# Patient Record
Sex: FEMALE | Race: WHITE | URBAN - METROPOLITAN AREA
[De-identification: names, ages, dates, MRNs, and addresses within clinical notes are randomized per-mention and may not be internally consistent; named-entity substitution may affect disease eponyms.]

---

## 2018-11-28 ENCOUNTER — INPATIENT (INPATIENT)
Facility: HOSPITAL | Age: 83
LOS: 4 days | Discharge: HOME | End: 2018-12-03
Attending: HOSPITALIST | Admitting: HOSPITALIST
Payer: MEDICARE

## 2018-11-28 VITALS — HEIGHT: 63 IN | WEIGHT: 117.07 LBS

## 2018-11-28 PROBLEM — Z00.00 ENCOUNTER FOR PREVENTIVE HEALTH EXAMINATION: Status: ACTIVE | Noted: 2018-11-28

## 2018-11-28 LAB
ABO RH CONFIRMATION: SIGNIFICANT CHANGE UP
ADD ON TEST-SPECIMEN IN LAB: SIGNIFICANT CHANGE UP
ALBUMIN SERPL ELPH-MCNC: 3.2 G/DL — LOW (ref 3.3–5)
ALP SERPL-CCNC: 80 U/L — SIGNIFICANT CHANGE UP (ref 40–120)
ALT FLD-CCNC: 25 U/L — SIGNIFICANT CHANGE UP (ref 12–78)
ANION GAP SERPL CALC-SCNC: 10 MMOL/L — SIGNIFICANT CHANGE UP (ref 5–17)
ANISOCYTOSIS BLD QL: SIGNIFICANT CHANGE UP
APPEARANCE UR: CLEAR — SIGNIFICANT CHANGE UP
APTT BLD: 25.4 SEC — LOW (ref 27.5–36.3)
AST SERPL-CCNC: 26 U/L — SIGNIFICANT CHANGE UP (ref 15–37)
BACTERIA # UR AUTO: ABNORMAL
BASO STIPL BLD QL SMEAR: PRESENT — SIGNIFICANT CHANGE UP
BASOPHILS # BLD AUTO: 0.01 K/UL — SIGNIFICANT CHANGE UP (ref 0–0.2)
BASOPHILS NFR BLD AUTO: 0.1 % — SIGNIFICANT CHANGE UP (ref 0–2)
BILIRUB SERPL-MCNC: 0.2 MG/DL — SIGNIFICANT CHANGE UP (ref 0.2–1.2)
BILIRUB UR-MCNC: NEGATIVE — SIGNIFICANT CHANGE UP
BLD GP AB SCN SERPL QL: SIGNIFICANT CHANGE UP
BUN SERPL-MCNC: 24 MG/DL — HIGH (ref 7–23)
CALCIUM SERPL-MCNC: 8.9 MG/DL — SIGNIFICANT CHANGE UP (ref 8.5–10.1)
CHLORIDE SERPL-SCNC: 104 MMOL/L — SIGNIFICANT CHANGE UP (ref 96–108)
CK SERPL-CCNC: 46 U/L — SIGNIFICANT CHANGE UP (ref 26–192)
CO2 SERPL-SCNC: 26 MMOL/L — SIGNIFICANT CHANGE UP (ref 22–31)
COLOR SPEC: YELLOW — SIGNIFICANT CHANGE UP
CREAT SERPL-MCNC: 0.99 MG/DL — SIGNIFICANT CHANGE UP (ref 0.5–1.3)
D DIMER BLD IA.RAPID-MCNC: 194 NG/ML DDU — SIGNIFICANT CHANGE UP
DIFF PNL FLD: NEGATIVE — SIGNIFICANT CHANGE UP
ELLIPTOCYTES BLD QL SMEAR: SLIGHT — SIGNIFICANT CHANGE UP
EOSINOPHIL # BLD AUTO: 0.03 K/UL — SIGNIFICANT CHANGE UP (ref 0–0.5)
EOSINOPHIL NFR BLD AUTO: 0.4 % — SIGNIFICANT CHANGE UP (ref 0–6)
EPI CELLS # UR: SIGNIFICANT CHANGE UP
GLUCOSE SERPL-MCNC: 118 MG/DL — HIGH (ref 70–99)
GLUCOSE UR QL: NEGATIVE MG/DL — SIGNIFICANT CHANGE UP
HCT VFR BLD CALC: 18.4 % — CRITICAL LOW (ref 34.5–45)
HGB BLD-MCNC: 5.7 G/DL — CRITICAL LOW (ref 11.5–15.5)
HYPOCHROMIA BLD QL: SLIGHT — SIGNIFICANT CHANGE UP
IMM GRANULOCYTES NFR BLD AUTO: 0.3 % — SIGNIFICANT CHANGE UP (ref 0–1.5)
INR BLD: 0.99 RATIO — SIGNIFICANT CHANGE UP (ref 0.88–1.16)
KETONES UR-MCNC: NEGATIVE — SIGNIFICANT CHANGE UP
LEUKOCYTE ESTERASE UR-ACNC: ABNORMAL
LYMPHOCYTES # BLD AUTO: 1.6 K/UL — SIGNIFICANT CHANGE UP (ref 1–3.3)
LYMPHOCYTES # BLD AUTO: 23.7 % — SIGNIFICANT CHANGE UP (ref 13–44)
MACROCYTES BLD QL: SLIGHT — SIGNIFICANT CHANGE UP
MAGNESIUM SERPL-MCNC: 1.9 MG/DL — SIGNIFICANT CHANGE UP (ref 1.6–2.6)
MANUAL SMEAR VERIFICATION: SIGNIFICANT CHANGE UP
MCHC RBC-ENTMCNC: 31 GM/DL — LOW (ref 32–36)
MCHC RBC-ENTMCNC: 31.3 PG — SIGNIFICANT CHANGE UP (ref 27–34)
MCV RBC AUTO: 101.1 FL — HIGH (ref 80–100)
MICROCYTES BLD QL: SLIGHT — SIGNIFICANT CHANGE UP
MONOCYTES # BLD AUTO: 0.62 K/UL — SIGNIFICANT CHANGE UP (ref 0–0.9)
MONOCYTES NFR BLD AUTO: 9.2 % — SIGNIFICANT CHANGE UP (ref 2–14)
NEUTROPHILS # BLD AUTO: 4.47 K/UL — SIGNIFICANT CHANGE UP (ref 1.8–7.4)
NEUTROPHILS NFR BLD AUTO: 66.3 % — SIGNIFICANT CHANGE UP (ref 43–77)
NITRITE UR-MCNC: NEGATIVE — SIGNIFICANT CHANGE UP
NRBC # BLD: 0 /100 WBCS — SIGNIFICANT CHANGE UP (ref 0–0)
PH UR: 6.5 — SIGNIFICANT CHANGE UP (ref 5–8)
PHOSPHATE SERPL-MCNC: 3 MG/DL — SIGNIFICANT CHANGE UP (ref 2.5–4.5)
PLAT MORPH BLD: NORMAL — SIGNIFICANT CHANGE UP
PLATELET # BLD AUTO: 201 K/UL — SIGNIFICANT CHANGE UP (ref 150–400)
POIKILOCYTOSIS BLD QL AUTO: SLIGHT — SIGNIFICANT CHANGE UP
POLYCHROMASIA BLD QL SMEAR: SLIGHT — SIGNIFICANT CHANGE UP
POTASSIUM SERPL-MCNC: 4.1 MMOL/L — SIGNIFICANT CHANGE UP (ref 3.5–5.3)
POTASSIUM SERPL-SCNC: 4.1 MMOL/L — SIGNIFICANT CHANGE UP (ref 3.5–5.3)
PROT SERPL-MCNC: 6.2 GM/DL — SIGNIFICANT CHANGE UP (ref 6–8.3)
PROT UR-MCNC: NEGATIVE MG/DL — SIGNIFICANT CHANGE UP
PROTHROM AB SERPL-ACNC: 11 SEC — SIGNIFICANT CHANGE UP (ref 10–12.9)
RBC # BLD: 1.82 M/UL — LOW (ref 3.8–5.2)
RBC # FLD: 17.2 % — HIGH (ref 10.3–14.5)
RBC BLD AUTO: ABNORMAL
RBC CASTS # UR COMP ASSIST: SIGNIFICANT CHANGE UP /HPF (ref 0–4)
ROULEAUX BLD QL SMEAR: PRESENT
SODIUM SERPL-SCNC: 140 MMOL/L — SIGNIFICANT CHANGE UP (ref 135–145)
SP GR SPEC: 1 — LOW (ref 1.01–1.02)
TROPONIN I SERPL-MCNC: 0.02 NG/ML — SIGNIFICANT CHANGE UP (ref 0.01–0.04)
TROPONIN I SERPL-MCNC: 0.03 NG/ML — SIGNIFICANT CHANGE UP (ref 0.01–0.04)
TYPE + AB SCN PNL BLD: SIGNIFICANT CHANGE UP
UROBILINOGEN FLD QL: NEGATIVE MG/DL — SIGNIFICANT CHANGE UP
WBC # BLD: 6.75 K/UL — SIGNIFICANT CHANGE UP (ref 3.8–10.5)
WBC # FLD AUTO: 6.75 K/UL — SIGNIFICANT CHANGE UP (ref 3.8–10.5)
WBC UR QL: SIGNIFICANT CHANGE UP

## 2018-11-28 PROCEDURE — 74176 CT ABD & PELVIS W/O CONTRAST: CPT | Mod: 26

## 2018-11-28 PROCEDURE — 71045 X-RAY EXAM CHEST 1 VIEW: CPT | Mod: 26

## 2018-11-28 PROCEDURE — 93010 ELECTROCARDIOGRAM REPORT: CPT

## 2018-11-28 PROCEDURE — 99291 CRITICAL CARE FIRST HOUR: CPT

## 2018-11-28 RX ORDER — PANTOPRAZOLE SODIUM 20 MG/1
40 TABLET, DELAYED RELEASE ORAL EVERY 12 HOURS
Qty: 0 | Refills: 0 | Status: DISCONTINUED | OUTPATIENT
Start: 2018-11-29 | End: 2018-11-29

## 2018-11-28 RX ORDER — FUROSEMIDE 40 MG
20 TABLET ORAL DAILY
Qty: 0 | Refills: 0 | Status: DISCONTINUED | OUTPATIENT
Start: 2018-11-28 | End: 2018-12-03

## 2018-11-28 RX ORDER — ALPRAZOLAM 0.25 MG
0.25 TABLET ORAL AT BEDTIME
Qty: 0 | Refills: 0 | Status: DISCONTINUED | OUTPATIENT
Start: 2018-11-28 | End: 2018-12-03

## 2018-11-28 RX ORDER — LOSARTAN POTASSIUM 100 MG/1
50 TABLET, FILM COATED ORAL ONCE
Qty: 0 | Refills: 0 | Status: COMPLETED | OUTPATIENT
Start: 2018-11-28 | End: 2018-11-28

## 2018-11-28 RX ORDER — PANTOPRAZOLE SODIUM 20 MG/1
TABLET, DELAYED RELEASE ORAL
Qty: 0 | Refills: 0 | Status: DISCONTINUED | OUTPATIENT
Start: 2018-11-28 | End: 2018-11-29

## 2018-11-28 RX ORDER — ACETAMINOPHEN 500 MG
650 TABLET ORAL EVERY 6 HOURS
Qty: 0 | Refills: 0 | Status: DISCONTINUED | OUTPATIENT
Start: 2018-11-28 | End: 2018-12-03

## 2018-11-28 RX ORDER — HYDRALAZINE HCL 50 MG
25 TABLET ORAL EVERY 12 HOURS
Qty: 0 | Refills: 0 | Status: DISCONTINUED | OUTPATIENT
Start: 2018-11-28 | End: 2018-12-03

## 2018-11-28 RX ORDER — ATORVASTATIN CALCIUM 80 MG/1
40 TABLET, FILM COATED ORAL AT BEDTIME
Qty: 0 | Refills: 0 | Status: DISCONTINUED | OUTPATIENT
Start: 2018-11-28 | End: 2018-12-03

## 2018-11-28 RX ORDER — PANTOPRAZOLE SODIUM 20 MG/1
40 TABLET, DELAYED RELEASE ORAL ONCE
Qty: 0 | Refills: 0 | Status: COMPLETED | OUTPATIENT
Start: 2018-11-28 | End: 2018-11-28

## 2018-11-28 RX ORDER — METOPROLOL TARTRATE 50 MG
25 TABLET ORAL DAILY
Qty: 0 | Refills: 0 | Status: DISCONTINUED | OUTPATIENT
Start: 2018-11-28 | End: 2018-12-03

## 2018-11-28 RX ORDER — SODIUM CHLORIDE 9 MG/ML
1 INJECTION INTRAMUSCULAR; INTRAVENOUS; SUBCUTANEOUS THREE TIMES A DAY
Qty: 0 | Refills: 0 | Status: DISCONTINUED | OUTPATIENT
Start: 2018-11-28 | End: 2018-12-03

## 2018-11-28 RX ORDER — SODIUM CHLORIDE 9 MG/ML
500 INJECTION INTRAMUSCULAR; INTRAVENOUS; SUBCUTANEOUS ONCE
Qty: 0 | Refills: 0 | Status: COMPLETED | OUTPATIENT
Start: 2018-11-28 | End: 2018-11-28

## 2018-11-28 RX ADMIN — SODIUM CHLORIDE 500 MILLILITER(S): 9 INJECTION INTRAMUSCULAR; INTRAVENOUS; SUBCUTANEOUS at 16:07

## 2018-11-28 RX ADMIN — PANTOPRAZOLE SODIUM 40 MILLIGRAM(S): 20 TABLET, DELAYED RELEASE ORAL at 21:38

## 2018-11-28 RX ADMIN — LOSARTAN POTASSIUM 50 MILLIGRAM(S): 100 TABLET, FILM COATED ORAL at 20:09

## 2018-11-28 RX ADMIN — Medication 25 MILLIGRAM(S): at 22:59

## 2018-11-28 RX ADMIN — SODIUM CHLORIDE 500 MILLILITER(S): 9 INJECTION INTRAMUSCULAR; INTRAVENOUS; SUBCUTANEOUS at 15:07

## 2018-11-28 RX ADMIN — Medication 25 MILLIGRAM(S): at 21:38

## 2018-11-28 RX ADMIN — ATORVASTATIN CALCIUM 40 MILLIGRAM(S): 80 TABLET, FILM COATED ORAL at 22:59

## 2018-11-28 NOTE — H&P ADULT - ASSESSMENT
89 yo female presented with chest pain, sob and weakness.      A/P:    1.  Chest pain  Shortness of breath  Weakness  Acute Anemia  ??Acute blood loss  -will give 2 units PRBC transfusion  -follow clinically and cbc  -follow CT Abd/Pelvis report  -hold aspirin and plavix  -on IV protonix   -follow GI and Cardiology consults    2.  HTN  -follow BP and adjust medications as needed    3.  Anxiety  -on Xanax    4.  HLD  -on statin    5.  h/o Hyponatremia  -on salt tabs    6.  SCD for DVT ppx

## 2018-11-28 NOTE — ED PROVIDER NOTE - MEDICAL DECISION MAKING DETAILS
pt with hx of anemia, lymphoma, salt wasting disease here with many days of chest pressure and weakness. Labs, CXR, saline.

## 2018-11-28 NOTE — ED PROVIDER NOTE - CARE PLAN
Principal Discharge DX:	Severe anemia  Secondary Diagnosis:	Weakness  Secondary Diagnosis:	Chest pain, unspecified type

## 2018-11-28 NOTE — ED ADULT NURSE REASSESSMENT NOTE - NS ED NURSE REASSESS COMMENT FT1
Called Dr Roque in reference to patient's elevated bp. Just gave patient 25mg hydralyzine.  Dr roque stated to let floor monitor and call him back if bp remains elevated.  called floor and spoke with Verito on 3e

## 2018-11-28 NOTE — ED PROVIDER NOTE - OBJECTIVE STATEMENT
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 +HA, chest discomfort. States that usually she gets episodes of weakness and chest discomfort during the morning, right after she wakes up. Describes the chest discomfort as "heaviness". As per daughter at bedside, pt is visiting from [place] and has been in [place] for about a week. Denies fever, cough, diarrhea, bladder incontinence. No hx of MI. No hx of CVA. Takes ASA. Nonsmoker.

## 2018-11-28 NOTE — ED ADULT NURSE NOTE - NSIMPLEMENTINTERV_GEN_ALL_ED
Implemented All Fall Risk Interventions:  [place] to call system. Call bell, personal items and telephone within reach. Instruct patient to call for assistance. Room bathroom lighting operational. Non-slip footwear when patient is off stretcher. Physically safe environment: no spills, clutter or unnecessary equipment. Stretcher in lowest position, wheels locked, appropriate side rails in place. Provide visual cue, wrist band, yellow gown, etc. Monitor gait and stability. Monitor for mental status changes and reorient to person, place, and time. Review medications for side effects contributing to fall risk. Reinforce activity limits and safety measures with patient and family.

## 2018-11-28 NOTE — H&P ADULT - NSHPPHYSICALEXAM_GEN_ALL_CORE
Vital Signs Last 24 Hrs  T(C): 36.5 (28 Nov 2018 22:43), Max: 37 (28 Nov 2018 12:37)  T(F): 97.7 (28 Nov 2018 22:43), Max: 98.6 (28 Nov 2018 12:37)  HR: 87 (28 Nov 2018 22:43) (85 - 96)  BP: 196/63 (28 Nov 2018 22:43) (171/64 - 205/80)  RR: 17 (28 Nov 2018 22:43) (17 - 19)  SpO2: 98% (28 Nov 2018 22:43) (98% - 100%)

## 2018-11-28 NOTE — H&P ADULT - NEUROLOGICAL DETAILS
responds to pain/cranial nerves intact/normal strength/alert and oriented x 3/responds to verbal commands

## 2018-11-28 NOTE — H&P ADULT - HISTORY OF PRESENT ILLNESS
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 +HA, chest discomfort. States that usually she gets episodes of weakness and chest discomfort during the morning, right after she wakes up. Describes the chest discomfort as "heaviness". As per daughter at bedside, pt is visiting from [place] and has been in [place] for about a week. Denies fever, cough, diarrhea, bladder incontinence. No hx of MI. No hx of CVA. Takes ASA. 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 +HA, chest discomfort. States that usually she gets episodes of weakness and chest discomfort during the morning, right after she wakes up. Describes the chest discomfort as "heaviness". As per daughter at bedside, pt is visiting from [place] and has been in [place] for about a week. Denies fever, cough, diarrhea, bladder incontinence. No hx of MI. No hx of CVA. Takes ASA. Nonsmoker. She denies any abdominal pain, any black stool, any blood in stool, any vomiting of blood or any type of active bleeding.     Family hx;  Patient is unable to provide detail family history due to her current clinical status.

## 2018-11-28 NOTE — ED STATDOCS - PROGRESS NOTE DETAILS
Craig NP for Dr. 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 As per daughter at bedside, pt is visiting from [place] and has been in [place] for about a week. Pt recently had a flu shot done, and believes it got infected. Will send pt to main ED for further evaluation.

## 2018-11-29 LAB
ANION GAP SERPL CALC-SCNC: 8 MMOL/L — SIGNIFICANT CHANGE UP (ref 5–17)
BASOPHILS # BLD AUTO: 0.01 K/UL — SIGNIFICANT CHANGE UP (ref 0–0.2)
BASOPHILS NFR BLD AUTO: 0.2 % — SIGNIFICANT CHANGE UP (ref 0–2)
BUN SERPL-MCNC: 26 MG/DL — HIGH (ref 7–23)
CALCIUM SERPL-MCNC: 8.7 MG/DL — SIGNIFICANT CHANGE UP (ref 8.5–10.1)
CHLORIDE SERPL-SCNC: 105 MMOL/L — SIGNIFICANT CHANGE UP (ref 96–108)
CO2 SERPL-SCNC: 26 MMOL/L — SIGNIFICANT CHANGE UP (ref 22–31)
CREAT SERPL-MCNC: 0.71 MG/DL — SIGNIFICANT CHANGE UP (ref 0.5–1.3)
EOSINOPHIL # BLD AUTO: 0.07 K/UL — SIGNIFICANT CHANGE UP (ref 0–0.5)
EOSINOPHIL NFR BLD AUTO: 1.2 % — SIGNIFICANT CHANGE UP (ref 0–6)
GLUCOSE SERPL-MCNC: 91 MG/DL — SIGNIFICANT CHANGE UP (ref 70–99)
HCT VFR BLD CALC: 26 % — LOW (ref 34.5–45)
HGB BLD-MCNC: 8.5 G/DL — LOW (ref 11.5–15.5)
IMM GRANULOCYTES NFR BLD AUTO: 0.3 % — SIGNIFICANT CHANGE UP (ref 0–1.5)
LYMPHOCYTES # BLD AUTO: 1.15 K/UL — SIGNIFICANT CHANGE UP (ref 1–3.3)
LYMPHOCYTES # BLD AUTO: 19.5 % — SIGNIFICANT CHANGE UP (ref 13–44)
MCHC RBC-ENTMCNC: 30.2 PG — SIGNIFICANT CHANGE UP (ref 27–34)
MCHC RBC-ENTMCNC: 32.7 GM/DL — SIGNIFICANT CHANGE UP (ref 32–36)
MCV RBC AUTO: 92.5 FL — SIGNIFICANT CHANGE UP (ref 80–100)
MONOCYTES # BLD AUTO: 0.78 K/UL — SIGNIFICANT CHANGE UP (ref 0–0.9)
MONOCYTES NFR BLD AUTO: 13.2 % — SIGNIFICANT CHANGE UP (ref 2–14)
NEUTROPHILS # BLD AUTO: 3.88 K/UL — SIGNIFICANT CHANGE UP (ref 1.8–7.4)
NEUTROPHILS NFR BLD AUTO: 65.6 % — SIGNIFICANT CHANGE UP (ref 43–77)
NRBC # BLD: 0 /100 WBCS — SIGNIFICANT CHANGE UP (ref 0–0)
PLATELET # BLD AUTO: 153 K/UL — SIGNIFICANT CHANGE UP (ref 150–400)
POTASSIUM SERPL-MCNC: 4 MMOL/L — SIGNIFICANT CHANGE UP (ref 3.5–5.3)
POTASSIUM SERPL-SCNC: 4 MMOL/L — SIGNIFICANT CHANGE UP (ref 3.5–5.3)
RBC # BLD: 2.81 M/UL — LOW (ref 3.8–5.2)
RBC # FLD: 17.8 % — HIGH (ref 10.3–14.5)
SODIUM SERPL-SCNC: 139 MMOL/L — SIGNIFICANT CHANGE UP (ref 135–145)
TROPONIN I SERPL-MCNC: 0.04 NG/ML — SIGNIFICANT CHANGE UP (ref 0.01–0.04)
WBC # BLD: 5.91 K/UL — SIGNIFICANT CHANGE UP (ref 3.8–10.5)
WBC # FLD AUTO: 5.91 K/UL — SIGNIFICANT CHANGE UP (ref 3.8–10.5)

## 2018-11-29 RX ORDER — PANTOPRAZOLE SODIUM 20 MG/1
8 TABLET, DELAYED RELEASE ORAL
Qty: 80 | Refills: 0 | Status: DISCONTINUED | OUTPATIENT
Start: 2018-11-29 | End: 2018-11-30

## 2018-11-29 RX ORDER — PANTOPRAZOLE SODIUM 20 MG/1
80 TABLET, DELAYED RELEASE ORAL ONCE
Qty: 0 | Refills: 0 | Status: DISCONTINUED | OUTPATIENT
Start: 2018-11-29 | End: 2018-11-30

## 2018-11-29 RX ADMIN — SODIUM CHLORIDE 1 GRAM(S): 9 INJECTION INTRAMUSCULAR; INTRAVENOUS; SUBCUTANEOUS at 14:15

## 2018-11-29 RX ADMIN — PANTOPRAZOLE SODIUM 10 MG/HR: 20 TABLET, DELAYED RELEASE ORAL at 21:21

## 2018-11-29 RX ADMIN — Medication 25 MILLIGRAM(S): at 05:31

## 2018-11-29 RX ADMIN — Medication 20 MILLIGRAM(S): at 05:32

## 2018-11-29 RX ADMIN — ATORVASTATIN CALCIUM 40 MILLIGRAM(S): 80 TABLET, FILM COATED ORAL at 21:18

## 2018-11-29 RX ADMIN — PANTOPRAZOLE SODIUM 40 MILLIGRAM(S): 20 TABLET, DELAYED RELEASE ORAL at 05:31

## 2018-11-29 RX ADMIN — SODIUM CHLORIDE 1 GRAM(S): 9 INJECTION INTRAMUSCULAR; INTRAVENOUS; SUBCUTANEOUS at 05:31

## 2018-11-29 RX ADMIN — SODIUM CHLORIDE 1 GRAM(S): 9 INJECTION INTRAMUSCULAR; INTRAVENOUS; SUBCUTANEOUS at 21:18

## 2018-11-29 RX ADMIN — Medication 25 MILLIGRAM(S): at 17:45

## 2018-11-29 RX ADMIN — PANTOPRAZOLE SODIUM 40 MILLIGRAM(S): 20 TABLET, DELAYED RELEASE ORAL at 17:44

## 2018-11-29 NOTE — CONSULT NOTE ADULT - SUBJECTIVE AND OBJECTIVE BOX
Patient is a 89y old  Female who presents with a chief complaint of complain of sob and chest pain .      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 +HA, chest discomfort. States that usually she gets episodes of weakness and chest discomfort during the morning, right after she wakes up. Describes the chest discomfort as "heaviness". As per daughter at the time of admission, pt is visiting from [place] and has been in [place] for about a week. Denies fever, cough, diarrhea, bladder incontinence. No hx of MI. No hx of CVA. Takes ASA. Nonsmoker. She denies any abdominal pain, any black stool, any blood in stool, any vomiting of blood or any type of active bleeding.       Pt received PRBC transfusion and she states that she felt better after that.  Denies any SOB or CP at this time.  She states that her primary compliant was generalized weakness and she had some SOB.  With SOB she states that she had chest heaviness.  Stress test last was many yrs ago per pt.  She had similar symptoms in [place] and she had loop recorder implanted.  Denies any prior cardiac hx.        PAST MEDICAL & SURGICAL HISTORY:  Lymphoma  HTN (hypertension)  Iron deficiency  No significant past surgical history      MEDICATIONS  (STANDING):  atorvastatin 40 milliGRAM(s) Oral at bedtime  furosemide    Tablet 20 milliGRAM(s) Oral daily  hydrALAZINE 25 milliGRAM(s) Oral every 12 hours  metoprolol succinate ER 25 milliGRAM(s) Oral daily  pantoprazole  Injectable      pantoprazole  Injectable 40 milliGRAM(s) IV Push every 12 hours  sodium chloride 1 Gram(s) Oral three times a day    MEDICATIONS  (PRN):  acetaminophen   Tablet .. 650 milliGRAM(s) Oral every 6 hours PRN Temp greater or equal to 38C (100.4F), Mild Pain (1 - 3)  ALPRAZolam 0.25 milliGRAM(s) Oral at bedtime PRN anxiety or insomnia      FAMILY HISTORY:  No family history of premature CAD or SCD.     SOCIAL HISTORY:  no smoker in last few yrs.     REVIEW OF SYSTEMS:  CONSTITUTIONAL:    No fatigue, malaise, lethargy.  No fever or chills. per HPI  HEENT:  Eyes:  No visual changes.     ENT:  No epistaxis.  No sinus pain.    RESPIRATORY:  No cough.  No wheeze.  No hemoptysis.  c/o shortness of breath.  CARDIOVASCULAR:  c/o chest pains.  No palpitations. c/o shortness of breath, No orthopnea or PND.  GASTROINTESTINAL:  No abdominal pain.  No nausea or vomiting.    GENITOURINARY:    No hematuria.    MUSCULOSKELETAL:  No musculoskeletal pain.  No joint swelling.  No arthritis.  NEUROLOGICAL:  No tingling or numbness or weakness.  PSYCHIATRIC:  No confusion  SKIN:  No rashes.    ENDOCRINE:  No unexplained weight loss.  No polydipsia.   HEMATOLOGIC:  No anemia.  No prolonged or excessive bleeding.   ALLERGIC AND IMMUNOLOGIC:  No pruritus.          Vital Signs Last 24 Hrs  T(C): 36.6 (2018 04:41), Max: 37 (2018 12:37)  T(F): 97.8 (2018 04:41), Max: 98.6 (2018 12:37)  HR: 86 (2018 04:41) (85 - 96)  BP: 147/49 (2018 04:41) (147/49 - 205/80)  BP(mean): --  RR: 17 (2018 22:43) (17 - 19)  SpO2: 97% (2018 04:41) (97% - 100%)    PHYSICAL EXAM-    Constitutional: elderly frail , pale looking female in no acute distress.     Head: Head is normocephalic and atraumatic.      Neck: No jugular venous distention. No audible carotid bruits.   Cardiovascular: Regular rate and rhythm without S3, S4. No murmurs or rubs are appreciated.      Respiratory: Breath sounds are normal. No rales. No wheezing.    Abdomen: Soft, nontender, nondistended with positive bowel sounds.      Extremity: No tenderness. No  pitting edema     Neurologic: The patient is alert and oriented.      Skin: No rash, no obvious lesions noted.      Psychiatric: The patient appears to be emotionally stable.      INTERPRETATION OF TELEMETRY: sinus rythm.     ECG: Sinus rythm , normal axis, no ST T changes. isolated PVCs.     I&O's Detail    2018 07:01  -  2018 07:00  --------------------------------------------------------  IN:    Packed Red Blood Cells: 605 mL  Total IN: 605 mL    OUT:  Total OUT: 0 mL    Total NET: 605 mL          LABS:                        5.7    6.75  )-----------( 201      ( 2018 14:27 )             18.4         140  |  104  |  24<H>  ----------------------------<  118<H>  4.1   |  26  |  0.99    Ca    8.9      2018 14:27  Phos  3.0       Mg     1.9         TPro  6.2  /  Alb  3.2<L>  /  TBili  0.2  /  DBili  x   /  AST  26  /  ALT  25  /  AlkPhos  80      CARDIAC MARKERS ( 2018 01:15 )  0.041 ng/mL / x     / x     / x     / x      CARDIAC MARKERS ( 2018 15:40 )  0.023 ng/mL / x     / x     / x     / x      CARDIAC MARKERS ( 2018 14:27 )  0.027 ng/mL / x     / 46 U/L / x     / x          PT/INR - ( 2018 15:40 )   PT: 11.0 sec;   INR: 0.99 ratio         PTT - ( 2018 15:40 )  PTT:25.4 sec  Urinalysis Basic - ( 2018 14:27 )    Color: Yellow / Appearance: Clear / S.005 / pH: x  Gluc: x / Ketone: Negative  / Bili: Negative / Urobili: Negative mg/dL   Blood: x / Protein: Negative mg/dL / Nitrite: Negative   Leuk Esterase: Trace / RBC: 0-2 /HPF / WBC 3-5   Sq Epi: x / Non Sq Epi: Few / Bacteria: Few      I&O's Summary    2018 07:01  -  2018 07:00  --------------------------------------------------------  IN: 605 mL / OUT: 0 mL / NET: 605 mL      BNP  RADIOLOGY & ADDITIONAL STUDIES:  < from: CT Abdomen and Pelvis No Cont (18 @ 21:37) >    ******PRELIMINARY REPORT******    ******PRELIMINARY REPORT******          EXAM:  CT ABDOMEN AND PELVIS                            PROCEDURE DATE:  2018    ******PRELIMINARY REPORT******    ******PRELIMINARY REPORT******              INTERPRETATION:  [facility] PRELIMINARY REPORT    EXAM:    CT Abdomen and Pelvis Without Intravenous Contrast     EXAM DATE/TIME:    2018 9:45 PM     CLINICAL HISTORY:    89 years old, female; Pain; Abdominal pain; Generalized; Patient HX:   Drop in   hb from 11.3 to 5.7     TECHNIQUE:    Axial computed tomography images of the abdomen and pelvis without   intravenous   contrast.    Coronal and sagittal reformatted images were created and reviewed.     COMPARISON:    No relevant prior studies available.     FINDINGS:    Lower thorax:  Nodular soft tissue density right middle lobe measuring 1   cm.   Possible linear versus nodular soft tissue density within the right   middle and   lingula.     ABDOMEN:    Liver: Normal. No mass.    Gallbladder and bile ducts: Normal. No calcified stones. No ductal   dilation.    Pancreas: Normal. No ductal dilation.    Spleen: Normal. No splenomegaly.    Adrenals: Normal. No mass.    Kidneys and ureters:  Subcentimeter cyst involving the lower pole of the   left   kidney, poorly characterize.    Stomach and bowel:  No evidence of bowel obstruction. Mild - moderate   amount   retained stool material throughout nondilated colon.    Appendix:  Appendix - visualized portions appear normal.     PELVIS:    Bladder:  Moderate fluid distention of the bladder.    Reproductive:  Uterus is not identified.     ABDOMEN and PELVIS:    Intraperitoneal space:  No evidence of focal hematoma or intraperitoneal   hemorrhage.    Bones/joints:  Chronic degenerative changes of the lumbar spine.    Soft tissues: Unremarkable.    Vasculature:  Chronic atherosclerotic calcification of the vasculature.    Lymph nodes: Normal. No enlarged lymph nodes.     IMPRESSION:   1. No evidence of focal hematoma or intraperitoneal hemorrhage.   2. No evidence of bowel obstruction.   3. Moderate fluid distention of the bladder.   4. Subcentimeter cyst involving the lower pole of the left kidney, poorly   characterize.   5. Nodular soft tissue density right middle lobe measuring 1 cm. Possible   linear versus nodular soft tissue density within the right middle and   lingula.   For low and high risk patients, CT at 3-6 months, then consider CT at   18-24   months. For high risk patients, consider CT at 3-6 months, then CT at   18-24   months.          ******PRELIMINARY REPORT******    ******PRELIMINARY REPORT******              EZIO AGUSTIN M.D.;[facility] RADIOLOG[facility]      < end of copied text >

## 2018-11-29 NOTE — PROGRESS NOTE ADULT - SUBJECTIVE AND OBJECTIVE BOX
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 +HA, chest discomfort. States that usually she gets episodes of weakness and chest discomfort during the morning, right after she wakes up. Describes the chest discomfort as "heaviness". As per daughter at bedside, pt is visiting from [place] and has been in [place] for about a week. Denies fever, cough, diarrhea, bladder incontinence. No hx of MI. No hx of CVA. Takes ASA. Nonsmoker. She denies any abdominal pain, any black stool, any blood in stool, any vomiting of blood or any type of active bleeding.     : Lying in bed, comfortable offers no complaints. Denies fever, chills, N, V, abd pain, CP, SOB.    REVIEW OF SYSTEMS: All other review of systems is negative unless indicated above.    Vital Signs Last 24 Hrs  T(C): 36.9 (2018 12:04), Max: 36.9 (2018 12:04)  T(F): 98.4 (2018 12:04), Max: 98.4 (2018 12:04)  HR: 85 (2018 12:04) (85 - 90)  BP: 120/44 (2018 12:04) (120/44 - 205/80)  BP(mean): --  RR: 15 (2018 12:04) (15 - 19)  SpO2: 96% (2018 12:04) (96% - 98%)    PHYSICAL EXAM:    Constitutional: NAD, awake and alert, frail, elderly  HEENT: PERR, EOMI, Normal Hearing, MMM  Neck: Soft and supple  Respiratory: Breath sounds are clear bilaterally, No wheezing, rales or rhonchi  Cardiovascular: S1 and S2, regular rate and rhythm, no Murmurs, gallops or rubs  Gastrointestinal: Bowel Sounds present, soft, nontender, nondistended, no guarding, no rebound  Extremities: No peripheral edema  Neurological: A/O x 3, no focal deficits in my limited exam  Skin: No rashes        MEDICATIONS  (STANDING):  atorvastatin 40 milliGRAM(s) Oral at bedtime  furosemide    Tablet 20 milliGRAM(s) Oral daily  hydrALAZINE 25 milliGRAM(s) Oral every 12 hours  metoprolol succinate ER 25 milliGRAM(s) Oral daily  pantoprazole  Injectable      pantoprazole  Injectable 40 milliGRAM(s) IV Push every 12 hours  sodium chloride 1 Gram(s) Oral three times a day    MEDICATIONS  (PRN):  acetaminophen   Tablet .. 650 milliGRAM(s) Oral every 6 hours PRN Temp greater or equal to 38C (100.4F), Mild Pain (1 - 3)  ALPRAZolam 0.25 milliGRAM(s) Oral at bedtime PRN anxiety or insomnia    CARDIAC MARKERS ( 2018 01:15 )  0.041 ng/mL / x     / x     / x     / x      CARDIAC MARKERS ( 2018 15:40 )  0.023 ng/mL / x     / x     / x     / x      CARDIAC MARKERS ( 2018 14:27 )  0.027 ng/mL / x     / 46 U/L / x     / x                                8.5    5.91  )-----------( 153      ( 2018 06:08 )             26.0         139  |  105  |  26<H>  ----------------------------<  91  4.0   |  26  |  0.71    Ca    8.7      2018 06:08  Phos  3.0       Mg     1.9         TPro  6.2  /  Alb  3.2<L>  /  TBili  0.2  /  DBili  x   /  AST  26  /  ALT  25  /  AlkPhos  80      CAPILLARY BLOOD GLUCOSE        LIVER FUNCTIONS - ( 2018 14:27 )  Alb: 3.2 g/dL / Pro: 6.2 gm/dL / ALK PHOS: 80 U/L / ALT: 25 U/L / AST: 26 U/L / GGT: x           PT/INR - ( 2018 15:40 )   PT: 11.0 sec;   INR: 0.99 ratio         PTT - ( 2018 15:40 )  PTT:25.4 sec  Urinalysis Basic - ( 2018 14:27 )    Color: Yellow / Appearance: Clear / S.005 / pH: x  Gluc: x / Ketone: Negative  / Bili: Negative / Urobili: Negative mg/dL   Blood: x / Protein: Negative mg/dL / Nitrite: Negative   Leuk Esterase: Trace / RBC: 0-2 /HPF / WBC 3-5   Sq Epi: x / Non Sq Epi: Few / Bacteria: Few      Assessment and Plan:  89 yo female presented with chest pain, sob and weakness.    CP/SOB/weakness: Probably secondary to acute on chronic anemia: r/o GI bleed  -trops neg x 3  -chest xray no acute pathology  -2 units PRBC transfusion  -CT Abd/Pelvis --> Moderate fluid distention of the bladder.  Nodular soft tissue density right middle lobe measuring 1 cm.  -hold aspirin and plavix  -on IV protonix   -plan for scope    Hx of lymphoma:  -onc on board    HTN  -loop recorder --> sinus 90s  -follow BP and adjust medications as needed    Anxiety  -on Xanax    HLD  -on statin    h/o Hyponatremia  -on salt tabs      SCD for DVT ppx

## 2018-11-29 NOTE — CHART NOTE - NSCHARTNOTEFT_GEN_A_CORE
EP asked to interrogate implantable loop monitor:    Rhythm is sinus 90's.  Episodes of sinus tachycardia/atrial tach in past few days.    No true atrial fib seen.  No pauses, heart block or bradycardia.

## 2018-11-29 NOTE — CONSULT NOTE ADULT - ASSESSMENT
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 +HA, chest discomfort. Hematology consult requested for anemia. 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 +HA, chest discomfort. Hematology consult requested for anemia.  Hb on admission 5.7 g/dl s/p transfusion of 2 units of PRBCs HB 8.5g/dl. Likely GI bleed, as per patient scheduled for EGD and colonoscopy.     Anemia work up ordered, will evaluate iron levels and determine if patient will benefit from iron infusion.    Request records from oncologist in [place]. CT of the abdomen and pelvis does not show evidence of disease.     Continue supportive care.

## 2018-11-29 NOTE — CONSULT NOTE ADULT - ASSESSMENT
SOB and chest pressure- likely from severe anemia.  So far cardiac enzymes and EKG did not reveal any ischemia.   No further workup recommended unless symptoms recur.   f/u with cardiologist as outpt.     s/p Loop recorder impantation- will request interrogation of the device.    Anemia- recheck Hgb.  Management pre primary team.     HTN- BP mildly elevated.  Resume outpt meds for HTN.    Other medical issues- Management per primary team.   Thank you for allowing me to participate in the care of this patient. Please feel free to contact me with any questions. SOB and chest pressure- likely from severe anemia.  So far cardiac enzymes and EKG did not reveal any ischemia.   No further workup recommended unless symptoms recur.   f/u with cardiologist as outpt.     s/p Loop recorder impantation- will request interrogation of the device.    Anemia- recheck Hgb.  Management pre primary team.     HTN- BP mildly elevated.  Resume outpt meds for HTN.    Other medical issues- Management per primary team.   Thank you for allowing me to participate in the care of this patient. Please feel free to contact me with any questions.     Addendum-  Pt stated to gi team that she had syncope and that is the reason why loop recorder implanted.She did not endorse this to me or to EP team.  Her loop interrogation showed tachycardia in the last few days that is probably sinus in nature based on rythm strips.  This could be from severe anemia.  Per Gi team pt has black stools and would need workup.  We do not have any of her ischemic HD workup here in our system.  Histjohnny as stated in HPI.  Patient is a moderate risk patient for a moderate risk procedure with poor functional tolerance.  Patient can proceed with anticipated procedure without any further cardiac testing.  Patient is optimized from cardiac standpoint.  Continue periop betablockers.

## 2018-11-29 NOTE — CONSULT NOTE ADULT - SUBJECTIVE AND OBJECTIVE BOX
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 +HA, chest discomfort. Hematology consult requested for anemia.     PAST MEDICAL & SURGICAL HISTORY:  Lymphoma  HTN (hypertension)  Iron deficiency  No significant past surgical history    FAMILY HISTORY:  not significant.        Review of Systems:  · Negative General Symptoms	no fever; no chills	  · Negative Skin Symptoms	no rash; no itching	  · Negative Ophthalmologic Symptoms	no diplopia; no photophobia	  · Negative ENMT Symptoms	no hearing difficulty; no ear pain	  · Negative Respiratory and Thorax Symptoms	no wheezing; no cough	  · Respiratory and Thorax Symptoms	dyspnea 	  · Negative Cardiovascular Symptoms	no palpitations	  · Cardiovascular Symptoms	chest pain	  · Negative Gastrointestinal Symptoms	no nausea; no vomiting; no diarrhea	  · Negative General Genitourinary Symptoms	no hematuria; no renal colic	  · Negative Musculoskeletal Symptoms	no arthralgia; no arthritis	  · Negative Neurological Symptoms	no transient paralysis; no weakness	  · Negative Psychiatric Symptoms	no suicidal ideation; no depression	  · Negative Hematology Symptoms	no gum bleeding; no nose bleeding	  · Negative Endocrine Symptoms	no cold intolerance; no heat intolerance	  · Negative Allergic Reactions	no anaphylaxis; no respiratory distress	      MEDICATIONS  (STANDING):  atorvastatin 40 milliGRAM(s) Oral at bedtime  furosemide    Tablet 20 milliGRAM(s) Oral daily  hydrALAZINE 25 milliGRAM(s) Oral every 12 hours  metoprolol succinate ER 25 milliGRAM(s) Oral daily  pantoprazole  Injectable      pantoprazole  Injectable 40 milliGRAM(s) IV Push every 12 hours  sodium chloride 1 Gram(s) Oral three times a day    MEDICATIONS  (PRN):  acetaminophen   Tablet .. 650 milliGRAM(s) Oral every 6 hours PRN Temp greater or equal to 38C (100.4F), Mild Pain (1 - 3)  ALPRAZolam 0.25 milliGRAM(s) Oral at bedtime PRN anxiety or insomnia        ICU Vital Signs Last 24 Hrs  T(C): 36.6 (29 Nov 2018 04:41), Max: 37 (28 Nov 2018 12:37)  T(F): 97.8 (29 Nov 2018 04:41), Max: 98.6 (28 Nov 2018 12:37)  HR: 86 (29 Nov 2018 04:41) (85 - 96)  BP: 147/49 (29 Nov 2018 04:41) (147/49 - 205/80)  BP(mean): --  ABP: --  ABP(mean): --  RR: 17 (28 Nov 2018 22:43) (17 - 19)  SpO2: 97% (29 Nov 2018 04:41) (97% - 100%)      General elderly  woman in NAD  HEENT  Lungs: Bilaterally clear, no W/R/C  CVS: S1 s2 regular, no M/R/G  Abdomen: soft NT ND positive for BS, no organomegaly.  Extremities: No C/C/E      Complete Blood Count + Automated Diff (11.29.18 @ 06:08)    WBC Count: 5.91 K/uL    RBC Count: 2.81 M/uL    Hemoglobin: 8.5 g/dL    Hematocrit: 26.0 %    Mean Cell Volume: 92.5 fl    Mean Cell Hemoglobin: 30.2 pg    Mean Cell Hemoglobin Conc: 32.7 gm/dL    Red Cell Distrib Width: 17.8 %    Platelet Count - Automated: 153 K/uL    Auto Neutrophil #: 3.88 K/uL    Auto Lymphocyte #: 1.15 K/uL    Auto Monocyte #: 0.78 K/uL    Auto Eosinophil #: 0.07 K/uL    Auto Basophil #: 0.01 K/uL    Auto Neutrophil %: 65.6: Differential percentages must be correlated with absolute numbers for  clinical significance. %    Auto Lymphocyte %: 19.5 %    Auto Monocyte %: 13.2 %    Auto Eosinophil %: 1.2 %    Auto Basophil %: 0.2 %    Auto Immature Granulocyte %: 0.3 %      Basic Metabolic Panel (11.29.18 @ 06:08)    Sodium, Serum: 139 mmol/L    Potassium, Serum: 4.0 mmol/L    Chloride, Serum: 105 mmol/L    Carbon Dioxide, Serum: 26 mmol/L    Anion Gap, Serum: 8 mmol/L    Blood Urea Nitrogen, Serum: 26 mg/dL      Creatinine, Serum: 0.71 mg/dL    Glucose, Serum: 91 mg/dL    Calcium, Total Serum: 8.7 mg/dL    eGFR if Non : 73: Interpretative comment  The units for eGFR are ml/min/1.73m2 (normalized body surface area). The  eGFR is calculated from a serum creatinine using the CKD-EPI equation.  Other variables required for calculation are race, age and sex. Among  patients with chronic kidney disease (CKD), the eGFR is useful in  determining the stage of disease according to KDOQI CKD classification.  All eGFR results are reported numerically with the following  interpretation.          GFR                    With                 Without     (ml/min/1.73 m2)    Kidney Damage       Kidney Damage        >= 90                    Stage 1                     Normal        60-89                    Stage 2                     Decreased GFR        30-59     Stage 3                     Stage 3        15-29                    Stage 4                     Stage 4        < 15                      Stage 5                     Stage 5  Each stage of CKD assumes that the associated GFR level has been in  effect for at least 3 months. Determination of stages one and two (with  eGFR > 59 ml/min/m2) requires estimation of kidney damage for at least 3  months as defined by structural or functional abnormalities.  Limitations: All estimates of GFR will be less accurate for patients at  extremes of muscle mass (including but not limited to frail elderly,  critically ill, or cancer patients), those with unusual diets, and those  with conditions associated with reduced secretion or extrarenal  elimination of creatinine. The eGFR equation is not recommended for use  in patients with unstable creatinine levels. 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 +HA, chest discomfort. Hematology consult requested for anemia.     History obtained from patient and daughter. History of presumptive NHL approx 5 years ago, treated with chemotherapy, saw oncologist in [place] 3 weeks ago. Patient also has history of hemorrhoids.     PAST MEDICAL & SURGICAL HISTORY:  Lymphoma  HTN (hypertension)  Iron deficiency  No significant past surgical history    FAMILY HISTORY:  not significant.        Review of Systems:  · Negative General Symptoms	no fever; no chills	  · Negative Skin Symptoms	no rash; no itching	  · Negative Ophthalmologic Symptoms	no diplopia; no photophobia	  · Negative ENMT Symptoms	no hearing difficulty; no ear pain	  · Negative Respiratory and Thorax Symptoms	no wheezing; no cough	  · Respiratory and Thorax Symptoms	dyspnea 	  · Negative Cardiovascular Symptoms	no palpitations	  · Cardiovascular Symptoms	chest pain	  · Negative Gastrointestinal Symptoms	no nausea; no vomiting; no diarrhea	  · Negative General Genitourinary Symptoms	no hematuria; no renal colic	  · Negative Musculoskeletal Symptoms	no arthralgia; no arthritis	  · Negative Neurological Symptoms	no transient paralysis; no weakness	  · Negative Psychiatric Symptoms	no suicidal ideation; no depression	  · Negative Hematology Symptoms	no gum bleeding; no nose bleeding	  · Negative Endocrine Symptoms	no cold intolerance; no heat intolerance	  · Negative Allergic Reactions	no anaphylaxis; no respiratory distress	      MEDICATIONS  (STANDING):  atorvastatin 40 milliGRAM(s) Oral at bedtime  furosemide    Tablet 20 milliGRAM(s) Oral daily  hydrALAZINE 25 milliGRAM(s) Oral every 12 hours  metoprolol succinate ER 25 milliGRAM(s) Oral daily  pantoprazole  Injectable      pantoprazole  Injectable 40 milliGRAM(s) IV Push every 12 hours  sodium chloride 1 Gram(s) Oral three times a day    MEDICATIONS  (PRN):  acetaminophen   Tablet .. 650 milliGRAM(s) Oral every 6 hours PRN Temp greater or equal to 38C (100.4F), Mild Pain (1 - 3)  ALPRAZolam 0.25 milliGRAM(s) Oral at bedtime PRN anxiety or insomnia        ICU Vital Signs Last 24 Hrs  T(C): 36.6 (29 Nov 2018 04:41), Max: 37 (28 Nov 2018 12:37)  T(F): 97.8 (29 Nov 2018 04:41), Max: 98.6 (28 Nov 2018 12:37)  HR: 86 (29 Nov 2018 04:41) (85 - 96)  BP: 147/49 (29 Nov 2018 04:41) (147/49 - 205/80)  BP(mean): --  ABP: --  ABP(mean): --  RR: 17 (28 Nov 2018 22:43) (17 - 19)  SpO2: 97% (29 Nov 2018 04:41) (97% - 100%)      General elderly  woman in NAD  HEENT  Lungs: Bilaterally clear, no W/R/C  CVS: S1 s2 regular, no M/R/G  Abdomen: soft NT ND positive for BS, no organomegaly.  Extremities: No C/C/E      Complete Blood Count + Automated Diff (11.29.18 @ 06:08)    WBC Count: 5.91 K/uL    RBC Count: 2.81 M/uL    Hemoglobin: 8.5 g/dL    Hematocrit: 26.0 %    Mean Cell Volume: 92.5 fl    Mean Cell Hemoglobin: 30.2 pg    Mean Cell Hemoglobin Conc: 32.7 gm/dL    Red Cell Distrib Width: 17.8 %    Platelet Count - Automated: 153 K/uL    Auto Neutrophil #: 3.88 K/uL    Auto Lymphocyte #: 1.15 K/uL    Auto Monocyte #: 0.78 K/uL    Auto Eosinophil #: 0.07 K/uL    Auto Basophil #: 0.01 K/uL    Auto Neutrophil %: 65.6: Differential percentages must be correlated with absolute numbers for  clinical significance. %    Auto Lymphocyte %: 19.5 %    Auto Monocyte %: 13.2 %    Auto Eosinophil %: 1.2 %    Auto Basophil %: 0.2 %    Auto Immature Granulocyte %: 0.3 %      Basic Metabolic Panel (11.29.18 @ 06:08)    Sodium, Serum: 139 mmol/L    Potassium, Serum: 4.0 mmol/L    Chloride, Serum: 105 mmol/L    Carbon Dioxide, Serum: 26 mmol/L    Anion Gap, Serum: 8 mmol/L    Blood Urea Nitrogen, Serum: 26 mg/dL      Creatinine, Serum: 0.71 mg/dL    Glucose, Serum: 91 mg/dL    Calcium, Total Serum: 8.7 mg/dL    eGFR if Non : 73: Interpretative comment  The units for eGFR are ml/min/1.73m2 (normalized body surface area). The  eGFR is calculated from a serum creatinine using the CKD-EPI equation.  Other variables required for calculation are race, age and sex. Among  patients with chronic kidney disease (CKD), the eGFR is useful in  determining the stage of disease according to KDOQI CKD classification.  All eGFR results are reported numerically with the following  interpretation.          GFR                    With                 Without     (ml/min/1.73 m2)    Kidney Damage       Kidney Damage        >= 90                    Stage 1                     Normal        60-89                    Stage 2                     Decreased GFR        30-59     Stage 3                     Stage 3        15-29                    Stage 4                     Stage 4        < 15                      Stage 5                     Stage 5  Each stage of CKD assumes that the associated GFR level has been in  effect for at least 3 months. Determination of stages one and two (with  eGFR > 59 ml/min/m2) requires estimation of kidney damage for at least 3  months as defined by structural or functional abnormalities.  Limitations: All estimates of GFR will be less accurate for patients at  extremes of muscle mass (including but not limited to frail elderly,  critically ill, or cancer patients), those with unusual diets, and those  with conditions associated with reduced secretion or extrarenal  elimination of creatinine. The eGFR equation is not recommended for use  in patients with unstable creatinine levels. mL/min/1.73M2    eGFR if African American: 84 mL/min/1.73M2        Troponin I, Serum Repeat 3 hours x 2 occurrences (11.28.18 @ 15:40)    Troponin I, Serum: 0.023: High Sensitivity Troponin and new reference  range effective 7/6/2016 ng/mL          FINDINGS:    Lower thorax:  Nodular soft tissue density right middle lobe measuring 1   cm.   Possible linear versus nodular soft tissue density within the right   middle and   lingula. 1 cm calcified granuloma in RIGHT middle lobe.    ABDOMEN:    Liver: Normal. No mass.    Gallbladder and bile ducts: Normal. No calcified stones. No ductal   dilation.    Pancreas: Normal. No ductal dilation.    Spleen: Normal. No splenomegaly.    Adrenals: Normal. No mass.    Kidneys and ureters:  Subcentimeter cyst involving the lower pole of the   left   kidney, poorly characterize.    Stomach and bowel:  No evidence of bowel obstruction. Mild - moderate   amount   retained stool material throughout nondilated colon.    Appendix:  Appendix - visualized portions appear normal.     PELVIS:    Bladder:  Moderate fluid distention of the bladder.    Reproductive:  Uterus is not identified.     ABDOMEN and PELVIS:    Intraperitoneal space:  No evidence of focal hematoma or intraperitoneal   hemorrhage.    Bones/joints:  Chronic degenerative changes of the lumbar spine.     Multilevel kyphosis at T11 L2 and L4. Compression deformity of the   superior endplate of L5 with loss of 25% of vertebral body height,   indeterminate in age.   Soft tissues: Unremarkable.    Vasculature:  Chronic atherosclerotic calcification of the vasculature.    Lymph nodes: Normal. No enlarged lymph nodes.     IMPRESSION:   1. No evidence of focalhematoma or intraperitoneal hemorrhage.   2. No evidence of bowel obstruction.   3. Moderate fluid distention of the bladder.   4. Subcentimeter cyst involving the lower pole of the left kidney, poorly   characterize.   5. Nodular soft tissue densityright middle lobe measuring 1 cm. Possible   linear versus nodular soft tissue density within the right middle and   lingula.   For low and high risk patients, CT at 3-6 months, then consider CT at   18-24   months. For high risk patients, consider CT at 3-6 months, then CT at   18-24   months.  6.Multilevel kyphosis at T11 L2 and L4. Compression deformity of the   superior endplate of L5 with loss of 25% of vertebral body height,   indeterminate in age.

## 2018-11-29 NOTE — CONSULT NOTE ADULT - SUBJECTIVE AND OBJECTIVE BOX
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 +HA, chest discomfort. States that usually she gets episodes of weakness and chest discomfort during the morning, right after she wakes up. Describes the chest discomfort as "heaviness". As per daughter at bedside, pt is visiting from [place] and has been in [place] for about a week. Denies fever, cough, diarrhea, bladder incontinence. No hx of MI. No hx of CVA. Takes ASA. Nonsmoker. She denies any abdominal pain, any black stool, any blood in stool, any vomiting of blood or any type of active bleeding.     Family hx;  Patient is unable to provide detail family history due to her current clinical status. (28 Nov 2018 20:43)      Patient is pleasant and very oriented. History is from patient as well as son who is a gastroenterologist in [place]. I discussed the case with son and obtained permission for evaluation as well.  Patient states that her hemoglobin recently was 11.3. Additionally, patient states that she had been hospitalized too long before episode of syncope at which time, she had a cardiac monitor placed and additionally, for reasons that she is not clear and son also is not clear, she was started on Plavix about a month ago.  She has noted some tarry stools however, that she was discussed to starting iron tablets which she took after feeling weak.  Any nausea or vomiting or weight loss. Had a colonoscopy years ago that was negative.      PAST MEDICAL & SURGICAL HISTORY:  Lymphoma  HTN (hypertension)  Iron deficiency  No significant past surgical history      MEDICATIONS  (STANDING):  atorvastatin 40 milliGRAM(s) Oral at bedtime  furosemide    Tablet 20 milliGRAM(s) Oral daily  hydrALAZINE 25 milliGRAM(s) Oral every 12 hours  metoprolol succinate ER 25 milliGRAM(s) Oral daily  pantoprazole  Injectable 80 milliGRAM(s) IV Push once  pantoprazole Infusion 8 mG/Hr (10 mL/Hr) IV Continuous <Continuous>  sodium chloride 1 Gram(s) Oral three times a day    MEDICATIONS  (PRN):  acetaminophen   Tablet .. 650 milliGRAM(s) Oral every 6 hours PRN Temp greater or equal to 38C (100.4F), Mild Pain (1 - 3)  ALPRAZolam 0.25 milliGRAM(s) Oral at bedtime PRN anxiety or insomnia      Allergies    penicillin (Hives)  sulfa drugs (Hives)  tetracycline (Hives)    Intolerances        SOCIAL HISTORY:neg drugs, lives at home and visiting    FAMILY HISTORY:M with colon ca      REVIEW OF SYSTEMS:    CONSTITUTIONAL: No weakness, fevers or chills  EYES/ENT: No visual changes;  No vertigo or throat pain   NECK: No pain or stiffness  RESPIRATORY: No cough, wheezing, hemoptysis; No shortness of breath  CARDIOVASCULAR: No chest pain or palpitations  GENITOURINARY: No dysuria, frequency or hematuria  NEUROLOGICAL: No numbness or weakness  SKIN: No itching, burning, rashes, or lesions   All other review of systems is negative unless indicated above.    Vital Signs Last 24 Hrs  T(C): 36.8 (29 Nov 2018 16:36), Max: 36.9 (29 Nov 2018 12:04)  T(F): 98.2 (29 Nov 2018 16:36), Max: 98.4 (29 Nov 2018 12:04)  HR: 90 (29 Nov 2018 16:36) (85 - 90)  BP: 134/49 (29 Nov 2018 16:36) (120/44 - 196/63)  BP(mean): --  RR: 15 (29 Nov 2018 12:04) (15 - 18)  SpO2: 98% (29 Nov 2018 16:36) (96% - 98%)    PHYSICAL EXAM:    Constitutional: NAD, well-developed  HEENT: EOMI, throat clear  Neck: No LAD, supple  Respiratory: CTA and P  Cardiovascular: S1 and S2, RRR, no M  Gastrointestinal: BS+, soft, NT/ND, neg HSM,  Extremities: No peripheral edema, neg clubing, cyanosis  Vascular: 2+ peripheral pulses  Neurological: A/O x 3, no focal deficits  Psychiatric: Normal mood, normal affect  Skin: No rashes    rectal, melena, G pos    LABS:  CBC Full  -  ( 29 Nov 2018 06:08 )  WBC Count : 5.91 K/uL  Hemoglobin : 8.5 g/dL  Hematocrit : 26.0 %  Platelet Count - Automated : 153 K/uL  Mean Cell Volume : 92.5 fl  Mean Cell Hemoglobin : 30.2 pg  Mean Cell Hemoglobin Concentration : 32.7 gm/dL  Auto Neutrophil # : 3.88 K/uL  Auto Lymphocyte # : 1.15 K/uL  Auto Monocyte # : 0.78 K/uL  Auto Eosinophil # : 0.07 K/uL  Auto Basophil # : 0.01 K/uL  Auto Neutrophil % : 65.6 %  Auto Lymphocyte % : 19.5 %  Auto Monocyte % : 13.2 %  Auto Eosinophil % : 1.2 %  Auto Basophil % : 0.2 %    11-29    139  |  105  |  26<H>  ----------------------------<  91  4.0   |  26  |  0.71    Ca    8.7      29 Nov 2018 06:08  Phos  3.0     11-28  Mg     1.9     11-28    TPro  6.2  /  Alb  3.2<L>  /  TBili  0.2  /  DBili  x   /  AST  26  /  ALT  25  /  AlkPhos  80  11-28    PT/INR - ( 28 Nov 2018 15:40 )   PT: 11.0 sec;   INR: 0.99 ratio         PTT - ( 28 Nov 2018 15:40 )  PTT:25.4 sec        RADIOLOGY & ADDITIONAL STUDIES:  < from: CT Abdomen and Pelvis No Cont (11.28.18 @ 21:37) >  EXAM:  CT ABDOMEN AND PELVIS                            PROCEDURE DATE:  11/28/2018          INTERPRETATION:      EXAM:    CT Abdomen and Pelvis Without Intravenous Contrast     EXAM DATE/TIME:    11/28/2018 9:45 PM     CLINICAL HISTORY:    89 years old, female; Pain; Abdominal pain; Generalized; Patient HX:   Drop in   hb from 11.3 to 5.7     TECHNIQUE:    Axial computed tomography images of the abdomen and pelvis without   intravenous   contrast.    Coronal and sagittal reformatted images were created and reviewed.     COMPARISON:    No relevant prior studies available.     FINDINGS:    Lower thorax:  Nodular soft tissue density right middle lobe measuring 1   cm.   Possible linear versus nodular soft tissue density within the right   middle and   lingula. 1 cm calcified granuloma in RIGHT middle lobe.    ABDOMEN:    Liver: Normal. No mass.    Gallbladder and bile ducts: Normal. No calcified stones. No ductal   dilation.    Pancreas: Normal. No ductal dilation.    Spleen: Normal. No splenomegaly.    Adrenals: Normal. No mass.    Kidneys and ureters:  Subcentimeter cyst involving the lower pole of the   left   kidney, poorly characterize.    Stomach and bowel:  No evidence of bowel obstruction. Mild - moderate   amount   retained stool material throughout nondilated colon.    Appendix:  Appendix - visualized portions appear normal.     PELVIS:    Bladder:  Moderate fluid distention of the bladder.    Reproductive:  Uterus is not identified.     ABDOMEN and PELVIS:    Intraperitoneal space:  No evidence of focal hematoma or intraperitoneal   hemorrhage.    Bones/joints:  Chronic degenerative changes of the lumbar spine.   Multilevel kyphosis at T11 L2 and L4. Compression deformity of the   superior endplate of L5 with loss of 25% of vertebral body height,   indeterminate in age.   Soft tissues: Unremarkable.    Vasculature:  Chronic atherosclerotic calcification of the vasculature.    Lymph nodes: Normal. No enlarged lymph nodes.     IMPRESSION:   1. No evidence of focalhematoma or intraperitoneal hemorrhage.   2. No evidence of bowel obstruction.   3. Moderate fluid distention of the bladder.   4. Subcentimeter cyst involving the lower pole of the left kidney, poorly   characterize.   5. Nodular soft tissue densityright middle lobe measuring 1 cm. Possible   linear versus nodular soft tissue density within the right middle and   lingula.   For low and high risk patients, CT at 3-6 months, then consider CT at   18-24   months. For high risk patients, consider CT at 3-6 months, then CT at   18-24   months.  6.Multilevel kyphosis at T11 L2 and L4. Compression deformity of the   superior endplate of L5 with loss of 25% of vertebral body height,   indeterminate in age.      < end of copied text >

## 2018-11-29 NOTE — PROVIDER CONTACT NOTE (OTHER) - SITUATION
Consult left with Dr. Mera' answering service.
Office aware of consult : spoke to pawan
Service aware of consult.

## 2018-11-29 NOTE — CONSULT NOTE ADULT - ASSESSMENT
Anemia–secondary to upper GI bleeding. BUN to creatinine ratio is elevated. Patient was recently started on Plavix.  I discussed with patient and son evaluation.  There would like endoscopy.  Would initiate patient on PPI drip.  Aspirin and Plavix should be held  Case discussed with cardiology as well as patient's son was a gastroenterologist.    History of lymphoma, however, patient states that this is in remission.    DVT prophylaxis  Ambulation as tolerated  Extensive discussion with patient and son regarding family history of colon cancer and I cannot rule out colonic pathology however, due to advanced age and likely upper GI bleed, they would like to avoid colonoscopy.  His also unclear as to why patient has been on Plavix.  Need to continue this, would need to be discussed with cardiology, I have discussed this with cardiology and we are awaiting further information    Endoscopy, alternatives benefits and risks reviewed.

## 2018-11-30 ENCOUNTER — RESULT REVIEW (OUTPATIENT)
Age: 83
End: 2018-11-30

## 2018-11-30 LAB
% ALBUMIN: 59.5 % — SIGNIFICANT CHANGE UP
% ALPHA 1: 8.4 % — SIGNIFICANT CHANGE UP
% ALPHA 2: 14.3 % — SIGNIFICANT CHANGE UP
% BETA: 11.7 % — SIGNIFICANT CHANGE UP
% GAMMA: 6.1 % — SIGNIFICANT CHANGE UP
% M SPIKE: 1.5 % — SIGNIFICANT CHANGE UP
ALBUMIN SERPL ELPH-MCNC: 3 G/DL — LOW (ref 3.6–5.5)
ALBUMIN/GLOB SERPL ELPH: 1.4 RATIO — SIGNIFICANT CHANGE UP
ALPHA1 GLOB SERPL ELPH-MCNC: 0.4 G/DL — SIGNIFICANT CHANGE UP (ref 0.1–0.4)
ALPHA2 GLOB SERPL ELPH-MCNC: 0.7 G/DL — SIGNIFICANT CHANGE UP (ref 0.5–1)
B-GLOBULIN SERPL ELPH-MCNC: 0.6 G/DL — SIGNIFICANT CHANGE UP (ref 0.5–1)
FERRITIN SERPL-MCNC: 207 NG/ML — HIGH (ref 15–150)
FOLATE SERPL-MCNC: 13 NG/ML — SIGNIFICANT CHANGE UP
GAMMA GLOBULIN: 0.3 G/DL — LOW (ref 0.6–1.6)
IGA FLD-MCNC: 76 MG/DL — LOW (ref 84–499)
IGG FLD-MCNC: 254 MG/DL — LOW (ref 610–1660)
IGM SERPL-MCNC: 58 MG/DL — SIGNIFICANT CHANGE UP (ref 35–242)
INTERPRETATION SERPL IFE-IMP: SIGNIFICANT CHANGE UP
IRON SATN MFR SERPL: 13 % — LOW (ref 14–50)
IRON SATN MFR SERPL: 38 UG/DL — SIGNIFICANT CHANGE UP (ref 30–160)
KAPPA LC SER QL IFE: 2.99 MG/DL — HIGH (ref 0.33–1.94)
KAPPA/LAMBDA FREE LIGHT CHAIN RATIO, SERUM: 1.85 RATIO — HIGH (ref 0.26–1.65)
LAMBDA LC SER QL IFE: 1.62 MG/DL — SIGNIFICANT CHANGE UP (ref 0.57–2.63)
M-SPIKE: 0.1 G/DL — HIGH (ref 0–0)
PROT PATTERN SERPL ELPH-IMP: SIGNIFICANT CHANGE UP
PROT SERPL-MCNC: 5.1 G/DL — LOW (ref 6–8.3)
PROT SERPL-MCNC: 5.1 G/DL — LOW (ref 6–8.3)
TIBC SERPL-MCNC: 289 UG/DL — SIGNIFICANT CHANGE UP (ref 220–430)
UIBC SERPL-MCNC: 251 UG/DL — SIGNIFICANT CHANGE UP (ref 110–370)
VIT B12 SERPL-MCNC: 715 PG/ML — SIGNIFICANT CHANGE UP (ref 232–1245)

## 2018-11-30 PROCEDURE — 88312 SPECIAL STAINS GROUP 1: CPT | Mod: 26

## 2018-11-30 PROCEDURE — 88342 IMHCHEM/IMCYTCHM 1ST ANTB: CPT | Mod: 26

## 2018-11-30 RX ORDER — PANTOPRAZOLE SODIUM 20 MG/1
40 TABLET, DELAYED RELEASE ORAL
Qty: 0 | Refills: 0 | Status: DISCONTINUED | OUTPATIENT
Start: 2018-11-30 | End: 2018-12-03

## 2018-11-30 RX ADMIN — Medication 25 MILLIGRAM(S): at 05:17

## 2018-11-30 RX ADMIN — PANTOPRAZOLE SODIUM 40 MILLIGRAM(S): 20 TABLET, DELAYED RELEASE ORAL at 11:46

## 2018-11-30 RX ADMIN — ATORVASTATIN CALCIUM 40 MILLIGRAM(S): 80 TABLET, FILM COATED ORAL at 21:03

## 2018-11-30 RX ADMIN — SODIUM CHLORIDE 1 GRAM(S): 9 INJECTION INTRAMUSCULAR; INTRAVENOUS; SUBCUTANEOUS at 14:31

## 2018-11-30 RX ADMIN — Medication 20 MILLIGRAM(S): at 05:17

## 2018-11-30 RX ADMIN — SODIUM CHLORIDE 1 GRAM(S): 9 INJECTION INTRAMUSCULAR; INTRAVENOUS; SUBCUTANEOUS at 05:17

## 2018-11-30 RX ADMIN — Medication 25 MILLIGRAM(S): at 17:50

## 2018-11-30 RX ADMIN — SODIUM CHLORIDE 1 GRAM(S): 9 INJECTION INTRAMUSCULAR; INTRAVENOUS; SUBCUTANEOUS at 21:03

## 2018-11-30 NOTE — PROGRESS NOTE ADULT - SUBJECTIVE AND OBJECTIVE BOX
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 +HA, chest discomfort. States that usually she gets episodes of weakness and chest discomfort during the morning, right after she wakes up. Describes the chest discomfort as "heaviness". As per daughter at bedside, pt is visiting from [place] and has been in [place] for about a week. Denies fever, cough, diarrhea, bladder incontinence. No hx of MI. No hx of CVA. Takes ASA. Nonsmoker. She denies any abdominal pain, any black stool, any blood in stool, any vomiting of blood or any type of active bleeding.     11/29: Lying in bed, comfortable offers no complaints. Denies fever, chills, N, V, abd pain, CP, SOB.  11/30: Pt feeling better, s/p 2u prbc transfusion with appropriate response.  Denies fever, chills, N, V, abd pain, CP, SOB.    REVIEW OF SYSTEMS: All other review of systems is negative unless indicated above.    Vital Signs Last 24 Hrs  T(C): 36.6 (30 Nov 2018 11:59), Max: 36.8 (29 Nov 2018 16:36)  T(F): 97.9 (30 Nov 2018 11:59), Max: 98.2 (29 Nov 2018 16:36)  HR: 92 (30 Nov 2018 11:59) (82 - 92)  BP: 133/54 (30 Nov 2018 11:59) (133/54 - 154/53)  BP(mean): --  RR: 17 (30 Nov 2018 11:59) (17 - 18)  SpO2: 100% (30 Nov 2018 11:59) (95% - 100%)    PHYSICAL EXAM:    Constitutional: NAD, awake and alert, frail, elderly  HEENT: PERR, EOMI, Normal Hearing, MMM  Neck: Soft and supple  Respiratory: Breath sounds are clear bilaterally, No wheezing, rales or rhonchi  Cardiovascular: S1 and S2, regular rate and rhythm, no Murmurs, gallops or rubs  Gastrointestinal: Bowel Sounds present, soft, nontender, nondistended, no guarding, no rebound  Extremities: No peripheral edema  Neurological: A/O x 3, no focal deficits in my limited exam  Skin: No rashes    MEDICATIONS  (STANDING):  atorvastatin 40 milliGRAM(s) Oral at bedtime  furosemide    Tablet 20 milliGRAM(s) Oral daily  hydrALAZINE 25 milliGRAM(s) Oral every 12 hours  metoprolol succinate ER 25 milliGRAM(s) Oral daily  pantoprazole    Tablet 40 milliGRAM(s) Oral before breakfast  sodium chloride 1 Gram(s) Oral three times a day    MEDICATIONS  (PRN):  acetaminophen   Tablet .. 650 milliGRAM(s) Oral every 6 hours PRN Temp greater or equal to 38C (100.4F), Mild Pain (1 - 3)  ALPRAZolam 0.25 milliGRAM(s) Oral at bedtime PRN anxiety or insomnia    CARDIAC MARKERS ( 29 Nov 2018 01:15 )  0.041 ng/mL / x     / x     / x     / x                                8.5    5.91  )-----------( 153      ( 29 Nov 2018 06:08 )             26.0     11-29    139  |  105  |  26<H>  ----------------------------<  91  4.0   |  26  |  0.71    Ca    8.7      29 Nov 2018 06:08    TPro  5.1<L>  /  Alb  3.0<L>  /  TBili  x   /  DBili  x   /  AST  x   /  ALT  x   /  AlkPhos  x   11-29    CAPILLARY BLOOD GLUCOSE        LIVER FUNCTIONS - ( 29 Nov 2018 16:30 )  Alb: 3.0 g/dL / Pro: 5.1 g/dL / ALK PHOS: x     / ALT: x     / AST: x     / GGT: x               Assessment and Plan:  89 yo female presented with chest pain, sob and weakness.    CP/SOB/weakness: Probably secondary to acute on chronic anemia: r/o GI bleed  -trops neg x 3  -chest xray no acute pathology  -2 units PRBC transfusion with appropriate response, continue to trend   -CT Abd/Pelvis --> Moderate fluid distention of the bladder.  Nodular soft tissue density right middle lobe measuring 1 cm.  -hold aspirin and plavix (unclear indication).  PCP in [place] Dr. La Paredes cell [phone], office: [phone]  -IV protonix changed to oral qd  -EGD --> mild esophageal stenosis, mild erythema antrum   -f/u GI, pt hesitant about pursuing colonoscopy    Hx of lymphoma:  -per pt in remission  -onc on board    HTN  -loop recorder --> sinus 90s  -follow BP and adjust medications as needed    Anxiety  -on Xanax    HLD  -on statin    h/o Hyponatremia  -on salt tabs      SCD for DVT ppx       Dispo:  -pending stable H+H and final GI recs. 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 +HA, chest discomfort. States that usually she gets episodes of weakness and chest discomfort during the morning, right after she wakes up. Describes the chest discomfort as "heaviness". As per daughter at bedside, pt is visiting from [place] and has been in [place] for about a week. Denies fever, cough, diarrhea, bladder incontinence. No hx of MI. No hx of CVA. Takes ASA. Nonsmoker. She denies any abdominal pain, any black stool, any blood in stool, any vomiting of blood or any type of active bleeding.     11/29: Lying in bed, comfortable offers no complaints. Denies fever, chills, N, V, abd pain, CP, SOB.  11/30: Pt feeling better, s/p 2u prbc transfusion with appropriate response.  Denies fever, chills, N, V, abd pain, CP, SOB.    REVIEW OF SYSTEMS: All other review of systems is negative unless indicated above.    Vital Signs Last 24 Hrs  T(C): 36.6 (30 Nov 2018 11:59), Max: 36.8 (29 Nov 2018 16:36)  T(F): 97.9 (30 Nov 2018 11:59), Max: 98.2 (29 Nov 2018 16:36)  HR: 92 (30 Nov 2018 11:59) (82 - 92)  BP: 133/54 (30 Nov 2018 11:59) (133/54 - 154/53)  BP(mean): --  RR: 17 (30 Nov 2018 11:59) (17 - 18)  SpO2: 100% (30 Nov 2018 11:59) (95% - 100%)    PHYSICAL EXAM:    Constitutional: NAD, awake and alert, frail, elderly  HEENT: PERR, EOMI, Normal Hearing, MMM  Neck: Soft and supple  Respiratory: Breath sounds are clear bilaterally, No wheezing, rales or rhonchi  Cardiovascular: S1 and S2, regular rate and rhythm, no Murmurs, gallops or rubs  Gastrointestinal: Bowel Sounds present, soft, nontender, nondistended, no guarding, no rebound  Extremities: No peripheral edema  Neurological: A/O x 3, no focal deficits in my limited exam  Skin: No rashes    MEDICATIONS  (STANDING):  atorvastatin 40 milliGRAM(s) Oral at bedtime  furosemide    Tablet 20 milliGRAM(s) Oral daily  hydrALAZINE 25 milliGRAM(s) Oral every 12 hours  metoprolol succinate ER 25 milliGRAM(s) Oral daily  pantoprazole    Tablet 40 milliGRAM(s) Oral before breakfast  sodium chloride 1 Gram(s) Oral three times a day    MEDICATIONS  (PRN):  acetaminophen   Tablet .. 650 milliGRAM(s) Oral every 6 hours PRN Temp greater or equal to 38C (100.4F), Mild Pain (1 - 3)  ALPRAZolam 0.25 milliGRAM(s) Oral at bedtime PRN anxiety or insomnia    CARDIAC MARKERS ( 29 Nov 2018 01:15 )  0.041 ng/mL / x     / x     / x     / x                                8.5    5.91  )-----------( 153      ( 29 Nov 2018 06:08 )             26.0     11-29    139  |  105  |  26<H>  ----------------------------<  91  4.0   |  26  |  0.71    Ca    8.7      29 Nov 2018 06:08    TPro  5.1<L>  /  Alb  3.0<L>  /  TBili  x   /  DBili  x   /  AST  x   /  ALT  x   /  AlkPhos  x   11-29    CAPILLARY BLOOD GLUCOSE        LIVER FUNCTIONS - ( 29 Nov 2018 16:30 )  Alb: 3.0 g/dL / Pro: 5.1 g/dL / ALK PHOS: x     / ALT: x     / AST: x     / GGT: x               Assessment and Plan:  89 yo female presented with chest pain, sob and weakness.    CP/SOB/weakness: Probably secondary to acute on chronic anemia: r/o GI bleed  -trops neg x 3  -chest xray no acute pathology  -2 units PRBC transfusion with appropriate response, continue to trend   -CT Abd/Pelvis --> Moderate fluid distention of the bladder.  Nodular soft tissue density right middle lobe measuring 1 cm.  -Stop aspirin and plavix (unclear indication, per pt was for stroke prevention?).  PCP in [place] Dr. La Paredes cell [phone], office: [phone]  -IV protonix changed to oral qd  -EGD --> mild esophageal stenosis, mild erythema antrum   -f/u GI, pt hesitant about pursuing colonoscopy    Hx of lymphoma:  -per pt in remission  -onc on board    HTN  -loop recorder --> sinus 90s  -follow BP and adjust medications as needed    Anxiety  -on Xanax    HLD  -on statin    h/o Hyponatremia  -on salt tabs      SCD for DVT ppx       Dispo:  -pending stable H+H and final GI recs.

## 2018-12-01 LAB
ANION GAP SERPL CALC-SCNC: 9 MMOL/L — SIGNIFICANT CHANGE UP (ref 5–17)
BUN SERPL-MCNC: 27 MG/DL — HIGH (ref 7–23)
CALCIUM SERPL-MCNC: 8.5 MG/DL — SIGNIFICANT CHANGE UP (ref 8.5–10.1)
CHLORIDE SERPL-SCNC: 105 MMOL/L — SIGNIFICANT CHANGE UP (ref 96–108)
CO2 SERPL-SCNC: 26 MMOL/L — SIGNIFICANT CHANGE UP (ref 22–31)
CREAT SERPL-MCNC: 0.96 MG/DL — SIGNIFICANT CHANGE UP (ref 0.5–1.3)
GLUCOSE SERPL-MCNC: 86 MG/DL — SIGNIFICANT CHANGE UP (ref 70–99)
HCT VFR BLD CALC: 24.9 % — LOW (ref 34.5–45)
HGB BLD-MCNC: 8 G/DL — LOW (ref 11.5–15.5)
MCHC RBC-ENTMCNC: 30.2 PG — SIGNIFICANT CHANGE UP (ref 27–34)
MCHC RBC-ENTMCNC: 32.1 GM/DL — SIGNIFICANT CHANGE UP (ref 32–36)
MCV RBC AUTO: 94 FL — SIGNIFICANT CHANGE UP (ref 80–100)
NRBC # BLD: 0 /100 WBCS — SIGNIFICANT CHANGE UP (ref 0–0)
PLATELET # BLD AUTO: 152 K/UL — SIGNIFICANT CHANGE UP (ref 150–400)
POTASSIUM SERPL-MCNC: 3.9 MMOL/L — SIGNIFICANT CHANGE UP (ref 3.5–5.3)
POTASSIUM SERPL-SCNC: 3.9 MMOL/L — SIGNIFICANT CHANGE UP (ref 3.5–5.3)
RBC # BLD: 2.65 M/UL — LOW (ref 3.8–5.2)
RBC # FLD: 18 % — HIGH (ref 10.3–14.5)
SODIUM SERPL-SCNC: 140 MMOL/L — SIGNIFICANT CHANGE UP (ref 135–145)
WBC # BLD: 5.02 K/UL — SIGNIFICANT CHANGE UP (ref 3.8–10.5)
WBC # FLD AUTO: 5.02 K/UL — SIGNIFICANT CHANGE UP (ref 3.8–10.5)

## 2018-12-01 RX ADMIN — PANTOPRAZOLE SODIUM 40 MILLIGRAM(S): 20 TABLET, DELAYED RELEASE ORAL at 05:44

## 2018-12-01 RX ADMIN — SODIUM CHLORIDE 1 GRAM(S): 9 INJECTION INTRAMUSCULAR; INTRAVENOUS; SUBCUTANEOUS at 05:44

## 2018-12-01 RX ADMIN — Medication 25 MILLIGRAM(S): at 17:21

## 2018-12-01 RX ADMIN — SODIUM CHLORIDE 1 GRAM(S): 9 INJECTION INTRAMUSCULAR; INTRAVENOUS; SUBCUTANEOUS at 14:26

## 2018-12-01 RX ADMIN — SODIUM CHLORIDE 1 GRAM(S): 9 INJECTION INTRAMUSCULAR; INTRAVENOUS; SUBCUTANEOUS at 21:27

## 2018-12-01 RX ADMIN — ATORVASTATIN CALCIUM 40 MILLIGRAM(S): 80 TABLET, FILM COATED ORAL at 21:27

## 2018-12-01 NOTE — PROGRESS NOTE ADULT - SUBJECTIVE AND OBJECTIVE BOX
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 +HA, chest discomfort. States that usually she gets episodes of weakness and chest discomfort during the morning, right after she wakes up. Describes the chest discomfort as "heaviness". As per daughter at bedside, pt is visiting from [place] and has been in [place] for about a week. Denies fever, cough, diarrhea, bladder incontinence. No hx of MI. No hx of CVA. Takes ASA. Nonsmoker. She denies any abdominal pain, any black stool, any blood in stool, any vomiting of blood or any type of active bleeding.     11/29: Lying in bed, comfortable offers no complaints. Denies fever, chills, N, V, abd pain, CP, SOB.  11/30: Pt feeling better, s/p 2u prbc transfusion with appropriate response.  Denies fever, chills, N, V, abd pain, CP, SOB.  12/01/18: Patient seen and examined. She denies any new complaints.     REVIEW OF SYSTEMS: All other review of systems is negative unless indicated above.    Vital Signs Last 24 Hrs  T(C): 36.7 (01 Dec 2018 10:43), Max: 37 (01 Dec 2018 04:44)  T(F): 98 (01 Dec 2018 10:43), Max: 98.6 (01 Dec 2018 04:44)  HR: 98 (01 Dec 2018 10:43) (85 - 98)  BP: 138/45 (01 Dec 2018 10:43) (107/42 - 138/45)  BP(mean): --  RR: 18 (01 Dec 2018 10:43) (17 - 18)  SpO2: 98% (01 Dec 2018 10:43) (94% - 98%)        PHYSICAL EXAM:    Constitutional: NAD, awake and alert, frail, elderly  HEENT: PERR, EOMI, Normal Hearing, MMM  Neck: Soft and supple  Respiratory: Breath sounds are clear bilaterally, No wheezing, rales or rhonchi  Cardiovascular: S1 and S2, regular rate and rhythm, no Murmurs, gallops or rubs  Gastrointestinal: Bowel Sounds present, soft, nontender, nondistended, no guarding, no rebound  Extremities: No peripheral edema  Neurological: A/O x 3, no focal deficits in my limited exam  Skin: No rashes        MEDICATIONS:    atorvastatin 40 milliGRAM(s) Oral at bedtime  furosemide    Tablet 20 milliGRAM(s) Oral daily  hydrALAZINE 25 milliGRAM(s) Oral every 12 hours  metoprolol succinate ER 25 milliGRAM(s) Oral daily  pantoprazole    Tablet 40 milliGRAM(s) Oral before breakfast  sodium chloride 1 Gram(s) Oral three times a day    MEDICATIONS  (PRN):  acetaminophen   Tablet .. 650 milliGRAM(s) Oral every 6 hours PRN Temp greater or equal to 38C (100.4F), Mild Pain (1 - 3)  ALPRAZolam 0.25 milliGRAM(s) Oral at bedtime PRN anxiety or insomnia        Labs:                                                 8.0    5.02  )-----------( 152      ( 01 Dec 2018 06:47 )             24.9     01 Dec 2018 06:47    140    |  105    |  27     ----------------------------<  86     3.9     |  26     |  0.96     Ca    8.5        01 Dec 2018 06:47    TPro  5.1    /  Alb  3.0    /  TBili  x      /  DBili  x      /  AST  x      /  ALT  x      /  AlkPhos  x      29 Nov 2018 16:30    LIVER FUNCTIONS - ( 29 Nov 2018 16:30 )  Alb: 3.0 g/dL / Pro: 5.1 g/dL / ALK PHOS: x     / ALT: x     / AST: x     / GGT: x             Assessment and Plan:  89 yo female presented with chest pain, sob and weakness.    CP/SOB/weakness: Probably secondary to acute on chronic anemia: r/o GI bleed  -trops neg x 3  -chest xray no acute pathology  -2 units PRBC transfusion with appropriate response, continue to trend   -CT Abd/Pelvis --> Moderate fluid distention of the bladder.  Nodular soft tissue density right middle lobe measuring 1 cm.  -Stop aspirin and plavix (unclear indication, per pt was for stroke prevention?).  PCP in [place] Dr. La Paredes cell [phone], office: [phone]  -IV protonix changed to oral qd  -EGD --> mild esophageal stenosis, mild erythema antrum   -f/u GI, possible colonoscopy on Monday    Hx of lymphoma:  -per pt in remission  -onc on board    HTN  -loop recorder --> sinus 90s  -follow BP and adjust medications as needed    Anxiety  -on Xanax    HLD  -on statin    h/o Hyponatremia  -on salt tabs      SCD for DVT ppx       Dispo:  -pending stable H+H and final GI recs.

## 2018-12-02 LAB
ANION GAP SERPL CALC-SCNC: 8 MMOL/L — SIGNIFICANT CHANGE UP (ref 5–17)
BUN SERPL-MCNC: 25 MG/DL — HIGH (ref 7–23)
CALCIUM SERPL-MCNC: 8.7 MG/DL — SIGNIFICANT CHANGE UP (ref 8.5–10.1)
CHLORIDE SERPL-SCNC: 105 MMOL/L — SIGNIFICANT CHANGE UP (ref 96–108)
CO2 SERPL-SCNC: 26 MMOL/L — SIGNIFICANT CHANGE UP (ref 22–31)
CREAT SERPL-MCNC: 0.78 MG/DL — SIGNIFICANT CHANGE UP (ref 0.5–1.3)
GLUCOSE SERPL-MCNC: 88 MG/DL — SIGNIFICANT CHANGE UP (ref 70–99)
HCT VFR BLD CALC: 24.3 % — LOW (ref 34.5–45)
HGB BLD-MCNC: 7.7 G/DL — LOW (ref 11.5–15.5)
MCHC RBC-ENTMCNC: 30.7 PG — SIGNIFICANT CHANGE UP (ref 27–34)
MCHC RBC-ENTMCNC: 31.7 GM/DL — LOW (ref 32–36)
MCV RBC AUTO: 96.8 FL — SIGNIFICANT CHANGE UP (ref 80–100)
NRBC # BLD: 0 /100 WBCS — SIGNIFICANT CHANGE UP (ref 0–0)
PLATELET # BLD AUTO: 144 K/UL — LOW (ref 150–400)
POTASSIUM SERPL-MCNC: 4.1 MMOL/L — SIGNIFICANT CHANGE UP (ref 3.5–5.3)
POTASSIUM SERPL-SCNC: 4.1 MMOL/L — SIGNIFICANT CHANGE UP (ref 3.5–5.3)
RBC # BLD: 2.51 M/UL — LOW (ref 3.8–5.2)
RBC # FLD: 17.5 % — HIGH (ref 10.3–14.5)
SODIUM SERPL-SCNC: 139 MMOL/L — SIGNIFICANT CHANGE UP (ref 135–145)
WBC # BLD: 4.71 K/UL — SIGNIFICANT CHANGE UP (ref 3.8–10.5)
WBC # FLD AUTO: 4.71 K/UL — SIGNIFICANT CHANGE UP (ref 3.8–10.5)

## 2018-12-02 RX ORDER — SOD SULF/SODIUM/NAHCO3/KCL/PEG
4000 SOLUTION, RECONSTITUTED, ORAL ORAL ONCE
Qty: 0 | Refills: 0 | Status: COMPLETED | OUTPATIENT
Start: 2018-12-02 | End: 2018-12-02

## 2018-12-02 RX ADMIN — Medication 4000 MILLILITER(S): at 17:38

## 2018-12-02 RX ADMIN — Medication 25 MILLIGRAM(S): at 17:45

## 2018-12-02 RX ADMIN — Medication 0.25 MILLIGRAM(S): at 01:36

## 2018-12-02 RX ADMIN — SODIUM CHLORIDE 1 GRAM(S): 9 INJECTION INTRAMUSCULAR; INTRAVENOUS; SUBCUTANEOUS at 05:11

## 2018-12-02 RX ADMIN — Medication 25 MILLIGRAM(S): at 05:11

## 2018-12-02 RX ADMIN — Medication 20 MILLIGRAM(S): at 05:11

## 2018-12-02 RX ADMIN — PANTOPRAZOLE SODIUM 40 MILLIGRAM(S): 20 TABLET, DELAYED RELEASE ORAL at 05:11

## 2018-12-02 RX ADMIN — SODIUM CHLORIDE 1 GRAM(S): 9 INJECTION INTRAMUSCULAR; INTRAVENOUS; SUBCUTANEOUS at 14:12

## 2018-12-02 RX ADMIN — ATORVASTATIN CALCIUM 40 MILLIGRAM(S): 80 TABLET, FILM COATED ORAL at 21:21

## 2018-12-02 RX ADMIN — SODIUM CHLORIDE 1 GRAM(S): 9 INJECTION INTRAMUSCULAR; INTRAVENOUS; SUBCUTANEOUS at 21:21

## 2018-12-02 NOTE — PROGRESS NOTE ADULT - SUBJECTIVE AND OBJECTIVE BOX
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 +HA, chest discomfort. States that usually she gets episodes of weakness and chest discomfort during the morning, right after she wakes up. Describes the chest discomfort as "heaviness". As per daughter at bedside, pt is visiting from [place] and has been in [place] for about a week. Denies fever, cough, diarrhea, bladder incontinence. No hx of MI. No hx of CVA. Takes ASA. Nonsmoker. She denies any abdominal pain, any black stool, any blood in stool, any vomiting of blood or any type of active bleeding.     11/29: Lying in bed, comfortable offers no complaints. Denies fever, chills, N, V, abd pain, CP, SOB.  11/30: Pt feeling better, s/p 2u prbc transfusion with appropriate response.  Denies fever, chills, N, V, abd pain, CP, SOB.  12/01/18: Patient seen and examined. She denies any new complaints.     REVIEW OF SYSTEMS: All other review of systems is negative unless indicated above.    Vital Signs Last 24 Hrs  T(C): 36.5 (02 Dec 2018 10:32), Max: 36.5 (01 Dec 2018 16:55)  T(F): 97.7 (02 Dec 2018 10:32), Max: 97.7 (01 Dec 2018 16:55)  HR: 99 (02 Dec 2018 10:32) (88 - 99)  BP: 137/50 (02 Dec 2018 10:32) (137/50 - 160/57)  BP(mean): --  RR: 18 (02 Dec 2018 10:32) (18 - 18)  SpO2: 99% (02 Dec 2018 10:32) (98% - 100%)        PHYSICAL EXAM:    Constitutional: NAD, awake and alert, frail, elderly  HEENT: PERR, EOMI, Normal Hearing, MMM  Neck: Soft and supple  Respiratory: Breath sounds are clear bilaterally, No wheezing, rales or rhonchi  Cardiovascular: S1 and S2, regular rate and rhythm, no Murmurs, gallops or rubs  Gastrointestinal: Bowel Sounds present, soft, nontender, nondistended, no guarding, no rebound  Extremities: No peripheral edema  Neurological: A/O x 3, no focal deficits in my limited exam  Skin: No rashes        MEDICATIONS:    atorvastatin 40 milliGRAM(s) Oral at bedtime  furosemide    Tablet 20 milliGRAM(s) Oral daily  hydrALAZINE 25 milliGRAM(s) Oral every 12 hours  metoprolol succinate ER 25 milliGRAM(s) Oral daily  pantoprazole    Tablet 40 milliGRAM(s) Oral before breakfast  sodium chloride 1 Gram(s) Oral three times a day    MEDICATIONS  (PRN):  acetaminophen   Tablet .. 650 milliGRAM(s) Oral every 6 hours PRN Temp greater or equal to 38C (100.4F), Mild Pain (1 - 3)  ALPRAZolam 0.25 milliGRAM(s) Oral at bedtime PRN anxiety or insomnia        Labs:                                       7.7    4.71  )-----------( 144      ( 02 Dec 2018 06:13 )             24.3     02 Dec 2018 06:13    139    |  105    |  25     ----------------------------<  88     4.1     |  26     |  0.78     Ca    8.7        02 Dec 2018 06:13                                       Assessment and Plan:  89 yo female presented with chest pain, sob and weakness.    CP/SOB/weakness: Probably secondary to acute on chronic anemia: r/o GI bleed  -trops neg x 3  -chest xray no acute pathology  -2 units PRBC transfusion with appropriate response, continue to trend   -CT Abd/Pelvis --> Moderate fluid distention of the bladder.  Nodular soft tissue density right middle lobe measuring 1 cm.  -Stop aspirin and plavix (unclear indication, per pt was for stroke prevention?).  PCP in [place] Dr. La Paredes cell [phone], office: [phone]  -IV protonix changed to oral qd  -EGD --> mild esophageal stenosis, mild erythema antrum   -f/u GI, possible colonoscopy tomorrow    Hx of lymphoma:  -per pt in remission  -onc on board    HTN  -loop recorder --> sinus 90s  -follow BP and adjust medications as needed    Anxiety  -on Xanax    HLD  -on statin    h/o Hyponatremia-stable  -on salt tabs      SCD for DVT ppx       Dispo:  -pending stable H+H and final GI recs.

## 2018-12-02 NOTE — PROGRESS NOTE ADULT - SUBJECTIVE AND OBJECTIVE BOX
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 +HA, chest discomfort. States that usually she gets episodes of weakness and chest discomfort during the morning, right after she wakes up. Describes the chest discomfort as "heaviness". As per daughter at bedside, pt is visiting from [place] and has been in [place] for about a week. Denies fever, cough, diarrhea, bladder incontinence. No hx of MI. No hx of CVA. Takes ASA. Nonsmoker. She denies any abdominal pain, any black stool, any blood in stool, any vomiting of blood or any type of active bleeding.     No bleeding overnight. Hct is stable.     Family hx;  Patient is unable to provide detail family history due to her current clinical status. (28 Nov 2018 20:43)      PAST MEDICAL & SURGICAL HISTORY:  Lymphoma  HTN (hypertension)  Iron deficiency  No significant past surgical history      MEDICATIONS  (STANDING):  atorvastatin 40 milliGRAM(s) Oral at bedtime  furosemide    Tablet 20 milliGRAM(s) Oral daily  hydrALAZINE 25 milliGRAM(s) Oral every 12 hours  metoprolol succinate ER 25 milliGRAM(s) Oral daily  pantoprazole    Tablet 40 milliGRAM(s) Oral before breakfast  sodium chloride 1 Gram(s) Oral three times a day    MEDICATIONS  (PRN):  acetaminophen   Tablet .. 650 milliGRAM(s) Oral every 6 hours PRN Temp greater or equal to 38C (100.4F), Mild Pain (1 - 3)  ALPRAZolam 0.25 milliGRAM(s) Oral at bedtime PRN anxiety or insomnia      Allergies    penicillin (Hives)  sulfa drugs (Hives)  tetracycline (Hives)    Intolerances          Vital Signs Last 24 Hrs  T(C): 36.4 (02 Dec 2018 04:34), Max: 36.7 (01 Dec 2018 10:43)  T(F): 97.5 (02 Dec 2018 04:34), Max: 98 (01 Dec 2018 10:43)  HR: 88 (01 Dec 2018 16:55) (88 - 98)  BP: 146/49 (02 Dec 2018 04:34) (138/45 - 160/57)  BP(mean): --  RR: 18 (01 Dec 2018 10:43) (18 - 18)  SpO2: 98% (02 Dec 2018 04:34) (98% - 100%)    PHYSICAL EXAM:    Constitutional: NAD, well-developed  Respiratory: CTAB  Cardiovascular: S1 and S2, RRR, no M/G/R  Gastrointestinal: BS+, soft, NT/ND  LABS:                        7.7    4.71  )-----------( 144      ( 02 Dec 2018 06:13 )             24.3     12-02    139  |  105  |  25<H>  ----------------------------<  88  4.1   |  26  |  0.78    Ca    8.7      02 Dec 2018 06:13            RADIOLOGY & ADDITIONAL STUDIES:

## 2018-12-03 ENCOUNTER — TRANSCRIPTION ENCOUNTER (OUTPATIENT)
Age: 83
End: 2018-12-03

## 2018-12-03 ENCOUNTER — RESULT REVIEW (OUTPATIENT)
Age: 83
End: 2018-12-03

## 2018-12-03 VITALS
TEMPERATURE: 97 F | SYSTOLIC BLOOD PRESSURE: 145 MMHG | HEART RATE: 88 BPM | OXYGEN SATURATION: 97 % | RESPIRATION RATE: 17 BRPM | DIASTOLIC BLOOD PRESSURE: 46 MMHG

## 2018-12-03 LAB
HCT VFR BLD CALC: 26.9 % — LOW (ref 34.5–45)
HGB BLD-MCNC: 8.5 G/DL — LOW (ref 11.5–15.5)
MCHC RBC-ENTMCNC: 29.9 PG — SIGNIFICANT CHANGE UP (ref 27–34)
MCHC RBC-ENTMCNC: 31.6 GM/DL — LOW (ref 32–36)
MCV RBC AUTO: 94.7 FL — SIGNIFICANT CHANGE UP (ref 80–100)
NRBC # BLD: 0 /100 WBCS — SIGNIFICANT CHANGE UP (ref 0–0)
PLATELET # BLD AUTO: 152 K/UL — SIGNIFICANT CHANGE UP (ref 150–400)
RBC # BLD: 2.84 M/UL — LOW (ref 3.8–5.2)
RBC # FLD: 17.2 % — HIGH (ref 10.3–14.5)
WBC # BLD: 5.1 K/UL — SIGNIFICANT CHANGE UP (ref 3.8–10.5)
WBC # FLD AUTO: 5.1 K/UL — SIGNIFICANT CHANGE UP (ref 3.8–10.5)

## 2018-12-03 PROCEDURE — 74177 CT ABD & PELVIS W/CONTRAST: CPT | Mod: 26

## 2018-12-03 PROCEDURE — 88305 TISSUE EXAM BY PATHOLOGIST: CPT | Mod: 26

## 2018-12-03 RX ORDER — ASPIRIN/CALCIUM CARB/MAGNESIUM 324 MG
1 TABLET ORAL
Qty: 0 | Refills: 0 | COMMUNITY

## 2018-12-03 RX ORDER — CLOPIDOGREL BISULFATE 75 MG/1
1 TABLET, FILM COATED ORAL
Qty: 0 | Refills: 0 | COMMUNITY

## 2018-12-03 RX ADMIN — PANTOPRAZOLE SODIUM 40 MILLIGRAM(S): 20 TABLET, DELAYED RELEASE ORAL at 15:04

## 2018-12-03 RX ADMIN — Medication 25 MILLIGRAM(S): at 06:41

## 2018-12-03 RX ADMIN — Medication 25 MILLIGRAM(S): at 18:29

## 2018-12-03 RX ADMIN — SODIUM CHLORIDE 1 GRAM(S): 9 INJECTION INTRAMUSCULAR; INTRAVENOUS; SUBCUTANEOUS at 15:01

## 2018-12-03 RX ADMIN — Medication 20 MILLIGRAM(S): at 15:04

## 2018-12-03 NOTE — DISCHARGE NOTE ADULT - PATIENT PORTAL LINK FT
You can access the [facility], offered by [facility], by registering with the following website: [email or web address]

## 2018-12-03 NOTE — PROGRESS NOTE ADULT - REASON FOR ADMISSION
complain of sob and chest pain

## 2018-12-03 NOTE — DISCHARGE NOTE ADULT - CARE PLAN
Principal Discharge DX:	Severe anemia  Goal:	prevent further admission  Assessment and plan of treatment:	CBC in 1 week and follow up with PMD

## 2018-12-03 NOTE — PROGRESS NOTE ADULT - ASSESSMENT
SOB and chest pressure- likely from severe anemia.  No further workup recommended unless symptoms recur.   f/u with cardiologist as outpt.   Stable from cardiac point of view

## 2018-12-03 NOTE — DISCHARGE NOTE ADULT - MEDICATION SUMMARY - MEDICATIONS TO TAKE
I will START or STAY ON the medications listed below when I get home from the hospital:    rosuvastatin 20 mg oral tablet  -- 1 tab(s) by mouth once a day (at bedtime)  -- Indication: For HLD    ALPRAZolam 0.25 mg oral tablet  -- 1 tab(s) by mouth once a day (at bedtime), As Needed  -- Indication: For anxiety    Metoprolol Succinate ER 25 mg oral tablet, extended release  -- 1 tab(s) by mouth once a day  -- Indication: For HTN (hypertension)    furosemide 20 mg oral tablet  -- 1 tab(s) by mouth once a day  -- Indication: For edema    famotidine 20 mg oral tablet  -- 1 tab(s) by mouth 2 times a day  -- Indication: For SEVERE ANEMIA, WEAKNESS, CHEST PAIN    sodium chloride 1 g oral tablet  -- 1 gram(s) by mouth 3 times a day  -- Indication: For Home med    hydrALAZINE 25 mg oral tablet  -- 1 tab(s) by mouth once a day  -- Indication: For HTN (hypertension)

## 2018-12-03 NOTE — PROGRESS NOTE ADULT - SUBJECTIVE AND OBJECTIVE BOX
Patient is a 89y old  Female who presents with a chief complaint of complain of sob and chest pain .      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 +HA, chest discomfort. States that usually she gets episodes of weakness and chest discomfort during the morning, right after she wakes up. Describes the chest discomfort as "heaviness". As per daughter at the time of admission, pt is visiting from [place] and has been in [place] for about a week. Denies fever, cough, diarrhea, bladder incontinence. No hx of MI. No hx of CVA. Takes ASA. Nonsmoker. She denies any abdominal pain, any black stool, any blood in stool, any vomiting of blood or any type of active bleeding.       Pt received PRBC transfusion and she states that she felt better after that.  Denies any SOB or CP at this time.  She states that her primary compliant was generalized weakness and she had some SOB.  With SOB she states that she had chest heaviness.  Stress test last was many yrs ago per pt.  She had similar symptoms in [place] and she had loop recorder implanted.  Denies any prior cardiac hx.    12/3 S/P EGD and colonoscopy, no active bleeding seen, for Upper GI series today, improved symptoms after  PRBC, no CP, no SOB, no palpitations        PAST MEDICAL & SURGICAL HISTORY:  Lymphoma  HTN (hypertension)  Iron deficiency  No significant past surgical history      MEDICATIONS  (STANDING):  atorvastatin 40 milliGRAM(s) Oral at bedtime  furosemide    Tablet 20 milliGRAM(s) Oral daily  hydrALAZINE 25 milliGRAM(s) Oral every 12 hours  metoprolol succinate ER 25 milliGRAM(s) Oral daily  pantoprazole  Injectable      pantoprazole  Injectable 40 milliGRAM(s) IV Push every 12 hours  sodium chloride 1 Gram(s) Oral three times a day    MEDICATIONS  (PRN):  acetaminophen   Tablet .. 650 milliGRAM(s) Oral every 6 hours PRN Temp greater or equal to 38C (100.4F), Mild Pain (1 - 3)  ALPRAZolam 0.25 milliGRAM(s) Oral at bedtime PRN anxiety or insomnia      FAMILY HISTORY:  No family history of premature CAD or SCD.     SOCIAL HISTORY:  no smoker in last few yrs.     REVIEW OF SYSTEMS:  CONSTITUTIONAL:    No fatigue, malaise, lethargy.  No fever or chills. per HPI  HEENT:  Eyes:  No visual changes.     ENT:  No epistaxis.  No sinus pain.    RESPIRATORY:  No cough.  No wheeze.  No hemoptysis.  c/o shortness of breath.  CARDIOVASCULAR:  c/o chest pains.  No palpitations. c/o shortness of breath, No orthopnea or PND.  GASTROINTESTINAL:  No abdominal pain.  No nausea or vomiting.    GENITOURINARY:    No hematuria.    MUSCULOSKELETAL:  No musculoskeletal pain.  No joint swelling.  No arthritis.  NEUROLOGICAL:  No tingling or numbness or weakness.  PSYCHIATRIC:  No confusion  SKIN:  No rashes.    ENDOCRINE:  No unexplained weight loss.  No polydipsia.   HEMATOLOGIC:  No anemia.  No prolonged or excessive bleeding.   ALLERGIC AND IMMUNOLOGIC:  No pruritus.          Vital Signs Last 24 Hrs  T(C): 36.6 (2018 04:41), Max: 37 (2018 12:37)  T(F): 97.8 (2018 04:41), Max: 98.6 (2018 12:37)  HR: 86 (2018 04:41) (85 - 96)  BP: 147/49 (2018 04:41) (147/49 - 205/80)  BP(mean): --  RR: 17 (2018 22:43) (17 - 19)  SpO2: 97% (2018 04:41) (97% - 100%)    PHYSICAL EXAM-    Constitutional: elderly frail , pale looking female in no acute distress.     Head: Head is normocephalic and atraumatic.      Neck: No jugular venous distention. No audible carotid bruits.   Cardiovascular: Regular rate and rhythm without S3, S4. No murmurs or rubs are appreciated.      Respiratory: Breath sounds are normal. No rales. No wheezing.    Abdomen: Soft, nontender, nondistended with positive bowel sounds.      Extremity: No tenderness. No  pitting edema     Neurologic: The patient is alert and oriented.      Skin: No rash, no obvious lesions noted.      Psychiatric: The patient appears to be emotionally stable.      INTERPRETATION OF TELEMETRY: sinus rythm.     ECG: Sinus rythm , normal axis, no ST T changes. isolated PVCs.     I&O's Detail    2018 07:01  -  2018 07:00  --------------------------------------------------------  IN:    Packed Red Blood Cells: 605 mL  Total IN: 605 mL    OUT:  Total OUT: 0 mL    Total NET: 605 mL          LABS:                        5.7    6.75  )-----------( 201      ( 2018 14:27 )             18.4         140  |  104  |  24<H>  ----------------------------<  118<H>  4.1   |  26  |  0.99    Ca    8.9      2018 14:27  Phos  3.0       Mg     1.9         TPro  6.2  /  Alb  3.2<L>  /  TBili  0.2  /  DBili  x   /  AST  26  /  ALT  25  /  AlkPhos  80      CARDIAC MARKERS ( 2018 01:15 )  0.041 ng/mL / x     / x     / x     / x      CARDIAC MARKERS ( 2018 15:40 )  0.023 ng/mL / x     / x     / x     / x      CARDIAC MARKERS ( 2018 14:27 )  0.027 ng/mL / x     / 46 U/L / x     / x          PT/INR - ( 2018 15:40 )   PT: 11.0 sec;   INR: 0.99 ratio         PTT - ( 2018 15:40 )  PTT:25.4 sec  Urinalysis Basic - ( 2018 14:27 )    Color: Yellow / Appearance: Clear / S.005 / pH: x  Gluc: x / Ketone: Negative  / Bili: Negative / Urobili: Negative mg/dL   Blood: x / Protein: Negative mg/dL / Nitrite: Negative   Leuk Esterase: Trace / RBC: 0-2 /HPF / WBC 3-5   Sq Epi: x / Non Sq Epi: Few / Bacteria: Few      I&O's Summary    2018 07:01  -  2018 07:00  --------------------------------------------------------  IN: 605 mL / OUT: 0 mL / NET: 605 mL      BNP  RADIOLOGY & ADDITIONAL STUDIES:  < from: CT Abdomen and Pelvis No Cont (18 @ 21:37) >    ******PRELIMINARY REPORT******    ******PRELIMINARY REPORT******          EXAM:  CT ABDOMEN AND PELVIS                            PROCEDURE DATE:  2018    ******PRELIMINARY REPORT******    ******PRELIMINARY REPORT******              INTERPRETATION:  [facility] RAD[facility] PRELIMINARY REPORT    EXAM:    CT Abdomen and Pelvis Without Intravenous Contrast     EXAM DATE/TIME:    2018 9:45 PM     CLINICAL HISTORY:    89 years old, female; Pain; Abdominal pain; Generalized; Patient HX:   Drop in   hb from 11.3 to 5.7     TECHNIQUE:    Axial computed tomography images of the abdomen and pelvis without   intravenous   contrast.    Coronal and sagittal reformatted images were created and reviewed.     COMPARISON:    No relevant prior studies available.     FINDINGS:    Lower thorax:  Nodular soft tissue density right middle lobe measuring 1   cm.   Possible linear versus nodular soft tissue density within the right   middle and   lingula.     ABDOMEN:    Liver: Normal. No mass.    Gallbladder and bile ducts: Normal. No calcified stones. No ductal   dilation.    Pancreas: Normal. No ductal dilation.    Spleen: Normal. No splenomegaly.    Adrenals: Normal. No mass.    Kidneys and ureters:  Subcentimeter cyst involving the lower pole of the   left   kidney, poorly characterize.    Stomach and bowel:  No evidence of bowel obstruction. Mild - moderate   amount   retained stool material throughout nondilated colon.    Appendix:  Appendix - visualized portions appear normal.     PELVIS:    Bladder:  Moderate fluid distention of the bladder.    Reproductive:  Uterus is not identified.     ABDOMEN and PELVIS:    Intraperitoneal space:  No evidence of focal hematoma or intraperitoneal   hemorrhage.    Bones/joints:  Chronic degenerative changes of the lumbar spine.    Soft tissues: Unremarkable.    Vasculature:  Chronic atherosclerotic calcification of the vasculature.    Lymph nodes: Normal. No enlarged lymph nodes.     IMPRESSION:   1. No evidence of focal hematoma or intraperitoneal hemorrhage.   2. No evidence of bowel obstruction.   3. Moderate fluid distention of the bladder.   4. Subcentimeter cyst involving the lower pole of the left kidney, poorly   characterize.   5. Nodular soft tissue density right middle lobe measuring 1 cm. Possible   linear versus nodular soft tissue density within the right middle and   lingula.   For low and high risk patients, CT at 3-6 months, then consider CT at   18-24   months. For high risk patients, consider CT at 3-6 months, then CT at   18-24   months.          ******PRELIMINARY REPORT******    ******PRELIMINARY REPORT******              EZIO AGUSTIN M.D.;[facility] RADIOLOGIST      < end of copied text >

## 2018-12-03 NOTE — DISCHARGE NOTE ADULT - MEDICATION SUMMARY - MEDICATIONS TO STOP TAKING
I will STOP taking the medications listed below when I get home from the hospital:    clopidogrel 75 mg oral tablet  -- 1 tab(s) by mouth once a day    Patient was told to stop after finished 30 day supply, had 5 days left    aspirin 81 mg oral tablet  -- 1 tab(s) by mouth once a day

## 2018-12-03 NOTE — DISCHARGE NOTE ADULT - HOSPITAL COURSE
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 +HA, chest discomfort. States that usually she gets episodes of weakness and chest discomfort during the morning, right after she wakes up. Describes the chest discomfort as "heaviness". As per daughter at bedside, pt is visiting from [place] and has been in [place] for about a week. Denies fever, cough, diarrhea, bladder incontinence. No hx of MI. No hx of CVA. Takes ASA. Nonsmoker. She denies any abdominal pain, any black stool, any blood in stool, any vomiting of blood or any type of active bleeding.     11/29: Lying in bed, comfortable offers no complaints. Denies fever, chills, N, V, abd pain, CP, SOB.  11/30: Pt feeling better, s/p 2u prbc transfusion with appropriate response.  Denies fever, chills, N, V, abd pain, CP, SOB.  12/01/18: Patient seen and examined. She denies any new complaints.   12/03/18: Patient seen and examined. S/P colonoscopy in am. No source of bleeding.       Vital Signs Last 24 Hrs  T(C): 36.9 (03 Dec 2018 10:30), Max: 36.9 (03 Dec 2018 10:30)  T(F): 98.4 (03 Dec 2018 10:30), Max: 98.4 (03 Dec 2018 10:30)  HR: 102 (03 Dec 2018 10:30) (82 - 102)  BP: 139/47 (03 Dec 2018 10:30) (134/39 - 153/62)  BP(mean): --  RR: 18 (03 Dec 2018 10:30) (17 - 18)  SpO2: 97% (03 Dec 2018 10:30) (97% - 100%)      PHYSICAL EXAM:    Constitutional: NAD, awake and alert, frail, elderly  HEENT: PERR, EOMI, Normal Hearing, MMM  Neck: Soft and supple  Respiratory: Breath sounds are clear bilaterally, No wheezing, rales or rhonchi  Cardiovascular: S1 and S2, regular rate and rhythm, no Murmurs, gallops or rubs  Gastrointestinal: Bowel Sounds present, soft, nontender, nondistended, no guarding, no rebound  Extremities: No peripheral edema  Neurological: A/O x 3, no focal deficits in my limited exam      Assessment and Plan:  89 yo female presented with chest pain, sob and weakness.    CP/SOB/weakness: Probably secondary to acute on chronic anemia: r/o GI bleed  -trops neg x 3  -chest xray no acute pathology  -2 units PRBC transfusion with appropriate response, continue to trend   -CT Abd/Pelvis --> Moderate fluid distention of the bladder.  Nodular soft tissue density right middle lobe measuring 1 cm.  -Stop aspirin and plavix (unclear indication, per pt was for stroke prevention?).  PCP in [place] Dr. La Paredes cell [phone], office: [phone]  -EGD --> mild esophageal stenosis, mild erythema antrum   -S/P colonoscopy: no source of bleeding    Hx of lymphoma:  -per pt in remission  -Outpatient follow up    HTN  -loop recorder --> sinus 90s  Continue outpatient meds    Anxiety  -on Xanax    HLD  -on statin    h/o Hyponatremia-stable  -on salt tabs    Home today after CT enterography  Spent more than 30 minutes to prepare the discharge.

## 2018-12-04 LAB
SURGICAL PATHOLOGY FINAL REPORT - CH: SIGNIFICANT CHANGE UP
SURGICAL PATHOLOGY FINAL REPORT - CH: SIGNIFICANT CHANGE UP

## 2018-12-06 DIAGNOSIS — E87.1 HYPO-OSMOLALITY AND HYPONATREMIA: ICD-10-CM

## 2018-12-06 DIAGNOSIS — E87.6 HYPOKALEMIA: ICD-10-CM

## 2018-12-06 DIAGNOSIS — F41.9 ANXIETY DISORDER, UNSPECIFIED: ICD-10-CM

## 2018-12-06 DIAGNOSIS — K57.30 DIVERTICULOSIS OF LARGE INTESTINE WITHOUT PERFORATION OR ABSCESS WITHOUT BLEEDING: ICD-10-CM

## 2018-12-06 DIAGNOSIS — D62 ACUTE POSTHEMORRHAGIC ANEMIA: ICD-10-CM

## 2018-12-06 DIAGNOSIS — K44.9 DIAPHRAGMATIC HERNIA WITHOUT OBSTRUCTION OR GANGRENE: ICD-10-CM

## 2018-12-06 DIAGNOSIS — Z88.2 ALLERGY STATUS TO SULFONAMIDES: ICD-10-CM

## 2018-12-06 DIAGNOSIS — K64.8 OTHER HEMORRHOIDS: ICD-10-CM

## 2018-12-06 DIAGNOSIS — R53.1 WEAKNESS: ICD-10-CM

## 2018-12-06 DIAGNOSIS — K31.89 OTHER DISEASES OF STOMACH AND DUODENUM: ICD-10-CM

## 2018-12-06 DIAGNOSIS — D12.5 BENIGN NEOPLASM OF SIGMOID COLON: ICD-10-CM

## 2018-12-06 DIAGNOSIS — C85.80 OTHER SPECIFIED TYPES OF NON-HODGKIN LYMPHOMA, UNSPECIFIED SITE: ICD-10-CM

## 2018-12-06 DIAGNOSIS — K22.2 ESOPHAGEAL OBSTRUCTION: ICD-10-CM

## 2018-12-06 DIAGNOSIS — Z88.0 ALLERGY STATUS TO PENICILLIN: ICD-10-CM

## 2018-12-06 DIAGNOSIS — K62.1 RECTAL POLYP: ICD-10-CM

## 2018-12-06 DIAGNOSIS — Z80.0 FAMILY HISTORY OF MALIGNANT NEOPLASM OF DIGESTIVE ORGANS: ICD-10-CM

## 2018-12-06 DIAGNOSIS — K92.1 MELENA: ICD-10-CM

## 2018-12-06 DIAGNOSIS — I10 ESSENTIAL (PRIMARY) HYPERTENSION: ICD-10-CM

## 2018-12-06 DIAGNOSIS — D12.2 BENIGN NEOPLASM OF ASCENDING COLON: ICD-10-CM

## 2019-12-31 ENCOUNTER — EMERGENCY (EMERGENCY)
Facility: HOSPITAL | Age: 84
LOS: 0 days | Discharge: HOME | End: 2019-12-31
Attending: EMERGENCY MEDICINE
Payer: MEDICARE

## 2019-12-31 VITALS
OXYGEN SATURATION: 100 % | TEMPERATURE: 99 F | RESPIRATION RATE: 12 BRPM | SYSTOLIC BLOOD PRESSURE: 138 MMHG | DIASTOLIC BLOOD PRESSURE: 46 MMHG | HEART RATE: 78 BPM

## 2019-12-31 VITALS — WEIGHT: 117.07 LBS | HEIGHT: 60 IN

## 2019-12-31 DIAGNOSIS — R05 COUGH: ICD-10-CM

## 2019-12-31 DIAGNOSIS — R19.7 DIARRHEA, UNSPECIFIED: ICD-10-CM

## 2019-12-31 DIAGNOSIS — Z79.899 OTHER LONG TERM (CURRENT) DRUG THERAPY: ICD-10-CM

## 2019-12-31 DIAGNOSIS — R09.81 NASAL CONGESTION: ICD-10-CM

## 2019-12-31 DIAGNOSIS — J06.9 ACUTE UPPER RESPIRATORY INFECTION, UNSPECIFIED: ICD-10-CM

## 2019-12-31 DIAGNOSIS — Z95.818 PRESENCE OF OTHER CARDIAC IMPLANTS AND GRAFTS: ICD-10-CM

## 2019-12-31 DIAGNOSIS — E61.1 IRON DEFICIENCY: ICD-10-CM

## 2019-12-31 DIAGNOSIS — B97.89 OTHER VIRAL AGENTS AS THE CAUSE OF DISEASES CLASSIFIED ELSEWHERE: ICD-10-CM

## 2019-12-31 DIAGNOSIS — Z85.72 PERSONAL HISTORY OF NON-HODGKIN LYMPHOMAS: ICD-10-CM

## 2019-12-31 DIAGNOSIS — I10 ESSENTIAL (PRIMARY) HYPERTENSION: ICD-10-CM

## 2019-12-31 PROBLEM — C85.90 NON-HODGKIN LYMPHOMA, UNSPECIFIED, UNSPECIFIED SITE: Chronic | Status: ACTIVE | Noted: 2018-11-28

## 2019-12-31 LAB
ALBUMIN SERPL ELPH-MCNC: 3.4 G/DL — SIGNIFICANT CHANGE UP (ref 3.3–5)
ALP SERPL-CCNC: 87 U/L — SIGNIFICANT CHANGE UP (ref 40–120)
ALT FLD-CCNC: 27 U/L — SIGNIFICANT CHANGE UP (ref 12–78)
ANION GAP SERPL CALC-SCNC: 5 MMOL/L — SIGNIFICANT CHANGE UP (ref 5–17)
APTT BLD: 27.7 SEC — SIGNIFICANT CHANGE UP (ref 27.5–36.3)
AST SERPL-CCNC: 32 U/L — SIGNIFICANT CHANGE UP (ref 15–37)
BASOPHILS # BLD AUTO: 0.01 K/UL — SIGNIFICANT CHANGE UP (ref 0–0.2)
BASOPHILS NFR BLD AUTO: 0.2 % — SIGNIFICANT CHANGE UP (ref 0–2)
BILIRUB SERPL-MCNC: 0.3 MG/DL — SIGNIFICANT CHANGE UP (ref 0.2–1.2)
BUN SERPL-MCNC: 19 MG/DL — SIGNIFICANT CHANGE UP (ref 7–23)
CALCIUM SERPL-MCNC: 9.4 MG/DL — SIGNIFICANT CHANGE UP (ref 8.5–10.1)
CHLORIDE SERPL-SCNC: 106 MMOL/L — SIGNIFICANT CHANGE UP (ref 96–108)
CO2 SERPL-SCNC: 28 MMOL/L — SIGNIFICANT CHANGE UP (ref 22–31)
CREAT SERPL-MCNC: 1.14 MG/DL — SIGNIFICANT CHANGE UP (ref 0.5–1.3)
EOSINOPHIL # BLD AUTO: 0.14 K/UL — SIGNIFICANT CHANGE UP (ref 0–0.5)
EOSINOPHIL NFR BLD AUTO: 2.9 % — SIGNIFICANT CHANGE UP (ref 0–6)
FLU A RESULT: SIGNIFICANT CHANGE UP
FLU A RESULT: SIGNIFICANT CHANGE UP
FLUAV AG NPH QL: SIGNIFICANT CHANGE UP
FLUBV AG NPH QL: SIGNIFICANT CHANGE UP
GLUCOSE SERPL-MCNC: 102 MG/DL — HIGH (ref 70–99)
HCT VFR BLD CALC: 33.3 % — LOW (ref 34.5–45)
HGB BLD-MCNC: 11 G/DL — LOW (ref 11.5–15.5)
IMM GRANULOCYTES NFR BLD AUTO: 0.2 % — SIGNIFICANT CHANGE UP (ref 0–1.5)
INR BLD: 0.98 RATIO — SIGNIFICANT CHANGE UP (ref 0.88–1.16)
LACTATE SERPL-SCNC: 1.2 MMOL/L — SIGNIFICANT CHANGE UP (ref 0.7–2)
LYMPHOCYTES # BLD AUTO: 1.12 K/UL — SIGNIFICANT CHANGE UP (ref 1–3.3)
LYMPHOCYTES # BLD AUTO: 22.9 % — SIGNIFICANT CHANGE UP (ref 13–44)
MCHC RBC-ENTMCNC: 32.5 PG — SIGNIFICANT CHANGE UP (ref 27–34)
MCHC RBC-ENTMCNC: 33 GM/DL — SIGNIFICANT CHANGE UP (ref 32–36)
MCV RBC AUTO: 98.5 FL — SIGNIFICANT CHANGE UP (ref 80–100)
MONOCYTES # BLD AUTO: 0.59 K/UL — SIGNIFICANT CHANGE UP (ref 0–0.9)
MONOCYTES NFR BLD AUTO: 12 % — SIGNIFICANT CHANGE UP (ref 2–14)
NEUTROPHILS # BLD AUTO: 3.03 K/UL — SIGNIFICANT CHANGE UP (ref 1.8–7.4)
NEUTROPHILS NFR BLD AUTO: 61.8 % — SIGNIFICANT CHANGE UP (ref 43–77)
PLATELET # BLD AUTO: 147 K/UL — LOW (ref 150–400)
POTASSIUM SERPL-MCNC: 3.4 MMOL/L — LOW (ref 3.5–5.3)
POTASSIUM SERPL-SCNC: 3.4 MMOL/L — LOW (ref 3.5–5.3)
PROT SERPL-MCNC: 7.1 GM/DL — SIGNIFICANT CHANGE UP (ref 6–8.3)
PROTHROM AB SERPL-ACNC: 10.9 SEC — SIGNIFICANT CHANGE UP (ref 10–12.9)
RBC # BLD: 3.38 M/UL — LOW (ref 3.8–5.2)
RBC # FLD: 14.3 % — SIGNIFICANT CHANGE UP (ref 10.3–14.5)
RSV RESULT: SIGNIFICANT CHANGE UP
RSV RNA RESP QL NAA+PROBE: SIGNIFICANT CHANGE UP
SODIUM SERPL-SCNC: 139 MMOL/L — SIGNIFICANT CHANGE UP (ref 135–145)
WBC # BLD: 4.9 K/UL — SIGNIFICANT CHANGE UP (ref 3.8–10.5)
WBC # FLD AUTO: 4.9 K/UL — SIGNIFICANT CHANGE UP (ref 3.8–10.5)

## 2019-12-31 PROCEDURE — 93010 ELECTROCARDIOGRAM REPORT: CPT

## 2019-12-31 PROCEDURE — 71045 X-RAY EXAM CHEST 1 VIEW: CPT | Mod: 26

## 2019-12-31 PROCEDURE — 99283 EMERGENCY DEPT VISIT LOW MDM: CPT

## 2019-12-31 RX ORDER — IPRATROPIUM/ALBUTEROL SULFATE 18-103MCG
3 AEROSOL WITH ADAPTER (GRAM) INHALATION
Refills: 0 | Status: COMPLETED | OUTPATIENT
Start: 2019-12-31 | End: 2019-12-31

## 2019-12-31 RX ADMIN — Medication 3 MILLILITER(S): at 15:00

## 2019-12-31 RX ADMIN — Medication 3 MILLILITER(S): at 15:06

## 2019-12-31 RX ADMIN — Medication 3 MILLILITER(S): at 15:40

## 2019-12-31 NOTE — ED ADULT TRIAGE NOTE - CHIEF COMPLAINT QUOTE
Patient comes in with chest congestion and cough x 2 days and diarrhea x 1 week. patient also states she had episode of epistaxis for 30 mins yesterday. Patient denies sob/cp. no signs of acute distress noted.

## 2019-12-31 NOTE — ED ADULT NURSE NOTE - NSIMPLEMENTINTERV_GEN_ALL_ED
Implemented All Fall with Harm Risk Interventions:  [place] to call system. Call bell, personal items and telephone within reach. Instruct patient to call for assistance. Room bathroom lighting operational. Non-slip footwear when patient is off stretcher. Physically safe environment: no spills, clutter or unnecessary equipment. Stretcher in lowest position, wheels locked, appropriate side rails in place. Provide visual cue, wrist band, yellow gown, etc. Monitor gait and stability. Monitor for mental status changes and reorient to person, place, and time. Review medications for side effects contributing to fall risk. Reinforce activity limits and safety measures with patient and family. Provide visual clues: red socks.

## 2019-12-31 NOTE — ED STATDOCS - OBJECTIVE STATEMENT
89 y/o female with PMHx of lymphoma, HTN, loop recorder presents to the ED c/o productive cough, congestion. +assoc. weakness, fatigue. Pt with few days of diarrhea after arriving from [place]. Pt with two episodes of epistaxis x2 days. Today, pt with new onset cough today. Pt also s/p cancerous skin removal to which she is applying hydrogen peroxide but daughter has noted purulent drainage. Denies fever, abd pain. Former smoker.

## 2019-12-31 NOTE — ED STATDOCS - PHYSICAL EXAMINATION
Constitutional: NAD AAOx3  Eyes: PERRLA EOMI  Head: Normocephalic atraumatic  Mouth/ENT: MMM. erythema to b/l nostrils.   Cardiac: regular rate   Resp: +coarse breath sounds b/l.   GI: Abd s/nt/nd  Neuro: CN2-12 intact  Skin: No rashes

## 2019-12-31 NOTE — ED STATDOCS - NS ED ROS FT
Constitutional: No fever or chills. +Weakness, fatigue  Eyes: No visual changes  HEENT: No throat pain. +epistaxis.   CV: No chest pain  Resp: No SOB. +cough, congestion  GI: No abd pain, nausea or vomiting. +diarrhea  : No dysuria  MSK: No musculoskeletal pain  Skin: No rash  Neuro: No headache

## 2019-12-31 NOTE — ED STATDOCS - NS_ ATTENDINGSCRIBEDETAILS _ED_A_ED_FT
I, Nigel Farmer MD,  performed the initial face to face bedside interview with this patient regarding history of present illness, review of symptoms and relevant past medical, social and family history.  I completed an independent physical examination.  I was the initial provider who evaluated this patient.  The history, relevant review of systems, past medical and surgical history, medical decision making, and physical examination was documented by the scribe in my presence and I attest to the accuracy of the documentation.

## 2019-12-31 NOTE — ED STATDOCS - PROGRESS NOTE DETAILS
Patient is a 89 y/o pleasant female with PMHx of lymphoma, HTN, loop recorder who presented to [facility] c/o cough, congestion, general malaise x 2 days. patient also has mild diarrhea x 1 week.  Pt had a cancerous skin removal from left shin area and she has been cleaning it with H2O2. As per daughter, the wound looks infected. Denies fever, abd pain, neck pain, HA, CP, SOB, [place]. ~MARRY Hernandez FLU test NEG. CXR NEG for pneumonia. Patient re-examined and re-evaluated after Albuterol NEB. Patient feels much better at this time. ED evaluation, Diagnosis and management discussed with the patient and family in detail. Rec avoid H2O2 for wound cleaning everyday, normal saline/sterile water maybe better choice. f/u derm. Workup results discussed with ED attending CHRISTOPH Diehl to dc home. Close PMD follow up encouraged.  Strict ED return instructions discussed in detail and patient given the opportunity to ask any questions about their discharge diagnosis and instructions. Patient verbalized understanding. ~ MARRY Hernandez

## 2019-12-31 NOTE — ED STATDOCS - SKIN, MLM
skin normal color for race, warm, dry and intact. LLE: small area of excised wound noted left anterior shin, appears to be healing well. wound edges are not erythematous, no signs of abscess, no drainage.  ~MARRY Hernandez

## 2019-12-31 NOTE — ED STATDOCS - NSFOLLOWUPINSTRUCTIONS_ED_ALL_ED_FT
Viral Respiratory Infection    A viral respiratory infection is an illness that affects parts of the body that are used for breathing. These include the lungs, nose, and throat. It is caused by a germ called a virus.  Some examples of this kind of infection are:  A cold.The flu (influenza).A respiratory syncytial virus (RSV) infection.A person who gets this illness may have the following symptoms:  A stuffy or runny nose.Yellow or green fluid in the nose.A cough.Sneezing.Tiredness (fatigue).Achy muscles.A sore throat.Sweating or chills.A fever.A headache.Follow these instructions at home:  Managing pain and congestion     Take over-the-counter and prescription medicines only as told by your doctor.If you have a sore throat, gargle with salt water. Do this 3–4 times per day or as needed. To make a salt-water mixture, dissolve ½–1 tsp of salt in 1 cup of warm water. Make sure that all the salt dissolves.Use nose drops made from salt water. This helps with stuffiness (congestion). It also helps soften the skin around your nose.Drink enough fluid to keep your pee (urine) pale yellow.General instructions        Rest as much as possible.Do not drink alcohol.Do not use any products that have nicotine or tobacco, such as cigarettes and e-cigarettes. If you need help quitting, ask your doctor.Keep all follow-up visits as told by your doctor. This is important.How is this prevented?        Get a flu shot every year. Ask your doctor when you should get your flu shot.Do not let other people get your germs. If you are sick:  Stay home from work or school.Wash your hands with soap and water often. Wash your hands after you cough or sneeze. If soap and water are not available, use hand .Avoid contact with people who are sick during cold and flu season. This is in fall and winter.Get help if:  Your symptoms last for 10 days or longer.Your symptoms get worse over time.You have a fever.You have very bad pain in your face or forehead.Parts of your jaw or neck become very swollen.Get help right away if:  You feel pain or pressure in your chest.You have shortness of breath.You faint or feel like you will faint.You keep throwing up (vomiting).You feel confused.Summary  A viral respiratory infection is an illness that affects parts of the body that are used for breathing.Examples of this illness include a cold, the flu, and respiratory syncytial virus (RSV) infection.The infection can cause a runny nose, cough, sneezing, sore throat, and fever.Follow what your doctor tells you about taking medicines, drinking lots of fluid, washing your hands, resting at home, and avoiding people who are sick.This information is not intended to replace advice given to you by your health care provider. Make sure you discuss any questions you have with your health care provider.

## 2019-12-31 NOTE — ED STATDOCS - CLINICAL SUMMARY MEDICAL DECISION MAKING FREE TEXT BOX
89 F presents to ED for cough, congestion worse today. exam with coarse breath sounds b/l. concern for PNA vs. viral bronchitis. will obtain labs, XR, flu test, reassess. 89 F presents to ED for cough, congestion worse today. exam with coarse breath sounds b/l. concern for PNA vs. viral bronchitis. will obtain labs, XR, flu test, reassess.    PA NOTE: 89 year old female seen and evaluated for cough, fever and malaise x 2 days. FLU test NEG. CXR. NEG. Patient feels a lot better after Albuterol NEB. Discussed with dr. Farmer. DC'd home in stable condition. ~MARRY Hernandez

## 2019-12-31 NOTE — ED STATDOCS - PATIENT PORTAL LINK FT
You can access the FollowMyHealth Patient Portal offered by [facility] by registering at the following website: [email or web address]. By joining [facility]’s FollowMyHealth portal, you will also be able to view your health information using other applications (apps) compatible with our system.

## 2020-01-01 ENCOUNTER — INPATIENT (INPATIENT)
Facility: HOSPITAL | Age: 85
LOS: 1 days | Discharge: HOME | DRG: 392 | End: 2020-01-03
Attending: FAMILY MEDICINE | Admitting: FAMILY MEDICINE
Payer: MEDICARE

## 2020-01-01 VITALS — HEIGHT: 60 IN | WEIGHT: 106.92 LBS

## 2020-01-01 DIAGNOSIS — J40 BRONCHITIS, NOT SPECIFIED AS ACUTE OR CHRONIC: ICD-10-CM

## 2020-01-01 DIAGNOSIS — Z29.9 ENCOUNTER FOR PROPHYLACTIC MEASURES, UNSPECIFIED: ICD-10-CM

## 2020-01-01 DIAGNOSIS — K52.9 NONINFECTIVE GASTROENTERITIS AND COLITIS, UNSPECIFIED: ICD-10-CM

## 2020-01-01 DIAGNOSIS — R19.7 DIARRHEA, UNSPECIFIED: ICD-10-CM

## 2020-01-01 DIAGNOSIS — I10 ESSENTIAL (PRIMARY) HYPERTENSION: ICD-10-CM

## 2020-01-01 LAB
ALBUMIN SERPL ELPH-MCNC: 3.3 G/DL — SIGNIFICANT CHANGE UP (ref 3.3–5)
ALP SERPL-CCNC: 82 U/L — SIGNIFICANT CHANGE UP (ref 40–120)
ALT FLD-CCNC: 24 U/L — SIGNIFICANT CHANGE UP (ref 12–78)
ANION GAP SERPL CALC-SCNC: 7 MMOL/L — SIGNIFICANT CHANGE UP (ref 5–17)
AST SERPL-CCNC: 31 U/L — SIGNIFICANT CHANGE UP (ref 15–37)
BASOPHILS # BLD AUTO: 0.01 K/UL — SIGNIFICANT CHANGE UP (ref 0–0.2)
BASOPHILS NFR BLD AUTO: 0.2 % — SIGNIFICANT CHANGE UP (ref 0–2)
BILIRUB SERPL-MCNC: 0.3 MG/DL — SIGNIFICANT CHANGE UP (ref 0.2–1.2)
BUN SERPL-MCNC: 19 MG/DL — SIGNIFICANT CHANGE UP (ref 7–23)
CALCIUM SERPL-MCNC: 9.1 MG/DL — SIGNIFICANT CHANGE UP (ref 8.5–10.1)
CHLORIDE SERPL-SCNC: 107 MMOL/L — SIGNIFICANT CHANGE UP (ref 96–108)
CO2 SERPL-SCNC: 27 MMOL/L — SIGNIFICANT CHANGE UP (ref 22–31)
CREAT SERPL-MCNC: 1.21 MG/DL — SIGNIFICANT CHANGE UP (ref 0.5–1.3)
EOSINOPHIL # BLD AUTO: 0.1 K/UL — SIGNIFICANT CHANGE UP (ref 0–0.5)
EOSINOPHIL NFR BLD AUTO: 1.9 % — SIGNIFICANT CHANGE UP (ref 0–6)
GLUCOSE SERPL-MCNC: 113 MG/DL — HIGH (ref 70–99)
HCT VFR BLD CALC: 31.7 % — LOW (ref 34.5–45)
HGB BLD-MCNC: 10.1 G/DL — LOW (ref 11.5–15.5)
IMM GRANULOCYTES NFR BLD AUTO: 0.2 % — SIGNIFICANT CHANGE UP (ref 0–1.5)
LACTATE SERPL-SCNC: 2.3 MMOL/L — HIGH (ref 0.7–2)
LIDOCAIN IGE QN: 196 U/L — SIGNIFICANT CHANGE UP (ref 73–393)
LYMPHOCYTES # BLD AUTO: 0.9 K/UL — LOW (ref 1–3.3)
LYMPHOCYTES # BLD AUTO: 17.4 % — SIGNIFICANT CHANGE UP (ref 13–44)
MCHC RBC-ENTMCNC: 31.3 PG — SIGNIFICANT CHANGE UP (ref 27–34)
MCHC RBC-ENTMCNC: 31.9 GM/DL — LOW (ref 32–36)
MCV RBC AUTO: 98.1 FL — SIGNIFICANT CHANGE UP (ref 80–100)
MONOCYTES # BLD AUTO: 0.66 K/UL — SIGNIFICANT CHANGE UP (ref 0–0.9)
MONOCYTES NFR BLD AUTO: 12.8 % — SIGNIFICANT CHANGE UP (ref 2–14)
NEUTROPHILS # BLD AUTO: 3.48 K/UL — SIGNIFICANT CHANGE UP (ref 1.8–7.4)
NEUTROPHILS NFR BLD AUTO: 67.5 % — SIGNIFICANT CHANGE UP (ref 43–77)
PLATELET # BLD AUTO: 146 K/UL — LOW (ref 150–400)
POTASSIUM SERPL-MCNC: 3.1 MMOL/L — LOW (ref 3.5–5.3)
POTASSIUM SERPL-SCNC: 3.1 MMOL/L — LOW (ref 3.5–5.3)
PROT SERPL-MCNC: 6.7 GM/DL — SIGNIFICANT CHANGE UP (ref 6–8.3)
RBC # BLD: 3.23 M/UL — LOW (ref 3.8–5.2)
RBC # FLD: 14.4 % — SIGNIFICANT CHANGE UP (ref 10.3–14.5)
SODIUM SERPL-SCNC: 141 MMOL/L — SIGNIFICANT CHANGE UP (ref 135–145)
WBC # BLD: 5.16 K/UL — SIGNIFICANT CHANGE UP (ref 3.8–10.5)
WBC # FLD AUTO: 5.16 K/UL — SIGNIFICANT CHANGE UP (ref 3.8–10.5)

## 2020-01-01 PROCEDURE — 85025 COMPLETE CBC W/AUTO DIFF WBC: CPT

## 2020-01-01 PROCEDURE — 87040 BLOOD CULTURE FOR BACTERIA: CPT

## 2020-01-01 PROCEDURE — 87798 DETECT AGENT NOS DNA AMP: CPT

## 2020-01-01 PROCEDURE — 36415 COLL VENOUS BLD VENIPUNCTURE: CPT

## 2020-01-01 PROCEDURE — 87581 M.PNEUMON DNA AMP PROBE: CPT

## 2020-01-01 PROCEDURE — 80048 BASIC METABOLIC PNL TOTAL CA: CPT

## 2020-01-01 PROCEDURE — 94640 AIRWAY INHALATION TREATMENT: CPT

## 2020-01-01 PROCEDURE — 83605 ASSAY OF LACTIC ACID: CPT

## 2020-01-01 PROCEDURE — 87633 RESP VIRUS 12-25 TARGETS: CPT

## 2020-01-01 PROCEDURE — 85027 COMPLETE CBC AUTOMATED: CPT

## 2020-01-01 PROCEDURE — 74177 CT ABD & PELVIS W/CONTRAST: CPT | Mod: 26

## 2020-01-01 PROCEDURE — 87486 CHLMYD PNEUM DNA AMP PROBE: CPT

## 2020-01-01 RX ORDER — ROSUVASTATIN CALCIUM 5 MG/1
1 TABLET ORAL
Qty: 0 | Refills: 0 | DISCHARGE

## 2020-01-01 RX ORDER — METRONIDAZOLE 500 MG
500 TABLET ORAL ONCE
Refills: 0 | Status: COMPLETED | OUTPATIENT
Start: 2020-01-01 | End: 2020-01-01

## 2020-01-01 RX ORDER — AMLODIPINE BESYLATE 2.5 MG/1
5 TABLET ORAL DAILY
Refills: 0 | Status: DISCONTINUED | OUTPATIENT
Start: 2020-01-01 | End: 2020-01-03

## 2020-01-01 RX ORDER — FAMOTIDINE 10 MG/ML
20 INJECTION INTRAVENOUS
Refills: 0 | Status: DISCONTINUED | OUTPATIENT
Start: 2020-01-01 | End: 2020-01-03

## 2020-01-01 RX ORDER — HYDRALAZINE HCL 50 MG
1 TABLET ORAL
Qty: 0 | Refills: 0 | DISCHARGE

## 2020-01-01 RX ORDER — SODIUM CHLORIDE 9 MG/ML
500 INJECTION INTRAMUSCULAR; INTRAVENOUS; SUBCUTANEOUS ONCE
Refills: 0 | Status: COMPLETED | OUTPATIENT
Start: 2020-01-01 | End: 2020-01-01

## 2020-01-01 RX ORDER — CIPROFLOXACIN LACTATE 400MG/40ML
500 VIAL (ML) INTRAVENOUS ONCE
Refills: 0 | Status: COMPLETED | OUTPATIENT
Start: 2020-01-01 | End: 2020-01-01

## 2020-01-01 RX ORDER — ATORVASTATIN CALCIUM 80 MG/1
10 TABLET, FILM COATED ORAL AT BEDTIME
Refills: 0 | Status: DISCONTINUED | OUTPATIENT
Start: 2020-01-01 | End: 2020-01-03

## 2020-01-01 RX ORDER — POTASSIUM CHLORIDE 20 MEQ
40 PACKET (EA) ORAL ONCE
Refills: 0 | Status: COMPLETED | OUTPATIENT
Start: 2020-01-01 | End: 2020-01-01

## 2020-01-01 RX ORDER — HYDRALAZINE HCL 50 MG
50 TABLET ORAL
Refills: 0 | Status: DISCONTINUED | OUTPATIENT
Start: 2020-01-01 | End: 2020-01-03

## 2020-01-01 RX ORDER — IPRATROPIUM/ALBUTEROL SULFATE 18-103MCG
3 AEROSOL WITH ADAPTER (GRAM) INHALATION ONCE
Refills: 0 | Status: DISCONTINUED | OUTPATIENT
Start: 2020-01-01 | End: 2020-01-03

## 2020-01-01 RX ORDER — SODIUM CHLORIDE 9 MG/ML
1000 INJECTION INTRAMUSCULAR; INTRAVENOUS; SUBCUTANEOUS ONCE
Refills: 0 | Status: COMPLETED | OUTPATIENT
Start: 2020-01-01 | End: 2020-01-01

## 2020-01-01 RX ORDER — SODIUM CHLORIDE 9 MG/ML
1 INJECTION INTRAMUSCULAR; INTRAVENOUS; SUBCUTANEOUS THREE TIMES A DAY
Refills: 0 | Status: DISCONTINUED | OUTPATIENT
Start: 2020-01-01 | End: 2020-01-03

## 2020-01-01 RX ORDER — SODIUM CHLORIDE 9 MG/ML
1000 INJECTION INTRAMUSCULAR; INTRAVENOUS; SUBCUTANEOUS
Refills: 0 | Status: DISCONTINUED | OUTPATIENT
Start: 2020-01-01 | End: 2020-01-02

## 2020-01-01 RX ORDER — POTASSIUM CHLORIDE 20 MEQ
10 PACKET (EA) ORAL ONCE
Refills: 0 | Status: COMPLETED | OUTPATIENT
Start: 2020-01-01 | End: 2020-01-01

## 2020-01-01 RX ORDER — FUROSEMIDE 40 MG
20 TABLET ORAL DAILY
Refills: 0 | Status: DISCONTINUED | OUTPATIENT
Start: 2020-01-01 | End: 2020-01-03

## 2020-01-01 RX ORDER — ONDANSETRON 8 MG/1
4 TABLET, FILM COATED ORAL EVERY 6 HOURS
Refills: 0 | Status: DISCONTINUED | OUTPATIENT
Start: 2020-01-01 | End: 2020-01-03

## 2020-01-01 RX ORDER — LANOLIN ALCOHOL/MO/W.PET/CERES
3 CREAM (GRAM) TOPICAL ONCE
Refills: 0 | Status: COMPLETED | OUTPATIENT
Start: 2020-01-01 | End: 2020-01-01

## 2020-01-01 RX ORDER — ALPRAZOLAM 0.25 MG
1 TABLET ORAL
Qty: 0 | Refills: 0 | DISCHARGE

## 2020-01-01 RX ORDER — AMIODARONE HYDROCHLORIDE 400 MG/1
200 TABLET ORAL DAILY
Refills: 0 | Status: DISCONTINUED | OUTPATIENT
Start: 2020-01-01 | End: 2020-01-03

## 2020-01-01 RX ORDER — IPRATROPIUM/ALBUTEROL SULFATE 18-103MCG
3 AEROSOL WITH ADAPTER (GRAM) INHALATION ONCE
Refills: 0 | Status: COMPLETED | OUTPATIENT
Start: 2020-01-01 | End: 2020-01-01

## 2020-01-01 RX ORDER — ENOXAPARIN SODIUM 100 MG/ML
30 INJECTION SUBCUTANEOUS DAILY
Refills: 0 | Status: DISCONTINUED | OUTPATIENT
Start: 2020-01-01 | End: 2020-01-03

## 2020-01-01 RX ORDER — METRONIDAZOLE 500 MG
500 TABLET ORAL EVERY 8 HOURS
Refills: 0 | Status: DISCONTINUED | OUTPATIENT
Start: 2020-01-01 | End: 2020-01-02

## 2020-01-01 RX ORDER — METOPROLOL TARTRATE 50 MG
25 TABLET ORAL DAILY
Refills: 0 | Status: DISCONTINUED | OUTPATIENT
Start: 2020-01-01 | End: 2020-01-03

## 2020-01-01 RX ADMIN — Medication 50 MILLIGRAM(S): at 18:58

## 2020-01-01 RX ADMIN — Medication 3 MILLILITER(S): at 12:45

## 2020-01-01 RX ADMIN — ENOXAPARIN SODIUM 30 MILLIGRAM(S): 100 INJECTION SUBCUTANEOUS at 18:57

## 2020-01-01 RX ADMIN — Medication 100 MILLIGRAM(S): at 22:25

## 2020-01-01 RX ADMIN — Medication 3 MILLIGRAM(S): at 22:25

## 2020-01-01 RX ADMIN — Medication 100 MILLIEQUIVALENT(S): at 17:19

## 2020-01-01 RX ADMIN — ATORVASTATIN CALCIUM 10 MILLIGRAM(S): 80 TABLET, FILM COATED ORAL at 18:58

## 2020-01-01 RX ADMIN — FAMOTIDINE 20 MILLIGRAM(S): 10 INJECTION INTRAVENOUS at 18:58

## 2020-01-01 RX ADMIN — SODIUM CHLORIDE 1000 MILLILITER(S): 9 INJECTION INTRAMUSCULAR; INTRAVENOUS; SUBCUTANEOUS at 14:13

## 2020-01-01 RX ADMIN — Medication 100 MILLIGRAM(S): at 15:53

## 2020-01-01 RX ADMIN — Medication 40 MILLIEQUIVALENT(S): at 15:57

## 2020-01-01 RX ADMIN — Medication 500 MILLIGRAM(S): at 15:58

## 2020-01-01 RX ADMIN — SODIUM CHLORIDE 500 MILLILITER(S): 9 INJECTION INTRAMUSCULAR; INTRAVENOUS; SUBCUTANEOUS at 14:00

## 2020-01-01 RX ADMIN — SODIUM CHLORIDE 500 MILLILITER(S): 9 INJECTION INTRAMUSCULAR; INTRAVENOUS; SUBCUTANEOUS at 12:53

## 2020-01-01 RX ADMIN — SODIUM CHLORIDE 125 MILLILITER(S): 9 INJECTION INTRAMUSCULAR; INTRAVENOUS; SUBCUTANEOUS at 18:57

## 2020-01-01 RX ADMIN — SODIUM CHLORIDE 1 GRAM(S): 9 INJECTION INTRAMUSCULAR; INTRAVENOUS; SUBCUTANEOUS at 18:58

## 2020-01-01 RX ADMIN — SODIUM CHLORIDE 1000 MILLILITER(S): 9 INJECTION INTRAMUSCULAR; INTRAVENOUS; SUBCUTANEOUS at 15:48

## 2020-01-01 NOTE — H&P ADULT - PROBLEM SELECTOR PLAN 2
questionable on CT - but will treat and monitor for resolution of symptoms  start IV cipro/flagyl  GI cs for further recs questionable on CT - but will treat and monitor for resolution of symptoms  start IV flagyl   GI cs for further recs

## 2020-01-01 NOTE — H&P ADULT - PROBLEM SELECTOR PLAN 3
stable,   resume home meds and monitor bp prn duonebs  mucinex and tessalon p for cough  consider abx if needed

## 2020-01-01 NOTE — ED STATDOCS - PROGRESS NOTE DETAILS
89 yo female with a PMH of htn, lymphoma currently in remission, presents with diarrhea x 1 week. Pt came from [place] approximately 1 week ago and had symptoms whilein [place]. Intermittent episodes of NB diarrhea, watery. Taking pepto bismal for symptoms. Was evaluated for a chest cold yesterday and did not have diarrhea yesterday but today it worsened. +weakness, decreased appetite. Will repeat labs, CT, GI PCR. Reeval. -Coleman Holley PA-C Pt with eleavted lactate and colitis on CT. Will admit and order ptassium and abx for pt. Pt and daughter aware. -Coleman Holley PA-C

## 2020-01-01 NOTE — ED STATDOCS - CLINICAL SUMMARY MEDICAL DECISION MAKING FREE TEXT BOX
Pt with intermittent diarrhea. Tolerating PO. Plan: labs, CT abd, send stool studies, anticipate d/c.

## 2020-01-01 NOTE — ED ADULT NURSE REASSESSMENT NOTE - NS ED NURSE REASSESS COMMENT FT1
Patient received from previous nurse. Pt is A&Ox4, VSS on RA. Pt is resting comfortably in their bed at this time, denies any pain or discomfort at this time family at bedside. Safety and comfort measures in place. Hourly rounding will be done on my time. Will continue to monitor.

## 2020-01-01 NOTE — H&P ADULT - HISTORY OF PRESENT ILLNESS
HPI:      PAST MEDICAL & SURGICAL HISTORY:  Lymphoma  HTN (hypertension)  Iron deficiency  salt-wasting   s/p open appendectomy  s/p hysterectomy   s/p Loop recorder         Review of Systems:   CONSTITUTIONAL: No fever.  EYES: No eye pain or discharge.  ENMT:  No sinus or throat pain  NECK: No pain or stiffness  RESPIRATORY: No cough, wheezing, chills or hemoptysis; No shortness of breath  CARDIOVASCULAR: No chest pain, palpitations, dizziness, or leg swelling  GASTROINTESTINAL: No abdominal or epigastric pain. No nausea, vomiting, or hematemesis; No diarrhea or constipation. No melena or hematochezia.  GENITOURINARY: No dysuria or incontinence  NEUROLOGICAL: No headaches, memory loss, loss of strength, numbness, or tremors  SKIN: No rashes.  LYMPH NODES: No enlarged glands  ENDOCRINE: No heat or cold intolerance; No hair loss  MUSCULOSKELETAL: No joint pain or swelling; No muscle, back, or extremity pain  PSYCHIATRIC: No depression, anxiety, mood swings, or difficulty sleeping  HEME/LYMPH: No easy bruising, or bleeding gums  ALLERY AND IMMUNOLOGIC: No hives or eczema    Allergies    penicillin (Hives)  sulfa drugs (Hives)  tetracycline (Hives)    Intolerances        Social History:   lives in own home in [place] w/ aide  denies smoking  occ etoh     FAMILY HISTORY:  unknown to this elderly lady     Home Medications:  amiodarone 200 mg oral tablet: 1 tab(s) orally once a day (01 Jan 2020 17:59)  amLODIPine 5 mg oral tablet: 1 tab(s) orally once a day (01 Jan 2020 17:59)  famotidine 20 mg oral tablet: 1 tab(s) orally 2 times a day (01 Jan 2020 17:59)  furosemide 20 mg oral tablet: 1 tab(s) orally once a day (01 Jan 2020 17:59)  hydrALAZINE 50 mg oral tablet: orally 2 times a day (01 Jan 2020 17:59)  Livalo 2 mg oral tablet: 1 tab(s) orally once a day (01 Jan 2020 17:59)  Metoprolol Succinate ER 25 mg oral tablet, extended release: 1 tab(s) orally once a day (01 Jan 2020 17:59)  sodium chloride 1 g oral tablet: 1 gram(s) orally 3 times a day (01 Jan 2020 17:59)      MEDICATIONS  (STANDING):  aMIOdarone    Tablet 200 milliGRAM(s) Oral daily  amLODIPine   Tablet 5 milliGRAM(s) Oral daily  atorvastatin 10 milliGRAM(s) Oral at bedtime  enoxaparin Injectable 30 milliGRAM(s) SubCutaneous daily  famotidine    Tablet 20 milliGRAM(s) Oral two times a day  furosemide    Tablet 20 milliGRAM(s) Oral daily  hydrALAZINE 50 milliGRAM(s) Oral two times a day  metoprolol succinate ER 25 milliGRAM(s) Oral daily  sodium chloride 1 Gram(s) Oral three times a day  sodium chloride 0.9%. 1000 milliLiter(s) (125 mL/Hr) IV Continuous <Continuous>    MEDICATIONS  (PRN):  albuterol/ipratropium for Nebulization. 3 milliLiter(s) Nebulizer once PRN Shortness of Breath and/or Wheezing  benzonatate 100 milliGRAM(s) Oral every 8 hours PRN Cough  guaiFENesin  milliGRAM(s) Oral every 12 hours PRN Cough  ondansetron Injectable 4 milliGRAM(s) IV Push every 6 hours PRN Nausea        PHYSICAL EXAM:  Vital Signs Last 24 Hrs  T(C): 36.3 (01 Jan 2020 15:04), Max: 36.5 (01 Jan 2020 12:00)  T(F): 97.3 (01 Jan 2020 15:04), Max: 97.7 (01 Jan 2020 12:00)  HR: 99 (01 Jan 2020 17:20) (82 - 102)  BP: 164/68 (01 Jan 2020 17:20) (158/55 - 173/63)  BP(mean): 94 (01 Jan 2020 15:04) (80 - 94)  RR: 16 (01 Jan 2020 17:20) (16 - 17)  SpO2: 96% (01 Jan 2020 17:20) (96% - 100%)  GENERAL: NAD, well-developed  HEAD:  Atraumatic, Normocephalic  EYES: EOMI, PERRLA, conjunctiva and sclera clear  ENT: normal hearing, no nasal discharge, throat clear, dentition normal  NECK: Supple, No JVD, no LAD, no thyromegaly   CHEST/LUNG: Clear to auscultation bilaterally; No wheeze, respirations unlabored  HEART: Regular rate and rhythm; No murmurs, rubs, or gallops  ABDOMEN: Soft, Nontender, Nondistended; Bowel sounds present, no HSM  EXTREMITIES:  2+ Peripheral Pulses, No clubbing, cyanosis, or edema  PSYCH: AAOx3, normal behavior  NEUROLOGY: non-focal, sensory and cn 2-12 intact, speech/language intact  SKIN: No visible rashes or lesions    LABS:                        10.1   5.16  )-----------( 146      ( 01 Jan 2020 12:50 )             31.7     01-01    141  |  107  |  19  ----------------------------<  113<H>  3.1<L>   |  27  |  1.21    Ca    9.1      01 Jan 2020 12:50    TPro  6.7  /  Alb  3.3  /  TBili  0.3  /  DBili  x   /  AST  31  /  ALT  24  /  AlkPhos  82  01-01    PT/INR - ( 31 Dec 2019 13:40 )   PT: 10.9 sec;   INR: 0.98 ratio         PTT - ( 31 Dec 2019 13:40 )  PTT:27.7 sec          RADIOLOGY & ADDITIONAL TESTS:    Imaging Personally Reviewed:  CT a/p - IMPRESSION: Questionable mild colitis. No evidence of bowel obstruction.    EKG Personally Reviewed:  n/a HPI:  89F w/PMH HTN, Lymphoma, presents c/o intractable diarrhea for the past week.  Pt resides in [place], was having diarrhea prior to her visit.  She flew on 12/24, and since then has been several episodes of watery diarrhea.  She's been self-medicating w/ pepto bismol which relieves her symptoms temporarily.  She endorses associated lower abd cramping that's relieved w/ BM.  She denies associated fever/chills, n/v, tenesmus, hematochezia/melena, mucus.  Dtr, at bedside, endorses that pt may have been on antibiotics for bronchitis in Nov.  Pt's son is GI MD and concerned pt may have c diff.      Incidentally, pt has also been having URI symptoms, cough and congestion, and had presented to ED yesterday, CXR neg, FLU neg, d/c'd home w/ nebs.  Pt still having cough w/ some sputum, denies cp, sob.      In ED, CT a/p - questionable mild colitis.  given cipro/flagyl, ivf, GI and cdiff pcr ordered but pt has not given sample yet.        PAST MEDICAL & SURGICAL HISTORY:  Lymphoma  HTN (hypertension)  Iron deficiency  salt-wasting   s/p open appendectomy  s/p hysterectomy   s/p Loop recorder         Review of Systems:   CONSTITUTIONAL: No fever.  EYES: No eye pain or discharge.  ENMT:  No sinus or throat pain  NECK: No pain or stiffness  RESPIRATORY:  SEE HPI   CARDIOVASCULAR: No chest pain, palpitations, dizziness, or leg swelling  GASTROINTESTINAL: No abdominal or epigastric pain. No nausea, vomiting, or hematemesis; SEE HPI   GENITOURINARY: No dysuria or incontinence  NEUROLOGICAL: No headaches, memory loss, loss of strength, numbness, or tremors  SKIN: No rashes.  MUSCULOSKELETAL: No joint pain or swelling; No muscle, back, or extremity pain  PSYCHIATRIC: No depression, anxiety, mood swings, or difficulty sleeping      Allergies    penicillin (Hives)  sulfa drugs (Hives)  tetracycline (Hives)      Social History:   lives in own home in [place] w/ aide  denies smoking  occ etoh     FAMILY HISTORY:  unknown to this elderly lady     Home Medications:  amiodarone 200 mg oral tablet: 1 tab(s) orally once a day (01 Jan 2020 17:59)  amLODIPine 5 mg oral tablet: 1 tab(s) orally once a day (01 Jan 2020 17:59)  famotidine 20 mg oral tablet: 1 tab(s) orally 2 times a day (01 Jan 2020 17:59)  furosemide 20 mg oral tablet: 1 tab(s) orally once a day (01 Jan 2020 17:59)  hydrALAZINE 50 mg oral tablet: orally 2 times a day (01 Jan 2020 17:59)  Livalo 2 mg oral tablet: 1 tab(s) orally once a day (01 Jan 2020 17:59)  Metoprolol Succinate ER 25 mg oral tablet, extended release: 1 tab(s) orally once a day (01 Jan 2020 17:59)  sodium chloride 1 g oral tablet: 1 gram(s) orally 3 times a day (01 Jan 2020 17:59)      MEDICATIONS  (STANDING):  aMIOdarone    Tablet 200 milliGRAM(s) Oral daily  amLODIPine   Tablet 5 milliGRAM(s) Oral daily  atorvastatin 10 milliGRAM(s) Oral at bedtime  enoxaparin Injectable 30 milliGRAM(s) SubCutaneous daily  famotidine    Tablet 20 milliGRAM(s) Oral two times a day  furosemide    Tablet 20 milliGRAM(s) Oral daily  hydrALAZINE 50 milliGRAM(s) Oral two times a day  metoprolol succinate ER 25 milliGRAM(s) Oral daily  sodium chloride 1 Gram(s) Oral three times a day  sodium chloride 0.9%. 1000 milliLiter(s) (125 mL/Hr) IV Continuous <Continuous>    MEDICATIONS  (PRN):  albuterol/ipratropium for Nebulization. 3 milliLiter(s) Nebulizer once PRN Shortness of Breath and/or Wheezing  benzonatate 100 milliGRAM(s) Oral every 8 hours PRN Cough  guaiFENesin  milliGRAM(s) Oral every 12 hours PRN Cough  ondansetron Injectable 4 milliGRAM(s) IV Push every 6 hours PRN Nausea        PHYSICAL EXAM:  Vital Signs Last 24 Hrs  T(C): 36.3 (01 Jan 2020 15:04), Max: 36.5 (01 Jan 2020 12:00)  T(F): 97.3 (01 Jan 2020 15:04), Max: 97.7 (01 Jan 2020 12:00)  HR: 99 (01 Jan 2020 17:20) (82 - 102)  BP: 164/68 (01 Jan 2020 17:20) (158/55 - 173/63)  BP(mean): 94 (01 Jan 2020 15:04) (80 - 94)  RR: 16 (01 Jan 2020 17:20) (16 - 17)  SpO2: 96% (01 Jan 2020 17:20) (96% - 100%)  GENERAL: NAD, FRAIL APPEARING   HEAD:  Atraumatic, Normocephalic  EYES: EOMI, PERRLA, conjunctiva and sclera clear  ENT: normal hearing, no nasal discharge, throat clear, dentition normal  NECK: Supple, No JVD, no LAD, no thyromegaly   CHEST/LUNG: Clear to auscultation bilaterally; No wheeze, respirations unlabored + MILD RHONCHI R>L  HEART: Regular rate and rhythm; No murmurs, rubs, or gallops  ABDOMEN: Soft,  Nondistended; Bowel sounds present, no HSM, ++ RLQ TTP, +SCAR  EXTREMITIES:  2+ Peripheral Pulses, No clubbing, cyanosis, or edema  PSYCH: AAOx3, normal behavior  NEUROLOGY: non-focal, sensory and cn 2-12 intact, speech/language intact  SKIN: No visible rashes or lesions    LABS:                        10.1   5.16  )-----------( 146      ( 01 Jan 2020 12:50 )             31.7     01-01    141  |  107  |  19  ----------------------------<  113<H>  3.1<L>   |  27  |  1.21    Ca    9.1      01 Jan 2020 12:50    TPro  6.7  /  Alb  3.3  /  TBili  0.3  /  DBili  x   /  AST  31  /  ALT  24  /  AlkPhos  82  01-01    PT/INR - ( 31 Dec 2019 13:40 )   PT: 10.9 sec;   INR: 0.98 ratio         PTT - ( 31 Dec 2019 13:40 )  PTT:27.7 sec          RADIOLOGY & ADDITIONAL TESTS:    Imaging Personally Reviewed:  CT a/p - IMPRESSION: Questionable mild colitis. No evidence of bowel obstruction.    EKG Personally Reviewed:  n/a

## 2020-01-01 NOTE — H&P ADULT - ASSESSMENT
89F w/PMH HTN, Lymphoma, presents c/o intractable diarrhea for the past week.    +URI symptoms, cough and congestion,   In ED, CT a/p - questionable mild colitis.  given cipro/flagyl, ivf, GI and cdiff pcr ordered but pt has not given sample yet.

## 2020-01-01 NOTE — ED ADULT NURSE NOTE - NSIMPLEMENTINTERV_GEN_ALL_ED
Implemented All Fall with Harm Risk Interventions:  [place] to call system. Call bell, personal items and telephone within reach. Instruct patient to call for assistance. Room bathroom lighting operational. Non-slip footwear when patient is off stretcher. Physically safe environment: no spills, clutter or unnecessary equipment. Stretcher in lowest position, wheels locked, appropriate side rails in place. Provide visual cue, wrist band, yellow gown, etc. Monitor gait and stability. Monitor for mental status changes and reorient to person, place, and time. Review medications for side effects contributing to fall risk. Reinforce activity limits and safety measures with patient and family. Provide visual clues: red socks.

## 2020-01-01 NOTE — ED STATDOCS - OBJECTIVE STATEMENT
89 y/o female with a PMHx of HTN, iron deficiency, lymphoma in remission, presents to the ED c/o intermittent diarrhea x1 week. Pt was evaluated in ED yesterday for chest congestion/cough. As per family at bedside, pt had diarrhea prior to being evaluated yesterday. No recent abx use. +diarrhea, +weakness, +decreased appetite. Denies fevers. No hx of diverticulitis. No other complaints at this time. 89 y/o female with a PMHx of HTN, iron deficiency, lymphoma in remission, presents to the ED c/o intermittent diarrhea x1 week. Pt was evaluated in ED yesterday for chest congestion/cough. As per family at bedside, pt had diarrhea prior to being evaluated yesterday. No recent abx use. +cough, +diarrhea, +weakness, +decreased appetite. Denies fevers, SOB. No hx of diverticulitis. No other complaints at this time.

## 2020-01-01 NOTE — ED ADULT TRIAGE NOTE - CHIEF COMPLAINT QUOTE
Pt was recently evaluated in ED for chest congestion/cough. Per pt, she has been experiencing diarrhea for over one week even prior to being seen in ED.

## 2020-01-02 LAB
ANION GAP SERPL CALC-SCNC: 3 MMOL/L — LOW (ref 5–17)
BASOPHILS # BLD AUTO: 0.01 K/UL — SIGNIFICANT CHANGE UP (ref 0–0.2)
BASOPHILS NFR BLD AUTO: 0.2 % — SIGNIFICANT CHANGE UP (ref 0–2)
BUN SERPL-MCNC: 9 MG/DL — SIGNIFICANT CHANGE UP (ref 7–23)
CALCIUM SERPL-MCNC: 8.5 MG/DL — SIGNIFICANT CHANGE UP (ref 8.5–10.1)
CHLORIDE SERPL-SCNC: 111 MMOL/L — HIGH (ref 96–108)
CO2 SERPL-SCNC: 27 MMOL/L — SIGNIFICANT CHANGE UP (ref 22–31)
CREAT SERPL-MCNC: 0.75 MG/DL — SIGNIFICANT CHANGE UP (ref 0.5–1.3)
EOSINOPHIL # BLD AUTO: 0.12 K/UL — SIGNIFICANT CHANGE UP (ref 0–0.5)
EOSINOPHIL NFR BLD AUTO: 2.8 % — SIGNIFICANT CHANGE UP (ref 0–6)
GLUCOSE SERPL-MCNC: 85 MG/DL — SIGNIFICANT CHANGE UP (ref 70–99)
HCT VFR BLD CALC: 26.9 % — LOW (ref 34.5–45)
HGB BLD-MCNC: 8.8 G/DL — LOW (ref 11.5–15.5)
IMM GRANULOCYTES NFR BLD AUTO: 0.5 % — SIGNIFICANT CHANGE UP (ref 0–1.5)
LYMPHOCYTES # BLD AUTO: 0.99 K/UL — LOW (ref 1–3.3)
LYMPHOCYTES # BLD AUTO: 22.7 % — SIGNIFICANT CHANGE UP (ref 13–44)
MCHC RBC-ENTMCNC: 32.1 PG — SIGNIFICANT CHANGE UP (ref 27–34)
MCHC RBC-ENTMCNC: 32.7 GM/DL — SIGNIFICANT CHANGE UP (ref 32–36)
MCV RBC AUTO: 98.2 FL — SIGNIFICANT CHANGE UP (ref 80–100)
MONOCYTES # BLD AUTO: 0.64 K/UL — SIGNIFICANT CHANGE UP (ref 0–0.9)
MONOCYTES NFR BLD AUTO: 14.7 % — HIGH (ref 2–14)
NEUTROPHILS # BLD AUTO: 2.58 K/UL — SIGNIFICANT CHANGE UP (ref 1.8–7.4)
NEUTROPHILS NFR BLD AUTO: 59.1 % — SIGNIFICANT CHANGE UP (ref 43–77)
PLATELET # BLD AUTO: 123 K/UL — LOW (ref 150–400)
POTASSIUM SERPL-MCNC: 3.8 MMOL/L — SIGNIFICANT CHANGE UP (ref 3.5–5.3)
POTASSIUM SERPL-SCNC: 3.8 MMOL/L — SIGNIFICANT CHANGE UP (ref 3.5–5.3)
RAPID RVP RESULT: DETECTED
RBC # BLD: 2.74 M/UL — LOW (ref 3.8–5.2)
RBC # FLD: 14.5 % — SIGNIFICANT CHANGE UP (ref 10.3–14.5)
RV+EV RNA SPEC QL NAA+PROBE: DETECTED
SODIUM SERPL-SCNC: 141 MMOL/L — SIGNIFICANT CHANGE UP (ref 135–145)
WBC # BLD: 4.36 K/UL — SIGNIFICANT CHANGE UP (ref 3.8–10.5)
WBC # FLD AUTO: 4.36 K/UL — SIGNIFICANT CHANGE UP (ref 3.8–10.5)

## 2020-01-02 RX ORDER — CEFUROXIME AXETIL 250 MG
500 TABLET ORAL EVERY 12 HOURS
Refills: 0 | Status: DISCONTINUED | OUTPATIENT
Start: 2020-01-02 | End: 2020-01-02

## 2020-01-02 RX ORDER — IPRATROPIUM/ALBUTEROL SULFATE 18-103MCG
3 AEROSOL WITH ADAPTER (GRAM) INHALATION EVERY 6 HOURS
Refills: 0 | Status: DISCONTINUED | OUTPATIENT
Start: 2020-01-02 | End: 2020-01-03

## 2020-01-02 RX ADMIN — FAMOTIDINE 20 MILLIGRAM(S): 10 INJECTION INTRAVENOUS at 05:34

## 2020-01-02 RX ADMIN — AMIODARONE HYDROCHLORIDE 200 MILLIGRAM(S): 400 TABLET ORAL at 05:34

## 2020-01-02 RX ADMIN — Medication 25 MILLIGRAM(S): at 05:34

## 2020-01-02 RX ADMIN — Medication 100 MILLIGRAM(S): at 05:34

## 2020-01-02 RX ADMIN — Medication 100 MILLIGRAM(S): at 21:55

## 2020-01-02 RX ADMIN — Medication 20 MILLIGRAM(S): at 13:01

## 2020-01-02 RX ADMIN — Medication 100 MILLIGRAM(S): at 13:00

## 2020-01-02 RX ADMIN — AMLODIPINE BESYLATE 5 MILLIGRAM(S): 2.5 TABLET ORAL at 05:34

## 2020-01-02 RX ADMIN — SODIUM CHLORIDE 1 GRAM(S): 9 INJECTION INTRAMUSCULAR; INTRAVENOUS; SUBCUTANEOUS at 05:34

## 2020-01-02 RX ADMIN — SODIUM CHLORIDE 1 GRAM(S): 9 INJECTION INTRAMUSCULAR; INTRAVENOUS; SUBCUTANEOUS at 21:55

## 2020-01-02 RX ADMIN — Medication 3 MILLILITER(S): at 13:41

## 2020-01-02 RX ADMIN — SODIUM CHLORIDE 1 GRAM(S): 9 INJECTION INTRAMUSCULAR; INTRAVENOUS; SUBCUTANEOUS at 17:48

## 2020-01-02 RX ADMIN — Medication 50 MILLIGRAM(S): at 17:48

## 2020-01-02 RX ADMIN — Medication 50 MILLIGRAM(S): at 05:34

## 2020-01-02 RX ADMIN — ATORVASTATIN CALCIUM 10 MILLIGRAM(S): 80 TABLET, FILM COATED ORAL at 21:55

## 2020-01-02 RX ADMIN — Medication 3 MILLILITER(S): at 20:57

## 2020-01-02 RX ADMIN — FAMOTIDINE 20 MILLIGRAM(S): 10 INJECTION INTRAVENOUS at 17:48

## 2020-01-02 NOTE — PROGRESS NOTE ADULT - SUBJECTIVE AND OBJECTIVE BOX
cc: diarrhea  hpi: 89y female w/ pmh htn, lymphoma p/w diarrhea x 1 week.  [place]- having diarrhea since then.  Pt recently on abx in Nov for bronchitis.    -     ros-    Vital Signs Last 24 Hrs  T(C): 36.7 (02 Jan 2020 05:35), Max: 36.9 (01 Jan 2020 19:08)    T(F): 98.1 (02 Jan 2020 05:35), Max: 98.5 (01 Jan 2020 19:08)  HR: 83 (02 Jan 2020 05:35) (82 - 102)  BP: 128/38 (02 Jan 2020 05:35) (128/38 - 173/63)  BP(mean): 85 (01 Jan 2020 19:08) (80 - 94)  RR: 18 (02 Jan 2020 05:35) (16 - 18)  SpO2: 95% (02 Jan 2020 05:35) (95% - 100%)    physical          LABS: All Labs Reviewed:                        8.8    4.36  )-----------( 123      ( 02 Jan 2020 08:17 )             26.9     01-02    141  |  111<H>  |  9   ----------------------------<  85  3.8   |  27  |  0.75    Ca    8.5      02 Jan 2020 08:17    TPro  6.7  /  Alb  3.3  /  TBili  0.3  /  DBili  x   /  AST  31  /  ALT  24  /  AlkPhos  82  01-01    PT/INR - ( 31 Dec 2019 13:40 )   PT: 10.9 sec;   INR: 0.98 ratio         PTT - ( 31 Dec 2019 13:40 )  PTT:27.7 sec    < from: CT Abdomen and Pelvis w/ IV Cont (01.01.20 @ 14:06) >    IMPRESSION: Questionable mild colitis. No evidence of bowel obstruction.    Thank you for  this referral.    < end of copied text >      MEDICATIONS  (STANDING):  aMIOdarone    Tablet 200 milliGRAM(s) Oral daily  amLODIPine   Tablet 5 milliGRAM(s) Oral daily  atorvastatin 10 milliGRAM(s) Oral at bedtime  enoxaparin Injectable 30 milliGRAM(s) SubCutaneous daily  famotidine    Tablet 20 milliGRAM(s) Oral two times a day  furosemide    Tablet 20 milliGRAM(s) Oral daily  hydrALAZINE 50 milliGRAM(s) Oral two times a day  metoprolol succinate ER 25 milliGRAM(s) Oral daily  metroNIDAZOLE  IVPB 500 milliGRAM(s) IV Intermittent every 8 hours  sodium chloride 1 Gram(s) Oral three times a day  sodium chloride 0.9%. 1000 milliLiter(s) (125 mL/Hr) IV Continuous <Continuous>    MEDICATIONS  (PRN):  albuterol/ipratropium for Nebulization. 3 milliLiter(s) Nebulizer once PRN Shortness of Breath and/or Wheezing  benzonatate 100 milliGRAM(s) Oral every 8 hours PRN Cough  guaiFENesin  milliGRAM(s) Oral every 12 hours PRN Cough  ondansetron Injectable 4 milliGRAM(s) IV Push every 6 hours PRN Nausea      Assessment and Plan:   89y female w/     1.  diarrhea, mild colitis  - stool studies pending, isolation    - elevated lactate resolved; no acidosis, no sepsis  - decrease ivf  - GI eval     2. URI  - recent abx for bronchitis in Nov  - CXR no infiltrate  - rvp not done;  flu negative    3. dvt px cc: diarrhea  hpi: 89y female w/ pmh htn, lymphoma p/w diarrhea x 1 week.  [place] and [place]- having diarrhea since then.  Pt recently on abx in Nov for bronchitis.    -Pt reports no loose bm since yesterday morning. She had 1 formed stool in ED.  No abd pain, no n/v.  Still coughing.  No cp, sob or palp.   Discussed w/ pt likely d/c tomorrow 1/3.     ros- as per hpi , other 10 point ros negative      Vital Signs Last 24 Hrs  T(C): 36.7 (02 Jan 2020 05:35), Max: 36.9 (01 Jan 2020 19:08)    T(F): 98.1 (02 Jan 2020 05:35), Max: 98.5 (01 Jan 2020 19:08)  HR: 83 (02 Jan 2020 05:35) (82 - 102)  BP: 128/38 (02 Jan 2020 05:35) (128/38 - 173/63)  BP(mean): 85 (01 Jan 2020 19:08) (80 - 94)  RR: 18 (02 Jan 2020 05:35) (16 - 18)  SpO2: 95% (02 Jan 2020 05:35) (95% - 100%)      PHYSICAL EXAM:  General: NAD, comfortable  Neuro: AAOx3  HEENT: NCAT, MMM   Neck: Soft and supple  Respiratory:  coarse b/l lung sounds  Cardiovascular: S1 and S2, RRR  Gastrointestinal: +BS, soft, non ttp   Extremities: No edema  Vascular: 2+ peripheral pulses  Musculoskeletal: full rom          LABS: All Labs Reviewed:                        8.8    4.36  )-----------( 123      ( 02 Jan 2020 08:17 )             26.9     01-02    141  |  111<H>  |  9   ----------------------------<  85  3.8   |  27  |  0.75    Ca    8.5      02 Jan 2020 08:17    TPro  6.7  /  Alb  3.3  /  TBili  0.3  /  DBili  x   /  AST  31  /  ALT  24  /  AlkPhos  82  01-01    PT/INR - ( 31 Dec 2019 13:40 )   PT: 10.9 sec;   INR: 0.98 ratio         PTT - ( 31 Dec 2019 13:40 )  PTT:27.7 sec    < from: CT Abdomen and Pelvis w/ IV Cont (01.01.20 @ 14:06) >    IMPRESSION: Questionable mild colitis. No evidence of bowel obstruction.    Thank you for  this referral.    < end of copied text >      MEDICATIONS  (STANDING):  aMIOdarone    Tablet 200 milliGRAM(s) Oral daily  amLODIPine   Tablet 5 milliGRAM(s) Oral daily  atorvastatin 10 milliGRAM(s) Oral at bedtime  enoxaparin Injectable 30 milliGRAM(s) SubCutaneous daily  famotidine    Tablet 20 milliGRAM(s) Oral two times a day  furosemide    Tablet 20 milliGRAM(s) Oral daily  hydrALAZINE 50 milliGRAM(s) Oral two times a day  metoprolol succinate ER 25 milliGRAM(s) Oral daily  metroNIDAZOLE  IVPB 500 milliGRAM(s) IV Intermittent every 8 hours  sodium chloride 1 Gram(s) Oral three times a day  sodium chloride 0.9%. 1000 milliLiter(s) (125 mL/Hr) IV Continuous <Continuous>    MEDICATIONS  (PRN):  albuterol/ipratropium for Nebulization. 3 milliLiter(s) Nebulizer once PRN Shortness of Breath and/or Wheezing  benzonatate 100 milliGRAM(s) Oral every 8 hours PRN Cough  guaiFENesin  milliGRAM(s) Oral every 12 hours PRN Cough  ondansetron Injectable 4 milliGRAM(s) IV Push every 6 hours PRN Nausea      Assessment and Plan:   89y female w/     1.  diarrhea, mild colitis  - no loose bm since arrival in ED  - stool studies pending, isolation  for now  - elevated lactate resolved; no acidosis, no sepsis  - stop ivf   - GI eval appreciated- d/c abx    2. URI  - recent abx for bronchitis in Nov  - CXR no infiltrate  - flu neg  - check rvp  - nebs, mucinex    3. dvt px

## 2020-01-02 NOTE — CONSULT NOTE ADULT - ASSESSMENT
89F with diarrhea, mild colitis on CT-now diarrhea has resolved.   Possibly self-limited infectious colitis.    Rec:  -reg diet  -stool studies as ordered, if diarrhea recurs  -suggest stopping abx unless indicated for other reasons  -would defer colonoscopy given age since diarrhea stopped  -would d/c home if no further diarrhea today  -d/w Dr. Jeong  -d/w RN

## 2020-01-02 NOTE — CONSULT NOTE ADULT - SUBJECTIVE AND OBJECTIVE BOX
GI consult    HPI:  89F w/PMH HTN, Lymphoma, presents c/o intractable diarrhea for the past week.  Pt resides in [place], was having diarrhea prior to her visit.  She flew on 12/24, and since then has been several episodes of watery diarrhea.  She's been self-medicating w/ pepto bismol which relieves her symptoms temporarily.  She endorses associated lower abd cramping that's relieved w/ BM.  She denies associated fever/chills, n/v, tenesmus, hematochezia/melena, mucus.  Dtr endorses that pt may have been on antibiotics for bronchitis in Nov.  Pt's son is GI MD and concerned pt may have c diff.    In ED, CT a/p - questionable mild colitis.  given cipro/flagyl, ivf, GI and cdiff pcr ordered but pt has not given sample yet.    This AM has had no further diarrhea, no abd pain, no n/v. Feels well. Jolie PO diet.    PAST MEDICAL & SURGICAL HISTORY:  Lymphoma  HTN (hypertension)  Iron deficiency  No significant past surgical history      Home Medications:  amiodarone 200 mg oral tablet: 1 tab(s) orally once a day (01 Jan 2020 17:59)  amLODIPine 5 mg oral tablet: 1 tab(s) orally once a day (01 Jan 2020 17:59)  famotidine 20 mg oral tablet: 1 tab(s) orally 2 times a day (01 Jan 2020 17:59)  furosemide 20 mg oral tablet: 1 tab(s) orally once a day (01 Jan 2020 17:59)  hydrALAZINE 50 mg oral tablet: orally 2 times a day (01 Jan 2020 17:59)  Livalo 2 mg oral tablet: 1 tab(s) orally once a day (01 Jan 2020 17:59)  Metoprolol Succinate ER 25 mg oral tablet, extended release: 1 tab(s) orally once a day (01 Jan 2020 17:59)  sodium chloride 1 g oral tablet: 1 gram(s) orally 3 times a day (01 Jan 2020 17:59)      MEDICATIONS  (STANDING):  albuterol/ipratropium for Nebulization 3 milliLiter(s) Nebulizer every 6 hours  aMIOdarone    Tablet 200 milliGRAM(s) Oral daily  amLODIPine   Tablet 5 milliGRAM(s) Oral daily  atorvastatin 10 milliGRAM(s) Oral at bedtime  enoxaparin Injectable 30 milliGRAM(s) SubCutaneous daily  famotidine    Tablet 20 milliGRAM(s) Oral two times a day  furosemide    Tablet 20 milliGRAM(s) Oral daily  hydrALAZINE 50 milliGRAM(s) Oral two times a day  metoprolol succinate ER 25 milliGRAM(s) Oral daily  metroNIDAZOLE  IVPB 500 milliGRAM(s) IV Intermittent every 8 hours  sodium chloride 1 Gram(s) Oral three times a day    MEDICATIONS  (PRN):  albuterol/ipratropium for Nebulization. 3 milliLiter(s) Nebulizer once PRN Shortness of Breath and/or Wheezing  benzonatate 100 milliGRAM(s) Oral every 8 hours PRN Cough 2nd line  guaiFENesin   Syrup  (Sugar-Free) 100 milliGRAM(s) Oral every 6 hours PRN Cough  ondansetron Injectable 4 milliGRAM(s) IV Push every 6 hours PRN Nausea      Allergies    penicillin (Hives)  sulfa drugs (Hives)  tetracycline (Hives)    Intolerances    Social History:   lives in own home in [place] w/ aide  denies smoking  occ etoh     FAMILY HISTORY:  denies    ROS  As above  Otherwise unremarkable, all systems reviewed    PE:  Vital Signs Last 24 Hrs  T(C): 36.7 (02 Jan 2020 10:54), Max: 36.9 (01 Jan 2020 19:08)  T(F): 98.1 (02 Jan 2020 10:54), Max: 98.5 (01 Jan 2020 19:08)  HR: 76 (02 Jan 2020 10:54) (76 - 102)  BP: 118/46 (02 Jan 2020 10:54) (118/46 - 173/63)  BP(mean): 85 (01 Jan 2020 19:08) (85 - 94)  RR: 17 (02 Jan 2020 10:54) (16 - 18)  SpO2: 95% (02 Jan 2020 10:54) (95% - 99%)    Constitutional: NAD, well-developed, A+Ox3  Anicteric   Respiratory: CTABL, breathing comfortably  Cardiovascular: S1 and S2, RRR  Gastrointestinal: +BS, soft, non tender, non distended, no mass  Extremities: warm, well perfused, no edema  Psychiatric: Normal mood, normal affect  Neuro: moves all extremities, grossly intact  Skin: No rashes or lesions    LABS:                        8.8    4.36  )-----------( 123      ( 02 Jan 2020 08:17 )             26.9     01-02    141  |  111<H>  |  9   ----------------------------<  85  3.8   |  27  |  0.75    Ca    8.5      02 Jan 2020 08:17    TPro  6.7  /  Alb  3.3  /  TBili  0.3  /  DBili  x   /  AST  31  /  ALT  24  /  AlkPhos  82  01-01    PT/INR - ( 31 Dec 2019 13:40 )   PT: 10.9 sec;   INR: 0.98 ratio         PTT - ( 31 Dec 2019 13:40 )  PTT:27.7 sec  LIVER FUNCTIONS - ( 01 Jan 2020 12:50 )  Alb: 3.3 g/dL / Pro: 6.7 gm/dL / ALK PHOS: 82 U/L / ALT: 24 U/L / AST: 31 U/L / GGT: x             RADIOLOGY & ADDITIONAL STUDIES:  Imaging Personally Reviewed:  CT a/p - IMPRESSION: Questionable mild colitis. No evidence of bowel obstruction.

## 2020-01-03 ENCOUNTER — TRANSCRIPTION ENCOUNTER (OUTPATIENT)
Age: 85
End: 2020-01-03

## 2020-01-03 VITALS — WEIGHT: 117.29 LBS

## 2020-01-03 LAB
HCT VFR BLD CALC: 27.3 % — LOW (ref 34.5–45)
HGB BLD-MCNC: 8.7 G/DL — LOW (ref 11.5–15.5)
MCHC RBC-ENTMCNC: 31.3 PG — SIGNIFICANT CHANGE UP (ref 27–34)
MCHC RBC-ENTMCNC: 31.9 GM/DL — LOW (ref 32–36)
MCV RBC AUTO: 98.2 FL — SIGNIFICANT CHANGE UP (ref 80–100)
PLATELET # BLD AUTO: 149 K/UL — LOW (ref 150–400)
RBC # BLD: 2.78 M/UL — LOW (ref 3.8–5.2)
RBC # FLD: 14.4 % — SIGNIFICANT CHANGE UP (ref 10.3–14.5)
WBC # BLD: 4.43 K/UL — SIGNIFICANT CHANGE UP (ref 3.8–10.5)
WBC # FLD AUTO: 4.43 K/UL — SIGNIFICANT CHANGE UP (ref 3.8–10.5)

## 2020-01-03 RX ADMIN — Medication 20 MILLIGRAM(S): at 12:32

## 2020-01-03 RX ADMIN — Medication 3 MILLILITER(S): at 01:35

## 2020-01-03 RX ADMIN — SODIUM CHLORIDE 1 GRAM(S): 9 INJECTION INTRAMUSCULAR; INTRAVENOUS; SUBCUTANEOUS at 12:35

## 2020-01-03 RX ADMIN — Medication 50 MILLIGRAM(S): at 05:47

## 2020-01-03 RX ADMIN — Medication 25 MILLIGRAM(S): at 05:47

## 2020-01-03 RX ADMIN — FAMOTIDINE 20 MILLIGRAM(S): 10 INJECTION INTRAVENOUS at 05:47

## 2020-01-03 RX ADMIN — SODIUM CHLORIDE 1 GRAM(S): 9 INJECTION INTRAMUSCULAR; INTRAVENOUS; SUBCUTANEOUS at 05:47

## 2020-01-03 RX ADMIN — AMLODIPINE BESYLATE 5 MILLIGRAM(S): 2.5 TABLET ORAL at 05:46

## 2020-01-03 RX ADMIN — AMIODARONE HYDROCHLORIDE 200 MILLIGRAM(S): 400 TABLET ORAL at 05:46

## 2020-01-03 RX ADMIN — Medication 3 MILLILITER(S): at 10:02

## 2020-01-03 NOTE — DISCHARGE NOTE PROVIDER - NSDCMRMEDTOKEN_GEN_ALL_CORE_FT
amiodarone 200 mg oral tablet: 1 tab(s) orally once a day  amLODIPine 5 mg oral tablet: 1 tab(s) orally once a day  famotidine 20 mg oral tablet: 1 tab(s) orally 2 times a day  furosemide 20 mg oral tablet: 1 tab(s) orally once a day  hydrALAZINE 50 mg oral tablet: orally 2 times a day  Livalo 2 mg oral tablet: 1 tab(s) orally once a day  Metoprolol Succinate ER 25 mg oral tablet, extended release: 1 tab(s) orally once a day  sodium chloride 1 g oral tablet: 1 gram(s) orally 3 times a day amiodarone 200 mg oral tablet: 1 tab(s) orally once a day  amLODIPine 5 mg oral tablet: 1 tab(s) orally once a day  famotidine 20 mg oral tablet: 1 tab(s) orally 2 times a day  furosemide 20 mg oral tablet: 1 tab(s) orally once a day  hydrALAZINE 50 mg oral tablet: orally 2 times a day  Livalo 2 mg oral tablet: 1 tab(s) orally once a day  Metoprolol Succinate ER 25 mg oral tablet, extended release: 1 tab(s) orally once a day  predniSONE 20 mg oral tablet: 1 tab(s) orally once a day   sodium chloride 1 g oral tablet: 1 gram(s) orally 3 times a day

## 2020-01-03 NOTE — DISCHARGE NOTE NURSING/CASE MANAGEMENT/SOCIAL WORK - PATIENT PORTAL LINK FT
You can access the FollowMyHealth Patient Portal offered by [facility] by registering at the following website: [email or web address]. By joining [employer]’s FollowMyHealth portal, you will also be able to view your health information using other applications (apps) compatible with our system.

## 2020-01-03 NOTE — DISCHARGE NOTE PROVIDER - HOSPITAL COURSE
Assessment and Plan:     89y female w/         1.  diarrhea, mild colitis    - no loose bm since arrival in ED    - stool studies pending, isolation  for now    - elevated lactate resolved; no acidosis, no sepsis      - stop ivf     - GI eval appreciated- d/c abx        2. URI  +enterovirus    - recent abx for bronchitis in Nov    - CXR no infiltrate    - flu neg    - nebs, mucinex        3. dvt px        1/3- d/c if no loose bms and after cbc results Assessment and Plan:     89y female w/         1.  diarrhea, mild colitis    - no loose bm since arrival in ED    - stool studies pending, isolation  for now    - elevated lactate resolved; no acidosis, no sepsis      - stop ivf     - GI eval appreciated- d/c abx        2. URI  +enterovirus    - recent abx for bronchitis in Nov    - CXR no infiltrate    - flu neg    - nebs, mucinex    discussed prednisone few days        3. normocytic anemia    - pt aware, chronic- hx of nosebleeds and that's why she states she can never take aspirin.  She had  small nosebleed yesterday.  H/h stable.  Pt will follow up w/ her ENT outpt    and her pmd for repeat labs         Stable for d/c .  She will f/u w/ her GI doc ( she states it is not her son).  Time to d/c 46min.

## 2020-01-03 NOTE — PROGRESS NOTE ADULT - SUBJECTIVE AND OBJECTIVE BOX
cc: diarrhea  hpi: 89y female w/ pmh htn, lymphoma p/w diarrhea x 1 week.  [place] and [place] here [place] 12[place]- having diarrhea since then.  Pt recently on abx in Nov for bronchitis.    -Pt reports no loose bm since yesterday morning. She had 1 formed stool in ED.  No abd pain, no n/v.  Still coughing.  No cp, sob or palp.   Discussed w/ pt likely d/c tomorrow 1/3.     1/3-     ros- as per hpi , other 10 point ros negative      Vital Signs Last 24 Hrs  T(C): 36.9 (03 Jan 2020 05:17), Max: 36.9 (03 Jan 2020 05:17)  T(F): 98.5 (03 Jan 2020 05:17), Max: 98.5 (03 Jan 2020 05:17)  HR: 90 (03 Jan 2020 05:17) (76 - 90)  BP: 150/46 (03 Jan 2020 05:17) (118/46 - 150/46)  BP(mean): --  RR: 17 (03 Jan 2020 05:17) (17 - 17)  SpO2: 95% (03 Jan 2020 05:17) (93% - 96%)  T(C): 36.7 (02 Jan 2020 05:35), Max: 36.9 (01 Jan 2020 19:08)      PHYSICAL EXAM:  General: NAD, comfortable  Neuro: AAOx3  HEENT: NCAT, MMM   Neck: Soft and supple  Respiratory:  coarse b/l lung sounds  Cardiovascular: S1 and S2, RRR  Gastrointestinal: +BS, soft, non ttp   Extremities: No edema  Vascular: 2+ peripheral pulses  Musculoskeletal: full rom          LABS: All Labs Reviewed:                         8.8    4.36  )-----------( 123      ( 02 Jan 2020 08:17 )             26.9     01-02    141  |  111<H>  |  9   ----------------------------<  85  3.8   |  27  |  0.75    Ca    8.5      02 Jan 2020 08:17    TPro  6.7  /  Alb  3.3  /  TBili  0.3  /  DBili  x   /  AST  31  /  ALT  24  /  AlkPhos  82  01-01    PT/INR - ( 31 Dec 2019 13:40 )   PT: 10.9 sec;   INR: 0.98 ratio         PTT - ( 31 Dec 2019 13:40 )  PTT:27.7 sec    < from: CT Abdomen and Pelvis w/ IV Cont (01.01.20 @ 14:06) >    IMPRESSION: Questionable mild colitis. No evidence of bowel obstruction.    Thank you for  this referral.    < end of copied text >          MEDICATIONS  (STANDING):  albuterol/ipratropium for Nebulization 3 milliLiter(s) Nebulizer every 6 hours  aMIOdarone    Tablet 200 milliGRAM(s) Oral daily  amLODIPine   Tablet 5 milliGRAM(s) Oral daily  atorvastatin 10 milliGRAM(s) Oral at bedtime  enoxaparin Injectable 30 milliGRAM(s) SubCutaneous daily  famotidine    Tablet 20 milliGRAM(s) Oral two times a day  furosemide    Tablet 20 milliGRAM(s) Oral daily  hydrALAZINE 50 milliGRAM(s) Oral two times a day  metoprolol succinate ER 25 milliGRAM(s) Oral daily  sodium chloride 1 Gram(s) Oral three times a day    MEDICATIONS  (PRN):  albuterol/ipratropium for Nebulization. 3 milliLiter(s) Nebulizer once PRN Shortness of Breath and/or Wheezing  benzonatate 100 milliGRAM(s) Oral every 8 hours PRN Cough 2nd line  guaiFENesin   Syrup  (Sugar-Free) 100 milliGRAM(s) Oral every 6 hours PRN Cough  ondansetron Injectable 4 milliGRAM(s) IV Push every 6 hours PRN Nausea      Assessment and Plan:   89y female w/     1.  diarrhea, mild colitis  - no loose bm since arrival in ED  - stool studies pending, isolation  for now  - elevated lactate resolved; no acidosis, no sepsis  - stop ivf   - GI eval appreciated- d/c abx    2. URI  +enterovirus  - recent abx for bronchitis in Nov  - CXR no infiltrate  - flu neg  - nebs, mucinex    3. dvt px    1/3- d/c if no loose bms

## 2020-01-03 NOTE — DISCHARGE NOTE PROVIDER - NSDCCPCAREPLAN_GEN_ALL_CORE_FT
PRINCIPAL DISCHARGE DIAGNOSIS  Diagnosis: Colitis  Assessment and Plan of Treatment: You were seen by our GI doc- mild colitis on imaging.  No pain, no loose stools here.  No antibiotics.  Follow up with your GI doctor outpatient.      SECONDARY DISCHARGE DIAGNOSES  Diagnosis: Enterovirus infection  Assessment and Plan of Treatment: upper respiratory infection   - supportive care- stay hydrated, rest, take over the counter meds as needed PRINCIPAL DISCHARGE DIAGNOSIS  Diagnosis: Colitis  Assessment and Plan of Treatment: You were seen by our GI doc- mild colitis on imaging.  No pain, no loose stools here.  No antibiotics.  Follow up with your GI doctor outpatient.      SECONDARY DISCHARGE DIAGNOSES  Diagnosis: Anemia  Assessment and Plan of Treatment: hemoglobin stable- follow up with your primary doc for repeat labs 1 week.   No NSAIDs.    Diagnosis: Enterovirus infection  Assessment and Plan of Treatment: upper respiratory infection   - supportive care- stay hydrated, rest, take over the counter meds as needed  - few days steroids prescribed.   You received nebulizer treatment in hospital.

## 2020-01-03 NOTE — PROGRESS NOTE ADULT - ASSESSMENT
89F with diarrhea, mild colitis on CT-now diarrhea has resolved.   Possibly self-limited infectious colitis.    Rec:  -reg diet  -would defer colonoscopy given age since diarrhea stopped  -would d/c home  -d/w RN

## 2020-01-03 NOTE — PROGRESS NOTE ADULT - SUBJECTIVE AND OBJECTIVE BOX
GI   (late note entry-pt seen at 9AM)    HPI:  feels well  no diarrhea  giovani PO  no abd pain  no n/v    ROS  As above  Otherwise unremarkable, all systems reviewed    PE:  Vital Signs Last 24 Hrs  T(C): 36.8 (03 Jan 2020 10:52), Max: 36.9 (03 Jan 2020 05:17)  T(F): 98.3 (03 Jan 2020 10:52), Max: 98.5 (03 Jan 2020 05:17)  HR: 85 (03 Jan 2020 10:52) (78 - 90)  BP: 155/43 (03 Jan 2020 10:52) (137/46 - 155/43)  BP(mean): --  RR: 18 (03 Jan 2020 10:52) (17 - 18)  SpO2: 98% (03 Jan 2020 10:52) (93% - 98%)    Constitutional: NAD, well-developed, A+Ox3  Anicteric   Respiratory: CTABL, breathing comfortably  Cardiovascular: S1 and S2, RRR  Gastrointestinal: +BS, soft, non tender, non distended, no mass  Extremities: warm, well perfused, no edema  Psychiatric: Normal mood, normal affect  Neuro: moves all extremities, grossly intact  Skin: No rashes or lesions    LABS:                                            8.7    4.43  )-----------( 149      ( 03 Jan 2020 10:46 )             27.3

## 2020-01-05 LAB
CULTURE RESULTS: SIGNIFICANT CHANGE UP
CULTURE RESULTS: SIGNIFICANT CHANGE UP
SPECIMEN SOURCE: SIGNIFICANT CHANGE UP
SPECIMEN SOURCE: SIGNIFICANT CHANGE UP

## 2020-01-07 DIAGNOSIS — Z88.0 ALLERGY STATUS TO PENICILLIN: ICD-10-CM

## 2020-01-07 DIAGNOSIS — B97.19 OTHER ENTEROVIRUS AS THE CAUSE OF DISEASES CLASSIFIED ELSEWHERE: ICD-10-CM

## 2020-01-07 DIAGNOSIS — Z79.899 OTHER LONG TERM (CURRENT) DRUG THERAPY: ICD-10-CM

## 2020-01-07 DIAGNOSIS — I10 ESSENTIAL (PRIMARY) HYPERTENSION: ICD-10-CM

## 2020-01-07 DIAGNOSIS — E61.1 IRON DEFICIENCY: ICD-10-CM

## 2020-01-07 DIAGNOSIS — D64.9 ANEMIA, UNSPECIFIED: ICD-10-CM

## 2020-01-07 DIAGNOSIS — J06.9 ACUTE UPPER RESPIRATORY INFECTION, UNSPECIFIED: ICD-10-CM

## 2020-01-07 DIAGNOSIS — Z88.2 ALLERGY STATUS TO SULFONAMIDES: ICD-10-CM

## 2020-01-07 DIAGNOSIS — R04.0 EPISTAXIS: ICD-10-CM

## 2020-01-07 DIAGNOSIS — A09 INFECTIOUS GASTROENTERITIS AND COLITIS, UNSPECIFIED: ICD-10-CM

## 2020-01-07 DIAGNOSIS — Z88.1 ALLERGY STATUS TO OTHER ANTIBIOTIC AGENTS STATUS: ICD-10-CM

## 2020-12-05 NOTE — PATIENT PROFILE ADULT - FLU SEASON?
[address] [place] [address] [facility]  Progress Note    SUBJECTIVE:  Chief Complaint   Patient presents with    Shortness of Breath    Emesis     on chemo for breast CA- states SOB, Pharyngitis, cough, emesis. sent in for blood work and covid swab    Pharyngitis     No new complaints today. Denies dyspnea. No fevers. Tolerating antibiotics. Review of systems:  As stated above in the chief complaint, otherwise negative. Medications:  Scheduled Meds:   digoxin  125 mcg Intravenous Daily    sodium chloride flush  10 mL Intravenous 2 times per day    apixaban  5 mg Oral BID    atorvastatin  40 mg Oral Nightly    calcium elemental  500 mg Oral BID    docusate sodium  100 mg Oral BID    lisinopril  10 mg Oral BID    metoprolol succinate  50 mg Oral BID    sodium chloride flush  10 mL Intravenous 2 times per day     Continuous Infusions:   sodium chloride 75 mL/hr (20 1224)     PRN Meds:sodium chloride flush, sodium chloride flush, acetaminophen **OR** acetaminophen, promethazine **OR** ondansetron    OBJECTIVE:  /60   Pulse 102   Temp 97.7 °F (36.5 °C) (Axillary)   Resp 16   Ht 5' 8\" (1.727 m)   Wt 173 lb (78.5 kg)   SpO2 93%   BMI 26.30 kg/m²   Temp  Av.9 °F (36.6 °C)  Min: 97.7 °F (36.5 °C)  Max: 98 °F (36.7 °C)  Constitutional: The patient is awake, alert, and oriented. No distress. Skin: Warm and dry. No rashes were noted. HEENT: Round and reactive pupils. Moist mucous membranes. No ulcerations or thrush. Neck: Supple to movements. Chest: No use of accessory muscles to breathe. Symmetrical expansion. No wheezing, crackles or rhonchi. Right Mediport covered and not accessed. Cardiovascular: S1 and S2 are rhythmic and regular. No murmurs appreciated. Abdomen: Positive bowel sounds to auscultation. Benign to palpation. No masses felt. No hepatosplenomegaly. Extremities: No clubbing, no cyanosis, no edema.   Lines: peripheral    Laboratory and Tests Review:  Lab Results   Component Value Date    WBC 16.4 (H) 12/05/2020    WBC 19.7 (H) 12/03/2020    WBC 7.4 11/18/2018    HGB 12.4 12/05/2020    HCT 37.8 12/05/2020    MCV 88.3 12/05/2020     12/05/2020     Lab Results   Component Value Date    NEUTROABS 18.72 (H) 12/03/2020    NEUTROABS 4.98 11/18/2018    NEUTROABS 6.48 05/21/2018     No results found for: CRP  Lab Results   Component Value Date    ALT 47 (H) 12/03/2020    AST 59 (H) 12/03/2020    ALKPHOS 93 12/03/2020    BILITOT 0.6 12/03/2020     Lab Results   Component Value Date     12/04/2020    K 3.6 12/04/2020    K 3.7 12/03/2020     12/04/2020    CO2 24 12/04/2020    BUN 58 12/04/2020    CREATININE 1.0 12/04/2020    CREATININE 1.7 12/03/2020    CREATININE 0.9 05/21/2018    GFRAA >60 12/04/2020    LABGLOM 56 12/04/2020    GLUCOSE 127 12/04/2020    PROT 6.8 12/03/2020    LABALBU 2.6 12/04/2020    CALCIUM 8.6 12/04/2020    BILITOT 0.6 12/03/2020    ALKPHOS 93 12/03/2020    AST 59 12/03/2020    ALT 47 12/03/2020     Lab Results   Component Value Date    CRP 4.7 (H) 12/04/2020     Lab Results   Component Value Date    SEDRATE 39 (H) 12/04/2020     Radiology:      Microbiology:       Recent Labs     12/03/20  1234 12/04/20  0450   PROCAL 0.63* 0.35*       ASSESSMENT:  · CLABSI  · Streptococcus bacteremia associated to the above  · SARS-CoV-2 asymptomatic infection  · Elevation of transaminases associated to SARS-CoV-2 infection  · Atrial fibrillation  · Hypotension associated to the above, improving    PLAN:  · Resume Daptomycin  · Check final cultures  · Check CPK  · Surgery following    Sita Gross  2:39 PM  12/5/2020 Yes...

## 2021-05-03 NOTE — ED PROVIDER NOTE - NS_ATTENDINGSCRIBE_ED_ALL_ED
Pending Prescriptions:                       Disp   Refills    modafinil (PROVIGIL) 200 MG tablet        60 tab*5            Sig: Take 1/2 tablet by mouth 3-4 times daily as           needed for sleepiness.    Last Written Prescription Date:  10/30/20  Last Fill Quantity: 60,   # refills: 5  Last Office Visit with [facility], [facility] or [facility] prescribing provider: 10/30/20  Future Office visit:   No Follow up scheduled at this time.    LIOR Melendez        
I personally performed the service described in the documentation recorded by the scribe in my presence, and it accurately and completely records my words and actions.

## 2021-07-10 ENCOUNTER — EMERGENCY (EMERGENCY)
Facility: HOSPITAL | Age: 86
LOS: 0 days | Discharge: HOME | End: 2021-07-10
Attending: EMERGENCY MEDICINE
Payer: MEDICARE

## 2021-07-10 VITALS
TEMPERATURE: 98 F | RESPIRATION RATE: 18 BRPM | DIASTOLIC BLOOD PRESSURE: 110 MMHG | OXYGEN SATURATION: 99 % | HEART RATE: 72 BPM | SYSTOLIC BLOOD PRESSURE: 167 MMHG

## 2021-07-10 VITALS — HEIGHT: 60 IN | WEIGHT: 115.08 LBS

## 2021-07-10 DIAGNOSIS — Z88.2 ALLERGY STATUS TO SULFONAMIDES: ICD-10-CM

## 2021-07-10 DIAGNOSIS — S51.811A LACERATION WITHOUT FOREIGN BODY OF RIGHT FOREARM, INITIAL ENCOUNTER: ICD-10-CM

## 2021-07-10 DIAGNOSIS — I10 ESSENTIAL (PRIMARY) HYPERTENSION: ICD-10-CM

## 2021-07-10 DIAGNOSIS — E61.1 IRON DEFICIENCY: ICD-10-CM

## 2021-07-10 DIAGNOSIS — Z23 ENCOUNTER FOR IMMUNIZATION: ICD-10-CM

## 2021-07-10 DIAGNOSIS — Z85.72 PERSONAL HISTORY OF NON-HODGKIN LYMPHOMAS: ICD-10-CM
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
## 2021-07-10 PROCEDURE — 73090 X-RAY EXAM OF FOREARM: CPT | Mod: 26,RT

## 2021-07-10 PROCEDURE — 99283 EMERGENCY DEPT VISIT LOW MDM: CPT | Mod: 25

## 2021-07-10 PROCEDURE — 90715 TDAP VACCINE 7 YRS/> IM: CPT

## 2021-07-10 PROCEDURE — 99283 EMERGENCY DEPT VISIT LOW MDM: CPT

## 2021-07-10 PROCEDURE — 73090 X-RAY EXAM OF FOREARM: CPT | Mod: RT

## 2021-07-10 PROCEDURE — 90471 IMMUNIZATION ADMIN: CPT

## 2021-07-10 RX ORDER — CIPROFLOXACIN LACTATE 400MG/40ML
500 VIAL (ML) INTRAVENOUS ONCE
Refills: 0 | Status: COMPLETED | OUTPATIENT
Start: 2021-07-10 | End: 2021-07-10

## 2021-07-10 RX ORDER — METRONIDAZOLE 500 MG
500 TABLET ORAL ONCE
Refills: 0 | Status: COMPLETED | OUTPATIENT
Start: 2021-07-10 | End: 2021-07-10

## 2021-07-10 RX ORDER — MOXIFLOXACIN HYDROCHLORIDE TABLETS, 400 MG 400 MG/1
1 TABLET, FILM COATED ORAL
Qty: 14 | Refills: 0
Start: 2021-07-10 | End: 2021-07-16

## 2021-07-10 RX ORDER — TETANUS TOXOID, REDUCED DIPHTHERIA TOXOID AND ACELLULAR PERTUSSIS VACCINE, ADSORBED 5; 2.5; 8; 8; 2.5 [IU]/.5ML; [IU]/.5ML; UG/.5ML; UG/.5ML; UG/.5ML
0.5 SUSPENSION INTRAMUSCULAR ONCE
Refills: 0 | Status: COMPLETED | OUTPATIENT
Start: 2021-07-10 | End: 2021-07-10

## 2021-07-10 RX ORDER — METRONIDAZOLE 500 MG
1 TABLET ORAL
Qty: 21 | Refills: 0
Start: 2021-07-10 | End: 2021-07-16

## 2021-07-10 RX ADMIN — TETANUS TOXOID, REDUCED DIPHTHERIA TOXOID AND ACELLULAR PERTUSSIS VACCINE, ADSORBED 0.5 MILLILITER(S): 5; 2.5; 8; 8; 2.5 SUSPENSION INTRAMUSCULAR at 13:25

## 2021-07-10 RX ADMIN — Medication 500 MILLIGRAM(S): at 13:25

## 2021-07-10 NOTE — ED STATDOCS - PHYSICAL EXAMINATION
*GEN: No acute distress, well appearing   *HEAD: Normocephalic, Atraumatic  *EYES/NOSE: b/l Pupils symmetric & Reactive to ligth, EOMI b/l  *THROAT: airway patent, moist mucous membranes  *NECK: Neck supple  *PULMONARY: No Respiratory distress, symmetric b/l chest rise  *CARDIAC: s1s2, regular rhythm   *ABDOMEN:  Non Tender, Non Distended, soft, no guarding, no rebound, no masses   *BACK: no CVA tenderness, No midline vertebral tenderness to palpation   *EXTREMITIES: symmetric pulses, 2+ DP & radial pulses, no cyanosis, no edema   *SKIN: no rash, no bruising, +2cm denuded skin right forearm   *NEUROLOGIC: alert,  full active & passive ROM in all 4 extremities,   *PSYCH: appropriate concern about symptoms, pleasant negative

## 2021-07-10 NOTE — ED STATDOCS - CARE PLAN
Principal Discharge DX:	Dog scratch  Secondary Diagnosis:	Skin tear of forearm without complication, right, initial encounter

## 2021-07-10 NOTE — ED ADULT NURSE NOTE - OBJECTIVE STATEMENT
Patient presents to ED complaining of dog bite to R forearm yesterday. Patient states her daughters dog bit R forearm, dog UTD shots Patient presents to ED complaining of dog bite to R forearm yesterday. Patient states her daughters dog bit R forearm, dog UTD shots. Patient complaining of pain and warmth to R forearm. Patient denies fever, chills, NVD.

## 2021-07-10 NOTE — ED ADULT TRIAGE NOTE - CHIEF COMPLAINT QUOTE
Pt presents to er with complaints of right wound erythema from dog bite that occurred yesterday, states today wound is worse and is leaking purulent drainage, denies fevers at this time.

## 2021-07-10 NOTE — ED STATDOCS - NS ED ROS FT
Review of Systems:  	•	CONSTITUTIONAL: no fever  	•	SKIN: no rash +right forearm denuded skin   	•	RESPIRATORY: no shortness of breath  	•	CARDIAC: no chest pain  	•	GI:  no abd pain, no nausea, no vomiting, no diarrhea  	•	GENITO-URINARY:  no dysuria  	•	MUSCULOSKELETAL:  no back pain, +right arm pain  	•	NEUROLOGIC: no weakness  	•	ALLERGY: no rhinorrhea  	•	PSYSCHIATRIC: appropriate concern about symptoms

## 2021-07-10 NOTE — ED STATDOCS - PROGRESS NOTE DETAILS
89 yo female presents with dog scratch to the R forearm yesterday. Dog's immunizations are UTD. Pt's last tdap is unknown. Denies fever, purulent discharge. WIll check xr, tdap, abx, and reeval. -Coleman Holley PA-C XR unremarkable. WIll d/c home on cipro and flagyl due to the multiple allergies that pt has. -Coleman Holley PA-C

## 2021-07-10 NOTE — ED STATDOCS - OBJECTIVE STATEMENT
Pertinent HPI/PMH/PSH/FHx/SHx and Review of Systems contained within  HPI: 89 y/o female presents to ED s/p scratch from dog to right forearm. Daughter states she cleaned wound with hydrogen peroxide and neosporin on it yesterday, states pt woke up today with pain to right arm. tetanus shot not UTD. Allergies; penicillin, tetracycline, and sulfa drugs   PMH/PSH relevant for: HTN, lymphoma not on chemo   ROS negative for: fever, Chest pain, SOB, Nausea, vomiting, diarrhea, abdominal pain, dysuria    FamilyHx and SocialHx not otherwise contributory

## 2021-07-10 NOTE — ED STATDOCS - NSFOLLOWUPINSTRUCTIONS_ED_ALL_ED_FT
Keep the wound clean with soap and water and apply bacitracin.   Take the antibiotics as directed.  Follow up with your primary care doctor for further evaluation.    Return to the ER for any new or other concerns.

## 2021-07-10 NOTE — ED STATDOCS - ATTENDING CONTRIBUTION TO CARE
I, Efrain Mays MD,  performed the initial face to face bedside interview with this patient regarding history of present illness, review of symptoms and relevant past medical, social and family history.  I completed an independent physical examination.  I was the initial provider who evaluated this patient. I have signed out the follow up of any pending tests (i.e. labs, radiological studies) to the ACP.  The ACP will complete the work up, interpret tests, administer medications if necessary and reassess the patient for an appropriate disposition.  If questions arise the ACP is expected to contact me for consultation. In the current work-flow I usually don't get to speak to nor reassess patients for final disposition once I sign the case out to the ACP (Unless otherwise specifically documented by me).

## 2021-07-10 NOTE — ED STATDOCS - PATIENT PORTAL LINK FT
You can access the FollowMyHealth Patient Portal offered by [facility] by registering at the following website: [email or web address]. By joining [employer]’s FollowMyHealth portal, you will also be able to view your health information using other applications (apps) compatible with our system.

## 2021-07-10 NOTE — ED STATDOCS - CLINICAL SUMMARY MEDICAL DECISION MAKING FREE TEXT BOX
dog scratch, pt does not want to take penicillin and multiple drug allergies, will start on cipro flagyl

## 2022-01-26 ENCOUNTER — EMERGENCY (EMERGENCY)
Facility: HOSPITAL | Age: 87
LOS: 0 days | Discharge: HOME | End: 2022-01-26
Attending: EMERGENCY MEDICINE
Payer: MEDICARE

## 2022-01-26 VITALS
TEMPERATURE: 98 F | RESPIRATION RATE: 16 BRPM | DIASTOLIC BLOOD PRESSURE: 46 MMHG | SYSTOLIC BLOOD PRESSURE: 134 MMHG | OXYGEN SATURATION: 98 % | HEART RATE: 63 BPM

## 2022-01-26 VITALS — WEIGHT: 110.89 LBS | HEIGHT: 60 IN

## 2022-01-26 DIAGNOSIS — I10 ESSENTIAL (PRIMARY) HYPERTENSION: ICD-10-CM

## 2022-01-26 DIAGNOSIS — R53.1 WEAKNESS: ICD-10-CM

## 2022-01-26 DIAGNOSIS — R03.0 ELEVATED BLOOD-PRESSURE READING, WITHOUT DIAGNOSIS OF HYPERTENSION: ICD-10-CM

## 2022-01-26 DIAGNOSIS — Z88.2 ALLERGY STATUS TO SULFONAMIDES: ICD-10-CM

## 2022-01-26 DIAGNOSIS — Z88.0 ALLERGY STATUS TO PENICILLIN: ICD-10-CM

## 2022-01-26 DIAGNOSIS — R51.9 HEADACHE, UNSPECIFIED: ICD-10-CM

## 2022-01-26 DIAGNOSIS — E87.1 HYPO-OSMOLALITY AND HYPONATREMIA: ICD-10-CM

## 2022-01-26 DIAGNOSIS — Z20.822 CONTACT WITH AND (SUSPECTED) EXPOSURE TO COVID-19: ICD-10-CM

## 2022-01-26 DIAGNOSIS — Z88.1 ALLERGY STATUS TO OTHER ANTIBIOTIC AGENTS STATUS: ICD-10-CM

## 2022-01-26 LAB
ALBUMIN SERPL ELPH-MCNC: 3.3 G/DL — SIGNIFICANT CHANGE UP (ref 3.3–5)
ALP SERPL-CCNC: 67 U/L — SIGNIFICANT CHANGE UP (ref 40–120)
ALT FLD-CCNC: 50 U/L — SIGNIFICANT CHANGE UP (ref 12–78)
ANION GAP SERPL CALC-SCNC: 7 MMOL/L — SIGNIFICANT CHANGE UP (ref 5–17)
APPEARANCE UR: CLEAR — SIGNIFICANT CHANGE UP
APTT BLD: 26.1 SEC — LOW (ref 27.5–35.5)
AST SERPL-CCNC: 35 U/L — SIGNIFICANT CHANGE UP (ref 15–37)
BASOPHILS # BLD AUTO: 0.01 K/UL — SIGNIFICANT CHANGE UP (ref 0–0.2)
BASOPHILS NFR BLD AUTO: 0.1 % — SIGNIFICANT CHANGE UP (ref 0–2)
BILIRUB SERPL-MCNC: 0.4 MG/DL — SIGNIFICANT CHANGE UP (ref 0.2–1.2)
BILIRUB UR-MCNC: NEGATIVE — SIGNIFICANT CHANGE UP
BUN SERPL-MCNC: 24 MG/DL — HIGH (ref 7–23)
CALCIUM SERPL-MCNC: 9.3 MG/DL — SIGNIFICANT CHANGE UP (ref 8.5–10.1)
CHLORIDE SERPL-SCNC: 95 MMOL/L — LOW (ref 96–108)
CO2 SERPL-SCNC: 27 MMOL/L — SIGNIFICANT CHANGE UP (ref 22–31)
COLOR SPEC: YELLOW — SIGNIFICANT CHANGE UP
CREAT SERPL-MCNC: 1.29 MG/DL — SIGNIFICANT CHANGE UP (ref 0.5–1.3)
DIFF PNL FLD: NEGATIVE — SIGNIFICANT CHANGE UP
EOSINOPHIL # BLD AUTO: 0 K/UL — SIGNIFICANT CHANGE UP (ref 0–0.5)
EOSINOPHIL NFR BLD AUTO: 0 % — SIGNIFICANT CHANGE UP (ref 0–6)
GLUCOSE SERPL-MCNC: 192 MG/DL — HIGH (ref 70–99)
GLUCOSE UR QL: NEGATIVE — SIGNIFICANT CHANGE UP
HCT VFR BLD CALC: 40.3 % — SIGNIFICANT CHANGE UP (ref 34.5–45)
HGB BLD-MCNC: 12.8 G/DL — SIGNIFICANT CHANGE UP (ref 11.5–15.5)
IMM GRANULOCYTES NFR BLD AUTO: 0.4 % — SIGNIFICANT CHANGE UP (ref 0–1.5)
INR BLD: 0.91 RATIO — SIGNIFICANT CHANGE UP (ref 0.88–1.16)
KETONES UR-MCNC: NEGATIVE — SIGNIFICANT CHANGE UP
LEUKOCYTE ESTERASE UR-ACNC: ABNORMAL
LYMPHOCYTES # BLD AUTO: 1.07 K/UL — SIGNIFICANT CHANGE UP (ref 1–3.3)
LYMPHOCYTES # BLD AUTO: 16 % — SIGNIFICANT CHANGE UP (ref 13–44)
MCHC RBC-ENTMCNC: 30.4 PG — SIGNIFICANT CHANGE UP (ref 27–34)
MCHC RBC-ENTMCNC: 31.8 GM/DL — LOW (ref 32–36)
MCV RBC AUTO: 95.7 FL — SIGNIFICANT CHANGE UP (ref 80–100)
MONOCYTES # BLD AUTO: 0.22 K/UL — SIGNIFICANT CHANGE UP (ref 0–0.9)
MONOCYTES NFR BLD AUTO: 3.3 % — SIGNIFICANT CHANGE UP (ref 2–14)
NEUTROPHILS # BLD AUTO: 5.37 K/UL — SIGNIFICANT CHANGE UP (ref 1.8–7.4)
NEUTROPHILS NFR BLD AUTO: 80.2 % — HIGH (ref 43–77)
NITRITE UR-MCNC: NEGATIVE — SIGNIFICANT CHANGE UP
PH UR: 5 — SIGNIFICANT CHANGE UP (ref 5–8)
PLATELET # BLD AUTO: 157 K/UL — SIGNIFICANT CHANGE UP (ref 150–400)
POTASSIUM SERPL-MCNC: 4.1 MMOL/L — SIGNIFICANT CHANGE UP (ref 3.5–5.3)
POTASSIUM SERPL-SCNC: 4.1 MMOL/L — SIGNIFICANT CHANGE UP (ref 3.5–5.3)
PROT SERPL-MCNC: 6.9 GM/DL — SIGNIFICANT CHANGE UP (ref 6–8.3)
PROT UR-MCNC: 30 MG/DL
PROTHROM AB SERPL-ACNC: 10.6 SEC — SIGNIFICANT CHANGE UP (ref 10.6–13.6)
RBC # BLD: 4.21 M/UL — SIGNIFICANT CHANGE UP (ref 3.8–5.2)
RBC # FLD: 14.6 % — HIGH (ref 10.3–14.5)
SARS-COV-2 RNA SPEC QL NAA+PROBE: SIGNIFICANT CHANGE UP
SODIUM SERPL-SCNC: 129 MMOL/L — LOW (ref 135–145)
SP GR SPEC: 1.01 — SIGNIFICANT CHANGE UP (ref 1.01–1.02)
TROPONIN I, HIGH SENSITIVITY RESULT: 12.95 NG/L — SIGNIFICANT CHANGE UP
UROBILINOGEN FLD QL: NEGATIVE — SIGNIFICANT CHANGE UP
WBC # BLD: 6.7 K/UL — SIGNIFICANT CHANGE UP (ref 3.8–10.5)
WBC # FLD AUTO: 6.7 K/UL — SIGNIFICANT CHANGE UP (ref 3.8–10.5)

## 2022-01-26 PROCEDURE — 93010 ELECTROCARDIOGRAM REPORT: CPT

## 2022-01-26 PROCEDURE — 87086 URINE CULTURE/COLONY COUNT: CPT

## 2022-01-26 PROCEDURE — 81001 URINALYSIS AUTO W/SCOPE: CPT

## 2022-01-26 PROCEDURE — 96374 THER/PROPH/DIAG INJ IV PUSH: CPT

## 2022-01-26 PROCEDURE — 80053 COMPREHEN METABOLIC PANEL: CPT

## 2022-01-26 PROCEDURE — U0005: CPT

## 2022-01-26 PROCEDURE — 84484 ASSAY OF TROPONIN QUANT: CPT

## 2022-01-26 PROCEDURE — 70450 CT HEAD/BRAIN W/O DYE: CPT | Mod: 26,MA

## 2022-01-26 PROCEDURE — 93005 ELECTROCARDIOGRAM TRACING: CPT

## 2022-01-26 PROCEDURE — 99285 EMERGENCY DEPT VISIT HI MDM: CPT

## 2022-01-26 PROCEDURE — 70450 CT HEAD/BRAIN W/O DYE: CPT | Mod: MA

## 2022-01-26 PROCEDURE — 71045 X-RAY EXAM CHEST 1 VIEW: CPT | Mod: 26

## 2022-01-26 PROCEDURE — 99285 EMERGENCY DEPT VISIT HI MDM: CPT | Mod: 25

## 2022-01-26 PROCEDURE — U0003: CPT

## 2022-01-26 PROCEDURE — 85730 THROMBOPLASTIN TIME PARTIAL: CPT

## 2022-01-26 PROCEDURE — 85025 COMPLETE CBC W/AUTO DIFF WBC: CPT

## 2022-01-26 PROCEDURE — 71045 X-RAY EXAM CHEST 1 VIEW: CPT

## 2022-01-26 PROCEDURE — 85610 PROTHROMBIN TIME: CPT

## 2022-01-26 PROCEDURE — 36415 COLL VENOUS BLD VENIPUNCTURE: CPT

## 2022-01-26 RX ORDER — AMLODIPINE BESYLATE 2.5 MG/1
1 TABLET ORAL
Qty: 0 | Refills: 0 | DISCHARGE

## 2022-01-26 RX ORDER — SODIUM CHLORIDE 9 MG/ML
1 INJECTION INTRAMUSCULAR; INTRAVENOUS; SUBCUTANEOUS ONCE
Refills: 0 | Status: COMPLETED | OUTPATIENT
Start: 2022-01-26 | End: 2022-01-26

## 2022-01-26 RX ORDER — SODIUM CHLORIDE 9 MG/ML
500 INJECTION INTRAMUSCULAR; INTRAVENOUS; SUBCUTANEOUS ONCE
Refills: 0 | Status: DISCONTINUED | OUTPATIENT
Start: 2022-01-26 | End: 2022-01-26

## 2022-01-26 RX ORDER — HYDRALAZINE HCL 50 MG
0 TABLET ORAL
Qty: 0 | Refills: 0 | DISCHARGE

## 2022-01-26 RX ORDER — HYDRALAZINE HCL 50 MG
10 TABLET ORAL ONCE
Refills: 0 | Status: COMPLETED | OUTPATIENT
Start: 2022-01-26 | End: 2022-01-26

## 2022-01-26 RX ORDER — SODIUM CHLORIDE 9 MG/ML
1 INJECTION INTRAMUSCULAR; INTRAVENOUS; SUBCUTANEOUS
Qty: 0 | Refills: 0 | DISCHARGE

## 2022-01-26 RX ORDER — HYDRALAZINE HCL 50 MG
50 TABLET ORAL ONCE
Refills: 0 | Status: COMPLETED | OUTPATIENT
Start: 2022-01-26 | End: 2022-01-26

## 2022-01-26 RX ADMIN — Medication 50 MILLIGRAM(S): at 19:19

## 2022-01-26 RX ADMIN — SODIUM CHLORIDE 1 GRAM(S): 9 INJECTION INTRAMUSCULAR; INTRAVENOUS; SUBCUTANEOUS at 20:09

## 2022-01-26 RX ADMIN — Medication 10 MILLIGRAM(S): at 17:04

## 2022-01-26 NOTE — ED ADULT NURSE NOTE - NSICDXPASTSURGICALHX_GEN_ALL_CORE_FT
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 He denies device related complaints. He has been pacer dependent in that he paces 100% of the time but there is a slow ventricular escape in the 40's with inhibition. EF has been preserved.  He spends most of his time on Lipocalyx.\par \par INRs managed by Dr. Minaya. [Swelling at Site] : no swelling at device site PAST SURGICAL HISTORY:  No significant past surgical history

## 2022-01-26 NOTE — ED PROVIDER NOTE - NSFOLLOWUPINSTRUCTIONS_ED_ALL_ED_FT
Please follow up with your doctor for your blood pressure.    Please return to ED if you have high blood pressure AND increased headache, chest pain, shortness of breath, weakness, or any other concerns.    Please take medications as prescribed.

## 2022-01-26 NOTE — ED PROVIDER NOTE - CLINICAL SUMMARY MEDICAL DECISION MAKING FREE TEXT BOX
EKG nonischemic.  CT WNL.  CXR WNL.  Labs with mild hyponatremia.  BP improved with meds.  Patient due for salt tablet and hydralazine, to be given in ED.  D/c home in good condition.  F/u with cardiology, PCP. Return precautions given.

## 2022-01-26 NOTE — PHARMACOTHERAPY INTERVENTION NOTE - COMMENTS
Medication reconciliation completed.  Reviewed Medication list and confirmed med allergies with list from Care Facility; confirmed with Dr. First Medjose.

## 2022-01-26 NOTE — ED PROVIDER NOTE - ENMT, MLM
Airway patent, Nasal mucosa clear. Mouth with normal mucosa. Throat has no vesicles, no oropharyngeal exudates and uvula is midline. +hard of hearing

## 2022-01-26 NOTE — ED ADULT TRIAGE NOTE - CHIEF COMPLAINT QUOTE
Patient presents In ED from [facility] with headache . Patient blood pressure elevated at facility. As per family blood pressure was 220/60. Patient states in triage " my headache feels much better now".

## 2022-01-26 NOTE — ED ADULT NURSE NOTE - NSIMPLEMENTINTERV_GEN_ALL_ED
Implemented All Fall with Harm Risk Interventions:  [place] to call system. Call bell, personal items and telephone within reach. Instruct patient to call for assistance. Room bathroom lighting operational. Non-slip footwear when patient is off stretcher. Physically safe environment: no spills, clutter or unnecessary equipment. Stretcher in lowest position, wheels locked, appropriate side rails in place. Provide visual cue, wrist band, yellow gown, etc. Monitor gait and stability. Monitor for mental status changes and reorient to person, place, and time. Review medications for side effects contributing to fall risk. Reinforce activity limits and safety measures with patient and family. Provide visual clues: red socks.

## 2022-01-26 NOTE — ED PROVIDER NOTE - OBJECTIVE STATEMENT
89 y/o female with PMHx of Lymphoma not on chemo, HTN, iron deficiency, presents to the ED from [facility] for hypertension. Pt's blood pressure at the facility was 220/60. Blood pressure is normally in the 140s-150s. Pt had endorsed a HA earlier but has resolved, only c/o weakness. Denies fever, n/v/d, difficulty urinating, CP, SOB.

## 2022-01-26 NOTE — ED PROVIDER NOTE - PATIENT PORTAL LINK FT
You can access the FollowMyHealth Patient Portal offered by [facility] by registering at the following website: [email or web address]. By joining [employer]’s FollowMyHealth portal, you will also be able to view your health information using other applications (apps) compatible with our system.

## 2022-01-26 NOTE — ED ADULT NURSE NOTE - CHIEF COMPLAINT QUOTE
Patient presents In ED from [facility] with headache . Patient blood pressure elevated at facility. As per family blood pressure was 220/60. Patient states in triage " my headache feels much better now".

## 2022-01-27 LAB
CULTURE RESULTS: NO GROWTH — SIGNIFICANT CHANGE UP
SPECIMEN SOURCE: SIGNIFICANT CHANGE UP

## 2022-02-01 ENCOUNTER — EMERGENCY (EMERGENCY)
Facility: HOSPITAL | Age: 87
LOS: 0 days | Discharge: HOME | End: 2022-02-01
Attending: EMERGENCY MEDICINE
Payer: MEDICARE

## 2022-02-01 VITALS — DIASTOLIC BLOOD PRESSURE: 55 MMHG | SYSTOLIC BLOOD PRESSURE: 185 MMHG

## 2022-02-01 VITALS
WEIGHT: 110.89 LBS | TEMPERATURE: 98 F | HEART RATE: 66 BPM | DIASTOLIC BLOOD PRESSURE: 71 MMHG | SYSTOLIC BLOOD PRESSURE: 245 MMHG | RESPIRATION RATE: 18 BRPM | HEIGHT: 60 IN

## 2022-02-01 DIAGNOSIS — I10 ESSENTIAL (PRIMARY) HYPERTENSION: ICD-10-CM

## 2022-02-01 DIAGNOSIS — Z88.1 ALLERGY STATUS TO OTHER ANTIBIOTIC AGENTS STATUS: ICD-10-CM

## 2022-02-01 DIAGNOSIS — Z88.0 ALLERGY STATUS TO PENICILLIN: ICD-10-CM

## 2022-02-01 DIAGNOSIS — Z88.2 ALLERGY STATUS TO SULFONAMIDES: ICD-10-CM

## 2022-02-01 PROCEDURE — 99284 EMERGENCY DEPT VISIT MOD MDM: CPT | Mod: 25

## 2022-02-01 PROCEDURE — 99284 EMERGENCY DEPT VISIT MOD MDM: CPT

## 2022-02-01 PROCEDURE — 96374 THER/PROPH/DIAG INJ IV PUSH: CPT

## 2022-02-01 RX ORDER — HYDRALAZINE HCL 50 MG
50 TABLET ORAL ONCE
Refills: 0 | Status: COMPLETED | OUTPATIENT
Start: 2022-02-01 | End: 2022-02-01

## 2022-02-01 RX ORDER — HYDRALAZINE HCL 50 MG
10 TABLET ORAL ONCE
Refills: 0 | Status: COMPLETED | OUTPATIENT
Start: 2022-02-01 | End: 2022-02-01

## 2022-02-01 RX ADMIN — Medication 10 MILLIGRAM(S): at 02:43

## 2022-02-01 RX ADMIN — Medication 50 MILLIGRAM(S): at 03:09

## 2022-02-01 NOTE — ED ADULT TRIAGE NOTE - CHIEF COMPLAINT QUOTE
pt BIBA from [place] for "hypertensive emergency". pt found to be 256/93 by EMS. pt has hx of HTN, SBP usually sits around 170 per EMS. pt is on sodium chloride pill for hx of adrenal insufficiency. pt was recently seen at  last Wednesday for similar presentation and symptoms. no medications administered PTA for BP. pt is asymptomatic hypertensive, denies CP, HA, SOB, dizziness or nausea at this time.

## 2022-02-01 NOTE — ED PROVIDER NOTE - PROGRESS NOTE DETAILS
BP improved with meds.   pt states feels fine and agree with plan for d/c back to NH and f/u with her cardiologist to adjust BP meds.   daughter notified of results and plan

## 2022-02-01 NOTE — ED ADULT NURSE NOTE - CHIEF COMPLAINT QUOTE
pt BIBA from [place] for "hypertensive emergency". pt found to be 256/93 by EMS. pt has hx of HTN, SBP usually sits around 170 per EMS. pt is on sodium chloride pill for hx of adrenal insufficiency. pt was recently seen at  last Wednesday for similar presentation and symptoms. no medications administered PTA for BP. pt is asymptomatic hypertensive, denies CP, HA, SOB, dizziness or nausea at this time.

## 2022-02-01 NOTE — ED ADULT NURSE NOTE - OBJECTIVE STATEMENT
Pt. reports to ED for hypertension. Pt. bib EMS from [place], for hypertensive emergency. Pt. BP upon arrival 256/93, reports dizziness.  Pt. denies headache, nausea, vomiting, blurred vision, chest pain. PMH hypertension.

## 2022-02-01 NOTE — ED PROVIDER NOTE - PATIENT PORTAL LINK FT
Pt bp is high
Pt. with /82
You can access the FollowMyHealth Patient Portal offered by [facility] by registering at the following website: [email or web address]. By joining [employer]’s FollowMyHealth portal, you will also be able to view your health information using other applications (apps) compatible with our system.

## 2022-02-01 NOTE — ED PROVIDER NOTE - NSFOLLOWUPINSTRUCTIONS_ED_ALL_ED_FT
please follow up with your doctor in 1-2 days for possible medication adjustment.   return to ED for any concerns

## 2022-02-01 NOTE — ED ADULT NURSE REASSESSMENT NOTE - NS ED NURSE REASSESS COMMENT FT1
Pts. daughter aware of plan of care, pt. will follow up with Cardiologist out pt. as soon as possible to adjust blood pressure medication.

## 2022-02-01 NOTE — ED PROVIDER NOTE - OBJECTIVE STATEMENT
89 y/o female in ED from NH with hypertension tonight.   pt with no complaints.   per EMS, VS taken by staff and found to be high.   pt denies any fever, HA, cp, sob, n/v/d/abd pain.

## 2022-03-05 ENCOUNTER — EMERGENCY (EMERGENCY)
Facility: HOSPITAL | Age: 87
LOS: 0 days | Discharge: HOME | End: 2022-03-05
Attending: EMERGENCY MEDICINE
Payer: MEDICARE

## 2022-03-05 VITALS
DIASTOLIC BLOOD PRESSURE: 51 MMHG | HEIGHT: 60 IN | OXYGEN SATURATION: 98 % | WEIGHT: 130.07 LBS | TEMPERATURE: 98 F | RESPIRATION RATE: 16 BRPM | HEART RATE: 62 BPM | SYSTOLIC BLOOD PRESSURE: 188 MMHG

## 2022-03-05 VITALS
OXYGEN SATURATION: 100 % | SYSTOLIC BLOOD PRESSURE: 164 MMHG | HEART RATE: 60 BPM | RESPIRATION RATE: 16 BRPM | DIASTOLIC BLOOD PRESSURE: 63 MMHG | TEMPERATURE: 98 F

## 2022-03-05 DIAGNOSIS — Z88.2 ALLERGY STATUS TO SULFONAMIDES: ICD-10-CM

## 2022-03-05 DIAGNOSIS — D50.9 IRON DEFICIENCY ANEMIA, UNSPECIFIED: ICD-10-CM
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
## 2022-03-05 DIAGNOSIS — Z88.0 ALLERGY STATUS TO PENICILLIN: ICD-10-CM

## 2022-03-05 DIAGNOSIS — R07.81 PLEURODYNIA: ICD-10-CM

## 2022-03-05 DIAGNOSIS — I10 ESSENTIAL (PRIMARY) HYPERTENSION: ICD-10-CM

## 2022-03-05 DIAGNOSIS — Z88.1 ALLERGY STATUS TO OTHER ANTIBIOTIC AGENTS STATUS: ICD-10-CM

## 2022-03-05 LAB
ANION GAP SERPL CALC-SCNC: 6 MMOL/L — SIGNIFICANT CHANGE UP (ref 5–17)
APPEARANCE UR: CLEAR — SIGNIFICANT CHANGE UP
BASOPHILS # BLD AUTO: 0.01 K/UL — SIGNIFICANT CHANGE UP (ref 0–0.2)
BASOPHILS NFR BLD AUTO: 0.1 % — SIGNIFICANT CHANGE UP (ref 0–2)
BILIRUB UR-MCNC: NEGATIVE — SIGNIFICANT CHANGE UP
BUN SERPL-MCNC: 18 MG/DL — SIGNIFICANT CHANGE UP (ref 7–23)
CALCIUM SERPL-MCNC: 9.4 MG/DL — SIGNIFICANT CHANGE UP (ref 8.5–10.1)
CHLORIDE SERPL-SCNC: 98 MMOL/L — SIGNIFICANT CHANGE UP (ref 96–108)
CO2 SERPL-SCNC: 28 MMOL/L — SIGNIFICANT CHANGE UP (ref 22–31)
COLOR SPEC: YELLOW — SIGNIFICANT CHANGE UP
CREAT SERPL-MCNC: 1.17 MG/DL — SIGNIFICANT CHANGE UP (ref 0.5–1.3)
DIFF PNL FLD: ABNORMAL
EGFR: 42 ML/MIN/1.73M2 — LOW
EOSINOPHIL # BLD AUTO: 0 K/UL — SIGNIFICANT CHANGE UP (ref 0–0.5)
EOSINOPHIL NFR BLD AUTO: 0 % — SIGNIFICANT CHANGE UP (ref 0–6)
GLUCOSE SERPL-MCNC: 134 MG/DL — HIGH (ref 70–99)
GLUCOSE UR QL: NEGATIVE — SIGNIFICANT CHANGE UP
HCT VFR BLD CALC: 37.3 % — SIGNIFICANT CHANGE UP (ref 34.5–45)
HGB BLD-MCNC: 12.2 G/DL — SIGNIFICANT CHANGE UP (ref 11.5–15.5)
IMM GRANULOCYTES NFR BLD AUTO: 0.1 % — SIGNIFICANT CHANGE UP (ref 0–1.5)
KETONES UR-MCNC: NEGATIVE — SIGNIFICANT CHANGE UP
LACTATE SERPL-SCNC: 2 MMOL/L — SIGNIFICANT CHANGE UP (ref 0.7–2)
LEUKOCYTE ESTERASE UR-ACNC: ABNORMAL
LYMPHOCYTES # BLD AUTO: 1.1 K/UL — SIGNIFICANT CHANGE UP (ref 1–3.3)
LYMPHOCYTES # BLD AUTO: 16.2 % — SIGNIFICANT CHANGE UP (ref 13–44)
MCHC RBC-ENTMCNC: 30.7 PG — SIGNIFICANT CHANGE UP (ref 27–34)
MCHC RBC-ENTMCNC: 32.7 GM/DL — SIGNIFICANT CHANGE UP (ref 32–36)
MCV RBC AUTO: 94 FL — SIGNIFICANT CHANGE UP (ref 80–100)
MONOCYTES # BLD AUTO: 0.69 K/UL — SIGNIFICANT CHANGE UP (ref 0–0.9)
MONOCYTES NFR BLD AUTO: 10.2 % — SIGNIFICANT CHANGE UP (ref 2–14)
NEUTROPHILS # BLD AUTO: 4.98 K/UL — SIGNIFICANT CHANGE UP (ref 1.8–7.4)
NEUTROPHILS NFR BLD AUTO: 73.4 % — SIGNIFICANT CHANGE UP (ref 43–77)
NITRITE UR-MCNC: NEGATIVE — SIGNIFICANT CHANGE UP
PH UR: 5 — SIGNIFICANT CHANGE UP (ref 5–8)
PLATELET # BLD AUTO: 151 K/UL — SIGNIFICANT CHANGE UP (ref 150–400)
POTASSIUM SERPL-MCNC: 3.9 MMOL/L — SIGNIFICANT CHANGE UP (ref 3.5–5.3)
POTASSIUM SERPL-SCNC: 3.9 MMOL/L — SIGNIFICANT CHANGE UP (ref 3.5–5.3)
PROT UR-MCNC: 100
RBC # BLD: 3.97 M/UL — SIGNIFICANT CHANGE UP (ref 3.8–5.2)
RBC # FLD: 15 % — HIGH (ref 10.3–14.5)
SODIUM SERPL-SCNC: 132 MMOL/L — LOW (ref 135–145)
SP GR SPEC: 1.02 — SIGNIFICANT CHANGE UP (ref 1.01–1.02)
UROBILINOGEN FLD QL: 1
WBC # BLD: 6.79 K/UL — SIGNIFICANT CHANGE UP (ref 3.8–10.5)
WBC # FLD AUTO: 6.79 K/UL — SIGNIFICANT CHANGE UP (ref 3.8–10.5)

## 2022-03-05 PROCEDURE — 99284 EMERGENCY DEPT VISIT MOD MDM: CPT

## 2022-03-05 PROCEDURE — 80048 BASIC METABOLIC PNL TOTAL CA: CPT

## 2022-03-05 PROCEDURE — 96374 THER/PROPH/DIAG INJ IV PUSH: CPT

## 2022-03-05 PROCEDURE — 99284 EMERGENCY DEPT VISIT MOD MDM: CPT | Mod: 25

## 2022-03-05 PROCEDURE — 87040 BLOOD CULTURE FOR BACTERIA: CPT

## 2022-03-05 PROCEDURE — 81001 URINALYSIS AUTO W/SCOPE: CPT

## 2022-03-05 PROCEDURE — 83605 ASSAY OF LACTIC ACID: CPT

## 2022-03-05 PROCEDURE — 74176 CT ABD & PELVIS W/O CONTRAST: CPT | Mod: 26,MA

## 2022-03-05 PROCEDURE — 71250 CT THORAX DX C-: CPT | Mod: MA

## 2022-03-05 PROCEDURE — 85025 COMPLETE CBC W/AUTO DIFF WBC: CPT

## 2022-03-05 PROCEDURE — 87086 URINE CULTURE/COLONY COUNT: CPT

## 2022-03-05 PROCEDURE — 36415 COLL VENOUS BLD VENIPUNCTURE: CPT

## 2022-03-05 PROCEDURE — 71250 CT THORAX DX C-: CPT | Mod: 26,MA

## 2022-03-05 PROCEDURE — 74176 CT ABD & PELVIS W/O CONTRAST: CPT | Mod: MA

## 2022-03-05 PROCEDURE — 96375 TX/PRO/DX INJ NEW DRUG ADDON: CPT

## 2022-03-05 RX ORDER — KETOROLAC TROMETHAMINE 30 MG/ML
15 SYRINGE (ML) INJECTION ONCE
Refills: 0 | Status: DISCONTINUED | OUTPATIENT
Start: 2022-03-05 | End: 2022-03-05

## 2022-03-05 RX ORDER — ACETAMINOPHEN 500 MG
650 TABLET ORAL ONCE
Refills: 0 | Status: COMPLETED | OUTPATIENT
Start: 2022-03-05 | End: 2022-03-05

## 2022-03-05 RX ORDER — ACETAMINOPHEN 500 MG
2 TABLET ORAL
Qty: 84 | Refills: 0
Start: 2022-03-05 | End: 2022-03-11

## 2022-03-05 RX ORDER — CEFTRIAXONE 500 MG/1
1000 INJECTION, POWDER, FOR SOLUTION INTRAMUSCULAR; INTRAVENOUS ONCE
Refills: 0 | Status: COMPLETED | OUTPATIENT
Start: 2022-03-05 | End: 2022-03-05

## 2022-03-05 RX ORDER — NITROFURANTOIN MACROCRYSTAL 50 MG
1 CAPSULE ORAL
Qty: 0 | Refills: 0 | DISCHARGE
Start: 2022-03-05 | End: 2022-03-14

## 2022-03-05 RX ADMIN — Medication 15 MILLIGRAM(S): at 13:48

## 2022-03-05 RX ADMIN — CEFTRIAXONE 100 MILLIGRAM(S): 500 INJECTION, POWDER, FOR SOLUTION INTRAMUSCULAR; INTRAVENOUS at 13:48

## 2022-03-05 RX ADMIN — Medication 650 MILLIGRAM(S): at 12:46

## 2022-03-05 NOTE — ED PROVIDER NOTE - OBJECTIVE STATEMENT
89 y/o female with PMHx of lymphoma, HTN presents to the ED BIBA from [place] s/p fall. Pt went to the bathroom and tried to sit on the toilet, lost her balance and hit her back. No head trauma. No LOC. Pt was not able to ambulate after fall. Pt usually ambulates with a walker. Pt has dysuria from bladder infection.

## 2022-03-05 NOTE — ED ADULT NURSE NOTE - NSIMPLEMENTINTERV_GEN_ALL_ED
Implemented All Fall with Harm Risk Interventions:  [place] to call system. Call bell, personal items and telephone within reach. Instruct patient to call for assistance. Room bathroom lighting operational. Non-slip footwear when patient is off stretcher. Physically safe environment: no spills, clutter or unnecessary equipment. Stretcher in lowest position, wheels locked, appropriate side rails in place. Provide visual cue, wrist band, yellow gown, etc. Monitor gait and stability. Monitor for mental status changes and reorient to person, place, and time. Review medications for side effects contributing to fall risk. Reinforce activity limits and safety measures with patient and family. Provide visual clues: red socks.

## 2022-03-05 NOTE — ED ADULT TRIAGE NOTE - CHIEF COMPLAINT QUOTE
patient brought in by EMS from [facility] c/o right middle back pain s/p fall last night.  patient was using restroom, fell and hit right side on toilet seat cover.  c/o worsening pain this morning and worsening pain upon inspiration.  - blood thinners.  - head strike.

## 2022-03-05 NOTE — ED PROVIDER NOTE - PROGRESS NOTE DETAILS
Pt's daughter is here, pt uses a walker and is on macrobid for uti, will give a dose of IV abx, pt is not tachy or hypoxic. Precautions reviewed.

## 2022-03-05 NOTE — ED PROVIDER NOTE - PATIENT PORTAL LINK FT
You can access the FollowMyHealth Patient Portal offered by [facility] by registering at the following website: [email or web address]. By joining [employer]’s FollowMyHealth portal, you will also be able to view your health information using other applications (apps) compatible with our system.

## 2022-03-05 NOTE — ED ADULT NURSE NOTE - OBJECTIVE STATEMENT
Pt presented to the ER c/o back pain. Pt stated that she went to sit down on the toilet and hit her back on the toilet lid. Pt is now c/o right lateral back pain. Pt stated that she recently started an antibiotic for a bladder infection unable to recall the name of it.

## 2022-03-05 NOTE — ED PROVIDER NOTE - CONDITION AT DISCHARGE:
[FreeTextEntry1] : 67 year old woman with uncontrolled GERD. I continue her omeprazole 40 mg twice daily and add sucralfate QID. I reinforced the diet. She will follow up in 3- 4 weeks. 
Satisfactory

## 2022-03-05 NOTE — ED PROVIDER NOTE - MUSCULOSKELETAL, MLM
Spine appears normal, range of motion is not limited, right lateral lower back pain, right lateral rib pain

## 2022-03-05 NOTE — ED ADULT NURSE NOTE - CHIEF COMPLAINT QUOTE
patient brought in by EMS from [facility] c/o right middle back pain s/p fall last night.  patient was using restroom, fell and hit right side on toilet seat cover.  c/o worsening pain this morning and worsening pain upon inspiration.  - blood thinners.  - head strike.

## 2022-03-06 LAB
CULTURE RESULTS: SIGNIFICANT CHANGE UP
SPECIMEN SOURCE: SIGNIFICANT CHANGE UP

## 2022-03-09 NOTE — PATIENT PROFILE ADULT - NSPROPOAURINARYCATHETER_GEN_A_NUR
pt c/o feeling shaky, twitching " my fingers are stuck together" pt stated to be anxious , pt has a hx of seizures, pt DENIES having seizures activities, no HA, no dizziness, no numbness,
no

## 2022-03-10 LAB
CULTURE RESULTS: SIGNIFICANT CHANGE UP
SPECIMEN SOURCE: SIGNIFICANT CHANGE UP

## 2022-03-31 ENCOUNTER — INPATIENT (INPATIENT)
Facility: HOSPITAL | Age: 87
LOS: 3 days | Discharge: HOME HEALTH | DRG: 291 | End: 2022-04-04
Attending: HOSPITALIST | Admitting: FAMILY MEDICINE
Payer: MEDICARE

## 2022-03-31 VITALS
SYSTOLIC BLOOD PRESSURE: 178 MMHG | HEART RATE: 68 BPM | RESPIRATION RATE: 19 BRPM | DIASTOLIC BLOOD PRESSURE: 83 MMHG | WEIGHT: 149.91 LBS | TEMPERATURE: 98 F | OXYGEN SATURATION: 95 % | HEIGHT: 60 IN

## 2022-03-31 DIAGNOSIS — E87.70 FLUID OVERLOAD, UNSPECIFIED: ICD-10-CM

## 2022-03-31 LAB
ALBUMIN SERPL ELPH-MCNC: 3.1 G/DL — LOW (ref 3.3–5)
ALP SERPL-CCNC: 86 U/L — SIGNIFICANT CHANGE UP (ref 40–120)
ALT FLD-CCNC: 31 U/L — SIGNIFICANT CHANGE UP (ref 12–78)
ANION GAP SERPL CALC-SCNC: 7 MMOL/L — SIGNIFICANT CHANGE UP (ref 5–17)
APPEARANCE UR: CLEAR — SIGNIFICANT CHANGE UP
APTT BLD: 27 SEC — LOW (ref 27.5–35.5)
AST SERPL-CCNC: 41 U/L — HIGH (ref 15–37)
BASOPHILS # BLD AUTO: 0.02 K/UL — SIGNIFICANT CHANGE UP (ref 0–0.2)
BASOPHILS NFR BLD AUTO: 0.4 % — SIGNIFICANT CHANGE UP (ref 0–2)
BILIRUB SERPL-MCNC: 0.5 MG/DL — SIGNIFICANT CHANGE UP (ref 0.2–1.2)
BILIRUB UR-MCNC: NEGATIVE — SIGNIFICANT CHANGE UP
BUN SERPL-MCNC: 18 MG/DL — SIGNIFICANT CHANGE UP (ref 7–23)
CALCIUM SERPL-MCNC: 9.4 MG/DL — SIGNIFICANT CHANGE UP (ref 8.5–10.1)
CHLORIDE SERPL-SCNC: 102 MMOL/L — SIGNIFICANT CHANGE UP (ref 96–108)
CO2 SERPL-SCNC: 29 MMOL/L — SIGNIFICANT CHANGE UP (ref 22–31)
COLOR SPEC: YELLOW — SIGNIFICANT CHANGE UP
CREAT SERPL-MCNC: 1.14 MG/DL — SIGNIFICANT CHANGE UP (ref 0.5–1.3)
DIFF PNL FLD: NEGATIVE — SIGNIFICANT CHANGE UP
EGFR: 44 ML/MIN/1.73M2 — LOW
EOSINOPHIL # BLD AUTO: 0.04 K/UL — SIGNIFICANT CHANGE UP (ref 0–0.5)
EOSINOPHIL NFR BLD AUTO: 0.8 % — SIGNIFICANT CHANGE UP (ref 0–6)
GLUCOSE SERPL-MCNC: 80 MG/DL — SIGNIFICANT CHANGE UP (ref 70–99)
GLUCOSE UR QL: NEGATIVE — SIGNIFICANT CHANGE UP
HCT VFR BLD CALC: 37.4 % — SIGNIFICANT CHANGE UP (ref 34.5–45)
HGB BLD-MCNC: 12.1 G/DL — SIGNIFICANT CHANGE UP (ref 11.5–15.5)
IMM GRANULOCYTES NFR BLD AUTO: 0.2 % — SIGNIFICANT CHANGE UP (ref 0–1.5)
INR BLD: 0.92 RATIO — SIGNIFICANT CHANGE UP (ref 0.88–1.16)
KETONES UR-MCNC: NEGATIVE — SIGNIFICANT CHANGE UP
LACTATE SERPL-SCNC: 0.9 MMOL/L — SIGNIFICANT CHANGE UP (ref 0.7–2)
LEUKOCYTE ESTERASE UR-ACNC: NEGATIVE — SIGNIFICANT CHANGE UP
LYMPHOCYTES # BLD AUTO: 1.51 K/UL — SIGNIFICANT CHANGE UP (ref 1–3.3)
LYMPHOCYTES # BLD AUTO: 28.5 % — SIGNIFICANT CHANGE UP (ref 13–44)
MCHC RBC-ENTMCNC: 30.6 PG — SIGNIFICANT CHANGE UP (ref 27–34)
MCHC RBC-ENTMCNC: 32.4 GM/DL — SIGNIFICANT CHANGE UP (ref 32–36)
MCV RBC AUTO: 94.7 FL — SIGNIFICANT CHANGE UP (ref 80–100)
MONOCYTES # BLD AUTO: 0.82 K/UL — SIGNIFICANT CHANGE UP (ref 0–0.9)
MONOCYTES NFR BLD AUTO: 15.5 % — HIGH (ref 2–14)
NEUTROPHILS # BLD AUTO: 2.89 K/UL — SIGNIFICANT CHANGE UP (ref 1.8–7.4)
NEUTROPHILS NFR BLD AUTO: 54.6 % — SIGNIFICANT CHANGE UP (ref 43–77)
NITRITE UR-MCNC: NEGATIVE — SIGNIFICANT CHANGE UP
NT-PROBNP SERPL-SCNC: 3487 PG/ML — HIGH (ref 0–450)
PH UR: 7 — SIGNIFICANT CHANGE UP (ref 5–8)
PLATELET # BLD AUTO: 166 K/UL — SIGNIFICANT CHANGE UP (ref 150–400)
POTASSIUM SERPL-MCNC: 3 MMOL/L — LOW (ref 3.5–5.3)
POTASSIUM SERPL-MCNC: 3.3 MMOL/L — LOW (ref 3.5–5.3)
POTASSIUM SERPL-SCNC: 3 MMOL/L — LOW (ref 3.5–5.3)
POTASSIUM SERPL-SCNC: 3.3 MMOL/L — LOW (ref 3.5–5.3)
PROT SERPL-MCNC: 6.6 GM/DL — SIGNIFICANT CHANGE UP (ref 6–8.3)
PROT UR-MCNC: 100
PROTHROM AB SERPL-ACNC: 10.7 SEC — SIGNIFICANT CHANGE UP (ref 10.5–13.4)
RBC # BLD: 3.95 M/UL — SIGNIFICANT CHANGE UP (ref 3.8–5.2)
RBC # FLD: 15.8 % — HIGH (ref 10.3–14.5)
SODIUM SERPL-SCNC: 138 MMOL/L — SIGNIFICANT CHANGE UP (ref 135–145)
SP GR SPEC: 1 — LOW (ref 1.01–1.02)
TROPONIN I, HIGH SENSITIVITY RESULT: 24.23 NG/L — SIGNIFICANT CHANGE UP
TROPONIN I, HIGH SENSITIVITY RESULT: 32.1 NG/L — SIGNIFICANT CHANGE UP
TSH SERPL-MCNC: 1.51 UU/ML — SIGNIFICANT CHANGE UP (ref 0.34–4.82)
UROBILINOGEN FLD QL: NEGATIVE — SIGNIFICANT CHANGE UP
WBC # BLD: 5.29 K/UL — SIGNIFICANT CHANGE UP (ref 3.8–10.5)
WBC # FLD AUTO: 5.29 K/UL — SIGNIFICANT CHANGE UP (ref 3.8–10.5)

## 2022-03-31 PROCEDURE — 97162 PT EVAL MOD COMPLEX 30 MIN: CPT | Mod: GP

## 2022-03-31 PROCEDURE — 83880 ASSAY OF NATRIURETIC PEPTIDE: CPT

## 2022-03-31 PROCEDURE — 97530 THERAPEUTIC ACTIVITIES: CPT | Mod: GP

## 2022-03-31 PROCEDURE — 93306 TTE W/DOPPLER COMPLETE: CPT

## 2022-03-31 PROCEDURE — 99223 1ST HOSP IP/OBS HIGH 75: CPT

## 2022-03-31 PROCEDURE — 84484 ASSAY OF TROPONIN QUANT: CPT

## 2022-03-31 PROCEDURE — 84443 ASSAY THYROID STIM HORMONE: CPT

## 2022-03-31 PROCEDURE — 93010 ELECTROCARDIOGRAM REPORT: CPT

## 2022-03-31 PROCEDURE — 93306 TTE W/DOPPLER COMPLETE: CPT | Mod: 26

## 2022-03-31 PROCEDURE — 73590 X-RAY EXAM OF LOWER LEG: CPT | Mod: 26,RT

## 2022-03-31 PROCEDURE — 80048 BASIC METABOLIC PNL TOTAL CA: CPT

## 2022-03-31 PROCEDURE — U0003: CPT

## 2022-03-31 PROCEDURE — 93971 EXTREMITY STUDY: CPT | Mod: 26,RT

## 2022-03-31 PROCEDURE — 85027 COMPLETE CBC AUTOMATED: CPT

## 2022-03-31 PROCEDURE — U0005: CPT

## 2022-03-31 PROCEDURE — 36415 COLL VENOUS BLD VENIPUNCTURE: CPT

## 2022-03-31 PROCEDURE — 84100 ASSAY OF PHOSPHORUS: CPT

## 2022-03-31 PROCEDURE — 84132 ASSAY OF SERUM POTASSIUM: CPT

## 2022-03-31 PROCEDURE — 71045 X-RAY EXAM CHEST 1 VIEW: CPT | Mod: 26

## 2022-03-31 PROCEDURE — 99285 EMERGENCY DEPT VISIT HI MDM: CPT

## 2022-03-31 PROCEDURE — 97116 GAIT TRAINING THERAPY: CPT | Mod: GP

## 2022-03-31 PROCEDURE — 83735 ASSAY OF MAGNESIUM: CPT

## 2022-03-31 RX ORDER — METOPROLOL TARTRATE 50 MG
25 TABLET ORAL DAILY
Refills: 0 | Status: DISCONTINUED | OUTPATIENT
Start: 2022-03-31 | End: 2022-04-01

## 2022-03-31 RX ORDER — ATORVASTATIN CALCIUM 80 MG/1
10 TABLET, FILM COATED ORAL AT BEDTIME
Refills: 0 | Status: DISCONTINUED | OUTPATIENT
Start: 2022-03-31 | End: 2022-04-04

## 2022-03-31 RX ORDER — HYDROCORTISONE 20 MG
5 TABLET ORAL AT BEDTIME
Refills: 0 | Status: DISCONTINUED | OUTPATIENT
Start: 2022-03-31 | End: 2022-04-04

## 2022-03-31 RX ORDER — POTASSIUM CHLORIDE 20 MEQ
40 PACKET (EA) ORAL EVERY 4 HOURS
Refills: 0 | Status: DISCONTINUED | OUTPATIENT
Start: 2022-03-31 | End: 2022-04-04

## 2022-03-31 RX ORDER — ERYTHROMYCIN BASE 5 MG/GRAM
1 OINTMENT (GRAM) OPHTHALMIC (EYE)
Refills: 0 | Status: DISCONTINUED | OUTPATIENT
Start: 2022-03-31 | End: 2022-04-04

## 2022-03-31 RX ORDER — OFLOXACIN 0.3 %
1 DROPS OPHTHALMIC (EYE)
Refills: 0 | Status: DISCONTINUED | OUTPATIENT
Start: 2022-03-31 | End: 2022-04-04

## 2022-03-31 RX ORDER — FAMOTIDINE 10 MG/ML
20 INJECTION INTRAVENOUS DAILY
Refills: 0 | Status: DISCONTINUED | OUTPATIENT
Start: 2022-03-31 | End: 2022-04-04

## 2022-03-31 RX ORDER — AMIODARONE HYDROCHLORIDE 400 MG/1
200 TABLET ORAL DAILY
Refills: 0 | Status: DISCONTINUED | OUTPATIENT
Start: 2022-03-31 | End: 2022-04-04

## 2022-03-31 RX ORDER — SODIUM CHLORIDE 9 MG/ML
1 INJECTION INTRAMUSCULAR; INTRAVENOUS; SUBCUTANEOUS DAILY
Refills: 0 | Status: DISCONTINUED | OUTPATIENT
Start: 2022-03-31 | End: 2022-04-02

## 2022-03-31 RX ORDER — FUROSEMIDE 40 MG
1 TABLET ORAL
Qty: 0 | Refills: 0 | DISCHARGE

## 2022-03-31 RX ORDER — METOPROLOL TARTRATE 50 MG
25 TABLET ORAL ONCE
Refills: 0 | Status: COMPLETED | OUTPATIENT
Start: 2022-03-31 | End: 2022-03-31

## 2022-03-31 RX ORDER — FUROSEMIDE 40 MG
40 TABLET ORAL ONCE
Refills: 0 | Status: COMPLETED | OUTPATIENT
Start: 2022-03-31 | End: 2022-03-31

## 2022-03-31 RX ORDER — LOSARTAN POTASSIUM 100 MG/1
50 TABLET, FILM COATED ORAL DAILY
Refills: 0 | Status: DISCONTINUED | OUTPATIENT
Start: 2022-03-31 | End: 2022-04-01

## 2022-03-31 RX ORDER — POTASSIUM CHLORIDE 20 MEQ
20 PACKET (EA) ORAL ONCE
Refills: 0 | Status: COMPLETED | OUTPATIENT
Start: 2022-03-31 | End: 2022-03-31

## 2022-03-31 RX ORDER — HEPARIN SODIUM 5000 [USP'U]/ML
5000 INJECTION INTRAVENOUS; SUBCUTANEOUS EVERY 12 HOURS
Refills: 0 | Status: DISCONTINUED | OUTPATIENT
Start: 2022-03-31 | End: 2022-04-04

## 2022-03-31 RX ORDER — NITROGLYCERIN 6.5 MG
0.5 CAPSULE, EXTENDED RELEASE ORAL ONCE
Refills: 0 | Status: COMPLETED | OUTPATIENT
Start: 2022-03-31 | End: 2022-03-31

## 2022-03-31 RX ORDER — ACETAMINOPHEN 500 MG
1 TABLET ORAL
Qty: 0 | Refills: 0 | DISCHARGE

## 2022-03-31 RX ORDER — HYDROCORTISONE 20 MG
10 TABLET ORAL DAILY
Refills: 0 | Status: DISCONTINUED | OUTPATIENT
Start: 2022-03-31 | End: 2022-04-04

## 2022-03-31 RX ORDER — ACETAMINOPHEN 500 MG
650 TABLET ORAL EVERY 6 HOURS
Refills: 0 | Status: DISCONTINUED | OUTPATIENT
Start: 2022-03-31 | End: 2022-04-04

## 2022-03-31 RX ORDER — FLUDROCORTISONE ACETATE 0.1 MG/1
0.05 TABLET ORAL DAILY
Refills: 0 | Status: DISCONTINUED | OUTPATIENT
Start: 2022-03-31 | End: 2022-04-04

## 2022-03-31 RX ORDER — HYDRALAZINE HCL 50 MG
50 TABLET ORAL
Refills: 0 | Status: DISCONTINUED | OUTPATIENT
Start: 2022-03-31 | End: 2022-04-02

## 2022-03-31 RX ORDER — ONDANSETRON 8 MG/1
4 TABLET, FILM COATED ORAL EVERY 6 HOURS
Refills: 0 | Status: DISCONTINUED | OUTPATIENT
Start: 2022-03-31 | End: 2022-04-04

## 2022-03-31 RX ORDER — LORATADINE 10 MG/1
1 TABLET ORAL
Qty: 0 | Refills: 0 | DISCHARGE

## 2022-03-31 RX ORDER — POLYETHYLENE GLYCOL 3350 17 G/17G
0 POWDER, FOR SOLUTION ORAL
Qty: 0 | Refills: 0 | DISCHARGE

## 2022-03-31 RX ORDER — HYDROCORTISONE 20 MG
2 TABLET ORAL
Qty: 0 | Refills: 0 | DISCHARGE

## 2022-03-31 RX ORDER — CEFTRIAXONE 500 MG/1
1000 INJECTION, POWDER, FOR SOLUTION INTRAMUSCULAR; INTRAVENOUS EVERY 24 HOURS
Refills: 0 | Status: DISCONTINUED | OUTPATIENT
Start: 2022-04-01 | End: 2022-04-01

## 2022-03-31 RX ORDER — FUROSEMIDE 40 MG
40 TABLET ORAL DAILY
Refills: 0 | Status: DISCONTINUED | OUTPATIENT
Start: 2022-04-01 | End: 2022-04-03

## 2022-03-31 RX ORDER — CEFTRIAXONE 500 MG/1
1000 INJECTION, POWDER, FOR SOLUTION INTRAMUSCULAR; INTRAVENOUS ONCE
Refills: 0 | Status: COMPLETED | OUTPATIENT
Start: 2022-03-31 | End: 2022-03-31

## 2022-03-31 RX ORDER — NEOMYCIN/POLYMYXIN B/DEXAMETHA 0.1 %
1 SUSPENSION, DROPS(FINAL DOSAGE FORM)(ML) OPHTHALMIC (EYE) AT BEDTIME
Refills: 0 | Status: DISCONTINUED | OUTPATIENT
Start: 2022-03-31 | End: 2022-04-04

## 2022-03-31 RX ORDER — SODIUM CHLORIDE 9 MG/ML
1 INJECTION INTRAMUSCULAR; INTRAVENOUS; SUBCUTANEOUS
Qty: 0 | Refills: 0 | DISCHARGE

## 2022-03-31 RX ADMIN — LOSARTAN POTASSIUM 50 MILLIGRAM(S): 100 TABLET, FILM COATED ORAL at 11:36

## 2022-03-31 RX ADMIN — Medication 50 MILLIGRAM(S): at 21:35

## 2022-03-31 RX ADMIN — FAMOTIDINE 20 MILLIGRAM(S): 10 INJECTION INTRAVENOUS at 11:36

## 2022-03-31 RX ADMIN — Medication 40 MILLIEQUIVALENT(S): at 21:34

## 2022-03-31 RX ADMIN — Medication 5 MILLIGRAM(S): at 21:35

## 2022-03-31 RX ADMIN — Medication 1 APPLICATION(S): at 21:36

## 2022-03-31 RX ADMIN — HEPARIN SODIUM 5000 UNIT(S): 5000 INJECTION INTRAVENOUS; SUBCUTANEOUS at 21:35

## 2022-03-31 RX ADMIN — Medication 25 MILLIGRAM(S): at 08:56

## 2022-03-31 RX ADMIN — Medication 0.5 INCH(S): at 06:50

## 2022-03-31 RX ADMIN — Medication 40 MILLIEQUIVALENT(S): at 17:01

## 2022-03-31 RX ADMIN — SODIUM CHLORIDE 1 GRAM(S): 9 INJECTION INTRAMUSCULAR; INTRAVENOUS; SUBCUTANEOUS at 17:05

## 2022-03-31 RX ADMIN — Medication 40 MILLIGRAM(S): at 07:04

## 2022-03-31 RX ADMIN — AMIODARONE HYDROCHLORIDE 200 MILLIGRAM(S): 400 TABLET ORAL at 11:37

## 2022-03-31 RX ADMIN — Medication 10 MILLIGRAM(S): at 17:05

## 2022-03-31 RX ADMIN — Medication 0.5 INCH(S): at 18:00

## 2022-03-31 RX ADMIN — Medication 20 MILLIEQUIVALENT(S): at 08:56

## 2022-03-31 RX ADMIN — CEFTRIAXONE 100 MILLIGRAM(S): 500 INJECTION, POWDER, FOR SOLUTION INTRAMUSCULAR; INTRAVENOUS at 07:30

## 2022-03-31 RX ADMIN — Medication 100 MILLIGRAM(S): at 21:37

## 2022-03-31 RX ADMIN — Medication 1 DROP(S): at 21:36

## 2022-03-31 RX ADMIN — ATORVASTATIN CALCIUM 10 MILLIGRAM(S): 80 TABLET, FILM COATED ORAL at 21:35

## 2022-03-31 RX ADMIN — FLUDROCORTISONE ACETATE 0.05 MILLIGRAM(S): 0.1 TABLET ORAL at 17:40

## 2022-03-31 NOTE — PHYSICAL THERAPY INITIAL EVALUATION ADULT - GENERAL OBSERVATIONS, REHAB EVAL
supine recumbent in bed dozing off but readily aousable ,initially refusing PT exam but wants to get up and use the bathroom to brush her teeth

## 2022-03-31 NOTE — PHYSICAL THERAPY INITIAL EVALUATION ADULT - DIAGNOSIS, PT EVAL
CHF/fluid overload,h/o orthostasis recently started on Florinef CHF/fluid overload, h/o orthostasis recently started on Florinef CHF/fluid overload, hypertensive emergency (WD=471/70 in ED) h/o orthostasis recently started on Florinef

## 2022-03-31 NOTE — PATIENT PROFILE ADULT - FALL HARM RISK - HARM RISK INTERVENTIONS

## 2022-03-31 NOTE — ED PROVIDER NOTE - CARE PLAN
Principal Discharge DX:	Volume overload  Secondary Diagnosis:	Pedal edema  Secondary Diagnosis:	Cellulitis   1

## 2022-03-31 NOTE — ED ADULT NURSE REASSESSMENT NOTE - NS ED NURSE REASSESS COMMENT FT1
Patient received at this time from KENNETH Gore.
Patient noted to have mild redness on her spine and her sacral area, Right anterior lower leg with redness noted mild warmth to touch.  Patient family reports that patient ambulates with walker but has had falls with the walker.

## 2022-03-31 NOTE — PHYSICAL THERAPY INITIAL EVALUATION ADULT - STRENGTHENING, PT EVAL
Performs AROM/strength General Conditioning EX's in chair/bed with SUP.& VCs 2x10 reps each (by discharge)

## 2022-03-31 NOTE — ED PROVIDER NOTE - SKIN [+], MLM
[facility] [facility] [address] [facility], [address] [facility]  Phone: [phone]  Fax [phone]    Physical Therapy Daily Treatment Note  Date:  2018    Patient Name:  Andriy Mccollum    :  1954  MRN: [id number]  Restrictions/Precautions:    Physician Information:  Referring Practitioner: Dr. Jarret Tsai  Medical/Treatment Diagnosis Information:  Diagnosis: K54.713 (ICD-10-CM) - Left shoulder pain, unspecified chronicity; S42.142A, S42.152A (ICD-10-CM) - Glenoid fracture of shoulder, left, closed, initial encounter  · Treatment Diagnosis: M25.512 (ICD-10-CM) - Left shoulder pain, unspecified chronicity      [x] Conservative / [] Surgical - DOS:  Therapy Diagnosis/Practice Pattern:  Practice Pattern G: Fracture  Insurance/Certification information:  PT Insurance Information: [employer]-$[id number] pr Ginny Para after 30V  Plan of care signed: [x] YES  [] NO  Number of Comorbidities:  []0     []1-2    [x]3+  Date of Patient follow up with Physician: RINA    G-Code (if applicable):      Date G-Code Applied: 18        Progress Note: []  Yes  [x]  No  Next due by: Visit #10        Latex Allergy:  [x]NO      []YES  Preferred Language for Healthcare:   [x]English       []other:    Visit # Insurance Allowable Reporting Period   5 30 Begin Date: 2018               End Date:      RECERT DUE BY: 8 weeks     SUBJECTIVE: Pt reports general body aches. Her elbow continues to be her largest issue. She feels the pendulum activity helps relieve her elbow symptoms some. She remains compliant with her HEP. Note: Pt has cervical hx including prior tx with therapy and radiculopathy into LUE.     OBJECTIVE: See Eval  Observation: P! w/ pressure at ulnar tunnel; Negative Spurling's  Palpation: L UT     Test used Initial score Current Score   Pain Summary VAS 5-6/10 1-2/10 shd  6-7/10 elbow   Functional questionnaire QDash 52%    ROM flexion 105 130 redness right lower leg/BRUISING

## 2022-03-31 NOTE — ED ADULT NURSE REASSESSMENT NOTE - GENERAL PATIENT STATE
Brief Postoperative Note    Patient: Johny Blackburn  YOB: 1969  MRN: [id number]    Date of Procedure: 12/2/2020     Pre-Op Diagnosis: CERVICALGIA, HNP    Post-Op Diagnosis: Same as preoperative diagnosis. Procedure(s):  C5-6 DISC ARTHROPLASTY    Surgeon(s):  Grisel Jones MD    Surgical Assistant: Physician Assistant: GERSON Crouch    Anesthesia: General     Estimated Blood Loss (mL): 50    Complications: None    Specimens: * No specimens in log *     Implants:   Implant Name Type Inv.  Item Serial No.  Lot No. LRB No. Used Action   Prestige   [id number] InfiniDB [id number] N/A 1 Implanted   GRAFT HUM TISS 3CMX 4CM AMNIO MEM VERSASHIELD - [id number]  GRAFT HUM TISS 3CMX 4CM AMNIO MEM VERSASHIELD [id number] ORTHOFIX [id number] N/A N/A 1 Implanted       Drains:   Cristi-Pink Drain 12/02/20 Anterior Neck (Active)   Site Assessment Clean, dry, & intact 12/02/20 1215   Dressing Status Clean, dry, & intact 12/02/20 1215   Status Charged 12/02/20 1215   Drainage Color Sanguinous 12/02/20 1209   Output (ml) 0 ml 12/02/20 1300       Findings: Stenosis    Electronically Signed by GERSON Payne on 12/2/2020 at 1:51 PM interactive breakfast ordered/comfortable appearance/family/SO at bedside

## 2022-03-31 NOTE — ED ADULT NURSE NOTE - NSIMPLEMENTINTERV_GEN_ALL_ED
Implemented All Fall with Harm Risk Interventions:  [place] to call system. Call bell, personal items and telephone within reach. Instruct patient to call for assistance. Room bathroom lighting operational. Non-slip footwear when patient is off stretcher. Physically safe environment: no spills, clutter or unnecessary equipment. Stretcher in lowest position, wheels locked, appropriate side rails in place. Provide visual cue, wrist band, yellow gown, etc. Monitor gait and stability. Monitor for mental status changes and reorient to person, place, and time. Review medications for side effects contributing to fall risk. Reinforce activity limits and safety measures with patient and family. Provide visual clues: red socks.

## 2022-03-31 NOTE — CONSULT NOTE ADULT - SUBJECTIVE AND OBJECTIVE BOX
CARDIOLOGY AND HEART FAILURE ATTENDING.    Patient is a 89y old  Female who presents with a chief complaint of SOB  Weakness.       HPI:  89 y/o female with history of lymphoma, HTN, Hyponatremia, adrenal insufficiency presents to the ED with complaints of weakness and sob.  Patient reports that over the past few days she has been experiencing GREENBERG and orthopnea, however denies chest pain. Also has noted that   has increased swelling in bilateral LE. Of note she was recently started on florinef for adrenal insufficiency for worsening dizziness and orthostatics.  She was seen recently by her cardiologist who stopped the lasix, patient is unsure why. Currently she denies any chest pain or sob but did have  some nausea and headache upon arrival to the ED, blood pressure at that time 230/70      Pt seen and examined by me now.  She is lying flat on bed.  She states that her pedal edema and SOB is better now.     PAST MEDICAL & SURGICAL HISTORY:  Iron deficiency    HTN (hypertension)    Lymphoma    H/O adrenal insufficiency    No significant past surgical history        MEDICATIONS  (STANDING):  aMIOdarone    Tablet 200 milliGRAM(s) Oral daily  atorvastatin 10 milliGRAM(s) Oral at bedtime  dexamethasone/neomycin/polymyxin B Ophthalmic Ointment - Peds 1 Application(s) Both EYES at bedtime  doxycycline hyclate Capsule 100 milliGRAM(s) Oral every 12 hours  erythromycin   Ointment 1 Application(s) Right EYE two times a day  famotidine    Tablet 20 milliGRAM(s) Oral daily  fludroCORTISONE 0.05 milliGRAM(s) Oral daily  heparin   Injectable 5000 Unit(s) SubCutaneous every 12 hours  hydrALAZINE 50 milliGRAM(s) Oral two times a day  hydrocortisone 5 milliGRAM(s) Oral at bedtime  hydrocortisone 10 milliGRAM(s) Oral daily  losartan 50 milliGRAM(s) Oral daily  metoprolol succinate ER 25 milliGRAM(s) Oral daily  ofloxacin 0.3% Ophthalmic Solution - Peds 1 Drop(s) Right EYE two times a day  potassium chloride    Tablet ER 40 milliEquivalent(s) Oral every 4 hours  sodium chloride 1 Gram(s) Oral daily    MEDICATIONS  (PRN):  acetaminophen     Tablet .. 650 milliGRAM(s) Oral every 6 hours PRN Mild Pain (1 - 3)  ondansetron Injectable 4 milliGRAM(s) IV Push every 6 hours PRN Nausea and/or Vomiting      FAMILY HISTORY: No family history of premature CAD or SCD.       SOCIAL HISTORY: no recent smoking     REVIEW OF SYSTEMS:  CONSTITUTIONAL:    No fatigue, malaise, lethargy.  No fever or chills.  RESPIRATORY:  No cough.  No wheeze.  No hemoptysis.  c/o shortness of breath.  CARDIOVASCULAR:  No chest pains.  No palpitations. c/o shortness of breath, No orthopnea or PND. c/o pedal edema  GASTROINTESTINAL:  No abdominal pain.  No nausea or vomiting.    GENITOURINARY:    No hematuria.    MUSCULOSKELETAL:  No musculoskeletal pain.  No joint swelling.  No arthritis.  NEUROLOGICAL:  No tingling or numbness or weakness.  PSYCHIATRIC:  No confusion  SKIN:  No rashes.            Vital Signs Last 24 Hrs  T(C): 36.4 (31 Mar 2022 12:21), Max: 36.9 (31 Mar 2022 11:22)  T(F): 97.5 (31 Mar 2022 12:21), Max: 98.4 (31 Mar 2022 11:22)  HR: 69 (31 Mar 2022 12:21) (63 - 69)  BP: 133/44 (31 Mar 2022 12:21) (133/44 - 239/64)  BP(mean): 107 (31 Mar 2022 11:22) (107 - 115)  RR: 16 (31 Mar 2022 12:21) (16 - 19)  SpO2: 93% (31 Mar 2022 12:21) (93% - 95%)    PHYSICAL EXAM-    Constitutional: elderly female in no acute distress     Head: Head is normocephalic and atraumatic.      Neck: No JVD.     Cardiovascular: Regular rate and rhythm without S3, S4. No murmurs or rubs are appreciated.      Respiratory: Breath sounds are normal. No rales. No wheezing.    Abdomen: Soft, nontender, nondistended with positive bowel sounds.      Extremity: No tenderness. trace b/l pedal  pitting edema     Neurologic: The patient is alert and oriented.      Skin: redness in R LE    Psychiatric: The patient appears to be emotionally stable.      INTERPRETATION OF TELEMETRY: SR     ECG: Sinus rythm , poor R wave progression    I&O's Detail      LABS:                        12.1   5.29  )-----------( 166      ( 31 Mar 2022 06:25 )             37.4     03-31    x   |  x   |  x   ----------------------------<  x   3.0<L>   |  x   |  x     Ca    9.4      31 Mar 2022 06:25    TPro  6.6  /  Alb  3.1<L>  /  TBili  0.5  /  DBili  x   /  AST  41<H>  /  ALT  31  /  AlkPhos  86          PT/INR - ( 31 Mar 2022 06:25 )   PT: 10.7 sec;   INR: 0.92 ratio         PTT - ( 31 Mar 2022 06:25 )  PTT:27.0 sec  Urinalysis Basic - ( 31 Mar 2022 06:25 )    Color: Yellow / Appearance: Clear / S.005 / pH: x  Gluc: x / Ketone: Negative  / Bili: Negative / Urobili: Negative   Blood: x / Protein: 100 / Nitrite: Negative   Leuk Esterase: Negative / RBC: Negative /HPF / WBC 0-2   Sq Epi: x / Non Sq Epi: Occasional / Bacteria: Occasional      I&O's Summary    BNPSerum Pro-Brain Natriuretic Peptide: 3487 pg/mL ( @ 06:25)    RADIOLOGY & ADDITIONAL STUDIES:  < from: Xray Chest 1 View- PORTABLE-Urgent (22 @ 07:25) >    ACC: [id number] EXAM:  XR CHEST PORTABLE URGENT 1V                        ACC: [id number] EXAM:  XR TIB FIB AP LAT 2 VIEWS RT                          PROCEDURE DATE:  2022          INTERPRETATION:  Right tib-fib and chest. Patient has local swelling and   possible DVT in the leg.    Right tib-fib. AP and lateral views.    There is mildly degeneration. Ankle is unremarkable. No bone destruction   or fracture.    AP chest on 2022 at 6:44 AM.    Heart possibly enlarged.    Fibrocalcific chronic findings in both apices again noted.    Left loop recorder again seen.    Slight scarring at left base laterally is seen.    There are multilevel vertebral possibly densities.    Chest is similar to CAT scan of  of this year.    IMPRESSION: No acute findings.    --- End of Report ---            JORJE BRAVO MD; Attending Radiologist  This document has been electronically signed. Mar 31 2022 10:59AM    < end of copied text >   CARDIOLOGY AND HEART FAILURE ATTENDING.    Patient is a 89y old  Female who presents with a chief complaint of SOB  Weakness.       HPI:  89 y/o female with history of lymphoma, HTN, Hyponatremia, adrenal insufficiency presents to the ED with complaints of weakness and sob.  Patient reports that over the past few days she has been experiencing GREENBERG and orthopnea, however denies chest pain. Also has noted that   has increased swelling in bilateral LE. Of note she was recently started on florinef for adrenal insufficiency for worsening dizziness and orthostatics.  She was seen recently by her cardiologist who stopped the lasix, patient is unsure why. Currently she denies any chest pain or sob but did have  some nausea and headache upon arrival to the ED, blood pressure at that time 230/70    3/31-   Pt seen and examined by me now.  She is lying flat on bed.  She states that her pedal edema and SOB is better now.     4/1- pt seen this am. Pt denies any symptoms this am and states that she feels much better.     PAST MEDICAL & SURGICAL HISTORY:  Iron deficiency    HTN (hypertension)    Lymphoma    H/O adrenal insufficiency    No significant past surgical history        MEDICATIONS  (STANDING):  aMIOdarone    Tablet 200 milliGRAM(s) Oral daily  atorvastatin 10 milliGRAM(s) Oral at bedtime  dexamethasone/neomycin/polymyxin B Ophthalmic Ointment - Peds 1 Application(s) Both EYES at bedtime  doxycycline hyclate Capsule 100 milliGRAM(s) Oral every 12 hours  erythromycin   Ointment 1 Application(s) Right EYE two times a day  famotidine    Tablet 20 milliGRAM(s) Oral daily  fludroCORTISONE 0.05 milliGRAM(s) Oral daily  heparin   Injectable 5000 Unit(s) SubCutaneous every 12 hours  hydrALAZINE 50 milliGRAM(s) Oral two times a day  hydrocortisone 5 milliGRAM(s) Oral at bedtime  hydrocortisone 10 milliGRAM(s) Oral daily  losartan 50 milliGRAM(s) Oral daily  metoprolol succinate ER 25 milliGRAM(s) Oral daily  ofloxacin 0.3% Ophthalmic Solution - Peds 1 Drop(s) Right EYE two times a day  potassium chloride    Tablet ER 40 milliEquivalent(s) Oral every 4 hours  sodium chloride 1 Gram(s) Oral daily    MEDICATIONS  (PRN):  acetaminophen     Tablet .. 650 milliGRAM(s) Oral every 6 hours PRN Mild Pain (1 - 3)  ondansetron Injectable 4 milliGRAM(s) IV Push every 6 hours PRN Nausea and/or Vomiting      FAMILY HISTORY: No family history of premature CAD or SCD.       SOCIAL HISTORY: no recent smoking     REVIEW OF SYSTEMS:  CONSTITUTIONAL:    No fatigue, malaise, lethargy.  No fever or chills.  RESPIRATORY:  No cough.  No wheeze.  No hemoptysis.  c/o shortness of breath.  CARDIOVASCULAR:  No chest pains.  No palpitations. c/o shortness of breath, No orthopnea or PND. c/o pedal edema  GASTROINTESTINAL:  No abdominal pain.  No nausea or vomiting.    GENITOURINARY:    No hematuria.    MUSCULOSKELETAL:  No musculoskeletal pain.  No joint swelling.  No arthritis.  NEUROLOGICAL:  No tingling or numbness or weakness.  PSYCHIATRIC:  No confusion  SKIN:  No rashes.            ICU Vital Signs Last 24 Hrs  T(C): 37 (01 Apr 2022 07:55), Max: 37 (01 Apr 2022 07:55)  T(F): 98.6 (01 Apr 2022 07:55), Max: 98.6 (01 Apr 2022 07:55)  HR: 63 (01 Apr 2022 07:55) (63 - 69)  BP: 141/41 (31 Mar 2022 19:17) (133/44 - 201/64)  BP(mean): 107 (31 Mar 2022 11:22) (107 - 107)  ABP: --  ABP(mean): --  RR: 18 (01 Apr 2022 07:55) (16 - 18)  SpO2: 95% (01 Apr 2022 07:55) (92% - 95%)      PHYSICAL EXAM-    Constitutional: elderly female in no acute distress     Head: Head is normocephalic and atraumatic.      Neck: No JVD.     Cardiovascular: Regular rate and rhythm without S3, S4. No murmurs or rubs are appreciated.      Respiratory: Breath sounds are normal. No rales. No wheezing.    Abdomen: Soft, nontender, nondistended with positive bowel sounds.      Extremity: No tenderness. trace b/l pedal  pitting edema     Neurologic: The patient is alert and oriented.      Skin: redness in R LE    Psychiatric: The patient appears to be emotionally stable.      INTERPRETATION OF TELEMETRY: SR     ECG: Sinus rythm , poor R wave progression    I&O's Detail      LABS:                                   10.5   5.61  )-----------( 141      ( 01 Apr 2022 06:42 )             32.4     04-01    138  |  105  |  24<H>  ----------------------------<  97  4.9   |  30  |  1.28    Ca    9.1      01 Apr 2022 06:42    TPro  6.6  /  Alb  3.1<L>  /  TBili  0.5  /  DBili  x   /  AST  41<H>  /  ALT  31  /  AlkPhos  86  03-31        LIVER FUNCTIONS - ( 31 Mar 2022 06:25 )  Alb: 3.1 g/dL / Pro: 6.6 gm/dL / ALK PHOS: 86 U/L / ALT: 31 U/L / AST: 41 U/L / GGT: x           PT/INR - ( 31 Mar 2022 06:25 )   PT: 10.7 sec;   INR: 0.92 ratio         PTT - ( 31 Mar 2022 06:25 )  PTT:27.0 sec          I&O's Summary    BNPSerum Pro-Brain Natriuretic Peptide: 3487 pg/mL (03-31 @ 06:25)    RADIOLOGY & ADDITIONAL STUDIES:  < from: Xray Chest 1 View- PORTABLE-Urgent (03.31.22 @ 07:25) >    ACC: [id number] EXAM:  XR CHEST PORTABLE URGENT 1V                        ACC: [id number] EXAM:  XR TIB FIB AP LAT 2 VIEWS RT                          < from: TTE Echo Complete w/o Contrast w/ Doppler (03.31.22 @ 19:09) >   Impression     Summary     Moderate concentric left ventricular hypertrophy is present.   Basal septal hypertrophy is seen measuring 1.7cm.   Estimated left ventricular ejection fraction is 55-60 %.   Normal appearing left atrium.   Mild aortic sclerosis is present with normal valvular opening.   Mild (1+) to moderate aortic regurgitation is present pressure half time   is 215cm/s2.   Mild mitral annular calcification is present.   The mitral valve leaflets appear thickened.   Mild (1+) mitral regurgitation is present.   EA reversal of the mitral inflow consistent with reduced compliance of   the   left ventricle.   The tricuspid valve leaflets appear mildly thickened and/or calcified,   but   open well.   Mild (1+) tricuspid valve regurgitation is present.   Normal appearing pulmonic valve structure.   Trace pulmonic valvular regurgitation is present.     Signature     ----------------------------------------------------------------   Electronically signed by Steven Cui MD(Interpreting   physician) on 03/31/2022 10:38 PM   ----------------------------------------------------------------    < end of copied text >  PROCEDURE DATE:  03/31/2022          INTERPRETATION:  Right tib-fib and chest. Patient has local swelling and   possible DVT in the leg.    Right tib-fib. AP and lateral views.    There is mildly degeneration. Ankle is unremarkable. No bone destruction   or fracture.    AP chest on March 31, 2022 at 6:44 AM.    Heart possibly enlarged.    Fibrocalcific chronic findings in both apices again noted.    Left loop recorder again seen.    Slight scarring at left base laterally is seen.    There are multilevel vertebral possibly densities.    Chest is similar to CAT scan of March 5 of this year.    IMPRESSION: No acute findings.    --- End of Report ---            JORJE BRAVO MD; Attending Radiologist  This document has been electronically signed. Mar 31 2022 10:59AM    < end of copied text >

## 2022-03-31 NOTE — PHYSICAL THERAPY INITIAL EVALUATION ADULT - DISCHARGE DISPOSITION, PT EVAL
return to assisted living when cleared by Medicine ,can resume home care PT there/home/home w/ assist/home w/ home PT

## 2022-03-31 NOTE — CONSULT NOTE ADULT - ASSESSMENT
SOB, Acute on chronic decompensated HF- HFPEF based on hx- she is mildly hypervolemic and at the most need another dose of iv lasix.  Will closely monitor.   Diuresis with close monitoring of the renal function and electrolytes.  Goal potassium of 4 and magnesium of 2.   Strict I/O and daily wt checks. Low sodium diet. Nutrition education.     R LE rash- cellulitis- on abx.    HTN emergency- BP close to normal.  can DC nitro patch in few hrs.    Loop recorder- recommend interrogation.  she thinks it might have been dead.  She is on amiodarone as outpt and she does not know why. vt vs afib. Not on AC in past.  reviwed the [facility] records which dont add to more info.   She had syncope hx   She states she is visiting from [place].  Zach try to get in touch with her [place] cardiologist tomorrow am.     Other medical issues- Management per primary team.   Thank you for allowing me to participate in the care of this patient. Please feel free to contact me with any questions.  SOB, Acute on chronic decompensated HF- HFPEF based on hx-   close to euvolemia and at the most need another dose of iv lasix.  Will closely monitor.   Diuresis with close monitoring of the renal function and electrolytes.  Goal potassium of 4 and magnesium of 2.   Strict I/O and daily wt checks. Low sodium diet. Nutrition education.   Can DC to [facility]  tomorrow from cardiac standpoint.      R LE rash- cellulitis- on abx.    HTN emergency- BP close to normal thi s am  Recheck after am meds.     Loop recorder- battery dead   Spoke with daughter this am.  Pt used to have multiple syncopal episodes in past secondary to salt depletion per daughter.  No recent ones.  Daughter not sure if she had VT or afib but thinks it might be afib.    Afib- in SR now.  Will continue amiodarone with close monitoring as outpt.  She is not on AC secondary to multiple falls, advanced age and high bleeding risk.    Pts daughter would like her cardiology close by and she is agreeable to make her mother followup with me outpt.  Pt to followup with me next week Thursday 4/7- 2:30 pm, at [address] [place], [address].  Ph :  ext [phone].    Other medical issues- Management per primary team.   Thank you for allowing me to participate in the care of this patient. Please feel free to contact me with any questions.

## 2022-03-31 NOTE — ED ADULT TRIAGE NOTE - CHIEF COMPLAINT QUOTE
Pt BIBA for hypertension. Symptoms include nausea, sob and sharp neck pain. Denies chest pain, vomiting or recent fevers. Pt also endorses bilateral lower extremity edema, pain and erythema.

## 2022-03-31 NOTE — H&P ADULT - NSICDXPASTMEDICALHX_GEN_ALL_CORE_FT
PAST MEDICAL HISTORY:  H/O adrenal insufficiency     HTN (hypertension)     Iron deficiency     Lymphoma

## 2022-03-31 NOTE — PHYSICAL THERAPY INITIAL EVALUATION ADULT - PERTINENT HX OF CURRENT PROBLEM, REHAB EVAL
weakness, shortness of breath ,increased BLE edema from Bristal assisted,her Lasix was recently discontinued and she had been started on Florinef for orthostasis

## 2022-03-31 NOTE — PHYSICAL THERAPY INITIAL EVALUATION ADULT - ADDITIONAL COMMENTS
pt resides [facility] and is ambulatory with RW ,had a fall 3 weeks ago associated with orthostasis and was placed on 24 hour care @ [facility] but recently decreased to 12 hours overnight @ [facility] ; pt owns a cane & a RW

## 2022-03-31 NOTE — H&P ADULT - NSHPPHYSICALEXAM_GEN_ALL_CORE
VITALS:  T(F): 98 (03-31-22 @ 07:12), Max: 98 (03-31-22 @ 06:13)  HR: 63 (03-31-22 @ 07:12) (63 - 68)  BP: 239/64 (03-31-22 @ 07:12) (178/83 - 239/64)  RR: 16 (03-31-22 @ 07:12) (16 - 19)  SpO2: 95% (03-31-22 @ 07:12) (95% - 95%)    PHYSICAL EXAM:  GEN: Thin, NAD  HEENT:  pupils equal and reactive, EOMI, no oropharyngeal lesions, erythema, exudates, oral thrush  NECK:   supple, no carotid bruits, no palpable lymph nodes, no thyromegaly  CV:  +S1, +S2, regular, + murmurs no rubs  RESP:   lungs clear to auscultation bilaterally, no wheezing, rales, rhonchi, good air entry bilaterally  BREAST:  not examined  GI:  abdomen soft, non-tender, non-distended, normal BS, no bruits, no abdominal masses, no palpable masses  RECTAL:  not examined  :  not examined  MSK:   normal muscle tone, no atrophy, no rigidity, no contractions  EXT:  no clubbing, no cyanosis, Trace edema bilateral, no calf pain, swelling or erythema  VASCULAR:  pulses equal and symmetric in the upper and lower extremities  NEURO:  AAOX3, no focal neurological deficits, follows all commands, able to move extremities spontaneously  SKIN: Right Lower shin hematoma with surrounding redness, slight warmth

## 2022-03-31 NOTE — H&P ADULT - ASSESSMENT
89 y/o female with history of lymphoma, HTN, Hyponatremia, adrenal insufficiency presents to the ED with complaints of weakness and sob.    # CHF exacerbation  - No echo on file  - Worsening GREENBERG and orthopnea  - Check Echo  - Lasix 40 IV daily  - Cardiology consult  - Repeat EKG  - Continue Losartan  - Check orthostatics  - Trend Troponin    # HTN urgency  - Improved and asymptomatic  - Labile blood pressure at home likely     due to hydrocortisone and florinef  - Continue Metoprolol  - Hydralazine 50 BID  - Losartan 50 daily  - Monitor BP and on tele    # Right LE Cellulitis  - Continue Rocephin 1 gram daily  - Add doxycycline 100 BID x 7 days  - Doppler LE negative  - Monitor CBC    # Adrenal Insufficiency  - Being followed by Endo as outpatient  - Continue Florinef and hydrocortisone   - Sodium Tab 1/day  - Monitor labs  - Check orthostatics    # Hypokalemia  - Replete and monitor    # HLD  - Atorvastatin    # History of Cardiac Arrhythmia   - Has loop recorder  - Will follow up with Dr. Hagen ( Cardio)     as outpatient  - Continue amiodarone 200 mg daily    # DVT prophylaxis  - Heparin

## 2022-03-31 NOTE — ED ADULT TRIAGE NOTE - PATIENT ON (OXYGEN DELIVERY METHOD)
room air
8.7    5.16  )-----------( 200      ( 16 Apr 2020 07:30 )             28.1     04-16    133<L>  |  99  |  56<H>  ----------------------------<  194<H>  4.4   |  23  |  1.79<H>    Ca    8.6      16 Apr 2020 07:30  Phos  4.3     04-16  Mg     1.8     04-16

## 2022-03-31 NOTE — PHYSICAL THERAPY INITIAL EVALUATION ADULT - PRECAUTIONS/LIMITATIONS, REHAB EVAL
+h/o fall ~3 weeks pta ?associated with orthostasis ,was started on Florinef/fall precautions +h/o imbalance/ fall ~3 weeks pta (reportedly lost balance while going to sit on toilet fell backwards hard injuring rib cage )/fall precautions +h/o imbalance/ fall onto toilet  ~3 weeks pta (reportedly lost balance while going to sit on toilet fell backwards hard injuring rib cage )/fall precautions +h/o imbalance/ fall onto toilet  ~3 weeks pta (reportedly lost balance while turning around while going to sit on toilet fell backwards hard onto toilet (DID NOT HIT FLOOR) injuring rib cage )/fall precautions

## 2022-03-31 NOTE — H&P ADULT - NSHPLABSRESULTS_GEN_ALL_CORE
LABS:                            12.1   5.29  )-----------( 166      ( 31 Mar 2022 06:25 )             37.4         138  |  102  |  18  ----------------------------<  80  3.3<L>   |  29  |  1.14    Ca    9.4      31 Mar 2022 06:25    TPro  6.6  /  Alb  3.1<L>  /  TBili  0.5  /  DBili  x   /  AST  41<H>  /  ALT  31  /  AlkPhos  86          LIVER FUNCTIONS - ( 31 Mar 2022 06:25 )  Alb: 3.1 g/dL / Pro: 6.6 gm/dL / ALK PHOS: 86 U/L / ALT: 31 U/L / AST: 41 U/L / GGT: x           PT/INR - ( 31 Mar 2022 06:25 )   PT: 10.7 sec;   INR: 0.92 ratio         PTT - ( 31 Mar 2022 06:25 )  PTT:27.0 sec      Urinalysis Basic - ( 31 Mar 2022 06:25 )  Color: Yellow / Appearance: Clear / S.005 / pH: x  Gluc: x / Ketone: Negative  / Bili: Negative / Urobili: Negative   Blood: x / Protein: 100 / Nitrite: Negative   Leuk Esterase: Negative / RBC: x / WBC x   Sq Epi: x / Non Sq Epi: x / Bacteria: x    Lactate, Blood: 0.9 mmol/L ( @ 06:25)    BNP 3487  Troponin 24    EKG: NSR, LAE  Chest xray: Prelim- Slight increased vascular congestion compared to previous chest xray  Doppler: RLE negative for DVT  Xray Tib/Fib: Negative for acute fractures Prelim

## 2022-03-31 NOTE — PHYSICAL THERAPY INITIAL EVALUATION ADULT - MARITAL STATUS
relocated from [facility] to [facility] in [place] Sept 2021 relocated from [facility] to [facility] in [place] Sept 2021/

## 2022-03-31 NOTE — ED PROVIDER NOTE - CPE EDP CARDIAC NORM
normal... no loss of consciousness, no gait abnormality, no headache, no sensory deficits, and no weakness.

## 2022-03-31 NOTE — PHARMACOTHERAPY INTERVENTION NOTE - COMMENTS
Med history complete, reviewed medications and allergies with patients med list from [facility] and confirmed medication list with doctor first med profile, all medication related questions answered

## 2022-03-31 NOTE — ED PROVIDER NOTE - CLINICAL SUMMARY MEDICAL DECISION MAKING FREE TEXT BOX
89 yo pt brought to ED with daughter for leg swelling and not able to walk because of leg swelling.  Pt was found to have high blood pressure by EMS.  Plan check labs, xray and u.s

## 2022-03-31 NOTE — H&P ADULT - HISTORY OF PRESENT ILLNESS
89 y/o female with history of lymphoma, HTN, Hyponatremia, adrenal insufficiency presents to the ED with complaints of weakness and sob.  Patient reports that over the past few days she has been experiencing GREENBERG and orthopnea, however denies chest pain. Also has noted that   has increased swelling in bilateral LE. Of note she was recently started on florinef for adrenal insufficiency for worsening dizziness and orthostatics.  She was seen recently by her cardiologist who stopped the lasix, patient is unsure why. Currently she denies any chest pain or sob but did have  some nausea and headache upon arrival to the ED, blood pressure at that time 230/70

## 2022-03-31 NOTE — ED PROVIDER NOTE - OBJECTIVE STATEMENT
89 yo pt with PMH for HTN, lymphoma presents to ED with daughter for leg swelling.  Daughter states that pt has moved here from [place].  Pt was on lasix, but was taken off the lasix by her cardiologist here.  Pt has been having increase leg swelling.  This am pt noticed redness on the right leg.  Daughter also reports that the leg swelling is so much that pt is not able to walk.  No fever, no travel, no sick contact.  + covid19 vaccine and booster. Pt notice this am that the right leg was red.  Pt denies any trauma to legs.  No fevers.

## 2022-03-31 NOTE — PHYSICAL THERAPY INITIAL EVALUATION ADULT - PATIENT PROFILE REVIEW, REHAB EVAL
yes was seen in ED 3/5 with c/o rib pain after losing balance while going to sit on toilet ,fell back and hit rib cage on ?grab bar/wall,?toilet tank ; was also seen in ED 2/1/22 with HTN/yes

## 2022-03-31 NOTE — PROGRESS NOTE ADULT - ASSESSMENT
Patient is 89yo F PMHx HTN, HLD, GERD, lymphoma, frequent UTIs, adrenal insuffieciency presents via EMS with c/o posterior headache, nausea, weakness, B/L leg weakness, inability to walk this morning, found to be hypertensive with EMS. BP currently 225/75. Of note in the ER patient found to have hematoma to right shin with redness down the right leg with warmth. Patient denies recent fall or injury, daughter reports when she saw the patient yesterday her leg looked normal. Patient c/o legs feeling swollen, minimal swelling noted. Doppler to RLE negative for DVT. Concern for cellulitis of RLE, patient initiated on ceftriaxone in ER. Patient to be admitted to telemetry for monitoring of fluid overload, HTN, RLE redness, IV antibiotics.     #HTN/Fluid Overload  - CXR concern for fluid overload  - SBP 220s  - continue IV lasix       #Hypokalemia  - repeat orally  - trend CMPs    #RLE Cellulitis  - Start IV vancomycin for MRSA coverage  - RLE doppler negative for DVT  - continue to monitor RLE redness    #DVT ppx  - SCDs    #Code Status: Full Code Patient is 89yo F PMHx HTN, HLD, GERD, lymphoma, frequent UTIs, adrenal insuffieciency presents via EMS with c/o posterior headache, nausea, weakness, B/L leg weakness, inability to walk this morning, found to be hypertensive with EMS. BP currently 225/75. Of note in the ER patient found to have hematoma to right shin with redness down the right leg with warmth. Patient denies recent fall or injury, daughter reports when she saw the patient yesterday her leg looked normal. Patient c/o legs feeling swollen, minimal swelling noted. Doppler to RLE negative for DVT. Concern for cellulitis of RLE, patient initiated on ceftriaxone in ER. Patient to be admitted to telemetry for monitoring of fluid overload, HTN, RLE redness, IV antibiotics.     #HTN/Fluid Overload  - CXR concern for fluid overload  - SBP 220s  - continue IV lasix     #Hypokalemia  - repeat orally  - trend CMPs    #Adrenal Insufficiency     #RLE Cellulitis  - Start IV vancomycin for MRSA coverage  - RLE doppler negative for DVT  - continue to monitor RLE redness    #DVT ppx  - SCDs    #Code Status: Full Code Patient is 89yo F PMHx HTN, HLD, GERD, lymphoma, frequent UTIs, adrenal insuffieciency presents via EMS with c/o posterior headache, nausea, weakness, B/L leg weakness, inability to walk this morning, found to be hypertensive with EMS. BP currently 225/75. Of note in the ER patient found to have hematoma to right shin with redness down the right leg with warmth. Patient denies recent fall or injury, daughter reports when she saw the patient yesterday her leg looked normal. Patient c/o legs feeling swollen, minimal swelling noted. Doppler to RLE negative for DVT. Concern for cellulitis of RLE, patient initiated on ceftriaxone in ER. Patient to be admitted to telemetry for monitoring of fluid overload, HTN, RLE redness, IV antibiotics.     #Hypertensive urgency  - CXR concern for fluid overload  - SBP 220s, HR 60s  - continue home medications  - add IV lasix    #CHF exacerbation  - GREENBERG since lasix was discontinued by cardiologist  - continue IV lasix  - ECHO     #Hypokalemia  - repeat orally  - trend CMPs    #Adrenal Insufficiency     #RLE Cellulitis  - Start IV vancomycin for MRSA coverage  - RLE doppler negative for DVT  - continue to monitor RLE redness    #DVT ppx  - SCDs    #Code Status: Full Code Patient is 89yo F PMHx HTN, HLD, GERD, lymphoma, frequent UTIs, adrenal insuffieciency presents via EMS with c/o posterior headache, nausea, weakness, B/L leg weakness, inability to walk this morning, found to be hypertensive with EMS. BP currently 225/75. Of note in the ER patient found to have hematoma to right shin with redness down the right leg with warmth. Patient denies recent fall or injury, daughter reports when she saw the patient yesterday her leg looked normal. Patient c/o legs feeling swollen, minimal swelling noted. Patient admits to increase dyspnea on exertion. Doppler to RLE negative for DVT. Concern for cellulitis of RLE, patient started on ceftriaxone in ER. CXR concern for fluid overload. Patient to be admitted to telemetry for monitoring of fluid overload, HTN, RLE redness, IV antibiotics. Consult cardiology.     #Hypertensive urgency  - CXR concern for fluid overload  - SBP 220s, HR 60s  - continue home medications  - add IV lasix  - cardiology consult    #CHF exacerbation  - GREENBERG since lasix was discontinued by cardiologist  - continue IV lasix  - ECHO   - cardiology consult    #Hypokalemia  - repeat orally  - trend CMPs    #Adrenal Insufficiency   - continue fludrocortisone    #RLE Cellulitis  - Continue ceftriaxone  - Start oral doxycycline for MRSA coverage  - RLE doppler negative for DVT  - continue to monitor RLE redness    #DVT ppx  - start heparin sq    #Code Status: Full Code Patient is 89yo F PMHx HTN, HLD, GERD, lymphoma, frequent UTIs, adrenal insuffieciency presents via EMS with c/o posterior headache, nausea, weakness, B/L leg weakness, inability to walk this morning, found to be hypertensive with EMS. BP currently 225/75. Of note in the ER patient found to have hematoma to right shin with redness down the right leg with warmth. Patient denies recent fall or injury, daughter reports when she saw the patient yesterday her leg looked normal. Patient c/o legs feeling swollen, minimal swelling noted. Patient admits to increase dyspnea on exertion. Doppler to RLE negative for DVT. Concern for cellulitis of RLE, patient started on ceftriaxone in ER. CXR concern for fluid overload. Patient to be admitted to telemetry for monitoring of CHF exacerbation, HTN, RLE redness, IV antibiotics. Will consult cardiology.     #Hypertensive urgency  - CXR concern for fluid overload  - SBP 220s, HR 60s  - continue home medications  - add IV lasix  - cardiology consult    #CHF exacerbation  - GREENBERG since lasix was discontinued by cardiologist  - continue IV lasix  - ECHO   - cardiology consult    #Hypokalemia  - repeat orally  - trend CMPs    #Adrenal Insufficiency   - continue fludrocortisone    #RLE Cellulitis  - Continue ceftriaxone  - Start oral doxycycline for MRSA coverage  - RLE doppler negative for DVT  - continue to monitor RLE redness    #DVT ppx  - start heparin sq    #Code Status: Full Code

## 2022-03-31 NOTE — ED PROVIDER NOTE - PROGRESS NOTE DETAILS
Attending Grant, pt and family updated on results of tests and plan for admission.  D/w Dr. Lee for admisison

## 2022-03-31 NOTE — PHYSICAL THERAPY INITIAL EVALUATION ADULT - LEVEL OF INDEPENDENCE: SCOOT/BRIDGE, REHAB EVAL
c/o reproducible rib cage pain posterolaterally when pushing off BUEs to scoot fwd in sitting/minimum assist (75% patients effort)

## 2022-03-31 NOTE — CHART NOTE - NSCHARTNOTEFT_GEN_A_CORE
Sw met with Pt and PINEDA An ([phone]) at bedside to provide additional support and assess needs.  Pt is A&Ox4, calm and pleasant. Pt reports she lives at [facility] ([facility]) since Sept 2021 (lived in [place] Pt was independent in ADLs until more recently after fall past 3 weeks where Pt had 24 hr nursing then progressed to 12 hr care/overnight care (from [facility]).  Pt reports she ambulates with rolling walker, if needed she has wheelchair. NO hx of TANG. NO home O2. Pt currently level 4 care at [facility] with total nursing and assistance with ADLs. Pt has supportive family.    Pharm- Long term rx ([facility]) and any other rx can be sent to [facility]  PCP- Dr. Madrid ([facility])    No further needs at this time, Pt to be admitted for continued care. ED team made aware.

## 2022-03-31 NOTE — ED PROVIDER NOTE - MUSCULOSKELETAL, MLM
Spine appears normal, range of motion is not limited, no muscle or joint tenderness, distal n/v/m intact extremities

## 2022-03-31 NOTE — PROGRESS NOTE ADULT - SUBJECTIVE AND OBJECTIVE BOX
ZOEY HERRERA  89y  [id number]    CHIEF COMPLAINT:    INTERVAL HISTORY:    ROS: All other systems reviewed and found to be negative with the exception of what has been described above.    VITALS: T(C): 36.7 (22 @ 07:12), Max: 36.7 (22 @ 06:13)  HR: 63 (22 @ 07:12) (63 - 68)  BP: 239/64 (22 @ 07:12) (178/83 - 239/64)  RR: 16 (22 @ 07:12) (16 - 19)  SpO2: 95% (22 @ 07:12) (95% - 95%)  PHYSICAL EXAM: VITALS:  T(F): 98 (22 @ 07:12), Max: 98 (22 @ 06:13)  HR: 63 (22 @ 07:12) (63 - 68)  BP: 239/64 (22 @ 07:12) (178/83 - 239/64)  RR: 16 (22 @ 07:12) (16 - 19)  SpO2: 95% (22 @ 07:12) (95% - 95%)  Wt(kg): --    I&O's Summary      CAPILLARY BLOOD GLUCOSE          PHYSICAL EXAM:    HEENT:  pupils equal and reactive, EOMI, no oropharyngeal lesions, erythema, exudates, oral thrush  NECK:   supple, no carotid bruits, no palpable lymph nodes, no thyromegaly  CV:  +S1, +S2, regular, no murmurs or rubs  RESP:   lungs clear to auscultation bilaterally, no wheezing, rales, rhonchi, good air entry bilaterally  BREAST:  not examined  GI:  abdomen soft, non-tender, non-distended, normal BS, no bruits, no abdominal masses, no palpable masses  RECTAL:  not examined  :  not examined  MSK:   normal muscle tone, no atrophy, no rigidity, no contractions  EXT:  no clubbing, no cyanosis, no edema, no calf pain, swelling or erythema  VASCULAR:  pulses equal and symmetric in the upper and lower extremities  NEURO:  AAOX3, no focal neurological deficits, follows all commands, able to move extremities spontaneously  SKIN:  no ulcers, lesions or rashes    LABS:                            12.1   5.29  )-----------( 166      ( 31 Mar 2022 06:25 )             37.4         138  |  102  |  18  ----------------------------<  80  3.3<L>   |  29  |  1.14    Ca    9.4      31 Mar 2022 06:25    TPro  6.6  /  Alb  3.1<L>  /  TBili  0.5  /  DBili  x   /  AST  41<H>  /  ALT  31  /  AlkPhos  86          LIVER FUNCTIONS - ( 31 Mar 2022 06:25 )  Alb: 3.1 g/dL / Pro: 6.6 gm/dL / ALK PHOS: 86 U/L / ALT: 31 U/L / AST: 41 U/L / GGT: x           PT/INR - ( 31 Mar 2022 06:25 )   PT: 10.7 sec;   INR: 0.92 ratio         PTT - ( 31 Mar 2022 06:25 )  PTT:27.0 sec  Urinalysis Basic - ( 31 Mar 2022 06:25 )    Color: Yellow / Appearance: Clear / S.005 / pH: x  Gluc: x / Ketone: Negative  / Bili: Negative / Urobili: Negative   Blood: x / Protein: 100 / Nitrite: Negative   Leuk Esterase: Negative / RBC: x / WBC x   Sq Epi: x / Non Sq Epi: x / Bacteria: x        Lactate, Blood: 0.9 mmol/L ( @ 06:25)    Blood, Urine: Negative ( @ 06:25)                            MICRO:  Blood, Urine: Negative ( @ 06:25)      IMAGING:    CARDIAC TESTING:    PROCEDURES:    MEDS: MEDICATIONS  (STANDING):    MEDICATIONS  (PRN):  acetaminophen     Tablet .. 650 milliGRAM(s) Oral every 6 hours PRN Mild Pain (1 - 3)  ondansetron Injectable 4 milliGRAM(s) IV Push every 6 hours PRN Nausea and/or Vomiting          Code status:   DVT px:  Dispo: ZOEY HERRERA  89y  [id number]    CHIEF COMPLAINT: high blood pressure, right leg redness    INTERVAL HISTORY: Patient AOx3, daughter at bedside, presented from [facility] via EMS with c/o headache, nausea, weakness, B/L leg weakness and swelling this morning while trying to ambulate with walker. Patient found to be hypertensive SBP 230s. Patient RLE noted to have hematoma to shin and redness spreading down shin, patient denies recent injury or fall. Daughter reports she saw patient yesterday and her legs did not have redness. Patient denies pain to leg, c/o swelling to legs. Patient reports to have chronic back pain. Patient reports relief of headache and nausea now. Patient denies CP, SOB, abdominal pain, N/V/D, urinary complaints.     ROS: All other systems reviewed and found to be negative with the exception of what has been described above.    VITALS: T(C): 36.7 (22 @ 07:12), Max: 36.7 (22 @ 06:13)  HR: 63 (22 @ 07:12) (63 - 68)  BP: 239/64 (22 @ 07:12) (178/83 - 239/64)  RR: 16 (22 @ 07:12) (16 - 19)  SpO2: 95% (22 @ 07:12) (95% - 95%)  PHYSICAL EXAM: VITALS:  T(F): 98 (22 @ 07:12), Max: 98 (22 @ 06:13)  HR: 63 (22 @ 07:12) (63 - 68)  BP: 239/64 (22 @ 07:12) (178/83 - 239/64)  RR: 16 (22 @ 07:12) (16 - 19)  SpO2: 95% (22 @ 07:12) (95% - 95%)  Wt(kg): --    I&O's Summary      CAPILLARY BLOOD GLUCOSE          PHYSICAL EXAM:  GENERAL: frail appearing elderly female  HEENT:  pupils equal and reactive, EOMI, no oropharyngeal lesions, erythema, exudates, oral thrush  NECK:   supple, no carotid bruits, no palpable lymph nodes, no thyromegaly  CV:  +S1, +S2, regular, no murmurs or rubs  RESP:   lungs clear to auscultation bilaterally, no wheezing, rales, rhonchi, good air entry bilaterally  BREAST:  not examined  GI:  abdomen soft, non-tender, non-distended, normal BS, no bruits, no abdominal masses, no palpable masses  RECTAL:  not examined  :  not examined  MSK:   normal muscle tone, no atrophy, no rigidity, no contractions  EXT:  minimal swelling to B/L lower extremities no clubbing, no cyanosis  VASCULAR:  pulses equal and symmetric in the upper and lower extremities  NEURO:  AAOX3, no focal neurological deficits, follows all commands, able to move extremities spontaneously  SKIN:  hematoma with redness down anterior RLE, warm to touch    LABS:                            12.1   5.29  )-----------( 166      ( 31 Mar 2022 06:25 )             37.4         138  |  102  |  18  ----------------------------<  80  3.3<L>   |  29  |  1.14    Ca    9.4      31 Mar 2022 06:25    TPro  6.6  /  Alb  3.1<L>  /  TBili  0.5  /  DBili  x   /  AST  41<H>  /  ALT    /  AlkPhos  86          LIVER FUNCTIONS - ( 31 Mar 2022 06:25 )  Alb: 3.1 g/dL / Pro: 6.6 gm/dL / ALK PHOS: 86 U/L / ALT: 31 U/L / AST: 41 U/L / GGT: x           PT/INR - ( 31 Mar 2022 06:25 )   PT: 10.7 sec;   INR: 0.92 ratio         PTT - ( 31 Mar 2022 06:25 )  PTT:27.0 sec  Urinalysis Basic - ( 31 Mar 2022 06:25 )    Color: Yellow / Appearance: Clear / S.005 / pH: x  Gluc: x / Ketone: Negative  / Bili: Negative / Urobili: Negative   Blood: x / Protein: 100 / Nitrite: Negative   Leuk Esterase: Negative / RBC: x / WBC x   Sq Epi: x / Non Sq Epi: x / Bacteria: x        Lactate, Blood: 0.9 mmol/L ( @ 06:25)    Blood, Urine: Negative ( @ 06:25)                            MICRO:  Blood, Urine: Negative ( @ 06:25)      IMAGING:    CARDIAC TESTING:    PROCEDURES:    MEDS: MEDICATIONS  (STANDING):    MEDICATIONS  (PRN):  acetaminophen     Tablet .. 650 milliGRAM(s) Oral every 6 hours PRN Mild Pain (1 - 3)  ondansetron Injectable 4 milliGRAM(s) IV Push every 6 hours PRN Nausea and/or Vomiting          Code status: Full Code   DVT px:  Dispo: ZOEY HERRERA  89y  [id number]    CHIEF COMPLAINT: high blood pressure, right leg redness    INTERVAL HISTORY: Patient AOx3, daughter at bedside, presented from [facility] via EMS with c/o headache, nausea, weakness, B/L leg weakness and swelling this morning while trying to ambulate with walker. Patient found to be hypertensive SBP 230s. Patient RLE noted to have hematoma to shin and redness spreading down shin, patient denies recent injury or fall. Daughter reports she saw patient yesterday and her legs did not have redness. Patient denies pain to leg, c/o swelling to legs. Patient reports to have chronic back pain. Patient reports relief of headache and nausea now. Patient denies fevers, chills, sick contacts, CP, SOB, abdominal pain, N/V/D, urinary complaints.     ROS: All other systems reviewed and found to be negative with the exception of what has been described above.    VITALS: T(C): 36.7 (22 @ 07:12), Max: 36.7 (22 @ 06:13)  HR: 63 (22 @ 07:12) (63 - 68)  BP: 239/64 (22 @ 07:12) (178/83 - 239/64)  RR: 16 (22 @ 07:12) (16 - 19)  SpO2: 95% (22 @ 07:12) (95% - 95%)  PHYSICAL EXAM: VITALS:  T(F): 98 (22 @ 07:12), Max: 98 (22 @ 06:13)  HR: 63 (22 @ 07:12) (63 - 68)  BP: 239/64 (22 @ 07:12) (178/83 - 239/64)  RR: 16 (22 @ 07:12) (16 - 19)  SpO2: 95% (22 @ 07:12) (95% - 95%)  Wt(kg): --    I&O's Summary      CAPILLARY BLOOD GLUCOSE          PHYSICAL EXAM:  GENERAL: frail appearing elderly female  HEENT:  pupils equal and reactive, EOMI, no oropharyngeal lesions, erythema, exudates, oral thrush  NECK:   supple, no carotid bruits, no palpable lymph nodes, no thyromegaly  CV:  +S1, +S2, regular, no murmurs or rubs  RESP:   lungs clear to auscultation bilaterally, no wheezing, rales, rhonchi, good air entry bilaterally  BREAST:  not examined  GI:  abdomen soft, non-tender, non-distended, normal BS, no bruits, no abdominal masses, no palpable masses  RECTAL:  not examined  :  not examined  MSK:   normal muscle tone, no atrophy, no rigidity, no contractions  EXT:  minimal swelling to B/L lower extremities no clubbing, no cyanosis  VASCULAR:  pulses equal and symmetric in the upper and lower extremities  NEURO:  AAOX3, no focal neurological deficits, follows all commands, able to move extremities spontaneously  SKIN:  hematoma with redness down anterior RLE, warm to touch    LABS:                            12.1   5.29  )-----------( 166      ( 31 Mar 2022 06:25 )             37.4         138  |  102  |  18  ----------------------------<  80  3.3<L>   |  29  |  1.14    Ca    9.4      31 Mar 2022 06:25    TPro  6.6  /  Alb  3.1<L>  /  TBili  0.5  /  DBili  x   /  AST  41<H>  /  ALT  31  /  AlkPhos  86          LIVER FUNCTIONS - ( 31 Mar 2022 06:25 )  Alb: 3.1 g/dL / Pro: 6.6 gm/dL / ALK PHOS: 86 U/L / ALT: 31 U/L / AST: 41 U/L / GGT: x           PT/INR - ( 31 Mar 2022 06:25 )   PT: 10.7 sec;   INR: 0.92 ratio         PTT - ( 31 Mar 2022 06:25 )  PTT:27.0 sec  Urinalysis Basic - ( 31 Mar 2022 06:25 )    Color: Yellow / Appearance: Clear / S.005 / pH: x  Gluc: x / Ketone: Negative  / Bili: Negative / Urobili: Negative   Blood: x / Protein: 100 / Nitrite: Negative   Leuk Esterase: Negative / RBC: x / WBC x   Sq Epi: x / Non Sq Epi: x / Bacteria: x        Lactate, Blood: 0.9 mmol/L ( @ 06:25)    Blood, Urine: Negative ( @ 06:25)                            MICRO:  Blood, Urine: Negative ( @ 06:25)      IMAGING:    CARDIAC TESTING:    PROCEDURES:    MEDS: MEDICATIONS  (STANDING):    MEDICATIONS  (PRN):  acetaminophen     Tablet .. 650 milliGRAM(s) Oral every 6 hours PRN Mild Pain (1 - 3)  ondansetron Injectable 4 milliGRAM(s) IV Push every 6 hours PRN Nausea and/or Vomiting          Code status: Full Code   DVT px:  Dispo: ZOEY HERRERA  89y  [id number]    CHIEF COMPLAINT: high blood pressure, right leg redness    INTERVAL HISTORY: Patient AOx3, daughter at bedside, presented from [facility] via EMS with c/o headache, nausea, weakness, B/L leg weakness and swelling this morning while trying to ambulate with walker. Patient found to be hypertensive SBP 230s. Patient RLE noted to have hematoma to shin and redness spreading down shin, patient denies recent injury or fall. Daughter reports she saw patient yesterday and her legs did not have redness. Patient denies pain to leg, c/o swelling to legs. Patient admits to increasing dyspnea on exertion. Patient reports to have chronic back pain. Patient reports relief of headache and nausea now. Patient denies fevers, chills, sick contacts, CP, abdominal pain, N/V/D, urinary complaints.     ROS: All other systems reviewed and found to be negative with the exception of what has been described above.    VITALS: T(C): 36.7 (22 @ 07:12), Max: 36.7 (22 @ 06:13)  HR: 63 (22 @ 07:12) (63 - 68)  BP: 239/64 (22 @ 07:12) (178/83 - 239/64)  RR: 16 (22 @ 07:12) (16 - 19)  SpO2: 95% (22 @ 07:12) (95% - 95%)  PHYSICAL EXAM: VITALS:  T(F): 98 (22 @ 07:12), Max: 98 (22 @ 06:13)  HR: 63 (22 @ 07:12) (63 - 68)  BP: 239/64 (22 @ 07:12) (178/83 - 239/64)  RR: 16 (22 @ 07:12) (16 - 19)  SpO2: 95% (22 @ 07:12) (95% - 95%)  Wt(kg): --    I&O's Summary      CAPILLARY BLOOD GLUCOSE          PHYSICAL EXAM:  GENERAL: frail appearing elderly female  HEENT:  pupils equal and reactive, EOMI, no oropharyngeal lesions, erythema, exudates, oral thrush  NECK:   supple, no carotid bruits, no palpable lymph nodes, no thyromegaly  CV:  +S1, +S2, regular, no murmurs or rubs  RESP:   lungs clear to auscultation bilaterally, no wheezing, rales, rhonchi, good air entry bilaterally  BREAST:  not examined  GI:  abdomen soft, non-tender, non-distended, normal BS, no bruits, no abdominal masses, no palpable masses  RECTAL:  not examined  :  not examined  MSK:   normal muscle tone, no atrophy, no rigidity, no contractions  EXT:  minimal swelling to B/L lower extremities no clubbing, no cyanosis  VASCULAR:  pulses equal and symmetric in the upper and lower extremities  NEURO:  AAOX3, no focal neurological deficits, follows all commands, able to move extremities spontaneously  SKIN:  hematoma with redness down anterior RLE, warm to touch    LABS:                            12.1   5.29  )-----------( 166      ( 31 Mar 2022 06:25 )             37.4         138  |  102  |  18  ----------------------------<  80  3.3<L>   |  29  |  1.14    Ca    9.4      31 Mar 2022 06:25    TPro  6.6  /  Alb  3.1<L>  /  TBili  0.5  /  DBili  x   /  AST  41<H>  /  ALT  31  /  AlkPhos  86          LIVER FUNCTIONS - ( 31 Mar 2022 06:25 )  Alb: 3.1 g/dL / Pro: 6.6 gm/dL / ALK PHOS: 86 U/L / ALT: 31 U/L / AST: 41 U/L / GGT: x           PT/INR - ( 31 Mar 2022 06:25 )   PT: 10.7 sec;   INR: 0.92 ratio         PTT - ( 31 Mar 2022 06:25 )  PTT:27.0 sec  Urinalysis Basic - ( 31 Mar 2022 06:25 )    Color: Yellow / Appearance: Clear / S.005 / pH: x  Gluc: x / Ketone: Negative  / Bili: Negative / Urobili: Negative   Blood: x / Protein: 100 / Nitrite: Negative   Leuk Esterase: Negative / RBC: x / WBC x   Sq Epi: x / Non Sq Epi: x / Bacteria: x        Lactate, Blood: 0.9 mmol/L ( @ 06:25)    Blood, Urine: Negative ( @ 06:25)                            MICRO:  Blood, Urine: Negative ( @ 06:25)      IMAGING:    ACC: [id number] EXAM:  US DPLX LWR EXT VEINS LTD RT                          PROCEDURE DATE:  2022          INTERPRETATION:  CLINICAL INFORMATION: Right lower extremity swelling.    COMPARISON: None available.    TECHNIQUE: Duplex sonography of the RIGHT LOWER extremity veins with   color and spectral Doppler, with and without compression.    FINDINGS:    There is normal compressibility of the right common femoral, femoral and   popliteal veins.  The contralateral common femoral vein is patent.  Doppler examination shows normal spontaneous and phasic flow.    No calf vein thrombosis is detected.    IMPRESSION:  No evidence of right lower extremity deep venous thrombosis.          --- End of Report ---    CARDIAC TESTING:    PROCEDURES:    MEDS: MEDICATIONS  (STANDING):    MEDICATIONS  (PRN):  acetaminophen     Tablet .. 650 milliGRAM(s) Oral every 6 hours PRN Mild Pain (1 - 3)  ondansetron Injectable 4 milliGRAM(s) IV Push every 6 hours PRN Nausea and/or Vomiting          Code status: Full Code   DVT px: heparin sq  Dispo: ZOEY HERRERA  89y  [id number]    CHIEF COMPLAINT: high blood pressure, right leg redness    INTERVAL HISTORY: Patient AOx3, daughter at bedside, presented from [facility] via EMS with c/o headache, nausea, weakness, B/L leg weakness and swelling this morning while trying to ambulate with walker. Patient found to be hypertensive SBP 230s. Patient RLE noted to have hematoma to shin and redness spreading down shin, patient denies recent injury or fall. Daughter reports she saw patient yesterday and her legs did not have redness. Patient denies pain to leg, c/o swelling to legs. Patient admits to increasing dyspnea on exertion. Patient reports to have chronic back pain. Patient reports relief of headache and nausea now. Patient denies fevers, chills, sick contacts, CP, abdominal pain, N/V/D, urinary complaints.     ROS: All other systems reviewed and found to be negative with the exception of what has been described above.    VITALS: T(C): 36.7 (22 @ 07:12), Max: 36.7 (22 @ 06:13)  HR: 63 (22 @ 07:12) (63 - 68)  BP: 239/64 (22 @ 07:12) (178/83 - 239/64)  RR: 16 (22 @ 07:12) (16 - 19)  SpO2: 95% (22 @ 07:12) (95% - 95%)  PHYSICAL EXAM: VITALS:  T(F): 98 (22 @ 07:12), Max: 98 (22 @ 06:13)  HR: 63 (22 @ 07:12) (63 - 68)  BP: 239/64 (22 @ 07:12) (178/83 - 239/64)  RR: 16 (22 @ 07:12) (16 - 19)  SpO2: 95% (22 @ 07:12) (95% - 95%)  Wt(kg): --    I&O's Summary      CAPILLARY BLOOD GLUCOSE          PHYSICAL EXAM:  GENERAL: frail appearing elderly female  HEENT:  pupils equal and reactive, EOMI, no oropharyngeal lesions, erythema, exudates, oral thrush  NECK:   supple, no carotid bruits, no palpable lymph nodes, no thyromegaly  CV:  +S1, +S2, regular, no murmurs or rubs  RESP:   lungs clear to auscultation bilaterally, no wheezing, rales, rhonchi, good air entry bilaterally  BREAST:  not examined  GI:  abdomen soft, non-tender, non-distended, normal BS, no bruits, no abdominal masses, no palpable masses  RECTAL:  not examined  :  not examined  MSK:   normal muscle tone, no atrophy, no rigidity, no contractions  EXT:  minimal swelling to B/L lower extremities no clubbing, no cyanosis  VASCULAR:  pulses equal and symmetric in the upper and lower extremities  NEURO:  AAOX3, no focal neurological deficits, follows all commands, able to move extremities spontaneously  SKIN:  hematoma with redness down anterior RLE, warm to touch    LABS:                            12.1   5.29  )-----------( 166      ( 31 Mar 2022 06:25 )             37.4         138  |  102  |  18  ----------------------------<  80  3.3<L>   |  29  |  1.14    Ca    9.4      31 Mar 2022 06:25    TPro  6.6  /  Alb  3.1<L>  /  TBili  0.5  /  DBili  x   /  AST  41<H>  /  ALT  31  /  AlkPhos  86          LIVER FUNCTIONS - ( 31 Mar 2022 06:25 )  Alb: 3.1 g/dL / Pro: 6.6 gm/dL / ALK PHOS: 86 U/L / ALT: 31 U/L / AST: 41 U/L / GGT: x           PT/INR - ( 31 Mar 2022 06:25 )   PT: 10.7 sec;   INR: 0.92 ratio         PTT - ( 31 Mar 2022 06:25 )  PTT:27.0 sec  Urinalysis Basic - ( 31 Mar 2022 06:25 )    Color: Yellow / Appearance: Clear / S.005 / pH: x  Gluc: x / Ketone: Negative  / Bili: Negative / Urobili: Negative   Blood: x / Protein: 100 / Nitrite: Negative   Leuk Esterase: Negative / RBC: x / WBC x   Sq Epi: x / Non Sq Epi: x / Bacteria: x        Lactate, Blood: 0.9 mmol/L ( @ 06:25)    Blood, Urine: Negative ( @ 06:25)                            MICRO:  Blood, Urine: Negative ( @ 06:25)      IMAGING:    ACC: [id number] EXAM:  US DPLX LWR EXT VEINS LTD RT                          PROCEDURE DATE:  2022          INTERPRETATION:  CLINICAL INFORMATION: Right lower extremity swelling.    COMPARISON: None available.    TECHNIQUE: Duplex sonography of the RIGHT LOWER extremity veins with   color and spectral Doppler, with and without compression.    FINDINGS:    There is normal compressibility of the right common femoral, femoral and   popliteal veins.  The contralateral common femoral vein is patent.  Doppler examination shows normal spontaneous and phasic flow.    No calf vein thrombosis is detected.    IMPRESSION:  No evidence of right lower extremity deep venous thrombosis.          --- End of Report ---    CARDIAC TESTING:    PROCEDURES:    MEDS: MEDICATIONS  (STANDING):  aMIOdarone    Tablet 200 milliGRAM(s) Oral daily  atorvastatin 10 milliGRAM(s) Oral at bedtime  dexamethasone/neomycin/polymyxin B Ophthalmic Ointment - Peds 1 Application(s) Both EYES at bedtime  erythromycin   Ointment 1 Application(s) Right EYE two times a day  famotidine    Tablet 20 milliGRAM(s) Oral two times a day  fludroCORTISONE 0.1 milliGRAM(s) Oral daily  hydrALAZINE 50 milliGRAM(s) Oral two times a day  hydrocortisone 5 milliGRAM(s) Oral daily  hydrocortisone 10 milliGRAM(s) Oral daily  losartan 50 milliGRAM(s) Oral daily  metoprolol succinate ER 25 milliGRAM(s) Oral daily  ofloxacin 0.3% Ophthalmic Solution - Peds 1 Drop(s) Right EYE two times a day  sodium chloride 1 Gram(s) Oral daily    MEDICATIONS  (PRN):  acetaminophen     Tablet .. 650 milliGRAM(s) Oral every 6 hours PRN Mild Pain (1 - 3)  ondansetron Injectable 4 milliGRAM(s) IV Push every 6 hours PRN Nausea and/or Vomiting              Code status: Full Code   DVT px: heparin sq  Dispo:

## 2022-04-01 LAB
ANION GAP SERPL CALC-SCNC: 3 MMOL/L — LOW (ref 5–17)
BUN SERPL-MCNC: 24 MG/DL — HIGH (ref 7–23)
CALCIUM SERPL-MCNC: 9.1 MG/DL — SIGNIFICANT CHANGE UP (ref 8.5–10.1)
CHLORIDE SERPL-SCNC: 105 MMOL/L — SIGNIFICANT CHANGE UP (ref 96–108)
CO2 SERPL-SCNC: 30 MMOL/L — SIGNIFICANT CHANGE UP (ref 22–31)
CREAT SERPL-MCNC: 1.28 MG/DL — SIGNIFICANT CHANGE UP (ref 0.5–1.3)
CULTURE RESULTS: SIGNIFICANT CHANGE UP
EGFR: 38 ML/MIN/1.73M2 — LOW
GLUCOSE SERPL-MCNC: 97 MG/DL — SIGNIFICANT CHANGE UP (ref 70–99)
HCT VFR BLD CALC: 32.4 % — LOW (ref 34.5–45)
HGB BLD-MCNC: 10.5 G/DL — LOW (ref 11.5–15.5)
MCHC RBC-ENTMCNC: 31.3 PG — SIGNIFICANT CHANGE UP (ref 27–34)
MCHC RBC-ENTMCNC: 32.4 GM/DL — SIGNIFICANT CHANGE UP (ref 32–36)
MCV RBC AUTO: 96.7 FL — SIGNIFICANT CHANGE UP (ref 80–100)
PLATELET # BLD AUTO: 141 K/UL — LOW (ref 150–400)
POTASSIUM SERPL-MCNC: 4.9 MMOL/L — SIGNIFICANT CHANGE UP (ref 3.5–5.3)
POTASSIUM SERPL-SCNC: 4.9 MMOL/L — SIGNIFICANT CHANGE UP (ref 3.5–5.3)
RBC # BLD: 3.35 M/UL — LOW (ref 3.8–5.2)
RBC # FLD: 15.9 % — HIGH (ref 10.3–14.5)
SODIUM SERPL-SCNC: 138 MMOL/L — SIGNIFICANT CHANGE UP (ref 135–145)
SPECIMEN SOURCE: SIGNIFICANT CHANGE UP
WBC # BLD: 5.61 K/UL — SIGNIFICANT CHANGE UP (ref 3.8–10.5)
WBC # FLD AUTO: 5.61 K/UL — SIGNIFICANT CHANGE UP (ref 3.8–10.5)

## 2022-04-01 PROCEDURE — 99233 SBSQ HOSP IP/OBS HIGH 50: CPT

## 2022-04-01 RX ORDER — LOSARTAN POTASSIUM 100 MG/1
100 TABLET, FILM COATED ORAL DAILY
Refills: 0 | Status: DISCONTINUED | OUTPATIENT
Start: 2022-04-01 | End: 2022-04-04

## 2022-04-01 RX ORDER — CARVEDILOL PHOSPHATE 80 MG/1
6.25 CAPSULE, EXTENDED RELEASE ORAL EVERY 12 HOURS
Refills: 0 | Status: DISCONTINUED | OUTPATIENT
Start: 2022-04-01 | End: 2022-04-02

## 2022-04-01 RX ORDER — HYDRALAZINE HCL 50 MG
10 TABLET ORAL EVERY 4 HOURS
Refills: 0 | Status: DISCONTINUED | OUTPATIENT
Start: 2022-04-01 | End: 2022-04-04

## 2022-04-01 RX ORDER — HYDRALAZINE HCL 50 MG
10 TABLET ORAL EVERY 4 HOURS
Refills: 0 | Status: DISCONTINUED | OUTPATIENT
Start: 2022-04-01 | End: 2022-04-01

## 2022-04-01 RX ADMIN — Medication 1 APPLICATION(S): at 09:04

## 2022-04-01 RX ADMIN — HEPARIN SODIUM 5000 UNIT(S): 5000 INJECTION INTRAVENOUS; SUBCUTANEOUS at 22:09

## 2022-04-01 RX ADMIN — HEPARIN SODIUM 5000 UNIT(S): 5000 INJECTION INTRAVENOUS; SUBCUTANEOUS at 09:05

## 2022-04-01 RX ADMIN — Medication 1 DROP(S): at 09:04

## 2022-04-01 RX ADMIN — Medication 100 MILLIGRAM(S): at 22:08

## 2022-04-01 RX ADMIN — SODIUM CHLORIDE 1 GRAM(S): 9 INJECTION INTRAMUSCULAR; INTRAVENOUS; SUBCUTANEOUS at 09:05

## 2022-04-01 RX ADMIN — FLUDROCORTISONE ACETATE 0.05 MILLIGRAM(S): 0.1 TABLET ORAL at 05:33

## 2022-04-01 RX ADMIN — Medication 50 MILLIGRAM(S): at 09:03

## 2022-04-01 RX ADMIN — LOSARTAN POTASSIUM 100 MILLIGRAM(S): 100 TABLET, FILM COATED ORAL at 10:19

## 2022-04-01 RX ADMIN — AMIODARONE HYDROCHLORIDE 200 MILLIGRAM(S): 400 TABLET ORAL at 09:03

## 2022-04-01 RX ADMIN — CEFTRIAXONE 100 MILLIGRAM(S): 500 INJECTION, POWDER, FOR SOLUTION INTRAMUSCULAR; INTRAVENOUS at 10:18

## 2022-04-01 RX ADMIN — Medication 1 APPLICATION(S): at 22:10

## 2022-04-01 RX ADMIN — Medication 50 MILLIGRAM(S): at 22:09

## 2022-04-01 RX ADMIN — Medication 1 DROP(S): at 22:10

## 2022-04-01 RX ADMIN — Medication 40 MILLIGRAM(S): at 09:04

## 2022-04-01 RX ADMIN — CARVEDILOL PHOSPHATE 6.25 MILLIGRAM(S): 80 CAPSULE, EXTENDED RELEASE ORAL at 22:23

## 2022-04-01 RX ADMIN — Medication 25 MILLIGRAM(S): at 09:03

## 2022-04-01 RX ADMIN — ATORVASTATIN CALCIUM 10 MILLIGRAM(S): 80 TABLET, FILM COATED ORAL at 22:09

## 2022-04-01 RX ADMIN — Medication 100 MILLIGRAM(S): at 09:03

## 2022-04-01 RX ADMIN — Medication 1 APPLICATION(S): at 22:08

## 2022-04-01 RX ADMIN — Medication 5 MILLIGRAM(S): at 22:08

## 2022-04-01 RX ADMIN — LOSARTAN POTASSIUM 50 MILLIGRAM(S): 100 TABLET, FILM COATED ORAL at 09:05

## 2022-04-01 RX ADMIN — Medication 10 MILLIGRAM(S): at 16:57

## 2022-04-01 RX ADMIN — FAMOTIDINE 20 MILLIGRAM(S): 10 INJECTION INTRAVENOUS at 13:48

## 2022-04-01 RX ADMIN — Medication 10 MILLIGRAM(S): at 09:04

## 2022-04-01 RX ADMIN — Medication 10 MILLIGRAM(S): at 20:19

## 2022-04-01 NOTE — DIETITIAN INITIAL EVALUATION ADULT. - OTHER INFO
89 y/o female with history of lymphoma, HTN, Hyponatremia, adrenal insufficiency presents to the ED with complaints of weakness and sob.  Patient reports that over the past few days she has been experiencing [place]EN[place] and orthopnea, however denies chest pain. Also has noted that   has increased swelling in bilateral LE. Of note she was recently started on florinef for adrenal insufficiency for worsening dizziness and orthostatics.  She was seen recently by her cardiologist who stopped the lasix, patient is unsure why. Currently she denies any chest pain or sob but did have  some nausea and headache upon arrival to the ED, blood pressure at that time 230/70    Pt seen for assessment and noted as thin, muscle wasting and weight loss. Pt overall has a fair appetite and is enjoying the food at the facility, but reports decreased intake. Given degree of muscle and fat wasting pt meets criteria for protein-calorie malnutrition. Pt agreeable to ensure enlive in the hospital. Encourage pt to continue at [place].

## 2022-04-01 NOTE — DIETITIAN INITIAL EVALUATION ADULT. - ORAL INTAKE PTA/DIET HISTORY
Pt seen for assessment 2/2 to HF admission. Pt is thin and admit weight incorrect (bedscale:55.9kg; past weight appear around 47kg-50kg). Pt shows clear signs of muscle and fat wasting. Pt states her appetite is fair, but the food at the facility ([facility]) isn't great. Pt feels she has lost weight due to the food options and quality of the food. Pt denies c/s difficulty and denies n/v/d/c. Pt denies food allergies. Per pt she has lost about 7lbs.

## 2022-04-01 NOTE — DIETITIAN INITIAL EVALUATION ADULT. - PERTINENT MEDS FT
MEDICATIONS  (STANDING):  aMIOdarone    Tablet 200 milliGRAM(s) Oral daily  atorvastatin 10 milliGRAM(s) Oral at bedtime  cefTRIAXone   IVPB 1000 milliGRAM(s) IV Intermittent every 24 hours  dexamethasone/neomycin/polymyxin B Ophthalmic Ointment - Peds 1 Application(s) Both EYES at bedtime  doxycycline hyclate Capsule 100 milliGRAM(s) Oral every 12 hours  erythromycin   Ointment 1 Application(s) Right EYE two times a day  famotidine    Tablet 20 milliGRAM(s) Oral daily  fludroCORTISONE 0.05 milliGRAM(s) Oral daily  furosemide   Injectable 40 milliGRAM(s) IV Push daily  heparin   Injectable 5000 Unit(s) SubCutaneous every 12 hours  hydrALAZINE 50 milliGRAM(s) Oral two times a day  hydrocortisone 5 milliGRAM(s) Oral at bedtime  hydrocortisone 10 milliGRAM(s) Oral daily  losartan 100 milliGRAM(s) Oral daily  metoprolol succinate ER 25 milliGRAM(s) Oral daily  ofloxacin 0.3% Ophthalmic Solution - Peds 1 Drop(s) Right EYE two times a day  potassium chloride    Tablet ER 40 milliEquivalent(s) Oral every 4 hours  sodium chloride 1 Gram(s) Oral daily    MEDICATIONS  (PRN):  acetaminophen     Tablet .. 650 milliGRAM(s) Oral every 6 hours PRN Mild Pain (1 - 3)  ondansetron Injectable 4 milliGRAM(s) IV Push every 6 hours PRN Nausea and/or Vomiting

## 2022-04-01 NOTE — DIETITIAN NUTRITION RISK NOTIFICATION - TREATMENT: THE FOLLOWING DIET HAS BEEN RECOMMENDED
Diet, Regular:   1500mL Fluid Restriction ([id number])  Low Sodium     Special Instructions for Nursing:  Low Sodium (04-01-22 @ 09:38) [Active]

## 2022-04-01 NOTE — DIETITIAN INITIAL EVALUATION ADULT. - PERTINENT LABORATORY DATA
04-01    138  |  105  |  24<H>  ----------------------------<  97  4.9   |  30  |  1.28    Ca    9.1      01 Apr 2022 06:42    TPro  6.6  /  Alb  3.1<L>  /  TBili  0.5  /  DBili  x   /  AST  41<H>  /  ALT  31  /  AlkPhos  86  03-31

## 2022-04-01 NOTE — PROGRESS NOTE ADULT - SUBJECTIVE AND OBJECTIVE BOX
History of Present Illness:   89 y/o female with history of lymphoma, HTN, Hyponatremia, adrenal insufficiency presents to the ED with complaints of weakness and sob.  Patient reports that over the past few days she has been experiencing GREENBERG and orthopnea, however denies chest pain. Also has noted that   has increased swelling in bilateral LE. Of note she was recently started on florinef for adrenal insufficiency for worsening dizziness and orthostatics.  She was seen recently by her cardiologist who stopped the lasix, patient is unsure why. Currently she denies any chest pain or sob but did have  some nausea and headache upon arrival to the ED, blood pressure at that time 230/70    4.1:  feels better after IV Lasix  less dyspnea, no cp        REVIEW OF SYSTEMS:    CONSTITUTIONAL: No weakness, No fevers or chills  ENT: No ear ache, No sorethroat  NECK: No pain, No stiffness  RESPIRATORY: No cough, No wheezing, No hemoptysis; + dyspnea  CARDIOVASCULAR: No chest pain, No palpitations  GASTROINTESTINAL: No abd pain, No nausea, No vomiting, No hematemesis, No diarrhea or constipation. No melena, No hematochezia.  GENITOURINARY: No dysuria, No  hematuria  NEUROLOGICAL: No diplopia, No paresthesia, No motor dysfunction  MUSCULOSKELETAL: No arthralgia, No myalgia  SKIN: No rashes, or lesions   PSYCH: no anxiety, no suicidal ideation    All other review of systems is negative unless indicated above    Vital Signs Last 24 Hrs  T(C): 37.1 (01 Apr 2022 08:30), Max: 37.1 (01 Apr 2022 08:30)  T(F): 98.7 (01 Apr 2022 08:30), Max: 98.7 (01 Apr 2022 08:30)  HR: 70 (01 Apr 2022 10:12) (63 - 92)  BP: 153/44 (01 Apr 2022 10:12) (133/44 - 194/51)  RR: 19 (01 Apr 2022 10:12) (16 - 19)  SpO2: 95% (01 Apr 2022 10:12) (92% - 95%)    PHYSICAL EXAM:    GENERAL: NAD  HEENT:  NC/AT, EOMI, PERRLA, No scleral icterus, Moist mucous membranes  NECK: Supple, No JVD  CNS:  Alert & Oriented X3, Motor Strength 5/5 B/L upper and lower extremities; DTRs 2+ intact   LUNG: Normal Breath sounds, Clear to auscultation bilaterally, No rales, No rhonchi, No wheezing  HEART: RRR; No murmurs, No rubs  ABDOMEN: +BS, ST/ND/NT  GENITOURINARY: Voiding, Bladder not distended  EXTREMITIES:  Rt shin erythema   MUSCULOSKELTAL: Joints normal ROM, No TTP, No effusion  VAGINAL: deferred  SKIN: no rashes  RECTAL: deferred, not indicated  BREAST: deferred                          10.5   5.61  )-----------( 141      ( 01 Apr 2022 06:42 )             32.4     04-01    138  |  105  |  24<H>  ----------------------------<  97  4.9   |  30  |  1.28    Ca    9.1      01 Apr 2022 06:42    TPro  6.6  /  Alb  3.1<L>  /  TBili  0.5  /  DBili  x   /  AST  41<H>  /  ALT  31  /  AlkPhos  86  03-31    MEDICATIONS  (STANDING):  aMIOdarone    Tablet 200 milliGRAM(s) Oral daily  atorvastatin 10 milliGRAM(s) Oral at bedtime  cefTRIAXone   IVPB 1000 milliGRAM(s) IV Intermittent every 24 hours  dexamethasone/neomycin/polymyxin B Ophthalmic Ointment - Peds 1 Application(s) Both EYES at bedtime  doxycycline hyclate Capsule 100 milliGRAM(s) Oral every 12 hours  erythromycin   Ointment 1 Application(s) Right EYE two times a day  famotidine    Tablet 20 milliGRAM(s) Oral daily  fludroCORTISONE 0.05 milliGRAM(s) Oral daily  furosemide   Injectable 40 milliGRAM(s) IV Push daily  heparin   Injectable 5000 Unit(s) SubCutaneous every 12 hours  hydrALAZINE 50 milliGRAM(s) Oral two times a day  hydrocortisone 5 milliGRAM(s) Oral at bedtime  hydrocortisone 10 milliGRAM(s) Oral daily  losartan 100 milliGRAM(s) Oral daily  metoprolol succinate ER 25 milliGRAM(s) Oral daily  ofloxacin 0.3% Ophthalmic Solution - Peds 1 Drop(s) Right EYE two times a day  potassium chloride    Tablet ER 40 milliEquivalent(s) Oral every 4 hours  sodium chloride 1 Gram(s) Oral daily    MEDICATIONS  (PRN):  acetaminophen     Tablet .. 650 milliGRAM(s) Oral every 6 hours PRN Mild Pain (1 - 3)  ondansetron Injectable 4 milliGRAM(s) IV Push every 6 hours PRN Nausea and/or Vomiting      all labs reviewed  all imaging reviewed      Assessment and Plan:   89 y/o female with history of lymphoma, HTN, Hyponatremia, adrenal insufficiency presents to the ED with complaints of weakness and sob.    # Acute on chronic diastolic CHF exacerbation  - No echo on file  - Worsening GREENBERG and orthopnea  - Check Echo  - Lasix 40 IV daily day#2, f/u K, Mg, Cr  - Cardiology consult      # HTN urgency  - Improved and asymptomatic  - Labile blood pressure at home likely     due to hydrocortisone and florinef  - Continue Metoprolol  - Hydralazine 50 BID  - increase Losartan to 100mg/day    # Right LE Cellulitis  - Continue Rocephin 1 gram daily  - Add doxycycline 100 BID x 7 days  - Doppler LE negative  - Monitor CBC    # Adrenal Insufficiency  - Being followed by Endo as outpatient  - Continue Florinef and hydrocortisone   - Sodium Tab 1/day  - Monitor labs  - Check orthostatics    # Hypokalemia  - Replete and monitor    # HLD  - Atorvastatin    # History of Cardiac Arrhythmia   - Has loop recorder  - Will follow up with Dr. Hagen ( Cardio)     as outpatient  - Continue amiodarone 200 mg daily    # DVT prophylaxis  - Heparin  History of Present Illness:   89 y/o female with history of lymphoma, HTN, Hyponatremia, adrenal insufficiency presents to the ED with complaints of weakness and sob.  Patient reports that over the past few days she has been experiencing GREENBERG and orthopnea, however denies chest pain. Also has noted that   has increased swelling in bilateral LE. Of note she was recently started on florinef for adrenal insufficiency for worsening dizziness and orthostatics.  She was seen recently by her cardiologist who stopped the lasix, patient is unsure why. Currently she denies any chest pain or sob but did have  some nausea and headache upon arrival to the ED, blood pressure at that time 230/70    4.1:  feels better after IV Lasix  less dyspnea, no cp        REVIEW OF SYSTEMS:    CONSTITUTIONAL: No weakness, No fevers or chills  ENT: No ear ache, No sorethroat  NECK: No pain, No stiffness  RESPIRATORY: No cough, No wheezing, No hemoptysis; + dyspnea  CARDIOVASCULAR: No chest pain, No palpitations  GASTROINTESTINAL: No abd pain, No nausea, No vomiting, No hematemesis, No diarrhea or constipation. No melena, No hematochezia.  GENITOURINARY: No dysuria, No  hematuria  NEUROLOGICAL: No diplopia, No paresthesia, No motor dysfunction  MUSCULOSKELETAL: No arthralgia, No myalgia  SKIN: No rashes, or lesions   PSYCH: no anxiety, no suicidal ideation    All other review of systems is negative unless indicated above    Vital Signs Last 24 Hrs  T(C): 37.1 (01 Apr 2022 08:30), Max: 37.1 (01 Apr 2022 08:30)  T(F): 98.7 (01 Apr 2022 08:30), Max: 98.7 (01 Apr 2022 08:30)  HR: 70 (01 Apr 2022 10:12) (63 - 92)  BP: 153/44 (01 Apr 2022 10:12) (133/44 - 194/51)  RR: 19 (01 Apr 2022 10:12) (16 - 19)  SpO2: 95% (01 Apr 2022 10:12) (92% - 95%)    PHYSICAL EXAM:    GENERAL: NAD  HEENT:  NC/AT, EOMI, PERRLA, No scleral icterus, Moist mucous membranes  NECK: Supple, No JVD  CNS:  Alert & Oriented X3, Motor Strength 5/5 B/L upper and lower extremities; DTRs 2+ intact   LUNG: Normal Breath sounds, Clear to auscultation bilaterally, No rales, No rhonchi, No wheezing  HEART: RRR; No murmurs, No rubs  ABDOMEN: +BS, ST/ND/NT  GENITOURINARY: Voiding, Bladder not distended  EXTREMITIES:  Rt shin erythema   MUSCULOSKELTAL: Joints normal ROM, No TTP, No effusion  VAGINAL: deferred  SKIN: no rashes  RECTAL: deferred, not indicated  BREAST: deferred                          10.5   5.61  )-----------( 141      ( 01 Apr 2022 06:42 )             32.4     04-01    138  |  105  |  24<H>  ----------------------------<  97  4.9   |  30  |  1.28    Ca    9.1      01 Apr 2022 06:42    TPro  6.6  /  Alb  3.1<L>  /  TBili  0.5  /  DBili  x   /  AST  41<H>  /  ALT  31  /  AlkPhos  86  03-31    MEDICATIONS  (STANDING):  aMIOdarone    Tablet 200 milliGRAM(s) Oral daily  atorvastatin 10 milliGRAM(s) Oral at bedtime  cefTRIAXone   IVPB 1000 milliGRAM(s) IV Intermittent every 24 hours  dexamethasone/neomycin/polymyxin B Ophthalmic Ointment - Peds 1 Application(s) Both EYES at bedtime  doxycycline hyclate Capsule 100 milliGRAM(s) Oral every 12 hours  erythromycin   Ointment 1 Application(s) Right EYE two times a day  famotidine    Tablet 20 milliGRAM(s) Oral daily  fludroCORTISONE 0.05 milliGRAM(s) Oral daily  furosemide   Injectable 40 milliGRAM(s) IV Push daily  heparin   Injectable 5000 Unit(s) SubCutaneous every 12 hours  hydrALAZINE 50 milliGRAM(s) Oral two times a day  hydrocortisone 5 milliGRAM(s) Oral at bedtime  hydrocortisone 10 milliGRAM(s) Oral daily  losartan 100 milliGRAM(s) Oral daily  metoprolol succinate ER 25 milliGRAM(s) Oral daily  ofloxacin 0.3% Ophthalmic Solution - Peds 1 Drop(s) Right EYE two times a day  potassium chloride    Tablet ER 40 milliEquivalent(s) Oral every 4 hours  sodium chloride 1 Gram(s) Oral daily    MEDICATIONS  (PRN):  acetaminophen     Tablet .. 650 milliGRAM(s) Oral every 6 hours PRN Mild Pain (1 - 3)  ondansetron Injectable 4 milliGRAM(s) IV Push every 6 hours PRN Nausea and/or Vomiting      all labs reviewed  all imaging reviewed      Assessment and Plan:   89 y/o female with history of lymphoma, HTN, Hyponatremia, adrenal insufficiency presents to the ED with complaints of weakness and sob.    # Acute on chronic diastolic CHF exacerbation  - Worsening GREENBERG and orthopnea  - Lasix 40 IV daily day#2, f/u K, Mg, Cr  - Cardiology consult noted      # HTN urgency  - Improved and asymptomatic  - Labile blood pressure at home likely     due to hydrocortisone and florinef  - d/c Toprol, start Coreg   - Hydralazine 50 BID  - increase Losartan to 100mg/day    # Right LE Cellulitis  - doxycycline 100 BID x 7 days  - Doppler LE negative    # Adrenal Insufficiency  - Being followed by Endo as outpatient  - Continue Florinef and hydrocortisone   - Sodium Tab 1/day  - Monitor labs  - Check orthostatics    # Hypokalemia  - Replete and monitor    # HLD  - Atorvastatin    # History of Cardiac Arrhythmia   - Has loop recorder  - Will follow up as outpatient  - Continue amiodarone 200 mg daily, BBlockers     # DVT prophylaxis  - Heparin

## 2022-04-02 LAB
ANION GAP SERPL CALC-SCNC: 4 MMOL/L — LOW (ref 5–17)
BUN SERPL-MCNC: 24 MG/DL — HIGH (ref 7–23)
CALCIUM SERPL-MCNC: 9.4 MG/DL — SIGNIFICANT CHANGE UP (ref 8.5–10.1)
CHLORIDE SERPL-SCNC: 101 MMOL/L — SIGNIFICANT CHANGE UP (ref 96–108)
CO2 SERPL-SCNC: 30 MMOL/L — SIGNIFICANT CHANGE UP (ref 22–31)
CREAT SERPL-MCNC: 1.16 MG/DL — SIGNIFICANT CHANGE UP (ref 0.5–1.3)
EGFR: 43 ML/MIN/1.73M2 — LOW
GLUCOSE SERPL-MCNC: 93 MG/DL — SIGNIFICANT CHANGE UP (ref 70–99)
NT-PROBNP SERPL-SCNC: 2162 PG/ML — HIGH (ref 0–450)
POTASSIUM SERPL-MCNC: 4.1 MMOL/L — SIGNIFICANT CHANGE UP (ref 3.5–5.3)
POTASSIUM SERPL-SCNC: 4.1 MMOL/L — SIGNIFICANT CHANGE UP (ref 3.5–5.3)
SODIUM SERPL-SCNC: 135 MMOL/L — SIGNIFICANT CHANGE UP (ref 135–145)

## 2022-04-02 PROCEDURE — 99232 SBSQ HOSP IP/OBS MODERATE 35: CPT

## 2022-04-02 RX ORDER — HYDRALAZINE HCL 50 MG
100 TABLET ORAL
Refills: 0 | Status: DISCONTINUED | OUTPATIENT
Start: 2022-04-02 | End: 2022-04-04

## 2022-04-02 RX ORDER — CARVEDILOL PHOSPHATE 80 MG/1
12.5 CAPSULE, EXTENDED RELEASE ORAL EVERY 12 HOURS
Refills: 0 | Status: DISCONTINUED | OUTPATIENT
Start: 2022-04-02 | End: 2022-04-04

## 2022-04-02 RX ADMIN — Medication 1 DROP(S): at 21:52

## 2022-04-02 RX ADMIN — Medication 1 APPLICATION(S): at 21:48

## 2022-04-02 RX ADMIN — Medication 5 MILLIGRAM(S): at 21:48

## 2022-04-02 RX ADMIN — LOSARTAN POTASSIUM 100 MILLIGRAM(S): 100 TABLET, FILM COATED ORAL at 10:45

## 2022-04-02 RX ADMIN — Medication 100 MILLIGRAM(S): at 10:44

## 2022-04-02 RX ADMIN — SODIUM CHLORIDE 1 GRAM(S): 9 INJECTION INTRAMUSCULAR; INTRAVENOUS; SUBCUTANEOUS at 10:45

## 2022-04-02 RX ADMIN — Medication 100 MILLIGRAM(S): at 21:49

## 2022-04-02 RX ADMIN — ONDANSETRON 4 MILLIGRAM(S): 8 TABLET, FILM COATED ORAL at 11:52

## 2022-04-02 RX ADMIN — HEPARIN SODIUM 5000 UNIT(S): 5000 INJECTION INTRAVENOUS; SUBCUTANEOUS at 10:46

## 2022-04-02 RX ADMIN — Medication 1 APPLICATION(S): at 10:48

## 2022-04-02 RX ADMIN — HEPARIN SODIUM 5000 UNIT(S): 5000 INJECTION INTRAVENOUS; SUBCUTANEOUS at 21:46

## 2022-04-02 RX ADMIN — Medication 10 MILLIGRAM(S): at 06:24

## 2022-04-02 RX ADMIN — FAMOTIDINE 20 MILLIGRAM(S): 10 INJECTION INTRAVENOUS at 12:14

## 2022-04-02 RX ADMIN — Medication 10 MILLIGRAM(S): at 10:45

## 2022-04-02 RX ADMIN — Medication 10 MILLIGRAM(S): at 01:28

## 2022-04-02 RX ADMIN — Medication 40 MILLIGRAM(S): at 10:47

## 2022-04-02 RX ADMIN — Medication 100 MILLIGRAM(S): at 21:47

## 2022-04-02 RX ADMIN — CARVEDILOL PHOSPHATE 12.5 MILLIGRAM(S): 80 CAPSULE, EXTENDED RELEASE ORAL at 21:46

## 2022-04-02 RX ADMIN — ATORVASTATIN CALCIUM 10 MILLIGRAM(S): 80 TABLET, FILM COATED ORAL at 21:46

## 2022-04-02 RX ADMIN — Medication 100 MILLIGRAM(S): at 10:47

## 2022-04-02 RX ADMIN — CARVEDILOL PHOSPHATE 12.5 MILLIGRAM(S): 80 CAPSULE, EXTENDED RELEASE ORAL at 10:47

## 2022-04-02 RX ADMIN — FLUDROCORTISONE ACETATE 0.05 MILLIGRAM(S): 0.1 TABLET ORAL at 06:25

## 2022-04-02 RX ADMIN — AMIODARONE HYDROCHLORIDE 200 MILLIGRAM(S): 400 TABLET ORAL at 10:46

## 2022-04-02 RX ADMIN — Medication 1 APPLICATION(S): at 21:51

## 2022-04-02 RX ADMIN — Medication 10 MILLIGRAM(S): at 12:11

## 2022-04-02 RX ADMIN — Medication 1 DROP(S): at 10:48

## 2022-04-02 NOTE — PROGRESS NOTE ADULT - SUBJECTIVE AND OBJECTIVE BOX
History of Present Illness:   89 y/o female with history of lymphoma, HTN, Hyponatremia, adrenal insufficiency presents to the ED with complaints of weakness and sob.  Patient reports that over the past few days she has been experiencing GREENBERG and orthopnea, however denies chest pain. Also has noted that   has increased swelling in bilateral LE. Of note she was recently started on florinef for adrenal insufficiency for worsening dizziness and orthostatics.  She was seen recently by her cardiologist who stopped the lasix, patient is unsure why. Currently she denies any chest pain or sob but did have  some nausea and headache upon arrival to the ED, blood pressure at that time 230/70  4/2: Improved SOB. Vomiting x1. Denies fever, chills, chest pain, nausea  less dyspnea, no cp        Review of Systems: 14 Point review of systems reviewed and reported as negative unless otherwise stated above    PHYSICAL EXAM:    T(C): 36.7 (04-02-22 @ 19:44), Max: 37.2 (04-02-22 @ 07:12)  HR: 66 (04-02-22 @ 19:44) (62 - 72)  BP: 142/44 (04-02-22 @ 19:44) (132/48 - 193/49)  RR: 18 (04-02-22 @ 19:44) (17 - 19)  SpO2: 93% (04-02-22 @ 19:44) (93% - 98%)    General: AAOx3; NAD  Head: AT/NC  ENT: Moist Mucous Membranes; No Injury  Eyes: EOMI; PERRL  Neck: Non-tender; No JVD  CVS: RRR, S1&S2, No murmur, No edema  Respiratory: Lungs CTA B/L; Normal Respiratory Effort  Abdomen/GI: Soft, non-tender, non-distended, no guarding, no rebound, normal bowel sounds  : No bladder distention, No Kidd  Extremities: No cyanosis, No clubbing, No edema  MSK: No CVA tenderness, Normal ROM, No injury  Neuro: AAOx3, CNII-XII grossly intact, non-focal  Psych: Appropriate, Cooperative, No depression, No anxiety  Skin: Clean, Dry and Intact                          10.5   5.61  )-----------( 141      ( 01 Apr 2022 06:42 )             32.4     04-02    135  |  101  |  24<H>  ----------------------------<  93  4.1   |  30  |  1.16    Ca    9.4      02 Apr 2022 06:50      COVID-19 PCR: NotDetec (31 Mar 2022 06:10)  COVID-19 PCR: NotDetec (26 Jan 2022 16:49)    CAPILLARY BLOOD GLUCOSE          Culture - Urine (collected 31 Mar 2022 06:25)  Source: Clean Catch Clean Catch (Midstream)  Final Report (01 Apr 2022 11:57):    <10,000 CFU/mL Normal Urogenital Britta    Culture - Blood (collected 31 Mar 2022 06:25)  Source: .Blood Blood-Peripheral  Preliminary Report (01 Apr 2022 12:01):    No growth to date.    Culture - Blood (collected 31 Mar 2022 06:25)  Source: .Blood Blood-Peripheral  Preliminary Report (01 Apr 2022 12:01):    No growth to date.                            10.5   5.61  )-----------( 141      ( 01 Apr 2022 06:42 )             32.4     04-01    138  |  105  |  24<H>  ----------------------------<  97  4.9   |  30  |  1.28    Ca    9.1      01 Apr 2022 06:42    TPro  6.6  /  Alb  3.1<L>  /  TBili  0.5  /  DBili  x   /  AST  41<H>  /  ALT  31  /  AlkPhos  86  03-31    MEDICATIONS  (STANDING):  aMIOdarone    Tablet 200 milliGRAM(s) Oral daily  atorvastatin 10 milliGRAM(s) Oral at bedtime  cefTRIAXone   IVPB 1000 milliGRAM(s) IV Intermittent every 24 hours  dexamethasone/neomycin/polymyxin B Ophthalmic Ointment - Peds 1 Application(s) Both EYES at bedtime  doxycycline hyclate Capsule 100 milliGRAM(s) Oral every 12 hours  erythromycin   Ointment 1 Application(s) Right EYE two times a day  famotidine    Tablet 20 milliGRAM(s) Oral daily  fludroCORTISONE 0.05 milliGRAM(s) Oral daily  furosemide   Injectable 40 milliGRAM(s) IV Push daily  heparin   Injectable 5000 Unit(s) SubCutaneous every 12 hours  hydrALAZINE 50 milliGRAM(s) Oral two times a day  hydrocortisone 5 milliGRAM(s) Oral at bedtime  hydrocortisone 10 milliGRAM(s) Oral daily  losartan 100 milliGRAM(s) Oral daily  metoprolol succinate ER 25 milliGRAM(s) Oral daily  ofloxacin 0.3% Ophthalmic Solution - Peds 1 Drop(s) Right EYE two times a day  potassium chloride    Tablet ER 40 milliEquivalent(s) Oral every 4 hours  sodium chloride 1 Gram(s) Oral daily    MEDICATIONS  (PRN):  acetaminophen     Tablet .. 650 milliGRAM(s) Oral every 6 hours PRN Mild Pain (1 - 3)  ondansetron Injectable 4 milliGRAM(s) IV Push every 6 hours PRN Nausea and/or Vomiting      all labs reviewed  all imaging reviewed      Assessment and Plan:   89 y/o female with history of lymphoma, HTN, Hyponatremia, adrenal insufficiency presents to the ED with complaints of weakness and sob.    # Acute on chronic diastolic CHF exacerbation  - Worsening GREENBERG and orthopnea on admission. Now improved  - Lasix 40 IV daily day#2, f/u K, Mg, Cr  - Cardiology consult noted      # HTN urgency  - Improved and asymptomatic  - Labile blood pressure at home likely     due to hydrocortisone and florinef  - d/c Toprol, start Coreg   - Hydralazine 50 BID  - increase Losartan to 100mg/day    # Right LE Cellulitis  - doxycycline 100 BID x 7 days  - Doppler LE negative    # Adrenal Insufficiency  - Being followed by Endo as outpatient  - Continue Florinef and hydrocortisone   - Sodium Tab 1/day discontinued  - Monitor labs  - Check orthostatics    # Hypokalemia  - Replete and monitor    # HLD  - Atorvastatin    # History of Cardiac Arrhythmia   - Has loop recorder  - Will follow up as outpatient  - Continue amiodarone 200 mg daily, Ritika     # DVT prophylaxis  - Heparin

## 2022-04-02 NOTE — PROGRESS NOTE ADULT - NUTRITIONAL ASSESSMENT
This patient has been assessed with a concern for Malnutrition and has been determined to have a diagnosis/diagnoses of Moderate protein-calorie malnutrition.    This patient is being managed with:   Diet Regular-  1500mL Fluid Restriction ([id number])  Low Sodium     Special Instructions for Nursing:  Low Sodium  Entered: Apr 1 2022  9:38AM

## 2022-04-03 LAB
HCT VFR BLD CALC: 37.6 % — SIGNIFICANT CHANGE UP (ref 34.5–45)
HGB BLD-MCNC: 11.9 G/DL — SIGNIFICANT CHANGE UP (ref 11.5–15.5)
MAGNESIUM SERPL-MCNC: 2.4 MG/DL — SIGNIFICANT CHANGE UP (ref 1.6–2.6)
MCHC RBC-ENTMCNC: 30.6 PG — SIGNIFICANT CHANGE UP (ref 27–34)
MCHC RBC-ENTMCNC: 31.6 GM/DL — LOW (ref 32–36)
MCV RBC AUTO: 96.7 FL — SIGNIFICANT CHANGE UP (ref 80–100)
PHOSPHATE SERPL-MCNC: 4.3 MG/DL — SIGNIFICANT CHANGE UP (ref 2.5–4.5)
PLATELET # BLD AUTO: 171 K/UL — SIGNIFICANT CHANGE UP (ref 150–400)
RBC # BLD: 3.89 M/UL — SIGNIFICANT CHANGE UP (ref 3.8–5.2)
RBC # FLD: 16.3 % — HIGH (ref 10.3–14.5)
WBC # BLD: 6.49 K/UL — SIGNIFICANT CHANGE UP (ref 3.8–10.5)
WBC # FLD AUTO: 6.49 K/UL — SIGNIFICANT CHANGE UP (ref 3.8–10.5)

## 2022-04-03 PROCEDURE — 99233 SBSQ HOSP IP/OBS HIGH 50: CPT

## 2022-04-03 RX ORDER — SODIUM CHLORIDE 9 MG/ML
500 INJECTION INTRAMUSCULAR; INTRAVENOUS; SUBCUTANEOUS
Refills: 0 | Status: DISCONTINUED | OUTPATIENT
Start: 2022-04-03 | End: 2022-04-04

## 2022-04-03 RX ADMIN — CARVEDILOL PHOSPHATE 12.5 MILLIGRAM(S): 80 CAPSULE, EXTENDED RELEASE ORAL at 21:12

## 2022-04-03 RX ADMIN — CARVEDILOL PHOSPHATE 12.5 MILLIGRAM(S): 80 CAPSULE, EXTENDED RELEASE ORAL at 10:50

## 2022-04-03 RX ADMIN — Medication 1 APPLICATION(S): at 11:14

## 2022-04-03 RX ADMIN — ATORVASTATIN CALCIUM 10 MILLIGRAM(S): 80 TABLET, FILM COATED ORAL at 21:14

## 2022-04-03 RX ADMIN — Medication 100 MILLIGRAM(S): at 10:51

## 2022-04-03 RX ADMIN — Medication 1 DROP(S): at 21:13

## 2022-04-03 RX ADMIN — Medication 1 APPLICATION(S): at 21:13

## 2022-04-03 RX ADMIN — LOSARTAN POTASSIUM 100 MILLIGRAM(S): 100 TABLET, FILM COATED ORAL at 10:50

## 2022-04-03 RX ADMIN — FLUDROCORTISONE ACETATE 0.05 MILLIGRAM(S): 0.1 TABLET ORAL at 05:55

## 2022-04-03 RX ADMIN — HEPARIN SODIUM 5000 UNIT(S): 5000 INJECTION INTRAVENOUS; SUBCUTANEOUS at 21:15

## 2022-04-03 RX ADMIN — Medication 1 DROP(S): at 10:53

## 2022-04-03 RX ADMIN — Medication 5 MILLIGRAM(S): at 21:14

## 2022-04-03 RX ADMIN — HEPARIN SODIUM 5000 UNIT(S): 5000 INJECTION INTRAVENOUS; SUBCUTANEOUS at 10:50

## 2022-04-03 RX ADMIN — Medication 40 MILLIGRAM(S): at 10:50

## 2022-04-03 RX ADMIN — Medication 100 MILLIGRAM(S): at 21:13

## 2022-04-03 RX ADMIN — Medication 1 APPLICATION(S): at 21:14

## 2022-04-03 RX ADMIN — AMIODARONE HYDROCHLORIDE 200 MILLIGRAM(S): 400 TABLET ORAL at 10:49

## 2022-04-03 RX ADMIN — FAMOTIDINE 20 MILLIGRAM(S): 10 INJECTION INTRAVENOUS at 11:18

## 2022-04-03 RX ADMIN — Medication 10 MILLIGRAM(S): at 10:49

## 2022-04-03 NOTE — PROGRESS NOTE ADULT - NUTRITIONAL ASSESSMENT
This patient has been assessed with a concern for Malnutrition and has been determined to have a diagnosis/diagnoses of Moderate protein-calorie malnutrition.    This patient is being managed with:   Diet Regular-  1500mL Fluid Restriction ([id number])  Low Sodium     Special Instructions for Nursing:  Low Sodium  Entered: Apr 1 2022  9:38AM

## 2022-04-03 NOTE — PROGRESS NOTE ADULT - SUBJECTIVE AND OBJECTIVE BOX
History of Present Illness:   89 y/o female with history of lymphoma, HTN, Hyponatremia, adrenal insufficiency presents to the ED with complaints of weakness and sob.  Patient reports that over the past few days she has been experiencing GREENBERG and orthopnea, however denies chest pain. Also has noted that   has increased swelling in bilateral LE. Of note she was recently started on florinef for adrenal insufficiency for worsening dizziness and orthostatics.  She was seen recently by her cardiologist who stopped the lasix, patient is unsure why. Currently she denies any chest pain or sob but did have  some nausea and headache upon arrival to the ED, blood pressure at that time 230/70  4/2: Improved SOB. Vomiting x1. Denies fever, chills, chest pain, nausea  4/3: Nausea resolved. BP improved. Patient deneis dizziness, chest pain, SOB          Review of Systems: 14 Point review of systems reviewed and reported as negative unless otherwise stated above    PHYSICAL EXAM:    T(C): 36.9 (04-03-22 @ 07:11), Max: 36.9 (04-03-22 @ 07:11)  HR: 64 (04-03-22 @ 07:11) (62 - 66)  BP: 165/35 (04-03-22 @ 07:11) (132/48 - 165/35)  RR: 19 (04-03-22 @ 07:11) (18 - 19)  SpO2: 95% (04-03-22 @ 07:11) (93% - 95%)  General: AAOx3; NAD  Head: AT/NC  ENT: Moist Mucous Membranes; No Injury  Eyes: EOMI; PERRL  Neck: Non-tender; No JVD  CVS: RRR, S1&S2, No murmur, No edema  Respiratory: Lungs CTA B/L; Normal Respiratory Effort  Abdomen/GI: Soft, non-tender, non-distended, no guarding, no rebound, normal bowel sounds  : No bladder distention, No Kidd  Extremities: No cyanosis, No clubbing, No edema  MSK: No CVA tenderness, Normal ROM, No injury  Neuro: AAOx3, CNII-XII grossly intact, non-focal  Psych: Appropriate, Cooperative, No depression, No anxiety  Skin: Clean, Dry and Intact                                     11.9   6.49  )-----------( 171      ( 03 Apr 2022 10:27 )             37.6     04-03    134<L>  |  98  |  32<H>  ----------------------------<  102<H>  4.3   |  30  |  1.65<H>    Ca    9.8      03 Apr 2022 10:27  Phos  4.3     04-03  Mg     2.4     04-03      COVID-19 PCR: NotDetec (31 Mar 2022 06:10)  COVID-19 PCR: NotDetec (26 Jan 2022 16:49)    CAPILLARY BLOOD GLUCOSE                     10.5   5.61  )-----------( 141      ( 01 Apr 2022 06:42 )             32.4     04-02    135  |  101  |  24<H>  ----------------------------<  93  4.1   |  30  |  1.16    Ca    9.4      02 Apr 2022 06:50      COVID-19 PCR: NotDetec (31 Mar 2022 06:10)  COVID-19 PCR: NotDetec (26 Jan 2022 16:49)    CAPILLARY BLOOD GLUCOSE          Culture - Urine (collected 31 Mar 2022 06:25)  Source: Clean Catch Clean Catch (Midstream)  Final Report (01 Apr 2022 11:57):    <10,000 CFU/mL Normal Urogenital Britta    Culture - Blood (collected 31 Mar 2022 06:25)  Source: .Blood Blood-Peripheral  Preliminary Report (01 Apr 2022 12:01):    No growth to date.    Culture - Blood (collected 31 Mar 2022 06:25)  Source: .Blood Blood-Peripheral  Preliminary Report (01 Apr 2022 12:01):    No growth to date.                            10.5   5.61  )-----------( 141      ( 01 Apr 2022 06:42 )             32.4     04-01    138  |  105  |  24<H>  ----------------------------<  97  4.9   |  30  |  1.28    Ca    9.1      01 Apr 2022 06:42    TPro  6.6  /  Alb  3.1<L>  /  TBili  0.5  /  DBili  x   /  AST  41<H>  /  ALT  31  /  AlkPhos  86  03-31    MEDICATIONS  (STANDING):  aMIOdarone    Tablet 200 milliGRAM(s) Oral daily  atorvastatin 10 milliGRAM(s) Oral at bedtime  cefTRIAXone   IVPB 1000 milliGRAM(s) IV Intermittent every 24 hours  dexamethasone/neomycin/polymyxin B Ophthalmic Ointment - Peds 1 Application(s) Both EYES at bedtime  doxycycline hyclate Capsule 100 milliGRAM(s) Oral every 12 hours  erythromycin   Ointment 1 Application(s) Right EYE two times a day  famotidine    Tablet 20 milliGRAM(s) Oral daily  fludroCORTISONE 0.05 milliGRAM(s) Oral daily  furosemide   Injectable 40 milliGRAM(s) IV Push daily  heparin   Injectable 5000 Unit(s) SubCutaneous every 12 hours  hydrALAZINE 50 milliGRAM(s) Oral two times a day  hydrocortisone 5 milliGRAM(s) Oral at bedtime  hydrocortisone 10 milliGRAM(s) Oral daily  losartan 100 milliGRAM(s) Oral daily  metoprolol succinate ER 25 milliGRAM(s) Oral daily  ofloxacin 0.3% Ophthalmic Solution - Peds 1 Drop(s) Right EYE two times a day  potassium chloride    Tablet ER 40 milliEquivalent(s) Oral every 4 hours  sodium chloride 1 Gram(s) Oral daily    MEDICATIONS  (PRN):  acetaminophen     Tablet .. 650 milliGRAM(s) Oral every 6 hours PRN Mild Pain (1 - 3)  ondansetron Injectable 4 milliGRAM(s) IV Push every 6 hours PRN Nausea and/or Vomiting      all labs reviewed  all imaging reviewed      Assessment and Plan:   89 y/o female with history of lymphoma, HTN, Hyponatremia, adrenal insufficiency presents to the ED with complaints of weakness and sob.    # Acute on chronic diastolic CHF exacerbation  # HI due to diuresis  - Worsening GREENBERG and orthopnea on admission. Now improved  - IV Lasix initially. Discontinue (rise in Cr 4/3): Add 500cc of NS back over 10 hours. Reassess Cr in am.   - Cardiology consult noted      # HTN urgency  - Improved and asymptomatic  - Labile blood pressure at home likely     due to hydrocortisone and florinef  - d/c Toprol,   - Maximize coreg/hydralazine dose  - increase Losartan to 100mg/day. Lbas resulted with Cr elevation after med pass. Re-evaluate in am to decide on discontinuation or not.     # Unlikely RLE Cellulitis  - Doxycycline->Provoked N/V. Reported allergy to tetracyclines  - RLE with bruise/ecchymoses; No warmth, minimal surrounding erythema, non-tender; no surroudning edema. Unlikely cellulitis. Observe off antibiotics.   - Doppler LE negative    # Adrenal Insufficiency  - Being followed by Endo as outpatient  - Continue Florinef and hydrocortisone   - Sodium Tab 1/day discontinued (unclear why on low salt diet, salt tab and diuresis). Maintain low salt diet, lower dose of diuresis and discontinue salt tab.  - Monitor labs  - Check orthostatics    # Hypokalemia  - Replete and monitor    # HLD  - Atorvastatin    # History of Cardiac Arrhythmia   - Has loop recorder  - Will follow up as outpatient  - Continue amiodarone 200 mg daily, Ritika     # DVT prophylaxis  - Heparin

## 2022-04-04 ENCOUNTER — TRANSCRIPTION ENCOUNTER (OUTPATIENT)
Age: 87
End: 2022-04-04

## 2022-04-04 VITALS
DIASTOLIC BLOOD PRESSURE: 40 MMHG | OXYGEN SATURATION: 94 % | TEMPERATURE: 98 F | SYSTOLIC BLOOD PRESSURE: 167 MMHG | RESPIRATION RATE: 19 BRPM | HEART RATE: 67 BPM

## 2022-04-04 LAB
ANION GAP SERPL CALC-SCNC: 6 MMOL/L — SIGNIFICANT CHANGE UP (ref 5–17)
ANION GAP SERPL CALC-SCNC: 6 MMOL/L — SIGNIFICANT CHANGE UP (ref 5–17)
BUN SERPL-MCNC: 37 MG/DL — HIGH (ref 7–23)
BUN SERPL-MCNC: 39 MG/DL — HIGH (ref 7–23)
CALCIUM SERPL-MCNC: 8.5 MG/DL — SIGNIFICANT CHANGE UP (ref 8.5–10.1)
CALCIUM SERPL-MCNC: 9.1 MG/DL — SIGNIFICANT CHANGE UP (ref 8.5–10.1)
CHLORIDE SERPL-SCNC: 100 MMOL/L — SIGNIFICANT CHANGE UP (ref 96–108)
CHLORIDE SERPL-SCNC: 100 MMOL/L — SIGNIFICANT CHANGE UP (ref 96–108)
CO2 SERPL-SCNC: 28 MMOL/L — SIGNIFICANT CHANGE UP (ref 22–31)
CO2 SERPL-SCNC: 29 MMOL/L — SIGNIFICANT CHANGE UP (ref 22–31)
CREAT SERPL-MCNC: 1.43 MG/DL — HIGH (ref 0.5–1.3)
CREAT SERPL-MCNC: 1.58 MG/DL — HIGH (ref 0.5–1.3)
EGFR: 30 ML/MIN/1.73M2 — LOW
EGFR: 33 ML/MIN/1.73M2 — LOW
GLUCOSE SERPL-MCNC: 84 MG/DL — SIGNIFICANT CHANGE UP (ref 70–99)
GLUCOSE SERPL-MCNC: 99 MG/DL — SIGNIFICANT CHANGE UP (ref 70–99)
MAGNESIUM SERPL-MCNC: 2.3 MG/DL — SIGNIFICANT CHANGE UP (ref 1.6–2.6)
POTASSIUM SERPL-MCNC: 3.9 MMOL/L — SIGNIFICANT CHANGE UP (ref 3.5–5.3)
POTASSIUM SERPL-MCNC: 3.9 MMOL/L — SIGNIFICANT CHANGE UP (ref 3.5–5.3)
POTASSIUM SERPL-SCNC: 3.9 MMOL/L — SIGNIFICANT CHANGE UP (ref 3.5–5.3)
POTASSIUM SERPL-SCNC: 3.9 MMOL/L — SIGNIFICANT CHANGE UP (ref 3.5–5.3)
SARS-COV-2 RNA SPEC QL NAA+PROBE: SIGNIFICANT CHANGE UP
SODIUM SERPL-SCNC: 134 MMOL/L — LOW (ref 135–145)
SODIUM SERPL-SCNC: 135 MMOL/L — SIGNIFICANT CHANGE UP (ref 135–145)

## 2022-04-04 PROCEDURE — 99239 HOSP IP/OBS DSCHRG MGMT >30: CPT

## 2022-04-04 RX ORDER — SODIUM CHLORIDE 9 MG/ML
1 INJECTION INTRAMUSCULAR; INTRAVENOUS; SUBCUTANEOUS
Qty: 0 | Refills: 0 | DISCHARGE

## 2022-04-04 RX ORDER — LOSARTAN POTASSIUM 100 MG/1
1 TABLET, FILM COATED ORAL
Qty: 30 | Refills: 0
Start: 2022-04-04 | End: 2022-05-03

## 2022-04-04 RX ORDER — HYDRALAZINE HCL 50 MG
1 TABLET ORAL
Qty: 60 | Refills: 0
Start: 2022-04-04 | End: 2022-05-03

## 2022-04-04 RX ORDER — HYDRALAZINE HCL 50 MG
1 TABLET ORAL
Qty: 60 | Refills: 0
Start: 2022-04-04

## 2022-04-04 RX ORDER — CARVEDILOL PHOSPHATE 80 MG/1
1 CAPSULE, EXTENDED RELEASE ORAL
Qty: 60 | Refills: 0
Start: 2022-04-04

## 2022-04-04 RX ORDER — LOSARTAN POTASSIUM 100 MG/1
1 TABLET, FILM COATED ORAL
Qty: 0 | Refills: 0 | DISCHARGE

## 2022-04-04 RX ORDER — LOSARTAN POTASSIUM 100 MG/1
1 TABLET, FILM COATED ORAL
Qty: 30 | Refills: 0
Start: 2022-04-04

## 2022-04-04 RX ORDER — HYDRALAZINE HCL 50 MG
1 TABLET ORAL
Qty: 0 | Refills: 0 | DISCHARGE

## 2022-04-04 RX ORDER — METOPROLOL TARTRATE 50 MG
1 TABLET ORAL
Qty: 0 | Refills: 0 | DISCHARGE

## 2022-04-04 RX ORDER — CARVEDILOL PHOSPHATE 80 MG/1
1 CAPSULE, EXTENDED RELEASE ORAL
Qty: 0 | Refills: 0 | DISCHARGE
Start: 2022-04-04

## 2022-04-04 RX ORDER — SODIUM CHLORIDE 9 MG/ML
500 INJECTION INTRAMUSCULAR; INTRAVENOUS; SUBCUTANEOUS
Refills: 0 | Status: COMPLETED | OUTPATIENT
Start: 2022-04-04 | End: 2022-04-04

## 2022-04-04 RX ADMIN — Medication 10 MILLIGRAM(S): at 09:42

## 2022-04-04 RX ADMIN — AMIODARONE HYDROCHLORIDE 200 MILLIGRAM(S): 400 TABLET ORAL at 09:41

## 2022-04-04 RX ADMIN — Medication 100 MILLIGRAM(S): at 09:42

## 2022-04-04 RX ADMIN — Medication 1 DROP(S): at 09:41

## 2022-04-04 RX ADMIN — SODIUM CHLORIDE 75 MILLILITER(S): 9 INJECTION INTRAMUSCULAR; INTRAVENOUS; SUBCUTANEOUS at 09:43

## 2022-04-04 RX ADMIN — FAMOTIDINE 20 MILLIGRAM(S): 10 INJECTION INTRAVENOUS at 09:45

## 2022-04-04 RX ADMIN — LOSARTAN POTASSIUM 100 MILLIGRAM(S): 100 TABLET, FILM COATED ORAL at 09:42

## 2022-04-04 RX ADMIN — CARVEDILOL PHOSPHATE 12.5 MILLIGRAM(S): 80 CAPSULE, EXTENDED RELEASE ORAL at 09:41

## 2022-04-04 RX ADMIN — HEPARIN SODIUM 5000 UNIT(S): 5000 INJECTION INTRAVENOUS; SUBCUTANEOUS at 09:42

## 2022-04-04 RX ADMIN — Medication 1 APPLICATION(S): at 09:43

## 2022-04-04 RX ADMIN — FLUDROCORTISONE ACETATE 0.05 MILLIGRAM(S): 0.1 TABLET ORAL at 06:00

## 2022-04-04 NOTE — DISCHARGE NOTE PROVIDER - NSDCHHHOMEBOUND_GEN_ALL_CORE
Ataxic gait/Requires supervison due to deteriorating mental status.../Shortness of breath with minimal ambulation/Chest pain/weakness during/after ambulation   greater than 20 feet/Fall risk

## 2022-04-04 NOTE — DISCHARGE NOTE PROVIDER - NSDCMRMEDTOKEN_GEN_ALL_CORE_FT
acetaminophen 325 mg oral tablet: 2 tab(s) orally every 4 hours, As Needed -for moderate pain   amiodarone 200 mg oral tablet: 1 tab(s) orally once a day  ascorbic acid 1000 mg oral tablet: 1 tab(s) orally once a day  carvedilol 12.5 mg oral tablet: 1 tab(s) orally every 12 hours  Claritin 10 mg oral tablet: 1 tab(s) orally once a day, As Needed  Cranberry oral tablet: 1 tab(s) 500mg orally once a day as needed  cyanocobalamin 1000 mcg oral tablet: 1 tab(s) orally once a day  Cystex Urinary Pain Relief 162 mg-162.5 mg oral tablet: 1 tab(s) orally once a day, As Needed for UTI  erythromycin 0.5% ophthalmic ointment: 1 application in the right eye 2 times a day  famotidine 20 mg oral tablet: 1 tab(s) orally 2 times a day  fludrocortisone 0.1 mg oral tablet: 0.5 tab(s) orally once a day  hydrALAZINE 100 mg oral tablet: 1 tab(s) orally every 12 hours x 30 days   hydrocortisone 5 mg oral tablet: 2 tab(s) orally once a day (in the morning)  hydrocortisone 5 mg oral tablet: 1 tab(s) orally once a day (in the evening)  Icy Hot Advanced Pain Relief 5% topical pad: apply to skin topically as needed for pain  Livalo 2 mg oral tablet: 1 tab(s) orally once a day  losartan 100 mg oral tablet: 1 tab(s) orally once a day  neomycin/polymyxin B/dexamethasone 3.5 mg-10,000 units-1 mg/g ophthalmic ointment: 1 application in each eye once a day (at bedtime)  ofloxacin 0.3% ophthalmic solution: 1 drop(s) in the right eye 2 times a day  Pepto-Bismol 262 mg/15 mL oral suspension: 15 milliliter(s) orally once a day, As Needed - for diarrhea  polyethylene glycol 3350 oral powder for reconstitution: 17 gram(s) orally once a day  Vitamin D3 50 mcg (2000 intl units) oral tablet: 1 tab(s) orally once a day

## 2022-04-04 NOTE — DISCHARGE NOTE PROVIDER - HOSPITAL COURSE
FROM H&P:    "89 y/o female with history of lymphoma, HTN, Hyponatremia, adrenal insufficiency presents to the ED with complaints of weakness and sob.  Patient reports that over the past few days she has been experiencing GREENBERG and orthopnea, however denies chest pain. Also has noted that   has increased swelling in bilateral LE. Of note she was recently started on florinef for adrenal insufficiency for worsening dizziness and orthostatics.  She was seen recently by her cardiologist who stopped the lasix, patient is unsure why. Currently she denies any chest pain or sob but did have  some nausea and headache upon arrival to the ED, blood pressure at that time 230/70"    # Acute on chronic diastolic CHF exacerbation  # HI due to diuresis not POA  - Worsening GREENBERG and orthopnea on admission. Now improved  - IV Lasix initially. Discontinue (rise in Cr 4/3): Add 500cc of NS back over 10 hours. Cr improved adequated with IVF but still to hold diurectic at discharge. Repeat BMP withtin 1 week outpatient and decision to restart Lasix with cardiology outpatient.   - Cardiology consult noted      # HTN urgency  - Improved and asymptomatic  - Labile blood pressure at home likely     due to hydrocortisone and florinef  - d/c Toprol,   - Maximize coreg/hydralazine dose  - increase Losartan to 100mg/day. L.     # Unlikely RLE Cellulitis  - Doxycycline->Provoked N/V. Reported allergy to tetracyclines however on outpatient doxy? No indication. Discussed with family and will discontinue at discharge and to be re-addressed with patient primary medical doctor.   - RLE with bruise/ecchymoses; No warmth, minimal surrounding erythema, non-tender; no surroudning edema. Unlikely cellulitis. Observe off antibiotics.   - Doppler LE negative    # Adrenal Insufficiency  - Being followed by Endo as outpatient  - Continue Florinef and hydrocortisone   - Sodium Tab 1/day discontinued (unclear why on low salt diet, salt tab and diuresis). Maintain low salt diet, lower dose of diuresis and discontinue salt tab.  - Monitor labs  - Check orthostatics    # Hypokalemia  - Replete and monitor    # HLD  - Atorvastatin    # History of Cardiac Arrhythmia   - Has loop recorder  - Will follow up as outpatient  - Continue amiodarone 200 mg daily, BBlockers     # DVT prophylaxis  - Heparin     Disposition; Orthostatics negative. Cr improved. Ambulating asymptomatically and feeling well. Discharge to [facility] with 24 hour aide and HHC in stable condition and close outpatient follow up.  T(C): 36.7 (04-04-22 @ 07:56), Max: 36.7 (04-04-22 @ 07:56)  HR: 67 (04-04-22 @ 07:56) (65 - 70)  BP: 167/40 (04-04-22 @ 07:56) (122/40 - 167/40)  RR: 19 (04-04-22 @ 07:56) (18 - 19)  SpO2: 94% (04-04-22 @ 07:56) (94% - 96%)  General: AAOx3; NAD  Head: AT/NC  ENT: Moist Mucous Membranes; No Injury  Eyes: EOMI; PERRL  Neck: Non-tender; No JVD  CVS: RRR, S1&S2, No murmur, No edema  Respiratory: Lungs CTA B/L; Normal Respiratory Effort  Abdomen/GI: Soft, non-tender, non-distended, no guarding, no rebound, normal bowel sounds  : No bladder distention, No Kidd  Extremities: No cyanosis, No clubbing, No edema  MSK: No CVA tenderness, Normal ROM, No injury  Neuro: AAOx3, CNII-XII grossly intact, non-focal  Psych: Appropriate, Cooperative, No depression, No anxiety  Skin: Clean, Dry and Intact  Discharge Management: 41 minutes  Date of Discharge/Service: 4/4/2022

## 2022-04-04 NOTE — DISCHARGE NOTE PROVIDER - CARE PROVIDER_API CALL
Kimmy Merida)  [facility] Card[facility]; Cardiovascular Disease  [address] [place] [address]  Phone: [phone]  Fax: [phone]  Established Patient  Scheduled Appointment: 04/07/2022    BE BROOKS  Internal Medicine  [address] [place] [address]  Phone: [phone]  Fax: [phone]  Established Patient  Follow Up Time: 1 week

## 2022-04-04 NOTE — PROGRESS NOTE ADULT - ASSESSMENT
A/P: 89 y/o female with history of lymphoma, HTN, Hyponatremia, adrenal insufficiency presents to the ED with complaints of weakness and sob.    1. Acute on chronic diastolic HF.   Appears compensated. Can start low dose PO lasix.   Cont BP control. Cont GDMT. Normal LV fxn on echo.  Diuresis with close monitoring of the renal function and electrolytes.  Goal potassium of 4 and magnesium of 2.   Strict I/O and daily wt checks. Low sodium diet. Nutrition education.     2. R LE rash- cellulitis- on abx.    3. HTN. BP improved- remains mildly elevated.   Cont coreg/hydralazine/losartan.  Can add amlodipine if needed.   Cont to follow for now.     4. Afib- in SR now. Will continue amiodarone with close monitoring as outpt.  She is not on AC secondary to multiple falls, advanced age and high bleeding risk.    5. D/C planning. Will sign off. Pt to f/u with her outpt cardiologist.

## 2022-04-04 NOTE — DISCHARGE NOTE NURSING/CASE MANAGEMENT/SOCIAL WORK - NSDCVIVACCINE_GEN_ALL_CORE_FT
Tdap; 10-Jul-2021 13:25; Starr Zazueta (KENNETH); [employer]; [id number] (Exp. Date: 25-Nov-2022); IntraMuscular; Deltoid Left.; 0.5 milliLiter(s); VIS (VIS Published: 09-May-2013, VIS Presented: 10-Jul-2021);

## 2022-04-04 NOTE — DISCHARGE NOTE NURSING/CASE MANAGEMENT/SOCIAL WORK - NSDCPEFALRISK_GEN_ALL_CORE
For information on Fall & Injury Prevention, visit: [email or web address] OR  [email or web address] OR  https://www.cdc.gov/steadi/patient.html

## 2022-04-04 NOTE — PROGRESS NOTE ADULT - SUBJECTIVE AND OBJECTIVE BOX
Cardiology Progress Note    HPI: 89 y/o female with history of lymphoma, HTN, Hyponatremia, adrenal insufficiency presents to the ED with complaints of weakness and sob.  Patient reports that over the past few days she has been experiencing GREENBERG and orthopnea, however denies chest pain. Also has noted that   has increased swelling in bilateral LE. Of note she was recently started on florinef for adrenal insufficiency for worsening dizziness and orthostatics.  She was seen recently by her cardiologist who stopped the lasix, patient is unsure why. Currently she denies any chest pain or sob but did have  some nausea and headache upon arrival to the ED, blood pressure at that time 230/70    4/4. Chart reviewed. No CP/SOB.   Not on tele. No events last pm.     PAST MEDICAL & SURGICAL HISTORY:  Iron deficiency    HTN (hypertension)    Lymphoma    H/O adrenal insufficiency    No significant past surgical history        MEDICATIONS  (STANDING):  aMIOdarone    Tablet 200 milliGRAM(s) Oral daily  atorvastatin 10 milliGRAM(s) Oral at bedtime  dexamethasone/neomycin/polymyxin B Ophthalmic Ointment - Peds 1 Application(s) Both EYES at bedtime  doxycycline hyclate Capsule 100 milliGRAM(s) Oral every 12 hours  erythromycin   Ointment 1 Application(s) Right EYE two times a day  famotidine    Tablet 20 milliGRAM(s) Oral daily  fludroCORTISONE 0.05 milliGRAM(s) Oral daily  heparin   Injectable 5000 Unit(s) SubCutaneous every 12 hours  hydrALAZINE 50 milliGRAM(s) Oral two times a day  hydrocortisone 5 milliGRAM(s) Oral at bedtime  hydrocortisone 10 milliGRAM(s) Oral daily  losartan 50 milliGRAM(s) Oral daily  metoprolol succinate ER 25 milliGRAM(s) Oral daily  ofloxacin 0.3% Ophthalmic Solution - Peds 1 Drop(s) Right EYE two times a day  potassium chloride    Tablet ER 40 milliEquivalent(s) Oral every 4 hours  sodium chloride 1 Gram(s) Oral daily    MEDICATIONS  (PRN):  acetaminophen     Tablet .. 650 milliGRAM(s) Oral every 6 hours PRN Mild Pain (1 - 3)  ondansetron Injectable 4 milliGRAM(s) IV Push every 6 hours PRN Nausea and/or Vomiting      FAMILY HISTORY: No family history of premature CAD or SCD.       SOCIAL HISTORY: no recent smoking     REVIEW OF SYSTEMS:  CONSTITUTIONAL:    No fatigue, malaise, lethargy.  No fever or chills.  RESPIRATORY:  No cough.  No wheeze.  No hemoptysis.  c/o shortness of breath.  CARDIOVASCULAR:  No chest pains.  No palpitations. c/o shortness of breath, No orthopnea or PND. c/o pedal edema  GASTROINTESTINAL:  No abdominal pain.  No nausea or vomiting.    GENITOURINARY:    No hematuria.    MUSCULOSKELETAL:  No musculoskeletal pain.  No joint swelling.  No arthritis.  NEUROLOGICAL:  No tingling or numbness or weakness.    ICU Vital Signs Last 24 Hrs  T(C): 37 (01 Apr 2022 07:55), Max: 37 (01 Apr 2022 07:55)  T(F): 98.6 (01 Apr 2022 07:55), Max: 98.6 (01 Apr 2022 07:55)  HR: 63 (01 Apr 2022 07:55) (63 - 69)  BP: 141/41 (31 Mar 2022 19:17) (133/44 - 201/64)  BP(mean): 107 (31 Mar 2022 11:22) (107 - 107)  ABP: --  ABP(mean): --  RR: 18 (01 Apr 2022 07:55) (16 - 18)  SpO2: 95% (01 Apr 2022 07:55) (92% - 95%)      PHYSICAL EXAM-    Constitutional: elderly female in no acute distress   Head: Head is normocephalic and atraumatic.    Neck: No JVD.   Cardiovascular: Regular rate and rhythm without S3, S4. No murmurs or rubs are appreciated.    Respiratory: Breath sounds are normal. No rales. No wheezing.  Abdomen: Soft, nontender, nondistended with positive bowel sounds.    Extremity: No tenderness. trace b/l pedal  pitting edema   Neurologic: The patient is alert and oriented.    Skin: redness in R LE    INTERPRETATION OF TELEMETRY: Not on tele.     ECG: Sinus rythm , poor R wave progression    I&O's Detail    LABS:                                 10.5   5.61  )-----------( 141      ( 01 Apr 2022 06:42 )             32.4     04-01    138  |  105  |  24<H>  ----------------------------<  97  4.9   |  30  |  1.28    Ca    9.1      01 Apr 2022 06:42    TPro  6.6  /  Alb  3.1<L>  /  TBili  0.5  /  DBili  x   /  AST  41<H>  /  ALT  31  /  AlkPhos  86  03-31    LIVER FUNCTIONS - ( 31 Mar 2022 06:25 )  Alb: 3.1 g/dL / Pro: 6.6 gm/dL / ALK PHOS: 86 U/L / ALT: 31 U/L / AST: 41 U/L / GGT: x           PT/INR - ( 31 Mar 2022 06:25 )   PT: 10.7 sec;   INR: 0.92 ratio       PTT - ( 31 Mar 2022 06:25 )  PTT:27.0 sec    I&O's Summary    BNPSerum Pro-Brain Natriuretic Peptide: 3487 pg/mL (03-31 @ 06:25)    RADIOLOGY & ADDITIONAL STUDIES:  < from: Xray Chest 1 View- PORTABLE-Urgent (03.31.22 @ 07:25) >    ACC: [id number] EXAM:  XR CHEST PORTABLE URGENT 1V                        ACC: [id number] EXAM:  XR TIB FIB AP LAT 2 VIEWS RT                          < from: TTE Echo Complete w/o Contrast w/ Doppler (03.31.22 @ 19:09) >   Impression     Summary     Moderate concentric left ventricular hypertrophy is present.   Basal septal hypertrophy is seen measuring 1.7cm.   Estimated left ventricular ejection fraction is 55-60 %.   Normal appearing left atrium.   Mild aortic sclerosis is present with normal valvular opening.   Mild (1+) to moderate aortic regurgitation is present pressure half time   is 215cm/s2.   Mild mitral annular calcification is present.   The mitral valve leaflets appear thickened.   Mild (1+) mitral regurgitation is present.   EA reversal of the mitral inflow consistent with reduced compliance of   the   left ventricle.   The tricuspid valve leaflets appear mildly thickened and/or calcified,   but   open well.   Mild (1+) tricuspid valve regurgitation is present.   Normal appearing pulmonic valve structure.   Trace pulmonic valvular regurgitation is present.     Signature     ----------------------------------------------------------------   Electronically signed by Steven Cui MD(Interpreting   physician) on 03/31/2022 10:38 PM   ----------------------------------------------------------------    < end of copied text >  PROCEDURE DATE:  03/31/2022          INTERPRETATION:  Right tib-fib and chest. Patient has local swelling and   possible DVT in the leg.    Right tib-fib. AP and lateral views.    There is mildly degeneration. Ankle is unremarkable. No bone destruction   or fracture.    AP chest on March 31, 2022 at 6:44 AM.    Heart possibly enlarged.    Fibrocalcific chronic findings in both apices again noted.    Left loop recorder again seen.    Slight scarring at left base laterally is seen.    There are multilevel vertebral possibly densities.    Chest is similar to CAT scan of March 5 of this year.    IMPRESSION: No acute findings.    --- End of Report ---    < from: US Duplex Venous Lower Ext Ltd, Right (03.31.22 @ 08:23) >  IMPRESSION:  No evidence of right lower extremity deep venous thrombosis.    < end of copied text >  < from: Xray Chest 1 View- PORTABLE-Urgent (03.31.22 @ 07:25) >  IMPRESSION: No acute findings.    < end of copied text >  < from: CT Abdomen and Pelvis No Cont (03.05.22 @ 12:26) >  IMPRESSION:  No sequela of trauma identified.    Possible vague hypodensity pancreatic tail    < end of copied text >  < from: TTE Echo Complete w/o Contrast w/ Doppler (03.31.22 @ 19:09) >  Moderate concentric left ventricular hypertrophy is present.   Basal septal hypertrophy is seen measuring 1.7cm.   Estimated left ventricular ejection fraction is 55-60 %.   Normal appearing left atrium.   Mild aortic sclerosis is present with normal valvular opening.   Mild (1+) to moderate aortic regurgitation is present pressure half time   is 215cm/s2.   Mild mitral annular calcification is present.   The mitral valve leaflets appear thickened.   Mild (1+) mitral regurgitation is present.   EA reversal of the mitral inflow consistent with reduced compliance of   the   left ventricle.   The tricuspid valve leaflets appear mildly thickened and/or calcified,   but   open well.   Mild (1+) tricuspid valve regurgitation is present.   Normal appearing pulmonic valve structure.   Trace pulmonic valvular regurgitation is present.    < end of copied text >

## 2022-04-04 NOTE — DISCHARGE NOTE PROVIDER - PROVIDER TOKENS
PROVIDER:[TOKEN:[[id number]],SCHEDULEDAPPT:[04/07/2022],ESTABLISHEDPATIENT:[T]],PROVIDER:[TOKEN:[[id number]],FOLLOWUP:[1 week],ESTABLISHEDPATIENT:[T]]

## 2022-04-04 NOTE — DISCHARGE NOTE PROVIDER - NSDCCPCAREPLAN_GEN_ALL_CORE_FT
PRINCIPAL DISCHARGE DIAGNOSIS  Diagnosis: Volume overload  Assessment and Plan of Treatment: Likely from sodium retention from Salt intake + Salt tabs   Salt tabs have been discontinued. At this time the diurectic has been held because the kidney function went down a little after the diurectic. Repeat labs withtin the next week outpatient to reassess whethter diurectics are to be resumed. To help with lower extremity swelling, can use compression stockings and/or elevated legs when not ambulating. Follow closely with your primary medical doctor and heart doctor.      SECONDARY DISCHARGE DIAGNOSES  Diagnosis: Pedal edema  Assessment and Plan of Treatment:

## 2022-04-04 NOTE — DISCHARGE NOTE NURSING/CASE MANAGEMENT/SOCIAL WORK - NSSCTYPOFSERV_GEN_ALL_CORE
JANUARY 16, 2020        DEAR SEAN,       WE HAVE RECEIVED A REPONSE FROM YOUR INSURANCE CARRIER, WHICH STATES THAT THE PROCEDURE(S) THAT DR. CASTILLO HAS PROPOSED FOR YOU HAVE BEEN APPROVED.    IF YOU HAVE ANY QUESTIONS IN REGARD TO WHAT YOUR FINANCIAL RESPONSIBILITIES MAY BE, OR ANY QUESTIONS IN REGARD TO YOUR PARTICULAR INSURANCE POLICY, PLEASE CONTACT YOUR INSURANCE CARRIER.       PLEASE FEEL FREE TO CONTACT ME TO SET UP THE PROCEDURE (S), OR WITH ANY QUESTIONS THAT YOU MAY HAVE: [phone].          THANK YOU,            BJ GEE        SURGICAL COORDINATOR  PLASTIC AND RECONSTRUCTIVE SURGERY DEPARTMENT  [facility]  DR. ROXANA CASTILLO’S OFFICE  [facility]  [phone] / PHONE  [phone] / FAX     SN/PT

## 2022-04-04 NOTE — DISCHARGE NOTE PROVIDER - DETAILS OF MALNUTRITION DIAGNOSIS/DIAGNOSES
This patient has been assessed with a concern for Malnutrition and was treated during this hospitalization for the following Nutrition diagnosis/diagnoses:     -  04/01/2022: Moderate protein-calorie malnutrition

## 2022-04-04 NOTE — DISCHARGE NOTE NURSING/CASE MANAGEMENT/SOCIAL WORK - PATIENT PORTAL LINK FT
You can access the FollowMyHealth Patient Portal offered by [facility] by registering at the following website: [email or web address]. By joining [facility]’s FollowMyHealth portal, you will also be able to view your health information using other applications (apps) compatible with our system.

## 2022-04-06 ENCOUNTER — EMERGENCY (EMERGENCY)
Facility: HOSPITAL | Age: 87
LOS: 0 days | Discharge: HOME | End: 2022-04-07
Attending: EMERGENCY MEDICINE
Payer: MEDICARE

## 2022-04-06 VITALS
OXYGEN SATURATION: 98 % | DIASTOLIC BLOOD PRESSURE: 83 MMHG | HEIGHT: 60 IN | HEART RATE: 64 BPM | WEIGHT: 113.98 LBS | RESPIRATION RATE: 17 BRPM | TEMPERATURE: 98 F | SYSTOLIC BLOOD PRESSURE: 229 MMHG

## 2022-04-06 DIAGNOSIS — Z20.822 CONTACT WITH AND (SUSPECTED) EXPOSURE TO COVID-19: ICD-10-CM

## 2022-04-06 DIAGNOSIS — R53.1 WEAKNESS: ICD-10-CM

## 2022-04-06 DIAGNOSIS — Z88.0 ALLERGY STATUS TO PENICILLIN: ICD-10-CM

## 2022-04-06 DIAGNOSIS — Z88.2 ALLERGY STATUS TO SULFONAMIDES: ICD-10-CM

## 2022-04-06 DIAGNOSIS — E61.1 IRON DEFICIENCY: ICD-10-CM

## 2022-04-06 DIAGNOSIS — I10 ESSENTIAL (PRIMARY) HYPERTENSION: ICD-10-CM

## 2022-04-06 DIAGNOSIS — R06.02 SHORTNESS OF BREATH: ICD-10-CM

## 2022-04-06 LAB
ALBUMIN SERPL ELPH-MCNC: 2.8 G/DL — LOW (ref 3.3–5)
ALP SERPL-CCNC: 75 U/L — SIGNIFICANT CHANGE UP (ref 40–120)
ALT FLD-CCNC: 44 U/L — SIGNIFICANT CHANGE UP (ref 12–78)
ANION GAP SERPL CALC-SCNC: 4 MMOL/L — LOW (ref 5–17)
AST SERPL-CCNC: 42 U/L — HIGH (ref 15–37)
BASOPHILS # BLD AUTO: 0.01 K/UL — SIGNIFICANT CHANGE UP (ref 0–0.2)
BASOPHILS NFR BLD AUTO: 0.2 % — SIGNIFICANT CHANGE UP (ref 0–2)
BILIRUB SERPL-MCNC: 0.4 MG/DL — SIGNIFICANT CHANGE UP (ref 0.2–1.2)
BUN SERPL-MCNC: 28 MG/DL — HIGH (ref 7–23)
CALCIUM SERPL-MCNC: 9.4 MG/DL — SIGNIFICANT CHANGE UP (ref 8.5–10.1)
CHLORIDE SERPL-SCNC: 99 MMOL/L — SIGNIFICANT CHANGE UP (ref 96–108)
CO2 SERPL-SCNC: 29 MMOL/L — SIGNIFICANT CHANGE UP (ref 22–31)
CREAT SERPL-MCNC: 1.2 MG/DL — SIGNIFICANT CHANGE UP (ref 0.5–1.3)
EGFR: 41 ML/MIN/1.73M2 — LOW
EOSINOPHIL # BLD AUTO: 0.01 K/UL — SIGNIFICANT CHANGE UP (ref 0–0.5)
EOSINOPHIL NFR BLD AUTO: 0.2 % — SIGNIFICANT CHANGE UP (ref 0–6)
GLUCOSE SERPL-MCNC: 95 MG/DL — SIGNIFICANT CHANGE UP (ref 70–99)
HCT VFR BLD CALC: 34.3 % — LOW (ref 34.5–45)
HGB BLD-MCNC: 10.9 G/DL — LOW (ref 11.5–15.5)
IMM GRANULOCYTES NFR BLD AUTO: 0.2 % — SIGNIFICANT CHANGE UP (ref 0–1.5)
LYMPHOCYTES # BLD AUTO: 1.8 K/UL — SIGNIFICANT CHANGE UP (ref 1–3.3)
LYMPHOCYTES # BLD AUTO: 34.4 % — SIGNIFICANT CHANGE UP (ref 13–44)
MCHC RBC-ENTMCNC: 30.6 PG — SIGNIFICANT CHANGE UP (ref 27–34)
MCHC RBC-ENTMCNC: 31.8 GM/DL — LOW (ref 32–36)
MCV RBC AUTO: 96.3 FL — SIGNIFICANT CHANGE UP (ref 80–100)
MONOCYTES # BLD AUTO: 0.76 K/UL — SIGNIFICANT CHANGE UP (ref 0–0.9)
MONOCYTES NFR BLD AUTO: 14.5 % — HIGH (ref 2–14)
NEUTROPHILS # BLD AUTO: 2.65 K/UL — SIGNIFICANT CHANGE UP (ref 1.8–7.4)
NEUTROPHILS NFR BLD AUTO: 50.5 % — SIGNIFICANT CHANGE UP (ref 43–77)
PLATELET # BLD AUTO: 153 K/UL — SIGNIFICANT CHANGE UP (ref 150–400)
POTASSIUM SERPL-MCNC: 4 MMOL/L — SIGNIFICANT CHANGE UP (ref 3.5–5.3)
POTASSIUM SERPL-SCNC: 4 MMOL/L — SIGNIFICANT CHANGE UP (ref 3.5–5.3)
PROT SERPL-MCNC: 6 GM/DL — SIGNIFICANT CHANGE UP (ref 6–8.3)
RBC # BLD: 3.56 M/UL — LOW (ref 3.8–5.2)
RBC # FLD: 15.7 % — HIGH (ref 10.3–14.5)
SODIUM SERPL-SCNC: 132 MMOL/L — LOW (ref 135–145)
TROPONIN I, HIGH SENSITIVITY RESULT: 17.2 NG/L — SIGNIFICANT CHANGE UP
WBC # BLD: 5.24 K/UL — SIGNIFICANT CHANGE UP (ref 3.8–10.5)
WBC # FLD AUTO: 5.24 K/UL — SIGNIFICANT CHANGE UP (ref 3.8–10.5)

## 2022-04-06 PROCEDURE — 36415 COLL VENOUS BLD VENIPUNCTURE: CPT

## 2022-04-06 PROCEDURE — 85025 COMPLETE CBC W/AUTO DIFF WBC: CPT

## 2022-04-06 PROCEDURE — 99284 EMERGENCY DEPT VISIT MOD MDM: CPT

## 2022-04-06 PROCEDURE — 93005 ELECTROCARDIOGRAM TRACING: CPT

## 2022-04-06 PROCEDURE — U0005: CPT

## 2022-04-06 PROCEDURE — 80053 COMPREHEN METABOLIC PANEL: CPT

## 2022-04-06 PROCEDURE — 70450 CT HEAD/BRAIN W/O DYE: CPT | Mod: MA

## 2022-04-06 PROCEDURE — 93010 ELECTROCARDIOGRAM REPORT: CPT

## 2022-04-06 PROCEDURE — U0003: CPT

## 2022-04-06 PROCEDURE — 84484 ASSAY OF TROPONIN QUANT: CPT

## 2022-04-06 PROCEDURE — 96374 THER/PROPH/DIAG INJ IV PUSH: CPT

## 2022-04-06 PROCEDURE — 70450 CT HEAD/BRAIN W/O DYE: CPT | Mod: 26,MA

## 2022-04-06 PROCEDURE — 99285 EMERGENCY DEPT VISIT HI MDM: CPT | Mod: 25

## 2022-04-06 RX ORDER — AMLODIPINE BESYLATE 2.5 MG/1
5 TABLET ORAL ONCE
Refills: 0 | Status: COMPLETED | OUTPATIENT
Start: 2022-04-06 | End: 2022-04-06

## 2022-04-06 RX ORDER — HYDRALAZINE HCL 50 MG
100 TABLET ORAL ONCE
Refills: 0 | Status: COMPLETED | OUTPATIENT
Start: 2022-04-06 | End: 2022-04-06

## 2022-04-06 RX ADMIN — Medication 100 MILLIGRAM(S): at 22:39

## 2022-04-06 RX ADMIN — AMLODIPINE BESYLATE 5 MILLIGRAM(S): 2.5 TABLET ORAL at 23:43

## 2022-04-06 NOTE — ED ADULT TRIAGE NOTE - CHIEF COMPLAINT QUOTE
Pt CHILANGO from the [facility] for Hypertension. EMS reports family wanted patient sent in for BP in 200s systolic, given daily BP meds at [facility]. Pt AAOx3 in triage. Reports mild headache to back of head and dizziness.

## 2022-04-06 NOTE — ED PROVIDER NOTE - CLINICAL SUMMARY MEDICAL DECISION MAKING FREE TEXT BOX
Pt with high blood pressure, no signs of end organ damage.  Had not been given nightly medications yet by SNF.  Given her hydralazine in ED, discharged back to [facility] in good condition.

## 2022-04-06 NOTE — ED PROVIDER NOTE - OBJECTIVE STATEMENT
89 y/o female with a PMHx of adrenal insufficiency, HTN, iron deficiency, lymphoma presents to the ED BIBA from [facility] for HTN. Pt recently admitted to  for SOB and weakness and d/c 2 days ago. Pt's BP found to be 200s systolic and family requested pt be brought to the ED for evaluation. Denies CP. No  other complaints at this time.

## 2022-04-06 NOTE — ED PROVIDER NOTE - PATIENT PORTAL LINK FT
You can access the FollowMyHealth Patient Portal offered by [facility] by registering at the following website: [email or web address]. By joining [employer]’s FollowMyHealth portal, you will also be able to view your health information using other applications (apps) compatible with our system.

## 2022-04-06 NOTE — ED PROVIDER NOTE - CONDITION AT DISCHARGE:
[Never] : never [TextBox_4] : Feeling well\par \par No cough,wheezing, SOB\par No beta agonist use.\par \par On Advair 115/21 1 BID.\par Singulair and Flonase. Improved

## 2022-04-06 NOTE — ED ADULT NURSE NOTE - CHIEF COMPLAINT QUOTE
Pt CHILANGO from the [facility] for Hypertension. EMS reports family wanted patient sent in for BP in 200s systolic, given daily BP meds at [facility]. Pt AAOx3 in triage. Reports mild headache to back of head and dizziness.

## 2022-04-06 NOTE — ED PROVIDER NOTE - PROGRESS NOTE DETAILS
Sade BEEBE: sign out received from Dr. Stewart, pending labs and BP mgmt. BP now improved. will recommend outpt f/u with her doctor as patient is also on fludrocortisone for adrenal insufficiency but that might be causing an elevation in BP.

## 2022-04-06 NOTE — ED ADULT NURSE NOTE - OBJECTIVE STATEMENT
89 y/o female BIBEMS c/o hypertension. pt. reports "high BP at bristal." pt. found to be 229/83 in triage. pt. denies chest pain, headache. pt. states she had tingling in extremities before coming to ED and have subsided now. pt. is a&ox4, speaking clearly. no distress noted at this time. EKG completed, pt. placed on cardiac monitor, labs drawn and sent. MD Tatum at bedside for evaluation.

## 2022-04-06 NOTE — ED PROVIDER NOTE - MUSCULOSKELETAL, MLM
Spine appears normal, range of motion is not limited, no muscle or joint tenderness. Trace pitting edema b/l LE.

## 2022-04-07 VITALS
OXYGEN SATURATION: 96 % | HEART RATE: 64 BPM | DIASTOLIC BLOOD PRESSURE: 50 MMHG | TEMPERATURE: 98 F | RESPIRATION RATE: 18 BRPM | SYSTOLIC BLOOD PRESSURE: 166 MMHG

## 2022-04-07 VITALS
RESPIRATION RATE: 20 BRPM | SYSTOLIC BLOOD PRESSURE: 185 MMHG | OXYGEN SATURATION: 99 % | DIASTOLIC BLOOD PRESSURE: 51 MMHG | HEART RATE: 65 BPM

## 2022-04-07 VITALS
RESPIRATION RATE: 16 BRPM | WEIGHT: 160.06 LBS | TEMPERATURE: 98 F | DIASTOLIC BLOOD PRESSURE: 52 MMHG | SYSTOLIC BLOOD PRESSURE: 143 MMHG | HEART RATE: 65 BPM | OXYGEN SATURATION: 93 % | HEIGHT: 60 IN

## 2022-04-07 DIAGNOSIS — I10 ESSENTIAL (PRIMARY) HYPERTENSION: ICD-10-CM

## 2022-04-07 DIAGNOSIS — N17.9 ACUTE KIDNEY FAILURE, UNSPECIFIED: ICD-10-CM

## 2022-04-07 DIAGNOSIS — I11.0 HYPERTENSIVE HEART DISEASE WITH HEART FAILURE: ICD-10-CM

## 2022-04-07 DIAGNOSIS — E44.0 MODERATE PROTEIN-CALORIE MALNUTRITION: ICD-10-CM

## 2022-04-07 DIAGNOSIS — T38.0X5A ADVERSE EFFECT OF GLUCOCORTICOIDS AND SYNTHETIC ANALOGUES, INITIAL ENCOUNTER: ICD-10-CM

## 2022-04-07 DIAGNOSIS — Z88.1 ALLERGY STATUS TO OTHER ANTIBIOTIC AGENTS STATUS: ICD-10-CM

## 2022-04-07 DIAGNOSIS — E78.5 HYPERLIPIDEMIA, UNSPECIFIED: ICD-10-CM

## 2022-04-07 DIAGNOSIS — Z88.0 ALLERGY STATUS TO PENICILLIN: ICD-10-CM

## 2022-04-07 DIAGNOSIS — Z20.822 CONTACT WITH AND (SUSPECTED) EXPOSURE TO COVID-19: ICD-10-CM

## 2022-04-07 DIAGNOSIS — R53.83 OTHER FATIGUE: ICD-10-CM

## 2022-04-07 DIAGNOSIS — I50.33 ACUTE ON CHRONIC DIASTOLIC (CONGESTIVE) HEART FAILURE: ICD-10-CM

## 2022-04-07 DIAGNOSIS — R53.1 WEAKNESS: ICD-10-CM

## 2022-04-07 DIAGNOSIS — I16.0 HYPERTENSIVE URGENCY: ICD-10-CM

## 2022-04-07 DIAGNOSIS — I48.91 UNSPECIFIED ATRIAL FIBRILLATION: ICD-10-CM

## 2022-04-07 DIAGNOSIS — E87.6 HYPOKALEMIA: ICD-10-CM

## 2022-04-07 DIAGNOSIS — E27.40 UNSPECIFIED ADRENOCORTICAL INSUFFICIENCY: ICD-10-CM

## 2022-04-07 DIAGNOSIS — I95.9 HYPOTENSION, UNSPECIFIED: ICD-10-CM

## 2022-04-07 DIAGNOSIS — Z88.2 ALLERGY STATUS TO SULFONAMIDES: ICD-10-CM

## 2022-04-07 PROBLEM — Z86.39 PERSONAL HISTORY OF OTHER ENDOCRINE, NUTRITIONAL AND METABOLIC DISEASE: Chronic | Status: ACTIVE | Noted: 2022-03-31

## 2022-04-07 LAB
ALBUMIN SERPL ELPH-MCNC: 2.5 G/DL — LOW (ref 3.3–5)
ALP SERPL-CCNC: 72 U/L — SIGNIFICANT CHANGE UP (ref 40–120)
ALT FLD-CCNC: 38 U/L — SIGNIFICANT CHANGE UP (ref 12–78)
ANION GAP SERPL CALC-SCNC: 5 MMOL/L — SIGNIFICANT CHANGE UP (ref 5–17)
APPEARANCE UR: CLEAR — SIGNIFICANT CHANGE UP
AST SERPL-CCNC: 38 U/L — HIGH (ref 15–37)
BASOPHILS # BLD AUTO: 0.02 K/UL — SIGNIFICANT CHANGE UP (ref 0–0.2)
BASOPHILS NFR BLD AUTO: 0.4 % — SIGNIFICANT CHANGE UP (ref 0–2)
BILIRUB SERPL-MCNC: 0.3 MG/DL — SIGNIFICANT CHANGE UP (ref 0.2–1.2)
BILIRUB UR-MCNC: NEGATIVE — SIGNIFICANT CHANGE UP
BUN SERPL-MCNC: 27 MG/DL — HIGH (ref 7–23)
CALCIUM SERPL-MCNC: 9.1 MG/DL — SIGNIFICANT CHANGE UP (ref 8.5–10.1)
CHLORIDE SERPL-SCNC: 100 MMOL/L — SIGNIFICANT CHANGE UP (ref 96–108)
CO2 SERPL-SCNC: 29 MMOL/L — SIGNIFICANT CHANGE UP (ref 22–31)
COLOR SPEC: YELLOW — SIGNIFICANT CHANGE UP
CREAT SERPL-MCNC: 1.23 MG/DL — SIGNIFICANT CHANGE UP (ref 0.5–1.3)
DIFF PNL FLD: NEGATIVE — SIGNIFICANT CHANGE UP
EGFR: 40 ML/MIN/1.73M2 — LOW
EOSINOPHIL # BLD AUTO: 0.01 K/UL — SIGNIFICANT CHANGE UP (ref 0–0.5)
EOSINOPHIL NFR BLD AUTO: 0.2 % — SIGNIFICANT CHANGE UP (ref 0–6)
GLUCOSE SERPL-MCNC: 138 MG/DL — HIGH (ref 70–99)
GLUCOSE UR QL: NEGATIVE — SIGNIFICANT CHANGE UP
HCT VFR BLD CALC: 32 % — LOW (ref 34.5–45)
HGB BLD-MCNC: 10.3 G/DL — LOW (ref 11.5–15.5)
IMM GRANULOCYTES NFR BLD AUTO: 0.2 % — SIGNIFICANT CHANGE UP (ref 0–1.5)
KETONES UR-MCNC: NEGATIVE — SIGNIFICANT CHANGE UP
LEUKOCYTE ESTERASE UR-ACNC: ABNORMAL
LYMPHOCYTES # BLD AUTO: 1.34 K/UL — SIGNIFICANT CHANGE UP (ref 1–3.3)
LYMPHOCYTES # BLD AUTO: 24.3 % — SIGNIFICANT CHANGE UP (ref 13–44)
MCHC RBC-ENTMCNC: 30.7 PG — SIGNIFICANT CHANGE UP (ref 27–34)
MCHC RBC-ENTMCNC: 32.2 GM/DL — SIGNIFICANT CHANGE UP (ref 32–36)
MCV RBC AUTO: 95.2 FL — SIGNIFICANT CHANGE UP (ref 80–100)
MONOCYTES # BLD AUTO: 0.54 K/UL — SIGNIFICANT CHANGE UP (ref 0–0.9)
MONOCYTES NFR BLD AUTO: 9.8 % — SIGNIFICANT CHANGE UP (ref 2–14)
NEUTROPHILS # BLD AUTO: 3.59 K/UL — SIGNIFICANT CHANGE UP (ref 1.8–7.4)
NEUTROPHILS NFR BLD AUTO: 65.1 % — SIGNIFICANT CHANGE UP (ref 43–77)
NITRITE UR-MCNC: NEGATIVE — SIGNIFICANT CHANGE UP
PH UR: 6.5 — SIGNIFICANT CHANGE UP (ref 5–8)
PLATELET # BLD AUTO: 143 K/UL — LOW (ref 150–400)
POTASSIUM SERPL-MCNC: 4.3 MMOL/L — SIGNIFICANT CHANGE UP (ref 3.5–5.3)
POTASSIUM SERPL-SCNC: 4.3 MMOL/L — SIGNIFICANT CHANGE UP (ref 3.5–5.3)
PROT SERPL-MCNC: 5.5 GM/DL — LOW (ref 6–8.3)
PROT UR-MCNC: 100
RBC # BLD: 3.36 M/UL — LOW (ref 3.8–5.2)
RBC # FLD: 15.6 % — HIGH (ref 10.3–14.5)
SODIUM SERPL-SCNC: 134 MMOL/L — LOW (ref 135–145)
SP GR SPEC: 1.01 — SIGNIFICANT CHANGE UP (ref 1.01–1.02)
UROBILINOGEN FLD QL: NEGATIVE — SIGNIFICANT CHANGE UP
WBC # BLD: 5.51 K/UL — SIGNIFICANT CHANGE UP (ref 3.8–10.5)
WBC # FLD AUTO: 5.51 K/UL — SIGNIFICANT CHANGE UP (ref 3.8–10.5)

## 2022-04-07 PROCEDURE — 71045 X-RAY EXAM CHEST 1 VIEW: CPT | Mod: 26

## 2022-04-07 PROCEDURE — 99284 EMERGENCY DEPT VISIT MOD MDM: CPT

## 2022-04-07 PROCEDURE — 93010 ELECTROCARDIOGRAM REPORT: CPT

## 2022-04-07 RX ORDER — HYDRALAZINE HCL 50 MG
10 TABLET ORAL ONCE
Refills: 0 | Status: COMPLETED | OUTPATIENT
Start: 2022-04-07 | End: 2022-04-07

## 2022-04-07 RX ORDER — NITROFURANTOIN MACROCRYSTAL 50 MG
1 CAPSULE ORAL
Qty: 10 | Refills: 0
Start: 2022-04-07 | End: 2022-04-11

## 2022-04-07 RX ADMIN — Medication 10 MILLIGRAM(S): at 00:52

## 2022-04-07 NOTE — ED ADULT NURSE NOTE - OBJECTIVE STATEMENT
pt presents to ED from [facility] for episode hypotension. evaluated in ED yesterday for hypertension and started on antihypertensives.

## 2022-04-07 NOTE — ED PROVIDER NOTE - PHYSICAL EXAMINATION
Constitutional: NAD, well appearing  HEENT: no rhinorrhea, PERRL, no oropharyngeal erythema or exudates, midline uvula.  TMs clear. +moist mucus membranes  CVS:  RRR, no m/r/g  Resp:  CTAB  GI: soft, ntnd  MSK:  no restriction to rom, full ROM to all extremities  Neuro:  A&Ox3, 5/5 strength to all extremities,  SILT to all extremities  Skin: no rash  psych: clear thought content  Heme/lymph:  No LAD

## 2022-04-07 NOTE — ED PROVIDER NOTE - CLINICAL SUMMARY MEDICAL DECISION MAKING FREE TEXT BOX
Elderly female with reported hypotension. Vitals signs here are normal. Pt is well appearing and in no distress. Pt 12 lead is normal. Pending screening, blood work and discussion with [facility] regarding events  that brought her here given pt feels well. Doubt pt was hypotensive from massive PE, massive MI, hypoglycemic, stroke, septic shock, or severe hypovolemic shock.

## 2022-04-07 NOTE — CHART NOTE - NSCHARTNOTEFT_GEN_A_CORE
CM met with patient and son at bedside, discussed dc option of TANG. Son stated that family has decided pt would return to the [facility] instead of going to [facility]. Pt is on service with home care and will be receiving home PT. Patient also has 24/7 HHA privately hired for the next 2 weeks per family.

## 2022-04-07 NOTE — ED PROVIDER NOTE - OBJECTIVE STATEMENT
89 y/o female with a PMH of H/O adrenal insufficiency, lymphoma, HTN, and iron deficiency from [facility] presents to the ED for hypotension. Daughter reports Pt bp this morning was 76 systolic and she was to weak to get up. Ems reports bp was 106 systolic and she was complaining about general malaise. Pt states that she feels fine as she ate cereal, bananas, and coffee for breakfast. Denies pain, loc, fever, chills. Pt was here yesterday in ER and discharged with hypertension suspected from fludrocortisone use. Pt has Cardio follow up at 2:30 pm today.

## 2022-04-07 NOTE — ED ADULT TRIAGE NOTE - CHIEF COMPLAINT QUOTE
pt presents to ED brought in by ambulance from [facility] due to low Bp pt was seen in  ED yesterday and treated for HTN and as per [facility] pt with low BP today on arrival to ED BP stable

## 2022-04-07 NOTE — ED ADULT NURSE REASSESSMENT NOTE - NS ED NURSE REASSESS COMMENT FT1
care assumed from previous RN. pt is A&O x3, resting in bed with comfortable appearance. family at bedside. provided pillows. ensure ordered for pt as per pt request. VSS. awaiting urine results.

## 2022-04-07 NOTE — ED PROVIDER NOTE - PATIENT PORTAL LINK FT
You can access the FollowMyHealth Patient Portal offered by [facility] by registering at the following website: [email or web address]. By joining [facility]’s FollowMyHealth portal, you will also be able to view your health information using other applications (apps) compatible with our system.

## 2022-04-07 NOTE — ED PROVIDER NOTE - PROGRESS NOTE DETAILS
Craig Sanders for attending : updated daughter on plan. No VS abnormalities throughout duration of stay. Patient appears well. No complaints. UTI on UA. ABx sent to pharmacy. Pt well appearing.  Symptoms ongoing for months.  Admitted here recently for same.  Would treat for UTI.  D/c home with strict return precautions and prompt outpatient f/u.

## 2022-04-07 NOTE — ED ADULT TRIAGE NOTE - BP NONINVASIVE SYSTOLIC (MM HG)
V.O. Dr. Dani Arcos DO/Higinio Nava RN. Morphine IR 15 mg PO TID and Q4H PRN as needed for pain. Metoclopramide 5 mg PO TID AC. Writer contacted patient in regards to Dr. Eliu Galindo orders for pain management. [phone]

## 2022-04-07 NOTE — ED PROVIDER NOTE - NSFOLLOWUPINSTRUCTIONS_ED_ALL_ED_FT
Macrobid 100mg twice a day for 5 days. for UTI.       Urinary Tract Infection, Adult       A urinary tract infection (UTI) is an infection of any part of the urinary tract. The urinary tract includes the kidneys, ureters, bladder, and urethra. These organs make, store, and get rid of urine in the body.    An upper UTI affects the ureters and kidneys. A lower UTI affects the bladder and urethra.      What are the causes?    Most urinary tract infections are caused by bacteria in your genital area around your urethra, where urine leaves your body. These bacteria grow and cause inflammation of your urinary tract.      What increases the risk?    You are more likely to develop this condition if:  •You have a urinary catheter that stays in place.      •You are not able to control when you urinate or have a bowel movement (incontinence).    •You are female and you:  •Use a spermicide or diaphragm for birth control.      •Have low estrogen levels.      •Are pregnant.        •You have certain genes that increase your risk.      •You are sexually active.      •You take antibiotic medicines.    •You have a condition that causes your flow of urine to slow down, such as:  •An enlarged prostate, if you are male.      •Blockage in your urethra.      •A kidney stone.      •A nerve condition that affects your bladder control (neurogenic bladder).      •Not getting enough to drink, or not urinating often.      •You have certain medical conditions, such as:  •Diabetes.      •A weak disease-fighting system (immunesystem).      •Sickle cell disease.      •Gout.      •Spinal cord injury.          What are the signs or symptoms?    Symptoms of this condition include:  •Needing to urinate right away (urgency).      •Frequent urination. This may include small amounts of urine each time you urinate.      •Pain or burning with urination.      •Blood in the urine.      •Urine that smells bad or unusual.      •Trouble urinating.      •Cloudy urine.      •Vaginal discharge, if you are female.      •Pain in the abdomen or the lower back.      You may also have:  •Vomiting or a decreased appetite.      •Confusion.      •Irritability or tiredness.      •A fever or chills.      •Diarrhea.      The first symptom in older adults may be confusion. In some cases, they may not have any symptoms until the infection has worsened.      How is this diagnosed?    This condition is diagnosed based on your medical history and a physical exam. You may also have other tests, including:  •Urine tests.      •Blood tests.      •Tests for STIs (sexually transmitted infections).      If you have had more than one UTI, a cystoscopy or imaging studies may be done to determine the cause of the infections.      How is this treated?    Treatment for this condition includes:  •Antibiotic medicine.      •Over-the-counter medicines to treat discomfort.      •Drinking enough water to stay hydrated.      If you have frequent infections or have other conditions such as a kidney stone, you may need to see a health care provider who specializes in the urinary tract (urologist).    In rare cases, urinary tract infections can cause sepsis. Sepsis is a life-threatening condition that occurs when the body responds to an infection. Sepsis is treated in the hospital with IV antibiotics, fluids, and other medicines.      Follow these instructions at home:     Medicines     •Take over-the-counter and prescription medicines only as told by your health care provider.      •If you were prescribed an antibiotic medicine, take it as told by your health care provider. Do not stop using the antibiotic even if you start to feel better.      General instructions   •Make sure you:  •Empty your bladder often and completely. Do not hold urine for long periods of time.      •Empty your bladder after sex.      •Wipe from front to back after urinating or having a bowel movement if you are female. Use each tissue only one time when you wipe.        •Drink enough fluid to keep your urine pale yellow.      •Keep all follow-up visits. This is important.        Contact a health care provider if:    •Your symptoms do not get better after 1–2 days.      •Your symptoms go away and then return.        Get help right away if:    •You have severe pain in your back or your lower abdomen.      •You have a fever or chills.      •You have nausea or vomiting.        Summary    •A urinary tract infection (UTI) is an infection of any part of the urinary tract, which includes the kidneys, ureters, bladder, and urethra.      •Most urinary tract infections are caused by bacteria in your genital area.      •Treatment for this condition often includes antibiotic medicines.      •If you were prescribed an antibiotic medicine, take it as told by your health care provider. Do not stop using the antibiotic even if you start to feel better.      •Keep all follow-up visits. This is important.      This information is not intended to replace advice given to you by your health care provider. Make sure you discuss any questions you have with your health care provider.

## 2022-04-09 LAB
CULTURE RESULTS: SIGNIFICANT CHANGE UP
SPECIMEN SOURCE: SIGNIFICANT CHANGE UP

## 2022-04-11 NOTE — ED POST DISCHARGE NOTE - RESULT SUMMARY
Ill-defined airspace disease on CXR from 4/7/22. noted to have normal CXR on 4/6/22. Patient contacted, states she feels well, no respiratory symptoms. Advised to follow up with PCP for additional CXR or return to ED if symptoms worsen. - Enrique Souza PA-C

## 2022-04-28 ENCOUNTER — FORM ENCOUNTER (OUTPATIENT)
Age: 87
End: 2022-04-28

## 2022-05-01 ENCOUNTER — INPATIENT (INPATIENT)
Facility: HOSPITAL | Age: 87
LOS: 5 days | Discharge: NURSING, REHAB OR LONG-TERM CARE | DRG: 291 | End: 2022-05-07
Attending: STUDENT IN AN ORGANIZED HEALTH CARE EDUCATION/TRAINING PROGRAM | Admitting: STUDENT IN AN ORGANIZED HEALTH CARE EDUCATION/TRAINING PROGRAM
Payer: MEDICARE

## 2022-05-01 VITALS — HEIGHT: 60 IN | WEIGHT: 119.05 LBS

## 2022-05-01 LAB
ALBUMIN SERPL ELPH-MCNC: 2.6 G/DL — LOW (ref 3.3–5)
ALP SERPL-CCNC: 75 U/L — SIGNIFICANT CHANGE UP (ref 40–120)
ALT FLD-CCNC: 41 U/L — SIGNIFICANT CHANGE UP (ref 12–78)
ANION GAP SERPL CALC-SCNC: 5 MMOL/L — SIGNIFICANT CHANGE UP (ref 5–17)
ANION GAP SERPL CALC-SCNC: 5 MMOL/L — SIGNIFICANT CHANGE UP (ref 5–17)
AST SERPL-CCNC: 36 U/L — SIGNIFICANT CHANGE UP (ref 15–37)
BASOPHILS # BLD AUTO: 0.02 K/UL — SIGNIFICANT CHANGE UP (ref 0–0.2)
BASOPHILS NFR BLD AUTO: 0.3 % — SIGNIFICANT CHANGE UP (ref 0–2)
BILIRUB SERPL-MCNC: 0.4 MG/DL — SIGNIFICANT CHANGE UP (ref 0.2–1.2)
BUN SERPL-MCNC: 21 MG/DL — SIGNIFICANT CHANGE UP (ref 7–23)
BUN SERPL-MCNC: 22 MG/DL — SIGNIFICANT CHANGE UP (ref 7–23)
CALCIUM SERPL-MCNC: 8.9 MG/DL — SIGNIFICANT CHANGE UP (ref 8.5–10.1)
CALCIUM SERPL-MCNC: 9.5 MG/DL — SIGNIFICANT CHANGE UP (ref 8.5–10.1)
CHLORIDE SERPL-SCNC: 102 MMOL/L — SIGNIFICANT CHANGE UP (ref 96–108)
CHLORIDE SERPL-SCNC: 103 MMOL/L — SIGNIFICANT CHANGE UP (ref 96–108)
CO2 SERPL-SCNC: 29 MMOL/L — SIGNIFICANT CHANGE UP (ref 22–31)
CO2 SERPL-SCNC: 32 MMOL/L — HIGH (ref 22–31)
CREAT SERPL-MCNC: 1.14 MG/DL — SIGNIFICANT CHANGE UP (ref 0.5–1.3)
CREAT SERPL-MCNC: 1.15 MG/DL — SIGNIFICANT CHANGE UP (ref 0.5–1.3)
EGFR: 43 ML/MIN/1.73M2 — LOW
EGFR: 44 ML/MIN/1.73M2 — LOW
EOSINOPHIL # BLD AUTO: 0.01 K/UL — SIGNIFICANT CHANGE UP (ref 0–0.5)
EOSINOPHIL NFR BLD AUTO: 0.1 % — SIGNIFICANT CHANGE UP (ref 0–6)
GLUCOSE SERPL-MCNC: 89 MG/DL — SIGNIFICANT CHANGE UP (ref 70–99)
GLUCOSE SERPL-MCNC: 99 MG/DL — SIGNIFICANT CHANGE UP (ref 70–99)
HCT VFR BLD CALC: 32.6 % — LOW (ref 34.5–45)
HGB BLD-MCNC: 10.4 G/DL — LOW (ref 11.5–15.5)
IMM GRANULOCYTES NFR BLD AUTO: 0.1 % — SIGNIFICANT CHANGE UP (ref 0–1.5)
LYMPHOCYTES # BLD AUTO: 1.49 K/UL — SIGNIFICANT CHANGE UP (ref 1–3.3)
LYMPHOCYTES # BLD AUTO: 21.5 % — SIGNIFICANT CHANGE UP (ref 13–44)
MCHC RBC-ENTMCNC: 31 PG — SIGNIFICANT CHANGE UP (ref 27–34)
MCHC RBC-ENTMCNC: 31.9 GM/DL — LOW (ref 32–36)
MCV RBC AUTO: 97.3 FL — SIGNIFICANT CHANGE UP (ref 80–100)
MONOCYTES # BLD AUTO: 0.79 K/UL — SIGNIFICANT CHANGE UP (ref 0–0.9)
MONOCYTES NFR BLD AUTO: 11.4 % — SIGNIFICANT CHANGE UP (ref 2–14)
NEUTROPHILS # BLD AUTO: 4.6 K/UL — SIGNIFICANT CHANGE UP (ref 1.8–7.4)
NEUTROPHILS NFR BLD AUTO: 66.6 % — SIGNIFICANT CHANGE UP (ref 43–77)
NT-PROBNP SERPL-SCNC: 3910 PG/ML — HIGH (ref 0–450)
PLATELET # BLD AUTO: 146 K/UL — LOW (ref 150–400)
POTASSIUM SERPL-MCNC: 2.9 MMOL/L — CRITICAL LOW (ref 3.5–5.3)
POTASSIUM SERPL-MCNC: 4 MMOL/L — SIGNIFICANT CHANGE UP (ref 3.5–5.3)
POTASSIUM SERPL-SCNC: 2.9 MMOL/L — CRITICAL LOW (ref 3.5–5.3)
POTASSIUM SERPL-SCNC: 4 MMOL/L — SIGNIFICANT CHANGE UP (ref 3.5–5.3)
PROT SERPL-MCNC: 5.8 GM/DL — LOW (ref 6–8.3)
RBC # BLD: 3.35 M/UL — LOW (ref 3.8–5.2)
RBC # FLD: 16.2 % — HIGH (ref 10.3–14.5)
SODIUM SERPL-SCNC: 136 MMOL/L — SIGNIFICANT CHANGE UP (ref 135–145)
SODIUM SERPL-SCNC: 140 MMOL/L — SIGNIFICANT CHANGE UP (ref 135–145)
TROPONIN I, HIGH SENSITIVITY RESULT: 32.8 NG/L — SIGNIFICANT CHANGE UP
WBC # BLD: 6.92 K/UL — SIGNIFICANT CHANGE UP (ref 3.8–10.5)
WBC # FLD AUTO: 6.92 K/UL — SIGNIFICANT CHANGE UP (ref 3.8–10.5)

## 2022-05-01 PROCEDURE — 97530 THERAPEUTIC ACTIVITIES: CPT | Mod: GP

## 2022-05-01 PROCEDURE — 80048 BASIC METABOLIC PNL TOTAL CA: CPT

## 2022-05-01 PROCEDURE — 94760 N-INVAS EAR/PLS OXIMETRY 1: CPT

## 2022-05-01 PROCEDURE — 97163 PT EVAL HIGH COMPLEX 45 MIN: CPT | Mod: GP

## 2022-05-01 PROCEDURE — 99223 1ST HOSP IP/OBS HIGH 75: CPT

## 2022-05-01 PROCEDURE — 76770 US EXAM ABDO BACK WALL COMP: CPT

## 2022-05-01 PROCEDURE — 36415 COLL VENOUS BLD VENIPUNCTURE: CPT

## 2022-05-01 PROCEDURE — 85027 COMPLETE CBC AUTOMATED: CPT

## 2022-05-01 PROCEDURE — 99285 EMERGENCY DEPT VISIT HI MDM: CPT

## 2022-05-01 PROCEDURE — 83880 ASSAY OF NATRIURETIC PEPTIDE: CPT

## 2022-05-01 PROCEDURE — 97116 GAIT TRAINING THERAPY: CPT | Mod: GP

## 2022-05-01 PROCEDURE — 71045 X-RAY EXAM CHEST 1 VIEW: CPT | Mod: 26

## 2022-05-01 PROCEDURE — U0005: CPT

## 2022-05-01 PROCEDURE — U0003: CPT

## 2022-05-01 PROCEDURE — 84100 ASSAY OF PHOSPHORUS: CPT

## 2022-05-01 PROCEDURE — 83735 ASSAY OF MAGNESIUM: CPT

## 2022-05-01 RX ORDER — LANOLIN ALCOHOL/MO/W.PET/CERES
3 CREAM (GRAM) TOPICAL AT BEDTIME
Refills: 0 | Status: DISCONTINUED | OUTPATIENT
Start: 2022-05-01 | End: 2022-05-07

## 2022-05-01 RX ORDER — HYDRALAZINE HCL 50 MG
100 TABLET ORAL EVERY 12 HOURS
Refills: 0 | Status: DISCONTINUED | OUTPATIENT
Start: 2022-05-01 | End: 2022-05-02

## 2022-05-01 RX ORDER — ACETAMINOPHEN 500 MG
650 TABLET ORAL EVERY 6 HOURS
Refills: 0 | Status: DISCONTINUED | OUTPATIENT
Start: 2022-05-01 | End: 2022-05-07

## 2022-05-01 RX ORDER — LOSARTAN POTASSIUM 100 MG/1
100 TABLET, FILM COATED ORAL DAILY
Refills: 0 | Status: DISCONTINUED | OUTPATIENT
Start: 2022-05-01 | End: 2022-05-02

## 2022-05-01 RX ORDER — FAMOTIDINE 10 MG/ML
20 INJECTION INTRAVENOUS DAILY
Refills: 0 | Status: DISCONTINUED | OUTPATIENT
Start: 2022-05-01 | End: 2022-05-07

## 2022-05-01 RX ORDER — POLYETHYLENE GLYCOL 3350 17 G/17G
17 POWDER, FOR SOLUTION ORAL
Qty: 0 | Refills: 0 | DISCHARGE

## 2022-05-01 RX ORDER — ATORVASTATIN CALCIUM 80 MG/1
10 TABLET, FILM COATED ORAL AT BEDTIME
Refills: 0 | Status: DISCONTINUED | OUTPATIENT
Start: 2022-05-01 | End: 2022-05-07

## 2022-05-01 RX ORDER — METOPROLOL TARTRATE 50 MG
25 TABLET ORAL DAILY
Refills: 0 | Status: DISCONTINUED | OUTPATIENT
Start: 2022-05-01 | End: 2022-05-01

## 2022-05-01 RX ORDER — METHENAMINE, SODIUM SALICYLATE 162; 162.5 MG/1; MG/1
1 TABLET ORAL
Qty: 0 | Refills: 0 | DISCHARGE

## 2022-05-01 RX ORDER — PREGABALIN 225 MG/1
1000 CAPSULE ORAL DAILY
Refills: 0 | Status: DISCONTINUED | OUTPATIENT
Start: 2022-05-01 | End: 2022-05-07

## 2022-05-01 RX ORDER — AMIODARONE HYDROCHLORIDE 400 MG/1
200 TABLET ORAL DAILY
Refills: 0 | Status: DISCONTINUED | OUTPATIENT
Start: 2022-05-01 | End: 2022-05-07

## 2022-05-01 RX ORDER — FLUDROCORTISONE ACETATE 0.1 MG/1
0.05 TABLET ORAL DAILY
Refills: 0 | Status: DISCONTINUED | OUTPATIENT
Start: 2022-05-01 | End: 2022-05-07

## 2022-05-01 RX ORDER — LORATADINE 10 MG/1
10 TABLET ORAL DAILY
Refills: 0 | Status: DISCONTINUED | OUTPATIENT
Start: 2022-05-01 | End: 2022-05-07

## 2022-05-01 RX ORDER — HYDROCORTISONE 20 MG
5 TABLET ORAL DAILY
Refills: 0 | Status: DISCONTINUED | OUTPATIENT
Start: 2022-05-01 | End: 2022-05-07

## 2022-05-01 RX ORDER — OFLOXACIN 0.3 %
1 DROPS OPHTHALMIC (EYE)
Qty: 0 | Refills: 0 | DISCHARGE

## 2022-05-01 RX ORDER — HEPARIN SODIUM 5000 [USP'U]/ML
5000 INJECTION INTRAVENOUS; SUBCUTANEOUS EVERY 8 HOURS
Refills: 0 | Status: DISCONTINUED | OUTPATIENT
Start: 2022-05-01 | End: 2022-05-07

## 2022-05-01 RX ORDER — HYDRALAZINE HCL 50 MG
50 TABLET ORAL
Refills: 0 | Status: DISCONTINUED | OUTPATIENT
Start: 2022-05-01 | End: 2022-05-01

## 2022-05-01 RX ORDER — CARVEDILOL PHOSPHATE 80 MG/1
12.5 CAPSULE, EXTENDED RELEASE ORAL EVERY 12 HOURS
Refills: 0 | Status: DISCONTINUED | OUTPATIENT
Start: 2022-05-01 | End: 2022-05-02

## 2022-05-01 RX ORDER — FUROSEMIDE 40 MG
80 TABLET ORAL ONCE
Refills: 0 | Status: COMPLETED | OUTPATIENT
Start: 2022-05-01 | End: 2022-05-01

## 2022-05-01 RX ORDER — CHOLECALCIFEROL (VITAMIN D3) 125 MCG
1000 CAPSULE ORAL DAILY
Refills: 0 | Status: DISCONTINUED | OUTPATIENT
Start: 2022-05-01 | End: 2022-05-07

## 2022-05-01 RX ORDER — ONDANSETRON 8 MG/1
4 TABLET, FILM COATED ORAL EVERY 8 HOURS
Refills: 0 | Status: DISCONTINUED | OUTPATIENT
Start: 2022-05-01 | End: 2022-05-07

## 2022-05-01 RX ORDER — HYDROCORTISONE 20 MG
10 TABLET ORAL DAILY
Refills: 0 | Status: DISCONTINUED | OUTPATIENT
Start: 2022-05-01 | End: 2022-05-07

## 2022-05-01 RX ORDER — POTASSIUM CHLORIDE 20 MEQ
40 PACKET (EA) ORAL EVERY 4 HOURS
Refills: 0 | Status: COMPLETED | OUTPATIENT
Start: 2022-05-01 | End: 2022-05-01

## 2022-05-01 RX ORDER — NITROGLYCERIN 6.5 MG
0.5 CAPSULE, EXTENDED RELEASE ORAL ONCE
Refills: 0 | Status: COMPLETED | OUTPATIENT
Start: 2022-05-01 | End: 2022-05-01

## 2022-05-01 RX ORDER — NEOMYCIN/POLYMYXIN B/DEXAMETHA 0.1 %
1 SUSPENSION, DROPS(FINAL DOSAGE FORM)(ML) OPHTHALMIC (EYE)
Qty: 0 | Refills: 0 | DISCHARGE

## 2022-05-01 RX ORDER — ERYTHROMYCIN BASE 5 MG/GRAM
1 OINTMENT (GRAM) OPHTHALMIC (EYE)
Qty: 0 | Refills: 0 | DISCHARGE

## 2022-05-01 RX ADMIN — Medication 80 MILLIGRAM(S): at 12:58

## 2022-05-01 RX ADMIN — Medication 0.5 INCH(S): at 15:45

## 2022-05-01 RX ADMIN — CARVEDILOL PHOSPHATE 12.5 MILLIGRAM(S): 80 CAPSULE, EXTENDED RELEASE ORAL at 22:22

## 2022-05-01 RX ADMIN — HEPARIN SODIUM 5000 UNIT(S): 5000 INJECTION INTRAVENOUS; SUBCUTANEOUS at 22:22

## 2022-05-01 RX ADMIN — Medication 5 MILLIGRAM(S): at 22:22

## 2022-05-01 RX ADMIN — Medication 40 MILLIEQUIVALENT(S): at 18:17

## 2022-05-01 RX ADMIN — LOSARTAN POTASSIUM 100 MILLIGRAM(S): 100 TABLET, FILM COATED ORAL at 16:37

## 2022-05-01 RX ADMIN — Medication 100 MILLIGRAM(S): at 22:21

## 2022-05-01 RX ADMIN — Medication 40 MILLIEQUIVALENT(S): at 12:31

## 2022-05-01 RX ADMIN — AMIODARONE HYDROCHLORIDE 200 MILLIGRAM(S): 400 TABLET ORAL at 16:37

## 2022-05-01 RX ADMIN — ATORVASTATIN CALCIUM 10 MILLIGRAM(S): 80 TABLET, FILM COATED ORAL at 22:22

## 2022-05-01 NOTE — H&P ADULT - NSHPPHYSICALEXAM_GEN_ALL_CORE
Physical Exam: Vital Signs Last 24 Hrs  T(C): 36.5 (01 May 2022 21:30), Max: 36.7 (01 May 2022 10:43)  T(F): 97.7 (01 May 2022 21:30), Max: 98 (01 May 2022 10:43)  HR: 63 (01 May 2022 21:30) (56 - 67)  BP: 165/51 (01 May 2022 21:30) (147/36 - 216/63)  BP(mean): 96 (01 May 2022 14:54) (88 - 106)  RR: 18 (01 May 2022 21:30) (14 - 20)  SpO2: 94% (01 May 2022 21:30) (92% - 98%)        HEENT: PRRL EOMI    MOUTH/TEETH/GUMS: Clear    NECK: no JVD    LUNGS: decreased BS at bases , no wheezing     HEART: S1,S2 RR    ABDOMEN: soft nontender    EXTREMITIES:  +1 ,  pedal edema    MUSCULOSKELETAL: no joint swelling     NEURO: no tremor, no focal signs.    SKIN: no rash    : CVA negative,

## 2022-05-01 NOTE — ED PROVIDER NOTE - CONSTITUTIONAL, MLM
Frail elderly female, awake, alert, oriented to person, place, time/situation and in mild distress. normal...

## 2022-05-01 NOTE — H&P ADULT - ASSESSMENT
89 yo female with Hx. of lymphoma, HTN, Hyponatremia, Atrial fib. conv to NSR, Adrenal   insufficiency  who was sent to the ER from the [facility] assisted living  because she was c/o SOB, The patient symptoms started yesterday and got worse this am. The patients new private aid got to her at 8 am when she was c/o worsening SOB, took her to the facility nurse  who recommended the patient to come to the ER. The patient was hospitalized on 3/31/22 for acute on chr. diastolic  CHF and was d/c on 4/4/22.  assessment Dx:                       1. Acute on chronic Diastolic CHF                        2. Hypokalemia                        3. Pleural effusions                       4. Atrial fib. conv to NSR     Plan:    admit to telemetry                medications: the patient received 80 mg lasix in the ED                VTEP: Heparin                Labs: cbc,bmp               Radiology: CXRay               Cardiac diagnostics: EKG , NSR                Consults: Cardiology                 Advance Directive: full code,                                   89 yo female with Hx. of lymphoma, HTN, Hyponatremia, Atrial fib. conv to NSR, Adrenal   insufficiency  who was sent to the ER from the [facility] assisted living  because she was c/o SOB, The patient symptoms started yesterday and got worse this am. The patients new private aid got to her at 8 am when she was c/o worsening SOB, took her to the facility nurse  who recommended the patient to come to the ER. The patient was hospitalized on 3/31/22 for acute on chr. diastolic  CHF and was d/c on 4/4/22.  assessment Dx:                       1. Acute on chronic Diastolic CHF                        2. Hypokalemia                        3. Pleural effusions                       4. Atrial fib. conv to NSR                        5. HTN            Plan:    admit to telemetry                medications: the patient received 80 mg lasix in the ED  and K supplement repeat K 4 , prior to Lasix 2.9                continue home medications as per med rec.                VTEP: Heparin                Labs: cbc,bmp               Radiology: CXRay               Cardiac diagnostics: EKG , NSR                Consults: Cardiology                 Advance Directive: full code,

## 2022-05-01 NOTE — H&P ADULT - HISTORY OF PRESENT ILLNESS
HPI: The patient is a 89 yo female with Hx. of lymphoma, HTN, Hyponatremia, Atrial fib. conv to NSR, Adrenal   insufficiency  who was sent to the ER from the [facility] assisted living  because she was c/o SOB, The patient symptoms started yesterday and got worse this am. The patients new private aid got to her at 8 am when she was c/o worsening SOB, took her to the facility nurse  who recommended the patient to come to the ER. The patient was hospitalized on 3/31/22 for acute on chr. diastolic  CHF and was d/c on 4/4/22.    PMHx:  lymphoma, HTN, Hyponatremia, Atrial fib. conv to NSR, Adrenal   insufficiency      PSHx:  no significant past surgical Hx.     Family Hx: Father had CVA, Mother had colon CA     Social Hx.: not smoking, no alcohol use

## 2022-05-01 NOTE — ED CLERICAL - NS ED CLERK NOTE PRE-ARRIVAL INFORMATION; ADDITIONAL PRE-ARRIVAL INFORMATION
This patient is enrolled in the [facility] readmission reduction program and has active care navigation. This patient can be followed up by the care navigation team within 24 hours. To arrange close follow-up or to obtain additional clinical information about this patient, please call the contact number above.

## 2022-05-01 NOTE — ED ADULT TRIAGE NOTE - CHIEF COMPLAINT QUOTE
patient ambulatory with walker to ED c/o difficulty breathing.  patient reports dyspnea on exertion.  patient also notes pain in right side of chest radiating to back while bending down. hx CHF.

## 2022-05-01 NOTE — ED ADULT NURSE NOTE - OBJECTIVE STATEMENT
pt came to ED for evaluation of dyspnea of exertion x 3 days. denies CP. denies any other pain or complaints.

## 2022-05-01 NOTE — ED PROVIDER NOTE - OBJECTIVE STATEMENT
89 y/o female with a PMHx of H/O adrenal insufficiency, lymphoma, HTN, and iron deficiency presents to the ED c/o SOB x 1 week. Pt states that SOB gets worse when she bends down and gets radiating CP. Denies any recent fevers. Pt has no other complaints at this time.

## 2022-05-01 NOTE — H&P ADULT - NSHPREVIEWOFSYSTEMS_GEN_ALL_CORE
ROS:   Eyes: no changes in vision  ENT/Mouth: no changes    Cardiovascular: no chest pain    Respiratory: has  SOB    Gastrointestinal: no diarrhea, no nausea, no vomiting    Genitourinary: no dysuria    Breast: no pain    Musculoskeletal: no pain    Integumentary: no itching    Neurological: No Headache, no tremor,    Endocrine: no excessive thirst,     Allergic/Immunologic: no itching

## 2022-05-01 NOTE — ED PROVIDER NOTE - MUSCULOSKELETAL, MLM
Spine appears normal, range of motion is not limited, no muscle or joint tenderness. Trace lower extremity swelling.

## 2022-05-01 NOTE — PATIENT PROFILE ADULT - FALL HARM RISK - HARM RISK INTERVENTIONS

## 2022-05-01 NOTE — H&P ADULT - NSHPLABSRESULTS_GEN_ALL_CORE
LABS: All Labs Reviewed:                          10.4   6.92  )-----------( 146      ( 01 May 2022 11:30 )             32.6       05-01    140  |  103  |  21  ----------------------------<  99  4.0   |  32<H>  |  1.14    Ca    9.5      01 May 2022 16:43    TPro  5.8<L>  /  Alb  2.6<L>  /  TBili  0.4  /  DBili  x   /  AST  36  /  ALT  41  /  AlkPhos  75  05-01  LIVER FUNCTIONS - ( 01 May 2022 11:30 )  Alb: 2.6 g/dL / Pro: 5.8 gm/dL / ALK PHOS: 75 U/L / ALT: 41 U/L / AST: 36 U/L / GGT: x           CXRay small bilateral pleural effusion,

## 2022-05-02 DIAGNOSIS — I50.9 HEART FAILURE, UNSPECIFIED: ICD-10-CM

## 2022-05-02 LAB
ANION GAP SERPL CALC-SCNC: 4 MMOL/L — LOW (ref 5–17)
BUN SERPL-MCNC: 20 MG/DL — SIGNIFICANT CHANGE UP (ref 7–23)
CALCIUM SERPL-MCNC: 8.5 MG/DL — SIGNIFICANT CHANGE UP (ref 8.5–10.1)
CHLORIDE SERPL-SCNC: 104 MMOL/L — SIGNIFICANT CHANGE UP (ref 96–108)
CO2 SERPL-SCNC: 30 MMOL/L — SIGNIFICANT CHANGE UP (ref 22–31)
CREAT SERPL-MCNC: 1.09 MG/DL — SIGNIFICANT CHANGE UP (ref 0.5–1.3)
EGFR: 46 ML/MIN/1.73M2 — LOW
GLUCOSE SERPL-MCNC: 81 MG/DL — SIGNIFICANT CHANGE UP (ref 70–99)
HCT VFR BLD CALC: 28.8 % — LOW (ref 34.5–45)
HGB BLD-MCNC: 9.2 G/DL — LOW (ref 11.5–15.5)
MCHC RBC-ENTMCNC: 31.2 PG — SIGNIFICANT CHANGE UP (ref 27–34)
MCHC RBC-ENTMCNC: 31.9 GM/DL — LOW (ref 32–36)
MCV RBC AUTO: 97.6 FL — SIGNIFICANT CHANGE UP (ref 80–100)
PLATELET # BLD AUTO: 133 K/UL — LOW (ref 150–400)
POTASSIUM SERPL-MCNC: 3.3 MMOL/L — LOW (ref 3.5–5.3)
POTASSIUM SERPL-SCNC: 3.3 MMOL/L — LOW (ref 3.5–5.3)
RBC # BLD: 2.95 M/UL — LOW (ref 3.8–5.2)
RBC # FLD: 16.4 % — HIGH (ref 10.3–14.5)
SODIUM SERPL-SCNC: 138 MMOL/L — SIGNIFICANT CHANGE UP (ref 135–145)
WBC # BLD: 4.74 K/UL — SIGNIFICANT CHANGE UP (ref 3.8–10.5)
WBC # FLD AUTO: 4.74 K/UL — SIGNIFICANT CHANGE UP (ref 3.8–10.5)

## 2022-05-02 PROCEDURE — 99232 SBSQ HOSP IP/OBS MODERATE 35: CPT

## 2022-05-02 RX ORDER — METOPROLOL TARTRATE 50 MG
1 TABLET ORAL
Qty: 0 | Refills: 0 | DISCHARGE

## 2022-05-02 RX ORDER — HYDRALAZINE HCL 50 MG
50 TABLET ORAL
Refills: 0 | Status: DISCONTINUED | OUTPATIENT
Start: 2022-05-02 | End: 2022-05-03

## 2022-05-02 RX ORDER — LISINOPRIL 2.5 MG/1
40 TABLET ORAL DAILY
Refills: 0 | Status: DISCONTINUED | OUTPATIENT
Start: 2022-05-02 | End: 2022-05-07

## 2022-05-02 RX ORDER — POTASSIUM CHLORIDE 20 MEQ
40 PACKET (EA) ORAL ONCE
Refills: 0 | Status: COMPLETED | OUTPATIENT
Start: 2022-05-02 | End: 2022-05-02

## 2022-05-02 RX ORDER — CARVEDILOL PHOSPHATE 80 MG/1
25 CAPSULE, EXTENDED RELEASE ORAL EVERY 12 HOURS
Refills: 0 | Status: DISCONTINUED | OUTPATIENT
Start: 2022-05-02 | End: 2022-05-07

## 2022-05-02 RX ORDER — SPIRONOLACTONE 25 MG/1
25 TABLET, FILM COATED ORAL DAILY
Refills: 0 | Status: DISCONTINUED | OUTPATIENT
Start: 2022-05-02 | End: 2022-05-04

## 2022-05-02 RX ADMIN — Medication 40 MILLIEQUIVALENT(S): at 18:41

## 2022-05-02 RX ADMIN — AMIODARONE HYDROCHLORIDE 200 MILLIGRAM(S): 400 TABLET ORAL at 10:07

## 2022-05-02 RX ADMIN — ATORVASTATIN CALCIUM 10 MILLIGRAM(S): 80 TABLET, FILM COATED ORAL at 22:19

## 2022-05-02 RX ADMIN — FLUDROCORTISONE ACETATE 0.1 MILLIGRAM(S): 0.1 TABLET ORAL at 10:08

## 2022-05-02 RX ADMIN — HEPARIN SODIUM 5000 UNIT(S): 5000 INJECTION INTRAVENOUS; SUBCUTANEOUS at 05:46

## 2022-05-02 RX ADMIN — Medication 50 MILLIGRAM(S): at 22:20

## 2022-05-02 RX ADMIN — PREGABALIN 1000 MICROGRAM(S): 225 CAPSULE ORAL at 10:07

## 2022-05-02 RX ADMIN — Medication 50 MILLIGRAM(S): at 10:08

## 2022-05-02 RX ADMIN — Medication 5 MILLIGRAM(S): at 22:20

## 2022-05-02 RX ADMIN — CARVEDILOL PHOSPHATE 25 MILLIGRAM(S): 80 CAPSULE, EXTENDED RELEASE ORAL at 22:19

## 2022-05-02 RX ADMIN — FAMOTIDINE 20 MILLIGRAM(S): 10 INJECTION INTRAVENOUS at 10:07

## 2022-05-02 RX ADMIN — LISINOPRIL 40 MILLIGRAM(S): 2.5 TABLET ORAL at 18:41

## 2022-05-02 RX ADMIN — HEPARIN SODIUM 5000 UNIT(S): 5000 INJECTION INTRAVENOUS; SUBCUTANEOUS at 15:07

## 2022-05-02 RX ADMIN — Medication 1000 UNIT(S): at 10:07

## 2022-05-02 RX ADMIN — Medication 10 MILLIGRAM(S): at 10:08

## 2022-05-02 RX ADMIN — CARVEDILOL PHOSPHATE 25 MILLIGRAM(S): 80 CAPSULE, EXTENDED RELEASE ORAL at 10:08

## 2022-05-02 RX ADMIN — HEPARIN SODIUM 5000 UNIT(S): 5000 INJECTION INTRAVENOUS; SUBCUTANEOUS at 22:19

## 2022-05-02 RX ADMIN — SPIRONOLACTONE 25 MILLIGRAM(S): 25 TABLET, FILM COATED ORAL at 10:07

## 2022-05-02 NOTE — CONSULT NOTE ADULT - SUBJECTIVE AND OBJECTIVE BOX
CARDIOLOGY AND HEART FAILURE ATTENDING.     Patient is a 89y old  Female who presents with a chief complaint of Hypokalemia, CHF.       HPI:  HPI: The patient is a 89 yo female with Hx. of lymphoma, HTN, Hyponatremia, Atrial fib. conv to NSR, Adrenal   insufficiency  who was sent to the ER from the [facility] living  because she was c/o SOB, The patient symptoms started yesterday and got worse this am. The patients new private aid got to her at 8 am when she was c/o worsening SOB, took her to the facility nurse  who recommended the patient to come to the ER. The patient was hospitalized on 3/31/22 for acute on chr. diastolic  CHF and was d/c on 4/4/22.    Pt seen and examined this am.  She denies any symptoms at rest.     PMHx:  lymphoma, HTN, Hyponatremia, Atrial fib. conv to NSR, Adrenal   insufficiency      PSHx:  no significant past surgical Hx.     Family Hx: Father had CVA, Mother had colon CA     Social Hx.: not smoking, no alcohol use      PAST MEDICAL & SURGICAL HISTORY:  Iron deficiency    HTN (hypertension)    Lymphoma    H/O adrenal insufficiency    No significant past surgical history        MEDICATIONS  (STANDING):  aMIOdarone    Tablet 200 milliGRAM(s) Oral daily  atorvastatin 10 milliGRAM(s) Oral at bedtime  carvedilol 25 milliGRAM(s) Oral every 12 hours  cholecalciferol 1000 Unit(s) Oral daily  cyanocobalamin 1000 MICROGram(s) Oral daily  famotidine    Tablet 20 milliGRAM(s) Oral daily  fludroCORTISONE 0.1 milliGRAM(s) Oral daily  heparin   Injectable 5000 Unit(s) SubCutaneous every 8 hours  hydrALAZINE 50 milliGRAM(s) Oral two times a day  hydrocortisone 5 milliGRAM(s) Oral daily  hydrocortisone 10 milliGRAM(s) Oral daily  lisinopril 40 milliGRAM(s) Oral daily  spironolactone 25 milliGRAM(s) Oral daily    MEDICATIONS  (PRN):  acetaminophen     Tablet .. 650 milliGRAM(s) Oral every 6 hours PRN Temp greater or equal to 38C (100.4F), Mild Pain (1 - 3)  aluminum hydroxide/magnesium hydroxide/simethicone Suspension 30 milliLiter(s) Oral every 4 hours PRN Dyspepsia  loratadine 10 milliGRAM(s) Oral daily PRN allergic rhinitis  melatonin 3 milliGRAM(s) Oral at bedtime PRN Insomnia  ondansetron Injectable 4 milliGRAM(s) IV Push every 8 hours PRN Nausea and/or Vomiting      FAMILY HISTORY: No family history of premature CAD or SCD.       SOCIAL HISTORY: no recent smoking      REVIEW OF SYSTEMS:  CONSTITUTIONAL:    No fatigue, malaise, lethargy.  No fever or chills.  RESPIRATORY:  No cough.  No wheeze.  No hemoptysis.  c/o shortness of breath.  CARDIOVASCULAR:  No chest pains.  No palpitations. c/o shortness of breath, No orthopnea or PND. c/o edema   GASTROINTESTINAL:  No abdominal pain.  No nausea or vomiting.    GENITOURINARY:    No hematuria.    MUSCULOSKELETAL:  No musculoskeletal pain.  No joint swelling.  No arthritis.  NEUROLOGICAL:  No tingling or numbness or weakness.  PSYCHIATRIC:  No confusion  SKIN:  No rashes.          Vital Signs Last 24 Hrs  T(C): 36.8 (02 May 2022 06:12), Max: 36.8 (02 May 2022 06:12)  T(F): 98.2 (02 May 2022 06:12), Max: 98.2 (02 May 2022 06:12)  HR: 65 (02 May 2022 06:12) (56 - 67)  BP: 168/50 (02 May 2022 06:12) (147/36 - 216/63)  BP(mean): 96 (01 May 2022 14:54) (88 - 106)  RR: 18 (01 May 2022 21:30) (14 - 20)  SpO2: 96% (02 May 2022 06:12) (92% - 98%)    PHYSICAL EXAM-    Constitutional: elderly frail female in no acute distress     Head: Head is normocephalic and atraumatic.      Neck: No JVD.     Cardiovascular: Regular rate and rhythm without S3, S4. No murmurs or rubs are appreciated.      Respiratory: Breath sounds are normal. No rales. No wheezing.    Abdomen: Soft, nontender, nondistended with positive bowel sounds.      Extremity: No tenderness. there is trace b/l pedal   pitting edema     Neurologic: The patient is alert and oriented.      Skin: No rash, no obvious lesions noted.      Psychiatric: The patient appears to be emotionally stable.      INTERPRETATION OF TELEMETRY: SR     ECG: Sinus rythm , poor  R wave progression, prolonged QT interval.     I&O's Detail      LABS:                        10.4   6.92  )-----------( 146      ( 01 May 2022 11:30 )             32.6     05-01    140  |  103  |  21  ----------------------------<  99  4.0   |  32<H>  |  1.14    Ca    9.5      01 May 2022 16:43    TPro  5.8<L>  /  Alb  2.6<L>  /  TBili  0.4  /  DBili  x   /  AST  36  /  ALT  41  /  AlkPhos  75  05-01            I&O's Summary    BNPSerum Pro-Brain Natriuretic Peptide: 3910 pg/mL (05-01 @ 11:30)    RADIOLOGY & ADDITIONAL STUDIES:  ch< from: Xray Chest 1 View-PORTABLE IMMEDIATE (05.01.22 @ 12:07) >    ACC: [id number] EXAM:  XR CHEST PORTABLE IMMED 1V                          PROCEDURE DATE:  05/01/2022          INTERPRETATION:  XR CHEST IMMEDIATE    Single AP view    HISTORY:  Shortness of Breath    Comparison: Chest x-ray 4/7/2022    Cardiac Looprecorder.    The cardiac silhouette is within normal limits. The lungs are clear.   Small bilateral pleural effusions and/or atelectasis. Status post   kyphoplasty.    IMPRESSION: Small bilateral pleural effusions and/or atelectasis    --- End of Report ---            BIN BA MD; Attending Radiologist  This document has been electronically signed. May  1 2022  4:01PM    < end of copied text >  < from: CT Head No Cont (04.06.22 @ 22:04) >      < from: TTE Echo Complete w/o Contrast w/ Doppler (03.31.22 @ 19:09) >     Impression     Summary     Moderate concentric left ventricular hypertrophy is present.   Basal septal hypertrophy is seen measuring 1.7cm.   Estimated left ventricular ejection fraction is 55-60 %.   Normal appearing left atrium.   Mild aortic sclerosis is present with normal valvular opening.   Mild (1+) to moderate aortic regurgitation is present pressure half time   is 215cm/s2.   Mild mitral annular calcification is present.   The mitral valve leaflets appear thickened.   Mild (1+) mitral regurgitation is present.   EA reversal of the mitral inflow consistent with reduced compliance of   the   left ventricle.   The tricuspid valve leaflets appear mildly thickened and/or calcified,   but   open well.   Mild (1+) tricuspid valve regurgitation is present.   Normal appearing pulmonic valve structure.   Trace pulmonic valvular regurgitation is present.     Signature     ----------------------------------------------------------------   Electronically signed by Steven Cui MD(Interpreting   physician) on 03/31/2022 10:38 PM   ----------------------------------------------------------------    < end of copied text >

## 2022-05-02 NOTE — PROGRESS NOTE ADULT - SUBJECTIVE AND OBJECTIVE BOX
HOSPITALIST PROGRESS NOTE:  SUBJECTIVE:  PCP:  Chief Complaint: Patient is a 89y old  Female who presents with a chief complaint of Hypokalemia, CHF (02 May 2022 07:59)      HPI:  HPI: The patient is a 89 yo female with Hx. of lymphoma, HTN, Hyponatremia, Atrial fib. conv to NSR, Adrenal   insufficiency  who was sent to the ER from the [facility] living  because she was c/o SOB, The patient symptoms started yesterday and got worse this am. The patients new private aid got to her at 8 am when she was c/o worsening SOB, took her to the facility nurse  who recommended the patient to come to the ER. The patient was hospitalized on 3/31/22 for acute on chr. diastolic  CHF and was d/c on 4/4/22.    5/2:  Patient denies any Dyspnea. states that she feels better; No CP     Allergies:  penicillin (Hives)  sulfa drugs (Hives)  tetracycline (Hives)    REVIEW OF SYSTEMS:  See HPI. All other review of systems is negative unless indicated above.     OBJECTIVE  Physical Exam:  Vital Signs:    Vital Signs Last 24 Hrs  T(C): 36.9 (02 May 2022 15:55), Max: 36.9 (02 May 2022 15:55)  T(F): 98.4 (02 May 2022 15:55), Max: 98.4 (02 May 2022 15:55)  HR: 57 (02 May 2022 15:55) (57 - 67)  BP: 148/49 (02 May 2022 15:55) (147/36 - 168/50)  BP(mean): --  RR: 18 (02 May 2022 15:55) (16 - 18)  SpO2: 95% (02 May 2022 15:55) (94% - 98%)  I&O's Summary      Constitutional: NAD, awake and alert  Neurological: AAO x 3, no focal deficits  HEENT: PERRLA, EOMI, MMM  Neck: Soft and supple, No LAD, No JVD  Respiratory: Breath sounds are clear bilaterally, No wheezing, rales or rhonchi  Cardiovascular: S1 and S2, regular rate and rhythm; no Murmurs, gallops or rubs  Gastrointestinal: Bowel Sounds present, soft, nontender, nondistended, no guarding, no rebound tenderness  Back: No CVA tenderness   Extremities: + peripheral edema B/L  Vascular: 2+ peripheral pulses  Musculoskeletal: 5/5 strength b/l upper and lower extremities  Skin: No rashes  Breast: Deferred  Rectal: Deferred    MEDICATIONS  (STANDING):  aMIOdarone    Tablet 200 milliGRAM(s) Oral daily  atorvastatin 10 milliGRAM(s) Oral at bedtime  carvedilol 25 milliGRAM(s) Oral every 12 hours  cholecalciferol 1000 Unit(s) Oral daily  cyanocobalamin 1000 MICROGram(s) Oral daily  famotidine    Tablet 20 milliGRAM(s) Oral daily  fludroCORTISONE 0.1 milliGRAM(s) Oral daily  heparin   Injectable 5000 Unit(s) SubCutaneous every 8 hours  hydrALAZINE 50 milliGRAM(s) Oral two times a day  hydrocortisone 10 milliGRAM(s) Oral daily  hydrocortisone 5 milliGRAM(s) Oral daily  lisinopril 40 milliGRAM(s) Oral daily  potassium chloride    Tablet ER 40 milliEquivalent(s) Oral once  spironolactone 25 milliGRAM(s) Oral daily      LABS: All Labs Reviewed:                        9.2    4.74  )-----------( 133      ( 02 May 2022 08:39 )             28.8     05-02    138  |  104  |  20  ----------------------------<  81  3.3<L>   |  30  |  1.09    Ca    8.5      02 May 2022 08:39    TPro  5.8<L>  /  Alb  2.6<L>  /  TBili  0.4  /  DBili  x   /  AST  36  /  ALT  41  /  AlkPhos  75  05-01        RADIOLOGY/EKG:    < from: Xray Chest 1 View-PORTABLE IMMEDIATE (05.01.22 @ 12:07) >    IMPRESSION: Small bilateral pleural effusions and/or atelectasis      < end of copied text >

## 2022-05-02 NOTE — CONSULT NOTE ADULT - ASSESSMENT
Hypertensive emergency- unclear if she had high sodium diet.  She has labile HTN with SBP any where from 70 to 200.  Will DC losartan and start lisinopril 40 mg daily- no allergies in past.  Will increase coreg to 25mg BID  Will decrease hydralazine for now to 50mg BID- with this med she has hours of drug fee period during the day with BID dosin.  Will add spirnolactone 25mg BID- this will help with diuresis, neurohormonal benefit and HTN.  Low sodium diet.        Acute on chronic decompensated HFPEF- almost close to euvolemia  NYHA class III.  got lasix at admission.  can continue lasix 40mg po daily   Diuresis with close monitoring of the renal function and electrolytes.  Goal potassium of 4 and magnesium of 2.   Strict I/O and daily wt checks. Low sodium diet. Nutrition education.   Ischemic workup not performed given advanced age and comorbidities.      Anemia- Management per primary team.     Adrenal insufficiency- Management per primary team.     Hyperlipidemia- continue statin.     Other medical issues- Management per primary team.   Thank you for allowing me to participate in the care of this patient. Please feel free to contact me with any questions.

## 2022-05-03 LAB
ANION GAP SERPL CALC-SCNC: 3 MMOL/L — LOW (ref 5–17)
BUN SERPL-MCNC: 22 MG/DL — SIGNIFICANT CHANGE UP (ref 7–23)
CALCIUM SERPL-MCNC: 9 MG/DL — SIGNIFICANT CHANGE UP (ref 8.5–10.1)
CHLORIDE SERPL-SCNC: 104 MMOL/L — SIGNIFICANT CHANGE UP (ref 96–108)
CO2 SERPL-SCNC: 31 MMOL/L — SIGNIFICANT CHANGE UP (ref 22–31)
CREAT SERPL-MCNC: 1.24 MG/DL — SIGNIFICANT CHANGE UP (ref 0.5–1.3)
EGFR: 40 ML/MIN/1.73M2 — LOW
GLUCOSE SERPL-MCNC: 86 MG/DL — SIGNIFICANT CHANGE UP (ref 70–99)
MAGNESIUM SERPL-MCNC: 2.3 MG/DL — SIGNIFICANT CHANGE UP (ref 1.6–2.6)
NT-PROBNP SERPL-SCNC: 2188 PG/ML — HIGH (ref 0–450)
PHOSPHATE SERPL-MCNC: 2.8 MG/DL — SIGNIFICANT CHANGE UP (ref 2.5–4.5)
POTASSIUM SERPL-MCNC: 4.4 MMOL/L — SIGNIFICANT CHANGE UP (ref 3.5–5.3)
POTASSIUM SERPL-SCNC: 4.4 MMOL/L — SIGNIFICANT CHANGE UP (ref 3.5–5.3)
SODIUM SERPL-SCNC: 138 MMOL/L — SIGNIFICANT CHANGE UP (ref 135–145)

## 2022-05-03 PROCEDURE — 99232 SBSQ HOSP IP/OBS MODERATE 35: CPT

## 2022-05-03 RX ORDER — HYDRALAZINE HCL 50 MG
10 TABLET ORAL ONCE
Refills: 0 | Status: COMPLETED | OUTPATIENT
Start: 2022-05-03 | End: 2022-05-03

## 2022-05-03 RX ORDER — HYDRALAZINE HCL 50 MG
100 TABLET ORAL
Refills: 0 | Status: DISCONTINUED | OUTPATIENT
Start: 2022-05-03 | End: 2022-05-07

## 2022-05-03 RX ORDER — FUROSEMIDE 40 MG
20 TABLET ORAL DAILY
Refills: 0 | Status: DISCONTINUED | OUTPATIENT
Start: 2022-05-03 | End: 2022-05-05

## 2022-05-03 RX ORDER — ASPIRIN/CALCIUM CARB/MAGNESIUM 324 MG
81 TABLET ORAL DAILY
Refills: 0 | Status: DISCONTINUED | OUTPATIENT
Start: 2022-05-03 | End: 2022-05-04

## 2022-05-03 RX ADMIN — Medication 100 MILLIGRAM(S): at 22:17

## 2022-05-03 RX ADMIN — Medication 10 MILLIGRAM(S): at 09:27

## 2022-05-03 RX ADMIN — Medication 10 MILLIGRAM(S): at 07:01

## 2022-05-03 RX ADMIN — FLUDROCORTISONE ACETATE 0.1 MILLIGRAM(S): 0.1 TABLET ORAL at 09:31

## 2022-05-03 RX ADMIN — CARVEDILOL PHOSPHATE 25 MILLIGRAM(S): 80 CAPSULE, EXTENDED RELEASE ORAL at 09:26

## 2022-05-03 RX ADMIN — Medication 100 MILLIGRAM(S): at 09:26

## 2022-05-03 RX ADMIN — ATORVASTATIN CALCIUM 10 MILLIGRAM(S): 80 TABLET, FILM COATED ORAL at 22:18

## 2022-05-03 RX ADMIN — AMIODARONE HYDROCHLORIDE 200 MILLIGRAM(S): 400 TABLET ORAL at 09:27

## 2022-05-03 RX ADMIN — Medication 5 MILLIGRAM(S): at 22:18

## 2022-05-03 RX ADMIN — PREGABALIN 1000 MICROGRAM(S): 225 CAPSULE ORAL at 09:27

## 2022-05-03 RX ADMIN — FAMOTIDINE 20 MILLIGRAM(S): 10 INJECTION INTRAVENOUS at 09:27

## 2022-05-03 RX ADMIN — HEPARIN SODIUM 5000 UNIT(S): 5000 INJECTION INTRAVENOUS; SUBCUTANEOUS at 06:00

## 2022-05-03 RX ADMIN — Medication 20 MILLIGRAM(S): at 17:11

## 2022-05-03 RX ADMIN — Medication 1000 UNIT(S): at 09:30

## 2022-05-03 RX ADMIN — LISINOPRIL 40 MILLIGRAM(S): 2.5 TABLET ORAL at 09:30

## 2022-05-03 RX ADMIN — SPIRONOLACTONE 25 MILLIGRAM(S): 25 TABLET, FILM COATED ORAL at 09:26

## 2022-05-03 RX ADMIN — HEPARIN SODIUM 5000 UNIT(S): 5000 INJECTION INTRAVENOUS; SUBCUTANEOUS at 22:17

## 2022-05-03 RX ADMIN — CARVEDILOL PHOSPHATE 25 MILLIGRAM(S): 80 CAPSULE, EXTENDED RELEASE ORAL at 22:16

## 2022-05-03 RX ADMIN — HEPARIN SODIUM 5000 UNIT(S): 5000 INJECTION INTRAVENOUS; SUBCUTANEOUS at 15:12

## 2022-05-03 NOTE — PROGRESS NOTE ADULT - SUBJECTIVE AND OBJECTIVE BOX
HOSPITALIST PROGRESS NOTE:  SUBJECTIVE:  PCP:  Chief Complaint: Patient is a 89y old  Female who presents with a chief complaint of Hypokalemia, CHF (02 May 2022 07:59)      HPI:  HPI: The patient is a 89 yo female with Hx. of lymphoma, HTN, Hyponatremia, Atrial fib. conv to NSR, Adrenal   insufficiency  who was sent to the ER from the [facility] assisted living  because she was c/o SOB, The patient symptoms started yesterday and got worse this am. The patients new private aid got to her at 8 am when she was c/o worsening SOB, took her to the facility nurse  who recommended the patient to come to the ER. The patient was hospitalized on 3/31/22 for acute on chr. diastolic  CHF and was d/c on 4/4/22.    5/2:  Patient denies any Dyspnea. states that she feels better; No CP   5/3:  Patients BP still elevated; patient denies any CP or dyspnea    Allergies:  penicillin (Hives)  sulfa drugs (Hives)  tetracycline (Hives)    REVIEW OF SYSTEMS:  See HPI. All other review of systems is negative unless indicated above.     OBJECTIVE  Physical Exam:  Vital Signs Last 24 Hrs  T(C): 36.2 (03 May 2022 08:56), Max: 36.9 (02 May 2022 15:55)  T(F): 97.1 (03 May 2022 08:56), Max: 98.4 (02 May 2022 15:55)  HR: 65 (03 May 2022 12:55) (57 - 68)  BP: 154/54 (03 May 2022 12:55) (148/49 - 209/61)  BP(mean): --  RR: 19 (03 May 2022 08:56) (18 - 19)  SpO2: 93% (03 May 2022 08:56) (93% - 96%)      Constitutional: NAD, awake and alert  Neurological: AAO x 3, no focal deficits  HEENT: PERRLA, EOMI, MMM  Neck: Soft and supple, No LAD, No JVD  Respiratory: Breath sounds are clear bilaterally, No wheezing, rales or rhonchi  Cardiovascular: S1 and S2, regular rate and rhythm; no Murmurs, gallops or rubs  Gastrointestinal: Bowel Sounds present, soft, nontender, nondistended, no guarding, no rebound tenderness  Back: No CVA tenderness   Extremities: + peripheral edema B/L  Vascular: 2+ peripheral pulses  Musculoskeletal: 5/5 strength b/l upper and lower extremities  Skin: No rashes  Breast: Deferred  Rectal: Deferred    MEDICATIONS  (STANDING):  aMIOdarone    Tablet 200 milliGRAM(s) Oral daily  atorvastatin 10 milliGRAM(s) Oral at bedtime  carvedilol 25 milliGRAM(s) Oral every 12 hours  cholecalciferol 1000 Unit(s) Oral daily  cyanocobalamin 1000 MICROGram(s) Oral daily  famotidine    Tablet 20 milliGRAM(s) Oral daily  fludroCORTISONE 0.1 milliGRAM(s) Oral daily  heparin   Injectable 5000 Unit(s) SubCutaneous every 8 hours  hydrALAZINE 50 milliGRAM(s) Oral two times a day  hydrocortisone 10 milliGRAM(s) Oral daily  hydrocortisone 5 milliGRAM(s) Oral daily  lisinopril 40 milliGRAM(s) Oral daily  potassium chloride    Tablet ER 40 milliEquivalent(s) Oral once  spironolactone 25 milliGRAM(s) Oral daily    Lab Results:  CBC  CBC Full  -  ( 02 May 2022 08:39 )  WBC Count : 4.74 K/uL  RBC Count : 2.95 M/uL  Hemoglobin : 9.2 g/dL  Hematocrit : 28.8 %  Platelet Count - Automated : 133 K/uL  Mean Cell Volume : 97.6 fl  Mean Cell Hemoglobin : 31.2 pg  Mean Cell Hemoglobin Concentration : 31.9 gm/dL  Auto Neutrophil # : x  Auto Lymphocyte # : x  Auto Monocyte # : x  Auto Eosinophil # : x  Auto Basophil # : x  Auto Neutrophil % : x  Auto Lymphocyte % : x  Auto Monocyte % : x  Auto Eosinophil % : x  Auto Basophil % : x    .		Differential:	[] Automated		[] Manual  Chemistry                        9.2    4.74  )-----------( 133      ( 02 May 2022 08:39 )             28.8     05-03    138  |  104  |  22  ----------------------------<  86  4.4   |  31  |  1.24    Ca    9.0      03 May 2022 07:56  Phos  2.8     05-03  Mg     2.3     05-03      RADIOLOGY/EKG:    < from: Xray Chest 1 View-PORTABLE IMMEDIATE (05.01.22 @ 12:07) >    IMPRESSION: Small bilateral pleural effusions and/or atelectasis      < end of copied text >

## 2022-05-03 NOTE — PROGRESS NOTE ADULT - SUBJECTIVE AND OBJECTIVE BOX
Cardiology Progress Note    HPI: The patient is a 89 yo female with Hx. of lymphoma, HTN, Hyponatremia, Atrial fib. conv to NSR, Adrenal   insufficiency  who was sent to the ER from the [facility] assisted living  because she was c/o SOB, The patient symptoms started yesterday and got worse this am. The patients new private aid got to her at 8 am when she was c/o worsening SOB, took her to the facility nurse  who recommended the patient to come to the ER. The patient was hospitalized on 3/31/22 for acute on chr. diastolic  CHF and was d/c on 4/4/22.    5/3. Chart reviewed. No CP/SOB. SR on tele.   No events last pm.     PMHx:  lymphoma, HTN, Hyponatremia, Atrial fib. conv to NSR, Adrenal   insufficiency      PSHx:  no significant past surgical Hx.     Family Hx: Father had CVA, Mother had colon CA     Social Hx.: not smoking, no alcohol use    PAST MEDICAL & SURGICAL HISTORY:  Iron deficiency  HTN (hypertension)  Lymphoma  H/O adrenal insufficiency  No significant past surgical history    MEDICATIONS  (STANDING):  aMIOdarone    Tablet 200 milliGRAM(s) Oral daily  atorvastatin 10 milliGRAM(s) Oral at bedtime  carvedilol 25 milliGRAM(s) Oral every 12 hours  cholecalciferol 1000 Unit(s) Oral daily  cyanocobalamin 1000 MICROGram(s) Oral daily  famotidine    Tablet 20 milliGRAM(s) Oral daily  fludroCORTISONE 0.1 milliGRAM(s) Oral daily  heparin   Injectable 5000 Unit(s) SubCutaneous every 8 hours  hydrALAZINE 50 milliGRAM(s) Oral two times a day  hydrocortisone 5 milliGRAM(s) Oral daily  hydrocortisone 10 milliGRAM(s) Oral daily  lisinopril 40 milliGRAM(s) Oral daily  spironolactone 25 milliGRAM(s) Oral daily    MEDICATIONS  (PRN):  acetaminophen     Tablet .. 650 milliGRAM(s) Oral every 6 hours PRN Temp greater or equal to 38C (100.4F), Mild Pain (1 - 3)  aluminum hydroxide/magnesium hydroxide/simethicone Suspension 30 milliLiter(s) Oral every 4 hours PRN Dyspepsia  loratadine 10 milliGRAM(s) Oral daily PRN allergic rhinitis  melatonin 3 milliGRAM(s) Oral at bedtime PRN Insomnia  ondansetron Injectable 4 milliGRAM(s) IV Push every 8 hours PRN Nausea and/or Vomiting    REVIEW OF SYSTEMS:  CONSTITUTIONAL:    No fatigue, malaise, lethargy.  No fever or chills.  RESPIRATORY:  No cough.  No wheeze.  No hemoptysis.  c/o shortness of breath.  CARDIOVASCULAR:  No chest pains.  No palpitations. c/o shortness of breath, No orthopnea or PND. c/o edema   GASTROINTESTINAL:  No abdominal pain.  No nausea or vomiting.    GENITOURINARY:    No hematuria.    MUSCULOSKELETAL:  No musculoskeletal pain.  No joint swelling.  No arthritis.  NEUROLOGICAL:  No tingling or numbness or weakness.    Vital Signs Last 24 Hrs  T(C): 36.8 (02 May 2022 06:12), Max: 36.8 (02 May 2022 06:12)  T(F): 98.2 (02 May 2022 06:12), Max: 98.2 (02 May 2022 06:12)  HR: 65 (02 May 2022 06:12) (56 - 67)  BP: 168/50 (02 May 2022 06:12) (147/36 - 216/63)  BP(mean): 96 (01 May 2022 14:54) (88 - 106)  RR: 18 (01 May 2022 21:30) (14 - 20)  SpO2: 96% (02 May 2022 06:12) (92% - 98%)    PHYSICAL EXAM-    Constitutional: elderly frail female in no acute distress   Head: Head is normocephalic and atraumatic.    Neck: No JVD.   Cardiovascular: Regular rate and rhythm without S3, S4. No murmurs or rubs are appreciated.    Respiratory: Breath sounds are normal. No rales. No wheezing.  Abdomen: Soft, nontender, nondistended with positive bowel sounds.    Extremity: No tenderness. there is trace b/l pedal   pitting edema     INTERPRETATION OF TELEMETRY: SR     ECG: Sinus rythm , poor  R wave progression, prolonged QT interval.     I&O's Detail      LABS:                        10.4   6.92  )-----------( 146      ( 01 May 2022 11:30 )             32.6     05-01    140  |  103  |  21  ----------------------------<  99  4.0   |  32<H>  |  1.14    Ca    9.5      01 May 2022 16:43    TPro  5.8<L>  /  Alb  2.6<L>  /  TBili  0.4  /  DBili  x   /  AST  36  /  ALT  41  /  AlkPhos  75  05-01    I&O's Summary    BNPSerum Pro-Brain Natriuretic Peptide: 3910 pg/mL (05-01 @ 11:30)    RADIOLOGY & ADDITIONAL STUDIES:  ch< from: Xray Chest 1 View-PORTABLE IMMEDIATE (05.01.22 @ 12:07) >    ACC: [id number] EXAM:  XR CHEST PORTABLE IMMED 1V                          PROCEDURE DATE:  05/01/2022          INTERPRETATION:  XR CHEST IMMEDIATE    Single AP view    HISTORY:  Shortness of Breath    Comparison: Chest x-ray 4/7/2022    Cardiac Looprecorder.    The cardiac silhouette is within normal limits. The lungs are clear.   Small bilateral pleural effusions and/or atelectasis. Status post   kyphoplasty.    IMPRESSION: Small bilateral pleural effusions and/or atelectasis    --- End of Report ---            BIN BA MD; Attending Radiologist  This document has been electronically signed. May  1 2022  4:01PM    < end of copied text >  < from: CT Head No Cont (04.06.22 @ 22:04) >      < from: TTE Echo Complete w/o Contrast w/ Doppler (03.31.22 @ 19:09) >     Impression     Summary     Moderate concentric left ventricular hypertrophy is present.   Basal septal hypertrophy is seen measuring 1.7cm.   Estimated left ventricular ejection fraction is 55-60 %.   Normal appearing left atrium.   Mild aortic sclerosis is present with normal valvular opening.   Mild (1+) to moderate aortic regurgitation is present pressure half time   is 215cm/s2.   Mild mitral annular calcification is present.   The mitral valve leaflets appear thickened.   Mild (1+) mitral regurgitation is present.   EA reversal of the mitral inflow consistent with reduced compliance of   the   left ventricle.   The tricuspid valve leaflets appear mildly thickened and/or calcified,   but   open well.   Mild (1+) tricuspid valve regurgitation is present.   Normal appearing pulmonic valve structure.   Trace pulmonic valvular regurgitation is present.     Signature     ----------------------------------------------------------------   Electronically signed by Steven Cui MD([facility]   physician) on 03/31/2022 10:38 PM   ----------------------------------------------------------------    < end of copied text >

## 2022-05-04 ENCOUNTER — APPOINTMENT (OUTPATIENT)
Dept: INTERNAL MEDICINE | Facility: CLINIC | Age: 87
End: 2022-05-04

## 2022-05-04 LAB
ANION GAP SERPL CALC-SCNC: 4 MMOL/L — LOW (ref 5–17)
BUN SERPL-MCNC: 24 MG/DL — HIGH (ref 7–23)
CALCIUM SERPL-MCNC: 9 MG/DL — SIGNIFICANT CHANGE UP (ref 8.5–10.1)
CHLORIDE SERPL-SCNC: 103 MMOL/L — SIGNIFICANT CHANGE UP (ref 96–108)
CO2 SERPL-SCNC: 30 MMOL/L — SIGNIFICANT CHANGE UP (ref 22–31)
CREAT SERPL-MCNC: 1.34 MG/DL — HIGH (ref 0.5–1.3)
EGFR: 36 ML/MIN/1.73M2 — LOW
GLUCOSE SERPL-MCNC: 86 MG/DL — SIGNIFICANT CHANGE UP (ref 70–99)
MAGNESIUM SERPL-MCNC: 2.2 MG/DL — SIGNIFICANT CHANGE UP (ref 1.6–2.6)
PHOSPHATE SERPL-MCNC: 3.4 MG/DL — SIGNIFICANT CHANGE UP (ref 2.5–4.5)
POTASSIUM SERPL-MCNC: 4.2 MMOL/L — SIGNIFICANT CHANGE UP (ref 3.5–5.3)
POTASSIUM SERPL-SCNC: 4.2 MMOL/L — SIGNIFICANT CHANGE UP (ref 3.5–5.3)
SODIUM SERPL-SCNC: 137 MMOL/L — SIGNIFICANT CHANGE UP (ref 135–145)

## 2022-05-04 PROCEDURE — 99232 SBSQ HOSP IP/OBS MODERATE 35: CPT

## 2022-05-04 RX ORDER — FLUDROCORTISONE ACETATE 0.1 MG/1
0.05 TABLET ORAL ONCE
Refills: 0 | Status: COMPLETED | OUTPATIENT
Start: 2022-05-04 | End: 2022-05-04

## 2022-05-04 RX ADMIN — Medication 100 MILLIGRAM(S): at 09:43

## 2022-05-04 RX ADMIN — HEPARIN SODIUM 5000 UNIT(S): 5000 INJECTION INTRAVENOUS; SUBCUTANEOUS at 05:59

## 2022-05-04 RX ADMIN — AMIODARONE HYDROCHLORIDE 200 MILLIGRAM(S): 400 TABLET ORAL at 09:42

## 2022-05-04 RX ADMIN — SPIRONOLACTONE 25 MILLIGRAM(S): 25 TABLET, FILM COATED ORAL at 09:44

## 2022-05-04 RX ADMIN — HEPARIN SODIUM 5000 UNIT(S): 5000 INJECTION INTRAVENOUS; SUBCUTANEOUS at 16:39

## 2022-05-04 RX ADMIN — LISINOPRIL 40 MILLIGRAM(S): 2.5 TABLET ORAL at 09:44

## 2022-05-04 RX ADMIN — Medication 100 MILLIGRAM(S): at 21:50

## 2022-05-04 RX ADMIN — FLUDROCORTISONE ACETATE 0.05 MILLIGRAM(S): 0.1 TABLET ORAL at 22:47

## 2022-05-04 RX ADMIN — CARVEDILOL PHOSPHATE 25 MILLIGRAM(S): 80 CAPSULE, EXTENDED RELEASE ORAL at 09:43

## 2022-05-04 RX ADMIN — Medication 1000 UNIT(S): at 09:43

## 2022-05-04 RX ADMIN — PREGABALIN 1000 MICROGRAM(S): 225 CAPSULE ORAL at 09:42

## 2022-05-04 RX ADMIN — Medication 5 MILLIGRAM(S): at 21:49

## 2022-05-04 RX ADMIN — ATORVASTATIN CALCIUM 10 MILLIGRAM(S): 80 TABLET, FILM COATED ORAL at 21:50

## 2022-05-04 RX ADMIN — Medication 20 MILLIGRAM(S): at 09:44

## 2022-05-04 RX ADMIN — Medication 81 MILLIGRAM(S): at 09:42

## 2022-05-04 RX ADMIN — FAMOTIDINE 20 MILLIGRAM(S): 10 INJECTION INTRAVENOUS at 09:43

## 2022-05-04 RX ADMIN — Medication 10 MILLIGRAM(S): at 09:44

## 2022-05-04 RX ADMIN — CARVEDILOL PHOSPHATE 25 MILLIGRAM(S): 80 CAPSULE, EXTENDED RELEASE ORAL at 21:50

## 2022-05-04 RX ADMIN — HEPARIN SODIUM 5000 UNIT(S): 5000 INJECTION INTRAVENOUS; SUBCUTANEOUS at 21:50

## 2022-05-04 NOTE — PHYSICAL THERAPY INITIAL EVALUATION ADULT - ADDITIONAL COMMENTS
Patient was ambulating with [employer] and via assistance at [facility] P[facility]

## 2022-05-04 NOTE — PHYSICAL THERAPY INITIAL EVALUATION ADULT - MODALITIES TREATMENT COMMENTS
Pt left OOB in chair in NAD with [facility] Intact. CBIR. Pt instructed to not get up alone. Chair alarm activated. KENNETH Ayala made aware

## 2022-05-04 NOTE — PROGRESS NOTE ADULT - SUBJECTIVE AND OBJECTIVE BOX
Cardiology Progress Note    HPI: The patient is a 89 yo female with Hx. of lymphoma, HTN, Hyponatremia, Atrial fib. conv to NSR, Adrenal   insufficiency  who was sent to the ER from the [facility] assisted living  because she was c/o SOB, The patient symptoms started yesterday and got worse this am. The patients new private aid got to her at 8 am when she was c/o worsening SOB, took her to the facility nurse  who recommended the patient to come to the ER. The patient was hospitalized on 3/31/22 for acute on chr. diastolic  CHF and was d/c on 4/4/22.    5/3. Chart reviewed. No CP/SOB. SR on tele.   No events last pm.     5/4. Had a cough last pm, not productive. No CP/SOB.  SR on tele. Ambulating to bathroom. No other events.      PMHx:  lymphoma, HTN, Hyponatremia, Atrial fib. conv to NSR, Adrenal   insufficiency      PSHx:  no significant past surgical Hx.     Family Hx: Father had CVA, Mother had colon CA     Social Hx.: not smoking, no alcohol use    PAST MEDICAL & SURGICAL HISTORY:  Iron deficiency  HTN (hypertension)  Lymphoma  H/O adrenal insufficiency  No significant past surgical history    MEDICATIONS  (STANDING):  aMIOdarone    Tablet 200 milliGRAM(s) Oral daily  atorvastatin 10 milliGRAM(s) Oral at bedtime  carvedilol 25 milliGRAM(s) Oral every 12 hours  cholecalciferol 1000 Unit(s) Oral daily  cyanocobalamin 1000 MICROGram(s) Oral daily  famotidine    Tablet 20 milliGRAM(s) Oral daily  fludroCORTISONE 0.1 milliGRAM(s) Oral daily  heparin   Injectable 5000 Unit(s) SubCutaneous every 8 hours  hydrALAZINE 50 milliGRAM(s) Oral two times a day  hydrocortisone 5 milliGRAM(s) Oral daily  hydrocortisone 10 milliGRAM(s) Oral daily  lisinopril 40 milliGRAM(s) Oral daily  spironolactone 25 milliGRAM(s) Oral daily    MEDICATIONS  (PRN):  acetaminophen     Tablet .. 650 milliGRAM(s) Oral every 6 hours PRN Temp greater or equal to 38C (100.4F), Mild Pain (1 - 3)  aluminum hydroxide/magnesium hydroxide/simethicone Suspension 30 milliLiter(s) Oral every 4 hours PRN Dyspepsia  loratadine 10 milliGRAM(s) Oral daily PRN allergic rhinitis  melatonin 3 milliGRAM(s) Oral at bedtime PRN Insomnia  ondansetron Injectable 4 milliGRAM(s) IV Push every 8 hours PRN Nausea and/or Vomiting    REVIEW OF SYSTEMS:  CONSTITUTIONAL:    No fatigue, malaise, lethargy.  No fever or chills.  RESPIRATORY:  No cough.  No wheeze.  No hemoptysis.  c/o shortness of breath.  CARDIOVASCULAR:  No chest pains.  No palpitations. c/o shortness of breath, No orthopnea or PND. c/o edema   GASTROINTESTINAL:  No abdominal pain.  No nausea or vomiting.    GENITOURINARY:    No hematuria.    MUSCULOSKELETAL:  No musculoskeletal pain.  No joint swelling.  No arthritis.  NEUROLOGICAL:  No tingling or numbness or weakness.    Vital Signs Last 24 Hrs  T(C): 36.8 (02 May 2022 06:12), Max: 36.8 (02 May 2022 06:12)  T(F): 98.2 (02 May 2022 06:12), Max: 98.2 (02 May 2022 06:12)  HR: 65 (02 May 2022 06:12) (56 - 67)  BP: 168/50 (02 May 2022 06:12) (147/36 - 216/63)  BP(mean): 96 (01 May 2022 14:54) (88 - 106)  RR: 18 (01 May 2022 21:30) (14 - 20)  SpO2: 96% (02 May 2022 06:12) (92% - 98%)    PHYSICAL EXAM-    Constitutional: elderly frail female in no acute distress   Head: Head is normocephalic and atraumatic.    Neck: No JVD.   Cardiovascular: Regular rate and rhythm without S3, S4. No murmurs or rubs are appreciated.    Respiratory: Breath sounds are normal. No rales. No wheezing.  Abdomen: Soft, nontender, nondistended with positive bowel sounds.    Extremity: No tenderness. there is trace b/l pedal   pitting edema     INTERPRETATION OF TELEMETRY: SR     ECG: Sinus rythm , poor  R wave progression, prolonged QT interval.     I&O's Detail      LABS:                        10.4   6.92  )-----------( 146      ( 01 May 2022 11:30 )             32.6     05-01    140  |  103  |  21  ----------------------------<  99  4.0   |  32<H>  |  1.14    Ca    9.5      01 May 2022 16:43    TPro  5.8<L>  /  Alb  2.6<L>  /  TBili  0.4  /  DBili  x   /  AST  36  /  ALT  41  /  AlkPhos  75  05-01    I&O's Summary    BNPSerum Pro-Brain Natriuretic Peptide: 3910 pg/mL (05-01 @ 11:30)    RADIOLOGY & ADDITIONAL STUDIES:  ch< from: Xray Chest 1 View-PORTABLE IMMEDIATE (05.01.22 @ 12:07) >    ACC: [id number] EXAM:  XR CHEST PORTABLE IMMED 1V                          PROCEDURE DATE:  05/01/2022          INTERPRETATION:  XR CHEST IMMEDIATE    Single AP view    HISTORY:  Shortness of Breath    Comparison: Chest x-ray 4/7/2022    Cardiac Looprecorder.    The cardiac silhouette is within normal limits. The lungs are clear.   Small bilateral pleural effusions and/or atelectasis. Status post   kyphoplasty.    IMPRESSION: Small bilateral pleural effusions and/or atelectasis    --- End of Report ---            BIN BA MD; Attending Radiologist  This document has been electronically signed. May  1 2022  4:01PM    < end of copied text >  < from: CT Head No Cont (04.06.22 @ 22:04) >      < from: TTE Echo Complete w/o Contrast w/ Doppler (03.31.22 @ 19:09) >     Impression     Summary     Moderate concentric left ventricular hypertrophy is present.   Basal septal hypertrophy is seen measuring 1.7cm.   Estimated left ventricular ejection fraction is 55-60 %.   Normal appearing left atrium.   Mild aortic sclerosis is present with normal valvular opening.   Mild (1+) to moderate aortic regurgitation is present pressure half time   is 215cm/s2.   Mild mitral annular calcification is present.   The mitral valve leaflets appear thickened.   Mild (1+) mitral regurgitation is present.   EA reversal of the mitral inflow consistent with reduced compliance of   the   left ventricle.   The tricuspid valve leaflets appear mildly thickened and/or calcified,   but   open well.   Mild (1+) tricuspid valve regurgitation is present.   Normal appearing pulmonic valve structure.   Trace pulmonic valvular regurgitation is present.     Signature     ----------------------------------------------------------------   Electronically signed by Steven TORRESInterpreting   physician) on 03/31/2022 10:38 PM   ----------------------------------------------------------------    < end of copied text >

## 2022-05-04 NOTE — PROGRESS NOTE ADULT - SUBJECTIVE AND OBJECTIVE BOX
HOSPITALIST PROGRESS NOTE:  SUBJECTIVE:  PCP:  Chief Complaint: Patient is a 89y old  Female who presents with a chief complaint of Hypokalemia, CHF (02 May 2022 07:59)      HPI:  HPI: The patient is a 89 yo female with Hx. of lymphoma, HTN, Hyponatremia, Atrial fib. conv to NSR, Adrenal   insufficiency  who was sent to the ER from the [facility] assisted living  because she was c/o SOB, The patient symptoms started yesterday and got worse this am. The patients new private aid got to her at 8 am when she was c/o worsening SOB, took her to the facility nurse  who recommended the patient to come to the ER. The patient was hospitalized on 3/31/22 for acute on chr. diastolic  CHF and was d/c on 4/4/22.    5/2:  Patient denies any Dyspnea. states that she feels better; No CP   5/3:  Patients BP still elevated; patient denies any CP or dyspnea  5/4:  Patients bp better this morning; jump in cr; she has no other complaints; discussed plan wiht patients daughter     Allergies:  penicillin (Hives)  sulfa drugs (Hives)  tetracycline (Hives)    REVIEW OF SYSTEMS:  See HPI. All other review of systems is negative unless indicated above.     OBJECTIVE  Physical Exam:  Vital Signs Last 24 Hrs  T(C): 36.6 (04 May 2022 09:01), Max: 36.7 (03 May 2022 16:34)  T(F): 97.8 (04 May 2022 09:01), Max: 98 (03 May 2022 16:34)  HR: 60 (04 May 2022 11:29) (60 - 67)  BP: 125/37 (04 May 2022 11:29) (125/37 - 190/52)  BP(mean): --  RR: 18 (04 May 2022 11:29) (18 - 19)  SpO2: 93% (04 May 2022 09:01) (93% - 96%)    Constitutional: NAD, awake and alert  Neurological: no focal deficits  HEENT: PERRLA, EOMI, MMM  Neck: Soft and supple, No LAD, No JVD  Respiratory: Breath sounds are clear bilaterally, No wheezing, rales or rhonchi  Cardiovascular: S1 and S2, regular rate and rhythm; no Murmurs, gallops or rubs  Gastrointestinal: Bowel Sounds present, soft, nontender, nondistended, no guarding, no rebound tenderness  Back: No CVA tenderness   Extremities: + peripheral edema B/L  Vascular: 2+ peripheral pulses  Musculoskeletal: 5/5 strength b/l upper and lower extremities  Skin: No rashes  Breast: Deferred  Rectal: Deferred    MEDICATIONS  (STANDING):  aMIOdarone    Tablet 200 milliGRAM(s) Oral daily  atorvastatin 10 milliGRAM(s) Oral at bedtime  carvedilol 25 milliGRAM(s) Oral every 12 hours  cholecalciferol 1000 Unit(s) Oral daily  cyanocobalamin 1000 MICROGram(s) Oral daily  famotidine    Tablet 20 milliGRAM(s) Oral daily  fludroCORTISONE 0.1 milliGRAM(s) Oral daily  heparin   Injectable 5000 Unit(s) SubCutaneous every 8 hours  hydrALAZINE 50 milliGRAM(s) Oral two times a day  hydrocortisone 10 milliGRAM(s) Oral daily  hydrocortisone 5 milliGRAM(s) Oral daily  lisinopril 40 milliGRAM(s) Oral daily  potassium chloride    Tablet ER 40 milliEquivalent(s) Oral once  spironolactone 25 milliGRAM(s) Oral daily    Lab Results:  CBC    .		Differential:	[] Automated		[] Manual  Chemistry    05-04    137  |  103  |  24<H>  ----------------------------<  86  4.2   |  30  |  1.34<H>    Ca    9.0      04 May 2022 07:50  Phos  3.4     05-04  Mg     2.2     05-04    RADIOLOGY/EKG:    < from: Xray Chest 1 View-PORTABLE IMMEDIATE (05.01.22 @ 12:07) >    IMPRESSION: Small bilateral pleural effusions and/or atelectasis      < end of copied text >

## 2022-05-05 ENCOUNTER — TRANSCRIPTION ENCOUNTER (OUTPATIENT)
Age: 87
End: 2022-05-05

## 2022-05-05 LAB
ANION GAP SERPL CALC-SCNC: 6 MMOL/L — SIGNIFICANT CHANGE UP (ref 5–17)
BUN SERPL-MCNC: 23 MG/DL — SIGNIFICANT CHANGE UP (ref 7–23)
CALCIUM SERPL-MCNC: 9.2 MG/DL — SIGNIFICANT CHANGE UP (ref 8.5–10.1)
CHLORIDE SERPL-SCNC: 101 MMOL/L — SIGNIFICANT CHANGE UP (ref 96–108)
CO2 SERPL-SCNC: 30 MMOL/L — SIGNIFICANT CHANGE UP (ref 22–31)
CREAT SERPL-MCNC: 1.46 MG/DL — HIGH (ref 0.5–1.3)
EGFR: 33 ML/MIN/1.73M2 — LOW
GLUCOSE SERPL-MCNC: 89 MG/DL — SIGNIFICANT CHANGE UP (ref 70–99)
MAGNESIUM SERPL-MCNC: 2.3 MG/DL — SIGNIFICANT CHANGE UP (ref 1.6–2.6)
NT-PROBNP SERPL-SCNC: 1995 PG/ML — HIGH (ref 0–450)
PHOSPHATE SERPL-MCNC: 3.3 MG/DL — SIGNIFICANT CHANGE UP (ref 2.5–4.5)
POTASSIUM SERPL-MCNC: 3.5 MMOL/L — SIGNIFICANT CHANGE UP (ref 3.5–5.3)
POTASSIUM SERPL-SCNC: 3.5 MMOL/L — SIGNIFICANT CHANGE UP (ref 3.5–5.3)
SARS-COV-2 RNA SPEC QL NAA+PROBE: DETECTED
SODIUM SERPL-SCNC: 137 MMOL/L — SIGNIFICANT CHANGE UP (ref 135–145)

## 2022-05-05 PROCEDURE — 99232 SBSQ HOSP IP/OBS MODERATE 35: CPT

## 2022-05-05 PROCEDURE — 76770 US EXAM ABDO BACK WALL COMP: CPT | Mod: 26

## 2022-05-05 RX ORDER — SPIRONOLACTONE 25 MG/1
25 TABLET, FILM COATED ORAL DAILY
Refills: 0 | Status: DISCONTINUED | OUTPATIENT
Start: 2022-05-05 | End: 2022-05-05

## 2022-05-05 RX ADMIN — FAMOTIDINE 20 MILLIGRAM(S): 10 INJECTION INTRAVENOUS at 10:41

## 2022-05-05 RX ADMIN — LISINOPRIL 40 MILLIGRAM(S): 2.5 TABLET ORAL at 10:41

## 2022-05-05 RX ADMIN — Medication 3 MILLIGRAM(S): at 22:00

## 2022-05-05 RX ADMIN — AMIODARONE HYDROCHLORIDE 200 MILLIGRAM(S): 400 TABLET ORAL at 10:40

## 2022-05-05 RX ADMIN — HEPARIN SODIUM 5000 UNIT(S): 5000 INJECTION INTRAVENOUS; SUBCUTANEOUS at 13:09

## 2022-05-05 RX ADMIN — Medication 100 MILLIGRAM(S): at 10:40

## 2022-05-05 RX ADMIN — Medication 20 MILLIGRAM(S): at 10:41

## 2022-05-05 RX ADMIN — CARVEDILOL PHOSPHATE 25 MILLIGRAM(S): 80 CAPSULE, EXTENDED RELEASE ORAL at 10:41

## 2022-05-05 RX ADMIN — Medication 100 MILLIGRAM(S): at 21:58

## 2022-05-05 RX ADMIN — HEPARIN SODIUM 5000 UNIT(S): 5000 INJECTION INTRAVENOUS; SUBCUTANEOUS at 05:23

## 2022-05-05 RX ADMIN — PREGABALIN 1000 MICROGRAM(S): 225 CAPSULE ORAL at 10:41

## 2022-05-05 RX ADMIN — HEPARIN SODIUM 5000 UNIT(S): 5000 INJECTION INTRAVENOUS; SUBCUTANEOUS at 21:57

## 2022-05-05 RX ADMIN — Medication 10 MILLIGRAM(S): at 10:41

## 2022-05-05 RX ADMIN — Medication 5 MILLIGRAM(S): at 21:59

## 2022-05-05 RX ADMIN — Medication 1000 UNIT(S): at 10:41

## 2022-05-05 RX ADMIN — CARVEDILOL PHOSPHATE 25 MILLIGRAM(S): 80 CAPSULE, EXTENDED RELEASE ORAL at 21:58

## 2022-05-05 RX ADMIN — FLUDROCORTISONE ACETATE 0.05 MILLIGRAM(S): 0.1 TABLET ORAL at 21:58

## 2022-05-05 RX ADMIN — ATORVASTATIN CALCIUM 10 MILLIGRAM(S): 80 TABLET, FILM COATED ORAL at 21:59

## 2022-05-05 NOTE — PROGRESS NOTE ADULT - SUBJECTIVE AND OBJECTIVE BOX
HOSPITALIST PROGRESS NOTE:  SUBJECTIVE:  PCP:  Chief Complaint: Patient is a 89y old  Female who presents with a chief complaint of Hypokalemia, CHF (02 May 2022 07:59)      HPI:  HPI: The patient is a 89 yo female with Hx. of lymphoma, HTN, Hyponatremia, Atrial fib. conv to NSR, Adrenal   insufficiency  who was sent to the ER from the [facility] living  because she was c/o SOB, The patient symptoms started yesterday and got worse this am. The patients new private aid got to her at 8 am when she was c/o worsening SOB, took her to the facility nurse  who recommended the patient to come to the ER. The patient was hospitalized on 3/31/22 for acute on chr. diastolic  CHF and was d/c on 4/4/22.    5/2:  Patient denies any Dyspnea. states that she feels better; No CP   5/3:  Patients BP still elevated; patient denies any CP or dyspnea  5/4:  Patients bp better this morning; jump in cr; she has no other complaints; discussed plan wiht patients daughter     5/5; chart reviewed, Pt was seen and examined, reports feeling well today, no SOB, no cough, no CP. D/c planning discussed       REVIEW OF SYSTEMS:  See HPI. All other review of systems is negative unless indicated above.     Vital Signs Last 24 Hrs  T(C): 36.4 (05 May 2022 15:52), Max: 36.8 (05 May 2022 09:00)  T(F): 97.6 (05 May 2022 15:52), Max: 98.2 (05 May 2022 09:00)  HR: 59 (05 May 2022 15:52) (57 - 63)  BP: 162/45 (05 May 2022 15:52) (154/45 - 162/45)  BP(mean): --  RR: 18 (05 May 2022 15:52) (18 - 19)  SpO2: 95% (05 May 2022 15:52) (92% - 96%)      PE:   Constitutional: NAD, awake and alert  Neurological: no focal deficits  HEENT: PERRLA, EOMI, MMM  Neck: Soft and supple, , No JVD  Respiratory: Breath sounds are clear bilaterally  Cardiovascular: S1 and S2, regular rate and rhythm; no Murmurs, gallops or rubs  Gastrointestinal: Bowel Sounds present, soft, nontender, nondistended, no guarding, no rebound tenderness  Back: No CVA tenderness   Extremities:  peripheral edema B/L better  Vascular: 2+ peripheral pulses  Musculoskeletal: 5/5 strength b/l upper and lower extremities    MEDICATIONS  (STANDING):  aMIOdarone    Tablet 200 milliGRAM(s) Oral daily  atorvastatin 10 milliGRAM(s) Oral at bedtime  carvedilol 25 milliGRAM(s) Oral every 12 hours  cholecalciferol 1000 Unit(s) Oral daily  cyanocobalamin 1000 MICROGram(s) Oral daily  famotidine    Tablet 20 milliGRAM(s) Oral daily  fludroCORTISONE 0.05 milliGRAM(s) Oral daily  heparin   Injectable 5000 Unit(s) SubCutaneous every 8 hours  hydrALAZINE 100 milliGRAM(s) Oral two times a day  hydrocortisone 10 milliGRAM(s) Oral daily  hydrocortisone 5 milliGRAM(s) Oral daily  lisinopril 40 milliGRAM(s) Oral daily    MEDICATIONS  (PRN):  acetaminophen     Tablet .. 650 milliGRAM(s) Oral every 6 hours PRN Temp greater or equal to 38C (100.4F), Mild Pain (1 - 3)  aluminum hydroxide/magnesium hydroxide/simethicone Suspension 30 milliLiter(s) Oral every 4 hours PRN Dyspepsia  loratadine 10 milliGRAM(s) Oral daily PRN allergic rhinitis  melatonin 3 milliGRAM(s) Oral at bedtime PRN Insomnia  ondansetron Injectable 4 milliGRAM(s) IV Push every 8 hours PRN Nausea and/or Vomiting      05-05    137  |  101  |  23  ----------------------------<  89  3.5   |  30  |  1.46<H>    Ca    9.2      05 May 2022 08:19  Phos  3.3     05-05  Mg     2.3     05-05      RADIOLOGY/EKG:    < from: Xray Chest 1 View-PORTABLE IMMEDIATE (05.01.22 @ 12:07) >    IMPRESSION: Small bilateral pleural effusions and/or atelectasis          < from: US Kidney and Bladder (05.05.22 @ 11:33) >    ACC: [id number] EXAM:  US KIDNEYS AND BLADDER                          PROCEDURE DATE:  05/05/2022          INTERPRETATION:  CLINICAL INFORMATION: Renal failure.    COMPARISON: CT chest/abdomen/pelvis 3/5/2022    TECHNIQUE: Sonography of the kidneys and bladder.    FINDINGS:  Right kidney: 10.1 cm. No hydronephrosis. Increased in echogenicity.    Left kidney: 10.1 cm. No hydronephrosis. Increased in echogenicity.    Urinary bladder: Unremarkable. Ureteral jet seen on the left.    IMPRESSION:  No hydronephrosis.    Kidneys increased in echogenicity consistent with medical renal disease.

## 2022-05-05 NOTE — PROGRESS NOTE ADULT - SUBJECTIVE AND OBJECTIVE BOX
CARDIOLOGY AND HEART FAILURE ATTENDING.     Patient is a 89y old  Female who presents with a chief complaint of Hypokalemia, CHF.       HPI:  HPI: The patient is a 89 yo female with Hx. of lymphoma, HTN, Hyponatremia, Atrial fib. conv to NSR, Adrenal   insufficiency  who was sent to the ER from the [facility] living  because she was c/o SOB, The patient symptoms started yesterday and got worse this am. The patients new private aid got to her at 8 am when she was c/o worsening SOB, took her to the facility nurse  who recommended the patient to come to the ER. The patient was hospitalized on 3/31/22 for acute on chr. diastolic  CHF and was d/c on 4/4/22.    5/3-   Pt seen and examined this am.  She denies any symptoms at rest.     5/5- pt seen this am.  She denies any dizziness with ambulation but c/o SOB        PAST MEDICAL & SURGICAL HISTORY:  Iron deficiency    HTN (hypertension)    Lymphoma    H/O adrenal insufficiency    No significant past surgical history        MEDICATIONS  (STANDING):  aMIOdarone    Tablet 200 milliGRAM(s) Oral daily  atorvastatin 10 milliGRAM(s) Oral at bedtime  carvedilol 25 milliGRAM(s) Oral every 12 hours  cholecalciferol 1000 Unit(s) Oral daily  cyanocobalamin 1000 MICROGram(s) Oral daily  famotidine    Tablet 20 milliGRAM(s) Oral daily  fludroCORTISONE 0.1 milliGRAM(s) Oral daily  heparin   Injectable 5000 Unit(s) SubCutaneous every 8 hours  hydrALAZINE 50 milliGRAM(s) Oral two times a day  hydrocortisone 5 milliGRAM(s) Oral daily  hydrocortisone 10 milliGRAM(s) Oral daily  lisinopril 40 milliGRAM(s) Oral daily  spironolactone 25 milliGRAM(s) Oral daily    MEDICATIONS  (PRN):  acetaminophen     Tablet .. 650 milliGRAM(s) Oral every 6 hours PRN Temp greater or equal to 38C (100.4F), Mild Pain (1 - 3)  aluminum hydroxide/magnesium hydroxide/simethicone Suspension 30 milliLiter(s) Oral every 4 hours PRN Dyspepsia  loratadine 10 milliGRAM(s) Oral daily PRN allergic rhinitis  melatonin 3 milliGRAM(s) Oral at bedtime PRN Insomnia  ondansetron Injectable 4 milliGRAM(s) IV Push every 8 hours PRN Nausea and/or Vomiting      FAMILY HISTORY: No family history of premature CAD or SCD.       SOCIAL HISTORY: no recent smoking      REVIEW OF SYSTEMS:  CONSTITUTIONAL:    No fatigue, malaise, lethargy.  No fever or chills.  RESPIRATORY:  No cough.  No wheeze.  No hemoptysis.  c/o shortness of breath.  CARDIOVASCULAR:  No chest pains.  No palpitations. c/o shortness of breath, No orthopnea or PND. c/o edema   GASTROINTESTINAL:  No abdominal pain.  No nausea or vomiting.    GENITOURINARY:    No hematuria.    MUSCULOSKELETAL:  No musculoskeletal pain.  No joint swelling.  No arthritis.  NEUROLOGICAL:  No tingling or numbness or weakness.  PSYCHIATRIC:  No confusion  SKIN:  No rashes.        ICU Vital Signs Last 24 Hrs  T(C): 36.8 (05 May 2022 09:00), Max: 36.8 (05 May 2022 09:00)  T(F): 98.2 (05 May 2022 09:00), Max: 98.2 (05 May 2022 09:00)  HR: 63 (05 May 2022 09:00) (55 - 88)  BP: 155/40 (05 May 2022 09:00) (109/37 - 155/40)  BP(mean): --  ABP: --  ABP(mean): --  RR: 19 (05 May 2022 09:00) (18 - 19)  SpO2: 94% (05 May 2022 09:00) (92% - 95%)      PHYSICAL EXAM-    Constitutional: elderly frail female in no acute distress     Head: Head is normocephalic and atraumatic.      Neck: No JVD.     Cardiovascular: Regular rate and rhythm without S3, S4. No murmurs or rubs are appreciated.      Respiratory: Breath sounds are normal. No rales. No wheezing.    Abdomen: Soft, nontender, nondistended with positive bowel sounds.      Extremity: No tenderness. there is trace b/l pedal   pitting edema     Neurologic: The patient is alert and oriented.      Skin: No rash, no obvious lesions noted.      Psychiatric: The patient appears to be emotionally stable.      INTERPRETATION OF TELEMETRY: SR     ECG: Sinus rythm , poor  R wave progression, prolonged QT interval.     I&O's Detail      LABS:               05-04    137  |  103  |  24<H>  ----------------------------<  86  4.2   |  30  |  1.34<H>    Ca    9.0      04 May 2022 07:50  Phos  3.4     05-04  Mg     2.2     05-04          I&O's Summary    BNPSerum Pro-Brain Natriuretic Peptide: 3910 pg/mL (05-01 @ 11:30)    RADIOLOGY & ADDITIONAL STUDIES:  ch< from: Xray Chest 1 View-PORTABLE IMMEDIATE (05.01.22 @ 12:07) >    ACC: [id number] EXAM:  XR CHEST PORTABLE IMMED 1V                          PROCEDURE DATE:  05/01/2022          INTERPRETATION:  XR CHEST IMMEDIATE    Single AP view    HISTORY:  Shortness of Breath    Comparison: Chest x-ray 4/7/2022    Cardiac Looprecorder.    The cardiac silhouette is within normal limits. The lungs are clear.   Small bilateral pleural effusions and/or atelectasis. Status post   kyphoplasty.    IMPRESSION: Small bilateral pleural effusions and/or atelectasis    --- End of Report ---            BIN BA MD; Attending Radiologist  This document has been electronically signed. May  1 2022  4:01PM    < end of copied text >  < from: CT Head No Cont (04.06.22 @ 22:04) >      < from: TTE Echo Complete w/o Contrast w/ Doppler (03.31.22 @ 19:09) >     Impression     Summary     Moderate concentric left ventricular hypertrophy is present.   Basal septal hypertrophy is seen measuring 1.7cm.   Estimated left ventricular ejection fraction is 55-60 %.   Normal appearing left atrium.   Mild aortic sclerosis is present with normal valvular opening.   Mild (1+) to moderate aortic regurgitation is present pressure half time   is 215cm/s2.   Mild mitral annular calcification is present.   The mitral valve leaflets appear thickened.   Mild (1+) mitral regurgitation is present.   EA reversal of the mitral inflow consistent with reduced compliance of   the   left ventricle.   The tricuspid valve leaflets appear mildly thickened and/or calcified,   but   open well.   Mild (1+) tricuspid valve regurgitation is present.   Normal appearing pulmonic valve structure.   Trace pulmonic valvular regurgitation is present.     Signature     ----------------------------------------------------------------   Electronically signed by Steven Cui MD([facility]   physician) on 03/31/2022 10:38 PM   ----------------------------------------------------------------    < end of copied text >

## 2022-05-05 NOTE — DISCHARGE NOTE NURSING/CASE MANAGEMENT/SOCIAL WORK - NSDCVIVACCINE_GEN_ALL_CORE_FT
Tdap; 10-Jul-2021 13:25; Starr Zazueta (KENNETH); [employer]; [id number] (Exp. Date: 25-Nov-2022); IntraMuscular; Deltoid Left.; 0.5 milliLiter(s); VIS (VIS Published: 09-May-2013, VIS Presented: 10-Jul-2021);

## 2022-05-05 NOTE — DISCHARGE NOTE NURSING/CASE MANAGEMENT/SOCIAL WORK - PATIENT PORTAL LINK FT
You can access the FollowMyHealth Patient Portal offered by [facility] by registering at the following website: [email or web address]. By joining [employer]’s FollowMyHealth portal, you will also be able to view your health information using other applications (apps) compatible with our system.

## 2022-05-05 NOTE — DISCHARGE NOTE NURSING/CASE MANAGEMENT/SOCIAL WORK - NSDCPEFALRISK_GEN_ALL_CORE
For information on Fall & Injury Prevention, visit: [email or web address] OR  [email or web address] OR  https://www.cdc.gov/steadi/patient.html

## 2022-05-06 ENCOUNTER — TRANSCRIPTION ENCOUNTER (OUTPATIENT)
Age: 87
End: 2022-05-06

## 2022-05-06 LAB
NT-PROBNP SERPL-SCNC: 2380 PG/ML — HIGH (ref 0–450)
SARS-COV-2 RNA SPEC QL NAA+PROBE: SIGNIFICANT CHANGE UP

## 2022-05-06 PROCEDURE — 99239 HOSP IP/OBS DSCHRG MGMT >30: CPT

## 2022-05-06 RX ORDER — ACETAMINOPHEN 500 MG
1 TABLET ORAL
Qty: 0 | Refills: 0 | DISCHARGE

## 2022-05-06 RX ORDER — LORATADINE 10 MG/1
1 TABLET ORAL
Qty: 0 | Refills: 0 | DISCHARGE

## 2022-05-06 RX ORDER — LISINOPRIL 2.5 MG/1
1 TABLET ORAL
Qty: 0 | Refills: 0 | DISCHARGE
Start: 2022-05-06

## 2022-05-06 RX ORDER — HYDRALAZINE HCL 50 MG
5 TABLET ORAL ONCE
Refills: 0 | Status: COMPLETED | OUTPATIENT
Start: 2022-05-06 | End: 2022-05-07

## 2022-05-06 RX ADMIN — Medication 100 MILLIGRAM(S): at 21:01

## 2022-05-06 RX ADMIN — PREGABALIN 1000 MICROGRAM(S): 225 CAPSULE ORAL at 09:27

## 2022-05-06 RX ADMIN — Medication 1000 UNIT(S): at 09:27

## 2022-05-06 RX ADMIN — HEPARIN SODIUM 5000 UNIT(S): 5000 INJECTION INTRAVENOUS; SUBCUTANEOUS at 14:48

## 2022-05-06 RX ADMIN — Medication 10 MILLIGRAM(S): at 09:27

## 2022-05-06 RX ADMIN — LISINOPRIL 40 MILLIGRAM(S): 2.5 TABLET ORAL at 09:28

## 2022-05-06 RX ADMIN — FLUDROCORTISONE ACETATE 0.05 MILLIGRAM(S): 0.1 TABLET ORAL at 09:28

## 2022-05-06 RX ADMIN — FAMOTIDINE 20 MILLIGRAM(S): 10 INJECTION INTRAVENOUS at 09:27

## 2022-05-06 RX ADMIN — Medication 5 MILLIGRAM(S): at 21:02

## 2022-05-06 RX ADMIN — Medication 3 MILLIGRAM(S): at 22:20

## 2022-05-06 RX ADMIN — AMIODARONE HYDROCHLORIDE 200 MILLIGRAM(S): 400 TABLET ORAL at 09:27

## 2022-05-06 RX ADMIN — HEPARIN SODIUM 5000 UNIT(S): 5000 INJECTION INTRAVENOUS; SUBCUTANEOUS at 06:43

## 2022-05-06 RX ADMIN — CARVEDILOL PHOSPHATE 25 MILLIGRAM(S): 80 CAPSULE, EXTENDED RELEASE ORAL at 09:28

## 2022-05-06 RX ADMIN — Medication 100 MILLIGRAM(S): at 09:28

## 2022-05-06 RX ADMIN — HEPARIN SODIUM 5000 UNIT(S): 5000 INJECTION INTRAVENOUS; SUBCUTANEOUS at 21:02

## 2022-05-06 RX ADMIN — CARVEDILOL PHOSPHATE 25 MILLIGRAM(S): 80 CAPSULE, EXTENDED RELEASE ORAL at 21:02

## 2022-05-06 RX ADMIN — ATORVASTATIN CALCIUM 10 MILLIGRAM(S): 80 TABLET, FILM COATED ORAL at 21:02

## 2022-05-06 NOTE — DISCHARGE NOTE PROVIDER - NSDCCPCAREPLAN_GEN_ALL_CORE_FT
PRINCIPAL DISCHARGE DIAGNOSIS  Diagnosis: Acute CHF  Assessment and Plan of Treatment: # Acute on chronic Diastolic CHF  - recommended daily weights, discharge weight 111 lbs  - Low sodium diet   - Pulse ox on ambulation 92% on room air  - continue with diuresis  - monitor kidney function with discharge Scr - 1.46      SECONDARY DISCHARGE DIAGNOSES  Diagnosis: Paroxysmal atrial fibrillation  Assessment and Plan of Treatment: # Atrial fib. converted  to NSR   - good rate control  - would avoid anticoagulation and ASA  in a patient with risk of falling; as per daughter she has unsteady gait; Fell in the bathroom 1 month ago; Understands the risk of stroke/PE/DVT    Diagnosis: Hypertensive emergency  Assessment and Plan of Treatment: # Hypertensive emergency  - discharge SBp 140s  - lisinopril 40 mg daily  - coreg to 25mg BID  - c/w increased  hydralazine for now to 100mg BID    Diagnosis: History of adrenal insufficiency  Assessment and Plan of Treatment: - continue with hydrocortisone /  florinef    Diagnosis: Chronic renal failure, unspecified stage  Assessment and Plan of Treatment: # HI/Likely  baseline CKD  - baseline Cr 1.1, discharge Scr 1.47  - Monitor closely on Lisinopril   - Kidney sono: no obstruction, medical renal Dz   - monitor kidney function on discharge     PRINCIPAL DISCHARGE DIAGNOSIS  Diagnosis: Acute CHF  Assessment and Plan of Treatment: # Acute on chronic Diastolic CHF  - recommended daily weights, discharge weight 111 lbs  - Low sodium diet   - Pulse ox on ambulation 92% on room air  - continue with diuresis  - monitor kidney function with discharge Scr - 1.46      SECONDARY DISCHARGE DIAGNOSES  Diagnosis: Paroxysmal atrial fibrillation  Assessment and Plan of Treatment: # Atrial fib. converted  to NSR   - good rate control  - would avoid anticoagulation and ASA  in a patient with risk of falling; as per daughter she has unsteady gait; Fell in the bathroom 1 month ago; Understands the risk of stroke/PE/DVT    Diagnosis: Hypertensive emergency  Assessment and Plan of Treatment: # Hypertensive emergency  - discharge SBp 140s  - lisinopril 40 mg daily  - coreg to 25mg BID  - c/w increased  hydralazine for now to 100mg BID    Diagnosis: History of adrenal insufficiency  Assessment and Plan of Treatment: - continue with hydrocortisone /  florinef    Diagnosis: Chronic renal failure, unspecified stage  Assessment and Plan of Treatment: # HI/Likely  baseline CKD  - baseline Cr 1.1, discharge Scr 1.47  - Monitor closely on Lisinopril   - Kidney sono: no obstruction, medical renal Dz   - monitor kidney function on discharge    Diagnosis: Anemia of chronic disease  Assessment and Plan of Treatment: - discharge HH 9.2  - last couple of month HH is trending down  - monitor outpatient for any bleeding / patient is not on any meds that could cause bleeding  - monitor HH trend -  if continues to trend down strongly recommended anemia workup to be intiiated     PRINCIPAL DISCHARGE DIAGNOSIS  Diagnosis: Acute CHF  Assessment and Plan of Treatment: # Acute on chronic Diastolic CHF  - recommended daily weights, discharge weight 111 lbs  - Low sodium diet   - Pulse ox on ambulation 92% on room air  - continue with diuresis  - monitor kidney function with discharge Scr - 1.46      SECONDARY DISCHARGE DIAGNOSES  Diagnosis: Paroxysmal atrial fibrillation  Assessment and Plan of Treatment: # Atrial fib. converted  to NSR   - good rate control  - would avoid anticoagulation and ASA  in a patient with risk of falling; as per daughter she has unsteady gait; Fell in the bathroom 1 month ago; Understands the risk of stroke/PE/DVT    Diagnosis: Hypertensive emergency  Assessment and Plan of Treatment: # Hypertensive emergency  - continue with current regiment of high BP meds  - after a thorough discussion with patient's family -  patient lives with very high blood pressures for years. When people try to improve the blood pressures - it drops too low. Family wants patient to be discharged, with an understanding that patient is at risk for strokes. Family understands    Diagnosis: History of adrenal insufficiency  Assessment and Plan of Treatment: - continue with hydrocortisone /  florinef    Diagnosis: Chronic renal failure, unspecified stage  Assessment and Plan of Treatment: # HI/Likely  baseline CKD  - baseline Cr 1.1, discharge Scr 1.47  - Monitor closely on Lisinopril   - Kidney sono: no obstruction, medical renal Dz   - monitor kidney function on discharge    Diagnosis: Anemia of chronic disease  Assessment and Plan of Treatment: - discharge HH 9.2  - last couple of month HH is trending down  - monitor outpatient for any bleeding / patient is not on any meds that could cause bleeding  - monitor HH trend -  if continues to trend down strongly recommended anemia workup to be intiiated

## 2022-05-06 NOTE — PROGRESS NOTE ADULT - ASSESSMENT
Hypertensive emergency- unclear if she had high sodium diet.  Continue coreg, lisinopril, lasix, hydralazine at current dose.  I added spirnolactone 25mg BID  Low sodium diet.  DC planning.     Acute on chronic decompensated HFPEF- almost close to euvolemia  NYHA class III.  continue lasix 20mg po daily   Diuresis with close monitoring of the renal function and electrolytes.  Goal potassium of 4 and magnesium of 2.   Strict I/O and daily wt checks. Low sodium diet. Nutrition education.   Ischemic workup not performed given advanced age and comorbidities.  Pt to see me Monday 230pm at [address] [place], [place]  Ph: [phone] ext [phone].       Anemia- Management per primary team.     Adrenal insufficiency- Management per primary team.     Hyperlipidemia- continue statin.     Other medical issues- Management per primary team.   Thank you for allowing me to participate in the care of this patient. Please feel free to contact me with any questions.     
#Acute on chronic Diastolic CHF  #B/L Pleural effusions    -S/P IV lasix  -Strict I/O and daily wt checks. Low sodium diet. Nutrition education.   -Pulse ox on ambulation 92%  -repeat BNP trending down  - Hold lasix and spironolactone, repeat  renal Fx in am   -PT evaluation    #Hypokalemia   -repleted, monitor                          #Atrial fib. converted  to NSR   - tele: SR-SB HR in 50s   - d/c tele    -would avoid anticoagulation and ASA  in a patient with risk of falling; as per daughter she has unsteady gait; Fell in the bathroom 1 month ago; Understands the risk of stroke/PE/DVT     #Hypertensive emergency  - BP improved  -off  losartan, c/w  lisinopril 40 mg daily-.  -c/w increased coreg to 25mg BID  -c/w increased  hydralazine for now to 100mg BID  -stop spironolactone 25mg QD    # HI/Likely  baseline CKD  baseline Cr 1.1, 1.47 this am   D/c spironolactone and lasix for now  Monitor closely on Lisinopril   KIdney sono: no obstruction, medical renal Dz     #Anemia most likely chronic   -continue to monitor      #Adrenal insufficiency  -continue to monitor     #Hyperlipidemia  - continue statin.     #VTEP: Heparin    #Advance Directive: full code    Dispo" Pt was planned for d/c but COVID test neg, Pt is asymptomatic, as per hiastory had COVID in April.  Will repeat test in am  
A/P: 89 yo female with Hx. of lymphoma, HTN, Hyponatremia, Atrial fib. conv to NSR, Adrenal insufficiency p/w SOB.     1. Hypertensive urgency. Multiple changes made yesterday- BP remains elevated with SBP between 180-210 mmhg.  Pt has no HA or vision changes. Cr stable. Can continue lisinopril, coreg, aldactone.   Will increase hydralazine back to 100mg BID.   Cont to observe BP with PRN agents as needed.   Low sodium diet.  2Decho with normal LV fxn, mod LVH- c/w chronic HTN.    2. Acute on chronic decompensated HFPEF- euvolemic  NYHA class III. Can restart PO lasix today- lower dose.   Cont GDMT, BP control in setting of diastolic HF.    Diuresis with close monitoring of the renal function and electrolytes.  Goal potassium of 4 and magnesium of 2.   Strict I/O and daily wt checks. Low sodium diet. Nutrition education.   Ischemic workup not performed given advanced age and comorbidities.    3. Anemia- Management per primary team.     4. Adrenal insufficiency- Management per primary team.     5. Hyperlipidemia- continue statin.     6. PAF? Maintaining SR on tele. Will avoid anticoagulation for now in this elderly patient.     7. DVT proph. 
Hypertensive emergency- unclear if she had high sodium diet.  Continue coreg, lisinopril, lasix, hydralazine at current dose.  Yesterday day time BP readings low normal.  Will continue with current regimen.   Low sodium diet.  DC planning.     Acute on chronic decompensated HFPEF- almost close to euvolemia  NYHA class III.  Hold lasix with worsening cr. Check renal function today.   ALthough mild pedal edema she looks intravascularly mildly depleted.   Diuresis with close monitoring of the renal function and electrolytes.  Goal potassium of 4 and magnesium of 2.   Strict I/O and daily wt checks. Low sodium diet. Nutrition education.   Ischemic workup not performed given advanced age and comorbidities.        Anemia- Management per primary team.     Adrenal insufficiency- Management per primary team.     Hyperlipidemia- continue statin.     Other medical issues- Management per primary team.   Thank you for allowing me to participate in the care of this patient. Please feel free to contact me with any questions.     
#Acute on chronic Diastolic CHF  #B/L Pleural effusions    -continue tele  -S/P IV lasix   -Continue lasix 40mg po daily   -add spirnolactone 25mg BID- this will help with diuresis, neurohormonal benefit and HTN.  -Strict I/O and daily wt checks. Low sodium diet. Nutrition education.   -Pulse ox on ambulation  -repeat BNP    #Hypokalemia   -replete and monitor                          #Atrial fib. conv to NSR   -continue to monitor     #Hypertensive emergency  -DC losartan and start lisinopril 40 mg daily- no allergies in past.  -increase coreg to 25mg BID  -decrease hydralazine for now to 50mg BID-  -add spirnolactone 25mg BID-    #Anemia most likely chronic   -continue to monitor      #Adrenal insufficiency  -continue to monitor     #Hyperlipidemia  - continue statin.     #VTEP: Heparin    #Advance Directive: full code
#Acute on chronic Diastolic CHF  #B/L Pleural effusions    -continue tele  -S/P IV lasix   -continue PO lasix 20mg QD  -stop spironolactone 25mg QD due to rise in Cr; FU repeat BMP dale   -Strict I/O and daily wt checks. Low sodium diet. Nutrition education.   -Pulse ox on ambulation 92%  -repeat BNP trending down  -PT evaluation    #Hypokalemia   -replete and monitor                          #Atrial fib. conv to NSR   -continue to monitor   -would avoid anticoagulation and ASA  in a patient with risk of falling; as per daughter she has unsteady gait; Fell in the bathroom 1 month ago; Understands the risk of stroke/PE/DVT     #Hypertensive emergency  -DC losartan and start lisinopril 40 mg daily- no allergies in past.  -increase coreg to 25mg BID  -increase hydralazine for now to 100mg BID  -stop spironolactone 25mg QD    #Anemia most likely chronic   -continue to monitor      #Adrenal insufficiency  -continue to monitor     #Hyperlipidemia  - continue statin.     #VTEP: Heparin    #Advance Directive: full code    Dispo - possible D/c dale depending on patient cr and BP; aldactone d/c
#Acute on chronic Diastolic CHF  #B/L Pleural effusions    -continue tele  -S/P IV lasix   -restart PO lasix 20mg QD  -add spironolactone 25mg QD  -Strict I/O and daily wt checks. Low sodium diet. Nutrition education.   -Pulse ox on ambulation  -repeat BNP trending down    #Hypokalemia   -replete and monitor                          #Atrial fib. conv to NSR   -continue to monitor   -void anticoagulation for now in this elderly patient.     #Hypertensive emergency  -DC losartan and start lisinopril 40 mg daily- no allergies in past.  -increase coreg to 25mg BID  -increase hydralazine for now to 100mg BID  -add spirnolactone 25mg QD    #Anemia most likely chronic   -continue to monitor      #Adrenal insufficiency  -continue to monitor     #Hyperlipidemia  - continue statin.     #VTEP: Heparin    #Advance Directive: full code
A/P: 89 yo female with Hx. of lymphoma, HTN, Hyponatremia, Atrial fib. conv to NSR, Adrenal insufficiency p/w SOB.     1. Hypertensive urgency. Multiple changes made over last 48 hrs.   SBP now in 150-160mmhg. Much improved as yesterday SBP between 180-210 mmhg.  Would hold on additional therapy today to not drop BP too quickly. Cont current regimen for now.  Pt has no HA or vision changes. Cr stable. Can continue lisinopril, coreg, aldactone, hydralazine.   Cont to observe BP with PRN agents as needed for today- and add further therapy tomorrow.   Low sodium diet.  2Decho with normal LV fxn, mod LVH- c/w chronic HTN.    2. Cough. Pt was switched from losartan to lisinopril- this may be causing her dry cough.  Consider changing back if cough continues today.     3. Acute on chronic decompensated HFPEF- euvolemic  NYHA class III. Continue PO lasix. Keep negative fluid balance.   Cont GDMT, BP control in setting of diastolic HF.    Diuresis with close monitoring of the renal function and electrolytes.  Goal potassium of 4 and magnesium of 2.   Strict I/O and daily wt checks. Low sodium diet. Nutrition education.   Ischemic workup not performed given advanced age and comorbidities.    4. Anemia- Management per primary team.     5. Adrenal insufficiency- Management per primary team.     6. Hyperlipidemia- continue statin.     7. PAF? Maintaining SR on tele. Will avoid anticoagulation for now in this elderly patient.     8. DVT proph.

## 2022-05-06 NOTE — DISCHARGE NOTE PROVIDER - NSDCMRMEDTOKEN_GEN_ALL_CORE_FT
amiodarone 200 mg oral tablet: 1 tab(s) orally once a day  ascorbic acid 1000 mg oral tablet: 1 tab(s) orally once a day  carvedilol 12.5 mg oral tablet: 1 tab(s) orally every 12 hours  Cranberry oral tablet: 1 tab(s) 500mg orally once a day as needed  cyanocobalamin 1000 mcg oral tablet: 1 tab(s) orally once a day  famotidine 20 mg oral tablet: 1 tab(s) orally 2 times a day  fludrocortisone 0.1 mg oral tablet: 0.5 tab(s) orally once a day  furosemide 20 mg oral tablet: 1 tab(s) orally once a day  hydrALAZINE 100 mg oral tablet: 1 tab(s) orally every 12 hours x 30 days   hydrocortisone 5 mg oral tablet: 2 tab(s) orally once a day (in the morning)  hydrocortisone 5 mg oral tablet: 1 tab(s) orally once a day (in the evening)  lisinopril 40 mg oral tablet: 1 tab(s) orally once a day  Livalo 2 mg oral tablet: 1 tab(s) orally once a day  Vitamin D3 50 mcg (2000 intl units) oral tablet: 1 tab(s) orally once a day   amiodarone 200 mg oral tablet: 1 tab(s) orally once a day  ascorbic acid 1000 mg oral tablet: 1 tab(s) orally once a day  carvedilol 12.5 mg oral tablet: 1 tab(s) orally every 12 hours  Cranberry oral tablet: 1 tab(s) 500mg orally once a day as needed  cyanocobalamin 1000 mcg oral tablet: 1 tab(s) orally once a day  famotidine 20 mg oral tablet: 1 tab(s) orally 2 times a day  fludrocortisone 0.1 mg oral tablet: 0.5 tab(s) orally once a day  hydrALAZINE 100 mg oral tablet: 1 tab(s) orally every 12 hours x 30 days   hydrocortisone 5 mg oral tablet: 2 tab(s) orally once a day (in the morning)  hydrocortisone 5 mg oral tablet: 1 tab(s) orally once a day (in the evening)  Lasix 20 mg oral tablet: 1 tab(s) orally once a day  lisinopril 40 mg oral tablet: 1 tab(s) orally once a day  Livalo 2 mg oral tablet: 1 tab(s) orally once a day  Vitamin D3 50 mcg (2000 intl units) oral tablet: 1 tab(s) orally once a day

## 2022-05-06 NOTE — PROGRESS NOTE ADULT - SUBJECTIVE AND OBJECTIVE BOX
HOSPITALIST PROGRESS NOTE:  SUBJECTIVE:  PCP:  Chief Complaint: Patient is a 89y old  Female who presents with a chief complaint of Hypokalemia, CHF (02 May 2022 07:59)      HPI:  HPI: The patient is a 89 yo female with Hx. of lymphoma, HTN, Hyponatremia, Atrial fib. conv to NSR, Adrenal   insufficiency  who was sent to the ER from the [facility] assisted living  because she was c/o SOB, The patient symptoms started yesterday and got worse this am. The patients new private aid got to her at 8 am when she was c/o worsening SOB, took her to the facility nurse  who recommended the patient to come to the ER. The patient was hospitalized on 3/31/22 for acute on chr. diastolic  CHF and was d/c on 4/4/22.    5/2:  Patient denies any Dyspnea. states that she feels better; No CP   5/3:  Patients BP still elevated; patient denies any CP or dyspnea  5/4:  Patients bp better this morning; jump in cr; she has no other complaints; discussed plan wiht patients daughter   5/5; chart reviewed, Pt was seen and examined, reports feeling well today, no SOB, no cough, no CP. D/c planning discussed     REVIEW OF SYSTEMS:  See HPI. All other review of systems is negative unless indicated above.     PE:   T(C): 36.6 (06 May 2022 08:06), Max: 36.9 (05 May 2022 21:20)  T(F): 97.9 (06 May 2022 08:06), Max: 98.4 (05 May 2022 21:20)  HR: 64 (06 May 2022 09:30) (59 - 64)  BP: 184/46 (06 May 2022 08:06) (162/45 - 184/46)  RR: 17 (06 May 2022 08:06) (17 - 18)  SpO2: 93% (06 May 2022 08:06) (93% - 95%)  Constitutional: NAD, awake and alert  Neurological: no focal deficits  HEENT: PERRLA, EOMI, MMM  Neck: Soft and supple, , No JVD  Respiratory: Breath sounds are clear bilaterally  Cardiovascular: S1 and S2, regular rate and rhythm; no Murmurs, gallops or rubs  Gastrointestinal: Bowel Sounds present, soft, nontender, nondistended, no guarding, no rebound tenderness  Back: No CVA tenderness   Extremities:  peripheral edema B/L better  Vascular: 2+ peripheral pulses  Musculoskeletal: 5/5 strength b/l upper and lower extremities            05-05    137  |  101  |  23  ----------------------------<  89  3.5   |  30  |  1.46<H>    Ca    9.2      05 May 2022 08:19  Phos  3.3     05-05  Mg     2.3     05-05      # Acute on chronic Diastolic CHF  # B/L Pleural effusions    - S/P IV lasix  - Strict I/O  - daily wt checks  - Low sodium diet. Nutrition education.   - Pulse ox on ambulation 92%  - repeat BNP trending down  - Hold lasix and spironolactone, repeat  renal Fx in am   - PT evaluation    # Hypokalemia   -repleted, monitor                          # Atrial fib. converted  to NSR   - tele: SR-SB HR in 50s   - d/c tele    - would avoid anticoagulation and ASA  in a patient with risk of falling; as per daughter she has unsteady gait; Fell in the bathroom 1 month ago; Understands the risk of stroke/PE/DVT     # Hypertensive emergency  - BP improved  -off  losartan, c/w  lisinopril 40 mg daily-.  -c/w increased coreg to 25mg BID  -c/w increased  hydralazine for now to 100mg BID  -stop spironolactone 25mg QD    # HI/Likely  baseline CKD  - baseline Cr 1.1, 1.47 this am   - D/c spironolactone and lasix for now  - Monitor closely on Lisinopril   - Kidney sono: no obstruction, medical renal Dz     # Anemia most likely chronic   -continue to monitor      # Adrenal insufficiency  - continue to monitor     #Hyperlipidemia  - continue statin.     #Advance Directive: full code     HOSPITALIST PROGRESS NOTE:  SUBJECTIVE:  PCP:  Chief Complaint: Patient is a 89y old  Female who presents with a chief complaint of Hypokalemia, CHF (02 May 2022 07:59)      HPI:  HPI: The patient is a 89 yo female with Hx. of lymphoma, HTN, Hyponatremia, Atrial fib. conv to NSR, Adrenal   insufficiency  who was sent to the ER from the [facility] living  because she was c/o SOB, The patient symptoms started yesterday and got worse this am. The patients new private aid got to her at 8 am when she was c/o worsening SOB, took her to the facility nurse  who recommended the patient to come to the ER. The patient was hospitalized on 3/31/22 for acute on chr. diastolic  CHF and was d/c on 4/4/22.      Medical progress: Patient denies any HA, CP, SOB. she is comfortable. Labs and vitals reviewed. awaiting covid swab.   Complaints: no new complaints    REVIEW OF SYSTEMS:  See HPI. All other review of systems is negative unless indicated above.     PE:   T(C): 36.6 (06 May 2022 08:06), Max: 36.9 (05 May 2022 21:20)  T(F): 97.9 (06 May 2022 08:06), Max: 98.4 (05 May 2022 21:20)  HR: 64 (06 May 2022 09:30) (59 - 64)  BP: 184/46 (06 May 2022 08:06) (162/45 - 184/46)  RR: 17 (06 May 2022 08:06) (17 - 18)  SpO2: 93% (06 May 2022 08:06) (93% - 95%)  Constitutional: NAD, awake and alert  Neurological: no focal deficits  HEENT: PERRLA, EOMI, MMM  Neck: Soft and supple, , No JVD  Respiratory: Breath sounds are clear bilaterally  Cardiovascular: S1 and S2, regular rate and rhythm; no Murmurs, gallops or rubs  Gastrointestinal: Bowel Sounds present, soft, nontender, nondistended, no guarding, no rebound tenderness  Back: No CVA tenderness   Extremities:  peripheral edema B/L better  Vascular: 2+ peripheral pulses  Musculoskeletal: 5/5 strength b/l upper and lower extremities      Labs:     137  |  101  |  23  ----------------------------<  89  3.5   |  30  |  1.46<H>    Ca    9.2      05 May 2022 08:19  Phos  3.3     05-05  Mg     2.3     05-05    # Acute on chronic Diastolic CHF  # B/L Pleural effusions    - Strict I/O  - daily wt checks  - Low sodium diet. Nutrition education.   - Pulse ox on ambulation 92%  - repeat BNP trending down  - Hold lasix and spironolactone, repeat  renal Fx in am   - PT evaluation    # Hypokalemia   -repleted, monitor                          # Atrial fib. converted  to NSR   - tele: SR-SB HR in 50s   - d/c tele    - would avoid anticoagulation and ASA  in a patient with risk of falling; as per daughter she has unsteady gait; Fell in the bathroom 1 month ago; Understands the risk of stroke/PE/DVT     # Hypertensive emergency  - BP improved  -off  losartan, c/w  lisinopril 40 mg daily-.  -c/w increased coreg to 25mg BID  -c/w increased  hydralazine for now to 100mg BID  -stop spironolactone 25mg QD    # HI/Likely  baseline CKD  - baseline Cr 1.1, 1.47 this am   - D/c spironolactone and lasix for now  - Monitor closely on Lisinopril   - Kidney sono: no obstruction, medical renal Dz     # Anemia most likely chronic   -continue to monitor      # Adrenal insufficiency  - continue to monitor     #Hyperlipidemia  - continue statin.     #Advance Directive: full code

## 2022-05-06 NOTE — DISCHARGE NOTE PROVIDER - CARE PROVIDER_API CALL
Kimmy Merida)  [facility]; Cardiovascular Disease  [address] [place] [address]  Phone: [phone]  Fax: [phone]  Follow Up Time:

## 2022-05-06 NOTE — DISCHARGE NOTE PROVIDER - HOSPITAL COURSE
HPI: The patient is a 89 yo female with Hx. of lymphoma, HTN, Hyponatremia, Atrial fib. conv to NSR, Adrenal   insufficiency  who was sent to the ER from the [facility] assisted living  because she was c/o SOB, The patient symptoms started yesterday and got worse this am. The patients new private aid got to her at 8 am when she was c/o worsening SOB, took her to the facility nurse  who recommended the patient to come to the ER. The patient was hospitalized on 3/31/22 for acute on chr. diastolic  CHF and was d/c on 4/4/22.      Medical progress: Patient denies any HA, CP, SOB. she is comfortable. Labs and vitals reviewed. awaiting covid swab.   Complaints: no new complaints  Family called -  no answer.     Physical Exam:   GENERAL APPEARANCE:  deconditioned, elderly, frail  T(C): 36.6 (05-06-22 @ 08:06), Max: 36.9 (05-05-22 @ 21:20)  HR: 64 (05-06-22 @ 09:30) (59 - 64)  BP: 146/48 (05-06-22 @ 11:59) (146/48 - 184/46)  RR: 17 (05-06-22 @ 08:06) (17 - 18)  SpO2: 93% (05-06-22 @ 08:06) (93% - 95%)  Wt(kg): --  HEENT:  Head is normocephalic    Skin:  Warm and dry without any rash   NECK:  Supple without lymphadenopathy.   HEART:  Regular rate and rhythm. normal S1 and S2, No M/R/G  LUNGS:  Good ins/exp effort, no W/R/R/C  ABDOMEN:  Soft, nontender, nondistended with good bowel sounds heard  EXTREMITIES:  Without cyanosis, clubbing or edema.   NEUROLOGICAL:  Gross nonfocal

## 2022-05-06 NOTE — PROGRESS NOTE ADULT - SUBJECTIVE AND OBJECTIVE BOX
CARDIOLOGY AND HEART FAILURE ATTENDING.     Patient is a 89y old  Female who presents with a chief complaint of Hypokalemia, CHF.       HPI:  HPI: The patient is a 89 yo female with Hx. of lymphoma, HTN, Hyponatremia, Atrial fib. conv to NSR, Adrenal   insufficiency  who was sent to the ER from the [facility] living  because she was c/o SOB, The patient symptoms started yesterday and got worse this am. The patients new private aid got to her at 8 am when she was c/o worsening SOB, took her to the facility nurse  who recommended the patient to come to the ER. The patient was hospitalized on 3/31/22 for acute on chr. diastolic  CHF and was d/c on 4/4/22.    5/3-   Pt seen and examined this am.  She denies any symptoms at rest.     5/5- pt seen this am.  She denies any dizziness with ambulation but c/o SOB    5/6- pt denies any symptoms.  Pt COVID positive and DC held.     PAST MEDICAL & SURGICAL HISTORY:  Iron deficiency    HTN (hypertension)    Lymphoma    H/O adrenal insufficiency    No significant past surgical history        MEDICATIONS  (STANDING):  aMIOdarone    Tablet 200 milliGRAM(s) Oral daily  atorvastatin 10 milliGRAM(s) Oral at bedtime  carvedilol 25 milliGRAM(s) Oral every 12 hours  cholecalciferol 1000 Unit(s) Oral daily  cyanocobalamin 1000 MICROGram(s) Oral daily  famotidine    Tablet 20 milliGRAM(s) Oral daily  fludroCORTISONE 0.1 milliGRAM(s) Oral daily  heparin   Injectable 5000 Unit(s) SubCutaneous every 8 hours  hydrALAZINE 50 milliGRAM(s) Oral two times a day  hydrocortisone 5 milliGRAM(s) Oral daily  hydrocortisone 10 milliGRAM(s) Oral daily  lisinopril 40 milliGRAM(s) Oral daily  spironolactone 25 milliGRAM(s) Oral daily    MEDICATIONS  (PRN):  acetaminophen     Tablet .. 650 milliGRAM(s) Oral every 6 hours PRN Temp greater or equal to 38C (100.4F), Mild Pain (1 - 3)  aluminum hydroxide/magnesium hydroxide/simethicone Suspension 30 milliLiter(s) Oral every 4 hours PRN Dyspepsia  loratadine 10 milliGRAM(s) Oral daily PRN allergic rhinitis  melatonin 3 milliGRAM(s) Oral at bedtime PRN Insomnia  ondansetron Injectable 4 milliGRAM(s) IV Push every 8 hours PRN Nausea and/or Vomiting      FAMILY HISTORY: No family history of premature CAD or SCD.       SOCIAL HISTORY: no recent smoking      REVIEW OF SYSTEMS:  CONSTITUTIONAL:    No fatigue, malaise, lethargy.  No fever or chills.  RESPIRATORY:  No cough.  No wheeze.  No hemoptysis.  c/o shortness of breath.  CARDIOVASCULAR:  No chest pains.  No palpitations. c/o shortness of breath, No orthopnea or PND. c/o edema   GASTROINTESTINAL:  No abdominal pain.  No nausea or vomiting.    GENITOURINARY:    No hematuria.    MUSCULOSKELETAL:  No musculoskeletal pain.  No joint swelling.  No arthritis.  NEUROLOGICAL:  No tingling or numbness or weakness.  PSYCHIATRIC:  No confusion  SKIN:  No rashes.        ICU Vital Signs Last 24 Hrs  T(C): 36.6 (06 May 2022 08:06), Max: 36.9 (05 May 2022 21:20)  T(F): 97.9 (06 May 2022 08:06), Max: 98.4 (05 May 2022 21:20)  HR: 62 (06 May 2022 08:06) (58 - 63)  BP: 184/46 (06 May 2022 08:06) (155/40 - 184/46)  BP(mean): --  ABP: --  ABP(mean): --  RR: 17 (06 May 2022 08:06) (17 - 19)  SpO2: 93% (06 May 2022 08:06) (93% - 96%)      PHYSICAL EXAM-    Constitutional: elderly frail female in no acute distress     Head: Head is normocephalic and atraumatic.      Neck: No JVD.     Cardiovascular: Regular rate and rhythm without S3, S4. No murmurs or rubs are appreciated.      Respiratory: Breath sounds are normal. No rales. No wheezing.    Abdomen: Soft, nontender, nondistended with positive bowel sounds.      Extremity: No tenderness. there is trace b/l pedal   pitting edema     Neurologic: The patient is alert and oriented.      Skin: No rash, no obvious lesions noted.      Psychiatric: The patient appears to be emotionally stable.      INTERPRETATION OF TELEMETRY: SR     ECG: Sinus rythm , poor  R wave progression, prolonged QT interval.     I&O's Detail      LABS:                 05-05    137  |  101  |  23  ----------------------------<  89  3.5   |  30  |  1.46<H>    Ca    9.2      05 May 2022 08:19  Phos  3.3     05-05  Mg     2.3     05-05                    I&O's Summary    BNPSerum Pro-Brain Natriuretic Peptide: 3910 pg/mL (05-01 @ 11:30)    RADIOLOGY & ADDITIONAL STUDIES:  ch< from: Xray Chest 1 View-PORTABLE IMMEDIATE (05.01.22 @ 12:07) >    ACC: [id number] EXAM:  XR CHEST PORTABLE IMMED 1V                          PROCEDURE DATE:  05/01/2022          INTERPRETATION:  XR CHEST IMMEDIATE    Single AP view    HISTORY:  Shortness of Breath    Comparison: Chest x-ray 4/7/2022    Cardiac Looprecorder.    The cardiac silhouette is within normal limits. The lungs are clear.   Small bilateral pleural effusions and/or atelectasis. Status post   kyphoplasty.    IMPRESSION: Small bilateral pleural effusions and/or atelectasis    --- End of Report ---            BIN BA MD; Attending Radiologist  This document has been electronically signed. May  1 2022  4:01PM    < end of copied text >  < from: CT Head No Cont (04.06.22 @ 22:04) >      < from: TTE Echo Complete w/o Contrast w/ Doppler (03.31.22 @ 19:09) >     Impression     Summary     Moderate concentric left ventricular hypertrophy is present.   Basal septal hypertrophy is seen measuring 1.7cm.   Estimated left ventricular ejection fraction is 55-60 %.   Normal appearing left atrium.   Mild aortic sclerosis is present with normal valvular opening.   Mild (1+) to moderate aortic regurgitation is present pressure half time   is 215cm/s2.   Mild mitral annular calcification is present.   The mitral valve leaflets appear thickened.   Mild (1+) mitral regurgitation is present.   EA reversal of the mitral inflow consistent with reduced compliance of   the   left ventricle.   The tricuspid valve leaflets appear mildly thickened and/or calcified,   but   open well.   Mild (1+) tricuspid valve regurgitation is present.   Normal appearing pulmonic valve structure.   Trace pulmonic valvular regurgitation is present.     Signature     ----------------------------------------------------------------   Electronically signed by Steven Cui MD([facility]   physician) on 03/31/2022 10:38 PM   ----------------------------------------------------------------    < end of copied text >

## 2022-05-07 VITALS — SYSTOLIC BLOOD PRESSURE: 165 MMHG | DIASTOLIC BLOOD PRESSURE: 60 MMHG | HEART RATE: 62 BPM

## 2022-05-07 LAB
ANION GAP SERPL CALC-SCNC: 6 MMOL/L — SIGNIFICANT CHANGE UP (ref 5–17)
BUN SERPL-MCNC: 24 MG/DL — HIGH (ref 7–23)
CALCIUM SERPL-MCNC: 9.1 MG/DL — SIGNIFICANT CHANGE UP (ref 8.5–10.1)
CHLORIDE SERPL-SCNC: 104 MMOL/L — SIGNIFICANT CHANGE UP (ref 96–108)
CO2 SERPL-SCNC: 30 MMOL/L — SIGNIFICANT CHANGE UP (ref 22–31)
CREAT SERPL-MCNC: 1.28 MG/DL — SIGNIFICANT CHANGE UP (ref 0.5–1.3)
EGFR: 38 ML/MIN/1.73M2 — LOW
GLUCOSE SERPL-MCNC: 92 MG/DL — SIGNIFICANT CHANGE UP (ref 70–99)
HCT VFR BLD CALC: 30.4 % — LOW (ref 34.5–45)
HGB BLD-MCNC: 9.7 G/DL — LOW (ref 11.5–15.5)
MCHC RBC-ENTMCNC: 31.3 PG — SIGNIFICANT CHANGE UP (ref 27–34)
MCHC RBC-ENTMCNC: 31.9 GM/DL — LOW (ref 32–36)
MCV RBC AUTO: 98.1 FL — SIGNIFICANT CHANGE UP (ref 80–100)
PLATELET # BLD AUTO: 144 K/UL — LOW (ref 150–400)
POTASSIUM SERPL-MCNC: 3.3 MMOL/L — LOW (ref 3.5–5.3)
POTASSIUM SERPL-SCNC: 3.3 MMOL/L — LOW (ref 3.5–5.3)
RBC # BLD: 3.1 M/UL — LOW (ref 3.8–5.2)
RBC # FLD: 16.2 % — HIGH (ref 10.3–14.5)
SODIUM SERPL-SCNC: 140 MMOL/L — SIGNIFICANT CHANGE UP (ref 135–145)
WBC # BLD: 4.61 K/UL — SIGNIFICANT CHANGE UP (ref 3.8–10.5)
WBC # FLD AUTO: 4.61 K/UL — SIGNIFICANT CHANGE UP (ref 3.8–10.5)

## 2022-05-07 RX ORDER — FUROSEMIDE 40 MG
1 TABLET ORAL
Qty: 0 | Refills: 0 | DISCHARGE

## 2022-05-07 RX ORDER — SPIRONOLACTONE 25 MG/1
25 TABLET, FILM COATED ORAL DAILY
Refills: 0 | Status: DISCONTINUED | OUTPATIENT
Start: 2022-05-07 | End: 2022-05-07

## 2022-05-07 RX ORDER — HYDRALAZINE HCL 50 MG
5 TABLET ORAL ONCE
Refills: 0 | Status: COMPLETED | OUTPATIENT
Start: 2022-05-07 | End: 2022-05-07

## 2022-05-07 RX ADMIN — AMIODARONE HYDROCHLORIDE 200 MILLIGRAM(S): 400 TABLET ORAL at 08:50

## 2022-05-07 RX ADMIN — SPIRONOLACTONE 25 MILLIGRAM(S): 25 TABLET, FILM COATED ORAL at 12:04

## 2022-05-07 RX ADMIN — HEPARIN SODIUM 5000 UNIT(S): 5000 INJECTION INTRAVENOUS; SUBCUTANEOUS at 05:29

## 2022-05-07 RX ADMIN — PREGABALIN 1000 MICROGRAM(S): 225 CAPSULE ORAL at 08:49

## 2022-05-07 RX ADMIN — CARVEDILOL PHOSPHATE 25 MILLIGRAM(S): 80 CAPSULE, EXTENDED RELEASE ORAL at 11:03

## 2022-05-07 RX ADMIN — LISINOPRIL 40 MILLIGRAM(S): 2.5 TABLET ORAL at 08:50

## 2022-05-07 RX ADMIN — Medication 5 MILLIGRAM(S): at 03:01

## 2022-05-07 RX ADMIN — Medication 5 MILLIGRAM(S): at 00:30

## 2022-05-07 RX ADMIN — Medication 10 MILLIGRAM(S): at 08:49

## 2022-05-07 RX ADMIN — Medication 100 MILLIGRAM(S): at 08:50

## 2022-05-07 RX ADMIN — Medication 1000 UNIT(S): at 08:50

## 2022-05-07 RX ADMIN — FLUDROCORTISONE ACETATE 0.05 MILLIGRAM(S): 0.1 TABLET ORAL at 08:51

## 2022-05-07 RX ADMIN — FAMOTIDINE 20 MILLIGRAM(S): 10 INJECTION INTRAVENOUS at 08:50

## 2022-05-07 NOTE — PROGRESS NOTE ADULT - PROVIDER SPECIALTY LIST ADULT
Hospitalist
Cardiology
Hospitalist
Hospitalist
Cardiology
Hospitalist

## 2022-05-07 NOTE — PROGRESS NOTE ADULT - REASON FOR ADMISSION
Hypokalemia, CHF

## 2022-05-13 DIAGNOSIS — E87.6 HYPOKALEMIA: ICD-10-CM

## 2022-05-13 DIAGNOSIS — I13.0 HYPERTENSIVE HEART AND CHRONIC KIDNEY DISEASE WITH HEART FAILURE AND STAGE 1 THROUGH STAGE 4 CHRONIC KIDNEY DISEASE, OR UNSPECIFIED CHRONIC KIDNEY DISEASE: ICD-10-CM

## 2022-05-13 DIAGNOSIS — N17.9 ACUTE KIDNEY FAILURE, UNSPECIFIED: ICD-10-CM

## 2022-05-13 DIAGNOSIS — E78.5 HYPERLIPIDEMIA, UNSPECIFIED: ICD-10-CM

## 2022-05-13 DIAGNOSIS — I16.0 HYPERTENSIVE URGENCY: ICD-10-CM

## 2022-05-13 DIAGNOSIS — Z88.0 ALLERGY STATUS TO PENICILLIN: ICD-10-CM

## 2022-05-13 DIAGNOSIS — Z86.16 PERSONAL HISTORY OF COVID-19: ICD-10-CM

## 2022-05-13 DIAGNOSIS — I48.0 PAROXYSMAL ATRIAL FIBRILLATION: ICD-10-CM

## 2022-05-13 DIAGNOSIS — Z88.2 ALLERGY STATUS TO SULFONAMIDES: ICD-10-CM

## 2022-05-13 DIAGNOSIS — I50.33 ACUTE ON CHRONIC DIASTOLIC (CONGESTIVE) HEART FAILURE: ICD-10-CM

## 2022-05-13 DIAGNOSIS — N18.9 CHRONIC KIDNEY DISEASE, UNSPECIFIED: ICD-10-CM

## 2022-05-13 DIAGNOSIS — D63.1 ANEMIA IN CHRONIC KIDNEY DISEASE: ICD-10-CM

## 2022-05-13 DIAGNOSIS — E27.40 UNSPECIFIED ADRENOCORTICAL INSUFFICIENCY: ICD-10-CM

## 2022-05-13 DIAGNOSIS — Z85.72 PERSONAL HISTORY OF NON-HODGKIN LYMPHOMAS: ICD-10-CM

## 2022-05-30 ENCOUNTER — INPATIENT (INPATIENT)
Facility: HOSPITAL | Age: 87
LOS: 5 days | Discharge: HOME HEALTH | DRG: 64 | End: 2022-06-05
Attending: INTERNAL MEDICINE | Admitting: NEUROLOGICAL SURGERY
Payer: MEDICARE

## 2022-05-30 VITALS
HEART RATE: 50 BPM | OXYGEN SATURATION: 100 % | SYSTOLIC BLOOD PRESSURE: 229 MMHG | DIASTOLIC BLOOD PRESSURE: 63 MMHG | TEMPERATURE: 97 F | RESPIRATION RATE: 18 BRPM | WEIGHT: 115.08 LBS | HEIGHT: 60 IN

## 2022-05-30 LAB
BASOPHILS # BLD AUTO: 0.01 K/UL — SIGNIFICANT CHANGE UP (ref 0–0.2)
BASOPHILS NFR BLD AUTO: 0.2 % — SIGNIFICANT CHANGE UP (ref 0–2)
EOSINOPHIL # BLD AUTO: 0.01 K/UL — SIGNIFICANT CHANGE UP (ref 0–0.5)
EOSINOPHIL NFR BLD AUTO: 0.2 % — SIGNIFICANT CHANGE UP (ref 0–6)
HCT VFR BLD CALC: 33.4 % — LOW (ref 34.5–45)
HGB BLD-MCNC: 10.9 G/DL — LOW (ref 11.5–15.5)
IMM GRANULOCYTES NFR BLD AUTO: 0.3 % — SIGNIFICANT CHANGE UP (ref 0–1.5)
LYMPHOCYTES # BLD AUTO: 1.26 K/UL — SIGNIFICANT CHANGE UP (ref 1–3.3)
LYMPHOCYTES # BLD AUTO: 21.2 % — SIGNIFICANT CHANGE UP (ref 13–44)
MCHC RBC-ENTMCNC: 31.4 PG — SIGNIFICANT CHANGE UP (ref 27–34)
MCHC RBC-ENTMCNC: 32.6 GM/DL — SIGNIFICANT CHANGE UP (ref 32–36)
MCV RBC AUTO: 96.3 FL — SIGNIFICANT CHANGE UP (ref 80–100)
MONOCYTES # BLD AUTO: 0.56 K/UL — SIGNIFICANT CHANGE UP (ref 0–0.9)
MONOCYTES NFR BLD AUTO: 9.4 % — SIGNIFICANT CHANGE UP (ref 2–14)
NEUTROPHILS # BLD AUTO: 4.08 K/UL — SIGNIFICANT CHANGE UP (ref 1.8–7.4)
NEUTROPHILS NFR BLD AUTO: 68.7 % — SIGNIFICANT CHANGE UP (ref 43–77)
PLATELET # BLD AUTO: 133 K/UL — LOW (ref 150–400)
RBC # BLD: 3.47 M/UL — LOW (ref 3.8–5.2)
RBC # FLD: 14.7 % — HIGH (ref 10.3–14.5)
WBC # BLD: 5.94 K/UL — SIGNIFICANT CHANGE UP (ref 3.8–10.5)
WBC # FLD AUTO: 5.94 K/UL — SIGNIFICANT CHANGE UP (ref 3.8–10.5)

## 2022-05-30 PROCEDURE — 93010 ELECTROCARDIOGRAM REPORT: CPT

## 2022-05-30 PROCEDURE — 71045 X-RAY EXAM CHEST 1 VIEW: CPT | Mod: 26

## 2022-05-30 PROCEDURE — 70450 CT HEAD/BRAIN W/O DYE: CPT | Mod: 26,MA

## 2022-05-30 PROCEDURE — 99285 EMERGENCY DEPT VISIT HI MDM: CPT

## 2022-05-30 RX ORDER — ACETAMINOPHEN 500 MG
1000 TABLET ORAL ONCE
Refills: 0 | Status: COMPLETED | OUTPATIENT
Start: 2022-05-30 | End: 2022-05-30

## 2022-05-30 RX ORDER — SODIUM CHLORIDE 9 MG/ML
500 INJECTION INTRAMUSCULAR; INTRAVENOUS; SUBCUTANEOUS ONCE
Refills: 0 | Status: COMPLETED | OUTPATIENT
Start: 2022-05-30 | End: 2022-05-30

## 2022-05-30 RX ORDER — ONDANSETRON 8 MG/1
4 TABLET, FILM COATED ORAL ONCE
Refills: 0 | Status: COMPLETED | OUTPATIENT
Start: 2022-05-30 | End: 2022-05-30

## 2022-05-30 RX ORDER — HYDRALAZINE HCL 50 MG
10 TABLET ORAL ONCE
Refills: 0 | Status: COMPLETED | OUTPATIENT
Start: 2022-05-30 | End: 2022-05-30

## 2022-05-30 RX ADMIN — ONDANSETRON 4 MILLIGRAM(S): 8 TABLET, FILM COATED ORAL at 23:44

## 2022-05-30 RX ADMIN — SODIUM CHLORIDE 500 MILLILITER(S): 9 INJECTION INTRAMUSCULAR; INTRAVENOUS; SUBCUTANEOUS at 23:44

## 2022-05-30 RX ADMIN — Medication 400 MILLIGRAM(S): at 23:44

## 2022-05-30 RX ADMIN — Medication 10 MILLIGRAM(S): at 23:44

## 2022-05-30 NOTE — ED PROVIDER NOTE - CLINICAL SUMMARY MEDICAL DECISION MAKING FREE TEXT BOX
pt with ha and not feeling well today with nausea, no trauma, willget ct head, r/o ich, labs, iv ine ekg

## 2022-05-30 NOTE — ED CLERICAL - NS ED CLERK NOTE PRE-ARRIVAL INFORMATION; ADDITIONAL PRE-ARRIVAL INFORMATION
This patient is enrolled in the readmission program and has active care navigation. This patient can be followed up by the care navigation team within 24 hours. To arrange close follow-up or to obtain additional clinical information about this patient, please call the contact number above.   Please call the hospitalist as needed to collaborate on further medical management ([phone])

## 2022-05-30 NOTE — ED ADULT TRIAGE NOTE - CHIEF COMPLAINT QUOTE
pt BIBEMS for weakness for the last few days. pt states she has felt weak and dizzy and " just not good". pt complaining of upper weakness and abdominal pain.  pt alert and oriented in triage. PMH HTN. no signs of distress noted. BP elevated.

## 2022-05-30 NOTE — ED PROVIDER NOTE - OBJECTIVE STATEMENT
89 yo female brought in by sondenis for not feeling well today. Pt states today she has had a "pain in her head" and felt nausea, no vomiting. she feels generally weak today and states she slept all day. She denies chest pain

## 2022-05-30 NOTE — ED PROVIDER NOTE - PROGRESS NOTE DETAILS
spoke with neurosurg for head ct tba to icu, pt bp 160 to 180 systolically, no headache, spoke to icu pa Elmo, family updated. NORMA Gifford DO

## 2022-05-30 NOTE — ED PROVIDER NOTE - PHYSICAL EXAMINATION
Gen:  Well appearing in NAD  Head:  NC/AT  HEENT: pupils perrl,no pharyngeal erythema, uvula midline  Cardiac: S1S2, RRR  Abd: Soft, non tender  Resp: No distress, CTA   musculoskeletal:: no deformities, no swelling, strength +5/+5  Skin: warm and dry as visualized, no rashes  Neuro: HECTOR, aao x 4  Psych:alert, cooperative, appropriate mood and affect for situation

## 2022-05-30 NOTE — ED PROVIDER NOTE - CARE PLAN
Principal Discharge DX:	Hypertensive urgency  Secondary Diagnosis:	ICH (intracerebral hemorrhage)   1

## 2022-05-30 NOTE — ED PROVIDER NOTE - NSICDXPASTMEDICALHX_GEN_ALL_CORE_FT
PAST MEDICAL HISTORY:  Chronic kidney disease, unspecified CKD stage     Diastolic heart failure     H/O adrenal insufficiency     HTN (hypertension)     Hypokalemia     Hyponatremia     Iron deficiency     Lymphoma     Paroxysmal atrial fibrillation

## 2022-05-31 ENCOUNTER — TRANSCRIPTION ENCOUNTER (OUTPATIENT)
Age: 87
End: 2022-05-31

## 2022-05-31 DIAGNOSIS — I16.0 HYPERTENSIVE URGENCY: ICD-10-CM

## 2022-05-31 LAB
ALBUMIN SERPL ELPH-MCNC: 2.3 G/DL — LOW (ref 3.3–5)
ALBUMIN SERPL ELPH-MCNC: 2.8 G/DL — LOW (ref 3.3–5)
ALP SERPL-CCNC: 63 U/L — SIGNIFICANT CHANGE UP (ref 40–120)
ALP SERPL-CCNC: 79 U/L — SIGNIFICANT CHANGE UP (ref 40–120)
ALT FLD-CCNC: 36 U/L — SIGNIFICANT CHANGE UP (ref 12–78)
ALT FLD-CCNC: 48 U/L — SIGNIFICANT CHANGE UP (ref 12–78)
ANION GAP SERPL CALC-SCNC: 3 MMOL/L — LOW (ref 5–17)
ANION GAP SERPL CALC-SCNC: 5 MMOL/L — SIGNIFICANT CHANGE UP (ref 5–17)
ANION GAP SERPL CALC-SCNC: 8 MMOL/L — SIGNIFICANT CHANGE UP (ref 5–17)
APPEARANCE UR: CLEAR — SIGNIFICANT CHANGE UP
AST SERPL-CCNC: 41 U/L — HIGH (ref 15–37)
AST SERPL-CCNC: 51 U/L — HIGH (ref 15–37)
BILIRUB SERPL-MCNC: 0.3 MG/DL — SIGNIFICANT CHANGE UP (ref 0.2–1.2)
BILIRUB SERPL-MCNC: 0.5 MG/DL — SIGNIFICANT CHANGE UP (ref 0.2–1.2)
BILIRUB UR-MCNC: NEGATIVE — SIGNIFICANT CHANGE UP
BUN SERPL-MCNC: 19 MG/DL — SIGNIFICANT CHANGE UP (ref 7–23)
BUN SERPL-MCNC: 19 MG/DL — SIGNIFICANT CHANGE UP (ref 7–23)
BUN SERPL-MCNC: 22 MG/DL — SIGNIFICANT CHANGE UP (ref 7–23)
CALCIUM SERPL-MCNC: 8.3 MG/DL — LOW (ref 8.5–10.1)
CALCIUM SERPL-MCNC: 8.4 MG/DL — LOW (ref 8.5–10.1)
CALCIUM SERPL-MCNC: 9.3 MG/DL — SIGNIFICANT CHANGE UP (ref 8.5–10.1)
CHLORIDE SERPL-SCNC: 102 MMOL/L — SIGNIFICANT CHANGE UP (ref 96–108)
CHLORIDE SERPL-SCNC: 106 MMOL/L — SIGNIFICANT CHANGE UP (ref 96–108)
CHLORIDE SERPL-SCNC: 98 MMOL/L — SIGNIFICANT CHANGE UP (ref 96–108)
CO2 SERPL-SCNC: 30 MMOL/L — SIGNIFICANT CHANGE UP (ref 22–31)
CO2 SERPL-SCNC: 30 MMOL/L — SIGNIFICANT CHANGE UP (ref 22–31)
CO2 SERPL-SCNC: 31 MMOL/L — SIGNIFICANT CHANGE UP (ref 22–31)
COLOR SPEC: YELLOW — SIGNIFICANT CHANGE UP
CREAT SERPL-MCNC: 1.05 MG/DL — SIGNIFICANT CHANGE UP (ref 0.5–1.3)
CREAT SERPL-MCNC: 1.13 MG/DL — SIGNIFICANT CHANGE UP (ref 0.5–1.3)
CREAT SERPL-MCNC: 1.19 MG/DL — SIGNIFICANT CHANGE UP (ref 0.5–1.3)
DIFF PNL FLD: NEGATIVE — SIGNIFICANT CHANGE UP
EGFR: 42 ML/MIN/1.73M2 — LOW
EGFR: 44 ML/MIN/1.73M2 — LOW
EGFR: 48 ML/MIN/1.73M2 — LOW
GLUCOSE SERPL-MCNC: 86 MG/DL — SIGNIFICANT CHANGE UP (ref 70–99)
GLUCOSE SERPL-MCNC: 93 MG/DL — SIGNIFICANT CHANGE UP (ref 70–99)
GLUCOSE SERPL-MCNC: 95 MG/DL — SIGNIFICANT CHANGE UP (ref 70–99)
GLUCOSE UR QL: NEGATIVE — SIGNIFICANT CHANGE UP
HCT VFR BLD CALC: 28.1 % — LOW (ref 34.5–45)
HGB BLD-MCNC: 9 G/DL — LOW (ref 11.5–15.5)
KETONES UR-MCNC: NEGATIVE — SIGNIFICANT CHANGE UP
LEUKOCYTE ESTERASE UR-ACNC: NEGATIVE — SIGNIFICANT CHANGE UP
LIDOCAIN IGE QN: 299 U/L — SIGNIFICANT CHANGE UP (ref 73–393)
MAGNESIUM SERPL-MCNC: 1.8 MG/DL — SIGNIFICANT CHANGE UP (ref 1.6–2.6)
MCHC RBC-ENTMCNC: 31.4 PG — SIGNIFICANT CHANGE UP (ref 27–34)
MCHC RBC-ENTMCNC: 32 GM/DL — SIGNIFICANT CHANGE UP (ref 32–36)
MCV RBC AUTO: 97.9 FL — SIGNIFICANT CHANGE UP (ref 80–100)
NITRITE UR-MCNC: NEGATIVE — SIGNIFICANT CHANGE UP
PH UR: 6.5 — SIGNIFICANT CHANGE UP (ref 5–8)
PHOSPHATE SERPL-MCNC: 2.7 MG/DL — SIGNIFICANT CHANGE UP (ref 2.5–4.5)
PLATELET # BLD AUTO: 111 K/UL — LOW (ref 150–400)
POTASSIUM SERPL-MCNC: 2.3 MMOL/L — CRITICAL LOW (ref 3.5–5.3)
POTASSIUM SERPL-MCNC: 2.8 MMOL/L — CRITICAL LOW (ref 3.5–5.3)
POTASSIUM SERPL-MCNC: 3.8 MMOL/L — SIGNIFICANT CHANGE UP (ref 3.5–5.3)
POTASSIUM SERPL-SCNC: 2.3 MMOL/L — CRITICAL LOW (ref 3.5–5.3)
POTASSIUM SERPL-SCNC: 2.8 MMOL/L — CRITICAL LOW (ref 3.5–5.3)
POTASSIUM SERPL-SCNC: 3.8 MMOL/L — SIGNIFICANT CHANGE UP (ref 3.5–5.3)
PROT SERPL-MCNC: 4.9 GM/DL — LOW (ref 6–8.3)
PROT SERPL-MCNC: 6.1 GM/DL — SIGNIFICANT CHANGE UP (ref 6–8.3)
PROT UR-MCNC: 100
RBC # BLD: 2.87 M/UL — LOW (ref 3.8–5.2)
RBC # FLD: 14.7 % — HIGH (ref 10.3–14.5)
SARS-COV-2 RNA SPEC QL NAA+PROBE: SIGNIFICANT CHANGE UP
SODIUM SERPL-SCNC: 137 MMOL/L — SIGNIFICANT CHANGE UP (ref 135–145)
SODIUM SERPL-SCNC: 137 MMOL/L — SIGNIFICANT CHANGE UP (ref 135–145)
SODIUM SERPL-SCNC: 139 MMOL/L — SIGNIFICANT CHANGE UP (ref 135–145)
SP GR SPEC: 1 — LOW (ref 1.01–1.02)
TROPONIN I, HIGH SENSITIVITY RESULT: 38.94 NG/L — SIGNIFICANT CHANGE UP
UROBILINOGEN FLD QL: NEGATIVE — SIGNIFICANT CHANGE UP
WBC # BLD: 6.14 K/UL — SIGNIFICANT CHANGE UP (ref 3.8–10.5)
WBC # FLD AUTO: 6.14 K/UL — SIGNIFICANT CHANGE UP (ref 3.8–10.5)

## 2022-05-31 PROCEDURE — 93010 ELECTROCARDIOGRAM REPORT: CPT

## 2022-05-31 PROCEDURE — U0003: CPT

## 2022-05-31 PROCEDURE — 70450 CT HEAD/BRAIN W/O DYE: CPT | Mod: ME

## 2022-05-31 PROCEDURE — 80053 COMPREHEN METABOLIC PANEL: CPT

## 2022-05-31 PROCEDURE — 93308 TTE F-UP OR LMTD: CPT

## 2022-05-31 PROCEDURE — 84100 ASSAY OF PHOSPHORUS: CPT

## 2022-05-31 PROCEDURE — G1004: CPT

## 2022-05-31 PROCEDURE — 70450 CT HEAD/BRAIN W/O DYE: CPT | Mod: 26

## 2022-05-31 PROCEDURE — 99223 1ST HOSP IP/OBS HIGH 75: CPT

## 2022-05-31 PROCEDURE — U0005: CPT

## 2022-05-31 PROCEDURE — 97530 THERAPEUTIC ACTIVITIES: CPT | Mod: GP

## 2022-05-31 PROCEDURE — 97161 PT EVAL LOW COMPLEX 20 MIN: CPT | Mod: GP

## 2022-05-31 PROCEDURE — 93308 TTE F-UP OR LMTD: CPT | Mod: 26

## 2022-05-31 PROCEDURE — 36415 COLL VENOUS BLD VENIPUNCTURE: CPT

## 2022-05-31 PROCEDURE — 80048 BASIC METABOLIC PNL TOTAL CA: CPT

## 2022-05-31 PROCEDURE — 70553 MRI BRAIN STEM W/O & W/DYE: CPT

## 2022-05-31 PROCEDURE — 93005 ELECTROCARDIOGRAM TRACING: CPT

## 2022-05-31 PROCEDURE — A9579: CPT

## 2022-05-31 PROCEDURE — 99233 SBSQ HOSP IP/OBS HIGH 50: CPT

## 2022-05-31 PROCEDURE — 97116 GAIT TRAINING THERAPY: CPT | Mod: GP

## 2022-05-31 PROCEDURE — 85027 COMPLETE CBC AUTOMATED: CPT

## 2022-05-31 PROCEDURE — 83735 ASSAY OF MAGNESIUM: CPT

## 2022-05-31 PROCEDURE — 80061 LIPID PANEL: CPT

## 2022-05-31 RX ORDER — LISINOPRIL 2.5 MG/1
40 TABLET ORAL DAILY
Refills: 0 | Status: DISCONTINUED | OUTPATIENT
Start: 2022-05-31 | End: 2022-06-01

## 2022-05-31 RX ORDER — MAGNESIUM SULFATE 500 MG/ML
1 VIAL (ML) INJECTION ONCE
Refills: 0 | Status: COMPLETED | OUTPATIENT
Start: 2022-05-31 | End: 2022-05-31

## 2022-05-31 RX ORDER — HYDRALAZINE HCL 50 MG
10 TABLET ORAL ONCE
Refills: 0 | Status: COMPLETED | OUTPATIENT
Start: 2022-05-31 | End: 2022-05-31

## 2022-05-31 RX ORDER — HYDROCORTISONE 20 MG
2 TABLET ORAL
Qty: 0 | Refills: 0 | DISCHARGE

## 2022-05-31 RX ORDER — FLUDROCORTISONE ACETATE 0.1 MG/1
0.05 TABLET ORAL DAILY
Refills: 0 | Status: DISCONTINUED | OUTPATIENT
Start: 2022-05-31 | End: 2022-05-31

## 2022-05-31 RX ORDER — FAMOTIDINE 10 MG/ML
20 INJECTION INTRAVENOUS
Refills: 0 | Status: DISCONTINUED | OUTPATIENT
Start: 2022-05-31 | End: 2022-05-31

## 2022-05-31 RX ORDER — POTASSIUM CHLORIDE 20 MEQ
40 PACKET (EA) ORAL ONCE
Refills: 0 | Status: COMPLETED | OUTPATIENT
Start: 2022-05-31 | End: 2022-05-31

## 2022-05-31 RX ORDER — ASCORBIC ACID 60 MG
1 TABLET,CHEWABLE ORAL
Qty: 0 | Refills: 0 | DISCHARGE

## 2022-05-31 RX ORDER — CARVEDILOL PHOSPHATE 80 MG/1
12.5 CAPSULE, EXTENDED RELEASE ORAL EVERY 12 HOURS
Refills: 0 | Status: DISCONTINUED | OUTPATIENT
Start: 2022-05-31 | End: 2022-05-31

## 2022-05-31 RX ORDER — CARVEDILOL PHOSPHATE 80 MG/1
12.5 CAPSULE, EXTENDED RELEASE ORAL ONCE
Refills: 0 | Status: COMPLETED | OUTPATIENT
Start: 2022-05-31 | End: 2022-05-31

## 2022-05-31 RX ORDER — AMIODARONE HYDROCHLORIDE 400 MG/1
200 TABLET ORAL DAILY
Refills: 0 | Status: DISCONTINUED | OUTPATIENT
Start: 2022-05-31 | End: 2022-06-05

## 2022-05-31 RX ORDER — HYDROCORTISONE 20 MG
5 TABLET ORAL AT BEDTIME
Refills: 0 | Status: DISCONTINUED | OUTPATIENT
Start: 2022-05-31 | End: 2022-06-05

## 2022-05-31 RX ORDER — SENNA PLUS 8.6 MG/1
2 TABLET ORAL AT BEDTIME
Refills: 0 | Status: DISCONTINUED | OUTPATIENT
Start: 2022-05-31 | End: 2022-06-05

## 2022-05-31 RX ORDER — CHOLECALCIFEROL (VITAMIN D3) 125 MCG
2000 CAPSULE ORAL DAILY
Refills: 0 | Status: DISCONTINUED | OUTPATIENT
Start: 2022-05-31 | End: 2022-06-05

## 2022-05-31 RX ORDER — POTASSIUM CHLORIDE 20 MEQ
20 PACKET (EA) ORAL ONCE
Refills: 0 | Status: COMPLETED | OUTPATIENT
Start: 2022-05-31 | End: 2022-05-31

## 2022-05-31 RX ORDER — HYDROCORTISONE 20 MG
10 TABLET ORAL DAILY
Refills: 0 | Status: DISCONTINUED | OUTPATIENT
Start: 2022-05-31 | End: 2022-06-05

## 2022-05-31 RX ORDER — FUROSEMIDE 40 MG
20 TABLET ORAL DAILY
Refills: 0 | Status: DISCONTINUED | OUTPATIENT
Start: 2022-05-31 | End: 2022-05-31

## 2022-05-31 RX ORDER — ASCORBIC ACID 60 MG
500 TABLET,CHEWABLE ORAL DAILY
Refills: 0 | Status: DISCONTINUED | OUTPATIENT
Start: 2022-05-31 | End: 2022-06-05

## 2022-05-31 RX ORDER — ONDANSETRON 8 MG/1
4 TABLET, FILM COATED ORAL ONCE
Refills: 0 | Status: COMPLETED | OUTPATIENT
Start: 2022-05-31 | End: 2022-05-31

## 2022-05-31 RX ORDER — POTASSIUM CHLORIDE 20 MEQ
10 PACKET (EA) ORAL
Refills: 0 | Status: COMPLETED | OUTPATIENT
Start: 2022-05-31 | End: 2022-05-31

## 2022-05-31 RX ORDER — HYDRALAZINE HCL 50 MG
100 TABLET ORAL ONCE
Refills: 0 | Status: COMPLETED | OUTPATIENT
Start: 2022-05-31 | End: 2022-05-31

## 2022-05-31 RX ORDER — ATORVASTATIN CALCIUM 80 MG/1
10 TABLET, FILM COATED ORAL AT BEDTIME
Refills: 0 | Status: DISCONTINUED | OUTPATIENT
Start: 2022-05-31 | End: 2022-06-05

## 2022-05-31 RX ORDER — HYDRALAZINE HCL 50 MG
10 TABLET ORAL EVERY 4 HOURS
Refills: 0 | Status: DISCONTINUED | OUTPATIENT
Start: 2022-05-31 | End: 2022-06-02

## 2022-05-31 RX ORDER — PREGABALIN 225 MG/1
1000 CAPSULE ORAL DAILY
Refills: 0 | Status: DISCONTINUED | OUTPATIENT
Start: 2022-05-31 | End: 2022-06-05

## 2022-05-31 RX ADMIN — Medication 100 MILLIEQUIVALENT(S): at 02:16

## 2022-05-31 RX ADMIN — Medication 40 MILLIEQUIVALENT(S): at 06:06

## 2022-05-31 RX ADMIN — AMIODARONE HYDROCHLORIDE 200 MILLIGRAM(S): 400 TABLET ORAL at 16:21

## 2022-05-31 RX ADMIN — Medication 10 MILLIEQUIVALENT(S): at 02:12

## 2022-05-31 RX ADMIN — Medication 500 MILLIGRAM(S): at 13:19

## 2022-05-31 RX ADMIN — Medication 500 MILLIGRAM(S): at 03:37

## 2022-05-31 RX ADMIN — LISINOPRIL 40 MILLIGRAM(S): 2.5 TABLET ORAL at 13:18

## 2022-05-31 RX ADMIN — Medication 10 MILLIGRAM(S): at 05:05

## 2022-05-31 RX ADMIN — Medication 10 MILLIGRAM(S): at 13:18

## 2022-05-31 RX ADMIN — Medication 100 MILLIEQUIVALENT(S): at 07:32

## 2022-05-31 RX ADMIN — Medication 20 MILLIGRAM(S): at 03:37

## 2022-05-31 RX ADMIN — Medication 100 MILLIGRAM(S): at 03:23

## 2022-05-31 RX ADMIN — Medication 100 MILLIEQUIVALENT(S): at 06:06

## 2022-05-31 RX ADMIN — Medication 20 MILLIEQUIVALENT(S): at 01:11

## 2022-05-31 RX ADMIN — LISINOPRIL 40 MILLIGRAM(S): 2.5 TABLET ORAL at 04:49

## 2022-05-31 RX ADMIN — AMIODARONE HYDROCHLORIDE 200 MILLIGRAM(S): 400 TABLET ORAL at 03:23

## 2022-05-31 RX ADMIN — Medication 1000 MILLIGRAM(S): at 02:06

## 2022-05-31 RX ADMIN — Medication 100 MILLIEQUIVALENT(S): at 01:12

## 2022-05-31 RX ADMIN — Medication 100 MILLIEQUIVALENT(S): at 03:23

## 2022-05-31 RX ADMIN — SODIUM CHLORIDE 500 MILLILITER(S): 9 INJECTION INTRAMUSCULAR; INTRAVENOUS; SUBCUTANEOUS at 02:06

## 2022-05-31 RX ADMIN — CARVEDILOL PHOSPHATE 12.5 MILLIGRAM(S): 80 CAPSULE, EXTENDED RELEASE ORAL at 03:22

## 2022-05-31 RX ADMIN — ONDANSETRON 4 MILLIGRAM(S): 8 TABLET, FILM COATED ORAL at 09:55

## 2022-05-31 RX ADMIN — Medication 2000 UNIT(S): at 13:19

## 2022-05-31 RX ADMIN — Medication 10 MILLIGRAM(S): at 23:22

## 2022-05-31 RX ADMIN — Medication 100 GRAM(S): at 06:27

## 2022-05-31 RX ADMIN — Medication 0.1 MILLIGRAM(S): at 01:11

## 2022-05-31 RX ADMIN — ATORVASTATIN CALCIUM 10 MILLIGRAM(S): 80 TABLET, FILM COATED ORAL at 22:20

## 2022-05-31 RX ADMIN — Medication 10 MILLIGRAM(S): at 18:35

## 2022-05-31 RX ADMIN — Medication 5 MILLIGRAM(S): at 22:20

## 2022-05-31 NOTE — ED ADULT NURSE NOTE - OBJECTIVE STATEMENT
brought in by sondenis for not feeling well today. Pt states today she has had a "pain in her head" and felt nausea, no vomiting. she feels generally weak today and states she slept all day.

## 2022-05-31 NOTE — CONSULT NOTE ADULT - ASSESSMENT
89y old Female with hx of Lymphoma (remission), HTN, hyponatremia, hypokalemia, paroxysmal A-fib not on AC due to falls (last 1 month ago), adrenal insufficiency, CKD, HLD, 3rd admission with recent admission 5/1-6/7 for HF. Now admitted 5/30 bib her son due to generalized weakness. Found here to have headache with hypertensive urgency improved with meds, CT showed an area of high attenuation in the left parietal region, possible ICH vs metastatic lesion.  Palliative Care consulted to assist with establishing GOC.     1) ICH vs. intracranial mass  - neurosurgical notes appreciated  - repeat CTH unchanged  - awaiting MRI  - also awaiting echo given hx of dCHf  - fall and aspiration precautions  - headache resolved    2) Hypertensive urgency  - being managed with meds  - on monitor    3) Nausea  - added zofran prn  - could be from mass    4) Debility  - walks with RW and has hx of falls recently  - suggest PT consult  - suggest nutrition consult for signs of malnutrition on exam    5) Prognosis  - appears poor overall  - in setting of advanced age, debility with recent falls, multiple hospitalizations this year, albumin 2.3 with evidence of malnutrition, now also with possible ICH vs met, pt at inc risk for further complications    6) GOC/Advanced Directives  - pt seems to have capacity for decision making, deferring to family  - HCP on file naming daughter Kate Partida [phone]  - full code, will discuss  - GOC meeting scheduled for tomorrow at 11am via phone    Thank you for including us in Ms. An's care. Will continue to follow with you.    Shoshana Lynn MD  Palliative Care Attending

## 2022-05-31 NOTE — H&P ADULT - HISTORY OF PRESENT ILLNESS
Pt is a very pleasant 89 year old woman with a PMH of Lymphoma, HTN, hyponatremia, hypokalemia, paroxysmal A-fib not on AC due to falls, adrenal insufficiency, CKD and HLD who was brought to the ED by her son due to generalized weakness. Pt was seen and examined in the ED, c/o weakness all over but no specific pain. Pt denies headache, dizziness, visual changes, nausea or vomiting. She states her lymphoma was "cured" and no longer follows with an oncologist. Pt is awake and alert, oriented X3, moving all extremities antigravity with good strength, no drift, no dysmetria. CT shows an area of high attenuation in the left parietal region, possible ICH vs metastatic lesion.

## 2022-05-31 NOTE — H&P ADULT - NSHPPHYSICALEXAM_GEN_ALL_CORE
Vital Signs Last 24 Hrs  T(C): 36.4 (31 May 2022 02:15), Max: 36.4 (31 May 2022 02:15)  T(F): 97.5 (31 May 2022 02:15), Max: 97.5 (31 May 2022 02:15)  HR: 59 (31 May 2022 03:30) (50 - 60)  BP: 191/58 (31 May 2022 03:30) (186/50 - 232/60)  BP(mean): 91 (31 May 2022 03:15) (90 - 109)  RR: 16 (31 May 2022 03:30) (13 - 18)  SpO2: 93% (31 May 2022 03:30) (93% - 100%)    Physical Exam:  Constitutional: Awake / alert, NAD, resting comfortably in bed  Eyes: anicteric sclera, moist conjunctivae, PEARRLA  HENT: atraumatic, oropharynx clear with moist mucous membranes, no mucosal ulcerations  Neck: trachea midline, FROM, supple, no thyromegaly or lymphadenopathy  Respiratory: Breath sounds are clear bilaterally, normal respiratory effort and no intercostal retractions   Cardiovascular: S1 and S2, regular rhythm  Gastrointestinal: Soft, NT/ND, +BS  Extremities: +pitting edema b/l lower ext up to the knee   Skin: No rashes  Psych: appropriate affect, alert, and oriented to person, place and time    Neurological Exam:  HF: AxOx 3, appropriately interactive, normal affect, speech fluent, no aphasia or paraphasic errors. Naming/repetition intact   CN: PERRL, EOMI, VFF, facial sensation normal, no NLFD, tongue midline  Motor: No pronator drift, Strength 5/5 in all 4 ext, normal bulk and tone, no tremor, rigidity or bradykinesia  Sens: Intact to light touch  Reflexes: Symmetric and normal, downgoing toes b/l  Coord:  No FNFA, dysmetria, VELMA intact   Gait/Balance: Not tested

## 2022-05-31 NOTE — CONSULT NOTE ADULT - CONVERSATION DETAILS
Called pt's daughter Kate who confirmed that there is quite a bit going on today and accepted recommendation that we talk tomorrow at 11am via phone, knowing that her brother and TOSHA are more than welcome. Shared names of clinical team and Kate asked if there is any paperwork we all may need in the interim. I shared Samira's agreement to bring in HCP for records and also gave headsup about the likelihood of being asked about code status. Otherwise, they will be available to discuss goals further at 11am tomorrow.

## 2022-05-31 NOTE — PHYSICAL THERAPY INITIAL EVALUATION ADULT - ADDITIONAL COMMENTS
pt recently DC from  to hospitals to [facility], back at [facility] x10d w/ HHA (24 hr->12hr). pt amb w/ rollator, just PTA pt amb in [facility] w/ [facility] ([place] to [place])

## 2022-05-31 NOTE — PROGRESS NOTE ADULT - ASSESSMENT
A/P: 89 year old female who presents with an ICH vs Mass, hypokalemia, and bradycardia  Adrenal insuffiencey     Plan:  Can transfer to tele  Po Diet  Replace K +  BMP at noon  Stop Florinef as it can cause hypokalemia  D/C Lasix as well  Hold Coreg because of bradycardia  Continue Amio for Now as she is rate controlled  For an MRI  Continue Hydrocortisone for adrenal insuffiencey

## 2022-05-31 NOTE — PROGRESS NOTE ADULT - ASSESSMENT
Pt is a very pleasant 89 year old woman with a PMH of Lymphoma, HTN, hyponatremia, hypokalemia, paroxysmal A-fib not on AC due to falls, adrenal insufficiency, CKD and HLD who was brought to the ED by her son due to generalized weakness. Pt was seen and examined in the ED, c/o weakness all over but no specific pain. Pt denies headache, dizziness, visual changes, nausea or vomiting. She states her lymphoma was "cured" and no longer follows with an oncologist. Pt is awake and alert, oriented X3, moving all extremities antigravity with good strength, no drift, no dysmetria. CT shows an area of high attenuation in the left parietal region, possible ICH vs metastatic lesion.     #ICH vs metastatic lesion     Admitted to ICU for monitoring- RHCT stable  MRI +/- contrast pending  Maintain SBP <150  BP stable ok for transfer to telemetry  Hospitalist consult  Neuro checks [id number]  Advance diet  PT/OOB with assist  potassium repleted f/u rpt labs  Will assess need for full metastatic work up  Venodynes for DVT PPX   Continue statin, check lipids    Last TTE 3/31/22, EF 55-60%   h/o a-fib, presently NSR, no AC due to history of falls     Discussed with Dr. Grant

## 2022-05-31 NOTE — CONSULT NOTE ADULT - SUBJECTIVE AND OBJECTIVE BOX
Patient is a 89y old  Female who presents with a chief complaint of     BRIEF HOSPITAL COURSE: Patient is a 89 yo female with a pmh of lymphoma, HTN, hyponatremia, a-fib (now in SR), and adrenal insuffiencey who presented to HTN ED from [facility] on 22 with severe headache. Patient states she has been feeling dizzy and today had a severe headache. She states nothing amplifies it and she did not take any medication to try and resolve it. On arrival to ED, patient was noted to have SBP in 220's. Head CT was obtained showing a new high attenuation nodule in the left parietal white matter which could represent a hemorrhagic or proteinaceous mass versus a focus of hemorrhage. Neurosurgery was consulted and requested ICU admission given inability to r/o intracranial hemorrhage. Of note, patient was also found to be severely hypokalemic with a K of 2.3 in ED.  ICU to accept patient to services.  Upon assessment, patient is resting comfortably in bed. Patient axox4, though [place]. Patient moving all extremities with equal strength.     Events last 24 hours: Arrived to ED for severe headache, found to have HTN urgency. Head CT with ? met lesion vs hemorrhage.     PAST MEDICAL & SURGICAL HISTORY:  Iron deficiency  HTN (hypertension)  Lymphoma  H/O adrenal insufficiency  No significant past surgical history    Review of Systems:  CONSTITUTIONAL: No fever, chills, or fatigue  EYES: No eye pain, visual disturbances, or discharge  ENMT:  No difficulty hearing, tinnitus, vertigo; No sinus or throat pain  NECK: No pain or stiffness  RESPIRATORY: No cough, wheezing, chills or hemoptysis; No shortness of breath  CARDIOVASCULAR: No chest pain, palpitations, dizziness, or leg swelling  GASTROINTESTINAL: No abdominal or epigastric pain. No nausea, vomiting, or hematemesis; No diarrhea or constipation. No melena or hematochezia.  GENITOURINARY: No dysuria, frequency, hematuria, or incontinence  NEUROLOGICAL: No headaches, memory loss, loss of strength, numbness, or tremors  SKIN: No itching, burning, rashes, or lesions   MUSCULOSKELETAL: No joint pain or swelling; No muscle, back, or extremity pain  PSYCHIATRIC: No depression, anxiety, mood swings, or difficulty sleeping      Medications:    aMIOdarone    Tablet 200 milliGRAM(s) Oral daily  carvedilol 12.5 milliGRAM(s) Oral every 12 hours  carvedilol 12.5 milliGRAM(s) Oral once  furosemide    Tablet 20 milliGRAM(s) Oral daily  hydrALAZINE 100 milliGRAM(s) Oral Once  lisinopril 40 milliGRAM(s) Oral daily            famotidine    Tablet 20 milliGRAM(s) Oral two times a day      atorvastatin 10 milliGRAM(s) Oral at bedtime  fludroCORTISONE 0.05 milliGRAM(s) Oral daily  hydrocortisone 5 milliGRAM(s) Oral daily  hydrocortisone 10 milliGRAM(s) Oral daily    ascorbic acid 500 milliGRAM(s) Oral daily  cholecalciferol 2000 Unit(s) Oral daily  cyanocobalamin 1000 MICROGram(s) Oral daily  potassium chloride  10 mEq/100 mL IVPB 10 milliEquivalent(s) IV Intermittent every 1 hour                ICU Vital Signs Last 24 Hrs  T(C): 36 (30 May 2022 22:59), Max: 36 (30 May 2022 22:59)  T(F): 96.8 (30 May 2022 22:59), Max: 96.8 (30 May 2022 22:59)  HR: 59 (31 May 2022 00:30) (50 - 60)  BP: 201/55 (31 May 2022 00:30) (196/54 - 232/60)  BP(mean): 97 (31 May 2022 00:30) (97 - 109)  ABP: --  ABP(mean): --  RR: 16 (31 May 2022 00:30) (15 - 18)  SpO2: 94% (31 May 2022 00:30) (94% - 100%)          I&O's Detail        LABS:                        10.9   5.94  )-----------( 133      ( 30 May 2022 23:35 )             33.4     05-30    137  |  98  |  22  ----------------------------<  95  2.3<LL>   |  31  |  1.19    Ca    9.3      30 May 2022 23:35    TPro  6.1  /  Alb  2.8<L>  /  TBili  0.5  /  DBili  x   /  AST  51<H>  /  ALT  48  /  AlkPhos  79  05-30          CAPILLARY BLOOD GLUCOSE          Urinalysis Basic - ( 31 May 2022 02:19 )    Color: Yellow / Appearance: Clear / S.005 / pH: x  Gluc: x / Ketone: Negative  / Bili: Negative / Urobili: Negative   Blood: x / Protein: 100 / Nitrite: Negative   Leuk Esterase: Negative / RBC: 0-2 /HPF / WBC 0-2   Sq Epi: x / Non Sq Epi: Few / Bacteria: Occasional      CULTURES:      Physical Examination:    General: No acute distress.  Alert, oriented, interactive, nonfocal    HEENT: Pupils equal, reactive to light.  Symmetric.    PULM: Clear to auscultation bilaterally, no significant sputum production    CVS: Regular rate and rhythm, no murmurs, rubs, or gallops    ABD: Soft, nondistended, nontender, normoactive bowel sounds, no masses    EXT: No edema, nontender    SKIN: Warm and well perfused, no rashes noted.    NEURO: A&Ox3, strength 5/5 all extremities, cranial nerves grossly intact, no focal deficits      RADIOLOGY: ***      CRITICAL CARE TIME SPENT: ***  Evaluating/treating patient, reviewing data/labs/imaging, discussing case with multidisciplinary team, discussing plan/goals of care with patient/family. Non-inclusive of procedure time.   Patient is a 89y old  Female who presents with a chief complaint of     BRIEF HOSPITAL COURSE: Patient is a 89 yo female with a pmh of lymphoma, HTN, hyponatremia, a-fib (now in SR), Diastolic HF and adrenal insuffiencey who presented to [facility] ED from [facility] on 22 with severe headache. Patient states she has been feeling dizzy and today had a severe headache. She states nothing amplifies it and she did not take any medication to try and resolve it. On arrival to ED, patient was noted to have SBP in 220's. Head CT was obtained showing a new high attenuation nodule in the left parietal white matter which could represent a hemorrhagic or proteinaceous mass versus a focus of hemorrhage. Neurosurgery was consulted and requested ICU admission given inability to r/o intracranial hemorrhage. Of note, patient was also found to be severely hypokalemic with a K of 2.3 in ED.  ICU to accept patient to services.  Upon assessment, patient is resting comfortably in bed. Patient axox4, though [place]. Patient moving all extremities with equal strength.     Events last 24 hours: Arrived to ED for severe headache, found to have HTN urgency. Head CT with ? met lesion vs hemorrhage.     PAST MEDICAL & SURGICAL HISTORY:  Iron deficiency  HTN (hypertension)  Lymphoma  H/O adrenal insufficiency  No significant past surgical history    Review of Systems:  CONSTITUTIONAL: No fever, chills, or fatigue. + Dizzy.  EYES: No changes in vision   ENMT:  Known [place].  NECK: No pain or stiffness  RESPIRATORY: No cough, wheezing; No shortness of breath  CARDIOVASCULAR: No chest pain, palpitations. + Leg swelling - states chronic.   GASTROINTESTINAL: No abdominal. No nausea, vomiting; No diarrhea or constipation. + minor epigastric pain resting.  GENITOURINARY: No dysuria, frequency or hematuria.   NEUROLOGICAL: No memory loss, loss of strength, or tremors. + Headache, + "slight" b/l hand numbness  SKIN: No itching, burning, rashes, or lesions   MUSCULOSKELETAL: No joint pain or swelling; No muscle, back, or extremity pain  PSYCHIATRIC: No depression, anxiety, mood swings, or difficulty sleeping      Medications:  aMIOdarone    Tablet 200 milliGRAM(s) Oral daily  carvedilol 12.5 milliGRAM(s) Oral every 12 hours  carvedilol 12.5 milliGRAM(s) Oral once  furosemide    Tablet 20 milliGRAM(s) Oral daily  hydrALAZINE 100 milliGRAM(s) Oral Once  lisinopril 40 milliGRAM(s) Oral daily  famotidine    Tablet 20 milliGRAM(s) Oral two times a day  atorvastatin 10 milliGRAM(s) Oral at bedtime  fludroCORTISONE 0.05 milliGRAM(s) Oral daily  hydrocortisone 5 milliGRAM(s) Oral daily  hydrocortisone 10 milliGRAM(s) Oral daily  ascorbic acid 500 milliGRAM(s) Oral daily  cholecalciferol 2000 Unit(s) Oral daily  cyanocobalamin 1000 MICROGram(s) Oral daily  potassium chloride  10 mEq/100 mL IVPB 10 milliEquivalent(s) IV Intermittent every 1 hour      ICU Vital Signs Last 24 Hrs  T(C): 36 (30 May 2022 22:59), Max: 36 (30 May 2022 22:59)  T(F): 96.8 (30 May 2022 22:59), Max: 96.8 (30 May 2022 22:59)  HR: 59 (31 May 2022 00:30) (50 - 60)  BP: 201/55 (31 May 2022 00:30) (196/54 - 232/60)  BP(mean): 97 (31 May 2022 00:30) (97 - 109)  ABP: --  ABP(mean): --  RR: 16 (31 May 2022 00:30) (15 - 18)  SpO2: 94% (31 May 2022 00:30) (94% - 100%)    I&O's Detail      LABS:                        10.9   5.94  )-----------( 133      ( 30 May 2022 23:35 )             33.4     05-30    137  |  98  |  22  ----------------------------<  95  2.3<LL>   |  31  |  1.19    Ca    9.3      30 May 2022 23:35    TPro  6.1  /  Alb  2.8<L>  /  TBili  0.5  /  DBili  x   /  AST  51<H>  /  ALT  48  /  AlkPhos  79  05-30    CAPILLARY BLOOD GLUCOSE    Urinalysis Basic - ( 31 May 2022 02:19 )    Color: Yellow / Appearance: Clear / S.005 / pH: x  Gluc: x / Ketone: Negative  / Bili: Negative / Urobili: Negative   Blood: x / Protein: 100 / Nitrite: Negative   Leuk Esterase: Negative / RBC: 0-2 /HPF / WBC 0-2   Sq Epi: x / Non Sq Epi: Few / Bacteria: Occasional    CULTURES:    Physical Examination:    General: No acute distress.  Alert, oriented, interactive, nonfocal. Resting comfortably.     HEENT: Pupils 2mm equal, reactive to light.  Symmetric.    PULM: Clear to auscultation bilaterally, no significant sputum production    CVS: Regular/Charli rate and rhythm, no murmurs, rubs, or gallops    ABD: Soft, nondistended, nontender, normoactive bowel sounds    EXT: +2 pitting lower ext edema, nontender    SKIN: Warm and well perfused, no rashes noted.    NEURO: A&Ox3, strength 5/5 all extremities, moving all ext. following commands.       RADIOLOGY: < from: CT Head No Cont (22 @ 23:55) >  INTERPRETATION:  CLINICAL INFORMATION: Headache and hypertension.    TECHNIQUE: Multiple axial sections were acquired from the base of the   skull to the vertex without contrast enhancement. Coronal and sagittal   reformats were additionally performed. Beam hardening artifact slightly   obscures evaluation of the posterior fossa and brainstem.    COMPARISON: CT of the head from 2022.    FINDINGS:  Parenchymal volume loss is noted with prominent ventricles and sulci.   Chronic microvascular ischemic changes are identified. No acute   territorial infarct is demonstrated.    A new 7 x 6 mm high attenuation nodule is seen in the left parietal white   matter. There is no significant adjacent mass effect.    Evaluation of the osseous structures with the appropriate window appears   unremarkable. Vascular calcifications are noted. Sequela of bilateral   lens surgery is seen. Mild mucosal thickening is noted in the left   sphenoid sinus. Minimal mucosal thickening is seen in the right maxillary   sinus. The remaining visualized paranasal sinuses and mastoid air cells   are clear.    IMPRESSION:  New high attenuation nodule in the left parietal white matter which could   represent a hemorrhagic or proteinaceous mass versus a focus of   hemorrhage. A follow-up contrast-enhanced MRI of the brain is recommended   for further evaluation.    < end of copied text >    TIME SPENT: 44 minutes  Evaluating/treating patient, reviewing data/labs/imaging, discussing case with multidisciplinary team, discussing plan/goals of care with patient/family. Non-inclusive of procedure time.   Patient is a 89y old  Female who presents with a chief complaint of headache.    BRIEF HOSPITAL COURSE: Patient is a 89 yo female with a pmh of lymphoma, HTN, hyponatremia, a-fib (now in SR), Diastolic HF and adrenal insuffiencey who presented to [facility] ED from [facility] on 22 with severe headache. Patient states she has been feeling dizzy and today had a severe headache. She states nothing amplifies it and she did not take any medication to try and resolve it. On arrival to ED, patient was noted to have SBP in 220's. Head CT was obtained showing a new high attenuation nodule in the left parietal white matter which could represent a hemorrhagic or proteinaceous mass versus a focus of hemorrhage. Neurosurgery was consulted and requested ICU admission given inability to r/o intracranial hemorrhage. Of note, patient was also found to be severely hypokalemic with a K of 2.3 in ED.  ICU to accept patient to services.  Upon assessment, patient is resting comfortably in bed. Patient axox4, though [place]. Patient moving all extremities with equal strength.     Events last 24 hours: Arrived to ED for severe headache, found to have HTN urgency. Head CT with ? met lesion vs hemorrhage.     PAST MEDICAL & SURGICAL HISTORY:  Iron deficiency  HTN (hypertension)  Lymphoma  H/O adrenal insufficiency  No significant past surgical history    Review of Systems:  CONSTITUTIONAL: No fever, chills, or fatigue. + Dizzy.  EYES: No changes in vision   ENMT:  Known [place].  NECK: No pain or stiffness  RESPIRATORY: No cough, wheezing; No shortness of breath  CARDIOVASCULAR: No chest pain, palpitations. + Leg swelling - states chronic.   GASTROINTESTINAL: No abdominal. No nausea, vomiting; No diarrhea or constipation. + minor epigastric pain resting.  GENITOURINARY: No dysuria, frequency or hematuria.   NEUROLOGICAL: No memory loss, loss of strength, or tremors. + Headache, + "slight" b/l hand numbness  SKIN: No itching, burning, rashes, or lesions   MUSCULOSKELETAL: No joint pain or swelling; No muscle, back, or extremity pain  PSYCHIATRIC: No depression, anxiety, mood swings, or difficulty sleeping      Medications:  aMIOdarone    Tablet 200 milliGRAM(s) Oral daily  carvedilol 12.5 milliGRAM(s) Oral every 12 hours  carvedilol 12.5 milliGRAM(s) Oral once  furosemide    Tablet 20 milliGRAM(s) Oral daily  hydrALAZINE 100 milliGRAM(s) Oral Once  lisinopril 40 milliGRAM(s) Oral daily  famotidine    Tablet 20 milliGRAM(s) Oral two times a day  atorvastatin 10 milliGRAM(s) Oral at bedtime  fludroCORTISONE 0.05 milliGRAM(s) Oral daily  hydrocortisone 5 milliGRAM(s) Oral daily  hydrocortisone 10 milliGRAM(s) Oral daily  ascorbic acid 500 milliGRAM(s) Oral daily  cholecalciferol 2000 Unit(s) Oral daily  cyanocobalamin 1000 MICROGram(s) Oral daily  potassium chloride  10 mEq/100 mL IVPB 10 milliEquivalent(s) IV Intermittent every 1 hour      ICU Vital Signs Last 24 Hrs  T(C): 36 (30 May 2022 22:59), Max: 36 (30 May 2022 22:59)  T(F): 96.8 (30 May 2022 22:59), Max: 96.8 (30 May 2022 22:59)  HR: 59 (31 May 2022 00:30) (50 - 60)  BP: 201/55 (31 May 2022 00:30) (196/54 - 232/60)  BP(mean): 97 (31 May 2022 00:30) (97 - 109)  ABP: --  ABP(mean): --  RR: 16 (31 May 2022 00:30) (15 - 18)  SpO2: 94% (31 May 2022 00:30) (94% - 100%)    I&O's Detail      LABS:                        10.9   5.94  )-----------( 133      ( 30 May 2022 23:35 )             33.4     05-30    137  |  98  |  22  ----------------------------<  95  2.3<LL>   |  31  |  1.19    Ca    9.3      30 May 2022 23:35    TPro  6.1  /  Alb  2.8<L>  /  TBili  0.5  /  DBili  x   /  AST  51<H>  /  ALT  48  /  AlkPhos  79  05-30    CAPILLARY BLOOD GLUCOSE    Urinalysis Basic - ( 31 May 2022 02:19 )    Color: Yellow / Appearance: Clear / S.005 / pH: x  Gluc: x / Ketone: Negative  / Bili: Negative / Urobili: Negative   Blood: x / Protein: 100 / Nitrite: Negative   Leuk Esterase: Negative / RBC: 0-2 /HPF / WBC 0-2   Sq Epi: x / Non Sq Epi: Few / Bacteria: Occasional    CULTURES:    Physical Examination:    General: No acute distress.  Alert, oriented, interactive, nonfocal. Resting comfortably.     HEENT: Pupils 2mm equal, reactive to light.  Symmetric.    PULM: Clear to auscultation bilaterally, no significant sputum production    CVS: Regular/Charli rate and rhythm, no murmurs, rubs, or gallops    ABD: Soft, nondistended, nontender, normoactive bowel sounds    EXT: +2 pitting lower ext edema, nontender    SKIN: Warm and well perfused, no rashes noted.    NEURO: A&Ox3, strength 5/5 all extremities, moving all ext. following commands.       RADIOLOGY: < from: CT Head No Cont (22 @ 23:55) >  INTERPRETATION:  CLINICAL INFORMATION: Headache and hypertension.    TECHNIQUE: Multiple axial sections were acquired from the base of the   skull to the vertex without contrast enhancement. Coronal and sagittal   reformats were additionally performed. Beam hardening artifact slightly   obscures evaluation of the posterior fossa and brainstem.    COMPARISON: CT of the head from 2022.    FINDINGS:  Parenchymal volume loss is noted with prominent ventricles and sulci.   Chronic microvascular ischemic changes are identified. No acute   territorial infarct is demonstrated.    A new 7 x 6 mm high attenuation nodule is seen in the left parietal white   matter. There is no significant adjacent mass effect.    Evaluation of the osseous structures with the appropriate window appears   unremarkable. Vascular calcifications are noted. Sequela of bilateral   lens surgery is seen. Mild mucosal thickening is noted in the left   sphenoid sinus. Minimal mucosal thickening is seen in the right maxillary   sinus. The remaining visualized paranasal sinuses and mastoid air cells   are clear.    IMPRESSION:  New high attenuation nodule in the left parietal white matter which could   represent a hemorrhagic or proteinaceous mass versus a focus of   hemorrhage. A follow-up contrast-enhanced MRI of the brain is recommended   for further evaluation.    < end of copied text >    TIME SPENT: 44 minutes  Evaluating/treating patient, reviewing data/labs/imaging, discussing case with multidisciplinary team, discussing plan/goals of care with patient/family. Non-inclusive of procedure time.   Patient is a 89y old  Female who presents with a chief complaint of headache.    BRIEF HOSPITAL COURSE: Patient is a 89 yo female with a pmh of lymphoma (states cured and not active), HTN, hyponatremia, a-fib - on no AC d/t falls (now in SR), Diastolic HF, CKD and adrenal insuffiencey who presented to [facility] ED from [facility] on 22 with severe headache. Patient states she has been feeling dizzy and today had a severe headache. She states nothing amplifies it and she did not take any medication to try and resolve it. On arrival to ED, patient was noted to have SBP in 220's. Head CT was obtained showing a new high attenuation nodule in the left parietal white matter which could represent a hemorrhagic or proteinaceous mass versus a focus of hemorrhage. Neurosurgery was consulted and requested ICU admission given inability to r/o intracranial hemorrhage. Of note, patient was also found to be severely hypokalemic with a K of 2.3 in ED.  ICU to accept patient to services.  Upon assessment, patient is resting comfortably in bed. Patient axox4, though [place]. Patient moving all extremities with equal strength.     Events last 24 hours: Arrived to ED for severe headache, found to have HTN urgency. Head CT with ? met lesion vs hemorrhage.     PAST MEDICAL & SURGICAL HISTORY:  Iron deficiency  HTN (hypertension)  Lymphoma  H/O adrenal insufficiency  No significant past surgical history    Review of Systems:  CONSTITUTIONAL: No fever, chills, or fatigue. + Dizzy.  EYES: No changes in vision   ENMT:  Known [place].  NECK: No pain or stiffness  RESPIRATORY: No cough, wheezing; No shortness of breath  CARDIOVASCULAR: No chest pain, palpitations. + Leg swelling - states chronic.   GASTROINTESTINAL: No abdominal. No nausea, vomiting; No diarrhea or constipation. + minor epigastric pain resting.  GENITOURINARY: No dysuria, frequency or hematuria.   NEUROLOGICAL: No memory loss, loss of strength, or tremors. + Headache, + "slight" b/l hand numbness  SKIN: No itching, burning, rashes, or lesions   MUSCULOSKELETAL: No joint pain or swelling; No muscle, back, or extremity pain  PSYCHIATRIC: No depression, anxiety, mood swings, or difficulty sleeping      Medications:  aMIOdarone    Tablet 200 milliGRAM(s) Oral daily  carvedilol 12.5 milliGRAM(s) Oral every 12 hours  carvedilol 12.5 milliGRAM(s) Oral once  furosemide    Tablet 20 milliGRAM(s) Oral daily  hydrALAZINE 100 milliGRAM(s) Oral Once  lisinopril 40 milliGRAM(s) Oral daily  famotidine    Tablet 20 milliGRAM(s) Oral two times a day  atorvastatin 10 milliGRAM(s) Oral at bedtime  fludroCORTISONE 0.05 milliGRAM(s) Oral daily  hydrocortisone 5 milliGRAM(s) Oral daily  hydrocortisone 10 milliGRAM(s) Oral daily  ascorbic acid 500 milliGRAM(s) Oral daily  cholecalciferol 2000 Unit(s) Oral daily  cyanocobalamin 1000 MICROGram(s) Oral daily  potassium chloride  10 mEq/100 mL IVPB 10 milliEquivalent(s) IV Intermittent every 1 hour      ICU Vital Signs Last 24 Hrs  T(C): 36 (30 May 2022 22:59), Max: 36 (30 May 2022 22:59)  T(F): 96.8 (30 May 2022 22:59), Max: 96.8 (30 May 2022 22:59)  HR: 59 (31 May 2022 00:30) (50 - 60)  BP: 201/55 (31 May 2022 00:30) (196/54 - 232/60)  BP(mean): 97 (31 May 2022 00:30) (97 - 109)  ABP: --  ABP(mean): --  RR: 16 (31 May 2022 00:30) (15 - 18)  SpO2: 94% (31 May 2022 00:30) (94% - 100%)    I&O's Detail      LABS:                        10.9   5.94  )-----------( 133      ( 30 May 2022 23:35 )             33.4     05-30    137  |  98  |  22  ----------------------------<  95  2.3<LL>   |  31  |  1.19    Ca    9.3      30 May 2022 23:35    TPro  6.1  /  Alb  2.8<L>  /  TBili  0.5  /  DBili  x   /  AST  51<H>  /  ALT  48  /  AlkPhos  79  05-30    CAPILLARY BLOOD GLUCOSE    Urinalysis Basic - ( 31 May 2022 02:19 )    Color: Yellow / Appearance: Clear / S.005 / pH: x  Gluc: x / Ketone: Negative  / Bili: Negative / Urobili: Negative   Blood: x / Protein: 100 / Nitrite: Negative   Leuk Esterase: Negative / RBC: 0-2 /HPF / WBC 0-2   Sq Epi: x / Non Sq Epi: Few / Bacteria: Occasional    CULTURES:    Physical Examination:    General: No acute distress.  Alert, oriented, interactive, nonfocal. Resting comfortably.     HEENT: Pupils 2mm equal, reactive to light.  Symmetric.    PULM: Clear to auscultation bilaterally, no significant sputum production    CVS: Regular/Charli rate and rhythm, no murmurs, rubs, or gallops    ABD: Soft, nondistended, nontender, normoactive bowel sounds    EXT: +2 pitting lower ext edema, nontender    SKIN: Warm and well perfused, no rashes noted.    NEURO: A&Ox3, strength 5/5 all extremities, moving all ext. following commands.       RADIOLOGY: < from: CT Head No Cont (22 @ 23:55) >  INTERPRETATION:  CLINICAL INFORMATION: Headache and hypertension.    TECHNIQUE: Multiple axial sections were acquired from the base of the   skull to the vertex without contrast enhancement. Coronal and sagittal   reformats were additionally performed. Beam hardening artifact slightly   obscures evaluation of the posterior fossa and brainstem.    COMPARISON: CT of the head from 2022.    FINDINGS:  Parenchymal volume loss is noted with prominent ventricles and sulci.   Chronic microvascular ischemic changes are identified. No acute   territorial infarct is demonstrated.    A new 7 x 6 mm high attenuation nodule is seen in the left parietal white   matter. There is no significant adjacent mass effect.    Evaluation of the osseous structures with the appropriate window appears   unremarkable. Vascular calcifications are noted. Sequela of bilateral   lens surgery is seen. Mild mucosal thickening is noted in the left   sphenoid sinus. Minimal mucosal thickening is seen in the right maxillary   sinus. The remaining visualized paranasal sinuses and mastoid air cells   are clear.    IMPRESSION:  New high attenuation nodule in the left parietal white matter which could   represent a hemorrhagic or proteinaceous mass versus a focus of   hemorrhage. A follow-up contrast-enhanced MRI of the brain is recommended   for further evaluation.    < end of copied text >    TIME SPENT: 44 minutes  Evaluating/treating patient, reviewing data/labs/imaging, discussing case with multidisciplinary team, discussing plan/goals of care with patient/family. Non-inclusive of procedure time.   Patient is a 89y old  Female who presents with a chief complaint of headache.    BRIEF HOSPITAL COURSE: Patient is a 89 yo female with a pmh of lymphoma (states cured and not active), HTN, hyponatremia, a-fib - on no AC d/t falls (now in SR), Diastolic HF, CKD and adrenal insuffiencey who presented to [facility] ED from [facility] Hospital on 22 with severe headache. Patient states she has been feeling dizzy and today had a severe headache. She states nothing amplifies it and she did not take any medication to try and resolve it. On arrival to ED, patient was noted to have SBP in 220's. Head CT was obtained showing a new high attenuation nodule in the left parietal white matter which could represent a hemorrhagic or proteinaceous mass versus a focus of hemorrhage. Neurosurgery was consulted and requested ICU admission given inability to r/o intracranial hemorrhage. Of note, patient was also found to be severely hypokalemic with a K of 2.3 in ED.  ICU to accept patient to services.  Upon assessment, patient is resting comfortably in bed. Patient axox4, though [place]. Patient moving all extremities with equal strength.     Events last 24 hours: Arrived to ED for severe headache, found to have HTN urgency. Head CT with ? met lesion vs hemorrhage.     PAST MEDICAL & SURGICAL HISTORY:  Iron deficiency  HTN (hypertension)  Lymphoma  H/O adrenal insufficiency  No significant past surgical history    Review of Systems:  CONSTITUTIONAL: No fever, chills, or fatigue. + Dizzy.  EYES: No changes in vision   ENMT:  Known [place].  NECK: No pain or stiffness  RESPIRATORY: No cough, wheezing; No shortness of breath  CARDIOVASCULAR: No chest pain, palpitations. + Leg swelling - states chronic.   GASTROINTESTINAL: No abdominal. No nausea, vomiting; No diarrhea or constipation. + minor epigastric pain resting.  GENITOURINARY: No dysuria, frequency or hematuria.   NEUROLOGICAL: No memory loss, loss of strength, or tremors. + Headache, + "slight" b/l hand numbness  SKIN: No itching, burning, rashes, or lesions   MUSCULOSKELETAL: No joint pain or swelling; No muscle, back, or extremity pain  PSYCHIATRIC: No depression, anxiety, mood swings, or difficulty sleeping      Medications:  aMIOdarone    Tablet 200 milliGRAM(s) Oral daily  carvedilol 12.5 milliGRAM(s) Oral every 12 hours  carvedilol 12.5 milliGRAM(s) Oral once  furosemide    Tablet 20 milliGRAM(s) Oral daily  hydrALAZINE 100 milliGRAM(s) Oral Once  lisinopril 40 milliGRAM(s) Oral daily  famotidine    Tablet 20 milliGRAM(s) Oral two times a day  atorvastatin 10 milliGRAM(s) Oral at bedtime  fludroCORTISONE 0.05 milliGRAM(s) Oral daily  hydrocortisone 5 milliGRAM(s) Oral daily  hydrocortisone 10 milliGRAM(s) Oral daily  ascorbic acid 500 milliGRAM(s) Oral daily  cholecalciferol 2000 Unit(s) Oral daily  cyanocobalamin 1000 MICROGram(s) Oral daily  potassium chloride  10 mEq/100 mL IVPB 10 milliEquivalent(s) IV Intermittent every 1 hour      ICU Vital Signs Last 24 Hrs  T(C): 36 (30 May 2022 22:59), Max: 36 (30 May 2022 22:59)  T(F): 96.8 (30 May 2022 22:59), Max: 96.8 (30 May 2022 22:59)  HR: 59 (31 May 2022 00:30) (50 - 60)  BP: 201/55 (31 May 2022 00:30) (196/54 - 232/60)  BP(mean): 97 (31 May 2022 00:30) (97 - 109)  ABP: --  ABP(mean): --  RR: 16 (31 May 2022 00:30) (15 - 18)  SpO2: 94% (31 May 2022 00:30) (94% - 100%)    I&O's Detail      LABS:                        10.9   5.94  )-----------( 133      ( 30 May 2022 23:35 )             33.4     05-30    137  |  98  |  22  ----------------------------<  95  2.3<LL>   |  31  |  1.19    Ca    9.3      30 May 2022 23:35    TPro  6.1  /  Alb  2.8<L>  /  TBili  0.5  /  DBili  x   /  AST  51<H>  /  ALT  48  /  AlkPhos  79  05-30    CAPILLARY BLOOD GLUCOSE    Urinalysis Basic - ( 31 May 2022 02:19 )    Color: Yellow / Appearance: Clear / S.005 / pH: x  Gluc: x / Ketone: Negative  / Bili: Negative / Urobili: Negative   Blood: x / Protein: 100 / Nitrite: Negative   Leuk Esterase: Negative / RBC: 0-2 /HPF / WBC 0-2   Sq Epi: x / Non Sq Epi: Few / Bacteria: Occasional    CULTURES:    Physical Examination:    General: No acute distress.  Alert, oriented, interactive, nonfocal. Resting comfortably.     HEENT: Pupils 2mm equal, reactive to light.  Symmetric.    PULM: Clear to auscultation bilaterally, no significant sputum production    CVS: Regular/Charli rate and rhythm, no murmurs, rubs, or gallops    ABD: Soft, nondistended, nontender, normoactive bowel sounds    EXT: +2 pitting lower ext edema, nontender    SKIN: Warm and well perfused, no rashes noted.    NEURO: A&Ox3, strength 5/5 all extremities, moving all ext. following commands.       RADIOLOGY: < from: CT Head No Cont (22 @ 23:55) >  INTERPRETATION:  CLINICAL INFORMATION: Headache and hypertension.    TECHNIQUE: Multiple axial sections were acquired from the base of the   skull to the vertex without contrast enhancement. Coronal and sagittal   reformats were additionally performed. Beam hardening artifact slightly   obscures evaluation of the posterior fossa and brainstem.    COMPARISON: CT of the head from 2022.    FINDINGS:  Parenchymal volume loss is noted with prominent ventricles and sulci.   Chronic microvascular ischemic changes are identified. No acute   territorial infarct is demonstrated.    A new 7 x 6 mm high attenuation nodule is seen in the left parietal white   matter. There is no significant adjacent mass effect.    Evaluation of the osseous structures with the appropriate window appears   unremarkable. Vascular calcifications are noted. Sequela of bilateral   lens surgery is seen. Mild mucosal thickening is noted in the left   sphenoid sinus. Minimal mucosal thickening is seen in the right maxillary   sinus. The remaining visualized paranasal sinuses and mastoid air cells   are clear.    IMPRESSION:  New high attenuation nodule in the left parietal white matter which could   represent a hemorrhagic or proteinaceous mass versus a focus of   hemorrhage. A follow-up contrast-enhanced MRI of the brain is recommended   for further evaluation.        Critical Care Time: 44 minutes assessing presenting problems of acute illness, which pose high probability of life threatening deterioration or end organ damage/dysfunction, as well as medical decision making including initiating plan of care, reviewing data, reviewing radiologic exams, discussing with multidisciplinary team,  discussing goals of care with patient/family, and writing this note.  Non-inclusive of procedures performed,

## 2022-05-31 NOTE — H&P ADULT - ASSESSMENT
Pt is a very pleasant 89 year old woman with a PMH of Lymphoma, HTN, hyponatremia, hypokalemia, paroxysmal A-fib not on AC due to falls, adrenal insufficiency, CKD and HLD who was brought to the ED by her son due to generalized weakness. Pt was seen and examined in the ED, c/o weakness all over but no specific pain. Pt denies headache, dizziness, visual changes, nausea or vomiting. She states her lymphoma was "cured" and no longer follows with an oncologist. Pt is awake and alert, oriented X3, moving all extremities antigravity with good strength, no drift, no dysmetria. CT shows an area of high attenuation in the left parietal region, possible ICH vs metastatic lesion.     #ICH vs metastatic lesion   Admit to ICU for Q1 hour neuro checks   Repeat CT head at 4am  MRI +/- contrast   Maintain SBP <150, IV Cardene as needed   potassium 2.3, repeated in the ED, will follow up   Will assess need for full metastatic work up  Venodynes for DVT PPX   Continue statin, check lipids    Last TTE 3/31/22, EF 55-60%   h/o a-fib, presently NSR, no AC due to history of falls     Discussed with Dr. Grant

## 2022-05-31 NOTE — PHYSICAL THERAPY INITIAL EVALUATION ADULT - PRECAUTIONS/LIMITATIONS, REHAB EVAL
Maintain SBP <150/aspiration precautions/fall precautions/oxygen therapy device and L/min/seizure precautions

## 2022-05-31 NOTE — CONSULT NOTE ADULT - ASSESSMENT
Patient is a 89 yo female with a pmh of lymphoma, HTN, hyponatremia, a-fib (now in SR), Diastolic HF and adrenal insuffiencey who presented to [facility] ED with severe headache.    Problem:  - Acute ICH vs Met lesion on head CT  - HTN urgency   - Hypokalemia     Patient is a 89 yo female with a pmh of lymphoma, HTN, hyponatremia, a-fib (now in SR), Diastolic HF and adrenal insuffiencey who presented to [facility] ED with severe headache.    Problem:  - Acute ICH vs Met lesion on head CT  - HTN urgency   - Hypokalemia     Plan:  - Admit to ICU  - Neuro Surg following, appreciate recs   - Repeat head CT at 4 am to assess change in etiology  - Neuro checks q1hr  - Goal SBP <150, Given home medications this am  - Will trial labetalol if needed, though low threshold to start nicardipine gtt  - Given x3 10 meq potassium replacements. Will repeat K post infusions.   - Strict I & O  - Trend electrolytes replace as needed. Goal K >4, mag >2  - Bedrest at this time  - ISS to prevent atelectasis   - DVT ppx with SCDS, hold AC at this time given concern of bleed  - Trend CBC, transfuse if hgb <7  - NPO at this time except meds  - TTE in am     Discussed case with EICU attending Dr. Harris  Patient is a 89 yo female with a pmh of lymphoma, HTN, hyponatremia, a-fib (now in SR), Diastolic HF and adrenal insuffiencey who presented to [facility] ED with severe headache.    Problem:  - Acute ICH vs Met lesion on head CT  - HTN urgency   - Hypokalemia     Plan:  - Admit to ICU  - Neuro Surg following, appreciate recs   - Repeat head CT at 4 am to assess change in etiology  - Neuro checks q1hr  - Goal SBP <150, Given home medications this am  - Will trial labetalol if needed, though low threshold to start nicardipine gtt  - Will need MRI  - Given x3 10 meq potassium replacements. Will repeat K post infusions.   - Strict I & O  - Trend electrolytes replace as needed. Goal K >4, mag >2  - Bedrest at this time  - ISS to prevent atelectasis   - DVT ppx with SCDS, hold AC at this time given concern of bleed  - Trend CBC, transfuse if hgb <7  - NPO at this time except meds  - TTE in am     Discussed case with EICU attending Dr. Harris  Patient is a 89 yo female with a pmh of lymphoma, HTN, hyponatremia, a-fib (now in SR), Diastolic HF and adrenal insuffiencey who presented to [facility] ED with severe headache.    Problem:  - Acute ICH vs Met lesion on head CT  - HTN urgency   - Hypokalemia   - Diastolic HF    Plan:  - Admit to ICU  - Neuro Surg following, appreciate recs   - Repeat head CT at 4 am to assess change in etiology  - Neuro checks q1hr  - Goal SBP <150, Given home medications this am  - Will trial labetalol if HR >60 if needed, otherwise utilize hydralazine IVP.   - Low threshold to start nicardipine gtt  - Will need MRI  - Given x3 10 meq potassium replacements. Will repeat K post infusions.   - Strict I & O  - Trend electrolytes replace as needed. Goal K >4, mag >2  - Bedrest at this time  - ISS to prevent atelectasis   - DVT ppx with SCDS, hold AC at this time given concern of bleed  - Trend CBC, transfuse if hgb <7  - NPO at this time except meds  - Resume home cardiac meds including lasix, coreg (hold if HR <60), lisinopril, hydralazine, amio and Lipitor  - TTE in am     Discussed case with EICU attending Dr. Harris

## 2022-05-31 NOTE — PATIENT PROFILE ADULT - FALL HARM RISK - HARM RISK INTERVENTIONS

## 2022-05-31 NOTE — DIETITIAN INITIAL EVALUATION ADULT - PERTINENT MEDS FT
MEDICATIONS  (STANDING):  aMIOdarone    Tablet 200 milliGRAM(s) Oral daily  ascorbic acid 500 milliGRAM(s) Oral daily  atorvastatin 10 milliGRAM(s) Oral at bedtime  cholecalciferol 2000 Unit(s) Oral daily  cyanocobalamin 1000 MICROGram(s) Oral daily  hydrocortisone 5 milliGRAM(s) Oral at bedtime  hydrocortisone 10 milliGRAM(s) Oral daily  lisinopril 40 milliGRAM(s) Oral daily    MEDICATIONS  (PRN):  senna 2 Tablet(s) Oral at bedtime PRN Constipation

## 2022-05-31 NOTE — PHYSICAL THERAPY INITIAL EVALUATION ADULT - GENERAL OBSERVATIONS, REHAB EVAL
pt rec'd sitting in BS chair in ICU, monitors on place, DIL present. pt c/o being hungry and is finishing (late) breakfast->info obtained from DIL

## 2022-05-31 NOTE — DISCHARGE NOTE NURSING/CASE MANAGEMENT/SOCIAL WORK - NSDCVIVACCINE_GEN_ALL_CORE_FT
Tdap; 10-Jul-2021 13:25; Starr Zazueta (KENNETH); [employer]; [id number] (Exp. Date: 25-Nov-2022); IntraMuscular; Deltoid Left.; 0.5 milliLiter(s); VIS (VIS Published: 09-May-2013, VIS Presented: 10-Jul-2021);

## 2022-05-31 NOTE — DISCHARGE NOTE NURSING/CASE MANAGEMENT/SOCIAL WORK - PATIENT PORTAL LINK FT
You can access the FollowMyHealth Patient Portal offered by [facility] by registering at the following website: [email or web address]. By joining [employer]’s FollowMyHealth portal, you will also be able to view your health information using other applications (apps) compatible with our system.

## 2022-05-31 NOTE — DIETITIAN INITIAL EVALUATION ADULT - OTHER INFO
89yo female with PMH significant for lymphoma, HTN, hyponatremia, hypokalemia, Afib, adrenal insufficiency, CKD, and HLD p/w generalized weakness. admitted with ICH vs metastatic lesion.  seen my palliative care;  pt remains full code, pending [facility] meeting tomorrow (6/1).    *during visit, pt reporting a lot of nausea, pending meds.  bedscale wt taken during visit of 112# (5/31).

## 2022-05-31 NOTE — CONSULT NOTE ADULT - SUBJECTIVE AND OBJECTIVE BOX
HPI: Ms. An is a 89y old Female with hx of Lymphoma (remission), HTN, hyponatremia, hypokalemia, paroxysmal A-fib not on AC due to falls (last 1 month ago), adrenal insufficiency, CKD, HLD, 3rd admission with recent admission 5/1-6/7 for HF. Now admitted 5/30 bib her son due to generalized weakness. Found here to have headache with hypertensive urgency improved with meds, CT showed an area of high attenuation in the left parietal region, possible ICH vs metastatic lesion.  Palliative Care consulted to assist with establishing GOC.     Met Ms. An this am. Pt tired, but open to talking. She denied pain or dyspnea, noting occasional cough at baseline (unchanged). Denies headache, was nauseas earlier, denies this now. She denied any other sx beside fatigue.     Pt notes she lives in [place] though could not recall how long she has been living there for; lived with daughter Kate prior to this. BARRIE Samira called during encounter explaining that her  (pt's son) Wolf and his sister Kate were pt's HCPs. However, Wolf is having teeth removed today and Kate's  is also having a procedure. Kate called Samira while Samira was on with us and agreed with being called to discuss goals. Pt was fine with this, trusting them to make decisions for her.     PAIN: ( )Yes   (x )No    DYSPNEA: ( ) Yes  ( x) No    PAST MEDICAL & SURGICAL HISTORY:  Iron deficiency  HTN (hypertension)  Lymphoma  H/O adrenal insufficiency  Hyponatremia  Hypokalemia  Chronic kidney disease, unspecified CKD stage  Diastolic heart failure  Paroxysmal atrial fibrillation    No significant past surgical history      SOCIAL HX: Lives at [place]    Hx opiate tolerance ( )YES  (x )NO    Baseline ADLs  (Prior to Admission)- walks with RW, falls, pt notes recent fall that led to hospitalization  ( ) Independent   ( x)Dependent    FAMILY HISTORY:  unable to recall    Review of Systems:    Anxiety- denies  Depression- denies  Constipation- denies  Diarrhea- denies   Nausea- occasionally  Vomiting- denies  Anorexia- yes  Weight Loss- yes   Cough- yes baseline, on occasion does not bother her  Secretions- denies  Fatigue- yes  Weakness- yes  Delirium- no    All other systems reviewed and negative      PHYSICAL EXAM:    Vital Signs Last 24 Hrs  T(C): 36.3 (31 May 2022 07:30), Max: 36.6 (31 May 2022 04:02)  T(F): 97.4 (31 May 2022 07:30), Max: 97.8 (31 May 2022 04:02)  HR: 51 (31 May 2022 08:00) (48 - 60)  BP: 151/45 (31 May 2022 08:00) (114/37 - 232/60)  BP(mean): 76 (31 May 2022 08:00) (60 - 109)  RR: 11 (31 May 2022 08:00) (11 - 18)  SpO2: 93% (31 May 2022 08:00) (92% - 100%)  Daily Height in cm: 152.4 (30 May 2022 22:59)    Daily     PPSV2: 30  %    General: Elderly female sitting up in bed, pleasant, appears tired  Mental Status: AOx3  HEENT: dmm, + temporal wasting  Lungs: dec at bases bl  Cardiac: + s1 s2 joycelyn  GI: soft nt nd +bs  : voids  MSK/skin: moves all extremities, muscle and fat wasting throughtout  Neuro: no focal deficits      LABS:                        9.0    6.14  )-----------( 111      ( 31 May 2022 05:10 )             28.1     05-31    137  |  102  |  19  ----------------------------<  93  2.8<LL>   |  30  |  1.05    Ca    8.3<L>      31 May 2022 05:10  Phos  2.7     05-31  Mg     1.8     05-31    TPro  4.9<L>  /  Alb  2.3<L>  /  TBili  0.3  /  DBili  x   /  AST  41<H>  /  ALT  36  /  AlkPhos  63  05-31      Albumin: Albumin, Serum: 2.3 g/dL (05-31 @ 05:10)      Allergies    penicillin (Hives)  sulfa drugs (Hives)  tetracycline (Hives)    Intolerances      MEDICATIONS  (STANDING):  aMIOdarone    Tablet 200 milliGRAM(s) Oral daily  ascorbic acid 500 milliGRAM(s) Oral daily  atorvastatin 10 milliGRAM(s) Oral at bedtime  cholecalciferol 2000 Unit(s) Oral daily  cyanocobalamin 1000 MICROGram(s) Oral daily  hydrocortisone 5 milliGRAM(s) Oral at bedtime  hydrocortisone 10 milliGRAM(s) Oral daily  lisinopril 40 milliGRAM(s) Oral daily    MEDICATIONS  (PRN):  senna 2 Tablet(s) Oral at bedtime PRN Constipation      RADIOLOGY/ADDITIONAL STUDIES:    ACC: [id number] EXAM:  CT BRAIN                        PROCEDURE DATE:  05/31/2022      IMPRESSION:  Stable head CT scan.    No interval change in appearance of a 7 mm mildly hyperattenuating   nodular focus in the left parietal lobe, located at the gray-white   matter-white white matter junction.  There is mild surrounding vasogenic   edema.  Follow-up MRI with contrast is recommended to evaluate for   metastatic lesion versus spontaneous hemorrhage.    Thick secretions layering dependently in the left sphenoid sinus may   represent trapped secretions versus sinusitis.    ACC: [id number] EXAM:  CT BRAIN                        PROCEDURE DATE:  05/30/2022      IMPRESSION:  New high attenuation nodule in the left parietal white matter which could   represent a hemorrhagic or proteinaceous mass versus a focus of   hemorrhage. A follow-up contrast-enhanced MRI of the brain is recommended   for further evaluation.    Call Back:  I discussed this case with Dr. Gifford at 12:54 AM on   5/31/2022.  Hospital policies for call back including read back policy   were followed. The verbal communication call back supplements this   written report.

## 2022-05-31 NOTE — DIETITIAN INITIAL EVALUATION ADULT - ADD RECOMMEND
1) when feasible, ADAT to regular with ensure enlive TID to optimize PO intake 2) consider checking vitamin D 25OH level and supplement prn 3) monitor BM: if > 3 days without BM, add bowel regimen 4) in v/o malnutrition, add 100mg thiamine 5) daily wt checks to track/trend changes 6) add MVI with minerals daily to ensure 100% RDI met

## 2022-05-31 NOTE — H&P ADULT - NSHPLABSRESULTS_GEN_ALL_CORE
< from: CT Head No Cont (04.06.22 @ 22:04) >    No acute intracranial hemorrhage, mass effect or midline shift.  No CT evidence of acute large vascular territory infarct.  The ventricles and cortical sulci are prominent reflecting parenchymal volume loss.  Patchy and more confluent hypodensities in the periventricular and   subcortical white matter are nonspecific, but likely sequela of small vessel ischemic disease.    The visualized paranasal sinuses and mastoid air cells are well aerated.   The native ocular lenses are surgically absent.  No displaced calvarial fracture.    IMPRESSION:  No acute intracranial hemorrhage or mass effect.    05-30    137  |  98  |  22  ----------------------------<  95  2.3<LL>   |  31  |  1.19                 10.9   5.94  )-----------( 133      ( 30 May 2022 23:35 )             33.4

## 2022-05-31 NOTE — DIETITIAN INITIAL EVALUATION ADULT - PERTINENT LABORATORY DATA
05-31    137  |  102  |  19  ----------------------------<  93  2.8<LL>   |  30  |  1.05    Ca    8.3<L>      31 May 2022 05:10  Phos  2.7     05-31  Mg     1.8     05-31    TPro  4.9<L>  /  Alb  2.3<L>  /  TBili  0.3  /  DBili  x   /  AST  41<H>  /  ALT  36  /  AlkPhos  63  05-31

## 2022-05-31 NOTE — DIETITIAN INITIAL EVALUATION ADULT - ORAL INTAKE PTA/DIET HISTORY
lives at assisted living facility; consumes half or less of meals there due to poor appetite.  no food allergies.

## 2022-05-31 NOTE — PHYSICAL THERAPY INITIAL EVALUATION ADULT - PERTINENT HX OF CURRENT PROBLEM, REHAB EVAL
pt was brought to the ED by her son due to generalized weakness. CT shows an area of high attenuation in the left parietal region, possible ICH vs metastatic lesion. Repeat CTH was stable.  Patient noted to be bradycardic, hypokalemic-replaced. MRI head pending .

## 2022-05-31 NOTE — PHARMACOTHERAPY INTERVENTION NOTE - COMMENTS
Medication history complete, reviewed with patient list from the [facility] and confirmed with rain

## 2022-06-01 LAB
ANION GAP SERPL CALC-SCNC: 6 MMOL/L — SIGNIFICANT CHANGE UP (ref 5–17)
ANION GAP SERPL CALC-SCNC: 6 MMOL/L — SIGNIFICANT CHANGE UP (ref 5–17)
BUN SERPL-MCNC: 21 MG/DL — SIGNIFICANT CHANGE UP (ref 7–23)
BUN SERPL-MCNC: 23 MG/DL — SIGNIFICANT CHANGE UP (ref 7–23)
CALCIUM SERPL-MCNC: 8.6 MG/DL — SIGNIFICANT CHANGE UP (ref 8.5–10.1)
CALCIUM SERPL-MCNC: 8.8 MG/DL — SIGNIFICANT CHANGE UP (ref 8.5–10.1)
CHLORIDE SERPL-SCNC: 103 MMOL/L — SIGNIFICANT CHANGE UP (ref 96–108)
CHLORIDE SERPL-SCNC: 104 MMOL/L — SIGNIFICANT CHANGE UP (ref 96–108)
CO2 SERPL-SCNC: 27 MMOL/L — SIGNIFICANT CHANGE UP (ref 22–31)
CO2 SERPL-SCNC: 28 MMOL/L — SIGNIFICANT CHANGE UP (ref 22–31)
CREAT SERPL-MCNC: 1.3 MG/DL — SIGNIFICANT CHANGE UP (ref 0.5–1.3)
CREAT SERPL-MCNC: 1.46 MG/DL — HIGH (ref 0.5–1.3)
EGFR: 33 ML/MIN/1.73M2 — LOW
EGFR: 37 ML/MIN/1.73M2 — LOW
GLUCOSE SERPL-MCNC: 134 MG/DL — HIGH (ref 70–99)
GLUCOSE SERPL-MCNC: 95 MG/DL — SIGNIFICANT CHANGE UP (ref 70–99)
HCT VFR BLD CALC: 29.2 % — LOW (ref 34.5–45)
HGB BLD-MCNC: 9.1 G/DL — LOW (ref 11.5–15.5)
MAGNESIUM SERPL-MCNC: 2.3 MG/DL — SIGNIFICANT CHANGE UP (ref 1.6–2.6)
MCHC RBC-ENTMCNC: 31.1 PG — SIGNIFICANT CHANGE UP (ref 27–34)
MCHC RBC-ENTMCNC: 31.2 GM/DL — LOW (ref 32–36)
MCV RBC AUTO: 99.7 FL — SIGNIFICANT CHANGE UP (ref 80–100)
PHOSPHATE SERPL-MCNC: 3.2 MG/DL — SIGNIFICANT CHANGE UP (ref 2.5–4.5)
PLATELET # BLD AUTO: 118 K/UL — LOW (ref 150–400)
POTASSIUM SERPL-MCNC: 3.6 MMOL/L — SIGNIFICANT CHANGE UP (ref 3.5–5.3)
POTASSIUM SERPL-MCNC: 4 MMOL/L — SIGNIFICANT CHANGE UP (ref 3.5–5.3)
POTASSIUM SERPL-SCNC: 3.6 MMOL/L — SIGNIFICANT CHANGE UP (ref 3.5–5.3)
POTASSIUM SERPL-SCNC: 4 MMOL/L — SIGNIFICANT CHANGE UP (ref 3.5–5.3)
RBC # BLD: 2.93 M/UL — LOW (ref 3.8–5.2)
RBC # FLD: 15.2 % — HIGH (ref 10.3–14.5)
SODIUM SERPL-SCNC: 136 MMOL/L — SIGNIFICANT CHANGE UP (ref 135–145)
SODIUM SERPL-SCNC: 138 MMOL/L — SIGNIFICANT CHANGE UP (ref 135–145)
WBC # BLD: 4.58 K/UL — SIGNIFICANT CHANGE UP (ref 3.8–10.5)
WBC # FLD AUTO: 4.58 K/UL — SIGNIFICANT CHANGE UP (ref 3.8–10.5)

## 2022-06-01 PROCEDURE — 99232 SBSQ HOSP IP/OBS MODERATE 35: CPT

## 2022-06-01 PROCEDURE — 99498 ADVNCD CARE PLAN ADDL 30 MIN: CPT

## 2022-06-01 PROCEDURE — 99233 SBSQ HOSP IP/OBS HIGH 50: CPT | Mod: 25

## 2022-06-01 PROCEDURE — 70553 MRI BRAIN STEM W/O & W/DYE: CPT | Mod: 26

## 2022-06-01 PROCEDURE — 99497 ADVNCD CARE PLAN 30 MIN: CPT

## 2022-06-01 PROCEDURE — 99233 SBSQ HOSP IP/OBS HIGH 50: CPT

## 2022-06-01 RX ORDER — LABETALOL HCL 100 MG
10 TABLET ORAL ONCE
Refills: 0 | Status: COMPLETED | OUTPATIENT
Start: 2022-06-01 | End: 2022-06-01

## 2022-06-01 RX ORDER — HYDRALAZINE HCL 50 MG
10 TABLET ORAL ONCE
Refills: 0 | Status: COMPLETED | OUTPATIENT
Start: 2022-06-01 | End: 2022-06-01

## 2022-06-01 RX ORDER — LABETALOL HCL 100 MG
10 TABLET ORAL ONCE
Refills: 0 | Status: DISCONTINUED | OUTPATIENT
Start: 2022-06-01 | End: 2022-06-01

## 2022-06-01 RX ORDER — AMLODIPINE BESYLATE 2.5 MG/1
5 TABLET ORAL DAILY
Refills: 0 | Status: DISCONTINUED | OUTPATIENT
Start: 2022-06-01 | End: 2022-06-02

## 2022-06-01 RX ORDER — CARVEDILOL PHOSPHATE 80 MG/1
12.5 CAPSULE, EXTENDED RELEASE ORAL EVERY 12 HOURS
Refills: 0 | Status: DISCONTINUED | OUTPATIENT
Start: 2022-06-01 | End: 2022-06-05

## 2022-06-01 RX ORDER — SODIUM CHLORIDE 9 MG/ML
500 INJECTION INTRAMUSCULAR; INTRAVENOUS; SUBCUTANEOUS ONCE
Refills: 0 | Status: COMPLETED | OUTPATIENT
Start: 2022-06-01 | End: 2022-06-01

## 2022-06-01 RX ORDER — HYDRALAZINE HCL 50 MG
50 TABLET ORAL ONCE
Refills: 0 | Status: COMPLETED | OUTPATIENT
Start: 2022-06-01 | End: 2022-06-01

## 2022-06-01 RX ORDER — HYDRALAZINE HCL 50 MG
100 TABLET ORAL EVERY 8 HOURS
Refills: 0 | Status: DISCONTINUED | OUTPATIENT
Start: 2022-06-01 | End: 2022-06-05

## 2022-06-01 RX ADMIN — AMLODIPINE BESYLATE 5 MILLIGRAM(S): 2.5 TABLET ORAL at 11:21

## 2022-06-01 RX ADMIN — Medication 10 MILLIGRAM(S): at 03:21

## 2022-06-01 RX ADMIN — Medication 10 MILLIGRAM(S): at 19:17

## 2022-06-01 RX ADMIN — AMIODARONE HYDROCHLORIDE 200 MILLIGRAM(S): 400 TABLET ORAL at 10:15

## 2022-06-01 RX ADMIN — ATORVASTATIN CALCIUM 10 MILLIGRAM(S): 80 TABLET, FILM COATED ORAL at 20:44

## 2022-06-01 RX ADMIN — Medication 10 MILLIGRAM(S): at 14:31

## 2022-06-01 RX ADMIN — PREGABALIN 1000 MICROGRAM(S): 225 CAPSULE ORAL at 10:16

## 2022-06-01 RX ADMIN — CARVEDILOL PHOSPHATE 6.25 MILLIGRAM(S): 80 CAPSULE, EXTENDED RELEASE ORAL at 20:43

## 2022-06-01 RX ADMIN — Medication 5 MILLIGRAM(S): at 20:44

## 2022-06-01 RX ADMIN — Medication 100 MILLIGRAM(S): at 13:04

## 2022-06-01 RX ADMIN — CARVEDILOL PHOSPHATE 6.25 MILLIGRAM(S): 80 CAPSULE, EXTENDED RELEASE ORAL at 15:05

## 2022-06-01 RX ADMIN — Medication 10 MILLIGRAM(S): at 01:13

## 2022-06-01 RX ADMIN — Medication 10 MILLIGRAM(S): at 11:44

## 2022-06-01 RX ADMIN — Medication 10 MILLIGRAM(S): at 21:07

## 2022-06-01 RX ADMIN — Medication 50 MILLIGRAM(S): at 05:03

## 2022-06-01 RX ADMIN — Medication 2000 UNIT(S): at 10:15

## 2022-06-01 RX ADMIN — Medication 10 MILLIGRAM(S): at 07:40

## 2022-06-01 RX ADMIN — Medication 100 MILLIGRAM(S): at 20:44

## 2022-06-01 RX ADMIN — Medication 10 MILLIGRAM(S): at 10:16

## 2022-06-01 RX ADMIN — Medication 50 MILLIGRAM(S): at 10:20

## 2022-06-01 RX ADMIN — Medication 500 MILLIGRAM(S): at 10:16

## 2022-06-01 NOTE — PROGRESS NOTE ADULT - CONVERSATION DETAILS
Met with Ms. An's family to discuss her medical issues and wishes for her care. They explained that the pt was living in [place] up until last summer when she moved up here with Kate. After a few months she moved in [facility] with 24hr aids support due to functional decline. Pt was doing ok there but not as interactive with other residents- more lively with family. They know that she is aging and are prepared to consider best plans for her.     We reviewed current medical issues, namely ICH, noting MRI was just completed and primary team will share result when available. They knew about pt's HTN, Elena, who is head GI in [place] explained that the pt has had issues with HTN emergency with SBPs over 200 for years, this is not new to family and has led to several hospital visits for BP control. However, pt has not had a stroke or TIAs that they can recall. History of lymphoma also mentioned with no further workup. They also have been struggling with chronic hyponatremia and trying to balance treatments for that with recurrent CHF exacerbations and BP issues. They know she is complicated, but she has been doing ok along the way despite this, with a sharp mind. They do note recognizing that pt seems more afraid before each hospitalization now and is emotional whenever they say goodbye. They gather that the possibility of mortality likely seems increasingly impactful as medical issues continue to recur.     Given pt's medical issues and advanced age we went on to discuss advanced directives. They confirmed HCP status, and having conversations with the pt in the past about code status, thinking she already had a DNR and DNI in place. We reviewed statistics for CPR and survival and they agreed that those aggressive interventions would not benefit the pt. We reviewed MOLST to put this on and they returned to room after meeting to review with pt and pt agreed, able to sign for herseld. Prior to this we also discussed possible dc options considering above including home palliative plan (essentially same plan they have had, with intent to return to the hospital) and home hospice (focusing on comfort at home without returning). Considering pt's sharp mind and her surfacing anxieties about mortality they felt they would opt for home palliative with PT for now, glad to know that hospice remains their right. They inquired about how to sign up for hospice in the future and we discussed this. For now, they are also taking pt home so she can be happier amongst her family. Shared agreement with this plan and intent to share this with floor SW who will help them set it up.     Overall, family understand pt is aging and declining with recurrent underlying and acute issues. However, for now they would like to continue on with stabilizing interventions and have pt return home with pall plan + PT when ready for dc. They remain open to further support.

## 2022-06-01 NOTE — PROGRESS NOTE ADULT - ASSESSMENT
89y old Female with hx of Lymphoma (remission), HTN, hyponatremia, hypokalemia, paroxysmal A-fib not on AC due to falls (last 1 month ago), adrenal insufficiency, CKD, HLD, 3rd admission with recent admission 5/1-6/7 for HF. Now admitted 5/30 bib her son due to generalized weakness. Found here to have headache with hypertensive urgency improved with meds, CT showed an area of high attenuation in the left parietal region, possible ICH vs metastatic lesion.  Palliative Care consulted to assist with establishing GOC.     1) ICH vs. intracranial mass  - neurosurgical notes appreciated  - repeat CTH unchanged  - awaiting MRI read  - limited echo appreciated  - fall and aspiration precautions  - headache resolved    2) Hypertensive urgency  - being managed with meds  - on monitor    3) Nausea  - c/w zofran prn  - could be from intracranial issues    4) Debility  - walks with RW and has hx of falls recently  - PT consult appreciated- walked 100 ft; home with PT suggested  - suggest nutrition consult for signs of malnutrition on exam    5) Prognosis  - appears poor overall  - in setting of advanced age, debility with recent falls, multiple hospitalizations this year, albumin 2.3 with evidence of malnutrition, now also with possible ICH vs met, pt at inc risk for further complications    6) GOC/Advanced Directives  - pt seems to have capacity for decision making, deferring to family  - HCP on file naming daughter Kate Partida [phone]  - MOLST completed today with pt- DNR and DNI  - GOC meeting held as noted above- not yet ready for hospice, would like to take pt home with home pall    Thank you for including us in Ms. An's care. Will continue to follow with you.    Shoshana Lynn MD  Palliative Care Attending

## 2022-06-01 NOTE — PROGRESS NOTE ADULT - TREATMENT GUIDELINE COMMENT
c/w current interventions, dc home with PT when medically stable    ** Spent 45 minutes reviewing advanced care planning, including advanced directives, MOLST, and all dispo options

## 2022-06-01 NOTE — PROGRESS NOTE ADULT - ASSESSMENT
Pt is a very pleasant 89 year old woman with a PMH of Lymphoma, HTN, hyponatremia, hypokalemia, paroxysmal A-fib not on AC due to falls, adrenal insufficiency, CKD and HLD who was brought to the ED by her son due to generalized weakness. Pt was seen and examined in the ED, c/o weakness all over but no specific pain. Pt denies headache, dizziness, visual changes, nausea or vomiting. She states her lymphoma was "cured" and no longer follows with an oncologist. Pt is awake and alert, oriented X3, moving all extremities antigravity with good strength, no drift, no dysmetria. CT shows an area of high attenuation in the left parietal region, possible ICH vs metastatic lesion.     Plan:  No acute neurosurgical intervention indicated  RHCT stable  MRI +/- contrast done awaiting final read appears c/w small heme vs cavernoma  Palliative care consult goc meeting this AM  Maintain SBP <150  Transfer to telemetry  Hospitalist consult  Neuro checks [id number]  Advance diet  PT/OOB with assist  Venodynes for DVT PPX   Continue statin  Last TTE 3/31/22, EF 55-60%   h/o a-fib, presently NSR, no AC due to history of falls     Discussed with Dr. Grant

## 2022-06-01 NOTE — PROGRESS NOTE ADULT - ASSESSMENT
A/P: 89 year old female who presents with an ICH vs Mass, hypokalemia, and bradycardia  Adrenal insuffiencey     Plan:  Can transfer to tele  Po Diet  Stopped Florinef as it can cause hypokalemia  D/C Lasix as well  Hold Coreg because of bradycardia  Resume hydralazine at 100 TID  Hold ACS ad her Cr went to 1.4  Continue Amio for Now as she is rate controlled  For an MRI today  Continue Hydrocortisone for adrenal insuffiencey

## 2022-06-02 LAB
ANION GAP SERPL CALC-SCNC: 1 MMOL/L — LOW (ref 5–17)
BUN SERPL-MCNC: 23 MG/DL — SIGNIFICANT CHANGE UP (ref 7–23)
CALCIUM SERPL-MCNC: 8.9 MG/DL — SIGNIFICANT CHANGE UP (ref 8.5–10.1)
CHLORIDE SERPL-SCNC: 104 MMOL/L — SIGNIFICANT CHANGE UP (ref 96–108)
CHOLEST SERPL-MCNC: 186 MG/DL — SIGNIFICANT CHANGE UP
CO2 SERPL-SCNC: 32 MMOL/L — HIGH (ref 22–31)
CREAT SERPL-MCNC: 1.44 MG/DL — HIGH (ref 0.5–1.3)
EGFR: 33 ML/MIN/1.73M2 — LOW
GLUCOSE SERPL-MCNC: 88 MG/DL — SIGNIFICANT CHANGE UP (ref 70–99)
HCT VFR BLD CALC: 28.1 % — LOW (ref 34.5–45)
HDLC SERPL-MCNC: 92 MG/DL — SIGNIFICANT CHANGE UP
HGB BLD-MCNC: 9 G/DL — LOW (ref 11.5–15.5)
LIPID PNL WITH DIRECT LDL SERPL: 81 MG/DL — SIGNIFICANT CHANGE UP
MAGNESIUM SERPL-MCNC: 2.1 MG/DL — SIGNIFICANT CHANGE UP (ref 1.6–2.6)
MCHC RBC-ENTMCNC: 31.9 PG — SIGNIFICANT CHANGE UP (ref 27–34)
MCHC RBC-ENTMCNC: 32 GM/DL — SIGNIFICANT CHANGE UP (ref 32–36)
MCV RBC AUTO: 99.6 FL — SIGNIFICANT CHANGE UP (ref 80–100)
NON HDL CHOLESTEROL: 94 MG/DL — SIGNIFICANT CHANGE UP
PHOSPHATE SERPL-MCNC: 3.3 MG/DL — SIGNIFICANT CHANGE UP (ref 2.5–4.5)
PLATELET # BLD AUTO: 120 K/UL — LOW (ref 150–400)
POTASSIUM SERPL-MCNC: 4.2 MMOL/L — SIGNIFICANT CHANGE UP (ref 3.5–5.3)
POTASSIUM SERPL-SCNC: 4.2 MMOL/L — SIGNIFICANT CHANGE UP (ref 3.5–5.3)
RBC # BLD: 2.82 M/UL — LOW (ref 3.8–5.2)
RBC # FLD: 15 % — HIGH (ref 10.3–14.5)
SODIUM SERPL-SCNC: 137 MMOL/L — SIGNIFICANT CHANGE UP (ref 135–145)
TRIGL SERPL-MCNC: 66 MG/DL — SIGNIFICANT CHANGE UP
WBC # BLD: 4.86 K/UL — SIGNIFICANT CHANGE UP (ref 3.8–10.5)
WBC # FLD AUTO: 4.86 K/UL — SIGNIFICANT CHANGE UP (ref 3.8–10.5)

## 2022-06-02 PROCEDURE — 99233 SBSQ HOSP IP/OBS HIGH 50: CPT

## 2022-06-02 PROCEDURE — 99232 SBSQ HOSP IP/OBS MODERATE 35: CPT

## 2022-06-02 PROCEDURE — 93010 ELECTROCARDIOGRAM REPORT: CPT

## 2022-06-02 RX ORDER — ERYTHROMYCIN BASE 5 MG/GRAM
1 OINTMENT (GRAM) OPHTHALMIC (EYE) AT BEDTIME
Refills: 0 | Status: DISCONTINUED | OUTPATIENT
Start: 2022-06-02 | End: 2022-06-05

## 2022-06-02 RX ORDER — HYDRALAZINE HCL 50 MG
10 TABLET ORAL
Refills: 0 | Status: DISCONTINUED | OUTPATIENT
Start: 2022-06-02 | End: 2022-06-05

## 2022-06-02 RX ORDER — AMLODIPINE BESYLATE 2.5 MG/1
10 TABLET ORAL DAILY
Refills: 0 | Status: DISCONTINUED | OUTPATIENT
Start: 2022-06-02 | End: 2022-06-05

## 2022-06-02 RX ADMIN — Medication 10 MILLIGRAM(S): at 22:41

## 2022-06-02 RX ADMIN — CARVEDILOL PHOSPHATE 12.5 MILLIGRAM(S): 80 CAPSULE, EXTENDED RELEASE ORAL at 21:22

## 2022-06-02 RX ADMIN — SENNA PLUS 2 TABLET(S): 8.6 TABLET ORAL at 21:25

## 2022-06-02 RX ADMIN — ATORVASTATIN CALCIUM 10 MILLIGRAM(S): 80 TABLET, FILM COATED ORAL at 21:22

## 2022-06-02 RX ADMIN — Medication 10 MILLIGRAM(S): at 03:13

## 2022-06-02 RX ADMIN — Medication 10 MILLIGRAM(S): at 06:43

## 2022-06-02 RX ADMIN — Medication 10 MILLIGRAM(S): at 09:24

## 2022-06-02 RX ADMIN — Medication 5 MILLIGRAM(S): at 21:22

## 2022-06-02 RX ADMIN — Medication 100 MILLIGRAM(S): at 21:22

## 2022-06-02 RX ADMIN — Medication 1 APPLICATION(S): at 21:23

## 2022-06-02 RX ADMIN — Medication 500 MILLIGRAM(S): at 09:25

## 2022-06-02 RX ADMIN — CARVEDILOL PHOSPHATE 12.5 MILLIGRAM(S): 80 CAPSULE, EXTENDED RELEASE ORAL at 09:25

## 2022-06-02 RX ADMIN — PREGABALIN 1000 MICROGRAM(S): 225 CAPSULE ORAL at 09:23

## 2022-06-02 RX ADMIN — Medication 100 MILLIGRAM(S): at 05:09

## 2022-06-02 RX ADMIN — Medication 2000 UNIT(S): at 09:24

## 2022-06-02 RX ADMIN — AMLODIPINE BESYLATE 10 MILLIGRAM(S): 2.5 TABLET ORAL at 06:50

## 2022-06-02 RX ADMIN — Medication 10 MILLIGRAM(S): at 18:28

## 2022-06-02 NOTE — PROGRESS NOTE ADULT - ASSESSMENT
A/P: 89 year old female who presents with an ICH vs Mass, hypokalemia, and bradycardia  Adrenal insuffiencey     Plan:  Can transfer to tele once BP improved  Po Diet  Stopped Florinef as it can cause hypokalemia  D/C Lasix as well  Resumed hydralazine at 100 TID and Coreg 12.5 bid, added Norvasc 10  Hold ACE ad her Cr went to 1.4.  if it improves will resume ACE  Continue Amio for Now as she is rate controlled  MRI noted  Continue Hydrocortisone for adrenal insuffiencey  DVT Prophylaxis with V

## 2022-06-02 NOTE — PROGRESS NOTE ADULT - ASSESSMENT
89y old Female with hx of Lymphoma (remission), HTN, hyponatremia, hypokalemia, paroxysmal A-fib not on AC due to falls (last 1 month ago), adrenal insufficiency, CKD, HLD, 3rd admission with recent admission 5/1-6/7 for HF. Now admitted 5/30 bib her son due to generalized weakness. Found here to have headache with hypertensive urgency improved with meds, CT showed an area of high attenuation in the left parietal region, possible ICH vs metastatic lesion.  Palliative Care consulted to assist with establishing GOC.     1) ICH vs. intracranial mass  - neurosurgical notes appreciated  - repeat CTH unchanged  - MRI appreciated  - echo appreciated  - fall and aspiration precautions  - headache resolved    2) Hypertensive urgency/chest pain  - being managed with meds  - on monitor  - unclear if chest discomfort is from reflux this am  - s/p maalox with persistent sx  - primary team aware and plan to evaluate. If negative cardio workup would consider giving dose of anti-imetic    3) Nausea  - c/w zofran prn--> can give dose   - could be from mass    4) Debility  - walks with RW and has hx of falls recently  - PT consulted and pt walked 100ft  - suggest nutrition consult for signs of malnutrition on exam     5) Prognosis  - appears poor overall  - in setting of advanced age, debility with recent falls, multiple hospitalizations this year, albumin 2.3 with evidence of malnutrition, now also with possible ICH vs met, pt at inc risk for further complications    6) GOC/Advanced Directives  - pt seems to have capacity for decision making, deferring to family  - HCP on file naming daughter Kate Partida [phone]  - MOLST completed 6/1- DNR and DNI  - GOC conversation held yesterday- plan for home with pall (ok if agency they want for PT doesn't have pall) they are not ready for hospice. See GOC note for further issues    Thank you for including us in Ms. An's care. Will continue to follow with you.    Shoshana Lynn MD  Palliative Care Attending

## 2022-06-02 NOTE — PROGRESS NOTE ADULT - ASSESSMENT
Pt is a very pleasant 89 year old woman with a PMH of Lymphoma, HTN, hyponatremia, hypokalemia, paroxysmal A-fib not on AC due to falls, adrenal insufficiency, CKD and HLD who was brought to the ED by her son due to generalized weakness. Pt was seen and examined in the ED, c/o weakness all over but no specific pain. Pt denies headache, dizziness, visual changes, nausea or vomiting. She states her lymphoma was "cured" and no longer follows with an oncologist. Pt is awake and alert, oriented X3, moving all extremities antigravity with good strength, no drift, no dysmetria. CT shows an area of high attenuation in the left parietal region, possible ICH vs metastatic lesion.     Plan:  MRI +/- contrast shows small focus hemorrhage, small hypertensive stroke vs hemorrhage into a cavernoma, no surgical intervention, pt should have a repeat CT in 2 weeks   Palliative care consult appreciated, pt now DNR/DNI, plan is for d/c home   Maintain SBP <180  Transfer to telemetry, transfer to medicine service  Nutrition consult for malnutrition   Neuro checks [id number]  Advance diet  PT/OOB with assist  Venodynes for DVT PPX   Continue statin  Last TTE 3/31/22, EF 55-60%   h/o a-fib, presently NSR, no AC due to history of falls     Discussed with Dr. Grant

## 2022-06-03 LAB
ANION GAP SERPL CALC-SCNC: 5 MMOL/L — SIGNIFICANT CHANGE UP (ref 5–17)
BUN SERPL-MCNC: 21 MG/DL — SIGNIFICANT CHANGE UP (ref 7–23)
CALCIUM SERPL-MCNC: 8.9 MG/DL — SIGNIFICANT CHANGE UP (ref 8.5–10.1)
CHLORIDE SERPL-SCNC: 100 MMOL/L — SIGNIFICANT CHANGE UP (ref 96–108)
CO2 SERPL-SCNC: 31 MMOL/L — SIGNIFICANT CHANGE UP (ref 22–31)
CREAT SERPL-MCNC: 1.17 MG/DL — SIGNIFICANT CHANGE UP (ref 0.5–1.3)
EGFR: 42 ML/MIN/1.73M2 — LOW
GLUCOSE SERPL-MCNC: 88 MG/DL — SIGNIFICANT CHANGE UP (ref 70–99)
HCT VFR BLD CALC: 29.2 % — LOW (ref 34.5–45)
HGB BLD-MCNC: 9 G/DL — LOW (ref 11.5–15.5)
MCHC RBC-ENTMCNC: 30.6 PG — SIGNIFICANT CHANGE UP (ref 27–34)
MCHC RBC-ENTMCNC: 30.8 GM/DL — LOW (ref 32–36)
MCV RBC AUTO: 99.3 FL — SIGNIFICANT CHANGE UP (ref 80–100)
PHOSPHATE SERPL-MCNC: 3 MG/DL — SIGNIFICANT CHANGE UP (ref 2.5–4.5)
PLATELET # BLD AUTO: 129 K/UL — LOW (ref 150–400)
POTASSIUM SERPL-MCNC: 4.1 MMOL/L — SIGNIFICANT CHANGE UP (ref 3.5–5.3)
POTASSIUM SERPL-SCNC: 4.1 MMOL/L — SIGNIFICANT CHANGE UP (ref 3.5–5.3)
RBC # BLD: 2.94 M/UL — LOW (ref 3.8–5.2)
RBC # FLD: 15 % — HIGH (ref 10.3–14.5)
SODIUM SERPL-SCNC: 136 MMOL/L — SIGNIFICANT CHANGE UP (ref 135–145)
WBC # BLD: 5.36 K/UL — SIGNIFICANT CHANGE UP (ref 3.8–10.5)
WBC # FLD AUTO: 5.36 K/UL — SIGNIFICANT CHANGE UP (ref 3.8–10.5)

## 2022-06-03 PROCEDURE — 99232 SBSQ HOSP IP/OBS MODERATE 35: CPT

## 2022-06-03 PROCEDURE — 99233 SBSQ HOSP IP/OBS HIGH 50: CPT

## 2022-06-03 RX ORDER — LABETALOL HCL 100 MG
10 TABLET ORAL ONCE
Refills: 0 | Status: COMPLETED | OUTPATIENT
Start: 2022-06-03 | End: 2022-06-03

## 2022-06-03 RX ORDER — LISINOPRIL 2.5 MG/1
20 TABLET ORAL DAILY
Refills: 0 | Status: DISCONTINUED | OUTPATIENT
Start: 2022-06-03 | End: 2022-06-05

## 2022-06-03 RX ORDER — HEPARIN SODIUM 5000 [USP'U]/ML
5000 INJECTION INTRAVENOUS; SUBCUTANEOUS EVERY 12 HOURS
Refills: 0 | Status: DISCONTINUED | OUTPATIENT
Start: 2022-06-03 | End: 2022-06-05

## 2022-06-03 RX ADMIN — Medication 100 MILLIGRAM(S): at 14:37

## 2022-06-03 RX ADMIN — Medication 100 MILLIGRAM(S): at 21:20

## 2022-06-03 RX ADMIN — Medication 100 MILLIGRAM(S): at 05:43

## 2022-06-03 RX ADMIN — Medication 10 MILLIGRAM(S): at 10:43

## 2022-06-03 RX ADMIN — PREGABALIN 1000 MICROGRAM(S): 225 CAPSULE ORAL at 10:42

## 2022-06-03 RX ADMIN — Medication 10 MILLIGRAM(S): at 22:13

## 2022-06-03 RX ADMIN — Medication 2000 UNIT(S): at 10:42

## 2022-06-03 RX ADMIN — ATORVASTATIN CALCIUM 10 MILLIGRAM(S): 80 TABLET, FILM COATED ORAL at 21:20

## 2022-06-03 RX ADMIN — Medication 5 MILLIGRAM(S): at 21:20

## 2022-06-03 RX ADMIN — HEPARIN SODIUM 5000 UNIT(S): 5000 INJECTION INTRAVENOUS; SUBCUTANEOUS at 21:20

## 2022-06-03 RX ADMIN — LISINOPRIL 20 MILLIGRAM(S): 2.5 TABLET ORAL at 12:18

## 2022-06-03 RX ADMIN — Medication 500 MILLIGRAM(S): at 10:43

## 2022-06-03 RX ADMIN — AMIODARONE HYDROCHLORIDE 200 MILLIGRAM(S): 400 TABLET ORAL at 10:43

## 2022-06-03 RX ADMIN — CARVEDILOL PHOSPHATE 12.5 MILLIGRAM(S): 80 CAPSULE, EXTENDED RELEASE ORAL at 21:20

## 2022-06-03 RX ADMIN — AMLODIPINE BESYLATE 10 MILLIGRAM(S): 2.5 TABLET ORAL at 05:43

## 2022-06-03 RX ADMIN — Medication 10 MILLIGRAM(S): at 18:35

## 2022-06-03 RX ADMIN — Medication 1 APPLICATION(S): at 21:20

## 2022-06-03 RX ADMIN — Medication 10 MILLIGRAM(S): at 16:36

## 2022-06-03 RX ADMIN — CARVEDILOL PHOSPHATE 12.5 MILLIGRAM(S): 80 CAPSULE, EXTENDED RELEASE ORAL at 10:42

## 2022-06-03 NOTE — PROGRESS NOTE ADULT - ASSESSMENT
A/P: 89 year old female who presents with an ICH vs Mass, hypokalemia, and bradycardia  Adrenal insuffiencey     Plan:  Can transfer to med surg once BP improved  Po Diet  Stopped Florinef as it can cause hypokalemia  D/C Lasix as well  Resumed hydralazine at 100 TID and Coreg 12.5 bid, added Norvasc 10  Resumed ACE on 6/3 as renal fx improved--- Lisinopril 20 QD  Continue Amio for Now as she is rate controlled  MRI noted  Continue Hydrocortisone for adrenal insuffiencey  DVT Prophylaxis with V.  D/W NS about SQH

## 2022-06-03 NOTE — PROGRESS NOTE ADULT - ASSESSMENT
Pt is a very pleasant 89 year old woman with a PMH of Lymphoma, HTN, hyponatremia, hypokalemia, paroxysmal A-fib not on AC due to falls, adrenal insufficiency, CKD and HLD who was brought to the ED by her son due to generalized weakness. Pt was seen and examined in the ED, c/o weakness all over but no specific pain. Pt denies headache, dizziness, visual changes, nausea or vomiting. She states her lymphoma was "cured" and no longer follows with an oncologist. Pt is awake and alert, oriented X3, moving all extremities antigravity with good strength, no drift, no dysmetria. CT shows an area of high attenuation in the left parietal region, possible ICH vs metastatic lesion.     Plan:  MRI +/- contrast shows small focus hemorrhage, small hypertensive stroke vs hemorrhage into a cavernoma, no surgical intervention, pt should have a repeat CT in 2 weeks   Palliative care consult appreciated, pt now DNR/DNI, plan is for d/c home   Maintain SBP <180  Transfer to telemetry, transfer to medicine service  Nutrition consult for malnutrition   Neuro checks [id number]  Advance diet  PT/OOB with assist  Venodynes for DVT PPX ok for SQH  Continue statin  Last TTE 3/31/22, EF 55-60%   h/o a-fib, presently NSR, no AC due to history of falls     Discussed with Dr. Grant  Pt is a very pleasant 89 year old woman with a PMH of Lymphoma, HTN, hyponatremia, hypokalemia, paroxysmal A-fib not on AC due to falls, adrenal insufficiency, CKD and HLD who was brought to the ED by her son due to generalized weakness. Pt was seen and examined in the ED, c/o weakness all over but no specific pain. Pt denies headache, dizziness, visual changes, nausea or vomiting. She states her lymphoma was "cured" and no longer follows with an oncologist. Pt is awake and alert, oriented X3, moving all extremities antigravity with good strength, no drift, no dysmetria. CT shows an area of high attenuation in the left parietal region, possible ICH vs metastatic lesion.     Plan:  MRI +/- contrast shows small focus hemorrhage, small hypertensive stroke vs hemorrhage into a cavernoma, no surgical intervention, pt should have a repeat CT in 2 weeks   Palliative care consult appreciated, pt now DNR/DNI, plan is for d/c home   Maintain SBP <180  Transfer to telemetry, transfer to medicine service  Signed out case to Hospitalist Dr. Haynes   Nutrition consult for malnutrition   Neuro checks [id number]  Advance diet  PT/OOB with assist  Venodynes for DVT PPX ok for SQH  Continue statin  Last TTE 3/31/22, EF 55-60%   h/o a-fib, presently NSR, no AC due to history of falls     Discussed with Dr. Grant

## 2022-06-04 LAB
ANION GAP SERPL CALC-SCNC: 3 MMOL/L — LOW (ref 5–17)
BUN SERPL-MCNC: 29 MG/DL — HIGH (ref 7–23)
CALCIUM SERPL-MCNC: 9 MG/DL — SIGNIFICANT CHANGE UP (ref 8.5–10.1)
CHLORIDE SERPL-SCNC: 100 MMOL/L — SIGNIFICANT CHANGE UP (ref 96–108)
CO2 SERPL-SCNC: 31 MMOL/L — SIGNIFICANT CHANGE UP (ref 22–31)
CREAT SERPL-MCNC: 1.23 MG/DL — SIGNIFICANT CHANGE UP (ref 0.5–1.3)
EGFR: 40 ML/MIN/1.73M2 — LOW
GLUCOSE SERPL-MCNC: 89 MG/DL — SIGNIFICANT CHANGE UP (ref 70–99)
HCT VFR BLD CALC: 27.3 % — LOW (ref 34.5–45)
HGB BLD-MCNC: 8.5 G/DL — LOW (ref 11.5–15.5)
MAGNESIUM SERPL-MCNC: 2.3 MG/DL — SIGNIFICANT CHANGE UP (ref 1.6–2.6)
MCHC RBC-ENTMCNC: 30.7 PG — SIGNIFICANT CHANGE UP (ref 27–34)
MCHC RBC-ENTMCNC: 31.1 GM/DL — LOW (ref 32–36)
MCV RBC AUTO: 98.6 FL — SIGNIFICANT CHANGE UP (ref 80–100)
PHOSPHATE SERPL-MCNC: 3.5 MG/DL — SIGNIFICANT CHANGE UP (ref 2.5–4.5)
PLATELET # BLD AUTO: 128 K/UL — LOW (ref 150–400)
POTASSIUM SERPL-MCNC: 4.4 MMOL/L — SIGNIFICANT CHANGE UP (ref 3.5–5.3)
POTASSIUM SERPL-SCNC: 4.4 MMOL/L — SIGNIFICANT CHANGE UP (ref 3.5–5.3)
RBC # BLD: 2.77 M/UL — LOW (ref 3.8–5.2)
RBC # FLD: 14.8 % — HIGH (ref 10.3–14.5)
SARS-COV-2 RNA SPEC QL NAA+PROBE: SIGNIFICANT CHANGE UP
SODIUM SERPL-SCNC: 134 MMOL/L — LOW (ref 135–145)
WBC # BLD: 4.54 K/UL — SIGNIFICANT CHANGE UP (ref 3.8–10.5)
WBC # FLD AUTO: 4.54 K/UL — SIGNIFICANT CHANGE UP (ref 3.8–10.5)

## 2022-06-04 PROCEDURE — 99233 SBSQ HOSP IP/OBS HIGH 50: CPT

## 2022-06-04 RX ORDER — LANOLIN ALCOHOL/MO/W.PET/CERES
3 CREAM (GRAM) TOPICAL AT BEDTIME
Refills: 0 | Status: DISCONTINUED | OUTPATIENT
Start: 2022-06-04 | End: 2022-06-05

## 2022-06-04 RX ADMIN — HEPARIN SODIUM 5000 UNIT(S): 5000 INJECTION INTRAVENOUS; SUBCUTANEOUS at 11:02

## 2022-06-04 RX ADMIN — Medication 500 MILLIGRAM(S): at 11:00

## 2022-06-04 RX ADMIN — LISINOPRIL 20 MILLIGRAM(S): 2.5 TABLET ORAL at 11:01

## 2022-06-04 RX ADMIN — Medication 5 MILLIGRAM(S): at 20:41

## 2022-06-04 RX ADMIN — Medication 3 MILLIGRAM(S): at 00:28

## 2022-06-04 RX ADMIN — CARVEDILOL PHOSPHATE 12.5 MILLIGRAM(S): 80 CAPSULE, EXTENDED RELEASE ORAL at 20:41

## 2022-06-04 RX ADMIN — ATORVASTATIN CALCIUM 10 MILLIGRAM(S): 80 TABLET, FILM COATED ORAL at 20:41

## 2022-06-04 RX ADMIN — HEPARIN SODIUM 5000 UNIT(S): 5000 INJECTION INTRAVENOUS; SUBCUTANEOUS at 20:42

## 2022-06-04 RX ADMIN — Medication 100 MILLIGRAM(S): at 20:41

## 2022-06-04 RX ADMIN — Medication 1 APPLICATION(S): at 21:00

## 2022-06-04 RX ADMIN — PREGABALIN 1000 MICROGRAM(S): 225 CAPSULE ORAL at 11:01

## 2022-06-04 RX ADMIN — Medication 2000 UNIT(S): at 11:01

## 2022-06-04 RX ADMIN — AMLODIPINE BESYLATE 10 MILLIGRAM(S): 2.5 TABLET ORAL at 05:58

## 2022-06-04 RX ADMIN — CARVEDILOL PHOSPHATE 12.5 MILLIGRAM(S): 80 CAPSULE, EXTENDED RELEASE ORAL at 11:00

## 2022-06-04 RX ADMIN — Medication 30 MILLILITER(S): at 16:46

## 2022-06-04 RX ADMIN — Medication 10 MILLIGRAM(S): at 11:01

## 2022-06-04 RX ADMIN — Medication 100 MILLIGRAM(S): at 16:00

## 2022-06-04 RX ADMIN — Medication 100 MILLIGRAM(S): at 05:58

## 2022-06-04 RX ADMIN — AMIODARONE HYDROCHLORIDE 200 MILLIGRAM(S): 400 TABLET ORAL at 11:00

## 2022-06-04 NOTE — PROGRESS NOTE ADULT - NEGATIVE RESPIRATORY AND THORAX SYMPTOMS
no wheezing/no dyspnea

## 2022-06-04 NOTE — PROGRESS NOTE ADULT - ASSESSMENT
A/P: 89 year old female who presents with an ICH vs Mass, hypokalemia, and bradycardia  Adrenal insuffiencey     Plan:    Po Diet  Stopped Florinef as it can cause hypokalemia  D/C Lasix as well  Resumed hydralazine at 100 TID and Coreg 12.5 bid, added Norvasc 10  Resumed ACE on 6/3 as renal fx improved--- Lisinopril 20 QD  Continue Amio for Now as she is rate controlled  MRI noted  Continue Hydrocortisone for adrenal insuffiencey  DVT Prophylaxis SQH    Transfer to MED SURG    GOC: DNR/DNI

## 2022-06-04 NOTE — PROGRESS NOTE ADULT - SUBJECTIVE AND OBJECTIVE BOX
HPI:  Pt is a very pleasant 89 year old woman with a PMH of Lymphoma, HTN, hyponatremia, hypokalemia, paroxysmal A-fib not on AC due to falls, adrenal insufficiency, CKD and HLD who was brought to the ED by her son due to generalized weakness. She states her lymphoma was "cured" and no longer follows with an oncologist.  CT shows an area of high attenuation in the left parietal region, possible ICH vs metastatic lesion. Repeat CTH was stable.  Patient noted to be bradycardiac.                 PAST MEDICAL & SURGICAL HISTORY:  Iron deficiency      HTN (hypertension)      Lymphoma      H/O adrenal insufficiency      Hyponatremia      Hypokalemia      Chronic kidney disease, unspecified CKD stage      Diastolic heart failure      Paroxysmal atrial fibrillation      No significant past surgical history          FAMILY HISTORY:      Social Hx:    Allergies    penicillin (Hives)  sulfa drugs (Hives)  tetracycline (Hives)    Intolerances        Height (cm): 152.4 ( @ 22:59)  Weight (kg): 52.2 ( @ 22:59)  BMI (kg/m2): 22.5 ( @ 22:59)    ICU Vital Signs Last 24 Hrs  T(C): 36.3 (31 May 2022 07:30), Max: 36.6 (31 May 2022 04:02)  T(F): 97.4 (31 May 2022 07:30), Max: 97.8 (31 May 2022 04:02)  HR: 49 (31 May 2022 11:00) (48 - 60)  BP: 122/41 (31 May 2022 11:00) (114/37 - 232/60)  BP(mean): 66 (31 May 2022 11:00) (42 - 109)  ABP: --  ABP(mean): --  RR: 12 (31 May 2022 11:00) (11 - 21)  SpO2: 100% (31 May 2022 11:00) (92% - 100%)          I&O's Summary    30 May 2022 07:01  -  31 May 2022 07:00  --------------------------------------------------------  IN: 200 mL / OUT: 0 mL / NET: 200 mL                              9.0    6.14  )-----------( 111      ( 31 May 2022 05:10 )             28.1           137  |  102  |  19  ----------------------------<  93  2.8<LL>   |  30  |  1.05    Ca    8.3<L>      31 May 2022 05:10  Phos  2.7       Mg     1.8         TPro  4.9<L>  /  Alb  2.3<L>  /  TBili  0.3  /  DBili  x   /  AST  41<H>  /  ALT  36  /  AlkPhos  63                  Urinalysis Basic - ( 31 May 2022 02:19 )    Color: Yellow / Appearance: Clear / S.005 / pH: x  Gluc: x / Ketone: Negative  / Bili: Negative / Urobili: Negative   Blood: x / Protein: 100 / Nitrite: Negative   Leuk Esterase: Negative / RBC: 0-2 /HPF / WBC 0-2   Sq Epi: x / Non Sq Epi: Few / Bacteria: Occasional        MEDICATIONS  (STANDING):  aMIOdarone    Tablet 200 milliGRAM(s) Oral daily  ascorbic acid 500 milliGRAM(s) Oral daily  atorvastatin 10 milliGRAM(s) Oral at bedtime  cholecalciferol 2000 Unit(s) Oral daily  cyanocobalamin 1000 MICROGram(s) Oral daily  hydrocortisone 5 milliGRAM(s) Oral at bedtime  hydrocortisone 10 milliGRAM(s) Oral daily  lisinopril 40 milliGRAM(s) Oral daily    MEDICATIONS  (PRN):  senna 2 Tablet(s) Oral at bedtime PRN Constipation      DVT Prophylaxis:    Advanced Directives:  Discussed with:    Visit Information:    ** Time is exclusive of billed procedures and/or teaching and/or routine family updates.        
CODE STATUS: dnr  Hospital day # 4    chief complaint: ICH    HPI: 89 year old woman with a PMH of Lymphoma, HTN, hyponatremia, hypokalemia, paroxysmal A-fib not on AC due to falls, adrenal insufficiency, CKD and HLD who was brought to the ED by her son due to generalized weakness; CT done in ED showed an area of high attenuation in the left parietal region, possible ICH vs metastatic lesion.  Pt transferred to Hospitalist service 6/3. No c/o.    Vital Signs Last 24 Hrs  T(C): 36.7 (04 Jun 2022 11:00), Max: 36.7 (03 Jun 2022 14:00)  T(F): 98.1 (04 Jun 2022 11:00), Max: 98.1 (04 Jun 2022 00:00)  HR: 60 (04 Jun 2022 12:00) (58 - 70)  BP: 145/70 (04 Jun 2022 12:00) (128/44 - 189/58)  BP(mean): 93 (04 Jun 2022 12:00) (69 - 99)  RR: 13 (04 Jun 2022 12:00) (11 - 21)  SpO2: 94% (04 Jun 2022 12:00) (87% - 100%)      Gen not in acute distress  HEENT nc at perrla  Neck supple no JVD  Chest CTA  CVS s1s2 reg, no m/g/r  Abd soft nt/nd + BS   voiding  Neuro non focal mot str 5/5 all extr  Skin no rash  Musculoskeletal gen weakness    Home Medications:  amiodarone 200 mg oral tablet: 1 tab(s) orally once a day (31 May 2022 07:54)  Claritin 10 mg oral tablet: 1 tab(s) orally once a day, As Needed (31 May 2022 07:54)  Cranberry oral tablet: 1 tab(s) 500mg orally once a day as needed (31 May 2022 07:54)  cyanocobalamin 1000 mcg oral tablet: 1 tab(s) orally once a day (31 May 2022 07:54)  Cystex Urinary Pain Relief 162 mg-162.5 mg oral tablet: 1 tab(s) orally once a day, As Needed (31 May 2022 07:54)  famotidine 20 mg oral tablet: 1 tab(s) orally 2 times a day (31 May 2022 07:54)  fludrocortisone 0.1 mg oral tablet: 0.5 tab(s) orally once a day (31 May 2022 07:54)  hydrocortisone 10 mg oral tablet: 1 tab(s) orally once a day (in the morning) (31 May 2022 07:54)  hydrocortisone 5 mg oral tablet: 1 tab(s) orally once a day (in the evening) (31 May 2022 07:54)  Icy Hot topical cream: Apply topically to affected area , As Needed (31 May 2022 07:54)  Lasix 20 mg oral tablet: 1 tab(s) orally once a day (31 May 2022 07:54)  lisinopril 40 mg oral tablet: 1 tab(s) orally once a day (31 May 2022 07:54)  Livalo 2 mg oral tablet: 1 tab(s) orally once a day (31 May 2022 07:54)  Melatonin 3 mg oral tablet: 1 tab(s) orally once a day (at bedtime), As Needed (31 May 2022 07:54)  MiraLax oral powder for reconstitution: 17 gram(s) orally once a day, As Needed (31 May 2022 07:54)  Pepto-Bismol 262 mg/15 mL oral suspension: 15 milliliter(s) orally 4 times a day, As Needed (31 May 2022 07:54)  Senna 8.6 mg oral tablet: 2 tab(s) orally once a day (at bedtime) (31 May 2022 07:54)  Tylenol 500 mg oral tablet: 1 tab(s) orally every 6 hours (31 May 2022 07:54)  Vitamin D3 50 mcg (2000 intl units) oral tablet: 1 tab(s) orally once a day (31 May 2022 07:54)      Labs:                           8.5    4.54  )-----------( 128      ( 04 Jun 2022 05:39 )             27.3   06-04    134<L>  |  100  |  29<H>  ----------------------------<  89  4.4   |  31  |  1.23    Ca    9.0      04 Jun 2022 05:39  Phos  3.5     06-04  Mg     2.3     06-04    12 Lead ECG (06.02.22 @ 09:58) >  Normal sinus rhythm  Possible Inferior infarct , age undetermined  Anteroseptal infarct (cited on or before 28-NOV-2018)  Abnormal ECG  When compared with ECG of 31-MAY-2022 06:57,  No significant change was found    RADIOLOGY:     CT Head No Cont (05.31.22 @ 04:21) >  Stable head CT scan.    No interval change in appearance of a 7 mm mildly hyperattenuating   nodular focus in the left parietal lobe, located at the gray-white   matter-white white matter junction.  There is mild surrounding vasogenic   edema.  Follow-up MRI with contrast is recommended to evaluate for   metastatic lesion versus spontaneous hemorrhage.    Thick secretions layering dependently in the left sphenoid sinus may   represent trapped secretions versus sinusitis.      MEDICATIONS  (STANDING):  aMIOdarone    Tablet 200 milliGRAM(s) Oral daily  amLODIPine   Tablet 10 milliGRAM(s) Oral daily  ascorbic acid 500 milliGRAM(s) Oral daily  atorvastatin 10 milliGRAM(s) Oral at bedtime  carvedilol 12.5 milliGRAM(s) Oral every 12 hours  cholecalciferol 2000 Unit(s) Oral daily  cyanocobalamin 1000 MICROGram(s) Oral daily  erythromycin   Ointment 1 Application(s) Both EYES at bedtime  heparin   Injectable 5000 Unit(s) SubCutaneous every 12 hours  hydrALAZINE 100 milliGRAM(s) Oral every 8 hours  hydrocortisone 5 milliGRAM(s) Oral at bedtime  hydrocortisone 10 milliGRAM(s) Oral daily  lisinopril 20 milliGRAM(s) Oral daily  melatonin 3 milliGRAM(s) Oral at bedtime        A/P:      * ICH  MRI +/- contrast shows small focus hemorrhage, small hypertensive stroke vs hemorrhage into a cavernoma, no surgical intervention, pt should have a repeat CT in 2 weeks   Palliative care consult appreciated, pt now DNR/DNI, plan is for d/c home   Maintain SBP <180  case d/w fam; will take her home for few days then return to [place]    * h/o adr rashadstef Mora was dc in ICU suspected hypokalemia induced by it    Dispo: home in am  
HPI:  Pt is a very pleasant 89 year old woman with a PMH of Lymphoma, HTN, hyponatremia, hypokalemia, paroxysmal A-fib not on AC due to falls, adrenal insufficiency, CKD and HLD who was brought to the ED by her son due to generalized weakness. Pt was seen and examined in the ED, c/o weakness all over but no specific pain. Pt denies headache, dizziness, visual changes, nausea or vomiting. She states her lymphoma was "cured" and no longer follows with an oncologist. Pt is awake and alert, oriented X3, moving all extremities antigravity with good strength, no drift, no dysmetria. CT shows an area of high attenuation in the left parietal region, possible ICH vs metastatic lesion.      5/31- Patient seen and examined in ICU. Repeat Head CT stable. Patient awake/alert, complains of generalized weakness. Denies HA, n/v, numb/tingling, visual changes.    6/1- Patient seen and examined, still hypertensive this AM SBP 190s. BP medications adjusted. No new complaints.    6/2- Pt seen and examined in the ICU, no events overnight, out of bed in chair, no complaints but states she still feels very weak, BP labile, goal is to maintain SB < 180    6/3- Patient seen and examined, no acute events overnight. Appears comfortable, sitting up in chair states she feels better today less generalized weakness. Norvasc increased today.     Vital Signs Last 24 Hrs  T(C): 36.9 (03 Jun 2022 05:00), Max: 36.9 (02 Jun 2022 18:00)  T(F): 98.5 (03 Jun 2022 05:00), Max: 98.5 (03 Jun 2022 05:00)  HR: 62 (03 Jun 2022 10:00) (55 - 65)  BP: 167/45 (03 Jun 2022 09:00) (144/44 - 199/58)  BP(mean): 79 (03 Jun 2022 09:00) (72 - 110)  RR: 15 (03 Jun 2022 10:00) (12 - 19)  SpO2: 99% (03 Jun 2022 10:00) (88% - 99%)    MEDICATIONS  (STANDING):  aMIOdarone    Tablet 200 milliGRAM(s) Oral daily  amLODIPine   Tablet 10 milliGRAM(s) Oral daily  ascorbic acid 500 milliGRAM(s) Oral daily  atorvastatin 10 milliGRAM(s) Oral at bedtime  carvedilol 12.5 milliGRAM(s) Oral every 12 hours  cholecalciferol 2000 Unit(s) Oral daily  cyanocobalamin 1000 MICROGram(s) Oral daily  erythromycin   Ointment 1 Application(s) Both EYES at bedtime  hydrALAZINE 100 milliGRAM(s) Oral every 8 hours  hydrocortisone 5 milliGRAM(s) Oral at bedtime  hydrocortisone 10 milliGRAM(s) Oral daily  lisinopril 20 milliGRAM(s) Oral daily    MEDICATIONS  (PRN):  aluminum hydroxide/magnesium hydroxide/simethicone Suspension 30 milliLiter(s) Oral every 4 hours PRN Dyspepsia  hydrALAZINE Injectable 10 milliGRAM(s) IV Push every 2 hours PRN SBP >180  senna 2 Tablet(s) Oral at bedtime PRN Constipation    PHYSICAL EXAM:  Constitutional: Awake / alert, NAD, resting comfortably in bed  Eyes: anicteric sclera, moist conjunctivae, PEARRLA  HENT: atraumatic, oropharynx clear with moist mucous membranes, no mucosal ulcerations  Neck: trachea midline, FROM, supple, no thyromegaly or lymphadenopathy  Respiratory: Breath sounds are clear bilaterally, normal respiratory effort and no intercostal retractions   Cardiovascular: S1 and S2, regular rhythm  Gastrointestinal: Soft, NT/ND, +BS  Extremities: +pitting edema b/l lower ext up to the knee   Skin: No rashes  Psych: appropriate affect, alert, and oriented to person, place and time    Neurological Exam:  HF: AxOx 3, appropriately interactive, normal affect, speech fluent, no aphasia or paraphasic errors. Naming/repetition intact   CN: PERRL, EOMI, VFF, facial sensation normal, no NLFD, tongue midline  Motor: No pronator drift, Strength 5/5 in all 4 ext, normal bulk and tone, no tremor, rigidity or bradykinesia  Sens: Intact to light touch  Reflexes: Symmetric and normal, downgoing toes b/l  Coord:  No FNFA, dysmetria, VELMA intact   Gait/Balance: Not tested            LABS:                         9.0    5.36  )-----------( 129      ( 03 Jun 2022 04:26 )             29.2     06-03    136  |  100  |  21  ----------------------------<  88  4.1   |  31  |  1.17    Ca    8.9      03 Jun 2022 04:26  Phos  3.0     06-03  Mg     2.2     06-03        06-02 Chol 186 LDL -- HDL 92 Trig 66  
HPI:  Pt is a very pleasant 89 year old woman with a PMH of Lymphoma, HTN, hyponatremia, hypokalemia, paroxysmal A-fib not on AC due to falls, adrenal insufficiency, CKD and HLD who was brought to the ED by her son due to generalized weakness. Pt was seen and examined in the ED, c/o weakness all over but no specific pain. Pt denies headache, dizziness, visual changes, nausea or vomiting. She states her lymphoma was "cured" and no longer follows with an oncologist. Pt is awake and alert, oriented X3, moving all extremities antigravity with good strength, no drift, no dysmetria. CT shows an area of high attenuation in the left parietal region, possible ICH vs metastatic lesion.      5/31- Patient seen and examined in ICU. Repeat Head CT stable. Patient awake/alert, complains of generalized weakness. Denies HA, n/v, numb/tingling, visual changes.    6/1- Patient seen and examined, still hypertensive this AM SBP 190s. BP medications adjusted. No new complaints.    6/2- Pt seen and examined in the ICU, no events overnight, out of bed in chair, no complaints but states she still feels very weak, BP labile, goal is to maintain SB < 180    Vital Signs Last 24 Hrs  T(C): 36.8 (02 Jun 2022 05:00), Max: 36.9 (01 Jun 2022 21:00)  T(F): 98.2 (02 Jun 2022 05:00), Max: 98.5 (02 Jun 2022 00:00)  HR: 68 (02 Jun 2022 09:00) (58 - 70)  BP: 179/47 (02 Jun 2022 09:00) (142/46 - 211/67)  BP(mean): 84 (02 Jun 2022 09:00) (73 - 104)  RR: 19 (02 Jun 2022 09:00) (12 - 23)  SpO2: 91% (02 Jun 2022 09:00) (91% - 100%)    MEDICATIONS  (STANDING):  aMIOdarone    Tablet 200 milliGRAM(s) Oral daily  amLODIPine   Tablet 10 milliGRAM(s) Oral daily  ascorbic acid 500 milliGRAM(s) Oral daily  atorvastatin 10 milliGRAM(s) Oral at bedtime  carvedilol 12.5 milliGRAM(s) Oral every 12 hours  cholecalciferol 2000 Unit(s) Oral daily  cyanocobalamin 1000 MICROGram(s) Oral daily  erythromycin   Ointment 1 Application(s) Both EYES at bedtime  hydrALAZINE 100 milliGRAM(s) Oral every 8 hours  hydrocortisone 10 milliGRAM(s) Oral daily  hydrocortisone 5 milliGRAM(s) Oral at bedtime    MEDICATIONS  (PRN):  aluminum hydroxide/magnesium hydroxide/simethicone Suspension 30 milliLiter(s) Oral every 4 hours PRN Dyspepsia  hydrALAZINE Injectable 10 milliGRAM(s) IV Push every 2 hours PRN SBP >180  senna 2 Tablet(s) Oral at bedtime PRN Constipation    ROS: pertinent positives in HPI, all other ROS were reviewed and are negative     PHYSICAL EXAM:  Constitutional: Awake / alert, NAD, resting comfortably in bed  Eyes: anicteric sclera, moist conjunctivae, PEARRLA  HENT: atraumatic, oropharynx clear with moist mucous membranes, no mucosal ulcerations  Neck: trachea midline, FROM, supple, no thyromegaly or lymphadenopathy  Respiratory: Breath sounds are clear bilaterally, normal respiratory effort and no intercostal retractions   Cardiovascular: S1 and S2, regular rhythm  Gastrointestinal: Soft, NT/ND, +BS  Extremities: +pitting edema b/l lower ext up to the knee   Skin: No rashes  Psych: appropriate affect, alert, and oriented to person, place and time    Neurological Exam:  HF: AxOx 3, appropriately interactive, normal affect, speech fluent, no aphasia or paraphasic errors. Naming/repetition intact   CN: PERRL, EOMI, VFF, facial sensation normal, no NLFD, tongue midline  Motor: No pronator drift, Strength 5/5 in all 4 ext, normal bulk and tone, no tremor, rigidity or bradykinesia  Sens: Intact to light touch  Reflexes: Symmetric and normal, downgoing toes b/l  Coord:  No FNFA, dysmetria, VELMA intact   Gait/Balance: Not tested    LABS:                         9.0    4.86  )-----------( 120      ( 02 Jun 2022 06:06 )             28.1     06-02    137  |  104  |  23  ----------------------------<  88  4.2   |  32<H>  |  1.44<H>    Ca    8.9      02 Jun 2022 06:06  Phos  3.3     06-02  Mg     2.1     06-02    06-02 Chol 186 LDL -- HDL 92 Trig 66    RADIOLOGY:  < from: MR Head w/wo IV Cont (06.01.22 @ 10:17) >  Small focus of hemorrhage within the left medial occipital lobe measuring   5 mm demonstrating internal T2 hyperintensity with a T2 hypointense rim.   This finding may reflect a small hypertensive hemorrhage versus   hemorrhage into a cavernoma, and hemorrhage into a metastatic lesion   cannot be entirely excluded. Short-term follow-up MRI brain with IV   contrast in 4-6 weeks may be helpful to ensure resolution.    
HPI: Pt seen and examined this am in follow up for sx. Pt noting chest discomfort in left chest overnight and this am, thinking this might be reflux. However, pt given maalox and she endorsed belching but no real relief. Pt was drinking orange juice and ginger ale at the time, counseled against acidic foods/bevarages, but also reassured her that I would share her sx with primary team so they can work it up further. Pt was onboard. Spoke with RN and Dr. Seay who will follow this up to ensure stability.       PAIN: as above    DYSPNEA: denies      ROS:  Nausea- denies    All other systems reviewed and negative      PHYSICAL EXAM:    Vital Signs Last 24 Hrs  T(C): 36.8 (02 Jun 2022 05:00), Max: 36.9 (01 Jun 2022 21:00)  T(F): 98.2 (02 Jun 2022 05:00), Max: 98.5 (02 Jun 2022 00:00)  HR: 68 (02 Jun 2022 09:00) (58 - 70)  BP: 179/47 (02 Jun 2022 09:00) (142/46 - 211/67)  BP(mean): 84 (02 Jun 2022 09:00) (73 - 104)  RR: 19 (02 Jun 2022 09:00) (12 - 23)  SpO2: 91% (02 Jun 2022 09:00) (91% - 100%)  Daily     Daily     PPSV2: 30  %    General: Elderly female sitting up in bed, pleasant, uncomfortable  Mental Status: AOx3  HEENT: dmm, + temporal wasting  Lungs: dec at bases bl  Cardiac: + s1 s2 rrr  GI: soft nt nd +bs  : voids  MSK/skin: moves all extremities, muscle and fat wasting throughout  Neuro: no focal deficits    LABS:                        9.0    4.86  )-----------( 120      ( 02 Jun 2022 06:06 )             28.1     06-02    137  |  104  |  23  ----------------------------<  88  4.2   |  32<H>  |  1.44<H>    Ca    8.9      02 Jun 2022 06:06  Phos  3.3     06-02  Mg     2.1     06-02        Albumin: Albumin, Serum: 2.3 g/dL (05-31 @ 05:10)      Allergies    penicillin (Hives)  sulfa drugs (Hives)  tetracycline (Hives)    Intolerances      MEDICATIONS  (STANDING):  aMIOdarone    Tablet 200 milliGRAM(s) Oral daily  amLODIPine   Tablet 10 milliGRAM(s) Oral daily  ascorbic acid 500 milliGRAM(s) Oral daily  atorvastatin 10 milliGRAM(s) Oral at bedtime  carvedilol 12.5 milliGRAM(s) Oral every 12 hours  cholecalciferol 2000 Unit(s) Oral daily  cyanocobalamin 1000 MICROGram(s) Oral daily  erythromycin   Ointment 1 Application(s) Both EYES at bedtime  hydrALAZINE 100 milliGRAM(s) Oral every 8 hours  hydrocortisone 10 milliGRAM(s) Oral daily  hydrocortisone 5 milliGRAM(s) Oral at bedtime    MEDICATIONS  (PRN):  aluminum hydroxide/magnesium hydroxide/simethicone Suspension 30 milliLiter(s) Oral every 4 hours PRN Dyspepsia  hydrALAZINE Injectable 10 milliGRAM(s) IV Push every 2 hours PRN SBP >180  senna 2 Tablet(s) Oral at bedtime PRN Constipation      RADIOLOGY:    ACC: [id number] EXAM:  ECHO TTE WO CON FOLLOW UP LTD                        PROCEDURE DATE:  05/31/2022         Summary     Limited study to assess left ventricular function.   The left ventricle is normal in size, wall motion, and contractility as   seen in limited views.   Trace pericardial effusion is present.   Pleural effusion - is present.    ACC: [id number] EXAM:  MR BRAIN WAW IC                        PROCEDURE DATE:  06/01/2022      IMPRESSION:    Small focus of hemorrhage within the left medial occipital lobe measuring   5 mm demonstrating internal T2 hyperintensity with a T2 hypointense rim.   This finding may reflect a small hypertensive hemorrhage versus   hemorrhage into a cavernoma, and hemorrhage into a metastatic lesion   cannot be entirely excluded. Short-term follow-up MRI brain with IV   contrast in 4-6 weeks may be helpful to ensure resolution.  
HPI: Pt seen and examined this am after returning from MRI, no complaints. Endorsed walking with PT and feeling a little better. Pt was ok with deferring GOC conversation to her children knowing they would return to share insights or any decisions.       PAIN: denies    DYSPNEA: denies      ROS:  All other systems reviewed and negative      PHYSICAL EXAM:    Vital Signs Last 24 Hrs  T(C): 36.8 (02 Jun 2022 05:00), Max: 36.9 (01 Jun 2022 21:00)  T(F): 98.2 (02 Jun 2022 05:00), Max: 98.5 (02 Jun 2022 00:00)  HR: 68 (02 Jun 2022 09:00) (58 - 70)  BP: 179/47 (02 Jun 2022 09:00) (142/46 - 211/67)  BP(mean): 84 (02 Jun 2022 09:00) (73 - 104)  RR: 19 (02 Jun 2022 09:00) (12 - 23)  SpO2: 91% (02 Jun 2022 09:00) (91% - 100%)  Daily     Daily     PPSV2: 30  %    General: Elderly female sitting up in chair, NAD  Mental Status: AOx4  HEENT: dmm, + temporal wasting  Lungs: dec at bases bl  Cardiac: + s1 s2 joycelyn  GI: soft nt nd +bs  : voids  MSK/skin: moves all extremities, muscle and fat wasting throughout  Neuro: no focal deficits    LABS:                              9.1    4.58  )-----------( 118      ( 01 Jun 2022 05:29 )             29.2       06-01    138  |  104  |  21  ----------------------------<  95  3.6   |  28  |  1.46<H>    Ca    8.6      01 Jun 2022 05:29  Phos  3.2     06-01  Mg     2.3     06-01    TPro  4.9<L>  /  Alb  2.3<L>  /  TBili  0.3  /  DBili  x   /  AST  41<H>  /  ALT  36  /  AlkPhos  63  05-31        Albumin: Albumin, Serum: 2.3 g/dL (05-31 @ 05:10)      Allergies    penicillin (Hives)  sulfa drugs (Hives)  tetracycline (Hives)    Intolerances      MEDICATIONS  (STANDING):  aMIOdarone    Tablet 200 milliGRAM(s) Oral daily  amLODIPine   Tablet 10 milliGRAM(s) Oral daily  ascorbic acid 500 milliGRAM(s) Oral daily  atorvastatin 10 milliGRAM(s) Oral at bedtime  carvedilol 12.5 milliGRAM(s) Oral every 12 hours  cholecalciferol 2000 Unit(s) Oral daily  cyanocobalamin 1000 MICROGram(s) Oral daily  erythromycin   Ointment 1 Application(s) Both EYES at bedtime  hydrALAZINE 100 milliGRAM(s) Oral every 8 hours  hydrocortisone 10 milliGRAM(s) Oral daily  hydrocortisone 5 milliGRAM(s) Oral at bedtime    MEDICATIONS  (PRN):  aluminum hydroxide/magnesium hydroxide/simethicone Suspension 30 milliLiter(s) Oral every 4 hours PRN Dyspepsia  hydrALAZINE Injectable 10 milliGRAM(s) IV Push every 2 hours PRN SBP >180  senna 2 Tablet(s) Oral at bedtime PRN Constipation    
HPI:  Pt is a very pleasant 89 year old woman with a PMH of Lymphoma, HTN, hyponatremia, hypokalemia, paroxysmal A-fib not on AC due to falls, adrenal insufficiency, CKD and HLD who was brought to the ED by her son due to generalized weakness. Pt was seen and examined in the ED, c/o weakness all over but no specific pain. Pt denies headache, dizziness, visual changes, nausea or vomiting. She states her lymphoma was "cured" and no longer follows with an oncologist. Pt is awake and alert, oriented X3, moving all extremities antigravity with good strength, no drift, no dysmetria. CT shows an area of high attenuation in the left parietal region, possible ICH vs metastatic lesion.      5/31- Patient seen and examined in ICU. Repeat Head CT stable. Patient awake/alert, complains of generalized weakness. Denies HA, n/v, numb/tingling, visual changes.    Vital Signs Last 24 Hrs  T(C): 36.3 (31 May 2022 07:30), Max: 36.6 (31 May 2022 04:02)  T(F): 97.4 (31 May 2022 07:30), Max: 97.8 (31 May 2022 04:02)  HR: 51 (31 May 2022 08:00) (48 - 60)  BP: 151/45 (31 May 2022 08:00) (114/37 - 232/60)  BP(mean): 76 (31 May 2022 08:00) (60 - 109)  RR: 11 (31 May 2022 08:00) (11 - 18)  SpO2: 93% (31 May 2022 08:00) (92% - 100%)    MEDICATIONS  (STANDING):  aMIOdarone    Tablet 200 milliGRAM(s) Oral daily  ascorbic acid 500 milliGRAM(s) Oral daily  atorvastatin 10 milliGRAM(s) Oral at bedtime  cholecalciferol 2000 Unit(s) Oral daily  cyanocobalamin 1000 MICROGram(s) Oral daily  hydrocortisone 5 milliGRAM(s) Oral at bedtime  hydrocortisone 10 milliGRAM(s) Oral daily  lisinopril 40 milliGRAM(s) Oral daily  ondansetron Injectable 4 milliGRAM(s) IV Push once    MEDICATIONS  (PRN):  senna 2 Tablet(s) Oral at bedtime PRN Constipation      PHYSICAL EXAM:  Constitutional: Awake / alert, NAD, resting comfortably in bed  Eyes: anicteric sclera, moist conjunctivae, PEARRLA  HENT: [place] b/l, atraumatic, oropharynx clear with moist mucous membranes, no mucosal ulcerations  Neck: trachea midline, FROM, supple, no thyromegaly or lymphadenopathy  Respiratory: Breath sounds are clear bilaterally, normal respiratory effort and no intercostal retractions   Cardiovascular: S1 and S2, regular rhythm  Gastrointestinal: Soft, NT/ND, +BS  Extremities: +pitting edema b/l lower ext up to the knee   Skin: No rashes  Psych: appropriate affect, alert, and oriented to person, place and time    Neurological Exam:  HF: AxO x 3, appropriately interactive, normal affect, speech fluent, no aphasia or paraphasic errors. Naming/repetition intact   CN: PERRL, EOMI, VFF, facial sensation normal, no NLFD, tongue midline  Motor: No pronator drift, Strength 5/5 in all 4 ext, normal bulk and tone, no tremor, rigidity or bradykinesia  Sens: Intact to light touch  Reflexes: Symmetric and normal, downgoing toes b/l  Coord:  No FNFA, dysmetria, VELMA intact   Gait/Balance: Not tested          LABS:                         9.0    6.14  )-----------( 111      ( 31 May 2022 05:10 )             28.1     05-31    137  |  102  |  19  ----------------------------<  93  2.8<LL>   |  30  |  1.05    Ca    8.3<L>      31 May 2022 05:10  Phos  2.7     05-31  Mg     1.8     05-31    TPro  4.9<L>  /  Alb  2.3<L>  /  TBili  0.3  /  DBili  x   /  AST  41<H>  /  ALT  36  /  AlkPhos  63  05-31    LIVER FUNCTIONS - ( 31 May 2022 05:10 )  Alb: 2.3 g/dL / Pro: 4.9 gm/dL / ALK PHOS: 63 U/L / ALT: 36 U/L / AST: 41 U/L / GGT: x                 RADIOLOGY:  CT Head No Cont (05.31.22 @ 04:21)  IMPRESSION:  Stable head CT scan.  No interval change in appearance of a 7 mm mildly hyperattenuating   nodular focus in the left parietal lobe, located at the gray-white   matter-white white matter junction.  There is mild surrounding vasogenic   edema.  Follow-up MRI with contrast is recommended to evaluate for   metastatic lesion versus spontaneous hemorrhage.    Thick secretions layering dependently in the left sphenoid sinus may   represent trapped secretions versus sinusitis.    
HPI:  Pt is a very pleasant 89 year old woman with a PMH of Lymphoma, HTN, hyponatremia, hypokalemia, paroxysmal A-fib not on AC due to falls, adrenal insufficiency, CKD and HLD who was brought to the ED by her son due to generalized weakness. Pt was seen and examined in the ED, c/o weakness all over but no specific pain. Pt denies headache, dizziness, visual changes, nausea or vomiting. She states her lymphoma was "cured" and no longer follows with an oncologist. Pt is awake and alert, oriented X3, moving all extremities antigravity with good strength, no drift, no dysmetria. CT shows an area of high attenuation in the left parietal region, possible ICH vs metastatic lesion.      5/31- Patient seen and examined in ICU. Repeat Head CT stable. Patient awake/alert, complains of generalized weakness. Denies HA, n/v, numb/tingling, visual changes.    6/1- Patient seen and examined, still hypertensive this AM SBP 190s. BP medications adjusted. No new complaints.    Vital Signs Last 24 Hrs  T(C): 36.9 (01 Jun 2022 09:00), Max: 37 (01 Jun 2022 05:00)  T(F): 98.4 (01 Jun 2022 09:00), Max: 98.6 (01 Jun 2022 05:00)  HR: 68 (01 Jun 2022 10:50) (48 - 75)  BP: 179/52 (01 Jun 2022 10:50) (131/45 - 190/61)  BP(mean): 87 (01 Jun 2022 10:50) (67 - 112)  RR: 21 (01 Jun 2022 10:50) (12 - 22)  SpO2: 99% (01 Jun 2022 08:00) (89% - 100%)    MEDICATIONS  (STANDING):  aMIOdarone    Tablet 200 milliGRAM(s) Oral daily  amLODIPine   Tablet 5 milliGRAM(s) Oral daily  ascorbic acid 500 milliGRAM(s) Oral daily  atorvastatin 10 milliGRAM(s) Oral at bedtime  cholecalciferol 2000 Unit(s) Oral daily  cyanocobalamin 1000 MICROGram(s) Oral daily  hydrALAZINE 100 milliGRAM(s) Oral every 8 hours  hydrocortisone 5 milliGRAM(s) Oral at bedtime  hydrocortisone 10 milliGRAM(s) Oral daily    MEDICATIONS  (PRN):  hydrALAZINE Injectable 10 milliGRAM(s) IV Push every 4 hours PRN SBP >160  senna 2 Tablet(s) Oral at bedtime PRN Constipation      PHYSICAL EXAM:  Constitutional: Awake / alert, NAD, resting comfortably in bed  Eyes: anicteric sclera, moist conjunctivae, PEARRLA  HENT: [place] b/l, atraumatic, oropharynx clear with moist mucous membranes, no mucosal ulcerations  Neck: trachea midline, FROM, supple, no thyromegaly or lymphadenopathy  Respiratory: Breath sounds are clear bilaterally, normal respiratory effort and no intercostal retractions   Cardiovascular: S1 and S2, regular rhythm  Gastrointestinal: Soft, NT/ND, +BS  Extremities: +pitting edema b/l lower ext up to the knee   Skin: No rashes  Psych: appropriate affect, alert, and oriented to person, place and time    Neurological Exam:  HF: AxO x 3, appropriately interactive, normal affect, speech fluent, no aphasia or paraphasic errors. Naming/repetition intact   CN: PERRL, EOMI, VFF, facial sensation normal, no NLFD, tongue midline  Motor: No pronator drift, Strength 5/5 in all 4 ext, normal bulk and tone, no tremor, rigidity or bradykinesia  Sens: Intact to light touch  Reflexes: Symmetric and normal, downgoing toes b/l  Coord:  No FNFA, dysmetria, VELMA intact   Gait/Balance: Not tested          LABS:                         9.1    4.58  )-----------( 118      ( 01 Jun 2022 05:29 )             29.2     06-01    138  |  104  |  21  ----------------------------<  95  3.6   |  28  |  1.46<H>    Ca    8.6      01 Jun 2022 05:29  Phos  3.2     06-01  Mg     2.3     06-01    TPro  4.9<L>  /  Alb  2.3<L>  /  TBili  0.3  /  DBili  x   /  AST  41<H>  /  ALT  36  /  AlkPhos  63  05-31    LIVER FUNCTIONS - ( 31 May 2022 05:10 )  Alb: 2.3 g/dL / Pro: 4.9 gm/dL / ALK PHOS: 63 U/L / ALT: 36 U/L / AST: 41 U/L / GGT: x               
HPI:  Pt is a very pleasant 89 year old woman with a PMH of Lymphoma, HTN, hyponatremia, hypokalemia, paroxysmal A-fib not on AC due to falls, adrenal insufficiency, CKD and HLD who was brought to the ED by her son due to generalized weakness. She states her lymphoma was "cured" and no longer follows with an oncologist.  CT shows an area of high attenuation in the left parietal region, possible ICH vs metastatic lesion. Repeat CTH was stable.  Patient noted to be bradycardiac.      6/1: BP elevated over night, no HA, for MRI today, bradycardia improved of Coreg  6/2: BP remains elevated despite increased dose of Hydralazine and addition of Norvasc  6/3: BP continues to be elevated.  no HA or CP  6/4: BP improved with resuming the ACE        PAST MEDICAL & SURGICAL HISTORY:  Iron deficiency      HTN (hypertension)      Lymphoma      H/O adrenal insufficiency      Hyponatremia      Hypokalemia      Chronic kidney disease, unspecified CKD stage      Diastolic heart failure      Paroxysmal atrial fibrillation      No significant past surgical history          FAMILY HISTORY:      Social Hx:    Allergies    penicillin (Hives)  sulfa drugs (Hives)  tetracycline (Hives)    Intolerances            ICU Vital Signs Last 24 Hrs  T(C): 36.7 (04 Jun 2022 06:00), Max: 36.7 (03 Jun 2022 14:00)  T(F): 98 (04 Jun 2022 06:00), Max: 98.1 (04 Jun 2022 00:00)  HR: 60 (04 Jun 2022 08:00) (58 - 70)  BP: 155/46 (04 Jun 2022 08:00) (128/44 - 189/58)  BP(mean): 77 (04 Jun 2022 08:00) (69 - 99)  ABP: --  ABP(mean): --  RR: 14 (04 Jun 2022 08:00) (11 - 21)  SpO2: 94% (04 Jun 2022 08:00) (87% - 100%)          I&O's Summary    03 Jun 2022 07:01  -  04 Jun 2022 07:00  --------------------------------------------------------  IN: 960 mL / OUT: 1750 mL / NET: -790 mL                              8.5    4.54  )-----------( 128      ( 04 Jun 2022 05:39 )             27.3       06-04    134<L>  |  100  |  29<H>  ----------------------------<  89  4.4   |  31  |  1.23    Ca    9.0      04 Jun 2022 05:39  Phos  3.5     06-04  Mg     2.3     06-04                      MEDICATIONS  (STANDING):  aMIOdarone    Tablet 200 milliGRAM(s) Oral daily  amLODIPine   Tablet 10 milliGRAM(s) Oral daily  ascorbic acid 500 milliGRAM(s) Oral daily  atorvastatin 10 milliGRAM(s) Oral at bedtime  carvedilol 12.5 milliGRAM(s) Oral every 12 hours  cholecalciferol 2000 Unit(s) Oral daily  cyanocobalamin 1000 MICROGram(s) Oral daily  erythromycin   Ointment 1 Application(s) Both EYES at bedtime  heparin   Injectable 5000 Unit(s) SubCutaneous every 12 hours  hydrALAZINE 100 milliGRAM(s) Oral every 8 hours  hydrocortisone 5 milliGRAM(s) Oral at bedtime  hydrocortisone 10 milliGRAM(s) Oral daily  lisinopril 20 milliGRAM(s) Oral daily  melatonin 3 milliGRAM(s) Oral at bedtime    MEDICATIONS  (PRN):  aluminum hydroxide/magnesium hydroxide/simethicone Suspension 30 milliLiter(s) Oral every 4 hours PRN Dyspepsia  hydrALAZINE Injectable 10 milliGRAM(s) IV Push every 2 hours PRN SBP >180  senna 2 Tablet(s) Oral at bedtime PRN Constipation      DVT Prophylaxis: SQH    Advanced Directives:  Discussed with:    Visit Information:    ** Time is exclusive of billed procedures and/or teaching and/or routine family updates.        
HPI:  Pt is a very pleasant 89 year old woman with a PMH of Lymphoma, HTN, hyponatremia, hypokalemia, paroxysmal A-fib not on AC due to falls, adrenal insufficiency, CKD and HLD who was brought to the ED by her son due to generalized weakness. She states her lymphoma was "cured" and no longer follows with an oncologist.  CT shows an area of high attenuation in the left parietal region, possible ICH vs metastatic lesion. Repeat CTH was stable.  Patient noted to be bradycardiac.      6/1: BP elevated over night, no HA, for MRI today, bradycardia improved of Coreg  6/2: BP remains elevated despite increased dose of Hydralazine and addition of Norvasc          PAST MEDICAL & SURGICAL HISTORY:  Iron deficiency      HTN (hypertension)      Lymphoma      H/O adrenal insufficiency      Hyponatremia      Hypokalemia      Chronic kidney disease, unspecified CKD stage      Diastolic heart failure      Paroxysmal atrial fibrillation      No significant past surgical history          FAMILY HISTORY:      Social Hx:    Allergies    penicillin (Hives)  sulfa drugs (Hives)  tetracycline (Hives)    Intolerances            ICU Vital Signs Last 24 Hrs  T(C): 36.8 (02 Jun 2022 05:00), Max: 36.9 (01 Jun 2022 21:00)  T(F): 98.2 (02 Jun 2022 05:00), Max: 98.5 (02 Jun 2022 00:00)  HR: 65 (02 Jun 2022 10:00) (58 - 69)  BP: 176/50 (02 Jun 2022 10:00) (142/46 - 211/67)  BP(mean): 87 (02 Jun 2022 10:00) (73 - 104)  ABP: --  ABP(mean): --  RR: 17 (02 Jun 2022 10:00) (12 - 23)  SpO2: 93% (02 Jun 2022 10:00) (91% - 100%)          I&O's Summary    01 Jun 2022 07:01  -  02 Jun 2022 07:00  --------------------------------------------------------  IN: 0 mL / OUT: 1000 mL / NET: -1000 mL                              9.0    4.86  )-----------( 120      ( 02 Jun 2022 06:06 )             28.1       06-02    137  |  104  |  23  ----------------------------<  88  4.2   |  32<H>  |  1.44<H>    Ca    8.9      02 Jun 2022 06:06  Phos  3.3     06-02  Mg     2.1     06-02                      MEDICATIONS  (STANDING):  aMIOdarone    Tablet 200 milliGRAM(s) Oral daily  amLODIPine   Tablet 10 milliGRAM(s) Oral daily  ascorbic acid 500 milliGRAM(s) Oral daily  atorvastatin 10 milliGRAM(s) Oral at bedtime  carvedilol 12.5 milliGRAM(s) Oral every 12 hours  cholecalciferol 2000 Unit(s) Oral daily  cyanocobalamin 1000 MICROGram(s) Oral daily  erythromycin   Ointment 1 Application(s) Both EYES at bedtime  hydrALAZINE 100 milliGRAM(s) Oral every 8 hours  hydrocortisone 10 milliGRAM(s) Oral daily  hydrocortisone 5 milliGRAM(s) Oral at bedtime    MEDICATIONS  (PRN):  aluminum hydroxide/magnesium hydroxide/simethicone Suspension 30 milliLiter(s) Oral every 4 hours PRN Dyspepsia  hydrALAZINE Injectable 10 milliGRAM(s) IV Push every 2 hours PRN SBP >180  senna 2 Tablet(s) Oral at bedtime PRN Constipation      DVT Prophylaxis: V    Advanced Directives:  Discussed with:    Visit Information:    ** Time is exclusive of billed procedures and/or teaching and/or routine family updates.        
HPI:  Pt is a very pleasant 89 year old woman with a PMH of Lymphoma, HTN, hyponatremia, hypokalemia, paroxysmal A-fib not on AC due to falls, adrenal insufficiency, CKD and HLD who was brought to the ED by her son due to generalized weakness. She states her lymphoma was "cured" and no longer follows with an oncologist.  CT shows an area of high attenuation in the left parietal region, possible ICH vs metastatic lesion. Repeat CTH was stable.  Patient noted to be bradycardiac.      6/1: BP elevated over night, no HA, for MRI today, bradycardia improved of Coreg  6/2: BP remains elevated despite increased dose of Hydralazine and addition of Norvasc  6/3: BP continues to be elevated.  no HA or CP      PAST MEDICAL & SURGICAL HISTORY:  Iron deficiency      HTN (hypertension)      Lymphoma      H/O adrenal insufficiency      Hyponatremia      Hypokalemia      Chronic kidney disease, unspecified CKD stage      Diastolic heart failure      Paroxysmal atrial fibrillation      No significant past surgical history          FAMILY HISTORY:      Social Hx:    Allergies    penicillin (Hives)  sulfa drugs (Hives)  tetracycline (Hives)    Intolerances            ICU Vital Signs Last 24 Hrs  T(C): 36.9 (03 Jun 2022 05:00), Max: 36.9 (02 Jun 2022 18:00)  T(F): 98.5 (03 Jun 2022 05:00), Max: 98.5 (03 Jun 2022 05:00)  HR: 62 (03 Jun 2022 10:00) (55 - 65)  BP: 167/45 (03 Jun 2022 09:00) (144/44 - 199/58)  BP(mean): 79 (03 Jun 2022 09:00) (72 - 110)  ABP: --  ABP(mean): --  RR: 15 (03 Jun 2022 10:00) (12 - 19)  SpO2: 99% (03 Jun 2022 10:00) (88% - 99%)          I&O's Summary    02 Jun 2022 07:01  -  03 Jun 2022 07:00  --------------------------------------------------------  IN: 0 mL / OUT: 750 mL / NET: -750 mL    03 Jun 2022 07:01  -  03 Jun 2022 10:43  --------------------------------------------------------  IN: 360 mL / OUT: 400 mL / NET: -40 mL                              9.0    5.36  )-----------( 129      ( 03 Jun 2022 04:26 )             29.2       06-03    136  |  100  |  21  ----------------------------<  88  4.1   |  31  |  1.17    Ca    8.9      03 Jun 2022 04:26  Phos  3.0     06-03  Mg     2.2     06-03                      MEDICATIONS  (STANDING):  aMIOdarone    Tablet 200 milliGRAM(s) Oral daily  amLODIPine   Tablet 10 milliGRAM(s) Oral daily  ascorbic acid 500 milliGRAM(s) Oral daily  atorvastatin 10 milliGRAM(s) Oral at bedtime  carvedilol 12.5 milliGRAM(s) Oral every 12 hours  cholecalciferol 2000 Unit(s) Oral daily  cyanocobalamin 1000 MICROGram(s) Oral daily  erythromycin   Ointment 1 Application(s) Both EYES at bedtime  hydrALAZINE 100 milliGRAM(s) Oral every 8 hours  hydrocortisone 5 milliGRAM(s) Oral at bedtime  hydrocortisone 10 milliGRAM(s) Oral daily  lisinopril 20 milliGRAM(s) Oral daily    MEDICATIONS  (PRN):  aluminum hydroxide/magnesium hydroxide/simethicone Suspension 30 milliLiter(s) Oral every 4 hours PRN Dyspepsia  hydrALAZINE Injectable 10 milliGRAM(s) IV Push every 2 hours PRN SBP >180  senna 2 Tablet(s) Oral at bedtime PRN Constipation      DVT Prophylaxis:    Advanced Directives:  Discussed with:    Visit Information:    ** Time is exclusive of billed procedures and/or teaching and/or routine family updates.                  PAST MEDICAL & SURGICAL HISTORY:  Iron deficiency      HTN (hypertension)      Lymphoma      H/O adrenal insufficiency      Hyponatremia      Hypokalemia      Chronic kidney disease, unspecified CKD stage      Diastolic heart failure      Paroxysmal atrial fibrillation      No significant past surgical history          FAMILY HISTORY:      Social Hx:    Allergies    penicillin (Hives)  sulfa drugs (Hives)  tetracycline (Hives)    Intolerances            ICU Vital Signs Last 24 Hrs  T(C): 36.9 (03 Jun 2022 05:00), Max: 36.9 (02 Jun 2022 18:00)  T(F): 98.5 (03 Jun 2022 05:00), Max: 98.5 (03 Jun 2022 05:00)  HR: 62 (03 Jun 2022 10:00) (55 - 65)  BP: 167/45 (03 Jun 2022 09:00) (144/44 - 199/58)  BP(mean): 79 (03 Jun 2022 09:00) (72 - 110)  ABP: --  ABP(mean): --  RR: 15 (03 Jun 2022 10:00) (12 - 19)  SpO2: 99% (03 Jun 2022 10:00) (88% - 99%)          I&O's Summary    02 Jun 2022 07:01  -  03 Jun 2022 07:00  --------------------------------------------------------  IN: 0 mL / OUT: 750 mL / NET: -750 mL    03 Jun 2022 07:01  -  03 Jun 2022 10:42  --------------------------------------------------------  IN: 360 mL / OUT: 400 mL / NET: -40 mL                              9.0    5.36  )-----------( 129      ( 03 Jun 2022 04:26 )             29.2       06-03    136  |  100  |  21  ----------------------------<  88  4.1   |  31  |  1.17    Ca    8.9      03 Jun 2022 04:26  Phos  3.0     06-03  Mg     2.2     06-03                      MEDICATIONS  (STANDING):  aMIOdarone    Tablet 200 milliGRAM(s) Oral daily  amLODIPine   Tablet 10 milliGRAM(s) Oral daily  ascorbic acid 500 milliGRAM(s) Oral daily  atorvastatin 10 milliGRAM(s) Oral at bedtime  carvedilol 12.5 milliGRAM(s) Oral every 12 hours  cholecalciferol 2000 Unit(s) Oral daily  cyanocobalamin 1000 MICROGram(s) Oral daily  erythromycin   Ointment 1 Application(s) Both EYES at bedtime  hydrALAZINE 100 milliGRAM(s) Oral every 8 hours  hydrocortisone 5 milliGRAM(s) Oral at bedtime  hydrocortisone 10 milliGRAM(s) Oral daily  lisinopril 20 milliGRAM(s) Oral daily    MEDICATIONS  (PRN):  aluminum hydroxide/magnesium hydroxide/simethicone Suspension 30 milliLiter(s) Oral every 4 hours PRN Dyspepsia  hydrALAZINE Injectable 10 milliGRAM(s) IV Push every 2 hours PRN SBP >180  senna 2 Tablet(s) Oral at bedtime PRN Constipation      DVT Prophylaxis: SQH    Advanced Directives:  Discussed with:    Visit Information:    ** Time is exclusive of billed procedures and/or teaching and/or routine family updates.        
HPI:  Pt is a very pleasant 89 year old woman with a PMH of Lymphoma, HTN, hyponatremia, hypokalemia, paroxysmal A-fib not on AC due to falls, adrenal insufficiency, CKD and HLD who was brought to the ED by her son due to generalized weakness. She states her lymphoma was "cured" and no longer follows with an oncologist.  CT shows an area of high attenuation in the left parietal region, possible ICH vs metastatic lesion. Repeat CTH was stable.  Patient noted to be bradycardiac.      : BP elevated over night, no HA, for MRI today, bradycardia improved of Coreg            PAST MEDICAL & SURGICAL HISTORY:  Iron deficiency      HTN (hypertension)      Lymphoma      H/O adrenal insufficiency      Hyponatremia      Hypokalemia      Chronic kidney disease, unspecified CKD stage      Diastolic heart failure      Paroxysmal atrial fibrillation      No significant past surgical history          FAMILY HISTORY:      Social Hx:    Allergies    penicillin (Hives)  sulfa drugs (Hives)  tetracycline (Hives)    Intolerances            ICU Vital Signs Last 24 Hrs  T(C): 37 (2022 05:00), Max: 37 (2022 05:00)  T(F): 98.6 (2022 05:00), Max: 98.6 (2022 05:00)  HR: 65 (2022 08:00) (48 - 67)  BP: 172/71 (2022 08:00) (122/41 - 186/57)  BP(mean): 99 (2022 08:00) (66 - 112)  ABP: --  ABP(mean): --  RR: 16 (2022 08:00) (12 - 22)  SpO2: 99% (2022 08:00) (89% - 100%)          I&O's Summary    31 May 2022 07:01  -  2022 07:00  --------------------------------------------------------  IN: 0 mL / OUT: 950 mL / NET: -950 mL                              9.1    4.58  )-----------( 118      ( 2022 05:29 )             29.2       06-    138  |  104  |  21  ----------------------------<  95  3.6   |  28  |  1.46<H>    Ca    8.6      2022 05:29  Phos  3.2     06-  Mg     2.3     06-    TPro  4.9<L>  /  Alb  2.3<L>  /  TBili  0.3  /  DBili  x   /  AST  41<H>  /  ALT  36  /  AlkPhos  63                  Urinalysis Basic - ( 31 May 2022 02:19 )    Color: Yellow / Appearance: Clear / S.005 / pH: x  Gluc: x / Ketone: Negative  / Bili: Negative / Urobili: Negative   Blood: x / Protein: 100 / Nitrite: Negative   Leuk Esterase: Negative / RBC: 0-2 /HPF / WBC 0-2   Sq Epi: x / Non Sq Epi: Few / Bacteria: Occasional        MEDICATIONS  (STANDING):  aMIOdarone    Tablet 200 milliGRAM(s) Oral daily  ascorbic acid 500 milliGRAM(s) Oral daily  atorvastatin 10 milliGRAM(s) Oral at bedtime  cholecalciferol 2000 Unit(s) Oral daily  cyanocobalamin 1000 MICROGram(s) Oral daily  hydrALAZINE 100 milliGRAM(s) Oral every 8 hours  hydrALAZINE 50 milliGRAM(s) Oral once  hydrocortisone 5 milliGRAM(s) Oral at bedtime  hydrocortisone 10 milliGRAM(s) Oral daily    MEDICATIONS  (PRN):  hydrALAZINE Injectable 10 milliGRAM(s) IV Push every 4 hours PRN SBP >160  senna 2 Tablet(s) Oral at bedtime PRN Constipation      DVT Prophylaxis:    Advanced Directives:  Discussed with:    Visit Information:    ** Time is exclusive of billed procedures and/or teaching and/or routine family updates.

## 2022-06-04 NOTE — PROGRESS NOTE ADULT - PROVIDER SPECIALTY LIST ADULT
Hospitalist
Neurosurgery
Palliative Care
Neurosurgery
Critical Care
Critical Care
Palliative Care
Critical Care

## 2022-06-04 NOTE — PROGRESS NOTE ADULT - MS EXT PE MLT D E PC
no clubbing/no cyanosis
no clubbing/no cyanosis/no pedal edema
no clubbing/no cyanosis

## 2022-06-04 NOTE — PROGRESS NOTE ADULT - RS GEN PE MLT RESP DETAILS PC
breath sounds equal/no rales/no rhonchi/no wheezes

## 2022-06-04 NOTE — PROGRESS NOTE ADULT - NUTRITIONAL ASSESSMENT
This patient has been assessed with a concern for Malnutrition and has been determined to have a diagnosis/diagnoses of Severe protein-calorie malnutrition.    This patient is being managed with:   Diet Regular-  Entered: May 31 2022  7:25AM    

## 2022-06-04 NOTE — PROGRESS NOTE ADULT - GASTROINTESTINAL DETAILS
soft/no distention
soft/nontender/no distention
soft/no distention

## 2022-06-04 NOTE — PROGRESS NOTE ADULT - NEGATIVE OPHTHALMOLOGIC SYMPTOMS
no diplopia/no photophobia

## 2022-06-04 NOTE — PROGRESS NOTE ADULT - NEGATIVE ENMT SYMPTOMS
no hearing difficulty

## 2022-06-05 ENCOUNTER — TRANSCRIPTION ENCOUNTER (OUTPATIENT)
Age: 87
End: 2022-06-05

## 2022-06-05 VITALS
TEMPERATURE: 98 F | RESPIRATION RATE: 16 BRPM | DIASTOLIC BLOOD PRESSURE: 49 MMHG | OXYGEN SATURATION: 94 % | HEART RATE: 58 BPM | SYSTOLIC BLOOD PRESSURE: 140 MMHG

## 2022-06-05 PROCEDURE — 99239 HOSP IP/OBS DSCHRG MGMT >30: CPT

## 2022-06-05 RX ORDER — ASCORBIC ACID 60 MG
1 TABLET,CHEWABLE ORAL
Qty: 0 | Refills: 0 | DISCHARGE
Start: 2022-06-05

## 2022-06-05 RX ORDER — ACETAMINOPHEN 500 MG
1 TABLET ORAL
Qty: 0 | Refills: 0 | DISCHARGE

## 2022-06-05 RX ORDER — HYDRALAZINE HCL 50 MG
1 TABLET ORAL
Qty: 0 | Refills: 0 | DISCHARGE
Start: 2022-06-05 | End: 2022-07-04

## 2022-06-05 RX ORDER — MENTHOL AND METHYL SALICYLATE 10; 30 G/100G; G/100G
1 STICK TOPICAL
Qty: 0 | Refills: 0 | DISCHARGE

## 2022-06-05 RX ORDER — LOSARTAN POTASSIUM 100 MG/1
50 TABLET, FILM COATED ORAL DAILY
Refills: 0 | Status: DISCONTINUED | OUTPATIENT
Start: 2022-06-05 | End: 2022-06-05

## 2022-06-05 RX ORDER — FLUDROCORTISONE ACETATE 0.1 MG/1
0.1 TABLET ORAL DAILY
Refills: 0 | Status: DISCONTINUED | OUTPATIENT
Start: 2022-06-05 | End: 2022-06-05

## 2022-06-05 RX ORDER — FUROSEMIDE 40 MG
1 TABLET ORAL
Qty: 0 | Refills: 0 | DISCHARGE

## 2022-06-05 RX ORDER — LOSARTAN POTASSIUM 100 MG/1
2 TABLET, FILM COATED ORAL
Qty: 0 | Refills: 0 | DISCHARGE
Start: 2022-06-05 | End: 2022-07-04

## 2022-06-05 RX ORDER — AMLODIPINE BESYLATE 2.5 MG/1
0.5 TABLET ORAL
Qty: 0 | Refills: 0 | DISCHARGE
Start: 2022-06-05 | End: 2022-07-04

## 2022-06-05 RX ORDER — LOSARTAN POTASSIUM 100 MG/1
1 TABLET, FILM COATED ORAL
Qty: 30 | Refills: 0
Start: 2022-06-05 | End: 2022-07-04

## 2022-06-05 RX ORDER — HYDROCORTISONE 20 MG
10 TABLET ORAL DAILY
Refills: 0 | Status: DISCONTINUED | OUTPATIENT
Start: 2022-06-05 | End: 2022-06-05

## 2022-06-05 RX ORDER — AMLODIPINE BESYLATE 2.5 MG/1
1 TABLET ORAL
Qty: 30 | Refills: 0
Start: 2022-06-05 | End: 2022-07-04

## 2022-06-05 RX ORDER — HYDRALAZINE HCL 50 MG
1 TABLET ORAL
Qty: 90 | Refills: 0
Start: 2022-06-05 | End: 2022-07-04

## 2022-06-05 RX ADMIN — Medication 10 MILLIGRAM(S): at 09:05

## 2022-06-05 RX ADMIN — CARVEDILOL PHOSPHATE 12.5 MILLIGRAM(S): 80 CAPSULE, EXTENDED RELEASE ORAL at 09:05

## 2022-06-05 RX ADMIN — Medication 100 MILLIGRAM(S): at 06:42

## 2022-06-05 RX ADMIN — Medication 10 MILLIGRAM(S): at 00:13

## 2022-06-05 RX ADMIN — PREGABALIN 1000 MICROGRAM(S): 225 CAPSULE ORAL at 09:05

## 2022-06-05 RX ADMIN — HEPARIN SODIUM 5000 UNIT(S): 5000 INJECTION INTRAVENOUS; SUBCUTANEOUS at 09:07

## 2022-06-05 RX ADMIN — LOSARTAN POTASSIUM 50 MILLIGRAM(S): 100 TABLET, FILM COATED ORAL at 09:05

## 2022-06-05 RX ADMIN — Medication 500 MILLIGRAM(S): at 09:06

## 2022-06-05 RX ADMIN — AMIODARONE HYDROCHLORIDE 200 MILLIGRAM(S): 400 TABLET ORAL at 09:04

## 2022-06-05 RX ADMIN — AMLODIPINE BESYLATE 10 MILLIGRAM(S): 2.5 TABLET ORAL at 06:43

## 2022-06-05 RX ADMIN — Medication 2000 UNIT(S): at 09:05

## 2022-06-05 RX ADMIN — LISINOPRIL 20 MILLIGRAM(S): 2.5 TABLET ORAL at 09:05

## 2022-06-05 NOTE — DISCHARGE NOTE PROVIDER - NSDCMRMEDTOKEN_GEN_ALL_CORE_FT
amiodarone 200 mg oral tablet: 1 tab(s) orally once a day  amLODIPine 10 mg oral tablet: 1 tab(s) orally once a day  ascorbic acid 500 mg oral tablet: 1 tab(s) orally once a day  carvedilol 12.5 mg oral tablet: 1 tab(s) orally every 12 hours  Claritin 10 mg oral tablet: 1 tab(s) orally once a day, As Needed  Cranberry oral tablet: 1 tab(s) 500mg orally once a day as needed  cyanocobalamin 1000 mcg oral tablet: 1 tab(s) orally once a day  Cystex Urinary Pain Relief 162 mg-162.5 mg oral tablet: 1 tab(s) orally once a day, As Needed  famotidine 20 mg oral tablet: 1 tab(s) orally 2 times a day  fludrocortisone 0.1 mg oral tablet: 0.5 tab(s) orally once a day  hydrALAZINE 100 mg oral tablet: 1 tab(s) orally 3 times a day x 30 days   hydrocortisone 10 mg oral tablet: 1 tab(s) orally once a day (in the morning)  hydrocortisone 5 mg oral tablet: 1 tab(s) orally once a day (in the evening)  Livalo 2 mg oral tablet: 1 tab(s) orally once a day  losartan 50 mg oral tablet: 1 tab(s) orally once a day  Melatonin 3 mg oral tablet: 1 tab(s) orally once a day (at bedtime), As Needed  MiraLax oral powder for reconstitution: 17 gram(s) orally once a day, As Needed  Pepto-Bismol 262 mg/15 mL oral suspension: 15 milliliter(s) orally 4 times a day, As Needed  Senna 8.6 mg oral tablet: 2 tab(s) orally once a day (at bedtime)  Vitamin D3 50 mcg (2000 intl units) oral tablet: 1 tab(s) orally once a day

## 2022-06-05 NOTE — DISCHARGE NOTE PROVIDER - CARE PROVIDER_API CALL
Jabier Kam; PhD)  Neurosurgery  [address] [facility] [address], [address] [place] [address]  Phone: [phone]  Fax: [phone]  Follow Up Time: 2 weeks    BE BROOKS  Internal Medicine  [address] [place] [address]  Phone: [phone]  Fax: [phone]  Follow Up Time: 2 weeks

## 2022-06-05 NOTE — DISCHARGE NOTE PROVIDER - HOSPITAL COURSE
89 year old woman with a PMH of Lymphoma, HTN, hyponatremia, hypokalemia, paroxysmal A-fib not on AC due to falls, adrenal insufficiency, CKD and HLD who was brought to the ED by her son due to generalized weakness; CT done in ED showed an area of high attenuation in the left parietal region, possible ICH vs metastatic lesion.  Pt transferred to Hospitalist service 6/3. No c/o.        * ICH  MRI +/- contrast shows small focus hemorrhage, small hypertensive stroke vs hemorrhage into a cavernoma, no surgical intervention, pt should have a repeat CT in 2 weeks   DNR/DNI,   case d/w fam; to decide if they want further studies as outpt    * h/o adr seth Mora was dc in ICU suspected hypokalemia induced by it  I will restart and recommend   Potassium check this week     Med change  Med rec was not done on admission I had a hard time reconciling meds  Increase Hydralazine  dc Lisinopril, she is now on Losartan  new: Norvasc and Coreg

## 2022-06-05 NOTE — DISCHARGE NOTE PROVIDER - NSDCCPCAREPLAN_GEN_ALL_CORE_FT
PRINCIPAL DISCHARGE DIAGNOSIS  Diagnosis: Hypertensive urgency  Assessment and Plan of Treatment: stable, meds changed. You need a potassium level this week to make sure it is normal. Abnormal MRI f/up with neurosurgery as OP      SECONDARY DISCHARGE DIAGNOSES  Diagnosis: ICH (intracerebral hemorrhage)  Assessment and Plan of Treatment:

## 2022-06-05 NOTE — DISCHARGE NOTE PROVIDER - DETAILS OF MALNUTRITION DIAGNOSIS/DIAGNOSES
This patient has been assessed with a concern for Malnutrition and was treated during this hospitalization for the following Nutrition diagnosis/diagnoses:     -  05/31/2022: Severe protein-calorie malnutrition

## 2022-06-05 NOTE — DISCHARGE NOTE PROVIDER - PROVIDER TOKENS
PROVIDER:[TOKEN:[[id number]],FOLLOWUP:[2 weeks]],PROVIDER:[TOKEN:[[id number]],FOLLOWUP:[2 weeks]]

## 2022-06-10 NOTE — CDI QUERY NOTE - NSCDIOTHERTXTBX_GEN_ALL_CORE_HH
Please include more specific documentation in your progress note and discharge summary.  The documentation in this medical record requires additional clarification to accurately capture the patient’s diagnosis/diagnoses, treatment and or severity of illness. Please document all corresponding diagnoses, either known or suspected, of the clinical indicators described below.    Link Signs and Symptoms to underlying cause if known (e.g. chest pain (likely) secondary to ___________).    Present on Admission:  Was the severity of the condition present on admission? If so, please document in the chart that “(the condition) was present on admission.”  A) Vasogenic edema  B) Cerebral edema  C) Unable to determine  D) Other ( Please specify)     CLINICAL INDICATORS:    < from: CT Head No Cont (05.31.22 @ 04:21) >  There is stable appearance of a 7 mm mildly hyperattenuating nodular   focus in the left parietal lobe, located at the gray matter-white matter   junction (2:18-19).  Mild surrounding vasogenic edema is stable.  There   is no significant mass effect.    Discharge summary on 6/5/2022:  * ICH  MRI +/- contrast shows small focus hemorrhage, small hypertensive stroke vs hemorrhage into a cavernoma, no surgical intervention, pt should have a repeat CT in 2 weeks

## 2022-06-11 NOTE — CHART NOTE - NSCHARTNOTEFT_GEN_A_CORE
HPI:  Pt is a very pleasant 89 year old woman with a PMH of Lymphoma, HTN, hyponatremia, hypokalemia, paroxysmal A-fib not on AC due to falls, adrenal insufficiency, CKD and HLD who was brought to the ED by her son due to generalized weakness. Pt was seen and examined in the ED, c/o weakness all over but no specific pain.  (31 May 2022 03:22)      PERTINENT PMH REVIEWED:  [ X ] YES [ ] NO           Primary Contact:  HCP on file naming daughter Kate Partida [phone]                HCP [ X ] Surrogate [   ] Guardian [   ]    Mental Status: pt. deferring to family  Concerns of Depression [  ]- not identified   Anxiety [   ]- not identified   Baseline ADLs (prior to admission):  Independent [ ] moderately [ ] fully   Dependent   [ X ] moderately [ ] fully    Family Meeting: [facility] conversation held yesterday with Dr. Lynn. Plan [facility] which they had before, they are not ready for hospice.    Anticipated Grief: Patient [  ] Family [ X ]    Caregiver [place] Assessed: Yes [ X ] No [  ]    Presybeterian: Yarsani     Spiritual Concerns: Not identified     Goals of Care: Comfort [  ] Rehabilitation [  ] Curative [  ] Life Prolonging [  ]    Previous Services: [facility]     ADVANCE DIRECTIVES: MOLST completed 6/1- DNR and DNI    Anticipated D/C Plan: Home to her sons temporarily with [facility] home care and eventually back to [facility]                      Summary:    This SW spoke with pts daughter via phone to follow up and offer support. Role of Palliative  explained. Pts daughter shared how pt. has been in and out of the hospital multiple times and it has been very difficult. Feelings explored and support provided. Pt. had been a resident at [facility]. Pts daughter reports after a recent hospitalization she went to [facility] rehab and then back to [place] with 24/7 aides however, it was not long before she had to come back to the hospital. [facility] meeting took place with Dr. Lynn yesterday. Pts daughter shared that the plan is for pt. to go back to her sons home for a week or so with [facility] home care (who they had used briefly and were happy with) and then eventually return to [place]. They are not ready for a comfort focus or hospice at this time. Emotional support provided. Our team will continue to follow.
Vasogenic edema POA
EP NP  called to interrogate ILR.  Pt had medtronic ILR implanted >4 years ago was told battery end of service.  Lt breast ILR site intact.   Pt & family (daughter) decided to leave it as it is.

## 2022-06-15 DIAGNOSIS — E43 UNSPECIFIED SEVERE PROTEIN-CALORIE MALNUTRITION: ICD-10-CM

## 2022-06-15 DIAGNOSIS — E87.1 HYPO-OSMOLALITY AND HYPONATREMIA: ICD-10-CM

## 2022-06-15 DIAGNOSIS — Z88.1 ALLERGY STATUS TO OTHER ANTIBIOTIC AGENTS STATUS: ICD-10-CM

## 2022-06-15 DIAGNOSIS — R29.700 NIHSS SCORE 0: ICD-10-CM

## 2022-06-15 DIAGNOSIS — I50.32 CHRONIC DIASTOLIC (CONGESTIVE) HEART FAILURE: ICD-10-CM

## 2022-06-15 DIAGNOSIS — Z66 DO NOT RESUSCITATE: ICD-10-CM

## 2022-06-15 DIAGNOSIS — R11.0 NAUSEA: ICD-10-CM

## 2022-06-15 DIAGNOSIS — E27.40 UNSPECIFIED ADRENOCORTICAL INSUFFICIENCY: ICD-10-CM

## 2022-06-15 DIAGNOSIS — I61.8 OTHER NONTRAUMATIC INTRACEREBRAL HEMORRHAGE: ICD-10-CM

## 2022-06-15 DIAGNOSIS — Z88.2 ALLERGY STATUS TO SULFONAMIDES: ICD-10-CM

## 2022-06-15 DIAGNOSIS — E78.5 HYPERLIPIDEMIA, UNSPECIFIED: ICD-10-CM

## 2022-06-15 DIAGNOSIS — N18.9 CHRONIC KIDNEY DISEASE, UNSPECIFIED: ICD-10-CM

## 2022-06-15 DIAGNOSIS — I48.0 PAROXYSMAL ATRIAL FIBRILLATION: ICD-10-CM

## 2022-06-15 DIAGNOSIS — E87.6 HYPOKALEMIA: ICD-10-CM

## 2022-06-15 DIAGNOSIS — R07.9 CHEST PAIN, UNSPECIFIED: ICD-10-CM

## 2022-06-15 DIAGNOSIS — T38.0X5A ADVERSE EFFECT OF GLUCOCORTICOIDS AND SYNTHETIC ANALOGUES, INITIAL ENCOUNTER: ICD-10-CM

## 2022-06-15 DIAGNOSIS — Z91.81 HISTORY OF FALLING: ICD-10-CM
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
## 2022-06-15 DIAGNOSIS — I13.0 HYPERTENSIVE HEART AND CHRONIC KIDNEY DISEASE WITH HEART FAILURE AND STAGE 1 THROUGH STAGE 4 CHRONIC KIDNEY DISEASE, OR UNSPECIFIED CHRONIC KIDNEY DISEASE: ICD-10-CM

## 2022-06-15 DIAGNOSIS — R00.1 BRADYCARDIA, UNSPECIFIED: ICD-10-CM

## 2022-06-15 DIAGNOSIS — Z88.0 ALLERGY STATUS TO PENICILLIN: ICD-10-CM

## 2022-06-15 DIAGNOSIS — G93.6 CEREBRAL EDEMA: ICD-10-CM

## 2022-06-15 DIAGNOSIS — R51.9 HEADACHE, UNSPECIFIED: ICD-10-CM

## 2022-06-15 DIAGNOSIS — I16.0 HYPERTENSIVE URGENCY: ICD-10-CM

## 2022-06-15 DIAGNOSIS — Z85.72 PERSONAL HISTORY OF NON-HODGKIN LYMPHOMAS: ICD-10-CM

## 2022-06-16 PROBLEM — N18.9 CHRONIC KIDNEY DISEASE, UNSPECIFIED: Chronic | Status: ACTIVE | Noted: 2022-05-31

## 2022-06-16 PROBLEM — E87.1 HYPO-OSMOLALITY AND HYPONATREMIA: Chronic | Status: ACTIVE | Noted: 2022-05-31

## 2022-06-16 PROBLEM — I50.30 UNSPECIFIED DIASTOLIC (CONGESTIVE) HEART FAILURE: Chronic | Status: ACTIVE | Noted: 2022-05-31

## 2022-06-16 PROBLEM — E87.6 HYPOKALEMIA: Chronic | Status: ACTIVE | Noted: 2022-05-31

## 2022-06-16 PROBLEM — I48.0 PAROXYSMAL ATRIAL FIBRILLATION: Chronic | Status: ACTIVE | Noted: 2022-05-31

## 2022-06-21 ENCOUNTER — INPATIENT (INPATIENT)
Facility: HOSPITAL | Age: 87
LOS: 4 days | Discharge: HOME HEALTH | DRG: 291 | End: 2022-06-26
Attending: HOSPITALIST | Admitting: INTERNAL MEDICINE
Payer: MEDICARE

## 2022-06-21 VITALS
SYSTOLIC BLOOD PRESSURE: 156 MMHG | HEIGHT: 60 IN | TEMPERATURE: 98 F | HEART RATE: 64 BPM | OXYGEN SATURATION: 93 % | RESPIRATION RATE: 17 BRPM | DIASTOLIC BLOOD PRESSURE: 50 MMHG

## 2022-06-21 DIAGNOSIS — Z90.49 ACQUIRED ABSENCE OF OTHER SPECIFIED PARTS OF DIGESTIVE TRACT: Chronic | ICD-10-CM

## 2022-06-21 DIAGNOSIS — Z86.79 PERSONAL HISTORY OF OTHER DISEASES OF THE CIRCULATORY SYSTEM: Chronic | ICD-10-CM

## 2022-06-21 DIAGNOSIS — Z90.710 ACQUIRED ABSENCE OF BOTH CERVIX AND UTERUS: Chronic | ICD-10-CM

## 2022-06-21 LAB
ADD ON TEST-SPECIMEN IN LAB: SIGNIFICANT CHANGE UP
ADD ON TEST-SPECIMEN IN LAB: SIGNIFICANT CHANGE UP
ALBUMIN SERPL ELPH-MCNC: 2.6 G/DL — LOW (ref 3.3–5)
ALP SERPL-CCNC: 94 U/L — SIGNIFICANT CHANGE UP (ref 40–120)
ALT FLD-CCNC: 71 U/L — SIGNIFICANT CHANGE UP (ref 12–78)
ANION GAP SERPL CALC-SCNC: 6 MMOL/L — SIGNIFICANT CHANGE UP (ref 5–17)
APPEARANCE UR: CLEAR — SIGNIFICANT CHANGE UP
APTT BLD: 26.4 SEC — LOW (ref 27.5–35.5)
AST SERPL-CCNC: 73 U/L — HIGH (ref 15–37)
BASOPHILS # BLD AUTO: 0 K/UL — SIGNIFICANT CHANGE UP (ref 0–0.2)
BASOPHILS NFR BLD AUTO: 0 % — SIGNIFICANT CHANGE UP (ref 0–2)
BILIRUB SERPL-MCNC: 0.4 MG/DL — SIGNIFICANT CHANGE UP (ref 0.2–1.2)
BILIRUB UR-MCNC: NEGATIVE — SIGNIFICANT CHANGE UP
BUN SERPL-MCNC: 21 MG/DL — SIGNIFICANT CHANGE UP (ref 7–23)
CALCIUM SERPL-MCNC: 8.8 MG/DL — SIGNIFICANT CHANGE UP (ref 8.5–10.1)
CHLORIDE SERPL-SCNC: 100 MMOL/L — SIGNIFICANT CHANGE UP (ref 96–108)
CO2 SERPL-SCNC: 32 MMOL/L — HIGH (ref 22–31)
COLOR SPEC: YELLOW — SIGNIFICANT CHANGE UP
CREAT SERPL-MCNC: 1.27 MG/DL — SIGNIFICANT CHANGE UP (ref 0.5–1.3)
DIFF PNL FLD: NEGATIVE — SIGNIFICANT CHANGE UP
EGFR: 38 ML/MIN/1.73M2 — LOW
EOSINOPHIL # BLD AUTO: 0 K/UL — SIGNIFICANT CHANGE UP (ref 0–0.5)
EOSINOPHIL NFR BLD AUTO: 0 % — SIGNIFICANT CHANGE UP (ref 0–6)
GLUCOSE SERPL-MCNC: 133 MG/DL — HIGH (ref 70–99)
GLUCOSE UR QL: NEGATIVE — SIGNIFICANT CHANGE UP
HCT VFR BLD CALC: 30 % — LOW (ref 34.5–45)
HGB BLD-MCNC: 9.7 G/DL — LOW (ref 11.5–15.5)
IMM GRANULOCYTES NFR BLD AUTO: 0.2 % — SIGNIFICANT CHANGE UP (ref 0–1.5)
INR BLD: 0.92 RATIO — SIGNIFICANT CHANGE UP (ref 0.88–1.16)
KETONES UR-MCNC: NEGATIVE — SIGNIFICANT CHANGE UP
LEUKOCYTE ESTERASE UR-ACNC: NEGATIVE — SIGNIFICANT CHANGE UP
LYMPHOCYTES # BLD AUTO: 1.33 K/UL — SIGNIFICANT CHANGE UP (ref 1–3.3)
LYMPHOCYTES # BLD AUTO: 26 % — SIGNIFICANT CHANGE UP (ref 13–44)
MAGNESIUM SERPL-MCNC: 2.2 MG/DL — SIGNIFICANT CHANGE UP (ref 1.6–2.6)
MCHC RBC-ENTMCNC: 30.7 PG — SIGNIFICANT CHANGE UP (ref 27–34)
MCHC RBC-ENTMCNC: 32.3 GM/DL — SIGNIFICANT CHANGE UP (ref 32–36)
MCV RBC AUTO: 94.9 FL — SIGNIFICANT CHANGE UP (ref 80–100)
MONOCYTES # BLD AUTO: 0.45 K/UL — SIGNIFICANT CHANGE UP (ref 0–0.9)
MONOCYTES NFR BLD AUTO: 8.8 % — SIGNIFICANT CHANGE UP (ref 2–14)
NEUTROPHILS # BLD AUTO: 3.32 K/UL — SIGNIFICANT CHANGE UP (ref 1.8–7.4)
NEUTROPHILS NFR BLD AUTO: 65 % — SIGNIFICANT CHANGE UP (ref 43–77)
NITRITE UR-MCNC: NEGATIVE — SIGNIFICANT CHANGE UP
NT-PROBNP SERPL-SCNC: 2226 PG/ML — HIGH (ref 0–450)
PH UR: 6.5 — SIGNIFICANT CHANGE UP (ref 5–8)
PLATELET # BLD AUTO: 157 K/UL — SIGNIFICANT CHANGE UP (ref 150–400)
POTASSIUM SERPL-MCNC: 2.7 MMOL/L — CRITICAL LOW (ref 3.5–5.3)
POTASSIUM SERPL-MCNC: 3.2 MMOL/L — LOW (ref 3.5–5.3)
POTASSIUM SERPL-SCNC: 2.7 MMOL/L — CRITICAL LOW (ref 3.5–5.3)
POTASSIUM SERPL-SCNC: 3.2 MMOL/L — LOW (ref 3.5–5.3)
PROT SERPL-MCNC: 6.1 GM/DL — SIGNIFICANT CHANGE UP (ref 6–8.3)
PROT UR-MCNC: 100
PROTHROM AB SERPL-ACNC: 10.7 SEC — SIGNIFICANT CHANGE UP (ref 10.5–13.4)
RAPID RVP RESULT: SIGNIFICANT CHANGE UP
RBC # BLD: 3.16 M/UL — LOW (ref 3.8–5.2)
RBC # FLD: 14 % — SIGNIFICANT CHANGE UP (ref 10.3–14.5)
SARS-COV-2 RNA SPEC QL NAA+PROBE: SIGNIFICANT CHANGE UP
SODIUM SERPL-SCNC: 138 MMOL/L — SIGNIFICANT CHANGE UP (ref 135–145)
SP GR SPEC: 1.01 — SIGNIFICANT CHANGE UP (ref 1.01–1.02)
TROPONIN I, HIGH SENSITIVITY RESULT: 21.49 NG/L — SIGNIFICANT CHANGE UP
UROBILINOGEN FLD QL: NEGATIVE — SIGNIFICANT CHANGE UP
WBC # BLD: 5.11 K/UL — SIGNIFICANT CHANGE UP (ref 3.8–10.5)
WBC # FLD AUTO: 5.11 K/UL — SIGNIFICANT CHANGE UP (ref 3.8–10.5)

## 2022-06-21 PROCEDURE — G1004: CPT

## 2022-06-21 PROCEDURE — 71250 CT THORAX DX C-: CPT | Mod: 26,MG

## 2022-06-21 PROCEDURE — 99285 EMERGENCY DEPT VISIT HI MDM: CPT | Mod: CS

## 2022-06-21 PROCEDURE — 71045 X-RAY EXAM CHEST 1 VIEW: CPT | Mod: 26

## 2022-06-21 RX ORDER — CARVEDILOL PHOSPHATE 80 MG/1
12.5 CAPSULE, EXTENDED RELEASE ORAL EVERY 12 HOURS
Refills: 0 | Status: DISCONTINUED | OUTPATIENT
Start: 2022-06-21 | End: 2022-06-24

## 2022-06-21 RX ORDER — LANOLIN ALCOHOL/MO/W.PET/CERES
3 CREAM (GRAM) TOPICAL AT BEDTIME
Refills: 0 | Status: DISCONTINUED | OUTPATIENT
Start: 2022-06-21 | End: 2022-06-26

## 2022-06-21 RX ORDER — LOSARTAN POTASSIUM 100 MG/1
50 TABLET, FILM COATED ORAL DAILY
Refills: 0 | Status: DISCONTINUED | OUTPATIENT
Start: 2022-06-21 | End: 2022-06-24

## 2022-06-21 RX ORDER — HYDROCORTISONE 20 MG
10 TABLET ORAL DAILY
Refills: 0 | Status: DISCONTINUED | OUTPATIENT
Start: 2022-06-21 | End: 2022-06-26

## 2022-06-21 RX ORDER — HYDRALAZINE HCL 50 MG
10 TABLET ORAL EVERY 6 HOURS
Refills: 0 | Status: DISCONTINUED | OUTPATIENT
Start: 2022-06-21 | End: 2022-06-26

## 2022-06-21 RX ORDER — SENNA PLUS 8.6 MG/1
2 TABLET ORAL DAILY
Refills: 0 | Status: DISCONTINUED | OUTPATIENT
Start: 2022-06-21 | End: 2022-06-26

## 2022-06-21 RX ORDER — ATORVASTATIN CALCIUM 80 MG/1
10 TABLET, FILM COATED ORAL AT BEDTIME
Refills: 0 | Status: DISCONTINUED | OUTPATIENT
Start: 2022-06-21 | End: 2022-06-26

## 2022-06-21 RX ORDER — CHOLECALCIFEROL (VITAMIN D3) 125 MCG
2000 CAPSULE ORAL DAILY
Refills: 0 | Status: DISCONTINUED | OUTPATIENT
Start: 2022-06-21 | End: 2022-06-26

## 2022-06-21 RX ORDER — POTASSIUM CHLORIDE 20 MEQ
10 PACKET (EA) ORAL ONCE
Refills: 0 | Status: COMPLETED | OUTPATIENT
Start: 2022-06-21 | End: 2022-06-21

## 2022-06-21 RX ORDER — LORATADINE 10 MG/1
10 TABLET ORAL DAILY
Refills: 0 | Status: DISCONTINUED | OUTPATIENT
Start: 2022-06-21 | End: 2022-06-26

## 2022-06-21 RX ORDER — AMLODIPINE BESYLATE 2.5 MG/1
10 TABLET ORAL DAILY
Refills: 0 | Status: DISCONTINUED | OUTPATIENT
Start: 2022-06-21 | End: 2022-06-24

## 2022-06-21 RX ORDER — FUROSEMIDE 40 MG
40 TABLET ORAL ONCE
Refills: 0 | Status: COMPLETED | OUTPATIENT
Start: 2022-06-21 | End: 2022-06-21

## 2022-06-21 RX ORDER — ENOXAPARIN SODIUM 100 MG/ML
40 INJECTION SUBCUTANEOUS EVERY 24 HOURS
Refills: 0 | Status: DISCONTINUED | OUTPATIENT
Start: 2022-06-21 | End: 2022-06-26

## 2022-06-21 RX ORDER — AMIODARONE HYDROCHLORIDE 400 MG/1
200 TABLET ORAL DAILY
Refills: 0 | Status: DISCONTINUED | OUTPATIENT
Start: 2022-06-21 | End: 2022-06-26

## 2022-06-21 RX ORDER — FAMOTIDINE 10 MG/ML
20 INJECTION INTRAVENOUS DAILY
Refills: 0 | Status: DISCONTINUED | OUTPATIENT
Start: 2022-06-21 | End: 2022-06-26

## 2022-06-21 RX ORDER — ACETAMINOPHEN 500 MG
650 TABLET ORAL EVERY 6 HOURS
Refills: 0 | Status: DISCONTINUED | OUTPATIENT
Start: 2022-06-21 | End: 2022-06-26

## 2022-06-21 RX ORDER — SENNA PLUS 8.6 MG/1
2 TABLET ORAL
Qty: 0 | Refills: 0 | DISCHARGE

## 2022-06-21 RX ORDER — POLYETHYLENE GLYCOL 3350 17 G/17G
17 POWDER, FOR SOLUTION ORAL DAILY
Refills: 0 | Status: DISCONTINUED | OUTPATIENT
Start: 2022-06-21 | End: 2022-06-26

## 2022-06-21 RX ORDER — POTASSIUM CHLORIDE 20 MEQ
40 PACKET (EA) ORAL ONCE
Refills: 0 | Status: COMPLETED | OUTPATIENT
Start: 2022-06-21 | End: 2022-06-21

## 2022-06-21 RX ORDER — ONDANSETRON 8 MG/1
4 TABLET, FILM COATED ORAL EVERY 8 HOURS
Refills: 0 | Status: DISCONTINUED | OUTPATIENT
Start: 2022-06-21 | End: 2022-06-26

## 2022-06-21 RX ORDER — HYDROCORTISONE 20 MG
5 TABLET ORAL AT BEDTIME
Refills: 0 | Status: DISCONTINUED | OUTPATIENT
Start: 2022-06-21 | End: 2022-06-26

## 2022-06-21 RX ORDER — FLUDROCORTISONE ACETATE 0.1 MG/1
0.05 TABLET ORAL DAILY
Refills: 0 | Status: DISCONTINUED | OUTPATIENT
Start: 2022-06-21 | End: 2022-06-26

## 2022-06-21 RX ORDER — PREGABALIN 225 MG/1
1000 CAPSULE ORAL DAILY
Refills: 0 | Status: DISCONTINUED | OUTPATIENT
Start: 2022-06-21 | End: 2022-06-26

## 2022-06-21 RX ORDER — FUROSEMIDE 40 MG
40 TABLET ORAL DAILY
Refills: 0 | Status: DISCONTINUED | OUTPATIENT
Start: 2022-06-21 | End: 2022-06-23

## 2022-06-21 RX ADMIN — CARVEDILOL PHOSPHATE 12.5 MILLIGRAM(S): 80 CAPSULE, EXTENDED RELEASE ORAL at 21:47

## 2022-06-21 RX ADMIN — Medication 100 MILLIEQUIVALENT(S): at 23:54

## 2022-06-21 RX ADMIN — ATORVASTATIN CALCIUM 10 MILLIGRAM(S): 80 TABLET, FILM COATED ORAL at 21:47

## 2022-06-21 RX ADMIN — Medication 100 MILLIEQUIVALENT(S): at 18:43

## 2022-06-21 RX ADMIN — ENOXAPARIN SODIUM 40 MILLIGRAM(S): 100 INJECTION SUBCUTANEOUS at 21:47

## 2022-06-21 RX ADMIN — Medication 100 MILLIEQUIVALENT(S): at 22:30

## 2022-06-21 RX ADMIN — Medication 5 MILLIGRAM(S): at 22:30

## 2022-06-21 RX ADMIN — Medication 40 MILLIGRAM(S): at 21:20

## 2022-06-21 RX ADMIN — Medication 40 MILLIEQUIVALENT(S): at 18:49

## 2022-06-21 NOTE — ED PROVIDER NOTE - OBJECTIVE STATEMENT
89 yo female w/PMHx of HTN, TIA, Afib, CKD, lymphoma in remission, CHF, UTI presents to the ED from [place] c/o SOB for a couple days. According to the daughter, pt was recently had her dose increased for Losartan. Pt has difficulty walking, dizziness, lightheadedness, and some CP today. Pt states on exertion that she has worse SOB. Pt states that she feels like she needs oxygen. Denies any cardiac hx or recent trauma. Cardiologist: Dr. Willard 89 yo female w/PMHx of HTN, TIA, Afib, CKD, lymphoma in remission, CHF, UTI presents to the ED from [place] c/o SOB for a couple days. According to the daughter, pt was recently had her dose increased for Losartan. Pt has difficulty walking, dizziness, lightheadedness, and some CP today. Pt states on exertion that she has worse SOB. Pt states that she feels like she needs oxygen. Denies any recent trauma. Cardiologist: Dr. Willard 89 yo female w/PMHx of HTN, TIA, Afib, CKD, lymphoma in remission, CHF, UTI presents to the ED from [place] c/o SOB for a couple days. According to the daughter, pt was recently had her dose increased for Losartan. Pt has difficulty walking, dizziness, lightheadedness, and some CP today. Pt states on exertion that she has worse SOB. Pt states that she feels like she needs oxygen. Denies any recent trauma. Pt usually ambulates with a walker. Cardiologist: Dr. Willard 89 yo female w/PMHx of HTN, TIA, Afib, CKD, lymphoma in remission, CHF, UTI presents to the ED from [place] c/o SOB for a couple days. According to the daughter, pt was recently had her dose increased for Losartan. Pt has difficulty walking, dizziness, lightheadedness, and some CP today. Pt states on exertion that she has worse SOB. Pt states that she feels like she needs oxygen. Denies any recent trauma. Cardiologist: Dr. Willard

## 2022-06-21 NOTE — H&P ADULT - NSHPPHYSICALEXAM_GEN_ALL_CORE
ICU Vital Signs Last 24 Hrs  T(C): 36.1 (21 Jun 2022 16:00), Max: 36.4 (21 Jun 2022 15:47)  T(F): 96.9 (21 Jun 2022 16:00), Max: 97.5 (21 Jun 2022 15:47)  HR: 66 (21 Jun 2022 18:55) (64 - 66)  BP: 166/64 (21 Jun 2022 18:55) (156/50 - 166/64)  BP(mean): 82 (21 Jun 2022 15:47) (82 - 82)  RR: 18 (21 Jun 2022 18:55) (17 - 18)  SpO2: 99% (21 Jun 2022 18:55) (93% - 99%)    General: Awake and alert, cooperative with exam. No acute distress.   Skin: Warm, dry, and pink.   Eyes: Pupils equal and reactive to light. Extraocular eye movements intact. No conjunctival injection, discharge, or scleral icterus.   HEENT: Atraumatic, normocephalic. Moist mucus membranes.   Neck: Supple, non-tender. No lymphadenopathy. JVD present with hepatojugular reflex.   Cardiology: Normal S1, S2. No murmurs, rubs, or gallops. Regular rate and rhythm.   Respiratory: Crackles at the bases and expiratory wheezing bilaterally. Normal chest expansion.   Gastrointestinal: Positive bowel sounds. Soft, non-tender, non-distended. No guarding, rigidity, or rebound tenderness. No hepatosplenomegaly.   Musculoskeletal: 5/5 motor strength in all extremities. Normal range of motion.   Extremities: No peripheral edema bilaterally. Dorsalis pedis pulses 2+ bilaterally.   Neurological: A+Ox3 (person, place, and time). Cranial nerves 2-12 intact. Normal speech. No facial droop. No focal neurological deficits.   Psychiatric: Normal affect. Normal mood.

## 2022-06-21 NOTE — PATIENT PROFILE ADULT - CAREGIVER ADDRESS
70M with HTN, DM2, hypothyroid, admitted to [facility] on 4/7/20 with fevers, cough, SOB, dx with COVID19 pneumonia, hypoxic respiratory failure requiring vent/trach/PEG, s/p Hydroxychloroquine, Solumedrol, Anakinra, convalescent plasma. Course further complicated by pseudomonas pneumonia, DVT, sepsis. Patient now transferred to Acute Ventil[facility] Recovery Unit at [facility] for further care. s/p hydroxychloroquine (4/7-4/12); solumedrol (4/7-4/13); anakinra (4/11-4/15); CP (4/29). Tx to ICU 6/8 for hypotension and tachycardia - found to have SVT. Additionally found to have large hematoma R leg and buttock-AC now off. ?CVA on CT head. He had episodes of bradycardia and junctional beats on CPAP, which is now resolved. On 6/24 he developed hypotension, LEONIDES likely 2nd over-diuresis which improved with volume resuscitation. [address], [facility] [address]

## 2022-06-21 NOTE — ED ADULT NURSE NOTE - NSIMPLEMENTINTERV_GEN_ALL_ED
Implemented All Fall Risk Interventions:  [place] to call system. Call bell, personal items and telephone within reach. Instruct patient to call for assistance. Room bathroom lighting operational. Non-slip footwear when patient is off stretcher. Physically safe environment: no spills, clutter or unnecessary equipment. Stretcher in lowest position, wheels locked, appropriate side rails in place. Provide visual cue, wrist band, yellow gown, etc. Monitor gait and stability. Monitor for mental status changes and reorient to person, place, and time. Review medications for side effects contributing to fall risk. Reinforce activity limits and safety measures with patient and family.

## 2022-06-21 NOTE — H&P ADULT - HISTORY OF PRESENT ILLNESS
89 y/o F with PMH HTN, TIA, A fib not on AC d/t falls, CKD, lymphoma in remission, CHF, hyponatremia, adrenal insufficiency, UTI presented with SOB. The pt reports that she was out with her daughter for lunch today and she got up to walk and had SOB with exertion. She states that these symptoms seem to have started after the last hospitalization 1 month ago. She does report coughing throughout the night productive of white phlegm. The cough only occurs at night. Reports sore throat, acid reflux, chills, congestion, wheezing, dizziness, and lower extremity swelling. The pt saw Dr. Blount in the office last week and stated that everything was normal at that time. No other current complaints. Denies fevers, chills, chest pain, SOB, abdominal pain, N/V, diarrhea/constipation.     Prior admission:   - 5/31/2022-6/5/2022: generalized weakness -> ICH -> no surgical intervention     ER course: //64, SpO2 93% on room air -> 99% on 2L nasal cannula. Labs: Hb 9.7, K+ 2.7, glucose 133, albumin 2.6, AST 73, BNP 2226. UA: occasional bacteria. COVID negative. EKG: NSR with HR 63 bpm, normal intervals, no ST segment changes, no T wave inversions (personally reviewed).     Imaging:   - CXR: loop recorder, fibrosis and scarring, no consolidation, small right pleural effusion, relatively unchanged from prior EKG (personally reviewed) -> official read: 1. Areas of probable scarring and fibrosis can be seen particularly in the left upper lobe and to a lesser extent in the right middle and lower lobe, unchanged from the previous study. 2. Mild pleural reaction on the right is better seen at this time. 3. Borderline heart size with loop recorder but no CHF. 4. Multilevel thoracolumbar vertebroplasty, unchanged.    Pt was given 40 mg PO KCl, 3 K-riders of 10 meq. The pt is being placed on telemetry observation for further management.

## 2022-06-21 NOTE — H&P ADULT - NSICDXFAMILYHX_GEN_ALL_CORE_FT
FAMILY HISTORY:  Father  Still living? Unknown  No known problems, Age at diagnosis: Age Unknown    Mother  Still living? Unknown  No known problems, Age at diagnosis: Age Unknown

## 2022-06-21 NOTE — H&P ADULT - NSHPSOCIALHISTORY_GEN_ALL_CORE
Lives at the [facility]. Independent with ADLs and IADLs. Dneies drug use. Social alcohol use. No current smoking.

## 2022-06-21 NOTE — PATIENT PROFILE ADULT - FALL HARM RISK - HARM RISK INTERVENTIONS

## 2022-06-21 NOTE — H&P ADULT - ASSESSMENT
89 y/o F presented with SOB with exertion     1. Acute hypoxic respiratory distress secondary to CHF exacerbation and right pleural effusion   - Admit to telemetry   - SpO2 93% on room air -> 99% on 2L nasal cannula  - c/w supplemental O2 to maintain SpO2 > 92%   - ECHO (5/31/22): limited study, trace pericardial effusion, pleural effusion   - BNP 2226, pt has 3+ pitting edema on exam with JVD on hepatojugular reflex   - Ordered repeat ECHO as pericardial and pleural effusion on prior with limited evaluation  - Start 40 mg IVP lasix daily (renal function may improve with diuresis)   - c/w home medications   - Ordered procalcitonin to r/o superimposed bacterial PNA and CT chest   - Strict I+Os, daily weights   - 2L H20 restriction, 2g sodium restriction      - Keep K > 4 and Mg > 2    - Lower extremity elevation and compression   - Cardiology consult - Dr. Blount     2. Hypertension   - //64, monitor closely   - c/w home anti-HTN agents   - Hydralazine for SBP > 160     3. Normocytic anemia   - Hb 9.7, monitor closely     4. Hypokalemia   - K+ 2.7, will repleted 40 mg PO KCl, 3 K-riders of 10 meq  - f/u K+ level 2 hours after repletion and AM K+ and Mg levels     5. Hyperglycemia   - Glucose 133, monitor closely   - Goal for hospitalization 140-180   - Ordered HbA1c     6. Elevated AST   - AST 73, trend     7. Asymptomatic bacteriuria  - UA: occasional bacteria -> likely contaminant   - f/u UCx, if positive will treat with antibiotics     8. Hypoalbuminemia   - Albumin 2.6   - Nutrition consult     9. History of HTN, TIA, A fib not on AC d/t falls, CKD, lymphoma in remission, CHF, hyponatremia, adrenal insufficiency, UTI  - c/w home medications; verified with facility list from [facility]     DVT ppx: Lovenox 40 mg subcutaneous daily   Code status: DNR/DNI (Completed MOLST form with the pt at the bedside. The patient is A+Ox3 at the time of my evaluation and has full capacity. Able to make an informed decision and understands risks associated with decisions made. MOLST completed and placed into chart).     *I was unable to reach the pt's daughter to provide her with an update. Please call pt's daughter/HCP Kate Partida with updates regarding the plan of care.

## 2022-06-21 NOTE — ED PROVIDER NOTE - CLINICAL SUMMARY MEDICAL DECISION MAKING FREE TEXT BOX
Pt with constant substernal CP worse on exertion, SOB, dizziness, lightheadedness. Family concerned, admit, r/o ACS. CT head, labs, XR, reassess.

## 2022-06-21 NOTE — ED ADULT NURSE NOTE - OBJECTIVE STATEMENT
pt. presents to ED co worsening SOB and intermittent cp. pt. not o2 dependent, 2L NC from intake room. spo2 98%. RA o2 95-96% but pt. feels more comfortable with oxygen on.   no other complaints upon assessment. pmh: HTN, hyponatremia. afebrile. no sick contact.

## 2022-06-21 NOTE — H&P ADULT - NSHPREVIEWOFSYSTEMS_GEN_ALL_CORE
Constitutional: negative for fatigue, negative for fever, negative for chills, negative for decreased appetite.  Skin: negative for rashes, negative for open wounds, negative for jaundice.   Eyes: negative for blurry vision, negative for double vision.   Ears, nose, throat: negative for ear pain, negative for nasal congestion, negative for sore throat, negative for lymph node swelling.   Cardiovascular: negative for chest pain, negative for palpitations, positive for lower extremity swelling.   Respiratory: positive for shortness of breath, positive for wheezing, positive for cough.   Gastrointestinal: negative for abdominal pain, negative for nausea, negative for vomiting, negative for diarrhea, negative for constipation, negative for blood in the stool, negative for black tarry stools.   Genitourinary: negative for burning on urination, negative for urinary urgency or frequency, negative for blood in the urine.   Endocrine: negative for cold intolerance, negative for heat intolerance, negative for increased thirst.   Hematologic: negative for easy bruising or bleeding.   Musculoskeletal: negative for muscle/joint pain, negative for decreased range of motion.   Neurological: positive for dizziness, negative for headaches, negative for loss of consciousness, negative for motor weakness, negative for sensory deficits.   Psychiatric: negative for depression, negative for anxiety.

## 2022-06-22 LAB
A1C WITH ESTIMATED AVERAGE GLUCOSE RESULT: 5.3 % — SIGNIFICANT CHANGE UP (ref 4–5.6)
ALBUMIN SERPL ELPH-MCNC: 2.2 G/DL — LOW (ref 3.3–5)
ALP SERPL-CCNC: 83 U/L — SIGNIFICANT CHANGE UP (ref 40–120)
ALT FLD-CCNC: 61 U/L — SIGNIFICANT CHANGE UP (ref 12–78)
ANION GAP SERPL CALC-SCNC: 4 MMOL/L — LOW (ref 5–17)
AST SERPL-CCNC: 59 U/L — HIGH (ref 15–37)
BASOPHILS # BLD AUTO: 0.01 K/UL — SIGNIFICANT CHANGE UP (ref 0–0.2)
BASOPHILS NFR BLD AUTO: 0.2 % — SIGNIFICANT CHANGE UP (ref 0–2)
BILIRUB SERPL-MCNC: 0.3 MG/DL — SIGNIFICANT CHANGE UP (ref 0.2–1.2)
BUN SERPL-MCNC: 19 MG/DL — SIGNIFICANT CHANGE UP (ref 7–23)
CALCIUM SERPL-MCNC: 8.8 MG/DL — SIGNIFICANT CHANGE UP (ref 8.5–10.1)
CERULOPLASMIN SERPL-MCNC: 27 MG/DL — SIGNIFICANT CHANGE UP (ref 16–45)
CHLORIDE SERPL-SCNC: 102 MMOL/L — SIGNIFICANT CHANGE UP (ref 96–108)
CO2 SERPL-SCNC: 34 MMOL/L — HIGH (ref 22–31)
CREAT SERPL-MCNC: 1.23 MG/DL — SIGNIFICANT CHANGE UP (ref 0.5–1.3)
CULTURE RESULTS: SIGNIFICANT CHANGE UP
EGFR: 40 ML/MIN/1.73M2 — LOW
EOSINOPHIL # BLD AUTO: 0 K/UL — SIGNIFICANT CHANGE UP (ref 0–0.5)
EOSINOPHIL NFR BLD AUTO: 0 % — SIGNIFICANT CHANGE UP (ref 0–6)
ESTIMATED AVERAGE GLUCOSE: 105 MG/DL — SIGNIFICANT CHANGE UP (ref 68–114)
FERRITIN SERPL-MCNC: 86 NG/ML — SIGNIFICANT CHANGE UP (ref 15–150)
GLUCOSE SERPL-MCNC: 87 MG/DL — SIGNIFICANT CHANGE UP (ref 70–99)
HCT VFR BLD CALC: 28.7 % — LOW (ref 34.5–45)
HGB BLD-MCNC: 8.9 G/DL — LOW (ref 11.5–15.5)
IMM GRANULOCYTES NFR BLD AUTO: 0.5 % — SIGNIFICANT CHANGE UP (ref 0–1.5)
IRON SATN MFR SERPL: 11 % — LOW (ref 14–50)
IRON SATN MFR SERPL: 29 UG/DL — LOW (ref 30–160)
LYMPHOCYTES # BLD AUTO: 1.26 K/UL — SIGNIFICANT CHANGE UP (ref 1–3.3)
LYMPHOCYTES # BLD AUTO: 30.6 % — SIGNIFICANT CHANGE UP (ref 13–44)
MAGNESIUM SERPL-MCNC: 2.2 MG/DL — SIGNIFICANT CHANGE UP (ref 1.6–2.6)
MCHC RBC-ENTMCNC: 30.1 PG — SIGNIFICANT CHANGE UP (ref 27–34)
MCHC RBC-ENTMCNC: 31 GM/DL — LOW (ref 32–36)
MCV RBC AUTO: 97 FL — SIGNIFICANT CHANGE UP (ref 80–100)
MONOCYTES # BLD AUTO: 0.37 K/UL — SIGNIFICANT CHANGE UP (ref 0–0.9)
MONOCYTES NFR BLD AUTO: 9 % — SIGNIFICANT CHANGE UP (ref 2–14)
NEUTROPHILS # BLD AUTO: 2.46 K/UL — SIGNIFICANT CHANGE UP (ref 1.8–7.4)
NEUTROPHILS NFR BLD AUTO: 59.7 % — SIGNIFICANT CHANGE UP (ref 43–77)
PLATELET # BLD AUTO: 145 K/UL — LOW (ref 150–400)
POTASSIUM SERPL-MCNC: 3.5 MMOL/L — SIGNIFICANT CHANGE UP (ref 3.5–5.3)
POTASSIUM SERPL-SCNC: 3.5 MMOL/L — SIGNIFICANT CHANGE UP (ref 3.5–5.3)
PROT SERPL-MCNC: 5.4 GM/DL — LOW (ref 6–8.3)
RBC # BLD: 2.96 M/UL — LOW (ref 3.8–5.2)
RBC # FLD: 14.2 % — SIGNIFICANT CHANGE UP (ref 10.3–14.5)
SODIUM SERPL-SCNC: 140 MMOL/L — SIGNIFICANT CHANGE UP (ref 135–145)
SPECIMEN SOURCE: SIGNIFICANT CHANGE UP
TIBC SERPL-MCNC: 263 UG/DL — SIGNIFICANT CHANGE UP (ref 220–430)
TRANSFERRIN SERPL-MCNC: 205 MG/DL — SIGNIFICANT CHANGE UP (ref 200–360)
UIBC SERPL-MCNC: 235 UG/DL — SIGNIFICANT CHANGE UP (ref 110–370)
WBC # BLD: 4.12 K/UL — SIGNIFICANT CHANGE UP (ref 3.8–10.5)
WBC # FLD AUTO: 4.12 K/UL — SIGNIFICANT CHANGE UP (ref 3.8–10.5)

## 2022-06-22 PROCEDURE — 93306 TTE W/DOPPLER COMPLETE: CPT | Mod: 26

## 2022-06-22 RX ORDER — HYDRALAZINE HCL 50 MG
50 TABLET ORAL THREE TIMES A DAY
Refills: 0 | Status: DISCONTINUED | OUTPATIENT
Start: 2022-06-22 | End: 2022-06-23

## 2022-06-22 RX ORDER — AZITHROMYCIN 500 MG/1
500 TABLET, FILM COATED ORAL EVERY 24 HOURS
Refills: 0 | Status: DISCONTINUED | OUTPATIENT
Start: 2022-06-22 | End: 2022-06-22

## 2022-06-22 RX ORDER — CEFTRIAXONE 500 MG/1
1000 INJECTION, POWDER, FOR SOLUTION INTRAMUSCULAR; INTRAVENOUS EVERY 24 HOURS
Refills: 0 | Status: DISCONTINUED | OUTPATIENT
Start: 2022-06-22 | End: 2022-06-22

## 2022-06-22 RX ORDER — CHLORHEXIDINE GLUCONATE 213 G/1000ML
1 SOLUTION TOPICAL
Refills: 0 | Status: DISCONTINUED | OUTPATIENT
Start: 2022-06-22 | End: 2022-06-26

## 2022-06-22 RX ORDER — FERROUS SULFATE 325(65) MG
325 TABLET ORAL DAILY
Refills: 0 | Status: DISCONTINUED | OUTPATIENT
Start: 2022-06-22 | End: 2022-06-26

## 2022-06-22 RX ADMIN — Medication 2000 UNIT(S): at 11:17

## 2022-06-22 RX ADMIN — ENOXAPARIN SODIUM 40 MILLIGRAM(S): 100 INJECTION SUBCUTANEOUS at 21:31

## 2022-06-22 RX ADMIN — CHLORHEXIDINE GLUCONATE 1 APPLICATION(S): 213 SOLUTION TOPICAL at 11:19

## 2022-06-22 RX ADMIN — Medication 10 MILLIGRAM(S): at 11:16

## 2022-06-22 RX ADMIN — Medication 50 MILLIGRAM(S): at 21:32

## 2022-06-22 RX ADMIN — FAMOTIDINE 20 MILLIGRAM(S): 10 INJECTION INTRAVENOUS at 11:17

## 2022-06-22 RX ADMIN — CARVEDILOL PHOSPHATE 12.5 MILLIGRAM(S): 80 CAPSULE, EXTENDED RELEASE ORAL at 11:17

## 2022-06-22 RX ADMIN — Medication 3 MILLIGRAM(S): at 23:32

## 2022-06-22 RX ADMIN — PREGABALIN 1000 MICROGRAM(S): 225 CAPSULE ORAL at 11:19

## 2022-06-22 RX ADMIN — Medication 40 MILLIGRAM(S): at 11:16

## 2022-06-22 RX ADMIN — CEFTRIAXONE 100 MILLIGRAM(S): 500 INJECTION, POWDER, FOR SOLUTION INTRAMUSCULAR; INTRAVENOUS at 11:19

## 2022-06-22 RX ADMIN — LOSARTAN POTASSIUM 50 MILLIGRAM(S): 100 TABLET, FILM COATED ORAL at 11:16

## 2022-06-22 RX ADMIN — AMIODARONE HYDROCHLORIDE 200 MILLIGRAM(S): 400 TABLET ORAL at 11:20

## 2022-06-22 RX ADMIN — AMLODIPINE BESYLATE 10 MILLIGRAM(S): 2.5 TABLET ORAL at 11:17

## 2022-06-22 RX ADMIN — Medication 5 MILLIGRAM(S): at 21:32

## 2022-06-22 RX ADMIN — FLUDROCORTISONE ACETATE 0.05 MILLIGRAM(S): 0.1 TABLET ORAL at 11:16

## 2022-06-22 RX ADMIN — AZITHROMYCIN 255 MILLIGRAM(S): 500 TABLET, FILM COATED ORAL at 11:19

## 2022-06-22 RX ADMIN — ATORVASTATIN CALCIUM 10 MILLIGRAM(S): 80 TABLET, FILM COATED ORAL at 21:32

## 2022-06-22 NOTE — DIETITIAN INITIAL EVALUATION ADULT - PERTINENT LABORATORY DATA
06-22    140  |  102  |  19  ----------------------------<  87  3.5   |  34<H>  |  1.23    Ca    8.8      22 Jun 2022 07:10  Mg     2.2     06-22    TPro  5.4<L>  /  Alb  2.2<L>  /  TBili  0.3  /  DBili  x   /  AST  59<H>  /  ALT  61  /  AlkPhos  83  06-22

## 2022-06-22 NOTE — DIETITIAN INITIAL EVALUATION ADULT - ADD RECOMMEND
1) liberalize diet to regular 2) ensure enlive TID 3) Monitor lytes and hydration replete prn 4) Maintain aspiration precautions, back of bed >35 degrees. 5) Monitor BM if none x > 3 days increase bowel meds 6) monitor weights track trends 7) MVI w/minerals daily to meet RDI's 8) Confirm goals of care regarding nutrition support

## 2022-06-22 NOTE — DIETITIAN INITIAL EVALUATION ADULT - WEIGHT (KG)
Department of Anesthesiology  Preprocedure Note       Name:  Deneen Romero   Age:  47 y.o.  :  1967                                          MRN:  [id number]         Date:  3/26/2021      Surgeon: Maria Isabel Paige):  Hardik Molina MD    Procedure: Procedure(s):  COLORECTAL CANCER SCREENING, NOT HIGH RISK    Medications prior to admission:   Prior to Admission medications    Medication Sig Start Date End Date Taking?  Authorizing Provider   levothyroxine (SYNTHROID) 88 MCG tablet TAKE ONE TABLET BY MOUTH EVERY DAY 21  Yes Sergio Tidwell MD   eszopiclone (LUNESTA) 3 MG TABS TAKE ONE TABLET BY MOUTH AT BEDTIME FOR 30 DAYS 20 Yes Sergio Tidwell MD   vitamin D (ERGOCALCIFEROL) 1.25 MG (59843 UT) CAPS capsule TAKE ONE CAPSULE BY MOUTH ONCE A WEEK 20  Yes Sergio Tidwell MD   estradiol (ESTRACE) 0.5 MG tablet TAKE ONE TABLET BY MOUTH EVERY DAY 19  Yes Historical Provider, MD   medroxyPROGESTERone (PROVERA) 2.5 MG tablet Take 2.5 mg by mouth daily   Yes Historical Provider, MD   Loratadine (CLARITIN) 10 MG CAPS Take  by mouth. prn    Yes Historical Provider, MD   Acetaminophen (TYLENOL PO) Take by mouth as needed    Historical Provider, MD   tiZANidine (ZANAFLEX) 4 MG tablet Take 1 tablet by mouth nightly as needed (pain)  Patient not taking: Reported on 3/19/2021 2/11/21   Sergio Tidwell MD   naproxen (NAPROSYN) 500 MG tablet Take 1 tablet by mouth 2 times daily for 7 days 19  Satnam Restrepo DO   levothyroxine (SYNTHROID) 100 MCG tablet TAKE ONE TABLET BY MOUTH EVERY DAY  Patient not taking: Reported on 2021   Sergio Tidwell MD       Current medications:    Current Facility-Administered Medications   Medication Dose Route Frequency Provider Last Rate Last Admin    0.9 % sodium chloride infusion   Intravenous Continuous Hardik Molina MD           Allergies:  No Known Allergies    Problem List:    Patient Active 56.2 Problem List   Diagnosis Code    Hypothyroidism, unspecified type E03.9    Lateral epicondylitis of right elbow M77.11    Depression with anxiety F41.8    Grief reaction F43.21       Past Medical History:        Diagnosis Date    Allergic rhinitis     Chronic back pain     Encounter for screening colonoscopy 3/26/2021    Endometriosis     Hypothyroidism     Insomnia        Past Surgical History:        Procedure Laterality Date    COLONOSCOPY  13    rectal bleeding--patient believes food poisoning    PELVIC LAPAROSCOPY         Social History:    Social History     Tobacco Use    Smoking status: Former Smoker     Quit date: 10/14/2012     Years since quittin.4    Smokeless tobacco: Never Used   Substance Use Topics    Alcohol use: Yes     Comment: socially                                Counseling given: Not Answered      Vital Signs (Current):   Vitals:    21 1029 21 0849   BP:  121/66   Pulse:  62   Resp:  16   Temp:  96.8 °F (36 °C)   TempSrc:  Temporal   SpO2:  96%   Weight: 125 lb (56.7 kg) 125 lb (56.7 kg)   Height: 5' 5\" (1.651 m) 5' 5\" (1.651 m)                                              BP Readings from Last 3 Encounters:   21 121/66   21 114/70   21 98/61       NPO Status: Time of last liquid consumption:                         Time of last solid consumption:                         Date of last liquid consumption: 21                        Date of last solid food consumption: 21    BMI:   Wt Readings from Last 3 Encounters:   21 125 lb (56.7 kg)   21 132 lb (59.9 kg)   21 132 lb 1.6 oz (59.9 kg)     Body mass index is 20.8 kg/m².     CBC:   Lab Results   Component Value Date    WBC 5.3 2021    RBC 4.44 2021    HGB 13.8 2021    HCT 41.8 2021    MCV 94.1 2021    RDW 13.1 2021     2021       CMP:   Lab Results   Component Value Date     2021    K 4.3 02/11/2021     02/11/2021    CO2 27 02/11/2021    BUN 10 02/11/2021    CREATININE 0.8 02/11/2021    GFRAA >60 02/11/2021    LABGLOM >60 02/11/2021    GLUCOSE 94 02/11/2021    GLUCOSE 90 02/08/2012    PROT 7.0 02/11/2021    CALCIUM 9.4 02/11/2021    BILITOT 0.2 02/11/2021    ALKPHOS 56 02/11/2021    AST 18 02/11/2021    ALT 11 02/11/2021       POC Tests: No results for input(s): POCGLU, POCNA, POCK, POCCL, POCBUN, POCHEMO, POCHCT in the last 72 hours. Coags:   Lab Results   Component Value Date    PROTIME 10.7 01/05/2011    INR 0.9 01/05/2011    APTT 26.8 01/05/2011       HCG (If Applicable):   Lab Results   Component Value Date    PREGTESTUR negative 12/20/2014        ABGs: No results found for: PHART, PO2ART, IWD2SHT, EAB3BWX, BEART, Z0HLVUFL     Type & Screen (If Applicable):  No results found for: LABABO, LABRH    Drug/Infectious Status (If Applicable):  No results found for: HIV, HEPCAB    COVID-19 Screening (If Applicable):   Lab Results   Component Value Date    COVID19 Not Detected 03/23/2021           Anesthesia Evaluation  Patient summary reviewed and Nursing notes reviewed no history of anesthetic complications:   Airway: Mallampati: I  TM distance: >3 FB   Neck ROM: full  Mouth opening: > = 3 FB Dental: normal exam         Pulmonary:Negative Pulmonary ROS breath sounds clear to auscultation                             Cardiovascular:Negative CV ROS  Exercise tolerance: good (>4 METS),           Rhythm: regular  Rate: normal                    Neuro/Psych:   (+) psychiatric history:depression/anxiety             GI/Hepatic/Renal:   (+) bowel prep,           Endo/Other:    (+) hypothyroidism::., .                 Abdominal:           Vascular:                                      Anesthesia Plan      MAC     ASA 2       Induction: intravenous. Anesthetic plan and risks discussed with patient.       Plan discussed with CRNA and surgical team.                  Pablito Duron MD   3/26/2021

## 2022-06-22 NOTE — DIETITIAN INITIAL EVALUATION ADULT - OTHER INFO
89 y/o F with PMH HTN, TIA, A fib not on AC d/t falls, CKD, lymphoma in remission, CHF, hyponatremia, adrenal insufficiency, UTI presented with SOB. The pt reports that she was out with her daughter for lunch today and she got up to walk and had SOB with exertion. Current therapeutic diet DASH/TLC is inappropriate 2/2 advancing age and poor nutritional status. NFPE- severe muscle/fat wasting. Will provide ensure shakes TID (in between meals) to optimize nutrition. Unable to obtain bed scale weight patient sitting in chair during assessment. Will continue to monitor and follow up prn. See below for recommendations. Criteria met for severe malnutrition.

## 2022-06-22 NOTE — DIETITIAN INITIAL EVALUATION ADULT - NSFNSGIASSESSMENTFT_GEN_A_CORE
no bm documented since admission x 1 day (polyethylene glycol 3350 17 Gram(s) Oral daily PRN Constipation, senna 2 Tablet(s) Oral daily PRN Constipation)

## 2022-06-22 NOTE — DIETITIAN INITIAL EVALUATION ADULT - PERTINENT MEDS FT
MEDICATIONS  (STANDING):  aMIOdarone    Tablet 200 milliGRAM(s) Oral daily  amLODIPine   Tablet 10 milliGRAM(s) Oral daily  atorvastatin 10 milliGRAM(s) Oral at bedtime  azithromycin  IVPB 500 milliGRAM(s) IV Intermittent every 24 hours  carvedilol 12.5 milliGRAM(s) Oral every 12 hours  cefTRIAXone   IVPB 1000 milliGRAM(s) IV Intermittent every 24 hours  chlorhexidine 4% Liquid 1 Application(s) Topical <User Schedule>  cholecalciferol 2000 Unit(s) Oral daily  cyanocobalamin 1000 MICROGram(s) Oral daily  enoxaparin Injectable 40 milliGRAM(s) SubCutaneous every 24 hours  famotidine    Tablet 20 milliGRAM(s) Oral daily  fludroCORTISONE 0.05 milliGRAM(s) Oral daily  furosemide   Injectable 40 milliGRAM(s) IV Push daily  hydrocortisone 10 milliGRAM(s) Oral daily  hydrocortisone 5 milliGRAM(s) Oral at bedtime  losartan 50 milliGRAM(s) Oral daily    MEDICATIONS  (PRN):  acetaminophen     Tablet .. 650 milliGRAM(s) Oral every 6 hours PRN Temp greater or equal to 38C (100.4F), Mild Pain (1 - 3)  aluminum hydroxide/magnesium hydroxide/simethicone Suspension 30 milliLiter(s) Oral every 4 hours PRN Dyspepsia  bismuth subsalicylate Liquid 15 milliLiter(s) Oral four times a day PRN Diarrhea  hydrALAZINE Injectable 10 milliGRAM(s) IV Push every 6 hours PRN SBP  loratadine 10 milliGRAM(s) Oral daily PRN allergies  melatonin 3 milliGRAM(s) Oral at bedtime PRN Insomnia  ondansetron Injectable 4 milliGRAM(s) IV Push every 8 hours PRN Nausea and/or Vomiting  polyethylene glycol 3350 17 Gram(s) Oral daily PRN Constipation  senna 2 Tablet(s) Oral daily PRN Constipation

## 2022-06-22 NOTE — PROGRESS NOTE ADULT - SUBJECTIVE AND OBJECTIVE BOX
Cheif complaints and Diagnosis: CHF / HTN/ anemia    Subjective: no complaints      REVIEW OF SYSTEMS:    CONSTITUTIONAL: No weakness, fevers or chills  EYES/ENT: No visual changes;  No vertigo or throat pain   NECK: No pain or stiffness  RESPIRATORY: No cough, wheezing, hemoptysis; No shortness of breath  CARDIOVASCULAR: No chest pain or palpitations  GASTROINTESTINAL: No abdominal or epigastric pain. No nausea, vomiting, or hematemesis; No diarrhea or constipation. No melena or hematochezia.  GENITOURINARY: No dysuria, frequency or hematuria  NEUROLOGICAL: No numbness or weakness  SKIN: No itching, burning, rashes, or lesions   All other review of systems is negative unless indicated above      Vital Signs Last 24 Hrs  T(C): 36.6 (2022 07:44), Max: 36.6 (2022 07:44)  T(F): 97.8 (2022 07:44), Max: 97.8 (2022 07:44)  HR: 69 (2022 07:44) (66 - 69)  BP: 167/51 (2022 07:44) (162/67 - 173/44)  BP(mean): --  RR: 18 (2022 07:44) (18 - 18)  SpO2: 100% (2022 07:44) (99% - 100%)    HEENT:   pupils equal and reactive, EOMI, no oropharyngeal lesions, erythema, exudates, oral thrush    NECK:   supple, no carotid bruits, no palpable lymph nodes, no thyromegaly    CV:  +S1, +S2, regular, no murmurs or rubs    RESP:   lungs clear to auscultation bilaterally, no wheezing, rales, rhonchi, good air entry bilaterally    BREAST:  not examined    GI:  abdomen soft, non-tender, non-distended, normal BS, no bruits, no abdominal masses, no palpable masses    RECTAL:  not examined    :  not examined    MSK:   normal muscle tone, no atrophy, no rigidity, no contractions    EXT:   no clubbing, no cyanosis, no edema, no calf pain, swelling or erythema    VASCULAR:  pulses equal and symmetric in the upper and lower extremities    NEURO:  AAOX3, no focal neurological deficits, follows all commands, able to move extremities spontaneously    SKIN:  no ulcers, lesions or rashes    MEDICATIONS  (STANDING):  aMIOdarone    Tablet 200 milliGRAM(s) Oral daily  amLODIPine   Tablet 10 milliGRAM(s) Oral daily  atorvastatin 10 milliGRAM(s) Oral at bedtime  carvedilol 12.5 milliGRAM(s) Oral every 12 hours  chlorhexidine 4% Liquid 1 Application(s) Topical <User Schedule>  cholecalciferol 2000 Unit(s) Oral daily  cyanocobalamin 1000 MICROGram(s) Oral daily  enoxaparin Injectable 40 milliGRAM(s) SubCutaneous every 24 hours  famotidine    Tablet 20 milliGRAM(s) Oral daily  fludroCORTISONE 0.05 milliGRAM(s) Oral daily  furosemide   Injectable 40 milliGRAM(s) IV Push daily  hydrALAZINE 50 milliGRAM(s) Oral three times a day  hydrocortisone 10 milliGRAM(s) Oral daily  hydrocortisone 5 milliGRAM(s) Oral at bedtime  losartan 50 milliGRAM(s) Oral daily    MEDICATIONS  (PRN):  acetaminophen     Tablet .. 650 milliGRAM(s) Oral every 6 hours PRN Temp greater or equal to 38C (100.4F), Mild Pain (1 - 3)  aluminum hydroxide/magnesium hydroxide/simethicone Suspension 30 milliLiter(s) Oral every 4 hours PRN Dyspepsia  bismuth subsalicylate Liquid 15 milliLiter(s) Oral four times a day PRN Diarrhea  hydrALAZINE Injectable 10 milliGRAM(s) IV Push every 6 hours PRN SBP  loratadine 10 milliGRAM(s) Oral daily PRN allergies  melatonin 3 milliGRAM(s) Oral at bedtime PRN Insomnia  ondansetron Injectable 4 milliGRAM(s) IV Push every 8 hours PRN Nausea and/or Vomiting  polyethylene glycol 3350 17 Gram(s) Oral daily PRN Constipation  senna 2 Tablet(s) Oral daily PRN Constipation      Urinalysis Basic - ( 2022 17:58 )    Color: Yellow / Appearance: Clear / S.010 / pH: x  Gluc: x / Ketone: Negative  / Bili: Negative / Urobili: Negative   Blood: x / Protein: 100 / Nitrite: Negative   Leuk Esterase: Negative / RBC: 0-2 /HPF / WBC 0-2   Sq Epi: x / Non Sq Epi: Occasional / Bacteria: Occasional    2022 07:10    140    |  102    |  19     ----------------------------<  87     3.5     |  34     |  1.23     Ca    8.8        2022 07:10  Mg     2.2       2022 07:10    TPro  5.4    /  Alb  2.2    /  TBili  0.3    /  DBili  x      /  AST  59     /  ALT  61     /  AlkPhos  83     2022 07:10  LIVER FUNCTIONS - ( 2022 07:10 )  Alb: 2.2 g/dL / Pro: 5.4 gm/dL / ALK PHOS: 83 U/L / ALT: 61 U/L / AST: 59 U/L / GGT: x         PT/INR - ( 2022 16:57 )   PT: 10.7 sec;   INR: 0.92 ratio         PTT - ( 2022 16:57 )  PTT:26.4 secCBC Full  -  ( 2022 07:10 )  WBC Count : 4.12 K/uL  Hemoglobin : 8.9 g/dL  Hematocrit : 28.7 %  Platelet Count - Automated : 145 K/uL  Mean Cell Volume : 97.0 fl  Mean Cell Hemoglobin : 30.1 pg  Mean Cell Hemoglobin Concentration : 31.0 gm/dL  Auto Neutrophil # : 2.46 K/uL  Auto Lymphocyte # : 1.26 K/uL  Auto Monocyte # : 0.37 K/uL  Auto Eosinophil # : 0.00 K/uL  Auto Basophil # : 0.01 K/uL  Auto Neutrophil % : 59.7 %  Auto Lymphocyte % : 30.6 %  Auto Monocyte % : 9.0 %  Auto Eosinophil % : 0.0 %  Auto Basophil % : 0.2 %        Blood, Urine: Negative ( @ 17:58)      PT/INR - ( 2022 16:57 )   PT: 10.7 sec;   INR: 0.92 ratio         PTT - ( 2022 16:57 )  PTT:26.4 sec          Assessment and Plan:        	  89 y/o F with PMH HTN, TIA, A fib not on AC d/t falls, CKD, lymphoma in remission, CHF, hyponatremia, adrenal insufficiency, UTI presented with SOB.         1. Acute hypoxic respiratory distress secondary to CHF exacerbation and right pleural effusion   -cardio appreciated Dr. Blount  -c/w IV lasix 40 daily  -c/w carvedilol    2. Hypertension   - //64, monitor closely   - c/w home anti-HTN agents   - Hydralazine for SBP > 160   -patient admits to eating high salt diet ; she is educated     3. Normocytic anemia  vs possible iron def anemia  -blood work suspicous for iron def anemia: ferritin low end, tibc on high end, low serum iron.   -start PO ferrous sulfate  - Hb 9.7, monitor closely     4. Hypokalemia   - K+ 2.7, will repleted 40 mg PO KCl, 3 K-riders of 10 meq  - f/u K+ level 2 hours after repletion and AM K+ and Mg levels     5. Hyperglycemia   - Glucose 133, monitor closely   - Goal for hospitalization 140-180   - Ordered HbA1c     6. Elevated AST   - AST 73, trend     7. Asymptomatic bacteriuria  - UA: occasional bacteria -> likely contaminant   - f/u UCx, if positive will treat with antibiotics     8. Hypoalbuminemia   - Albumin 2.6   - Nutrition consult     9. History of HTN, TIA, A fib not on AC d/t falls, CKD, lymphoma in remission, CHF, hyponatremia, adrenal insufficiency, UTI  - c/w home medications; verified with facility list from [facility]     DVT ppx: Lovenox 40 mg subcutaneous daily   Code status: DNR/DNI

## 2022-06-22 NOTE — CONSULT NOTE ADULT - SUBJECTIVE AND OBJECTIVE BOX
Cardiology Consultation    HPI: 89 y/o F with PMH HTN, TIA, A fib not on AC d/t falls, CKD, lymphoma in remission, CHF, hyponatremia, adrenal insufficiency, UTI presented with SOB. The pt reports that she was out with her daughter for lunch today and she got up to walk and had SOB with exertion. She states that these symptoms seem to have started after the last hospitalization 1 month ago. She does report coughing throughout the night productive of white phlegm. The cough only occurs at night. Reports sore throat, acid reflux, chills, congestion, wheezing, dizziness, and lower extremity swelling. The pt saw Dr. Blount in the office last week and stated that everything was normal at that time. No other current complaints. Denies fevers, chills, chest pain, SOB, abdominal pain, N/V, diarrhea/constipation.   Prior admission:   - 2022-2022: generalized weakness -> ICH -> no surgical intervention   ER course: //64, SpO2 93% on room air -> 99% on 2L nasal cannula. Labs: Hb 9.7, K+ 2.7, glucose 133, albumin 2.6, AST 73, BNP 2226. UA: occasional bacteria. COVID negative. EKG: NSR with HR 63 bpm, normal intervals, no ST segment changes, no T wave inversions (personally reviewed).   Imaging:   - CXR: loop recorder, fibrosis and scarring, no consolidation, small right pleural effusion, relatively unchanged from prior EKG (personally reviewed) -> official read: 1. Areas of probable scarring and fibrosis can be seen particularly in the left upper lobe and to a lesser extent in the right middle and lower lobe, unchanged from the previous study. 2. Mild pleural reaction on the right is better seen at this time. 3. Borderline heart size with loop recorder but no CHF. 4. Multilevel thoracolumbar vertebroplasty, unchanged.  Pt was given 40 mg PO KCl, 3 K-riders of 10 meq. The pt is being placed on telemetry observation for further management.  (2022 20:54)        PAST MEDICAL & SURGICAL HISTORY:  Iron deficiency  HTN (hypertension)  Lymphoma  H/O adrenal insufficiency  Hyponatremia  Hypokalemia  Chronic kidney disease, unspecified CKD stage  Diastolic heart failure  Paroxysmal atrial fibrillation  S/P appendectomy  S/P hysterectomy  H/O intracranial hemorrhage    Allergies  penicillin (Hives)  sulfa drugs (Hives)  tetracycline (Hives)    SOCIAL HISTORY: Denies tobacco, etoh abuse or illicit drug use    FAMILY HISTORY:  No known problems (Father, Mother)    MEDICATIONS  (STANDING):  aMIOdarone    Tablet 200 milliGRAM(s) Oral daily  amLODIPine   Tablet 10 milliGRAM(s) Oral daily  atorvastatin 10 milliGRAM(s) Oral at bedtime  azithromycin  IVPB 500 milliGRAM(s) IV Intermittent every 24 hours  carvedilol 12.5 milliGRAM(s) Oral every 12 hours  cefTRIAXone   IVPB 1000 milliGRAM(s) IV Intermittent every 24 hours  cholecalciferol 2000 Unit(s) Oral daily  cyanocobalamin 1000 MICROGram(s) Oral daily  enoxaparin Injectable 40 milliGRAM(s) SubCutaneous every 24 hours  famotidine    Tablet 20 milliGRAM(s) Oral daily  fludroCORTISONE 0.05 milliGRAM(s) Oral daily  furosemide   Injectable 40 milliGRAM(s) IV Push daily  hydrocortisone 10 milliGRAM(s) Oral daily  hydrocortisone 5 milliGRAM(s) Oral at bedtime  losartan 50 milliGRAM(s) Oral daily    MEDICATIONS  (PRN):  acetaminophen     Tablet .. 650 milliGRAM(s) Oral every 6 hours PRN Temp greater or equal to 38C (100.4F), Mild Pain (1 - 3)  aluminum hydroxide/magnesium hydroxide/simethicone Suspension 30 milliLiter(s) Oral every 4 hours PRN Dyspepsia  bismuth subsalicylate Liquid 15 milliLiter(s) Oral four times a day PRN Diarrhea  hydrALAZINE Injectable 10 milliGRAM(s) IV Push every 6 hours PRN SBP  loratadine 10 milliGRAM(s) Oral daily PRN allergies  melatonin 3 milliGRAM(s) Oral at bedtime PRN Insomnia  ondansetron Injectable 4 milliGRAM(s) IV Push every 8 hours PRN Nausea and/or Vomiting  polyethylene glycol 3350 17 Gram(s) Oral daily PRN Constipation  senna 2 Tablet(s) Oral daily PRN Constipation    Vital Signs Last 24 Hrs  T(C): 36.6 (2022 07:44), Max: 36.6 (2022 07:44)  T(F): 97.8 (2022 07:44), Max: 97.8 (2022 07:44)  HR: 69 (2022 07:44) (64 - 69)  BP: 167/51 (2022 07:44) (156/50 - 173/44)  BP(mean): 82 (2022 15:47) (82 - 82)  RR: 18 (2022 07:44) (17 - 18)  SpO2: 100% (2022 07:44) (93% - 100%)    REVIEW OF SYSTEMS:    CONSTITUTIONAL:  As per HPI.  HEENT:  Eyes:  No diplopia or blurred vision. ENT:  No earache, sore throat or runny nose.  CARDIOVASCULAR:  No pressure, squeezing, strangling, tightness, heaviness or aching about the chest, neck, axilla or epigastrium.  RESPIRATORY:  +SOB.  GASTROINTESTINAL:  No nausea, vomiting or diarrhea.  GENITOURINARY:  No dysuria, frequency or urgency.  MUSCULOSKELETAL:  As per HPI.  SKIN:  No change in skin, hair or nails. +edema b/l.  NEUROLOGIC:  No paresthesias, fasciculations, seizures or weakness.       PHYSICAL EXAMINATION:    GENERAL APPEARANCE:  Pt. is not currently dyspneic, in no distress. Pt. is alert, oriented, and pleasant.  HEENT:  Pupils are normal and react normally. No icterus. Mucous membranes well colored.  NECK:  Supple. No lymphadenopathy. Jugular venous pressure not elevated. Carotids equal.   HEART:   The cardiac impulse has a normal quality. There are no murmurs, rubs or gallops noted  CHEST:  decreased BS b/l. crackles scattered.   ABDOMEN:  Soft and nontender.   EXTREMITIES:  2-3+ LE pitting edema.    LABS:                        9.7    5.11  )-----------( 157      ( 2022 16:57 )             30.0     06-21    x   |  x   |  x   ----------------------------<  x   3.2<L>   |  x   |  x     Ca    8.8      2022 16:57  Mg     2.2     06-21    TPro  6.1  /  Alb  2.6<L>  /  TBili  0.4  /  DBili  x   /  AST  73<H>  /  ALT  71  /  AlkPhos  94  06-21    LIVER FUNCTIONS - ( 2022 16:57 )  Alb: 2.6 g/dL / Pro: 6.1 gm/dL / ALK PHOS: 94 U/L / ALT: 71 U/L / AST: 73 U/L / GGT: x           PT/INR - ( 2022 16:57 )   PT: 10.7 sec;   INR: 0.92 ratio       PTT - ( 2022 16:57 )  PTT:26.4 sec    Urinalysis Basic - ( 2022 17:58 )    Color: Yellow / Appearance: Clear / S.010 / pH: x  Gluc: x / Ketone: Negative  / Bili: Negative / Urobili: Negative   Blood: x / Protein: 100 / Nitrite: Negative   Leuk Esterase: Negative / RBC: 0-2 /HPF / WBC 0-2   Sq Epi: x / Non Sq Epi: Occasional / Bacteria: Occasional    EKG: < from: 12 Lead ECG (22 @ 09:58) >   Normal sinus rhythm  Possible Inferior infarct , age undetermined  Anteroseptal infarct (cited on or before 2018)  Abnormal ECG    TELEMETRY: SR     CARDIAC TESTS: `< from: Transthoracic Echocardiogram Follow Up (22 @ 10:46) >  Limited study to assess left ventricular function.   The left ventricle is normal in size, wall motion, and contractility as   seen in limited views.   Trace pericardial effusion is present.   Pleural effusion - is present.    < end of copied text >  < from: TTE Echo Complete w/o Contrast w/ Doppler (22 @ 19:09) >   Moderate concentric left ventricular hypertrophy is present.   Basal septal hypertrophy is seen measuring 1.7cm.   Estimated left ventricular ejection fraction is 55-60 %.   Normal appearing left atrium.   Mild aortic sclerosis is present with normal valvular opening.   Mild (1+) to moderate aortic regurgitation is present pressure half time   is 215cm/s2.   Mild mitral annular calcification is present.   The mitral valve leaflets appear thickened.   Mild (1+) mitral regurgitation is present.   EA reversal of the mitral inflow consistent with reduced compliance of   the   left ventricle.   The tricuspid valve leaflets appear mildly thickened and/or calcified,   but   open well.   Mild (1+) tricuspid valve regurgitation is present.   Normal appearing pulmonic valve structure.   Trace pulmonic valvular regurgitation is present.    RADIOLOGY & ADDITIONAL STUDIES: `< from: CT Chest No Cont (22 @ 20:36) >  IMPRESSION:  Patchy biapical opacities with small pleural effusions are new from the   prior study are new from the prior study and are indeterminate.   Differential diagnosis includes infectious/inflammatory etiology and   fluid overload.    < end of copied text >  < from: MR Head w/wo IV Cont (22 @ 10:17) >  IMPRESSION:    Small focus of hemorrhage within the left medial occipital lobe measuring   5 mm demonstrating internal T2 hyperintensity with a T2 hypointense rim.   This finding may reflect a small hypertensive hemorrhage versus   hemorrhage into a cavernoma, and hemorrhage into a metastatic lesion   cannot be entirely excluded. Short-term follow-up MRI brain with IV   contrast in 4-6 weeks may be helpful to ensure resolution.    ASSESSMENT & PLAN:

## 2022-06-22 NOTE — CONSULT NOTE ADULT - ASSESSMENT
A/P:  89 y/o F with PMH HTN, TIA, A fib not on AC d/t falls, CKD, lymphoma in remission, CHF, hyponatremia, adrenal insufficiency,   UTI presented with SOB.    1. SOB. Acute on chronic diastolic HF exacerbation. Recent echo 5/22 with normal LV fxn.   Cont IV diuresis- follow CR closely- 1.27 now. Strict I/Os, daily wts.   Cont outpt medical therapy- will need tight BP control.   Repeat CXR in next 24-48 hrs.     2. Hypertension. Now on coreg 12.5 BID- will hold on increasing with HR in 60s.   Will hold on increasing losartan with Cr 1.27 and diuresis.   Will start hydralazine 25mg BID and titrate as needed.     3. PAF. Now in SR. Cont rhythm control with amiodarone. No a/c at present likely due to age/fall risk.    4. TIA. Cont conservative medical mgmt.    5. DVT proph.

## 2022-06-22 NOTE — DIETITIAN NUTRITION RISK NOTIFICATION - ADDITIONAL COMMENTS/DIETITIAN RECOMMENDATIONS
Additional Recommendations  1) liberalize diet to regular 2) ensure enlive TID 3) Monitor lytes and hydration replete prn 4) Maintain aspiration precautions, back of bed >35 degrees. 5) Monitor BM if none x > 3 days increase bowel meds 6) monitor weights track trends 7) MVI w/minerals daily to meet RDI's 8) Confirm goals of care regarding nutrition support

## 2022-06-23 DIAGNOSIS — R07.9 CHEST PAIN, UNSPECIFIED: ICD-10-CM

## 2022-06-23 LAB
ADD ON TEST-SPECIMEN IN LAB: SIGNIFICANT CHANGE UP
ANION GAP SERPL CALC-SCNC: 4 MMOL/L — LOW (ref 5–17)
BUN SERPL-MCNC: 18 MG/DL — SIGNIFICANT CHANGE UP (ref 7–23)
CALCIUM SERPL-MCNC: 8.9 MG/DL — SIGNIFICANT CHANGE UP (ref 8.5–10.1)
CHLORIDE SERPL-SCNC: 100 MMOL/L — SIGNIFICANT CHANGE UP (ref 96–108)
CO2 SERPL-SCNC: 34 MMOL/L — HIGH (ref 22–31)
CREAT SERPL-MCNC: 1.06 MG/DL — SIGNIFICANT CHANGE UP (ref 0.5–1.3)
EGFR: 48 ML/MIN/1.73M2 — LOW
GLUCOSE SERPL-MCNC: 89 MG/DL — SIGNIFICANT CHANGE UP (ref 70–99)
POTASSIUM SERPL-MCNC: 2.9 MMOL/L — CRITICAL LOW (ref 3.5–5.3)
POTASSIUM SERPL-SCNC: 2.9 MMOL/L — CRITICAL LOW (ref 3.5–5.3)
SODIUM SERPL-SCNC: 138 MMOL/L — SIGNIFICANT CHANGE UP (ref 135–145)

## 2022-06-23 PROCEDURE — 87635 SARS-COV-2 COVID-19 AMP PRB: CPT

## 2022-06-23 PROCEDURE — 99233 SBSQ HOSP IP/OBS HIGH 50: CPT

## 2022-06-23 PROCEDURE — 36415 COLL VENOUS BLD VENIPUNCTURE: CPT

## 2022-06-23 PROCEDURE — 97530 THERAPEUTIC ACTIVITIES: CPT | Mod: GP

## 2022-06-23 PROCEDURE — 97116 GAIT TRAINING THERAPY: CPT | Mod: GP

## 2022-06-23 PROCEDURE — 80048 BASIC METABOLIC PNL TOTAL CA: CPT

## 2022-06-23 PROCEDURE — 93971 EXTREMITY STUDY: CPT | Mod: 26,RT

## 2022-06-23 PROCEDURE — 83735 ASSAY OF MAGNESIUM: CPT

## 2022-06-23 PROCEDURE — 93971 EXTREMITY STUDY: CPT | Mod: RT

## 2022-06-23 RX ORDER — FUROSEMIDE 40 MG
40 TABLET ORAL
Refills: 0 | Status: COMPLETED | OUTPATIENT
Start: 2022-06-23 | End: 2022-06-24

## 2022-06-23 RX ORDER — PREGABALIN 225 MG/1
1 CAPSULE ORAL
Qty: 0 | Refills: 0 | DISCHARGE

## 2022-06-23 RX ORDER — POTASSIUM CHLORIDE 20 MEQ
40 PACKET (EA) ORAL EVERY 4 HOURS
Refills: 0 | Status: COMPLETED | OUTPATIENT
Start: 2022-06-23 | End: 2022-06-23

## 2022-06-23 RX ORDER — HYDRALAZINE HCL 50 MG
100 TABLET ORAL THREE TIMES A DAY
Refills: 0 | Status: DISCONTINUED | OUTPATIENT
Start: 2022-06-23 | End: 2022-06-26

## 2022-06-23 RX ADMIN — Medication 3 MILLIGRAM(S): at 22:56

## 2022-06-23 RX ADMIN — FLUDROCORTISONE ACETATE 0.05 MILLIGRAM(S): 0.1 TABLET ORAL at 10:36

## 2022-06-23 RX ADMIN — ENOXAPARIN SODIUM 40 MILLIGRAM(S): 100 INJECTION SUBCUTANEOUS at 22:56

## 2022-06-23 RX ADMIN — Medication 650 MILLIGRAM(S): at 19:57

## 2022-06-23 RX ADMIN — CHLORHEXIDINE GLUCONATE 1 APPLICATION(S): 213 SOLUTION TOPICAL at 10:37

## 2022-06-23 RX ADMIN — Medication 40 MILLIEQUIVALENT(S): at 11:04

## 2022-06-23 RX ADMIN — Medication 2000 UNIT(S): at 10:35

## 2022-06-23 RX ADMIN — CARVEDILOL PHOSPHATE 12.5 MILLIGRAM(S): 80 CAPSULE, EXTENDED RELEASE ORAL at 10:36

## 2022-06-23 RX ADMIN — Medication 40 MILLIGRAM(S): at 10:35

## 2022-06-23 RX ADMIN — Medication 650 MILLIGRAM(S): at 20:30

## 2022-06-23 RX ADMIN — ATORVASTATIN CALCIUM 10 MILLIGRAM(S): 80 TABLET, FILM COATED ORAL at 22:56

## 2022-06-23 RX ADMIN — Medication 10 MILLIGRAM(S): at 10:36

## 2022-06-23 RX ADMIN — LOSARTAN POTASSIUM 50 MILLIGRAM(S): 100 TABLET, FILM COATED ORAL at 10:36

## 2022-06-23 RX ADMIN — Medication 5 MILLIGRAM(S): at 23:36

## 2022-06-23 RX ADMIN — PREGABALIN 1000 MICROGRAM(S): 225 CAPSULE ORAL at 11:37

## 2022-06-23 RX ADMIN — AMLODIPINE BESYLATE 10 MILLIGRAM(S): 2.5 TABLET ORAL at 10:35

## 2022-06-23 RX ADMIN — Medication 325 MILLIGRAM(S): at 10:36

## 2022-06-23 RX ADMIN — Medication 40 MILLIEQUIVALENT(S): at 15:09

## 2022-06-23 RX ADMIN — AMIODARONE HYDROCHLORIDE 200 MILLIGRAM(S): 400 TABLET ORAL at 10:36

## 2022-06-23 RX ADMIN — FAMOTIDINE 20 MILLIGRAM(S): 10 INJECTION INTRAVENOUS at 10:36

## 2022-06-23 RX ADMIN — Medication 100 MILLIGRAM(S): at 15:09

## 2022-06-23 RX ADMIN — Medication 40 MILLIGRAM(S): at 17:08

## 2022-06-23 RX ADMIN — Medication 50 MILLIGRAM(S): at 06:31

## 2022-06-23 NOTE — PROGRESS NOTE ADULT - SUBJECTIVE AND OBJECTIVE BOX
Cheif complaints and Diagnosis: resp failure/ CHF/ right effusion    Subjective:       REVIEW OF SYSTEMS:    CONSTITUTIONAL: No weakness, fevers or chills  EYES/ENT: No visual changes;  No vertigo or throat pain   NECK: No pain or stiffness  RESPIRATORY: No cough, wheezing, hemoptysis; No shortness of breath  CARDIOVASCULAR: No chest pain or palpitations  GASTROINTESTINAL: No abdominal or epigastric pain. No nausea, vomiting, or hematemesis; No diarrhea or constipation. No melena or hematochezia.  GENITOURINARY: No dysuria, frequency or hematuria  NEUROLOGICAL: No numbness or weakness  SKIN: No itching, burning, rashes, or lesions   All other review of systems is negative unless indicated above      Vital Signs Last 24 Hrs  T(C): 36.6 (2022 07:52), Max: 36.9 (2022 19:05)  T(F): 97.8 (2022 07:52), Max: 98.4 (2022 19:05)  HR: 76 (2022 07:52) (69 - 76)  BP: 179/47 (2022 07:52) (166/45 - 179/47)  BP(mean): 82 (2022 07:52) (82 - 82)  RR: 18 (2022 07:52) (18 - 18)  SpO2: 90% (2022 07:52) (90% - 98%)    HEENT:   pupils equal and reactive, EOMI, no oropharyngeal lesions, erythema, exudates, oral thrush    NECK:   supple, no carotid bruits, no palpable lymph nodes, no thyromegaly    CV:  +S1, +S2, regular, no murmurs or rubs    RESP:   lungs clear to auscultation bilaterally, no wheezing, rales, rhonchi, good air entry bilaterally    BREAST:  not examined    GI:  abdomen soft, non-tender, non-distended, normal BS, no bruits, no abdominal masses, no palpable masses    RECTAL:  not examined    :  not examined    MSK:   normal muscle tone, no atrophy, no rigidity, no contractions    EXT:   no clubbing, no cyanosis, no edema, no calf pain, swelling or erythema    VASCULAR:  pulses equal and symmetric in the upper and lower extremities    NEURO:  AAOX3, no focal neurological deficits, follows all commands, able to move extremities spontaneously    SKIN:  no ulcers, lesions or rashes    MEDICATIONS  (STANDING):  aMIOdarone    Tablet 200 milliGRAM(s) Oral daily  amLODIPine   Tablet 10 milliGRAM(s) Oral daily  atorvastatin 10 milliGRAM(s) Oral at bedtime  carvedilol 12.5 milliGRAM(s) Oral every 12 hours  chlorhexidine 4% Liquid 1 Application(s) Topical <User Schedule>  cholecalciferol 2000 Unit(s) Oral daily  cyanocobalamin 1000 MICROGram(s) Oral daily  enoxaparin Injectable 40 milliGRAM(s) SubCutaneous every 24 hours  famotidine    Tablet 20 milliGRAM(s) Oral daily  ferrous    sulfate 325 milliGRAM(s) Oral daily  fludroCORTISONE 0.05 milliGRAM(s) Oral daily  furosemide   Injectable 40 milliGRAM(s) IV Push daily  hydrALAZINE 50 milliGRAM(s) Oral three times a day  hydrocortisone 10 milliGRAM(s) Oral daily  hydrocortisone 5 milliGRAM(s) Oral at bedtime  losartan 50 milliGRAM(s) Oral daily  potassium chloride    Tablet ER 40 milliEquivalent(s) Oral every 4 hours    MEDICATIONS  (PRN):  acetaminophen     Tablet .. 650 milliGRAM(s) Oral every 6 hours PRN Temp greater or equal to 38C (100.4F), Mild Pain (1 - 3)  aluminum hydroxide/magnesium hydroxide/simethicone Suspension 30 milliLiter(s) Oral every 4 hours PRN Dyspepsia  bismuth subsalicylate Liquid 15 milliLiter(s) Oral four times a day PRN Diarrhea  hydrALAZINE Injectable 10 milliGRAM(s) IV Push every 6 hours PRN SBP  loratadine 10 milliGRAM(s) Oral daily PRN allergies  melatonin 3 milliGRAM(s) Oral at bedtime PRN Insomnia  ondansetron Injectable 4 milliGRAM(s) IV Push every 8 hours PRN Nausea and/or Vomiting  polyethylene glycol 3350 17 Gram(s) Oral daily PRN Constipation  senna 2 Tablet(s) Oral daily PRN Constipation      Urinalysis Basic - ( 2022 17:58 )    Color: Yellow / Appearance: Clear / S.010 / pH: x  Gluc: x / Ketone: Negative  / Bili: Negative / Urobili: Negative   Blood: x / Protein: 100 / Nitrite: Negative   Leuk Esterase: Negative / RBC: 0-2 /HPF / WBC 0-2   Sq Epi: x / Non Sq Epi: Occasional / Bacteria: Occasional    2022 07:24    138    |  100    |  18     ----------------------------<  89     2.9     |  34     |  1.06     Ca    8.9        2022 07:24  Mg     2.1       2022 07:24    TPro  5.4    /  Alb  2.2    /  TBili  0.3    /  DBili  x      /  AST  59     /  ALT  61     /  AlkPhos  83     2022 07:10  LIVER FUNCTIONS - ( 2022 07:10 )  Alb: 2.2 g/dL / Pro: 5.4 gm/dL / ALK PHOS: 83 U/L / ALT: 61 U/L / AST: 59 U/L / GGT: x         PT/INR - ( 2022 16:57 )   PT: 10.7 sec;   INR: 0.92 ratio         PTT - ( 2022 16:57 )  PTT:26.4 secCBC Full  -  ( 2022 07:10 )  WBC Count : 4.12 K/uL  Hemoglobin : 8.9 g/dL  Hematocrit : 28.7 %  Platelet Count - Automated : 145 K/uL  Mean Cell Volume : 97.0 fl  Mean Cell Hemoglobin : 30.1 pg  Mean Cell Hemoglobin Concentration : 31.0 gm/dL  Auto Neutrophil # : 2.46 K/uL  Auto Lymphocyte # : 1.26 K/uL  Auto Monocyte # : 0.37 K/uL  Auto Eosinophil # : 0.00 K/uL  Auto Basophil # : 0.01 K/uL  Auto Neutrophil % : 59.7 %  Auto Lymphocyte % : 30.6 %  Auto Monocyte % : 9.0 %  Auto Eosinophil % : 0.0 %  Auto Basophil % : 0.2 %            PT/INR - ( 2022 16:57 )   PT: 10.7 sec;   INR: 0.92 ratio         PTT - ( 2022 16:57 )  PTT:26.4 sec    RADIOLOGY RESULTS:          Assessment and Plan:          Assessment and Plan:        89 y/o F with PMH HTN, TIA, A fib not on AC d/t falls, CKD, lymphoma in remission, CHF, hyponatremia, adrenal insufficiency, UTI presented with SOB.         1. Acute hypoxic respiratory distress secondary to CHF exacerbation and right pleural effusion   -cardio appreciated Dr. Blount  -c/w IV lasix 40 daily  -c/w carvedilol    2. Hypertension   - //64, monitor closely   - c/w home anti-HTN agents   - Hydralazine for SBP > 160   -patient admits to eating high salt diet ; she is educated     3. Normocytic anemia  vs possible iron def anemia  -blood work suspicous for iron def anemia: ferritin low end, tibc on high end, low serum iron.   -start PO ferrous sulfate  - Hb 9.7, monitor closely     4. Hypokalemia   - K+ 2.7, will repleted 40 mg PO KCl, 3 K-riders of 10 meq  - f/u K+ level 2 hours after repletion and AM K+ and Mg levels     5. Hyperglycemia   - Glucose 133, monitor closely   - Goal for hospitalization 140-180   - Ordered HbA1c     6. Elevated AST   - AST 73, trend     7. Asymptomatic bacteriuria  - UA: occasional bacteria -> likely contaminant   - f/u UCx, if positive will treat with antibiotics     8. Hypoalbuminemia   - Albumin 2.6   - Nutrition consult     9. History of HTN, TIA, A fib not on AC d/t falls, CKD, lymphoma in remission, CHF, hyponatremia, adrenal insufficiency, UTI  - c/w home medications; verified with facility list from [facility]     DVT ppx: Lovenox 40 mg subcutaneous daily   Code status: DNR/DNI        Cheif complaints and Diagnosis: resp failure/ CHF/ right effusion    Subjective: breathing issues improved; patient walked wth PT with o2 sat in 90's on room air.  Leg edema b/l still persists with R>L      REVIEW OF SYSTEMS:    CONSTITUTIONAL: No weakness, fevers or chills  EYES/ENT: No visual changes;  No vertigo or throat pain   NECK: No pain or stiffness  RESPIRATORY: No cough, wheezing, hemoptysis; No shortness of breath  CARDIOVASCULAR: No chest pain or palpitations  GASTROINTESTINAL: No abdominal or epigastric pain. No nausea, vomiting, or hematemesis; No diarrhea or constipation. No melena or hematochezia.  GENITOURINARY: No dysuria, frequency or hematuria  NEUROLOGICAL: No numbness or weakness  SKIN: No itching, burning, rashes, or lesions   All other review of systems is negative unless indicated above      Vital Signs Last 24 Hrs  T(C): 36.6 (2022 07:52), Max: 36.9 (2022 19:05)  T(F): 97.8 (2022 07:52), Max: 98.4 (2022 19:05)  HR: 76 (2022 07:52) (69 - 76)  BP: 179/47 (2022 07:52) (166/45 - 179/47)  BP(mean): 82 (2022 07:52) (82 - 82)  RR: 18 (2022 07:52) (18 - 18)  SpO2: 90% (2022 07:52) (90% - 98%)    HEENT:   pupils equal and reactive, EOMI, no oropharyngeal lesions, erythema, exudates, oral thrush    NECK:   supple, no carotid bruits, no palpable lymph nodes, no thyromegaly    CV:  +S1, +S2, regular, no murmurs or rubs    RESP:   lungs clear to auscultation bilaterally, no wheezing, rales, rhonchi, good air entry bilaterally    BREAST:  not examined    GI:  abdomen soft, non-tender, non-distended, normal BS, no bruits, no abdominal masses, no palpable masses    RECTAL:  not examined    :  not examined    MSK:   normal muscle tone, no atrophy, no rigidity, no contractions    EXT:   no clubbing, no cyanosis, no edema, no calf pain, swelling or erythema    VASCULAR:  pulses equal and symmetric in the upper and lower extremities    NEURO:  AAOX3, no focal neurological deficits, follows all commands, able to move extremities spontaneously    SKIN:  no ulcers, lesions or rashes    MEDICATIONS  (STANDING):  aMIOdarone    Tablet 200 milliGRAM(s) Oral daily  amLODIPine   Tablet 10 milliGRAM(s) Oral daily  atorvastatin 10 milliGRAM(s) Oral at bedtime  carvedilol 12.5 milliGRAM(s) Oral every 12 hours  chlorhexidine 4% Liquid 1 Application(s) Topical <User Schedule>  cholecalciferol 2000 Unit(s) Oral daily  cyanocobalamin 1000 MICROGram(s) Oral daily  enoxaparin Injectable 40 milliGRAM(s) SubCutaneous every 24 hours  famotidine    Tablet 20 milliGRAM(s) Oral daily  ferrous    sulfate 325 milliGRAM(s) Oral daily  fludroCORTISONE 0.05 milliGRAM(s) Oral daily  furosemide   Injectable 40 milliGRAM(s) IV Push daily  hydrALAZINE 50 milliGRAM(s) Oral three times a day  hydrocortisone 10 milliGRAM(s) Oral daily  hydrocortisone 5 milliGRAM(s) Oral at bedtime  losartan 50 milliGRAM(s) Oral daily  potassium chloride    Tablet ER 40 milliEquivalent(s) Oral every 4 hours    MEDICATIONS  (PRN):  acetaminophen     Tablet .. 650 milliGRAM(s) Oral every 6 hours PRN Temp greater or equal to 38C (100.4F), Mild Pain (1 - 3)  aluminum hydroxide/magnesium hydroxide/simethicone Suspension 30 milliLiter(s) Oral every 4 hours PRN Dyspepsia  bismuth subsalicylate Liquid 15 milliLiter(s) Oral four times a day PRN Diarrhea  hydrALAZINE Injectable 10 milliGRAM(s) IV Push every 6 hours PRN SBP  loratadine 10 milliGRAM(s) Oral daily PRN allergies  melatonin 3 milliGRAM(s) Oral at bedtime PRN Insomnia  ondansetron Injectable 4 milliGRAM(s) IV Push every 8 hours PRN Nausea and/or Vomiting  polyethylene glycol 3350 17 Gram(s) Oral daily PRN Constipation  senna 2 Tablet(s) Oral daily PRN Constipation      Urinalysis Basic - ( 2022 17:58 )    Color: Yellow / Appearance: Clear / S.010 / pH: x  Gluc: x / Ketone: Negative  / Bili: Negative / Urobili: Negative   Blood: x / Protein: 100 / Nitrite: Negative   Leuk Esterase: Negative / RBC: 0-2 /HPF / WBC 0-2   Sq Epi: x / Non Sq Epi: Occasional / Bacteria: Occasional    2022 07:24    138    |  100    |  18     ----------------------------<  89     2.9     |  34     |  1.06     Ca    8.9        2022 07:24  Mg     2.1       2022 07:24    TPro  5.4    /  Alb  2.2    /  TBili  0.3    /  DBili  x      /  AST  59     /  ALT  61     /  AlkPhos  83     2022 07:10  LIVER FUNCTIONS - ( 2022 07:10 )  Alb: 2.2 g/dL / Pro: 5.4 gm/dL / ALK PHOS: 83 U/L / ALT: 61 U/L / AST: 59 U/L / GGT: x         PT/INR - ( 2022 16:57 )   PT: 10.7 sec;   INR: 0.92 ratio         PTT - ( 2022 16:57 )  PTT:26.4 secCBC Full  -  ( 2022 07:10 )  WBC Count : 4.12 K/uL  Hemoglobin : 8.9 g/dL  Hematocrit : 28.7 %  Platelet Count - Automated : 145 K/uL  Mean Cell Volume : 97.0 fl  Mean Cell Hemoglobin : 30.1 pg  Mean Cell Hemoglobin Concentration : 31.0 gm/dL  Auto Neutrophil # : 2.46 K/uL  Auto Lymphocyte # : 1.26 K/uL  Auto Monocyte # : 0.37 K/uL  Auto Eosinophil # : 0.00 K/uL  Auto Basophil # : 0.01 K/uL  Auto Neutrophil % : 59.7 %  Auto Lymphocyte % : 30.6 %  Auto Monocyte % : 9.0 %  Auto Eosinophil % : 0.0 %  Auto Basophil % : 0.2 %            PT/INR - ( 2022 16:57 )   PT: 10.7 sec;   INR: 0.92 ratio         PTT - ( 2022 16:57 )  PTT:26.4 sec          Assessment and Plan:        89 y/o F with PMH HTN, TIA, A fib not on AC d/t falls, CKD, lymphoma in remission, CHF, hyponatremia, adrenal insufficiency, UTI presented with SOB.         1. Acute hypoxic respiratory distress secondary to CHF exacerbation and right pleural effusion   -cardio appreciated Dr. Blount  -c/w IV lasix 40 daily >>still sig edema persists, change to BID x 3 doses.   -c/w carvedilol    2. Hypertension   - //64, monitor closely   - c/w home anti-HTN agents   - Hydralazine for SBP > 160   -patient admits to eating high salt diet ; she is educated   -restarted patients home hydralazine 100mg tid.     3. Normocytic anemia  vs possible iron def anemia  -blood work suspicous for iron def anemia: ferritin low end, tibc on high end, low serum iron.   -start PO ferrous sulfate  - Hb 9.7, monitor closely     4. Hypokalemia   - K+ 2.7, will repleted 40 mg PO KCl, 3 K-riders of 10 meq  - f/u K+ level 2 hours after repletion and AM K+ and Mg levels     5. Hyperglycemia   - Glucose 133, monitor closely   - Goal for hospitalization 140-180   - Ordered HbA1c     6. Elevated AST   - AST 73, trend     7. Asymptomatic bacteriuria  - UA: occasional bacteria -> likely contaminant   - f/u UCx, if positive will treat with antibiotics     8. Hypoalbuminemia   - Albumin 2.6   - Nutrition consult     9. History of HTN, TIA, A fib not on AC d/t falls, CKD, lymphoma in remission, CHF, hyponatremia, adrenal insufficiency, UTI  - c/w home medications; verified with facility list from [facility]       10-right lower ext edema  -rule out DVT   -u/s rle       DVT ppx: Lovenox 40 mg subcutaneous daily   Code status: DNR/DNI

## 2022-06-23 NOTE — PHYSICAL THERAPY INITIAL EVALUATION ADULT - PRECAUTIONS/LIMITATIONS, REHAB EVAL
cardiac precautions/fall precautions 2L NC at rest/cardiac precautions/fall precautions/oxygen therapy device and L/min

## 2022-06-23 NOTE — PHYSICAL THERAPY INITIAL EVALUATION ADULT - IMPAIRMENTS FOUND, PT EVAL
aerobic capacity/endurance/gait, locomotion, and balance aerobic capacity/endurance/gait, locomotion, and balance/gross motor/muscle strength/ventilation and respiration/gas exchange

## 2022-06-23 NOTE — PHYSICAL THERAPY INITIAL EVALUATION ADULT - SHORT TERM MEMORY, REHAB EVAL
[facility] Surgery Consultation    CC:  Perineal pain     HPI:  This 36 year old year old male is seen at the request of Magdalena Harry for evaluation of higinio-anal pain. He reports that it has been going on for 8 days. Was seen in UC three days ago for pain and change in stool caliber. No abnormality noted at that time. Was referred to general surgery, pain significantly increased today with night sweats over night. He has never had anything drained before but has often felt twinges of pain. He has never had abdominal surgery before. He lives in [place].     History reviewed. No pertinent past medical history.    Past Surgical History:   Procedure Laterality Date     HAND SURGERY Right 2006     ORTHOPEDIC SURGERY      tendon repair right hand       No Known Allergies    No current facility-administered medications for this encounter.      Current Outpatient Medications   Medication     triamcinolone (KENALOG) 0.1 % external cream       HABITS:    Social History     Tobacco Use     Smoking status: Former Smoker     Smokeless tobacco: Current User     Types: Chew   Substance Use Topics     Alcohol use: Yes     Comment: occasionaly      Drug use: No       Family History   Problem Relation Age of Onset     Diabetes Paternal Grandmother        REVIEW OF SYSTEMS:  Ten point review of systems negative except those mentioned in the HPI.     OBJECTIVE:    /76   Pulse 74   Temp 98.5  F (36.9  C) (Tympanic)   Resp 16   SpO2 99%     GENERAL: Generally appears well, in no distress with appropriate affect.  HEENT:   Sclerae anicteric - normocephalic atraumatic   Respiratory:  No acute distress, no splinting   Cardiovascular:  Regular Rate and Rhythm  Abdomen: tender at posterior midline.   :  deferred  Extremities:  Extremities normal. No deformities, edema, or skin discoloration.  Skin:  no suspicious lesions or rashes  Neurological: grossly intact  Psych:  Alert, oriented, affect appropriate with normal decision  making ability.    IMPRESSION:    8 days of perianal pain, leukocytosis and CT scan concerning for higinio-anal abscess discussed the possibility of a seton. Will plan exam under anesthesia.     PLAN:    EUA possible seton, likely discharge home tonight.     Marty Mercer MD,     7/27/2020  6:21 PM     intact

## 2022-06-23 NOTE — PHYSICAL THERAPY INITIAL EVALUATION ADULT - GENERAL OBSERVATIONS, REHAB EVAL
pt rec'd pt rec'd sitting in chair. Rn stated after breakfast this morning pt feel SOB put on 2L o2 via NC. Pt initially hesitant but willing to participate in PT.o2 at rest  RA 96% before PT session. pt rec'd sitting in chair. Rn stated after breakfast this morning pt feel SOB put on 2L o2 via NC. Pt initially hesitant but willing to participate in PT.o2 sats at 100% on 2 lits o2 ,at rest on  RA o2 sats 96% before PT session.

## 2022-06-23 NOTE — PHYSICAL THERAPY INITIAL EVALUATION ADULT - PERTINENT HX OF CURRENT PROBLEM, REHAB EVAL
pt presented to Ed with SOB. Pt was out with dtr got up to walk and had SOB with exertion.. Pt stated these sx began after last hpspitalization 1 month ago. Pt report coughing throughout night with productive white phlegm.Reports sore throat, acid reflux, chills, congestion, wheezing, dizziness, and lower extremity swelling.  Saw Dr Blount last week in office, everything fine at that time. Last hospital admit reason generalized weakness. pt presented to Ed with SOB. Pt was out with dtr got up to walk and had SOB with exertion.. Pt stated these sx began after last hpspitalization 1 month ago. Pt report coughing throughout night with productive white phlegm.Reports sore throat, acid reflux, chills, congestion, wheezing, dizziness, and lower extremity swelling.  Saw Dr Blount last week in office, everything fine at that time. Last hospital admit reason generalized weakness. Hx of HTn , TIA(~3 yrs ago) Afib, CKD, lymphoma, pt presented to ED with SOB. Pt was out with dtr got up to walk and had SOB with exertion.. Pt stated these sx began after last hpspitalization 1 month ago. Pt report coughing throughout night with productive white phlegm.Reports sore throat, acid reflux, chills, congestion, wheezing, dizziness, and lower extremity swelling.  Saw Dr Blount last week in office, everything fine at that time. Last hospital admit reason generalized weakness. Hx of HTn , TIA(~3 yrs ago) Afib, CKD, lymphoma,

## 2022-06-23 NOTE — PROGRESS NOTE ADULT - SUBJECTIVE AND OBJECTIVE BOX
Patient is a 89y old  Female who presents with a chief complaint of SOB with exertion (2022 17:13)      HPI:  89 y/o F with PMH HTN, TIA, A fib not on AC d/t falls, CKD, lymphoma in remission, CHF, hyponatremia, adrenal insufficiency, UTI presented with SOB. The pt reports that she was out with her daughter for lunch today and she got up to walk and had SOB with exertion. She states that these symptoms seem to have started after the last hospitalization 1 month ago. She does report coughing throughout the night productive of white phlegm. The cough only occurs at night. Reports sore throat, acid reflux, chills, congestion, wheezing, dizziness, and lower extremity swelling. The pt saw Dr. Blount in the office last week and stated that everything was normal at that time. No other current complaints. Denies fevers, chills, chest pain, SOB, abdominal pain, N/V, diarrhea/constipation.     Prior admission:   - 2022-2022: generalized weakness -> ICH -> no surgical intervention     ER course: //64, SpO2 93% on room air -> 99% on 2L nasal cannula. Labs: Hb 9.7, K+ 2.7, glucose 133, albumin 2.6, AST 73, BNP 2226. UA: occasional bacteria. COVID negative. EKG: NSR with HR 63 bpm, normal intervals, no ST segment changes, no T wave inversions (personally reviewed).     Imaging:   - CXR: loop recorder, fibrosis and scarring, no consolidation, small right pleural effusion, relatively unchanged from prior EKG (personally reviewed) -> official read: 1. Areas of probable scarring and fibrosis can be seen particularly in the left upper lobe and to a lesser extent in the right middle and lower lobe, unchanged from the previous study. 2. Mild pleural reaction on the right is better seen at this time. 3. Borderline heart size with loop recorder but no CHF. 4. Multilevel thoracolumbar vertebroplasty, unchanged.    Pt was given 40 mg PO KCl, 3 K-riders of 10 meq. The pt is being placed on telemetry observation for further management.  (2022 20:54)      PAST MEDICAL & SURGICAL HISTORY:  Iron deficiency      HTN (hypertension)      Lymphoma      H/O adrenal insufficiency      Hyponatremia      Hypokalemia      Chronic kidney disease, unspecified CKD stage      Diastolic heart failure      Paroxysmal atrial fibrillation      S/P appendectomy      S/P hysterectomy      H/O intracranial hemorrhage          MEDICATIONS  (STANDING):  aMIOdarone    Tablet 200 milliGRAM(s) Oral daily  amLODIPine   Tablet 10 milliGRAM(s) Oral daily  atorvastatin 10 milliGRAM(s) Oral at bedtime  carvedilol 12.5 milliGRAM(s) Oral every 12 hours  chlorhexidine 4% Liquid 1 Application(s) Topical <User Schedule>  cholecalciferol 2000 Unit(s) Oral daily  cyanocobalamin 1000 MICROGram(s) Oral daily  enoxaparin Injectable 40 milliGRAM(s) SubCutaneous every 24 hours  famotidine    Tablet 20 milliGRAM(s) Oral daily  ferrous    sulfate 325 milliGRAM(s) Oral daily  fludroCORTISONE 0.05 milliGRAM(s) Oral daily  furosemide   Injectable 40 milliGRAM(s) IV Push daily  hydrALAZINE 50 milliGRAM(s) Oral three times a day  hydrocortisone 10 milliGRAM(s) Oral daily  hydrocortisone 5 milliGRAM(s) Oral at bedtime  losartan 50 milliGRAM(s) Oral daily    MEDICATIONS  (PRN):  acetaminophen     Tablet .. 650 milliGRAM(s) Oral every 6 hours PRN Temp greater or equal to 38C (100.4F), Mild Pain (1 - 3)  aluminum hydroxide/magnesium hydroxide/simethicone Suspension 30 milliLiter(s) Oral every 4 hours PRN Dyspepsia  bismuth subsalicylate Liquid 15 milliLiter(s) Oral four times a day PRN Diarrhea  hydrALAZINE Injectable 10 milliGRAM(s) IV Push every 6 hours PRN SBP  loratadine 10 milliGRAM(s) Oral daily PRN allergies  melatonin 3 milliGRAM(s) Oral at bedtime PRN Insomnia  ondansetron Injectable 4 milliGRAM(s) IV Push every 8 hours PRN Nausea and/or Vomiting  polyethylene glycol 3350 17 Gram(s) Oral daily PRN Constipation  senna 2 Tablet(s) Oral daily PRN Constipation      FAMILY HISTORY:  No known problems (Father, Mother)        SOCIAL HISTORY:    REVIEW OF SYSTEMS:  CONSTITUTIONAL:    No fatigue, malaise, lethargy.  No fever or chills.  HEENT:  Eyes:  No visual changes.     ENT:  No epistaxis.  No sinus pain.    RESPIRATORY:  No cough.  No wheeze.  No hemoptysis.  No shortness of breath.  CARDIOVASCULAR:  No chest pains.  No palpitations. No shortness of breath, No orthopnea or PND.  GASTROINTESTINAL:  No abdominal pain.  No nausea or vomiting.    GENITOURINARY:    No hematuria.    MUSCULOSKELETAL:  No musculoskeletal pain.  No joint swelling.  No arthritis.  NEUROLOGICAL:  No tingling or numbness or weakness.  PSYCHIATRIC:  No confusion  SKIN:  No rashes.    ENDOCRINE:  No unexplained weight loss.  No polydipsia.   HEMATOLOGIC:  No anemia.  No prolonged or excessive bleeding.   ALLERGIC AND IMMUNOLOGIC:  No pruritus.          Vital Signs Last 24 Hrs  T(C): 36.9 (2022 19:05), Max: 36.9 (2022 19:05)  T(F): 98.4 (2022 19:05), Max: 98.4 (2022 19:05)  HR: 70 (2022 06:21) (69 - 70)  BP: 168/50 (2022 06:21) (166/45 - 170/42)  BP(mean): --  RR: 18 (2022 21:27) (18 - 18)  SpO2: 95% (2022 21:27) (94% - 98%)    PHYSICAL EXAM-    Constitutional: The patient appears to be normal, well developed, well nourished and alert and oriented to time, place and person. The patient does not appear acutely ill.     Head: Head is normocephalic and atraumatic.      Neck: No jugular venous distention. No audible carotid bruits. There are strong carotid pulses bilaterally. No JVD.     Cardiovascular: Regular rate and rhythm without S3, S4. No murmurs or rubs are appreciated.      Respiratory: Breathsounds are normal. No rales. No wheezing.    Abdomen: Soft, nontender, nondistended with positive bowel sounds.      Extremity: No tenderness. No  pitting edema     Neurologic: The patient is alert and oriented.      Skin: No rash, no obvious lesions noted.      Psychiatric: The patient appears to be emotionally stable.      INTERPRETATION OF TELEMETRY:    ECG: Sinus rythm , normal axis, no ST T changes.     I&O's Detail      LABS:                        8.9    4.12  )-----------( 145      ( 2022 07:10 )             28.7     06-    140  |  102  |  19  ----------------------------<  87  3.5   |  34<H>  |  1.23    Ca    8.8      2022 07:10  Mg     2.2         TPro  5.4<L>  /  Alb  2.2<L>  /  TBili  0.3  /  DBili  x   /  AST  59<H>  /  ALT  61  /  AlkPhos  83          PT/INR - ( 2022 16:57 )   PT: 10.7 sec;   INR: 0.92 ratio         PTT - ( 2022 16:57 )  PTT:26.4 sec  Urinalysis Basic - ( 2022 17:58 )    Color: Yellow / Appearance: Clear / S.010 / pH: x  Gluc: x / Ketone: Negative  / Bili: Negative / Urobili: Negative   Blood: x / Protein: 100 / Nitrite: Negative   Leuk Esterase: Negative / RBC: 0-2 /HPF / WBC 0-2   Sq Epi: x / Non Sq Epi: Occasional / Bacteria: Occasional      I&O's Summary    BNP  RADIOLOGY & ADDITIONAL STUDIES: Patient is a 89y old  Female who presents with a chief complaint of SOB with exertion.     HPI:  89 y/o F with PMH HTN, TIA, A fib not on AC d/t falls, CKD, lymphoma in remission, CHF, hyponatremia, adrenal insufficiency, UTI presented with SOB.   Pt seen and examined by me this am.  Still c/o SOB sitting in bed and cough.  No CP .  Last night mild confusion per staff.      PAST MEDICAL & SURGICAL HISTORY:  Iron deficiency      HTN (hypertension)      Lymphoma      H/O adrenal insufficiency      Hyponatremia      Hypokalemia      Chronic kidney disease, unspecified CKD stage      Diastolic heart failure      Paroxysmal atrial fibrillation      S/P appendectomy      S/P hysterectomy      H/O intracranial hemorrhage          MEDICATIONS  (STANDING):  aMIOdarone    Tablet 200 milliGRAM(s) Oral daily  amLODIPine   Tablet 10 milliGRAM(s) Oral daily  atorvastatin 10 milliGRAM(s) Oral at bedtime  carvedilol 12.5 milliGRAM(s) Oral every 12 hours  chlorhexidine 4% Liquid 1 Application(s) Topical <User Schedule>  cholecalciferol 2000 Unit(s) Oral daily  cyanocobalamin 1000 MICROGram(s) Oral daily  enoxaparin Injectable 40 milliGRAM(s) SubCutaneous every 24 hours  famotidine    Tablet 20 milliGRAM(s) Oral daily  ferrous    sulfate 325 milliGRAM(s) Oral daily  fludroCORTISONE 0.05 milliGRAM(s) Oral daily  furosemide   Injectable 40 milliGRAM(s) IV Push daily  hydrALAZINE 50 milliGRAM(s) Oral three times a day  hydrocortisone 10 milliGRAM(s) Oral daily  hydrocortisone 5 milliGRAM(s) Oral at bedtime  losartan 50 milliGRAM(s) Oral daily    MEDICATIONS  (PRN):  acetaminophen     Tablet .. 650 milliGRAM(s) Oral every 6 hours PRN Temp greater or equal to 38C (100.4F), Mild Pain (1 - 3)  aluminum hydroxide/magnesium hydroxide/simethicone Suspension 30 milliLiter(s) Oral every 4 hours PRN Dyspepsia  bismuth subsalicylate Liquid 15 milliLiter(s) Oral four times a day PRN Diarrhea  hydrALAZINE Injectable 10 milliGRAM(s) IV Push every 6 hours PRN SBP  loratadine 10 milliGRAM(s) Oral daily PRN allergies  melatonin 3 milliGRAM(s) Oral at bedtime PRN Insomnia  ondansetron Injectable 4 milliGRAM(s) IV Push every 8 hours PRN Nausea and/or Vomiting  polyethylene glycol 3350 17 Gram(s) Oral daily PRN Constipation  senna 2 Tablet(s) Oral daily PRN Constipation      FAMILY HISTORY:  No known problems (Father, Mother)        SOCIAL HISTORY:    REVIEW OF SYSTEMS:  CONSTITUTIONAL:    No fatigue, malaise, lethargy.  No fever or chills.    RESPIRATORY:  No cough.  No wheeze.  No hemoptysis.  c/o shortness of breath.  CARDIOVASCULAR:  No chest pains.  No palpitations. c/o shortness of breath, No orthopnea or PND.  GASTROINTESTINAL:  No abdominal pain.  No nausea or vomiting.    GENITOURINARY:    No hematuria.    MUSCULOSKELETAL:  c/o musculoskeletal pain.  No joint swelling.  No arthritis.  NEUROLOGICAL:  No tingling or numbness or weakness.  PSYCHIATRIC:  No confusion  SKIN:  No rashes.          Vital Signs Last 24 Hrs  T(C): 36.9 (2022 19:05), Max: 36.9 (2022 19:05)  T(F): 98.4 (2022 19:05), Max: 98.4 (2022 19:05)  HR: 70 (2022 06:21) (69 - 70)  BP: 168/50 (2022 06:21) (166/45 - 170/42)  BP(mean): --  RR: 18 (2022 21:27) (18 - 18)  SpO2: 95% (2022 21:27) (94% - 98%)    PHYSICAL EXAM-    Constitutional: elderly frail female in no acute distress.     Head: Head is normocephalic and atraumatic.      Neck:  No JVD.     Cardiovascular: Regular rate and rhythm without S3, S4. No murmurs or rubs are appreciated.      Respiratory: B/l rales. No wheezing.    Abdomen: Soft, nontender, nondistended with positive bowel sounds.      Extremity: No tenderness.trace b/l pedal  pitting edema     Neurologic: The patient is alert and oriented.      Skin: No rash, no obvious lesions noted.      Psychiatric: The patient appears to be emotionally stable.      INTERPRETATION OF TELEMETRY: SR 60-70/min     ECG: Sinus rythm , poor R wave progression, T wave flattening in lateral leads.     I&O's Detail      LABS:                        8.9    4.12  )-----------( 145      ( 2022 07:10 )             28.7         140  |  102  |  19  ----------------------------<  87  3.5   |  34<H>  |  1.23    Ca    8.8      2022 07:10  Mg     2.2         TPro  5.4<L>  /  Alb  2.2<L>  /  TBili  0.3  /  DBili  x   /  AST  59<H>  /  ALT  61  /  AlkPhos  83          PT/INR - ( 2022 16:57 )   PT: 10.7 sec;   INR: 0.92 ratio         PTT - ( 2022 16:57 )  PTT:26.4 sec  Urinalysis Basic - ( 2022 17:58 )    Color: Yellow / Appearance: Clear / S.010 / pH: x  Gluc: x / Ketone: Negative  / Bili: Negative / Urobili: Negative   Blood: x / Protein: 100 / Nitrite: Negative   Leuk Esterase: Negative / RBC: 0-2 /HPF / WBC 0-2   Sq Epi: x / Non Sq Epi: Occasional / Bacteria: Occasional      I&O's Summary    BNP  RADIOLOGY & ADDITIONAL STUDIES:  < from: TTE Echo Complete w/o Contrast w/ Doppler (22 @ 19:09) >   Impression     Summary     Moderate concentric left ventricular hypertrophy is present.   Basal septal hypertrophy is seen measuring 1.7cm.   Estimated left ventricular ejection fraction is 55-60 %.   Normal appearing left atrium.   Mild aortic sclerosis is present with normal valvular opening.   Mild (1+) to moderate aortic regurgitation is present pressure half time   is 215cm/s2.   Mild mitral annular calcification is present.   The mitral valve leaflets appear thickened.   Mild (1+) mitral regurgitation is present.   EA reversal of the mitral inflow consistent with reduced compliance of   the   left ventricle.   The tricuspid valve leaflets appear mildly thickened and/or calcified,   but   open well.   Mild (1+) tricuspid valve regurgitation is present.   Normal appearing pulmonic valve structure.   Trace pulmonic valvular regurgitation is present.     Signature     ----------------------------------------------------------------   Electronically signed by Steven Cui MD([facility]   physician) on 2022 10:38 PM   ----------------------------------------------------------------    < end of copied text >

## 2022-06-24 ENCOUNTER — TRANSCRIPTION ENCOUNTER (OUTPATIENT)
Age: 87
End: 2022-06-24

## 2022-06-24 PROCEDURE — 99232 SBSQ HOSP IP/OBS MODERATE 35: CPT

## 2022-06-24 RX ORDER — LOSARTAN POTASSIUM 100 MG/1
50 TABLET, FILM COATED ORAL DAILY
Refills: 0 | Status: DISCONTINUED | OUTPATIENT
Start: 2022-06-24 | End: 2022-06-26

## 2022-06-24 RX ORDER — AMLODIPINE BESYLATE 2.5 MG/1
10 TABLET ORAL ONCE
Refills: 0 | Status: COMPLETED | OUTPATIENT
Start: 2022-06-24 | End: 2022-06-24

## 2022-06-24 RX ORDER — AMLODIPINE BESYLATE 2.5 MG/1
10 TABLET ORAL DAILY
Refills: 0 | Status: DISCONTINUED | OUTPATIENT
Start: 2022-06-24 | End: 2022-06-26

## 2022-06-24 RX ORDER — FUROSEMIDE 40 MG
1 TABLET ORAL
Qty: 30 | Refills: 0
Start: 2022-06-24 | End: 2022-07-23

## 2022-06-24 RX ORDER — LOSARTAN POTASSIUM 100 MG/1
50 TABLET, FILM COATED ORAL ONCE
Refills: 0 | Status: COMPLETED | OUTPATIENT
Start: 2022-06-24 | End: 2022-06-24

## 2022-06-24 RX ORDER — CARVEDILOL PHOSPHATE 80 MG/1
25 CAPSULE, EXTENDED RELEASE ORAL EVERY 12 HOURS
Refills: 0 | Status: DISCONTINUED | OUTPATIENT
Start: 2022-06-24 | End: 2022-06-26

## 2022-06-24 RX ORDER — FUROSEMIDE 40 MG
40 TABLET ORAL DAILY
Refills: 0 | Status: DISCONTINUED | OUTPATIENT
Start: 2022-06-25 | End: 2022-06-26

## 2022-06-24 RX ADMIN — AMIODARONE HYDROCHLORIDE 200 MILLIGRAM(S): 400 TABLET ORAL at 11:02

## 2022-06-24 RX ADMIN — CHLORHEXIDINE GLUCONATE 1 APPLICATION(S): 213 SOLUTION TOPICAL at 10:54

## 2022-06-24 RX ADMIN — AMLODIPINE BESYLATE 10 MILLIGRAM(S): 2.5 TABLET ORAL at 16:11

## 2022-06-24 RX ADMIN — FLUDROCORTISONE ACETATE 0.05 MILLIGRAM(S): 0.1 TABLET ORAL at 11:02

## 2022-06-24 RX ADMIN — LOSARTAN POTASSIUM 50 MILLIGRAM(S): 100 TABLET, FILM COATED ORAL at 16:11

## 2022-06-24 RX ADMIN — Medication 40 MILLIGRAM(S): at 15:06

## 2022-06-24 RX ADMIN — Medication 100 MILLIGRAM(S): at 06:45

## 2022-06-24 RX ADMIN — FAMOTIDINE 20 MILLIGRAM(S): 10 INJECTION INTRAVENOUS at 11:02

## 2022-06-24 RX ADMIN — Medication 2000 UNIT(S): at 11:04

## 2022-06-24 RX ADMIN — ATORVASTATIN CALCIUM 10 MILLIGRAM(S): 80 TABLET, FILM COATED ORAL at 21:16

## 2022-06-24 RX ADMIN — Medication 10 MILLIGRAM(S): at 10:54

## 2022-06-24 RX ADMIN — Medication 325 MILLIGRAM(S): at 11:00

## 2022-06-24 RX ADMIN — CARVEDILOL PHOSPHATE 25 MILLIGRAM(S): 80 CAPSULE, EXTENDED RELEASE ORAL at 11:35

## 2022-06-24 RX ADMIN — Medication 5 MILLIGRAM(S): at 21:16

## 2022-06-24 RX ADMIN — ENOXAPARIN SODIUM 40 MILLIGRAM(S): 100 INJECTION SUBCUTANEOUS at 21:15

## 2022-06-24 RX ADMIN — Medication 40 MILLIGRAM(S): at 06:46

## 2022-06-24 RX ADMIN — CARVEDILOL PHOSPHATE 25 MILLIGRAM(S): 80 CAPSULE, EXTENDED RELEASE ORAL at 21:16

## 2022-06-24 RX ADMIN — PREGABALIN 1000 MICROGRAM(S): 225 CAPSULE ORAL at 10:54

## 2022-06-24 RX ADMIN — Medication 100 MILLIGRAM(S): at 21:16

## 2022-06-24 RX ADMIN — Medication 100 MILLIGRAM(S): at 15:06

## 2022-06-24 NOTE — DISCHARGE NOTE PROVIDER - HOSPITAL COURSE
Vital Signs Last 24 Hrs  T(C): 36.8 (24 Jun 2022 08:00), Max: 36.8 (23 Jun 2022 19:45)  T(F): 98.3 (24 Jun 2022 08:00), Max: 98.3 (24 Jun 2022 08:00)  HR: 69 (24 Jun 2022 08:00) (67 - 69)  BP: 163/48 (24 Jun 2022 08:00) (143/39 - 163/48)  BP(mean): --  RR: 18 (24 Jun 2022 08:00) (18 - 18)  SpO2: 96% (24 Jun 2022 08:00) (96% - 99%)    HEENT:   pupils equal and reactive, EOMI, no oropharyngeal lesions, erythema, exudates, oral thrush    NECK:   supple, no carotid bruits, no palpable lymph nodes, no thyromegaly    CV:  +S1, +S2, regular, no murmurs or rubs    RESP:   lungs clear to auscultation bilaterally, no wheezing, rales, rhonchi, good air entry bilaterally    BREAST:  not examined    GI:  abdomen soft, non-tender, non-distended, normal BS, no bruits, no abdominal masses, no palpable masses    RECTAL:  not examined    :  not examined    MSK:   normal muscle tone, no atrophy, no rigidity, no contractions    EXT:   no clubbing, no cyanosis, no edema, no calf pain, swelling or erythema    VASCULAR:  pulses equal and symmetric in the upper and lower extremities    NEURO:  AAOX3, no focal neurological deficits, follows all commands, able to move extremities spontaneously    SKIN:  no ulcers, lesions or rashes    23 Jun 2022 07:24    138    |  100    |  18     ----------------------------<  89     2.9     |  34     |  1.06     Ca    8.9        23 Jun 2022 07:24  Mg     2.1       23 Jun 2022 07:24            Hospital Course:       89 y/o F with PMH HTN, TIA, A fib not on AC d/t falls, CKD, lymphoma in remission, CHF, hyponatremia, adrenal insufficiency, UTI presented with SOB.       1. Acute hypoxic respiratory distress secondary to CHF exacerbation and right pleural effusion   -cardio appreciated Dr. Pinto  -s/p IV lasix >> significant improvement in leg edema.   -c/w carvedilol, increased due to uncontrolled BP    2. Hypertension   -patient admits to eating high salt diet ; she is educated   -restarted patients home hydralazine 100mg tid. Carvedilol increased.     3. Normocytic anemia  vs possible iron def anemia  -blood work suspicous for iron def anemia: ferritin low end, tibc on high end, low serum iron.   -start PO ferrous sulfate      4. Asymptomatic bacteriuria  -urine culture negative        5-right lower ext edema  -rule out DVT   -u/s rle         Code status: DNR/DNI        89 y/o F with HTN, HFpEF, Afib not on AC due to falls, hx of TIA, CKD, lymphoma in remission, hyponatremia, adrenal insufficiency, past UTI, presented to [facility] with dyspnea, B/L LE edema in setting of eating high salt diet. Patient found to have CT chest findings of patchy B/L R pleural effusion and decompensated CHF, admitted to Medicine Telemetry.    Acute hypoxic respiratory distress  Patient admitted to the hospital with dyspnea and hypoxia. Found to have a CHF exacerbation and R pleural effusion, see below. Patient has since clinically improved and is stable for discharge.    Acute on chronic diastolic CHF  Appreciate input from Cardiology. Patient s/p course of IV Lasix with subsequent improvement. Carvedilol dose increased to optimize BP, GDMT for CHF. Continued on home hydralazine 100mg TID. F/u with Cardiology as outpatient.     R pleural effusion   Medically managed with diuresis. Continue to monitor.    Uncontrolled hypertension   BP now improved with increased dose of carvedilol, continuation of patient's home hydralazine. F/u with PCP as outpatient for BP check.    Normocytic anemia  No overt bleeding. No signs of hemolysis. Likely due to iron deficiency vs chronic disease. Hgb ~9 and stable. MCV WNL. RDW WNL. Serum iron low, TIBC normal, ferritin normal. Iron sat low. Patient started on PO iron supplementation, will need PCP follow up outpatient.     Hypokalemia  In setting of IV Lasix. Patient given potassium supplementation.     Lower extremity edema  Likely due to CHF exacerbation. LE ultrasound duplex negative for DVT.     Hepatic hypodensity  As noted incidentally on CT chest. Previously seen. Continue to monitor.       Code status: DNR/DNI       Discharge Exam:  Afebrile  BP 140s/40s HR 60s  RR 18  O2 96%  HEENT:   pupils equal and reactive, EOMI, no oropharyngeal lesions, erythema, exudates, oral thrush  NECK:   supple, no carotid bruits, no palpable lymph nodes, no thyromegaly  CV:  +S1, +S2, regular, no murmurs or rubs  RESP:   lungs clear to auscultation bilaterally, no wheezing, rales, rhonchi, good air entry bilaterally  BREAST:  not examined  GI:  abdomen soft, non-tender, non-distended, normal BS, no bruits, no abdominal masses, no palpable masses  MSK:   normal muscle tone, no atrophy, no rigidity, no contractions  EXT:   no clubbing, no cyanosis, no edema, no calf pain, swelling or erythema  VASCULAR:  pulses equal and symmetric in the upper and lower extremities  NEURO:  AAOX3, no focal neurological deficits, follows all commands, able to move extremities spontaneously  SKIN:  no ulcers, lesions or rashes    Labs:    4.12 > 8.9/28.7 < 145    MVC WNL RDW WNL Iron 29 TIBC 235 Iron sat 11% Ferritin 86    138  |  98  |  28  --------------------<  80  3.4   |  33  |  1.21    Ca 8.9   Mg 2.3    a1c 5.3   troponin (-)  proBNP 2226    Tot prot 5.4 Alb 2.2 AST 59 ALT 61 AlkPhos 83 Tbili 0.3    UA clear, N-, LE-, 0-2 WBC, 0-2 RBC, occ bacteria, occ epithelial cells    COVID19 PCR negative           89 y/o F with HTN, HFpEF, Afib not on AC due to falls, hx of TIA, CKD, lymphoma in remission, hyponatremia, adrenal insufficiency, past UTI, presented to [facility] with dyspnea, B/L LE edema in setting of eating high salt diet. Patient found to have CT chest findings of patchy B/L R pleural effusion and decompensated CHF, admitted to Medicine Telemetry.    Acute hypoxic respiratory distress  Patient admitted to the hospital with dyspnea and hypoxia. Found to have a CHF exacerbation and R pleural effusion, see below. Patient has since clinically improved and is stable for discharge.    Acute on chronic diastolic CHF  No angina. Trop negative. BNP 2226. EKG NSR, normal rate, old anteroseptal q. TTE done, mild asymmetrical septal left ventricular hypertrophy, normal LVEF, mildly dilated LA. Mild AR, mod TR, mild pHTN. Appreciate input from Cardiology. Patient s/p course of IV furosemide with subsequent improvement. Home dose of furosemide increased to 40mg daily. Carvedilol dose increased to 25mg BID to optimize BP, GDMT for CHF. Continued on home hydralazine 100mg TID, losartan 50mg daily and amlodipine 10mg daily. F/u with Cardiology as outpatient with Dr. Blount.     Paroxysmal afib  In normal sinus rhythm this admission. On carvedilol and amiodarone. Not on AC as outpatient secondary to risk of falls and risk of bleed per family.    Small B/L pleural effusions  As noted on chest imaging. No need to pursue thoracentesis. Medically managed with diuresis. Continue to monitor.    Mild bronchiectasis  As noted in CT chest in RML and lingula associated with atelectasis and scarring. Bilateral calcified pleural plaques. No enlarged mediastinal, hilar or axillary lymph nodes. No fever. No cough. No discolored sputum. Patient given ceftriaxone and azithromycin in the ED but not continued thereafter. Improved without antibiotics. Will need continued follow up as outpatient.     Uncontrolled hypertension   BP now improved with increased dose of carvedilol, continuation of patient's home hydralazine, losartan and amlodipine. Home furosemide dose was increased. F/u with PCP as outpatient for BP check.    Normocytic anemia  No overt bleeding. No signs of hemolysis. Likely due to iron deficiency vs chronic disease. Hgb ~9 and stable. MCV WNL. RDW WNL. Serum iron low, TIBC normal, ferritin normal. Iron sat low. Patient started on PO iron supplementation, will need PCP follow up outpatient.     Hypokalemia  In setting of IV Lasix. Patient given potassium supplementation.     Lower extremity edema  Likely due to CHF exacerbation. LE ultrasound duplex negative for DVT.     Hepatic hypodensity  As noted incidentally on CT chest. Previously seen. Continue to monitor.     Adrenal insufficiency  Stable. Continued on home fludrocortisone and hydrocortisone.     Code status: DNR/DNI     Discharge Exam:  Afebrile  BP 140s/40s HR 60s  RR 18  O2 96%  HEENT:  NCAT, pupils equal and reactive, EOMI, no oropharyngeal lesions  NECK:  Supple, no carotid bruits, no palpable lymph nodes, no thyromegaly  CV: +S1, +S2, regular, rate normal  RESP: Normal resp effort, lungs clear to auscultation bilaterally  GI: Abdomen soft, non-tender, non-distended, normal BS,  MSK: Normal muscle tone, no atrophy, no rigidity, no contractions  EXT: No clubbing, no cyanosis, no edema, no calf pain, swelling or erythema  VASCULAR: Pulses equal and symmetric in the upper and lower extremities  NEURO:  AAOX3, no focal neurological deficits, follows all commands, able to move extremities spontaneously  SKIN:  no ulcers, lesions or rashes    Labs:    4.12 > 8.9/28.7 < 145    MVC WNL RDW WNL Iron 29 TIBC 235 Iron sat 11% Ferritin 86    138  |  98  |  28  --------------------<  80  3.4   |  33  |  1.21    Ca 8.9   Mg 2.3    a1c 5.3   troponin (-)  proBNP 2226    Tot prot 5.4 Alb 2.2 AST 59 ALT 61 AlkPhos 83 Tbili 0.3    UA clear, N-, LE-, 0-2 WBC, 0-2 RBC, occ bacteria, occ epithelial cells    COVID19 PCR negative    Imaging:  US venous duplex B/L LE 6/23: No evidence of right lower extremity deep venous thrombosis.    CT chest WO 6/21: Central tracheobronchial tree is patent.  Bilateral calcified granuloma. Mild bronchiectasis in the right middle lobe and lingula with associated atelectasis/scarring. Patchy biapical opacities are new from the prior study. Bilateral calcified pleural plaques. No enlarged mediastinal, hilar or axillary lymph nodes. The visualized portion of the thyroid gland is unremarkable. There are small bilateral pleural effusions.  There is no pneumothorax. Cardiomegaly.  There are atherosclerotic calcifications of the aorta and coronary arteries.  There is trace pericardial fluid.  Aortic valve calcification. Images of the upper abdomen demonstrate redemonstrated hepatic hypodensity. Multilevel vertebroplasty  The soft tissues are unremarkable.    CXR 6/21: Areas of probable scarring and fibrosis can be seen particularly in the left upper lobe and to a lesser extent in the right middle and lower lobe, unchanged from the previous study. Mild pleural reaction on the right is better seen at this time. Borderline heart size with loop recorder but no CHF. Multilevel thoracolumbar vertebroplasty, unchanged.    Cardiac Testing:  TTE 6/22:  Mild asymmetrical septal left ventricular hypertrophy is present. Estimated left ventricular ejection fraction is 60-65 %. The left atrium is mildly dilated. Mild (1+) aortic regurgitation. Moderate (2+) tricuspid valve regurgitation. Mild pulmonary hypertension. Trace pericardial effusion is present.    EKG 6/21: Normal rate. Sinus rhythm. Old anteroseptal q.                    89 y/o F with HTN, HFpEF, Afib not on AC due to falls, hx of TIA, CKD, lymphoma in remission, hyponatremia, adrenal insufficiency, past UTI, presented to [facility] with dyspnea, B/L LE edema in setting of eating high salt diet. Patient found to have CT chest findings of patchy B/L R pleural effusion and decompensated CHF, admitted to Medicine Telemetry.     Acute hypoxic respiratory distress  Patient admitted to the hospital with dyspnea and hypoxia. Found to have a CHF exacerbation and R pleural effusion, see below. Patient has since clinically improved and is stable for discharge.    Acute on chronic diastolic CHF  No angina. Trop negative. BNP 2226. EKG NSR, normal rate, old anteroseptal q. TTE done, mild asymmetrical septal left ventricular hypertrophy, normal LVEF, mildly dilated LA. Mild AR, mod TR, mild pHTN. Appreciate input from Cardiology. Patient s/p course of IV furosemide with subsequent improvement. Home dose of furosemide increased to 40mg daily. Carvedilol dose increased to 25mg BID to optimize BP, GDMT for CHF. Continued on home hydralazine 100mg TID, losartan 50mg daily and amlodipine 10mg daily. F/u with Cardiology as outpatient with Dr. Blount.     Paroxysmal afib  In normal sinus rhythm this admission. On carvedilol and amiodarone. Not on AC as outpatient secondary to risk of falls and risk of bleed per family.    Small B/L pleural effusions  As noted on chest imaging. No need to pursue thoracentesis. Medically managed with diuresis. Continue to monitor.    Mild bronchiectasis  As noted in CT chest in RML and lingula associated with atelectasis and scarring. Bilateral calcified pleural plaques. No enlarged mediastinal, hilar or axillary lymph nodes. No fever. No cough. No discolored sputum. Patient given ceftriaxone and azithromycin in the ED but not continued thereafter. Improved without antibiotics. Will need continued follow up as outpatient.     Uncontrolled hypertension   BP now improved with increased dose of carvedilol, continuation of patient's home hydralazine, losartan and amlodipine. Home furosemide dose was increased. F/u with PCP as outpatient for BP check.    Normocytic anemia  No overt bleeding. No signs of hemolysis. Likely due to iron deficiency vs chronic disease. Hgb ~9 and stable. MCV WNL. RDW WNL. Serum iron low, TIBC normal, ferritin normal. Iron sat low. Patient started on PO iron supplementation, will need PCP follow up outpatient.     Hypokalemia  In setting of IV Lasix. Patient given potassium supplementation.     Lower extremity edema  Likely due to CHF exacerbation. LE ultrasound duplex negative for DVT.     Hepatic hypodensity  As noted incidentally on CT chest. Previously seen. Continue to monitor.     Adrenal insufficiency  Stable. Continued on home fludrocortisone and hydrocortisone.     Code status: DNR/DNI     Discharge Exam:  Afebrile  BP 140s/40s HR 60s  RR 18  O2 96%  HEENT:  NCAT, pupils equal and reactive, EOMI, no oropharyngeal lesions  NECK:  Supple, no carotid bruits, no palpable lymph nodes, no thyromegaly  CV: +S1, +S2, regular, rate normal  RESP: Normal resp effort, lungs clear to auscultation bilaterally  GI: Abdomen soft, non-tender, non-distended, normal BS,  MSK: Normal muscle tone, no atrophy, no rigidity, no contractions  EXT: No clubbing, no cyanosis, no edema, no calf pain, swelling or erythema  VASCULAR: Pulses equal and symmetric in the upper and lower extremities  NEURO:  AAOX3, no focal neurological deficits, follows all commands, able to move extremities spontaneously  SKIN:  no ulcers, lesions or rashes    Labs:    4.12 > 8.9/28.7 < 145    MVC WNL RDW WNL Iron 29 TIBC 235 Iron sat 11% Ferritin 86    138  |  98  |  28  --------------------<  80  3.4   |  33  |  1.21    Ca 8.9   Mg 2.3    a1c 5.3   troponin (-)  proBNP 2226    Tot prot 5.4 Alb 2.2 AST 59 ALT 61 AlkPhos 83 Tbili 0.3    UA clear, N-, LE-, 0-2 WBC, 0-2 RBC, occ bacteria, occ epithelial cells    COVID19 PCR negative    Imaging:  US venous duplex B/L LE 6/23: No evidence of right lower extremity deep venous thrombosis.    CT chest WO 6/21: Central tracheobronchial tree is patent.  Bilateral calcified granuloma. Mild bronchiectasis in the right middle lobe and lingula with associated atelectasis/scarring. Patchy biapical opacities are new from the prior study. Bilateral calcified pleural plaques. No enlarged mediastinal, hilar or axillary lymph nodes. The visualized portion of the thyroid gland is unremarkable. There are small bilateral pleural effusions.  There is no pneumothorax. Cardiomegaly.  There are atherosclerotic calcifications of the aorta and coronary arteries.  There is trace pericardial fluid.  Aortic valve calcification. Images of the upper abdomen demonstrate redemonstrated hepatic hypodensity. Multilevel vertebroplasty  The soft tissues are unremarkable.    CXR 6/21: Areas of probable scarring and fibrosis can be seen particularly in the left upper lobe and to a lesser extent in the right middle and lower lobe, unchanged from the previous study. Mild pleural reaction on the right is better seen at this time. Borderline heart size with loop recorder but no CHF. Multilevel thoracolumbar vertebroplasty, unchanged.    Cardiac Testing:  TTE 6/22:  Mild asymmetrical septal left ventricular hypertrophy is present. Estimated left ventricular ejection fraction is 60-65 %. The left atrium is mildly dilated. Mild (1+) aortic regurgitation. Moderate (2+) tricuspid valve regurgitation. Mild pulmonary hypertension. Trace pericardial effusion is present.    EKG 6/21: Normal rate. Sinus rhythm. Old anteroseptal q.                    89 y/o F with HTN, HFpEF, Afib not on AC due to falls, hx of TIA, CKD, lymphoma in remission, hyponatremia, adrenal insufficiency, past UTI, presented to [facility] with dyspnea, B/L LE edema in setting of eating high salt diet. Patient found to have CT chest findings of patchy B/L R pleural effusion and decompensated CHF, admitted to Medicine Telemetry.     Acute hypoxic respiratory distress  Patient admitted to the hospital with dyspnea and hypoxia. Found to have a CHF exacerbation and R pleural effusion, see below. Patient has since clinically improved and is stable for discharge.    Acute on chronic diastolic CHF  No angina. Trop negative. BNP 2226. EKG NSR, normal rate, old anteroseptal q. TTE done, mild asymmetrical septal left ventricular hypertrophy, normal LVEF, mildly dilated LA. Mild AR, mod TR, mild pHTN. Appreciate input from Cardiology. Patient s/p course of IV furosemide with subsequent improvement. Home dose of furosemide increased to 40mg daily. Carvedilol dose increased to 25mg BID to optimize BP, GDMT for CHF. Continued on home hydralazine 100mg TID, losartan 50mg daily and amlodipine 10mg daily. F/u with Cardiology as outpatient with Dr. Blount.     Paroxysmal afib   In normal sinus rhythm this admission. On carvedilol and amiodarone. Not on AC as outpatient secondary to risk of falls and risk of bleed per family.    Small B/L pleural effusions  As noted on chest imaging. No need to pursue thoracentesis. Medically managed with diuresis. Continue to monitor.    Mild bronchiectasis  As noted in CT chest in RML and lingula associated with atelectasis and scarring. Bilateral calcified pleural plaques. No enlarged mediastinal, hilar or axillary lymph nodes. No fever. No cough. No discolored sputum. Patient given ceftriaxone and azithromycin in the ED but not continued thereafter. Improved without antibiotics. Will need continued follow up as outpatient.     Uncontrolled hypertension   BP now improved with increased dose of carvedilol, continuation of patient's home hydralazine, losartan and amlodipine. Home furosemide dose was increased. F/u with PCP as outpatient for BP check.    Normocytic anemia  No overt bleeding. No signs of hemolysis. Likely due to iron deficiency vs chronic disease. Hgb ~9 and stable. MCV WNL. RDW WNL. Serum iron low, TIBC normal, ferritin normal. Iron sat low. Patient started on PO iron supplementation, will need PCP follow up outpatient.     Hypokalemia  In setting of IV Lasix. Patient given potassium supplementation.     Lower extremity edema  Likely due to CHF exacerbation. LE ultrasound duplex negative for DVT.     Hepatic hypodensity  As noted incidentally on CT chest. Previously seen. Continue to monitor.     Adrenal insufficiency  Stable. Continued on home fludrocortisone and hydrocortisone.     Code status: DNR/DNI     Discharge Exam:  Afebrile  BP 140s/40s HR 60s  RR 18  O2 96%  HEENT:  NCAT, pupils equal and reactive, EOMI, no oropharyngeal lesions  NECK:  Supple, no carotid bruits, no palpable lymph nodes, no thyromegaly  CV: +S1, +S2, regular, rate normal  RESP: Normal resp effort, lungs clear to auscultation bilaterally  GI: Abdomen soft, non-tender, non-distended, normal BS,  MSK: Normal muscle tone, no atrophy, no rigidity, no contractions  EXT: No clubbing, no cyanosis, no edema, no calf pain, swelling or erythema  VASCULAR: Pulses equal and symmetric in the upper and lower extremities  NEURO:  AAOX3, no focal neurological deficits, follows all commands, able to move extremities spontaneously  SKIN:  no ulcers, lesions or rashes    Labs:    4.12 > 8.9/28.7 < 145    MVC WNL RDW WNL Iron 29 TIBC 235 Iron sat 11% Ferritin 86    138  |  98  |  28  --------------------<  80  3.4   |  33  |  1.21    Ca 8.9   Mg 2.3    a1c 5.3   troponin (-)  proBNP 2226    Tot prot 5.4 Alb 2.2 AST 59 ALT 61 AlkPhos 83 Tbili 0.3    UA clear, N-, LE-, 0-2 WBC, 0-2 RBC, occ bacteria, occ epithelial cells    COVID19 PCR negative    Imaging:  US venous duplex B/L LE 6/23: No evidence of right lower extremity deep venous thrombosis.    CT chest WO 6/21: Central tracheobronchial tree is patent.  Bilateral calcified granuloma. Mild bronchiectasis in the right middle lobe and lingula with associated atelectasis/scarring. Patchy biapical opacities are new from the prior study. Bilateral calcified pleural plaques. No enlarged mediastinal, hilar or axillary lymph nodes. The visualized portion of the thyroid gland is unremarkable. There are small bilateral pleural effusions.  There is no pneumothorax. Cardiomegaly.  There are atherosclerotic calcifications of the aorta and coronary arteries.  There is trace pericardial fluid.  Aortic valve calcification. Images of the upper abdomen demonstrate redemonstrated hepatic hypodensity. Multilevel vertebroplasty  The soft tissues are unremarkable.    CXR 6/21: Areas of probable scarring and fibrosis can be seen particularly in the left upper lobe and to a lesser extent in the right middle and lower lobe, unchanged from the previous study. Mild pleural reaction on the right is better seen at this time. Borderline heart size with loop recorder but no CHF. Multilevel thoracolumbar vertebroplasty, unchanged.    Cardiac Testing:  TTE 6/22:  Mild asymmetrical septal left ventricular hypertrophy is present. Estimated left ventricular ejection fraction is 60-65 %. The left atrium is mildly dilated. Mild (1+) aortic regurgitation. Moderate (2+) tricuspid valve regurgitation. Mild pulmonary hypertension. Trace pericardial effusion is present.    EKG 6/21: Normal rate. Sinus rhythm. Old anteroseptal q.

## 2022-06-24 NOTE — DISCHARGE NOTE PROVIDER - PROVIDER TOKENS
PROVIDER:[TOKEN:[[id number]]] PROVIDER:[TOKEN:[[id number]],FOLLOWUP:[2 weeks]],PROVIDER:[TOKEN:[[id number]],SCHEDULEDAPPT:[07/05/2022]]

## 2022-06-24 NOTE — DISCHARGE NOTE PROVIDER - NSDCFUSCHEDAPPT_GEN_ALL_CORE_FT
Kristie Gong  [facility]  [facility] [id number]  Scheduled Appointment: 07/05/2022

## 2022-06-24 NOTE — DISCHARGE NOTE PROVIDER - NSDCCAREPROVSEEN_GEN_ALL_CORE_FT
Rachlin, Peter ([facility])  Tory Hamilton ([facility])  Imtiaz Mera (Cardiology)  Tahira Cook ([facility])  Kimmy Merida (Cardiology)

## 2022-06-24 NOTE — DISCHARGE NOTE PROVIDER - DETAILS OF MALNUTRITION DIAGNOSIS/DIAGNOSES
This patient has been assessed with a concern for Malnutrition and was treated during this hospitalization for the following Nutrition diagnosis/diagnoses:     -  06/22/2022: Severe protein-calorie malnutrition

## 2022-06-24 NOTE — DISCHARGE NOTE PROVIDER - NSDCMRMEDTOKEN_GEN_ALL_CORE_FT
acetaminophen 500 mg oral tablet: 1 tab(s) orally every 6 hours, As Needed  amiodarone 200 mg oral tablet: 1 tab(s) orally once a day  amLODIPine 10 mg oral tablet: 1 tab(s) orally once a day  Claritin 10 mg oral tablet: 1 tab(s) orally once a day, As Needed  Cranberry oral tablet: 1 tab(s) 500mg orally once a day as needed  cyanocobalamin 1000 mcg oral tablet: 1 tab(s) orally once a day  Cystex Urinary Pain Relief 162 mg-162.5 mg oral tablet: 1 tab(s) orally once a day, As Needed  famotidine 20 mg oral tablet: 1 tab(s) orally 2 times a day  fludrocortisone 0.1 mg oral tablet: 0.5 tab(s) orally once a day  furosemide 20 mg oral tablet: 1 tab(s) orally once a day  hydrALAZINE 100 mg oral tablet: 1 tab(s) orally 3 times a day   hydrocortisone 10 mg oral tablet: 1 tab(s) orally once a day (in the morning)  hydrocortisone 5 mg oral tablet: 1 tab(s) orally once a day (in the evening)  Icy Hot Advanced Pain Relief 5% topical pad: Apply topically to affected area 3 times a day, As Needed  Lasix 40 mg oral tablet: 1 tab(s) orally once a day   Livalo 2 mg oral tablet: 1 tab(s) orally once a day  losartan 50 mg oral tablet: 1 tab(s) orally once a day  Melatonin 3 mg oral tablet: 1 tab(s) orally once a day (at bedtime), As Needed  MiraLax oral powder for reconstitution: 17 gram(s) orally once a day, As Needed  Pepto-Bismol 262 mg/15 mL oral suspension: 15 milliliter(s) orally 4 times a day, As Needed  Senna 8.6 mg oral tablet: 2 tab(s) orally once a day  Vitamin D3 50 mcg (2000 intl units) oral tablet: 1 tab(s) orally once a day   acetaminophen 500 mg oral tablet: 1 tab(s) orally every 6 hours, As Needed  amiodarone 200 mg oral tablet: 1 tab(s) orally once a day  amLODIPine 10 mg oral tablet: 1 tab(s) orally once a day  Claritin 10 mg oral tablet: 1 tab(s) orally once a day, As Needed  Cranberry oral tablet: 1 tab(s) 500mg orally once a day as needed  cyanocobalamin 1000 mcg oral tablet: 1 tab(s) orally once a day  Cystex Urinary Pain Relief 162 mg-162.5 mg oral tablet: 1 tab(s) orally once a day, As Needed  famotidine 20 mg oral tablet: 1 tab(s) orally 2 times a day  ferrous sulfate 325 mg (65 mg elemental iron) oral tablet: 1 tab(s) orally once a day  fludrocortisone 0.1 mg oral tablet: 0.5 tab(s) orally once a day  furosemide 20 mg oral tablet: 1 tab(s) orally once a day  hydrALAZINE 100 mg oral tablet: 1 tab(s) orally 3 times a day   hydrocortisone 10 mg oral tablet: 1 tab(s) orally once a day (in the morning)  hydrocortisone 5 mg oral tablet: 1 tab(s) orally once a day (in the evening)  Icy Hot Advanced Pain Relief 5% topical pad: Apply topically to affected area 3 times a day, As Needed  Lasix 40 mg oral tablet: 1 tab(s) orally once a day   Livalo 2 mg oral tablet: 1 tab(s) orally once a day  losartan 50 mg oral tablet: 1 tab(s) orally once a day  Melatonin 3 mg oral tablet: 1 tab(s) orally once a day (at bedtime), As Needed  MiraLax oral powder for reconstitution: 17 gram(s) orally once a day, As Needed  Pepto-Bismol 262 mg/15 mL oral suspension: 15 milliliter(s) orally 4 times a day, As Needed  Senna 8.6 mg oral tablet: 2 tab(s) orally once a day  Vitamin D3 50 mcg (2000 intl units) oral tablet: 1 tab(s) orally once a day   acetaminophen 500 mg oral tablet: 1 tab(s) orally every 6 hours, As Needed  amiodarone 200 mg oral tablet: 1 tab(s) orally once a day  amLODIPine 10 mg oral tablet: 1 tab(s) orally once a day  carvedilol 25 mg oral tablet: 1 tab(s) orally every 12 hours  Claritin 10 mg oral tablet: 1 tab(s) orally once a day, As Needed  Cranberry oral tablet: 1 tab(s) 500mg orally once a day as needed  cyanocobalamin 1000 mcg oral tablet: 1 tab(s) orally once a day  Cystex Urinary Pain Relief 162 mg-162.5 mg oral tablet: 1 tab(s) orally once a day, As Needed  famotidine 20 mg oral tablet: 1 tab(s) orally 2 times a day  ferrous sulfate 325 mg (65 mg elemental iron) oral tablet: 1 tab(s) orally once a day  fludrocortisone 0.1 mg oral tablet: 0.5 tab(s) orally once a day  hydrALAZINE 100 mg oral tablet: 1 tab(s) orally 3 times a day   hydrocortisone 10 mg oral tablet: 1 tab(s) orally once a day (in the morning)  hydrocortisone 5 mg oral tablet: 1 tab(s) orally once a day (in the evening)  Icy Hot Advanced Pain Relief 5% topical pad: Apply topically to affected area 3 times a day, As Needed  Lasix 40 mg oral tablet: 1 tab(s) orally once a day   Livalo 2 mg oral tablet: 1 tab(s) orally once a day  losartan 50 mg oral tablet: 1 tab(s) orally once a day  Melatonin 3 mg oral tablet: 1 tab(s) orally once a day (at bedtime), As Needed  MiraLax oral powder for reconstitution: 17 gram(s) orally once a day, As Needed  Pepto-Bismol 262 mg/15 mL oral suspension: 15 milliliter(s) orally 4 times a day, As Needed  Senna 8.6 mg oral tablet: 2 tab(s) orally once a day  Vitamin D3 50 mcg (2000 intl units) oral tablet: 1 tab(s) orally once a day

## 2022-06-24 NOTE — PROGRESS NOTE ADULT - SUBJECTIVE AND OBJECTIVE BOX
Patient is a 89y old  Female who presents with a chief complaint of SOB with exertion.     HPI:  89 y/o F with PMH HTN, TIA, A fib not on AC d/t falls, CKD, lymphoma in remission, CHF, hyponatremia, adrenal insufficiency, UTI presented with SOB.   -   Pt seen and examined by me this am.  Still c/o SOB sitting in bed and cough.  No CP .  Last night mild confusion per staff.    - pt seen this am.  Pt states that she is having trouble breathing sitting up but better when lying down.     PAST MEDICAL & SURGICAL HISTORY:  Iron deficiency      HTN (hypertension)      Lymphoma      H/O adrenal insufficiency      Hyponatremia      Hypokalemia      Chronic kidney disease, unspecified CKD stage      Diastolic heart failure      Paroxysmal atrial fibrillation      S/P appendectomy      S/P hysterectomy      H/O intracranial hemorrhage          MEDICATIONS  (STANDING):  aMIOdarone    Tablet 200 milliGRAM(s) Oral daily  amLODIPine   Tablet 10 milliGRAM(s) Oral daily  atorvastatin 10 milliGRAM(s) Oral at bedtime  carvedilol 12.5 milliGRAM(s) Oral every 12 hours  chlorhexidine 4% Liquid 1 Application(s) Topical <User Schedule>  cholecalciferol 2000 Unit(s) Oral daily  cyanocobalamin 1000 MICROGram(s) Oral daily  enoxaparin Injectable 40 milliGRAM(s) SubCutaneous every 24 hours  famotidine    Tablet 20 milliGRAM(s) Oral daily  ferrous    sulfate 325 milliGRAM(s) Oral daily  fludroCORTISONE 0.05 milliGRAM(s) Oral daily  furosemide   Injectable 40 milliGRAM(s) IV Push daily  hydrALAZINE 50 milliGRAM(s) Oral three times a day  hydrocortisone 10 milliGRAM(s) Oral daily  hydrocortisone 5 milliGRAM(s) Oral at bedtime  losartan 50 milliGRAM(s) Oral daily    MEDICATIONS  (PRN):  acetaminophen     Tablet .. 650 milliGRAM(s) Oral every 6 hours PRN Temp greater or equal to 38C (100.4F), Mild Pain (1 - 3)  aluminum hydroxide/magnesium hydroxide/simethicone Suspension 30 milliLiter(s) Oral every 4 hours PRN Dyspepsia  bismuth subsalicylate Liquid 15 milliLiter(s) Oral four times a day PRN Diarrhea  hydrALAZINE Injectable 10 milliGRAM(s) IV Push every 6 hours PRN SBP  loratadine 10 milliGRAM(s) Oral daily PRN allergies  melatonin 3 milliGRAM(s) Oral at bedtime PRN Insomnia  ondansetron Injectable 4 milliGRAM(s) IV Push every 8 hours PRN Nausea and/or Vomiting  polyethylene glycol 3350 17 Gram(s) Oral daily PRN Constipation  senna 2 Tablet(s) Oral daily PRN Constipation      FAMILY HISTORY:  No known problems (Father, Mother)        SOCIAL HISTORY: no recent smoking     REVIEW OF SYSTEMS:  CONSTITUTIONAL:    No fatigue, malaise, lethargy.  No fever or chills.    RESPIRATORY:  No cough.  No wheeze.  No hemoptysis.  c/o shortness of breath.  CARDIOVASCULAR:  No chest pains.  No palpitations. c/o shortness of breath, No orthopnea or PND.  GASTROINTESTINAL:  No abdominal pain.  No nausea or vomiting.    GENITOURINARY:    No hematuria.    MUSCULOSKELETAL:  c/o musculoskeletal pain.  No joint swelling.  No arthritis.  NEUROLOGICAL:  No tingling or numbness or weakness.  PSYCHIATRIC:  No confusion  SKIN:  No rashes.          ICU Vital Signs Last 24 Hrs  T(C): 36.8 (2022 08:00), Max: 36.8 (2022 19:45)  T(F): 98.3 (2022 08:00), Max: 98.3 (2022 08:00)  HR: 69 (2022 08:00) (67 - 69)  BP: 163/48 (2022 08:00) (143/39 - 163/48)  BP(mean): --  ABP: --  ABP(mean): --  RR: 18 (2022 08:00) (18 - 18)  SpO2: 96% (2022 08:00) (94% - 99%)      PHYSICAL EXAM-    Constitutional: elderly frail female in no acute distress.     Head: Head is normocephalic and atraumatic.      Neck:  No JVD.     Cardiovascular: Regular rate and rhythm without S3, S4. No murmurs or rubs are appreciated.      Respiratory: B/l rales. No wheezing.    Abdomen: Soft, nontender, nondistended with positive bowel sounds.      Extremity: No tenderness. trace b/l pedal  pitting edema     Neurologic: The patient is alert and oriented.      Skin: No rash, no obvious lesions noted.      Psychiatric: The patient appears to be emotionally stable.      INTERPRETATION OF TELEMETRY: SR 60-70/min     ECG: Sinus rythm , poor R wave progression, T wave flattening in lateral leads.     I&O's Detail      LABS:                   138  |  100  |  18  ----------------------------<  89  2.9<LL>   |  34<H>  |  1.06    Ca    8.9      2022 07:24  Mg     2.1                   06 Chol 186 LDL -- HDL 92 Trig 66       PTT - ( 2022 16:57 )  PTT:26.4 sec  Urinalysis Basic - ( 2022 17:58 )    Color: Yellow / Appearance: Clear / S.010 / pH: x  Gluc: x / Ketone: Negative  / Bili: Negative / Urobili: Negative   Blood: x / Protein: 100 / Nitrite: Negative   Leuk Esterase: Negative / RBC: 0-2 /HPF / WBC 0-2   Sq Epi: x / Non Sq Epi: Occasional / Bacteria: Occasional      I&O's Summary    BNP  RADIOLOGY & ADDITIONAL STUDIES:  < from: TTE Echo Complete w/o Contrast w/ Doppler (22 @ 19:09) >   Impression     Summary     Moderate concentric left ventricular hypertrophy is present.   Basal septal hypertrophy is seen measuring 1.7cm.   Estimated left ventricular ejection fraction is 55-60 %.   Normal appearing left atrium.   Mild aortic sclerosis is present with normal valvular opening.   Mild (1+) to moderate aortic regurgitation is present pressure half time   is 215cm/s2.   Mild mitral annular calcification is present.   The mitral valve leaflets appear thickened.   Mild (1+) mitral regurgitation is present.   EA reversal of the mitral inflow consistent with reduced compliance of   the   left ventricle.   The tricuspid valve leaflets appear mildly thickened and/or calcified,   but   open well.   Mild (1+) tricuspid valve regurgitation is present.   Normal appearing pulmonic valve structure.   Trace pulmonic valvular regurgitation is present.     Signature     ----------------------------------------------------------------   Electronically signed by Steven Cui MD(Interpreting   physician) on 2022 10:38 PM   ----------------------------------------------------------------    < end of copied text >

## 2022-06-24 NOTE — DISCHARGE NOTE PROVIDER - REASON FOR ADMISSION
SOB with exertion Acute hypoxic respiratory failure  Acute on chronic diastolic CHF  R pleural effusion    Acute hypoxic respiratory distress  Acute on chronic diastolic CHF

## 2022-06-24 NOTE — DISCHARGE NOTE PROVIDER - NSDCCPCAREPLAN_GEN_ALL_CORE_FT
PRINCIPAL DISCHARGE DIAGNOSIS  Diagnosis: Acute CHF  Assessment and Plan of Treatment:   *Exacerbation of congestive heart failure.   *Adhere to a low salt diet. Refrain from eating canned foods with high salt.   *Increase your lasix to 40mg daily (previously 20mg daily )  *Follow up with Dr. Blount in one week in her office.      SECONDARY DISCHARGE DIAGNOSES  Diagnosis: Benign essential HTN  Assessment and Plan of Treatment:   *increase your Carvedilol to 25mg twice a day  *Follow up outpatient with your cardiologist Dr. Blount in one week.   *Adhere to a low salt diet.   *continue with your hydralazing 100mg three times a day     PRINCIPAL DISCHARGE DIAGNOSIS  Diagnosis: Acute CHF  Assessment and Plan of Treatment:   *Exacerbation of congestive heart failure.   *Adhere to a low salt diet. Refrain from eating canned foods with high salt.   *Increase your lasix to 40mg daily (previously 20mg daily )  *Follow up with Dr. Blount in one week in her office.      SECONDARY DISCHARGE DIAGNOSES  Diagnosis: Benign essential HTN  Assessment and Plan of Treatment:   *increase your Carvedilol to 25mg twice a day  *Follow up outpatient with your cardiologist Dr. Blount in one week.   *Adhere to a low salt diet.   *continue with your hydralazing 100mg three times a day    Diagnosis: Iron deficiency anemia  Assessment and Plan of Treatment:   *Start oral Ferrous Sulfate 325mg once daily.   *Iron may cause constipation. if this happens you can start over the counter stool softners.     PRINCIPAL DISCHARGE DIAGNOSIS  Diagnosis: Acute on chronic diastolic congestive heart failure  Assessment and Plan of Treatment: You were admitted to the hospital for shortness of breath, leg swelling. You were found to have an acute exacerbation of congestive heart failure. You were treated with an intravenous diuretic. Your home medications were continued albeit with adjustments in dose to maximize medical therapy. Continue on furosemide but now 40mg daily. Continue on carvedilol but now 25mg twice a day. Continue hydralazine 100mg three times a day. Adhere to a low salt diet. Avoid fluid intake greater than 1.5 Liters per day. Monitor weight, preferably at least twice a week. Notify your doctor or return to the hospital of you develop chest pain or tightness, worsening shortness of breath, trouble laying flat, leg swelling or increase in weight greater than 3 lbs in 2 days or 5 lbs in 1 week. Follow up with Dr Hwang as outpatient within 2 weeks.      SECONDARY DISCHARGE DIAGNOSES  Diagnosis: Benign essential HTN  Assessment and Plan of Treatment: Note that your home furosemide was increaesed to 40mg daily, carvedilol was increased to 25mg twice a day and hydralazine 100mg three times a day was continued. Please follow up with your Primary Care provider and/or Dr. Blount, Cardiology within 1-2 weeks for blood pressure check.    Diagnosis: Normocytic anemia  Assessment and Plan of Treatment: You were noted to have a mild anemia. Cause may be iron deficiency given your lab results. You were started on iron supplementation. Iron may cause constipation. If this happens you can start over the counter stool softners. Please follow up with your Primary Care Provider for further workup if needed.    Diagnosis: Bronchiectasis  Assessment and Plan of Treatment: You were noted to have on cat scan of the chest areas of bronchiectasis (thickening of the brochioles, which are mid-sized airways). There was also some lung scarring. Reassuringy, you had no signs or symptoms of infection and you improved without a course of antibiotics. Recommend outpatient follow up with your Primary Care Provider.

## 2022-06-24 NOTE — DISCHARGE NOTE PROVIDER - CARE PROVIDER_API CALL
Kimmy Merida)  [facility]; Cardiovascular Disease  [address] [place] [address]  Phone: [phone]  Fax: [phone]  Follow Up Time:    Kimmy Merida)  [facility]; Cardiovascular Disease  [address] [place] [address]  Phone: [phone]  Fax: [phone]  Follow Up Time: 2 weeks    Kristie Gong)  Internal Medicine  [address] [place] [address]  Phone: [phone]  Fax: [phone]  Scheduled Appointment: 07/05/2022

## 2022-06-25 LAB
ANION GAP SERPL CALC-SCNC: 7 MMOL/L — SIGNIFICANT CHANGE UP (ref 5–17)
BUN SERPL-MCNC: 28 MG/DL — HIGH (ref 7–23)
CALCIUM SERPL-MCNC: 8.9 MG/DL — SIGNIFICANT CHANGE UP (ref 8.5–10.1)
CHLORIDE SERPL-SCNC: 98 MMOL/L — SIGNIFICANT CHANGE UP (ref 96–108)
CO2 SERPL-SCNC: 33 MMOL/L — HIGH (ref 22–31)
CREAT SERPL-MCNC: 1.21 MG/DL — SIGNIFICANT CHANGE UP (ref 0.5–1.3)
EGFR: 41 ML/MIN/1.73M2 — LOW
GLUCOSE SERPL-MCNC: 80 MG/DL — SIGNIFICANT CHANGE UP (ref 70–99)
MAGNESIUM SERPL-MCNC: 2.3 MG/DL — SIGNIFICANT CHANGE UP (ref 1.6–2.6)
POTASSIUM SERPL-MCNC: 3.4 MMOL/L — LOW (ref 3.5–5.3)
POTASSIUM SERPL-SCNC: 3.4 MMOL/L — LOW (ref 3.5–5.3)
SARS-COV-2 RNA SPEC QL NAA+PROBE: SIGNIFICANT CHANGE UP
SODIUM SERPL-SCNC: 138 MMOL/L — SIGNIFICANT CHANGE UP (ref 135–145)

## 2022-06-25 PROCEDURE — 99232 SBSQ HOSP IP/OBS MODERATE 35: CPT

## 2022-06-25 RX ORDER — FERROUS SULFATE 325(65) MG
1 TABLET ORAL
Qty: 0 | Refills: 0 | DISCHARGE

## 2022-06-25 RX ORDER — CARVEDILOL PHOSPHATE 80 MG/1
1 CAPSULE, EXTENDED RELEASE ORAL
Qty: 60 | Refills: 3
Start: 2022-06-25

## 2022-06-25 RX ORDER — POTASSIUM CHLORIDE 20 MEQ
40 PACKET (EA) ORAL ONCE
Refills: 0 | Status: COMPLETED | OUTPATIENT
Start: 2022-06-25 | End: 2022-06-25

## 2022-06-25 RX ORDER — FERROUS SULFATE 325(65) MG
1 TABLET ORAL
Qty: 30 | Refills: 3
Start: 2022-06-25

## 2022-06-25 RX ORDER — CARVEDILOL PHOSPHATE 80 MG/1
1 CAPSULE, EXTENDED RELEASE ORAL
Qty: 0 | Refills: 3 | DISCHARGE
Start: 2022-06-25

## 2022-06-25 RX ORDER — FUROSEMIDE 40 MG
1 TABLET ORAL
Qty: 30 | Refills: 3
Start: 2022-06-25 | End: 2022-10-22

## 2022-06-25 RX ORDER — FUROSEMIDE 40 MG
1 TABLET ORAL
Qty: 0 | Refills: 0 | DISCHARGE

## 2022-06-25 RX ADMIN — PREGABALIN 1000 MICROGRAM(S): 225 CAPSULE ORAL at 09:57

## 2022-06-25 RX ADMIN — LOSARTAN POTASSIUM 50 MILLIGRAM(S): 100 TABLET, FILM COATED ORAL at 10:04

## 2022-06-25 RX ADMIN — Medication 10 MILLIGRAM(S): at 09:57

## 2022-06-25 RX ADMIN — FAMOTIDINE 20 MILLIGRAM(S): 10 INJECTION INTRAVENOUS at 10:02

## 2022-06-25 RX ADMIN — CARVEDILOL PHOSPHATE 25 MILLIGRAM(S): 80 CAPSULE, EXTENDED RELEASE ORAL at 22:06

## 2022-06-25 RX ADMIN — AMIODARONE HYDROCHLORIDE 200 MILLIGRAM(S): 400 TABLET ORAL at 09:57

## 2022-06-25 RX ADMIN — Medication 40 MILLIEQUIVALENT(S): at 09:57

## 2022-06-25 RX ADMIN — FLUDROCORTISONE ACETATE 0.05 MILLIGRAM(S): 0.1 TABLET ORAL at 09:57

## 2022-06-25 RX ADMIN — ATORVASTATIN CALCIUM 10 MILLIGRAM(S): 80 TABLET, FILM COATED ORAL at 22:05

## 2022-06-25 RX ADMIN — Medication 2000 UNIT(S): at 10:03

## 2022-06-25 RX ADMIN — Medication 5 MILLIGRAM(S): at 22:06

## 2022-06-25 RX ADMIN — CARVEDILOL PHOSPHATE 25 MILLIGRAM(S): 80 CAPSULE, EXTENDED RELEASE ORAL at 09:57

## 2022-06-25 RX ADMIN — CHLORHEXIDINE GLUCONATE 1 APPLICATION(S): 213 SOLUTION TOPICAL at 10:03

## 2022-06-25 RX ADMIN — Medication 100 MILLIGRAM(S): at 05:39

## 2022-06-25 RX ADMIN — Medication 100 MILLIGRAM(S): at 22:06

## 2022-06-25 RX ADMIN — ENOXAPARIN SODIUM 40 MILLIGRAM(S): 100 INJECTION SUBCUTANEOUS at 22:06

## 2022-06-25 RX ADMIN — Medication 100 MILLIGRAM(S): at 13:40

## 2022-06-25 RX ADMIN — AMLODIPINE BESYLATE 10 MILLIGRAM(S): 2.5 TABLET ORAL at 10:03

## 2022-06-25 RX ADMIN — Medication 325 MILLIGRAM(S): at 09:57

## 2022-06-25 RX ADMIN — Medication 40 MILLIGRAM(S): at 10:02

## 2022-06-25 NOTE — PROGRESS NOTE ADULT - ASSESSMENT
89 y/o F with HTN, HFpEF, Afib not on AC due to falls, hx of TIA, CKD, lymphoma in remission, hyponatremia, adrenal insufficiency, past UTI, presented to [facility] with dyspnea, B/L LE edema in setting of eating high salt diet. Patient found to have CT chest findings of patchy B/L R pleural effusion and decompensated CHF, admitted to Medicine Telemetry.     Acute hypoxic respiratory distress  Patient admitted to the hospital with dyspnea and hypoxia. Found to have a CHF exacerbation and R pleural effusion, see below. Patient has since clinically improved and is stable for discharge.    Acute on chronic diastolic CHF  No angina. Trop negative. BNP 2226. EKG NSR, normal rate, old anteroseptal q. TTE done, mild asymmetrical septal left ventricular hypertrophy, normal LVEF, mildly dilated LA. Mild AR, mod TR, mild pHTN. Appreciate input from Cardiology. Patient s/p course of IV furosemide with subsequent improvement. Home dose of furosemide increased to 40mg daily. Carvedilol dose increased to 25mg BID to optimize BP, GDMT for CHF. Continued on home hydralazine 100mg TID, losartan 50mg daily and amlodipine 10mg daily. F/u with Cardiology as outpatient with Dr. Blount.     Paroxysmal afib  In normal sinus rhythm this admission. On carvedilol and amiodarone. Not on AC as outpatient secondary to risk of falls and risk of bleed per family.    Small B/L pleural effusions  As noted on chest imaging. No need to pursue thoracentesis. Medically managed with diuresis. Continue to monitor.    Mild bronchiectasis  As noted in CT chest in RML and lingula associated with atelectasis and scarring. Bilateral calcified pleural plaques. No enlarged mediastinal, hilar or axillary lymph nodes. No fever. No cough. No discolored sputum. Patient given ceftriaxone and azithromycin in the ED but not continued thereafter. Improved without antibiotics. Will need continued follow up as outpatient.     Uncontrolled hypertension   BP now improved with increased dose of carvedilol, continuation of patient's home hydralazine, losartan and amlodipine. Home furosemide dose was increased. F/u with PCP as outpatient for BP check.    Normocytic anemia  No overt bleeding. No signs of hemolysis. Likely due to iron deficiency vs chronic disease. Hgb ~9 and stable. MCV WNL. RDW WNL. Serum iron low, TIBC normal, ferritin normal. Iron sat low. Patient started on PO iron supplementation, will need PCP follow up outpatient.     Hypokalemia  In setting of IV Lasix. Patient given potassium supplementation.     Lower extremity edema  Likely due to CHF exacerbation. LE ultrasound duplex negative for DVT.     Hepatic hypodensity  As noted incidentally on CT chest. Previously seen. Continue to monitor.     Adrenal insufficiency  Stable. Continued on home fludrocortisone and hydrocortisone.     Code status: DNR/DNI   
SOB, Acute on chronic decompensated HFPEF- hypervolemic, NYHA class III- complicated by chronic CKD and poorly controlled HTN and labile HTN.   Recommend diuresis with lasix iv daily.  Diuresis with close monitoring of the renal function and electrolytes.  Goal potassium of 4 and magnesium of 2.   Strict I/O and daily wt checks. Low sodium diet. Nutrition education.   cardiac enzymes negative.      Anemia- chronic normocytic anemia.   Management per primary team.     CKD- renal function stable.  monitor with diuresis.    Pooly controlled HTN- in the past pt states that her BP became when low when meds uptitrated in hosptial.  BP still high.  Will increase coreg while monitoring on tele.  Will monitor for bradycardia.  IF still high then will consider losartan uptitration which should be relatively easy compared to coreg uptitration.   low sodium diet.  Outpt she might be getting more sodium in diet.    Paroxysmal afib- now in SR  Not on AC as outpt secondary to risk of falls and risk of bleed per family.    Other medical issues- Management per primary team.   Thank you for allowing me to participate in the care of this patient. Please feel free to contact me with any questions. 
SOB, Acute on chronic decompensated HFPEF- hypervolemic, NYHA class III- complicated by chronic CKD and poorly controlled HTN and labile HTN.   I saw her in office few days ago and I informed the daughter that BP high and mild volume overload.  They are leaning toward comfort measures and want interventions to be as minimal as possible and they do not want pt to know her BP high or volume high.  Recommend diuresis with lasix iv daily.  Diuresis with close monitoring of the renal function and electrolytes.  Goal potassium of 4 and magnesium of 2.   Strict I/O and daily wt checks. Low sodium diet. Nutrition education.   cardiac enzymes negative.      Anemia- chronic normocytic anemia.   Management per primary team.     CKD- renal function stable.  monitor with diuresis.    Pooly controlled HTN- in the past pt states that her BP became when low when meds uptitrated in hosptial.  Today recheck BP after am meds in 2 hrs.  If BP still high increase losartan to 100mg daily.  low sodium diet.  Outpt she might be getting more sodium in diet.    Paroxysmal afib- now in SR  Not on AC as outpt secondary to risk of falls and risk of bleed per family.    Other medical issues- Management per primary team.   Thank you for allowing me to participate in the care of this patient. Please feel free to contact me with any questions.

## 2022-06-25 NOTE — PROGRESS NOTE ADULT - NUTRITIONAL ASSESSMENT
This patient has been assessed with a concern for Malnutrition and has been determined to have a diagnosis/diagnoses of Severe protein-calorie malnutrition.    This patient is being managed with:   Diet DASH/TLC-  Sodium & Cholesterol Restricted  Entered: Jun 21 2022  8:45PM    

## 2022-06-25 NOTE — PROGRESS NOTE ADULT - SUBJECTIVE AND OBJECTIVE BOX
Chief Complaint: Shortness of breath    Interval Hx: Patient seen and examined. Feeling much improved. No dyspnea at rest. Minimal dyspnea with exertion. No chest pain or tightness. No cough. No leg edema. O2 sat 96% on RA at rest.     ROS: Multi system review comprehensively negative x 10 systems except for some chronic physical deconditioning and debility    Exam:  Afebrile  BP 140s/40s HR 60s  RR 18  O2 96%  HEENT:  NCAT, pupils equal and reactive, EOMI, no oropharyngeal lesions  NECK:  Supple, no carotid bruits, no palpable lymph nodes, no thyromegaly  CV: +S1, +S2, regular, rate normal  RESP: Normal resp effort, lungs clear to auscultation bilaterally  GI: Abdomen soft, non-tender, non-distended, normal BS,  MSK: Normal muscle tone, no atrophy, no rigidity, no contractions  EXT: No clubbing, no cyanosis, no edema, no calf pain, swelling or erythema  VASCULAR: Pulses equal and symmetric in the upper and lower extremities  NEURO:  AAOX3, no focal neurological deficits, follows all commands, able to move extremities spontaneously  SKIN:  no ulcers, lesions or rashes    Labs:    4.12 > 8.9/28.7 < 145    MVC WNL RDW WNL Iron 29 TIBC 235 Iron sat 11% Ferritin 86    138  |  98  |  28  --------------------<  80  3.4   |  33  |  1.21    Ca 8.9   Mg 2.3    a1c 5.3   troponin (-)  proBNP 2226    Tot prot 5.4 Alb 2.2 AST 59 ALT 61 AlkPhos 83 Tbili 0.3    UA clear, N-, LE-, 0-2 WBC, 0-2 RBC, occ bacteria, occ epithelial cells    COVID19 PCR negative    Imaging:  US venous duplex B/L LE 6/23: No evidence of right lower extremity deep venous thrombosis.    CT chest WO 6/21: Central tracheobronchial tree is patent.  Bilateral calcified granuloma. Mild bronchiectasis in the right middle lobe and lingula with associated atelectasis/scarring. Patchy biapical opacities are new from the prior study. Bilateral calcified pleural plaques. No enlarged mediastinal, hilar or axillary lymph nodes. The visualized portion of the thyroid gland is unremarkable. There are small bilateral pleural effusions.  There is no pneumothorax. Cardiomegaly.  There are atherosclerotic calcifications of the aorta and coronary arteries.  There is trace pericardial fluid.  Aortic valve calcification. Images of the upper abdomen demonstrate redemonstrated hepatic hypodensity. Multilevel vertebroplasty  The soft tissues are unremarkable.    CXR 6/21: Areas of probable scarring and fibrosis can be seen particularly in the left upper lobe and to a lesser extent in the right middle and lower lobe, unchanged from the previous study. Mild pleural reaction on the right is better seen at this time. Borderline heart size with loop recorder but no CHF. Multilevel thoracolumbar vertebroplasty, unchanged.    Cardiac Testing:  TTE 6/22:  Mild asymmetrical septal left ventricular hypertrophy is present. Estimated left ventricular ejection fraction is 60-65 %. The left atrium is mildly dilated. Mild (1+) aortic regurgitation. Moderate (2+) tricuspid valve regurgitation. Mild pulmonary hypertension. Trace pericardial effusion is present.    EKG 6/21: Normal rate. Sinus rhythm. Old anteroseptal q.

## 2022-06-26 ENCOUNTER — TRANSCRIPTION ENCOUNTER (OUTPATIENT)
Age: 87
End: 2022-06-26

## 2022-06-26 VITALS
OXYGEN SATURATION: 97 % | DIASTOLIC BLOOD PRESSURE: 35 MMHG | RESPIRATION RATE: 18 BRPM | HEART RATE: 63 BPM | SYSTOLIC BLOOD PRESSURE: 135 MMHG | TEMPERATURE: 98 F

## 2022-06-26 PROCEDURE — 99239 HOSP IP/OBS DSCHRG MGMT >30: CPT

## 2022-06-26 RX ORDER — SENNA PLUS 8.6 MG/1
2 TABLET ORAL
Qty: 0 | Refills: 0 | DISCHARGE

## 2022-06-26 RX ORDER — SENNA PLUS 8.6 MG/1
2 TABLET ORAL
Qty: 60 | Refills: 3
Start: 2022-06-26

## 2022-06-26 RX ORDER — SENNA PLUS 8.6 MG/1
2 TABLET ORAL
Qty: 0 | Refills: 3 | DISCHARGE
Start: 2022-06-26

## 2022-06-26 RX ORDER — POLYETHYLENE GLYCOL 3350 17 G/17G
17 POWDER, FOR SOLUTION ORAL
Qty: 510 | Refills: 3
Start: 2022-06-26 | End: 2022-10-23

## 2022-06-26 RX ORDER — POLYETHYLENE GLYCOL 3350 17 G/17G
17 POWDER, FOR SOLUTION ORAL
Qty: 0 | Refills: 0 | DISCHARGE

## 2022-06-26 RX ADMIN — CARVEDILOL PHOSPHATE 25 MILLIGRAM(S): 80 CAPSULE, EXTENDED RELEASE ORAL at 09:08

## 2022-06-26 RX ADMIN — LOSARTAN POTASSIUM 50 MILLIGRAM(S): 100 TABLET, FILM COATED ORAL at 05:49

## 2022-06-26 RX ADMIN — Medication 10 MILLIGRAM(S): at 09:07

## 2022-06-26 RX ADMIN — PREGABALIN 1000 MICROGRAM(S): 225 CAPSULE ORAL at 09:07

## 2022-06-26 RX ADMIN — FAMOTIDINE 20 MILLIGRAM(S): 10 INJECTION INTRAVENOUS at 09:08

## 2022-06-26 RX ADMIN — Medication 325 MILLIGRAM(S): at 09:06

## 2022-06-26 RX ADMIN — AMLODIPINE BESYLATE 10 MILLIGRAM(S): 2.5 TABLET ORAL at 05:48

## 2022-06-26 RX ADMIN — Medication 100 MILLIGRAM(S): at 05:49

## 2022-06-26 RX ADMIN — FLUDROCORTISONE ACETATE 0.05 MILLIGRAM(S): 0.1 TABLET ORAL at 09:08

## 2022-06-26 RX ADMIN — Medication 2000 UNIT(S): at 09:07

## 2022-06-26 RX ADMIN — Medication 40 MILLIGRAM(S): at 05:49

## 2022-06-26 RX ADMIN — CHLORHEXIDINE GLUCONATE 1 APPLICATION(S): 213 SOLUTION TOPICAL at 05:48

## 2022-06-26 RX ADMIN — AMIODARONE HYDROCHLORIDE 200 MILLIGRAM(S): 400 TABLET ORAL at 09:07

## 2022-06-26 NOTE — DISCHARGE NOTE NURSING/CASE MANAGEMENT/SOCIAL WORK - NSDCPEFALRISK_GEN_ALL_CORE
For information on Fall & Injury Prevention, visit: [email or web address] OR  [email or web address] OR  https://www.cdc.gov/steadi/patient.html

## 2022-06-26 NOTE — DISCHARGE NOTE NURSING/CASE MANAGEMENT/SOCIAL WORK - PATIENT PORTAL LINK FT
You can access the FollowMyHealth Patient Portal offered by [facility] by registering at the following website: [email or web address]. By joining [employer]’s FollowMyHealth portal, you will also be able to view your health information using other applications (apps) compatible with our system.

## 2022-06-26 NOTE — DISCHARGE NOTE NURSING/CASE MANAGEMENT/SOCIAL WORK - NSDCVIVACCINE_GEN_ALL_CORE_FT
Tdap; 10-Jul-2021 13:25; Starr Zazueta (KENNETH); [employer]; [id number] (Exp. Date: 25-Nov-2022); IntraMuscular; Deltoid Left.; 0.5 milliLiter(s); VIS (VIS Published: 09-May-2013, VIS Presented: 10-Jul-2021);

## 2022-06-30 DIAGNOSIS — Z66 DO NOT RESUSCITATE: ICD-10-CM

## 2022-06-30 DIAGNOSIS — I50.33 ACUTE ON CHRONIC DIASTOLIC (CONGESTIVE) HEART FAILURE: ICD-10-CM

## 2022-06-30 DIAGNOSIS — Z88.0 ALLERGY STATUS TO PENICILLIN: ICD-10-CM

## 2022-06-30 DIAGNOSIS — E88.09 OTHER DISORDERS OF PLASMA-PROTEIN METABOLISM, NOT ELSEWHERE CLASSIFIED: ICD-10-CM

## 2022-06-30 DIAGNOSIS — Z87.440 PERSONAL HISTORY OF URINARY (TRACT) INFECTIONS: ICD-10-CM

## 2022-06-30 DIAGNOSIS — I48.0 PAROXYSMAL ATRIAL FIBRILLATION: ICD-10-CM

## 2022-06-30 DIAGNOSIS — J96.01 ACUTE RESPIRATORY FAILURE WITH HYPOXIA: ICD-10-CM

## 2022-06-30 DIAGNOSIS — C85.90 NON-HODGKIN LYMPHOMA, UNSPECIFIED, UNSPECIFIED SITE: ICD-10-CM

## 2022-06-30 DIAGNOSIS — Z88.2 ALLERGY STATUS TO SULFONAMIDES: ICD-10-CM

## 2022-06-30 DIAGNOSIS — R73.9 HYPERGLYCEMIA, UNSPECIFIED: ICD-10-CM

## 2022-06-30 DIAGNOSIS — Z88.1 ALLERGY STATUS TO OTHER ANTIBIOTIC AGENTS STATUS: ICD-10-CM

## 2022-06-30 DIAGNOSIS — J47.9 BRONCHIECTASIS, UNCOMPLICATED: ICD-10-CM

## 2022-06-30 DIAGNOSIS — E43 UNSPECIFIED SEVERE PROTEIN-CALORIE MALNUTRITION: ICD-10-CM

## 2022-06-30 DIAGNOSIS — Z86.73 PERSONAL HISTORY OF TRANSIENT ISCHEMIC ATTACK (TIA), AND CEREBRAL INFARCTION WITHOUT RESIDUAL DEFICITS: ICD-10-CM

## 2022-06-30 DIAGNOSIS — I13.0 HYPERTENSIVE HEART AND CHRONIC KIDNEY DISEASE WITH HEART FAILURE AND STAGE 1 THROUGH STAGE 4 CHRONIC KIDNEY DISEASE, OR UNSPECIFIED CHRONIC KIDNEY DISEASE: ICD-10-CM

## 2022-06-30 DIAGNOSIS — N18.9 CHRONIC KIDNEY DISEASE, UNSPECIFIED: ICD-10-CM

## 2022-07-05 ENCOUNTER — APPOINTMENT (OUTPATIENT)
Dept: INTERNAL MEDICINE | Facility: CLINIC | Age: 87
End: 2022-07-05

## 2022-07-05 VITALS
TEMPERATURE: 98 F | HEIGHT: 60 IN | BODY MASS INDEX: 23.56 KG/M2 | DIASTOLIC BLOOD PRESSURE: 80 MMHG | OXYGEN SATURATION: 98 % | HEART RATE: 79 BPM | SYSTOLIC BLOOD PRESSURE: 150 MMHG | RESPIRATION RATE: 15 BRPM | WEIGHT: 120 LBS

## 2022-07-05 DIAGNOSIS — R09.82 POSTNASAL DRIP: ICD-10-CM

## 2022-07-05 DIAGNOSIS — Z87.891 PERSONAL HISTORY OF NICOTINE DEPENDENCE: ICD-10-CM

## 2022-07-05 DIAGNOSIS — Z76.89 PERSONS ENCOUNTERING HEALTH SERVICES IN OTHER SPECIFIED CIRCUMSTANCES: ICD-10-CM

## 2022-07-05 DIAGNOSIS — I10 ESSENTIAL (PRIMARY) HYPERTENSION: ICD-10-CM

## 2022-07-05 DIAGNOSIS — Z86.79 PERSONAL HISTORY OF OTHER DISEASES OF THE CIRCULATORY SYSTEM: ICD-10-CM

## 2022-07-05 DIAGNOSIS — Z80.0 FAMILY HISTORY OF MALIGNANT NEOPLASM OF DIGESTIVE ORGANS: ICD-10-CM

## 2022-07-05 DIAGNOSIS — M81.0 AGE-RELATED OSTEOPOROSIS W/OUT CURRENT PATHOLOGICAL FRACTURE: ICD-10-CM

## 2022-07-05 DIAGNOSIS — H91.90 UNSPECIFIED HEARING LOSS, UNSPECIFIED EAR: ICD-10-CM

## 2022-07-05 DIAGNOSIS — Z86.39 PERSONAL HISTORY OF OTHER ENDOCRINE, NUTRITIONAL AND METABOLIC DISEASE: ICD-10-CM

## 2022-07-05 PROCEDURE — 99204 OFFICE O/P NEW MOD 45 MIN: CPT | Mod: 25

## 2022-07-05 PROCEDURE — 36415 COLL VENOUS BLD VENIPUNCTURE: CPT

## 2022-07-05 PROCEDURE — G0444 DEPRESSION SCREEN ANNUAL: CPT | Mod: 59

## 2022-07-05 RX ORDER — FUROSEMIDE 20 MG/1
20 TABLET ORAL
Refills: 0 | Status: DISCONTINUED | COMMUNITY
Start: 2022-05-18 | End: 2022-07-05

## 2022-07-05 RX ORDER — CRANBERRY FRUIT EXTRACT 200 MG
CAPSULE ORAL
Refills: 0 | Status: ACTIVE | COMMUNITY

## 2022-07-05 NOTE — HISTORY OF PRESENT ILLNESS
[Family Member] : family member [FreeTextEntry1] : pt. is here today to establish care. [de-identified] : Ms. ZOEY HERRERA  is    89 year female . \par She have h/o uncontrolled HTN , have multiple visits to the hospital , CHF , seeing cardiology.\par adrenal dysfunction causing hyponatremia.\par lymphoma in remission , not seeing oncology at this point.\par osteoporosis not on treatment.\par macular degeneration both eyes , not able to see well.\par she is feeling stable , except , feels dizzy intermittent and have unsteady gait uses a walker.\par c/o cough , worse at night , post nasal drip.\par No change is bowel and bladder habit.\par No trouble sleeping at night.\par \par

## 2022-07-05 NOTE — PHYSICAL EXAM
[Normal Rate] : normal rate  [Regular Rhythm] : with a regular rhythm [Normal] : affect was normal and insight and judgment were intact [de-identified] : Bilateral nasal mucosa red and swollen [de-identified] : + Systolic murmur [de-identified] : Generalized weakness, uses walker

## 2022-07-05 NOTE — HEALTH RISK ASSESSMENT
[Fair] :  ~his/her~ mood as fair [Never] : Never [No] : In the past 12 months have you used drugs other than those required for medical reasons? No [No falls in past year] : Patient reported no falls in the past year [3] : 1) Little interest or pleasure doing things for nearly every day (3) [1] : 2) Feeling down, depressed, or hopeless for several days (1) [PHQ-2 Positive] : PHQ-2 Positive [Nearly Every Day (3)] : 4.) Feeling tired or having little energy? Nearly every day [Several Days (1)] : 5.) Poor appetite or overeating? Several days [Not at All (0)] : 9.) Thoughts that you would be off dead or of hurting yourself in some way? Not at all [Mild] : severity of depression is mild [PHQ-9 Positive] : PHQ-9 Positive [Learning/Retaining New Information] : difficulty learning/retaining new information [Handling Complex Tasks] : difficulty handling complex tasks [Reasoning] : difficulty with reasoning [de-identified] : limited due to weakness [[id number]] : 8 [Change in mental status noted] : No change in mental status noted [Language] : denies difficulty with language [de-identified] : lives in a senior living facility

## 2022-07-05 NOTE — ASSESSMENT
[FreeTextEntry1] : Ms. HERRERA is a 89-year-old female here with her daughter to establish care.\par She had multiple hospital visits in the last few months for elevated blood pressure.\par I reviewed hospital records, labs, cardiac work-up\par \par Uncontrolled hypertension:\par Blood pressure is fairly controlled today.\par She is on amlodipine 10 mg, carvedilol 25 mg twice daily, furosemide 40 mg once a day, hydralazine 100 mg tid, losartan 50 mg once a day.\par Also on amiodarone 200 mg once a day.\par \par GERD:\par Symptoms are controlled on famotidine 20 mg once a day.\par \par Lymphoma:\par Currently on remission.\par \par Macular degeneration both eyes:\par Sees ophthalmology.  She is on erythromycin ophthalmic ointment as needed\par \par Postnasal drip:\par Advised Flonase nasal spray 2 spray both nostril at bedtime.\par Hypokalemia:\par Ordered CMP.\par \par HM:\par PHQ-9: 8.  Stated she will not need medication, she feels fine.\par Up-to-date on flu, Tdap, COVID-vaccine\par \par Return for follow-up in 3 months.\par \par \par

## 2022-07-05 NOTE — REVIEW OF SYSTEMS
[Hearing Loss] : hearing loss [Nosebleed] : nosebleed [Nasal Discharge] : nasal discharge [Postnasal Drip] : postnasal drip [Wheezing] : wheezing [Cough] : cough [Negative] : Gastrointestinal [Earache] : no earache [Hoarseness] : no hoarseness [Sore Throat] : no sore throat [Shortness Of Breath] : no shortness of breath [Dyspnea on Exertion] : no dyspnea on exertion

## 2022-07-06 LAB
ALBUMIN SERPL ELPH-MCNC: 3.4 G/DL
ALP BLD-CCNC: 95 U/L
ALT SERPL-CCNC: 38 U/L
ANION GAP SERPL CALC-SCNC: 11 MMOL/L
AST SERPL-CCNC: 46 U/L
BILIRUB SERPL-MCNC: 0.2 MG/DL
BUN SERPL-MCNC: 24 MG/DL
CALCIUM SERPL-MCNC: 8.8 MG/DL
CHLORIDE SERPL-SCNC: 101 MMOL/L
CO2 SERPL-SCNC: 29 MMOL/L
CREAT SERPL-MCNC: 1.66 MG/DL
EGFR: 28 ML/MIN/1.73M2
GLUCOSE SERPL-MCNC: 101 MG/DL
POTASSIUM SERPL-SCNC: 3.7 MMOL/L
PROT SERPL-MCNC: 5.5 G/DL
SODIUM SERPL-SCNC: 142 MMOL/L

## 2022-07-07 ENCOUNTER — NON-APPOINTMENT (OUTPATIENT)
Age: 87
End: 2022-07-07

## 2022-07-17 ENCOUNTER — INPATIENT (INPATIENT)
Facility: HOSPITAL | Age: 87
LOS: 9 days | Discharge: HOME | DRG: 291 | End: 2022-07-27
Attending: INTERNAL MEDICINE | Admitting: HOSPITALIST
Payer: MEDICARE

## 2022-07-17 VITALS — HEIGHT: 60 IN | WEIGHT: 119.93 LBS

## 2022-07-17 DIAGNOSIS — Z90.49 ACQUIRED ABSENCE OF OTHER SPECIFIED PARTS OF DIGESTIVE TRACT: Chronic | ICD-10-CM

## 2022-07-17 DIAGNOSIS — Z90.710 ACQUIRED ABSENCE OF BOTH CERVIX AND UTERUS: Chronic | ICD-10-CM

## 2022-07-17 DIAGNOSIS — Z86.79 PERSONAL HISTORY OF OTHER DISEASES OF THE CIRCULATORY SYSTEM: Chronic | ICD-10-CM

## 2022-07-17 LAB
APTT BLD: 28 SEC — SIGNIFICANT CHANGE UP (ref 27.5–35.5)
BASOPHILS # BLD AUTO: 0.01 K/UL — SIGNIFICANT CHANGE UP (ref 0–0.2)
BASOPHILS NFR BLD AUTO: 0.2 % — SIGNIFICANT CHANGE UP (ref 0–2)
EOSINOPHIL # BLD AUTO: 0.02 K/UL — SIGNIFICANT CHANGE UP (ref 0–0.5)
EOSINOPHIL NFR BLD AUTO: 0.4 % — SIGNIFICANT CHANGE UP (ref 0–6)
HCT VFR BLD CALC: 32.8 % — LOW (ref 34.5–45)
HGB BLD-MCNC: 10.6 G/DL — LOW (ref 11.5–15.5)
IMM GRANULOCYTES NFR BLD AUTO: 0.2 % — SIGNIFICANT CHANGE UP (ref 0–1.5)
INR BLD: 0.97 RATIO — SIGNIFICANT CHANGE UP (ref 0.88–1.16)
LYMPHOCYTES # BLD AUTO: 1.38 K/UL — SIGNIFICANT CHANGE UP (ref 1–3.3)
LYMPHOCYTES # BLD AUTO: 26.8 % — SIGNIFICANT CHANGE UP (ref 13–44)
MCHC RBC-ENTMCNC: 30.8 PG — SIGNIFICANT CHANGE UP (ref 27–34)
MCHC RBC-ENTMCNC: 32.3 GM/DL — SIGNIFICANT CHANGE UP (ref 32–36)
MCV RBC AUTO: 95.3 FL — SIGNIFICANT CHANGE UP (ref 80–100)
MONOCYTES # BLD AUTO: 0.72 K/UL — SIGNIFICANT CHANGE UP (ref 0–0.9)
MONOCYTES NFR BLD AUTO: 14 % — SIGNIFICANT CHANGE UP (ref 2–14)
NEUTROPHILS # BLD AUTO: 3 K/UL — SIGNIFICANT CHANGE UP (ref 1.8–7.4)
NEUTROPHILS NFR BLD AUTO: 58.4 % — SIGNIFICANT CHANGE UP (ref 43–77)
PLATELET # BLD AUTO: 115 K/UL — LOW (ref 150–400)
PROTHROM AB SERPL-ACNC: 11.3 SEC — SIGNIFICANT CHANGE UP (ref 10.5–13.4)
RBC # BLD: 3.44 M/UL — LOW (ref 3.8–5.2)
RBC # FLD: 15.6 % — HIGH (ref 10.3–14.5)
SARS-COV-2 RNA SPEC QL NAA+PROBE: SIGNIFICANT CHANGE UP
WBC # BLD: 5.14 K/UL — SIGNIFICANT CHANGE UP (ref 3.8–10.5)
WBC # FLD AUTO: 5.14 K/UL — SIGNIFICANT CHANGE UP (ref 3.8–10.5)

## 2022-07-17 PROCEDURE — 99285 EMERGENCY DEPT VISIT HI MDM: CPT | Mod: FS,CS

## 2022-07-17 PROCEDURE — 71045 X-RAY EXAM CHEST 1 VIEW: CPT | Mod: 26

## 2022-07-17 RX ORDER — ONDANSETRON 8 MG/1
4 TABLET, FILM COATED ORAL EVERY 8 HOURS
Refills: 0 | Status: DISCONTINUED | OUTPATIENT
Start: 2022-07-17 | End: 2022-07-27

## 2022-07-17 RX ORDER — HYDRALAZINE HCL 50 MG
100 TABLET ORAL EVERY 8 HOURS
Refills: 0 | Status: DISCONTINUED | OUTPATIENT
Start: 2022-07-17 | End: 2022-07-27

## 2022-07-17 RX ORDER — ACETAMINOPHEN 500 MG
650 TABLET ORAL EVERY 6 HOURS
Refills: 0 | Status: DISCONTINUED | OUTPATIENT
Start: 2022-07-17 | End: 2022-07-21

## 2022-07-17 RX ORDER — PREGABALIN 225 MG/1
1000 CAPSULE ORAL DAILY
Refills: 0 | Status: DISCONTINUED | OUTPATIENT
Start: 2022-07-17 | End: 2022-07-27

## 2022-07-17 RX ORDER — CHOLECALCIFEROL (VITAMIN D3) 125 MCG
2000 CAPSULE ORAL DAILY
Refills: 0 | Status: DISCONTINUED | OUTPATIENT
Start: 2022-07-17 | End: 2022-07-27

## 2022-07-17 RX ORDER — POTASSIUM CHLORIDE 20 MEQ
40 PACKET (EA) ORAL ONCE
Refills: 0 | Status: COMPLETED | OUTPATIENT
Start: 2022-07-17 | End: 2022-07-17

## 2022-07-17 RX ORDER — FLUDROCORTISONE ACETATE 0.1 MG/1
0.1 TABLET ORAL DAILY
Refills: 0 | Status: DISCONTINUED | OUTPATIENT
Start: 2022-07-17 | End: 2022-07-18

## 2022-07-17 RX ORDER — AMLODIPINE BESYLATE 2.5 MG/1
10 TABLET ORAL DAILY
Refills: 0 | Status: DISCONTINUED | OUTPATIENT
Start: 2022-07-17 | End: 2022-07-27

## 2022-07-17 RX ORDER — POLYETHYLENE GLYCOL 3350 17 G/17G
17 POWDER, FOR SOLUTION ORAL DAILY
Refills: 0 | Status: DISCONTINUED | OUTPATIENT
Start: 2022-07-17 | End: 2022-07-27

## 2022-07-17 RX ORDER — LORATADINE 10 MG/1
10 TABLET ORAL DAILY
Refills: 0 | Status: DISCONTINUED | OUTPATIENT
Start: 2022-07-17 | End: 2022-07-27

## 2022-07-17 RX ORDER — FUROSEMIDE 40 MG
40 TABLET ORAL ONCE
Refills: 0 | Status: COMPLETED | OUTPATIENT
Start: 2022-07-17 | End: 2022-07-17

## 2022-07-17 RX ORDER — LANOLIN ALCOHOL/MO/W.PET/CERES
3 CREAM (GRAM) TOPICAL AT BEDTIME
Refills: 0 | Status: DISCONTINUED | OUTPATIENT
Start: 2022-07-17 | End: 2022-07-27

## 2022-07-17 RX ORDER — HYDROCORTISONE 20 MG
10 TABLET ORAL
Refills: 0 | Status: DISCONTINUED | OUTPATIENT
Start: 2022-07-17 | End: 2022-07-27

## 2022-07-17 RX ORDER — AMIODARONE HYDROCHLORIDE 400 MG/1
200 TABLET ORAL DAILY
Refills: 0 | Status: DISCONTINUED | OUTPATIENT
Start: 2022-07-17 | End: 2022-07-27

## 2022-07-17 RX ORDER — POTASSIUM CHLORIDE 20 MEQ
20 PACKET (EA) ORAL EVERY 4 HOURS
Refills: 0 | Status: COMPLETED | OUTPATIENT
Start: 2022-07-17 | End: 2022-07-17

## 2022-07-17 RX ORDER — FAMOTIDINE 10 MG/ML
20 INJECTION INTRAVENOUS DAILY
Refills: 0 | Status: DISCONTINUED | OUTPATIENT
Start: 2022-07-17 | End: 2022-07-27

## 2022-07-17 RX ORDER — SENNA PLUS 8.6 MG/1
2 TABLET ORAL AT BEDTIME
Refills: 0 | Status: DISCONTINUED | OUTPATIENT
Start: 2022-07-17 | End: 2022-07-27

## 2022-07-17 RX ORDER — ATORVASTATIN CALCIUM 80 MG/1
10 TABLET, FILM COATED ORAL AT BEDTIME
Refills: 0 | Status: DISCONTINUED | OUTPATIENT
Start: 2022-07-17 | End: 2022-07-27

## 2022-07-17 RX ORDER — CARVEDILOL PHOSPHATE 80 MG/1
25 CAPSULE, EXTENDED RELEASE ORAL EVERY 12 HOURS
Refills: 0 | Status: DISCONTINUED | OUTPATIENT
Start: 2022-07-17 | End: 2022-07-27

## 2022-07-17 RX ORDER — POTASSIUM CHLORIDE 20 MEQ
10 PACKET (EA) ORAL ONCE
Refills: 0 | Status: DISCONTINUED | OUTPATIENT
Start: 2022-07-17 | End: 2022-07-17

## 2022-07-17 RX ORDER — POTASSIUM CHLORIDE 20 MEQ
10 PACKET (EA) ORAL
Refills: 0 | Status: COMPLETED | OUTPATIENT
Start: 2022-07-17 | End: 2022-07-17

## 2022-07-17 RX ORDER — HYDROCORTISONE 20 MG
5 TABLET ORAL
Refills: 0 | Status: DISCONTINUED | OUTPATIENT
Start: 2022-07-17 | End: 2022-07-27

## 2022-07-17 RX ADMIN — Medication 100 MILLIGRAM(S): at 21:01

## 2022-07-17 RX ADMIN — Medication 40 MILLIGRAM(S): at 13:50

## 2022-07-17 RX ADMIN — Medication 100 MILLIEQUIVALENT(S): at 15:03

## 2022-07-17 RX ADMIN — Medication 3 MILLIGRAM(S): at 21:01

## 2022-07-17 RX ADMIN — FAMOTIDINE 20 MILLIGRAM(S): 10 INJECTION INTRAVENOUS at 18:54

## 2022-07-17 RX ADMIN — Medication 20 MILLIEQUIVALENT(S): at 20:13

## 2022-07-17 RX ADMIN — Medication 40 MILLIEQUIVALENT(S): at 15:03

## 2022-07-17 RX ADMIN — Medication 100 MILLIEQUIVALENT(S): at 17:03

## 2022-07-17 RX ADMIN — SENNA PLUS 2 TABLET(S): 8.6 TABLET ORAL at 21:01

## 2022-07-17 RX ADMIN — Medication 5 MILLIGRAM(S): at 21:01

## 2022-07-17 RX ADMIN — ATORVASTATIN CALCIUM 10 MILLIGRAM(S): 80 TABLET, FILM COATED ORAL at 21:01

## 2022-07-17 RX ADMIN — Medication 20 MILLIEQUIVALENT(S): at 21:02

## 2022-07-17 NOTE — ED PROVIDER NOTE - NS ED ATTENDING STATEMENT MOD
This was a shared visit with the ZIGGY. I reviewed and verified the documentation and independently performed the documented:

## 2022-07-17 NOTE — ED PROVIDER NOTE - PHYSICAL EXAMINATION
Gen: Well appearing. A&O x3.   HEENT: NC/AT. Dentition in good repair. No oropharyngeal erythema, tonsilar enlargement or exudates. Uvula midline. No submandibular or anterior cervical lymphadenopathy. TM well visualized bilaterally. Nares patent.  Cardiac: s1s2, RRR.  Lungs: Decreased breath sounds right chest, no chest wall tenderness.  Abdomen: NBS x4, soft, nontender. No CVAT.  MSK: No obvious deformity to extremities. No midline spinal tenderness or tenderness over bony landmarks on extremities. 5/5 upper and lower extremity strength. Full ROM in all extremities  Neuro: CN II-XII intact. Sensation intact to light touch in all extremities. 5/5 strength in all extremities.   PV: 2+ pitting LE and UE edema without calf tenderness. Distal pulses 2+ in all extremities.

## 2022-07-17 NOTE — ED ADULT TRIAGE NOTE - CHIEF COMPLAINT QUOTE
PT presents to er with complaints of chest heaviness since last night, states she has a history of CHF and was recently admitted. States she took all morning meds today.

## 2022-07-17 NOTE — PATIENT PROFILE ADULT - BRAND OF FIRST COVID-19 BOOSTER
[employer] impaired postural control/impaired balance/abnormal muscle tone/decreased ROM/decreased strength/impaired motor control

## 2022-07-17 NOTE — ED PROVIDER NOTE - OBJECTIVE STATEMENT
89 y/o F with PMH of CHF, HTN, lymphoma, CKD presents with SOB, chest pain, LE and UE swelling x 2-3 days. Daughter at bedside proving majority of history as patient is [place] as well. Patient recently admitted for similar from 6/21-6/26 and discharged home. Had home dose of lasix increased to 40mg PO daily upon discharge. Denies fever, chills, nausea, vomiting, dizziness.

## 2022-07-17 NOTE — ED PROVIDER NOTE - PROGRESS NOTE DETAILS
Pt initially recorded O2 saturation of 96% at triage. During history/physical patient observed to have O2 saturation 89-91% throughout exam, placed on 4L O2 NC and now at 95%. IV lasix ordered. - RENU WinklerC Symptoms improved with IV lasix, found to be hypokalemic, will replete in ED. Oxygen now at 2L NC, holding % O2 saturation. Plan for observation placement. - Enrique Souza PA-C Craig Nunez for attending Dr. Rojas  89 y/o female with a PMHX of htn, chf, cad, lymphoma hospitalized last week of June for CHF exacerbation, BIBA to ED c/o of recurrence of SOB some chest distress positive swelling of all 4 extremities past 2-3 days. No fever, chills, or cough. Had chest pain and labored breathing.   Exam: positive edema, distal extremities x 4, sob exacerbated, positive chest distress since bradycardic, regular, decreased breathing sound at bases with slightly rales, 1-2 plus pitting edema distal extremities  x4 no tactile warmth Craig Nunez for attending Dr. Rojas  89 y/o female with a PMHX of htn, chf, cad, lymphoma hospitalized last week of June for CHF exacerbation, BIBA to ED c/o of recurrence of SOB some chest distress, positive swelling of all 4 extremities past 2-3 days. No fever, chills, or cough. Had chest pain and labored breathing.   Exam:   CV: bradycardic, regular, Lungs: decreased breathing sound at bases with slight rales, 1-2 plus pitting edema distal extremities x4. Skin: no tactile warmth,. no rash.  Neuro: Alert, CNs intact, no focal motor weakness.

## 2022-07-17 NOTE — PATIENT PROFILE ADULT - FALL HARM RISK - HARM RISK INTERVENTIONS

## 2022-07-17 NOTE — ED ADULT NURSE NOTE - NS ED NOTE ABUSE SUSPICION NEGLECT YN
Quality 226: Preventive Care And Screening: Tobacco Use: Screening And Cessation Intervention: Patient screened for tobacco use and is an ex/non-smoker
Quality 130: Documentation Of Current Medications In The Medical Record: Current Medications Documented
Detail Level: Detailed
Quality 431: Preventive Care And Screening: Unhealthy Alcohol Use - Screening: Patient not identified as an unhealthy alcohol user when screened for unhealthy alcohol use using a systematic screening method
No

## 2022-07-17 NOTE — H&P ADULT - NSHPLABSRESULTS_GEN_ALL_CORE
10.6   5.14  )-----------( 115      ( 17 Jul 2022 13:11 )             32.8     07-17    135  |  96  |  22  ----------------------------<  109<H>  2.8<LL>   |  33<H>  |  1.52<H>    Ca    8.5      17 Jul 2022 13:59  Mg     2.1     07-17    TPro  6.1  /  Alb  2.6<L>  /  TBili  0.3  /  DBili  x   /  AST  52<H>  /  ALT  49  /  AlkPhos  99  07-17    COVID-19 PCR: NotDetec (17 Jul 2022 13:11)  COVID-19 PCR: NotDetec (25 Jun 2022 10:15)  SARS-CoV-2: NotDetec (21 Jun 2022 16:58)  COVID-19 PCR: NotDetec (04 Jun 2022 15:20)  COVID-19 PCR: NotDetec (31 May 2022 00:08)  COVID-19 PCR: NotDetec (06 May 2022 09:20)  COVID-19 PCR: Detected (05 May 2022 10:45)  COVID-19 PCR: NotDetec (01 May 2022 11:30)  COVID-19 PCR: NotDetec (07 Apr 2022 11:54)  COVID-19 PCR: NotDetec (06 Apr 2022 21:15)  COVID-19 PCR: NotDetec (04 Apr 2022 10:10)  COVID-19 PCR: NotDetec (31 Mar 2022 06:10)  COVID-19 PCR: NotDetec (26 Jan 2022 16:49)    CAPILLARY BLOOD GLUCOSE      Troponin I, High Sensitivity Result: 15.39Serum Pro-Brain Natriuretic Peptide: 1170 pg/mL (07.17.22 @ 13:59)   Serum Pro-Brain Natriuretic Peptide: 2226 pg/mL (06.21.22 @ 16:57)   Serum Pro-Brain Natriuretic Peptide: 2380 pg/mL (05.06.22 @ 07:23)   Serum Pro-Brain Natriuretic Peptide: 1995 pg/mL (05.05.22 @ 08:19)   Serum Pro-Brain Natriuretic Peptide: 2188 pg/mL (05.03.22 @ 07:56)     < from: 12 Lead ECG (07.17.22 @ 11:32) >    Diagnosis Line Normal sinus rhythm  Anteroseptal infarct (cited on or before 28-NOV-2018)  Abnormal ECG  When compared with ECG of 21-JUN-2022 15:54,      < end of copied text >      CXRAY (7/17): Mild vascualr congestion; bilateral pleural effusions (official radiology read of imaging unavailable at the time of admission)  I personally reviewed labs, imaging, ek

## 2022-07-17 NOTE — ED ADULT NURSE REASSESSMENT NOTE - NS ED NURSE REASSESS COMMENT FT1
Assumed care of Pt from RN Anette. Pt received resting comfortably in stretcher. Vital signs reassessed and stable at this time. Pending cortef from pharmacy, as per RN Anette called to request medication earlier, still not received. No additional requests or complaints. Patient safety maintained. Will continue to monitor.

## 2022-07-17 NOTE — ED PROVIDER NOTE - NS ED ROS FT
GEN: Denies fever, chills, sick contacts, recent travel  HEENT: Denies dizziness, blurry vision, HA  Cardiac: +SOB, CP Denies palpitations  Lungs: Denies cough, wheezing  PV: + LE swelling  GI: Denies nausea, vomiting, diarrhea, constipation, flank pain  : Denies hematuria, dysuria, frequency, urgency  Skin: Denies rash, redness, open wound  MSK: Denies pain, decreased ROM  Neuro: Denies dizziness, difficulty speaking, difficulty swallowing, numbness/tingling in extremities, loss of bowel/bladder control, weakness in extremities

## 2022-07-17 NOTE — H&P ADULT - NSHPPHYSICALEXAM_GEN_ALL_CORE
PHYSICAL EXAM:    T(C): 36.4 (07-17-22 @ 15:08), Max: 36.4 (07-17-22 @ 11:40)  HR: 56 (07-17-22 @ 15:08) (56 - 60)  BP: 139/86 (07-17-22 @ 15:08) (139/86 - 147/72)  RR: 20 (07-17-22 @ 15:08) (17 - 20)  SpO2: 96% (07-17-22 @ 15:08) (96% - 96%)    General: AAOx3; NAD  Head: AT/NC  ENT: Moist Mucous Membranes; No Injury  Eyes: EOMI; PERRL  Neck: Non-tender; No JVD  CVS: RRR, S1&S2, No murmur, Anasarca  Respiratory: Decreased basilar breath sounds. ; Normal Respiratory Effort  Abdomen/GI: Soft, non-tender, non-distended, no guarding, no rebound, normal bowel sounds  : No bladder distention, No Kidd  Extremities: No cyanosis, No clubbing, LE Edema  MSK: No CVA tenderness, Normal ROM, No injury  Neuro: AAOx3, CNII-XII grossly intact (Except Kaw), non-focal  Psych: Appropriate, Cooperative,   Skin: Clean, Dry and Intact

## 2022-07-17 NOTE — ED PROVIDER NOTE - CARE PLAN
1 Principal Discharge DX:	CHF exacerbation  Secondary Diagnosis:	Acute respiratory failure with hypoxia  Secondary Diagnosis:	Hypokalemia  Secondary Diagnosis:	Pleural effusion due to congestive heart failure

## 2022-07-17 NOTE — ED PROVIDER NOTE - ATTENDING APP SHARED VISIT CONTRIBUTION OF CARE
HORACE Rojas MD, ED Attending physician:  This was a shared visit with ZIGGY.  I reviewed and verified the documentation and independently performed the documented history/exam.

## 2022-07-17 NOTE — PROVIDER CONTACT NOTE (OTHER) - SITUATION
removed IV in pt R FA when arrived from ER for warmth, swelling and pain. Let arm rest elevated and reevaluated site, less swollen, but still reddened and warm to the touch

## 2022-07-17 NOTE — H&P ADULT - HISTORY OF PRESENT ILLNESS
89 y/o F with PMH HTN, TIA, A fib not on AC d/t falls, CKD, lymphoma in remission, CHF, hyponatremia, adrenal insufficiency presents from [facility] with increased SOB/Edema. Admitted for acute CHF exacerbation towards end of June. At that time patient had baseline GREENBERG and fatigue with exertion. However in the last week leading up to admission worsening fatigue and GREENBERG and edema. Unknown if there is weight gain/loss.  Denies fever, chills, syncope, chest pain, nausea, vomiting, Abdominal pain/discomfort.    Recent admission end of June with CHF exacerbation. At discharge increased lasix dose and coreg dosing.     In the ER, Tmax 97.6F, HR 56-60, /72, RR 17-20, SpO2 96% on RA (reported SpO2 89-91% on RA in ER documentation). Hgb 10.6 (baseline), Plt 115, K 2.8, Cr 1.52 (baseline 1.1-1.3), BUN 22, BNP 1170.

## 2022-07-17 NOTE — ED PROVIDER NOTE - CLINICAL SUMMARY MEDICAL DECISION MAKING FREE TEXT BOX
89 y/o F with PMH of CHF presents with SOB, diffuse extremity swelling. Recent admission for CHF, concern for exacerbation. WIll check labs, EKG, CXR, provide IV lasix, likely admission.

## 2022-07-18 DIAGNOSIS — I50.9 HEART FAILURE, UNSPECIFIED: ICD-10-CM

## 2022-07-18 LAB
ANION GAP SERPL CALC-SCNC: 5 MMOL/L — SIGNIFICANT CHANGE UP (ref 5–17)
BUN SERPL-MCNC: 20 MG/DL — SIGNIFICANT CHANGE UP (ref 7–23)
CALCIUM SERPL-MCNC: 8.9 MG/DL — SIGNIFICANT CHANGE UP (ref 8.5–10.1)
CHLORIDE SERPL-SCNC: 98 MMOL/L — SIGNIFICANT CHANGE UP (ref 96–108)
CO2 SERPL-SCNC: 31 MMOL/L — SIGNIFICANT CHANGE UP (ref 22–31)
CREAT SERPL-MCNC: 1.34 MG/DL — HIGH (ref 0.5–1.3)
EGFR: 36 ML/MIN/1.73M2 — LOW
GLUCOSE SERPL-MCNC: 86 MG/DL — SIGNIFICANT CHANGE UP (ref 70–99)
HCT VFR BLD CALC: 29.7 % — LOW (ref 34.5–45)
HGB BLD-MCNC: 9.3 G/DL — LOW (ref 11.5–15.5)
MAGNESIUM SERPL-MCNC: 2.1 MG/DL — SIGNIFICANT CHANGE UP (ref 1.6–2.6)
MCHC RBC-ENTMCNC: 30.2 PG — SIGNIFICANT CHANGE UP (ref 27–34)
MCHC RBC-ENTMCNC: 31.3 GM/DL — LOW (ref 32–36)
MCV RBC AUTO: 96.4 FL — SIGNIFICANT CHANGE UP (ref 80–100)
PHOSPHATE SERPL-MCNC: 2.6 MG/DL — SIGNIFICANT CHANGE UP (ref 2.5–4.5)
PLATELET # BLD AUTO: 104 K/UL — LOW (ref 150–400)
POTASSIUM SERPL-MCNC: 3.5 MMOL/L — SIGNIFICANT CHANGE UP (ref 3.5–5.3)
POTASSIUM SERPL-SCNC: 3.5 MMOL/L — SIGNIFICANT CHANGE UP (ref 3.5–5.3)
RBC # BLD: 3.08 M/UL — LOW (ref 3.8–5.2)
RBC # FLD: 15.4 % — HIGH (ref 10.3–14.5)
SODIUM SERPL-SCNC: 134 MMOL/L — LOW (ref 135–145)
WBC # BLD: 4.31 K/UL — SIGNIFICANT CHANGE UP (ref 3.8–10.5)
WBC # FLD AUTO: 4.31 K/UL — SIGNIFICANT CHANGE UP (ref 3.8–10.5)

## 2022-07-18 PROCEDURE — 84300 ASSAY OF URINE SODIUM: CPT

## 2022-07-18 PROCEDURE — 76376 3D RENDER W/INTRP POSTPROCES: CPT

## 2022-07-18 PROCEDURE — 82570 ASSAY OF URINE CREATININE: CPT

## 2022-07-18 PROCEDURE — 97110 THERAPEUTIC EXERCISES: CPT | Mod: GP

## 2022-07-18 PROCEDURE — 99233 SBSQ HOSP IP/OBS HIGH 50: CPT

## 2022-07-18 PROCEDURE — 93971 EXTREMITY STUDY: CPT | Mod: LT

## 2022-07-18 PROCEDURE — 87635 SARS-COV-2 COVID-19 AMP PRB: CPT

## 2022-07-18 PROCEDURE — 84156 ASSAY OF PROTEIN URINE: CPT

## 2022-07-18 PROCEDURE — 36415 COLL VENOUS BLD VENIPUNCTURE: CPT

## 2022-07-18 PROCEDURE — 94760 N-INVAS EAR/PLS OXIMETRY 1: CPT

## 2022-07-18 PROCEDURE — 80048 BASIC METABOLIC PNL TOTAL CA: CPT

## 2022-07-18 PROCEDURE — 97163 PT EVAL HIGH COMPLEX 45 MIN: CPT | Mod: GP

## 2022-07-18 PROCEDURE — 86334 IMMUNOFIX E-PHORESIS SERUM: CPT

## 2022-07-18 PROCEDURE — 85027 COMPLETE CBC AUTOMATED: CPT

## 2022-07-18 PROCEDURE — 73521 X-RAY EXAM HIPS BI 2 VIEWS: CPT

## 2022-07-18 PROCEDURE — 84166 PROTEIN E-PHORESIS/URINE/CSF: CPT

## 2022-07-18 PROCEDURE — 83735 ASSAY OF MAGNESIUM: CPT

## 2022-07-18 PROCEDURE — 83880 ASSAY OF NATRIURETIC PEPTIDE: CPT

## 2022-07-18 PROCEDURE — 81001 URINALYSIS AUTO W/SCOPE: CPT

## 2022-07-18 PROCEDURE — 84165 PROTEIN E-PHORESIS SERUM: CPT

## 2022-07-18 PROCEDURE — 84100 ASSAY OF PHOSPHORUS: CPT

## 2022-07-18 PROCEDURE — 97116 GAIT TRAINING THERAPY: CPT | Mod: GP

## 2022-07-18 PROCEDURE — 84155 ASSAY OF PROTEIN SERUM: CPT

## 2022-07-18 PROCEDURE — 82962 GLUCOSE BLOOD TEST: CPT

## 2022-07-18 PROCEDURE — 72192 CT PELVIS W/O DYE: CPT

## 2022-07-18 RX ORDER — ACETAMINOPHEN 500 MG
1 TABLET ORAL
Qty: 0 | Refills: 0 | DISCHARGE

## 2022-07-18 RX ORDER — FLUDROCORTISONE ACETATE 0.1 MG/1
0.05 TABLET ORAL DAILY
Refills: 0 | Status: DISCONTINUED | OUTPATIENT
Start: 2022-07-18 | End: 2022-07-27

## 2022-07-18 RX ORDER — FUROSEMIDE 40 MG
40 TABLET ORAL DAILY
Refills: 0 | Status: DISCONTINUED | OUTPATIENT
Start: 2022-07-18 | End: 2022-07-23

## 2022-07-18 RX ORDER — CHLORHEXIDINE GLUCONATE 213 G/1000ML
1 SOLUTION TOPICAL
Refills: 0 | Status: DISCONTINUED | OUTPATIENT
Start: 2022-07-18 | End: 2022-07-27

## 2022-07-18 RX ORDER — LOSARTAN POTASSIUM 100 MG/1
50 TABLET, FILM COATED ORAL DAILY
Refills: 0 | Status: DISCONTINUED | OUTPATIENT
Start: 2022-07-18 | End: 2022-07-27

## 2022-07-18 RX ORDER — FLUDROCORTISONE ACETATE 0.1 MG/1
0.1 TABLET ORAL DAILY
Refills: 0 | Status: DISCONTINUED | OUTPATIENT
Start: 2022-07-18 | End: 2022-07-18

## 2022-07-18 RX ADMIN — Medication 100 MILLIGRAM(S): at 14:04

## 2022-07-18 RX ADMIN — Medication 100 MILLIGRAM(S): at 05:33

## 2022-07-18 RX ADMIN — SENNA PLUS 2 TABLET(S): 8.6 TABLET ORAL at 22:43

## 2022-07-18 RX ADMIN — AMIODARONE HYDROCHLORIDE 200 MILLIGRAM(S): 400 TABLET ORAL at 10:04

## 2022-07-18 RX ADMIN — Medication 2000 UNIT(S): at 10:05

## 2022-07-18 RX ADMIN — FLUDROCORTISONE ACETATE 0.05 MILLIGRAM(S): 0.1 TABLET ORAL at 18:03

## 2022-07-18 RX ADMIN — Medication 5 MILLIGRAM(S): at 18:03

## 2022-07-18 RX ADMIN — CHLORHEXIDINE GLUCONATE 1 APPLICATION(S): 213 SOLUTION TOPICAL at 14:05

## 2022-07-18 RX ADMIN — AMLODIPINE BESYLATE 10 MILLIGRAM(S): 2.5 TABLET ORAL at 10:05

## 2022-07-18 RX ADMIN — PREGABALIN 1000 MICROGRAM(S): 225 CAPSULE ORAL at 10:04

## 2022-07-18 RX ADMIN — ATORVASTATIN CALCIUM 10 MILLIGRAM(S): 80 TABLET, FILM COATED ORAL at 22:44

## 2022-07-18 RX ADMIN — CARVEDILOL PHOSPHATE 25 MILLIGRAM(S): 80 CAPSULE, EXTENDED RELEASE ORAL at 22:44

## 2022-07-18 RX ADMIN — Medication 40 MILLIGRAM(S): at 10:07

## 2022-07-18 RX ADMIN — Medication 100 MILLIGRAM(S): at 22:44

## 2022-07-18 RX ADMIN — FAMOTIDINE 20 MILLIGRAM(S): 10 INJECTION INTRAVENOUS at 10:05

## 2022-07-18 RX ADMIN — LOSARTAN POTASSIUM 50 MILLIGRAM(S): 100 TABLET, FILM COATED ORAL at 10:07

## 2022-07-18 RX ADMIN — Medication 10 MILLIGRAM(S): at 10:04

## 2022-07-18 RX ADMIN — CARVEDILOL PHOSPHATE 25 MILLIGRAM(S): 80 CAPSULE, EXTENDED RELEASE ORAL at 10:04

## 2022-07-18 NOTE — DIETITIAN NUTRITION RISK NOTIFICATION - ADDITIONAL COMMENTS/DIETITIAN RECOMMENDATIONS
Record PO intake in EMR after each meal (nursing.)   MVI w/ minerals daily to ensure 100% RDA met.    ensure pudding bid.    Pt on 1500 cc FR.    Daily weights.   Monitor PO intake, tolerance, labs and weight.

## 2022-07-18 NOTE — DIETITIAN INITIAL EVALUATION ADULT - PERTINENT MEDS FT
MEDICATIONS  (STANDING):  aMIOdarone    Tablet 200 milliGRAM(s) Oral daily  amLODIPine   Tablet 10 milliGRAM(s) Oral daily  atorvastatin 10 milliGRAM(s) Oral at bedtime  carvedilol 25 milliGRAM(s) Oral every 12 hours  cholecalciferol 2000 Unit(s) Oral daily  cyanocobalamin 1000 MICROGram(s) Oral daily  famotidine    Tablet 20 milliGRAM(s) Oral daily  fludroCORTISONE 0.1 milliGRAM(s) Oral daily  hydrALAZINE 100 milliGRAM(s) Oral every 8 hours  hydrocortisone 10 milliGRAM(s) Oral <User Schedule>  hydrocortisone 5 milliGRAM(s) Oral <User Schedule>  senna 2 Tablet(s) Oral at bedtime    MEDICATIONS  (PRN):  acetaminophen     Tablet .. 650 milliGRAM(s) Oral every 6 hours PRN Temp greater or equal to 38C (100.4F), Mild Pain (1 - 3)  aluminum hydroxide/magnesium hydroxide/simethicone Suspension 30 milliLiter(s) Oral every 4 hours PRN Dyspepsia  bismuth subsalicylate Liquid 15 milliLiter(s) Oral four times a day PRN Dyspsea/GI DIscomfort  loratadine 10 milliGRAM(s) Oral daily PRN Allergies  melatonin 3 milliGRAM(s) Oral at bedtime PRN Insomnia  ondansetron Injectable 4 milliGRAM(s) IV Push every 8 hours PRN Nausea and/or Vomiting  polyethylene glycol 3350 17 Gram(s) Oral daily PRN Constipation

## 2022-07-18 NOTE — DIETITIAN INITIAL EVALUATION ADULT - ADD RECOMMEND
Record PO intake in EMR after each meal (nursing.) MVI w/ minerals daily to ensure 100% RDA met.  ensure pudding.  Pt on 1500 cc FR.  Daily weights. Monitor PO intake, tolerance, labs and weight.

## 2022-07-18 NOTE — DIETITIAN INITIAL EVALUATION ADULT - PERTINENT LABORATORY DATA
07-18    134<L>  |  98  |  20  ----------------------------<  86  3.5   |  31  |  1.34<H>    Ca    8.9      18 Jul 2022 06:30  Phos  2.6     07-18  Mg     2.1     07-18    TPro  6.1  /  Alb  2.6<L>  /  TBili  0.3  /  DBili  x   /  AST  52<H>  /  ALT  49  /  AlkPhos  99  07-17  A1C with Estimated Average Glucose Result: 5.3 % (06-22-22 @ 07:10)

## 2022-07-18 NOTE — DIETITIAN INITIAL EVALUATION ADULT - OTHER INFO
89 y/o F with PMH HTN, TIA, A fib not on AC d/t falls, CKD, lymphoma in remission, CHF, hyponatremia, adrenal insufficiency presents from [facility] with increased SOB/Edema. Admitted for acute CHF exacerbation towards end of June. At that time patient had baseline GREENBERG and fatigue with exertion. However in the last week leading up to admission worsening fatigue and GREENBERG and edema. Unknown if there is weight gain/loss.     Recent admission end of June with CHF exacerbation. At discharge increased lasix dose and coreg dosing.  89 y/o F with PMH HTN, TIA, A fib not on AC d/t falls, CKD, lymphoma in remission, CHF, hyponatremia, adrenal insufficiency presents from [facility] with increased SOB/Edema. Admitted for acute CHF exacerbation towards end of June. At that time patient had baseline GREENBERG and fatigue with exertion. However in the last week leading up to admission worsening fatigue and GREENBERG and edema. Unknown if there is weight gain/loss.     RDN- Visited with pt at bedside.  Pt was seen on 6/22 at , pt weight  by RD  124#  Now 132 #  Pt reports fair PO intake  Last BM  7/16  NFPE reveals severe and moderate muscle and fat wasting  Pt at risk for severe malnutrition  No GI issues, no issues with chew/swallow  Recommend regular diet, and encouraging PO intake      Recent admission end of June with CHF exacerbation. At discharge increased lasix dose and coreg dosing.

## 2022-07-18 NOTE — CONSULT NOTE ADULT - ASSESSMENT
SOB, Acute on chronic decompensated HFPEF- hypervolemic , Hypervolemic, NYHA class III-   Pt is on fludrocortisone and hydrocortisone ? reason  This might have caused fluid retention on top of other factors.  DC fludrocortisone.  Will speak with hosptialist attending today.  WIll continue lasix iv 40 mg daily.  she has cardiorenal syndrome too.  Last admission she was getting lot of sodium in diet and family buying her high sodium diet which was addressed even in office.  Diuresis with close monitoring of the renal function and electrolytes.  Goal potassium of 4 and magnesium of 2.   Strict I/O and daily wt checks. Low sodium diet. Nutrition education.     HTN- BP mildly elevated this am.  Will recommend cont current meds and recheck later in am.    Hyponatremia- monitor with diuresis.    CKD- last time her cr normalized with diuresis.  monitor for now.  Improving with diuresis now.    PAF- not on AC secondary to falls and risk of bleed outweighs benefit of AC  on amiodarone.     Other medical issues- Management per primary team.   Thank you for allowing me to participate in the care of this patient. Please feel free to contact me with any questions.

## 2022-07-18 NOTE — PHYSICAL THERAPY INITIAL EVALUATION ADULT - ADDITIONAL COMMENTS
Patient was ambulating Independently with RW to bathroom and dining santiago @ [facility]. Patient was also receiving in house Physical Therapy Services PTA.

## 2022-07-18 NOTE — PROGRESS NOTE ADULT - ASSESSMENT
89 y/o F with PMH HTN, TIA, A fib not on AC d/t falls, CKD, lymphoma in remission, CHF, hyponatremia, adrenal insufficiency presents from [facility] with increased SOB/Edema, admitted for acute on chronic HFpEF, admitted for same in 6/22.    #Acute hypoxic respiratory failure 2/2 acute on chronic HFpEF  -tele  -now on RA  -BNP 1170  -trops neg  -ILR interrogation  -echo done in 6/22  -lasix 40iv qd for now (on lasix 40po qd at home)  -f/u cards recs    #Paroxysmal Atrial Fibrillation (no AC because of fall history)  -Continue BB/Amiodarone    #Adrenal Insufficiency  - hydrocortisone, florinef    #HTN  -Home meds    #thrombocytopenia  -appears chronic, trend    #DVT ppx  -SCDs    #from [facility], PT ordered 89 y/o F with PMH HTN, TIA, A fib not on AC d/t falls, CKD, lymphoma in remission, CHF, hyponatremia, adrenal insufficiency presents from [facility] with increased SOB/Edema, admitted for acute on chronic HFpEF, admitted for same in 6/22.    #Acute hypoxic respiratory failure 2/2 acute on chronic HFpEF  -tele  -now on RA  -BNP 1170  -trops neg  -ILR interrogation  -echo done in 6/22  -lasix 40iv qd for now (on lasix 40po qd at home)  -f/u cards recs    #Paroxysmal Atrial Fibrillation (no AC because of fall history)  -Continue BB/Amiodarone    #Adrenal Insufficiency  - hydrocortisone  -will 1/2 dose florinef to 0.05mg qd to see if helps w recurrent CHF exac    #HTN  -Home meds    #thrombocytopenia  -appears chronic, trend    #DVT ppx  -SCDs    #from [facility], PT ordered

## 2022-07-18 NOTE — DIETITIAN NUTRITION RISK NOTIFICATION - TREATMENT: THE FOLLOWING DIET HAS BEEN RECOMMENDED
Diet, DASH/TLC:   Sodium & Cholesterol Restricted  1500mL Fluid Restriction ([id number])     Special Instructions for Nursin milliLiter(s) to 2000 milliLiter(s) fluid restriction (22 @ 16:00) [Active]

## 2022-07-18 NOTE — PROGRESS NOTE ADULT - SUBJECTIVE AND OBJECTIVE BOX
HOSPITALIST ATTENDING PROGRESS NOTE    Chart and meds reviewed.  Patient seen and examined.    CC: SOB    Subjective: Feeling well, sitting in chair, 94% on RA.    All other systems reviewed and found to be negative with the exception of what has been described above.    MEDICATIONS  (STANDING):  aMIOdarone    Tablet 200 milliGRAM(s) Oral daily  amLODIPine   Tablet 10 milliGRAM(s) Oral daily  atorvastatin 10 milliGRAM(s) Oral at bedtime  carvedilol 25 milliGRAM(s) Oral every 12 hours  chlorhexidine 4% Liquid 1 Application(s) Topical <User Schedule>  cholecalciferol 2000 Unit(s) Oral daily  cyanocobalamin 1000 MICROGram(s) Oral daily  famotidine    Tablet 20 milliGRAM(s) Oral daily  fludroCORTISONE 0.1 milliGRAM(s) Oral daily  furosemide   Injectable 40 milliGRAM(s) IV Push daily  hydrALAZINE 100 milliGRAM(s) Oral every 8 hours  hydrocortisone 10 milliGRAM(s) Oral <User Schedule>  hydrocortisone 5 milliGRAM(s) Oral <User Schedule>  losartan 50 milliGRAM(s) Oral daily  senna 2 Tablet(s) Oral at bedtime    MEDICATIONS  (PRN):  acetaminophen     Tablet .. 650 milliGRAM(s) Oral every 6 hours PRN Temp greater or equal to 38C (100.4F), Mild Pain (1 - 3)  aluminum hydroxide/magnesium hydroxide/simethicone Suspension 30 milliLiter(s) Oral every 4 hours PRN Dyspepsia  bismuth subsalicylate Liquid 15 milliLiter(s) Oral four times a day PRN Dyspsea/GI DIscomfort  loratadine 10 milliGRAM(s) Oral daily PRN Allergies  melatonin 3 milliGRAM(s) Oral at bedtime PRN Insomnia  ondansetron Injectable 4 milliGRAM(s) IV Push every 8 hours PRN Nausea and/or Vomiting  polyethylene glycol 3350 17 Gram(s) Oral daily PRN Constipation      VITALS:  T(F): 98 (07-18-22 @ 08:02), Max: 98.8 (07-17-22 @ 19:00)  HR: 60 (07-18-22 @ 14:03) (49 - 63)  BP: 144/45 (07-18-22 @ 14:03) (144/41 - 158/53)  RR: 18 (07-18-22 @ 08:02) (18 - 20)  SpO2: 94% (07-18-22 @ 11:50) (94% - 98%)  Wt(kg): --    I&O's Summary    17 Jul 2022 07:01  -  18 Jul 2022 07:00  --------------------------------------------------------  IN: 0 mL / OUT: 700 mL / NET: -700 mL        CAPILLARY BLOOD GLUCOSE      POCT Blood Glucose.: 116 mg/dL (18 Jul 2022 12:53)      PHYSICAL EXAM:  Gen: NAD  HEENT:  pupils equal and reactive, EOMI, no oropharyngeal lesions, erythema, exudates, oral thrush  NECK:   supple, no carotid bruits, no palpable lymph nodes, no thyromegaly  CV:  +S1, +S2, regular, no murmurs or rubs  RESP:   lungs clear to auscultation bilaterally, no wheezing, rales, rhonchi, good air entry bilaterally  BREAST:  not examined  GI:  abdomen soft, non-tender, non-distended, normal BS, no bruits, no abdominal masses, no palpable masses  RECTAL:  not examined  :  not examined  MSK:   normal muscle tone, no atrophy, no rigidity, no contractions  EXT:  no clubbing, no cyanosis, no edema, no calf pain, swelling or erythema  VASCULAR:  pulses equal and symmetric in the upper and lower extremities  NEURO:  AAOX3, no focal neurological deficits, follows all commands, able to move extremities spontaneously  SKIN:  no ulcers, lesions or rashes    LABS:                            9.3    4.31  )-----------( 104      ( 18 Jul 2022 06:30 )             29.7     07-18    134<L>  |  98  |  20  ----------------------------<  86  3.5   |  31  |  1.34<H>    Ca    8.9      18 Jul 2022 06:30  Phos  2.6     07-18  Mg     2.1     07-18    TPro  6.1  /  Alb  2.6<L>  /  TBili  0.3  /  DBili  x   /  AST  52<H>  /  ALT  49  /  AlkPhos  99  07-17        LIVER FUNCTIONS - ( 17 Jul 2022 13:59 )  Alb: 2.6 g/dL / Pro: 6.1 gm/dL / ALK PHOS: 99 U/L / ALT: 49 U/L / AST: 52 U/L / GGT: x           PT/INR - ( 17 Jul 2022 13:11 )   PT: 11.3 sec;   INR: 0.97 ratio         PTT - ( 17 Jul 2022 13:11 )  PTT:28.0 sec    CULTURES:  no new    Additional results/Imaging, I have personally reviewed:  1. Central bronchiectasis on both sides along with prominent interstitial markings in both upper lobes are unchanged. 2. Calcified bilateral pleural plaque near the apices along with bibasilar pleural effusions, grossly unchanged. 3. Cardiac loop recorder however there is no pulmonary edema.    Echo 6/22/22: Mild asymmetrical septal left ventricular hypertrophy is present. Estimated left ventricular ejection fraction is 60-65 %. The left atrium is mildly dilated. Mild (1+) aortic regurgitation Moderate (2+) tricuspid valve regurgitation. Mild pulmonary hypertension. Trace pericardial effusion is present.    Telemetry, personally reviewed:  7/18/22: sinus 50-60

## 2022-07-18 NOTE — PHYSICAL THERAPY INITIAL EVALUATION ADULT - MODALITIES TREATMENT COMMENTS
Pt left OOB in chair in NAD with HM Intact. CBIR. Pt instructed to not get up alone. Chair alarm donned. RN aware. NC left off due to 94% on Room Air

## 2022-07-18 NOTE — DIETITIAN INITIAL EVALUATION ADULT - NSFNSPHYEXAMSKINFT_GEN_A_CORE
Pressure Injury 1: sacrum, Stage I  Pressure Injury 2: none, none  Pressure Injury 3: none, none  Pressure Injury 4: none, none  Pressure Injury 5: none, none  Pressure Injury 6: none, none  Pressure Injury 7: none, none  Pressure Injury 8: none, none  Pressure Injury 9: none, none  Pressure Injury 10: none, none  Pressure Injury 11: none, none Pressure Injury 1: sacrum, Stage I  Wagner De La Cruz

## 2022-07-18 NOTE — PHYSICAL THERAPY INITIAL EVALUATION ADULT - DISCHARGE DISPOSITION, PT EVAL
Safe to Return to [facility] with Resumption of Home Physical Therapy Services

## 2022-07-18 NOTE — PHYSICAL THERAPY INITIAL EVALUATION ADULT - GAIT DISTANCE, PT EVAL
30 feet - Ambulation Distance limited due to SOB - SpO2 88% on Room Air which recovered back up to 94% with rest

## 2022-07-18 NOTE — GOALS OF CARE CONVERSATION - ADVANCED CARE PLANNING - CONVERSATION DETAILS
Spoke with patient at bedside  MOLST obtained off one content.  Patient confirmed DNR/DNI - MOLST, no change. Signed copy of MOLST to confirm.

## 2022-07-18 NOTE — DIETITIAN INITIAL EVALUATION ADULT - NS FNS DIET ORDER
Diet, DASH/TLC:   Sodium & Cholesterol Restricted  1500mL Fluid Restriction ([id number])     Special Instructions for Nursin milliLiter(s) to 2000 milliLiter(s) fluid restriction (22 @ 16:00)

## 2022-07-18 NOTE — DIETITIAN INITIAL EVALUATION ADULT - NAME AND PHONE
Khadijah Serrano RDN, CDN, [facility]      [phone]   [email or web address]

## 2022-07-18 NOTE — CONSULT NOTE ADULT - SUBJECTIVE AND OBJECTIVE BOX
Patient is a 89y old  Female who presents with a chief complaint of SOB/Edema (17 Jul 2022 17:35)      HPI:  89 y/o F with PMH HTN, TIA, A fib not on AC d/t falls, CKD, lymphoma in remission, CHF, hyponatremia, adrenal insufficiency presents from [facility] with increased SOB/Edema. Admitted for acute CHF exacerbation towards end of June. At that time patient had baseline GREENBERG and fatigue with exertion. However in the last week leading up to admission worsening fatigue and GREENBERG and edema. Unknown if there is weight gain/loss.  Denies fever, chills, syncope, chest pain, nausea, vomiting, Abdominal pain/discomfort.    Recent admission end of June with CHF exacerbation. At discharge increased lasix dose and coreg dosing.     In the ER, Tmax 97.6F, HR 56-60, /72, RR 17-20, SpO2 96% on RA (reported SpO2 89-91% on RA in ER documentation). Hgb 10.6 (baseline), Plt 115, K 2.8, Cr 1.52 (baseline 1.1-1.3), BUN 22, BNP 1170. (17 Jul 2022 17:35)      PAST MEDICAL & SURGICAL HISTORY:  Iron deficiency      HTN (hypertension)      Lymphoma      H/O adrenal insufficiency      Hyponatremia      Hypokalemia      Chronic kidney disease, unspecified CKD stage      Diastolic heart failure      Paroxysmal atrial fibrillation      S/P appendectomy      S/P hysterectomy      H/O intracranial hemorrhage          MEDICATIONS  (STANDING):  aMIOdarone    Tablet 200 milliGRAM(s) Oral daily  amLODIPine   Tablet 10 milliGRAM(s) Oral daily  atorvastatin 10 milliGRAM(s) Oral at bedtime  carvedilol 25 milliGRAM(s) Oral every 12 hours  cholecalciferol 2000 Unit(s) Oral daily  cyanocobalamin 1000 MICROGram(s) Oral daily  famotidine    Tablet 20 milliGRAM(s) Oral daily  fludroCORTISONE 0.1 milliGRAM(s) Oral daily  hydrALAZINE 100 milliGRAM(s) Oral every 8 hours  hydrocortisone 10 milliGRAM(s) Oral <User Schedule>  hydrocortisone 5 milliGRAM(s) Oral <User Schedule>  senna 2 Tablet(s) Oral at bedtime    MEDICATIONS  (PRN):  acetaminophen     Tablet .. 650 milliGRAM(s) Oral every 6 hours PRN Temp greater or equal to 38C (100.4F), Mild Pain (1 - 3)  aluminum hydroxide/magnesium hydroxide/simethicone Suspension 30 milliLiter(s) Oral every 4 hours PRN Dyspepsia  bismuth subsalicylate Liquid 15 milliLiter(s) Oral four times a day PRN Dyspsea/GI DIscomfort  loratadine 10 milliGRAM(s) Oral daily PRN Allergies  melatonin 3 milliGRAM(s) Oral at bedtime PRN Insomnia  ondansetron Injectable 4 milliGRAM(s) IV Push every 8 hours PRN Nausea and/or Vomiting  polyethylene glycol 3350 17 Gram(s) Oral daily PRN Constipation      FAMILY HISTORY:  No known problems (Father, Mother)        SOCIAL HISTORY:    REVIEW OF SYSTEMS:  CONSTITUTIONAL:    No fatigue, malaise, lethargy.  No fever or chills.  HEENT:  Eyes:  No visual changes.     ENT:  No epistaxis.  No sinus pain.    RESPIRATORY:  No cough.  No wheeze.  No hemoptysis.  No shortness of breath.  CARDIOVASCULAR:  No chest pains.  No palpitations. No shortness of breath, No orthopnea or PND.  GASTROINTESTINAL:  No abdominal pain.  No nausea or vomiting.    GENITOURINARY:    No hematuria.    MUSCULOSKELETAL:  No musculoskeletal pain.  No joint swelling.  No arthritis.  NEUROLOGICAL:  No tingling or numbness or weakness.  PSYCHIATRIC:  No confusion  SKIN:  No rashes.    ENDOCRINE:  No unexplained weight loss.  No polydipsia.   HEMATOLOGIC:  No anemia.  No prolonged or excessive bleeding.   ALLERGIC AND IMMUNOLOGIC:  No pruritus.          Vital Signs Last 24 Hrs  T(C): 36.3 (17 Jul 2022 20:39), Max: 37.1 (17 Jul 2022 19:00)  T(F): 97.3 (17 Jul 2022 20:39), Max: 98.8 (17 Jul 2022 19:00)  HR: 61 (18 Jul 2022 05:31) (49 - 61)  BP: 144/41 (18 Jul 2022 05:31) (139/86 - 158/53)  BP(mean): 83 (17 Jul 2022 20:02) (73 - 94)  RR: 18 (17 Jul 2022 20:39) (17 - 20)  SpO2: 98% (17 Jul 2022 20:39) (96% - 98%)    Parameters below as of 17 Jul 2022 20:39  Patient On (Oxygen Delivery Method): nasal cannula  O2 Flow (L/min): 2      PHYSICAL EXAM-    Constitutional: The patient appears to be normal, well developed, well nourished and alert and oriented to time, place and person. The patient does not appear acutely ill.     Head: Head is normocephalic and atraumatic.      Neck: No jugular venous distention. No audible carotid bruits. There are strong carotid pulses bilaterally. No JVD.     Cardiovascular: Regular rate and rhythm without S3, S4. No murmurs or rubs are appreciated.      Respiratory: Breathsounds are normal. No rales. No wheezing.    Abdomen: Soft, nontender, nondistended with positive bowel sounds.      Extremity: No tenderness. No  pitting edema     Neurologic: The patient is alert and oriented.      Skin: No rash, no obvious lesions noted.      Psychiatric: The patient appears to be emotionally stable.      INTERPRETATION OF TELEMETRY:    ECG: Sinus rythm , normal axis, no ST T changes.     I&O's Detail    17 Jul 2022 07:01  -  18 Jul 2022 07:00  --------------------------------------------------------  IN:  Total IN: 0 mL    OUT:    Voided (mL): 700 mL  Total OUT: 700 mL    Total NET: -700 mL          LABS:                        9.3    4.31  )-----------( 104      ( 18 Jul 2022 06:30 )             29.7     07-18    134<L>  |  98  |  20  ----------------------------<  86  3.5   |  31  |  1.34<H>    Ca    8.9      18 Jul 2022 06:30  Phos  2.6     07-18  Mg     2.1     07-18    TPro  6.1  /  Alb  2.6<L>  /  TBili  0.3  /  DBili  x   /  AST  52<H>  /  ALT  49  /  AlkPhos  99  07-17        PT/INR - ( 17 Jul 2022 13:11 )   PT: 11.3 sec;   INR: 0.97 ratio         PTT - ( 17 Jul 2022 13:11 )  PTT:28.0 sec    I&O's Summary    17 Jul 2022 07:01  -  18 Jul 2022 07:00  --------------------------------------------------------  IN: 0 mL / OUT: 700 mL / NET: -700 mL      BNPSerum Pro-Brain Natriuretic Peptide: 1170 pg/mL (07-17 @ 13:59)    RADIOLOGY & ADDITIONAL STUDIES: Patient is a 89y old  Female who presents with a chief complaint of SOB/Edema.     HPI:  89 y/o F with PMH HTN, TIA, A fib not on AC d/t falls, CKD, lymphoma in remission, CHF, hyponatremia, adrenal insufficiency presents from [facility] with increased SOB/Edema.       In the ER, Tmax 97.6F, HR 56-60, /72, RR 17-20, SpO2 96% on RA (reported SpO2 89-91% on RA in ER documentation). Hgb 10.6 (baseline), Plt 115, K 2.8, Cr 1.52 (baseline 1.1-1.3), BUN 22, BNP 1170.     Pt seen and examined this am.  Pt still c/o SOB, cough and phlegm.  No overnight events.      PAST MEDICAL & SURGICAL HISTORY:  Iron deficiency      HTN (hypertension)      Lymphoma      H/O adrenal insufficiency      Hyponatremia      Hypokalemia      Chronic kidney disease, unspecified CKD stage      Diastolic heart failure      Paroxysmal atrial fibrillation      S/P appendectomy      S/P hysterectomy      H/O intracranial hemorrhage          MEDICATIONS  (STANDING):  aMIOdarone    Tablet 200 milliGRAM(s) Oral daily  amLODIPine   Tablet 10 milliGRAM(s) Oral daily  atorvastatin 10 milliGRAM(s) Oral at bedtime  carvedilol 25 milliGRAM(s) Oral every 12 hours  cholecalciferol 2000 Unit(s) Oral daily  cyanocobalamin 1000 MICROGram(s) Oral daily  famotidine    Tablet 20 milliGRAM(s) Oral daily  fludroCORTISONE 0.1 milliGRAM(s) Oral daily  hydrALAZINE 100 milliGRAM(s) Oral every 8 hours  hydrocortisone 10 milliGRAM(s) Oral <User Schedule>  hydrocortisone 5 milliGRAM(s) Oral <User Schedule>  senna 2 Tablet(s) Oral at bedtime    MEDICATIONS  (PRN):  acetaminophen     Tablet .. 650 milliGRAM(s) Oral every 6 hours PRN Temp greater or equal to 38C (100.4F), Mild Pain (1 - 3)  aluminum hydroxide/magnesium hydroxide/simethicone Suspension 30 milliLiter(s) Oral every 4 hours PRN Dyspepsia  bismuth subsalicylate Liquid 15 milliLiter(s) Oral four times a day PRN Dyspsea/GI DIscomfort  loratadine 10 milliGRAM(s) Oral daily PRN Allergies  melatonin 3 milliGRAM(s) Oral at bedtime PRN Insomnia  ondansetron Injectable 4 milliGRAM(s) IV Push every 8 hours PRN Nausea and/or Vomiting  polyethylene glycol 3350 17 Gram(s) Oral daily PRN Constipation      FAMILY HISTORY:  No known problems (Father, Mother)        SOCIAL HISTORY: no recent smoking     REVIEW OF SYSTEMS:  CONSTITUTIONAL:    No fatigue, malaise, lethargy.  No fever or chills.  RESPIRATORY:  No cough.  No wheeze.  No hemoptysis.  c/o shortness of breath.  CARDIOVASCULAR:  No chest pains.  No palpitations. c/o shortness of breath, No orthopnea or PND.  GASTROINTESTINAL:  No abdominal pain.  No nausea or vomiting.    GENITOURINARY:    No hematuria.    MUSCULOSKELETAL:  c/o musculoskeletal pain.  No joint swelling.  No arthritis.  NEUROLOGICAL:  No tingling or numbness or weakness.  PSYCHIATRIC:  No confusion  SKIN:  No rashes.            Vital Signs Last 24 Hrs  T(C): 36.3 (17 Jul 2022 20:39), Max: 37.1 (17 Jul 2022 19:00)  T(F): 97.3 (17 Jul 2022 20:39), Max: 98.8 (17 Jul 2022 19:00)  HR: 61 (18 Jul 2022 05:31) (49 - 61)  BP: 144/41 (18 Jul 2022 05:31) (139/86 - 158/53)  BP(mean): 83 (17 Jul 2022 20:02) (73 - 94)  RR: 18 (17 Jul 2022 20:39) (17 - 20)  SpO2: 98% (17 Jul 2022 20:39) (96% - 98%)    Parameters below as of 17 Jul 2022 20:39  Patient On (Oxygen Delivery Method): nasal cannula  O2 Flow (L/min): 2      PHYSICAL EXAM-    Constitutional: elderly ill looking female in no acute distress    Head: Head is normocephalic and atraumatic.      Neck: + JVD.     Cardiovascular: Regular rate and rhythm without S3, S4. No murmurs or rubs are appreciated.      Respiratory: B/lrales. No wheezing.    Abdomen: Soft, nontender, nondistended with positive bowel sounds.      Extremity: No tenderness. No  pitting edema     Neurologic: The patient is alert and oriented.      Skin: No rash, no obvious lesions noted.      Psychiatric: The patient appears to be emotionally stable.      INTERPRETATION OF TELEMETRY: SR     ECG: Sinus rythm ,  no ST T changes. poor R wave progression.    I&O's Detail    17 Jul 2022 07:01  -  18 Jul 2022 07:00  --------------------------------------------------------  IN:  Total IN: 0 mL    OUT:    Voided (mL): 700 mL  Total OUT: 700 mL    Total NET: -700 mL          LABS:                        9.3    4.31  )-----------( 104      ( 18 Jul 2022 06:30 )             29.7     07-18    134<L>  |  98  |  20  ----------------------------<  86  3.5   |  31  |  1.34<H>    Ca    8.9      18 Jul 2022 06:30  Phos  2.6     07-18  Mg     2.1     07-18    TPro  6.1  /  Alb  2.6<L>  /  TBili  0.3  /  DBili  x   /  AST  52<H>  /  ALT  49  /  AlkPhos  99  07-17        PT/INR - ( 17 Jul 2022 13:11 )   PT: 11.3 sec;   INR: 0.97 ratio         PTT - ( 17 Jul 2022 13:11 )  PTT:28.0 sec    I&O's Summary    17 Jul 2022 07:01  -  18 Jul 2022 07:00  --------------------------------------------------------  IN: 0 mL / OUT: 700 mL / NET: -700 mL      BNPSerum Pro-Brain Natriuretic Peptide: 1170 pg/mL (07-17 @ 13:59)    RADIOLOGY & ADDITIONAL STUDIES:

## 2022-07-19 LAB
ANION GAP SERPL CALC-SCNC: 5 MMOL/L — SIGNIFICANT CHANGE UP (ref 5–17)
BUN SERPL-MCNC: 23 MG/DL — SIGNIFICANT CHANGE UP (ref 7–23)
CALCIUM SERPL-MCNC: 8.8 MG/DL — SIGNIFICANT CHANGE UP (ref 8.5–10.1)
CHLORIDE SERPL-SCNC: 98 MMOL/L — SIGNIFICANT CHANGE UP (ref 96–108)
CO2 SERPL-SCNC: 32 MMOL/L — HIGH (ref 22–31)
CREAT SERPL-MCNC: 1.57 MG/DL — HIGH (ref 0.5–1.3)
EGFR: 30 ML/MIN/1.73M2 — LOW
GLUCOSE SERPL-MCNC: 80 MG/DL — SIGNIFICANT CHANGE UP (ref 70–99)
HCT VFR BLD CALC: 30.4 % — LOW (ref 34.5–45)
HGB BLD-MCNC: 9.4 G/DL — LOW (ref 11.5–15.5)
MCHC RBC-ENTMCNC: 30.1 PG — SIGNIFICANT CHANGE UP (ref 27–34)
MCHC RBC-ENTMCNC: 30.9 GM/DL — LOW (ref 32–36)
MCV RBC AUTO: 97.4 FL — SIGNIFICANT CHANGE UP (ref 80–100)
PLATELET # BLD AUTO: 102 K/UL — LOW (ref 150–400)
POTASSIUM SERPL-MCNC: 3.3 MMOL/L — LOW (ref 3.5–5.3)
POTASSIUM SERPL-SCNC: 3.3 MMOL/L — LOW (ref 3.5–5.3)
RBC # BLD: 3.12 M/UL — LOW (ref 3.8–5.2)
RBC # FLD: 15.8 % — HIGH (ref 10.3–14.5)
SODIUM SERPL-SCNC: 135 MMOL/L — SIGNIFICANT CHANGE UP (ref 135–145)
WBC # BLD: 5.7 K/UL — SIGNIFICANT CHANGE UP (ref 3.8–10.5)
WBC # FLD AUTO: 5.7 K/UL — SIGNIFICANT CHANGE UP (ref 3.8–10.5)

## 2022-07-19 PROCEDURE — 99233 SBSQ HOSP IP/OBS HIGH 50: CPT

## 2022-07-19 RX ORDER — POTASSIUM CHLORIDE 20 MEQ
40 PACKET (EA) ORAL ONCE
Refills: 0 | Status: COMPLETED | OUTPATIENT
Start: 2022-07-19 | End: 2022-07-19

## 2022-07-19 RX ADMIN — Medication 40 MILLIEQUIVALENT(S): at 09:50

## 2022-07-19 RX ADMIN — FAMOTIDINE 20 MILLIGRAM(S): 10 INJECTION INTRAVENOUS at 09:47

## 2022-07-19 RX ADMIN — CHLORHEXIDINE GLUCONATE 1 APPLICATION(S): 213 SOLUTION TOPICAL at 09:48

## 2022-07-19 RX ADMIN — SENNA PLUS 2 TABLET(S): 8.6 TABLET ORAL at 21:59

## 2022-07-19 RX ADMIN — Medication 10 MILLIGRAM(S): at 09:47

## 2022-07-19 RX ADMIN — AMLODIPINE BESYLATE 10 MILLIGRAM(S): 2.5 TABLET ORAL at 09:49

## 2022-07-19 RX ADMIN — PREGABALIN 1000 MICROGRAM(S): 225 CAPSULE ORAL at 09:46

## 2022-07-19 RX ADMIN — ATORVASTATIN CALCIUM 10 MILLIGRAM(S): 80 TABLET, FILM COATED ORAL at 21:59

## 2022-07-19 RX ADMIN — Medication 5 MILLIGRAM(S): at 18:28

## 2022-07-19 RX ADMIN — Medication 100 MILLIGRAM(S): at 21:59

## 2022-07-19 RX ADMIN — Medication 40 MILLIGRAM(S): at 09:46

## 2022-07-19 RX ADMIN — Medication 2000 UNIT(S): at 09:46

## 2022-07-19 RX ADMIN — Medication 100 MILLIGRAM(S): at 15:20

## 2022-07-19 RX ADMIN — LOSARTAN POTASSIUM 50 MILLIGRAM(S): 100 TABLET, FILM COATED ORAL at 09:48

## 2022-07-19 RX ADMIN — AMIODARONE HYDROCHLORIDE 200 MILLIGRAM(S): 400 TABLET ORAL at 09:48

## 2022-07-19 RX ADMIN — FLUDROCORTISONE ACETATE 0.05 MILLIGRAM(S): 0.1 TABLET ORAL at 09:46

## 2022-07-19 RX ADMIN — CARVEDILOL PHOSPHATE 25 MILLIGRAM(S): 80 CAPSULE, EXTENDED RELEASE ORAL at 09:47

## 2022-07-19 RX ADMIN — Medication 100 MILLIGRAM(S): at 06:15

## 2022-07-19 NOTE — PROGRESS NOTE ADULT - ASSESSMENT
A/P: 89 y/o F with PMH HTN, TIA, A fib not on AC d/t falls, CKD, lymphoma in remission, CHF, hyponatremia, adrenal insufficiency presents from [facility] with increased SOB/Edema.     1. Acute on chronic diastolic HF. Pt was on fludrocortisone and hydrocortisone as outpt exacerbating fluid retention.   Will speak with hosptialist attending today.  Florinef already reduced to half dose. 10mg of Amlodipine also likely contributing.   Cr did increase to 1.57- will continue IV lasix today, but if Cr increases further, will need to d/c.    Cont tight BP control.   Diuresis with close monitoring of the renal function and electrolytes.  Goal potassium of 4 and magnesium of 2.   Strict I/O and daily wt checks. Low sodium diet. Nutrition education.     2. HTN. BP relatively stable at present in 140s systolic.     3. Hyponatremia- monitor with diuresis. Normalized.     4. CKD. Cr increased today. Will follow daily.     5. PAF- not on AC secondary to falls and risk of bleed outweighs benefit of AC  on amiodarone.     6. Palliative care eval.

## 2022-07-19 NOTE — PROGRESS NOTE ADULT - SUBJECTIVE AND OBJECTIVE BOX
HOSPITALIST ATTENDING PROGRESS NOTE    Chart and meds reviewed.  Patient seen and examined.    CC: SOB    Subjective: Pt reports SOB but is stable on RA at rest, will put in for amb puls ox. Per daugther, prior to past 2 months, pt did not have any JOSELYN. Also with LUE edema.    All other systems reviewed and found to be negative with the exception of what has been described above.    MEDICATIONS  (STANDING):  aMIOdarone    Tablet 200 milliGRAM(s) Oral daily  amLODIPine   Tablet 10 milliGRAM(s) Oral daily  atorvastatin 10 milliGRAM(s) Oral at bedtime  carvedilol 25 milliGRAM(s) Oral every 12 hours  chlorhexidine 4% Liquid 1 Application(s) Topical <User Schedule>  cholecalciferol 2000 Unit(s) Oral daily  cyanocobalamin 1000 MICROGram(s) Oral daily  famotidine    Tablet 20 milliGRAM(s) Oral daily  fludroCORTISONE 0.05 milliGRAM(s) Oral daily  furosemide   Injectable 40 milliGRAM(s) IV Push daily  hydrALAZINE 100 milliGRAM(s) Oral every 8 hours  hydrocortisone 10 milliGRAM(s) Oral <User Schedule>  hydrocortisone 5 milliGRAM(s) Oral <User Schedule>  losartan 50 milliGRAM(s) Oral daily  senna 2 Tablet(s) Oral at bedtime    MEDICATIONS  (PRN):  acetaminophen     Tablet .. 650 milliGRAM(s) Oral every 6 hours PRN Temp greater or equal to 38C (100.4F), Mild Pain (1 - 3)  aluminum hydroxide/magnesium hydroxide/simethicone Suspension 30 milliLiter(s) Oral every 4 hours PRN Dyspepsia  bismuth subsalicylate Liquid 15 milliLiter(s) Oral four times a day PRN Dyspsea/GI DIscomfort  loratadine 10 milliGRAM(s) Oral daily PRN Allergies  melatonin 3 milliGRAM(s) Oral at bedtime PRN Insomnia  ondansetron Injectable 4 milliGRAM(s) IV Push every 8 hours PRN Nausea and/or Vomiting  polyethylene glycol 3350 17 Gram(s) Oral daily PRN Constipation      VITALS:  T(F): 98 (07-19-22 @ 07:57), Max: 98 (07-19-22 @ 07:57)  HR: 63 (07-19-22 @ 07:57) (60 - 65)  BP: 139/33 (07-19-22 @ 07:57) (139/33 - 148/41)  RR: 18 (07-19-22 @ 07:57) (18 - 18)  SpO2: 93% (07-19-22 @ 07:57) (93% - 95%)  Wt(kg): --    I&O's Summary      CAPILLARY BLOOD GLUCOSE      POCT Blood Glucose.: 85 mg/dL (19 Jul 2022 07:49)  POCT Blood Glucose.: 120 mg/dL (18 Jul 2022 16:48)      PHYSICAL EXAM:  Gen: NAD  HEENT:  pupils equal and reactive, EOMI, no oropharyngeal lesions, erythema, exudates, oral thrush  NECK:   supple, no carotid bruits, no palpable lymph nodes, no thyromegaly  CV:  +S1, +S2, regular, no murmurs or rubs  RESP:   lungs clear to auscultation bilaterally, no wheezing, rales, rhonchi, good air entry bilaterally  BREAST:  not examined  GI:  abdomen soft, non-tender, non-distended, normal BS, no bruits, no abdominal masses, no palpable masses  RECTAL:  not examined  :  not examined  MSK:   normal muscle tone, no atrophy, no rigidity, no contractions  EXT:  2+ b/l JOSELYN, and LUE edema  VASCULAR:  pulses equal and symmetric in the upper and lower extremities  NEURO:  AAOX3, no focal neurological deficits, follows all commands, able to move extremities spontaneously  SKIN:  no ulcers, lesions or rashes    LABS:                            9.4    5.70  )-----------( 102      ( 19 Jul 2022 06:02 )             30.4     07-19    135  |  98  |  23  ----------------------------<  80  3.3<L>   |  32<H>  |  1.57<H>    Ca    8.8      19 Jul 2022 06:02  Phos  2.6     07-18  Mg     2.1     07-18    CULTURES:  no new    Additional results/Imaging, I have personally reviewed:  1. Central bronchiectasis on both sides along with prominent interstitial markings in both upper lobes are unchanged. 2. Calcified bilateral pleural plaque near the apices along with bibasilar pleural effusions, grossly unchanged. 3. Cardiac loop recorder however there is no pulmonary edema.    Echo 6/22/22: Mild asymmetrical septal left ventricular hypertrophy is present. Estimated left ventricular ejection fraction is 60-65 %. The left atrium is mildly dilated. Mild (1+) aortic regurgitation Moderate (2+) tricuspid valve regurgitation. Mild pulmonary hypertension. Trace pericardial effusion is present.    Telemetry, personally reviewed:  7/18/22: sinus 50-60  7/19/22: sinus 60-70, no ectopy

## 2022-07-19 NOTE — PROGRESS NOTE ADULT - SUBJECTIVE AND OBJECTIVE BOX
Cardiology Progress Note    HPI: 89 y/o F with PMH HTN, TIA, A fib not on AC d/t falls, CKD, lymphoma in remission, CHF, hyponatremia, adrenal insufficiency presents from [facility] with increased SOB/Edema.   In the ER, Tmax 97.6F, HR 56-60, /72, RR 17-20, SpO2 96% on RA (reported SpO2 89-91% on RA in ER documentation). Hgb 10.6 (baseline), Plt 115, K 2.8, Cr 1.52 (baseline 1.1-1.3), BUN 22, BNP 1170.     7/19. SOB improved, edema is persistent. No events last pm.   Cr increased to 1.57. No fevers. Feels weak/tired.    PAST MEDICAL & SURGICAL HISTORY:  Iron deficiency  HTN (hypertension)  Lymphoma  H/O adrenal insufficiency  Hyponatremia  Hypokalemia  Chronic kidney disease, unspecified CKD stage  Diastolic heart failure  Paroxysmal atrial fibrillation  S/P appendectomy  S/P hysterectomy  H/O intracranial hemorrhage    MEDICATIONS  (STANDING):  aMIOdarone    Tablet 200 milliGRAM(s) Oral daily  amLODIPine   Tablet 10 milliGRAM(s) Oral daily  atorvastatin 10 milliGRAM(s) Oral at bedtime  carvedilol 25 milliGRAM(s) Oral every 12 hours  cholecalciferol 2000 Unit(s) Oral daily  cyanocobalamin 1000 MICROGram(s) Oral daily  famotidine    Tablet 20 milliGRAM(s) Oral daily  fludroCORTISONE 0.1 milliGRAM(s) Oral daily  hydrALAZINE 100 milliGRAM(s) Oral every 8 hours  hydrocortisone 10 milliGRAM(s) Oral <User Schedule>  hydrocortisone 5 milliGRAM(s) Oral <User Schedule>  senna 2 Tablet(s) Oral at bedtime    MEDICATIONS  (PRN):  acetaminophen     Tablet .. 650 milliGRAM(s) Oral every 6 hours PRN Temp greater or equal to 38C (100.4F), Mild Pain (1 - 3)  aluminum hydroxide/magnesium hydroxide/simethicone Suspension 30 milliLiter(s) Oral every 4 hours PRN Dyspepsia  bismuth subsalicylate Liquid 15 milliLiter(s) Oral four times a day PRN Dyspsea/GI DIscomfort  loratadine 10 milliGRAM(s) Oral daily PRN Allergies  melatonin 3 milliGRAM(s) Oral at bedtime PRN Insomnia  ondansetron Injectable 4 milliGRAM(s) IV Push every 8 hours PRN Nausea and/or Vomiting  polyethylene glycol 3350 17 Gram(s) Oral daily PRN Constipation    FAMILY HISTORY:  No known problems (Father, Mother)    SOCIAL HISTORY: no recent smoking     REVIEW OF SYSTEMS:  CONSTITUTIONAL:    No fatigue, malaise, lethargy.  No fever or chills.  RESPIRATORY:  No cough.  No wheeze.  No hemoptysis.  c/o shortness of breath.  CARDIOVASCULAR:  No chest pains.  No palpitations. c/o shortness of breath, No orthopnea or PND.  GASTROINTESTINAL:  No abdominal pain.  No nausea or vomiting.    GENITOURINARY:    No hematuria.    MUSCULOSKELETAL:  c/o musculoskeletal pain.  No joint swelling.  No arthritis.  NEUROLOGICAL:  No tingling or numbness or weakness.    Vital Signs Last 24 Hrs  T(C): 36.3 (17 Jul 2022 20:39), Max: 37.1 (17 Jul 2022 19:00)  T(F): 97.3 (17 Jul 2022 20:39), Max: 98.8 (17 Jul 2022 19:00)  HR: 61 (18 Jul 2022 05:31) (49 - 61)  BP: 144/41 (18 Jul 2022 05:31) (139/86 - 158/53)  BP(mean): 83 (17 Jul 2022 20:02) (73 - 94)  RR: 18 (17 Jul 2022 20:39) (17 - 20)  SpO2: 98% (17 Jul 2022 20:39) (96% - 98%)    Parameters below as of 17 Jul 2022 20:39  Patient On (Oxygen Delivery Method): nasal cannula  O2 Flow (L/min): 2      PHYSICAL EXAM-  Constitutional: elderly ill looking female in no acute distress  Head: Head is normocephalic and atraumatic.    Neck: + JVD.   Cardiovascular: Regular rate and rhythm without S3, S4. No murmurs or rubs are appreciated.    Respiratory: B/lrales. No wheezing.  Abdomen: Soft, nontender, nondistended with positive bowel sounds.    Extremity: No tenderness. No  pitting edema   Neurologic: The patient is alert and oriented.      INTERPRETATION OF TELEMETRY: SR     ECG: Sinus rythm ,  no ST T changes. poor R wave progression.    I&O's Detail    17 Jul 2022 07:01  -  18 Jul 2022 07:00  --------------------------------------------------------  IN:  Total IN: 0 mL    OUT:    Voided (mL): 700 mL  Total OUT: 700 mL    Total NET: -700 mL    LABS:                        9.3    4.31  )-----------( 104      ( 18 Jul 2022 06:30 )             29.7     07-18    134<L>  |  98  |  20  ----------------------------<  86  3.5   |  31  |  1.34<H>    Ca    8.9      18 Jul 2022 06:30  Phos  2.6     07-18  Mg     2.1     07-18    TPro  6.1  /  Alb  2.6<L>  /  TBili  0.3  /  DBili  x   /  AST  52<H>  /  ALT  49  /  AlkPhos  99  07-17        PT/INR - ( 17 Jul 2022 13:11 )   PT: 11.3 sec;   INR: 0.97 ratio         PTT - ( 17 Jul 2022 13:11 )  PTT:28.0 sec    I&O's Summary    17 Jul 2022 07:01  -  18 Jul 2022 07:00  --------------------------------------------------------  IN: 0 mL / OUT: 700 mL / NET: -700 mL      BNPSerum Pro-Brain Natriuretic Peptide: 1170 pg/mL (07-17 @ 13:59)    RADIOLOGY & ADDITIONAL STUDIES:

## 2022-07-19 NOTE — PROGRESS NOTE ADULT - ASSESSMENT
89 y/o F with PMH HTN, TIA, A fib not on AC d/t falls, CKD, lymphoma in remission, CHF, hyponatremia, adrenal insufficiency presents from [facility] with increased SOB/Edema, admitted for acute on chronic HFpEF, admitted for same in 6/22.    #Acute hypoxic respiratory failure 2/2 acute on chronic HFpEF  -tele  -now on RA at rest, however will order ambulatory puls ox  -BNP 1170  -trops neg  -ILR interrogation  -echo done in 6/22  -also LUE edema, doppler ordered  -lasix 40iv qd for now (on lasix 40po qd at home)  -f/u cards recs    #Paroxysmal Atrial Fibrillation (no AC because of fall history)  -Continue BB/Amiodarone    #Adrenal Insufficiency  - hydrocortisone  -will 1/2 dose florinef to 0.05mg qd to see if helps w recurrent CHF exac    #HTN  -Home meds    #thrombocytopenia  -appears chronic, trend    #DVT ppx  -SCDs    #from [place] [place] [facility], PT ordered  #updated daughter at bedside today

## 2022-07-20 LAB
ANION GAP SERPL CALC-SCNC: 3 MMOL/L — LOW (ref 5–17)
BUN SERPL-MCNC: 23 MG/DL — SIGNIFICANT CHANGE UP (ref 7–23)
CALCIUM SERPL-MCNC: 9.1 MG/DL — SIGNIFICANT CHANGE UP (ref 8.5–10.1)
CHLORIDE SERPL-SCNC: 98 MMOL/L — SIGNIFICANT CHANGE UP (ref 96–108)
CO2 SERPL-SCNC: 32 MMOL/L — HIGH (ref 22–31)
CREAT SERPL-MCNC: 1.52 MG/DL — HIGH (ref 0.5–1.3)
EGFR: 31 ML/MIN/1.73M2 — LOW
GLUCOSE SERPL-MCNC: 88 MG/DL — SIGNIFICANT CHANGE UP (ref 70–99)
HCT VFR BLD CALC: 30.9 % — LOW (ref 34.5–45)
HGB BLD-MCNC: 9.9 G/DL — LOW (ref 11.5–15.5)
MCHC RBC-ENTMCNC: 30.4 PG — SIGNIFICANT CHANGE UP (ref 27–34)
MCHC RBC-ENTMCNC: 32 GM/DL — SIGNIFICANT CHANGE UP (ref 32–36)
MCV RBC AUTO: 94.8 FL — SIGNIFICANT CHANGE UP (ref 80–100)
PLATELET # BLD AUTO: 108 K/UL — LOW (ref 150–400)
POTASSIUM SERPL-MCNC: 3.4 MMOL/L — LOW (ref 3.5–5.3)
POTASSIUM SERPL-SCNC: 3.4 MMOL/L — LOW (ref 3.5–5.3)
RBC # BLD: 3.26 M/UL — LOW (ref 3.8–5.2)
RBC # FLD: 15.6 % — HIGH (ref 10.3–14.5)
SODIUM SERPL-SCNC: 133 MMOL/L — LOW (ref 135–145)
WBC # BLD: 5.95 K/UL — SIGNIFICANT CHANGE UP (ref 3.8–10.5)
WBC # FLD AUTO: 5.95 K/UL — SIGNIFICANT CHANGE UP (ref 3.8–10.5)

## 2022-07-20 PROCEDURE — 93971 EXTREMITY STUDY: CPT | Mod: 26,LT

## 2022-07-20 PROCEDURE — 99233 SBSQ HOSP IP/OBS HIGH 50: CPT

## 2022-07-20 RX ORDER — POTASSIUM CHLORIDE 20 MEQ
40 PACKET (EA) ORAL EVERY 4 HOURS
Refills: 0 | Status: COMPLETED | OUTPATIENT
Start: 2022-07-20 | End: 2022-07-20

## 2022-07-20 RX ADMIN — CARVEDILOL PHOSPHATE 25 MILLIGRAM(S): 80 CAPSULE, EXTENDED RELEASE ORAL at 10:14

## 2022-07-20 RX ADMIN — Medication 100 MILLIGRAM(S): at 21:34

## 2022-07-20 RX ADMIN — CHLORHEXIDINE GLUCONATE 1 APPLICATION(S): 213 SOLUTION TOPICAL at 10:14

## 2022-07-20 RX ADMIN — Medication 100 MILLIGRAM(S): at 05:36

## 2022-07-20 RX ADMIN — Medication 40 MILLIEQUIVALENT(S): at 10:17

## 2022-07-20 RX ADMIN — SENNA PLUS 2 TABLET(S): 8.6 TABLET ORAL at 21:34

## 2022-07-20 RX ADMIN — Medication 5 MILLIGRAM(S): at 17:20

## 2022-07-20 RX ADMIN — PREGABALIN 1000 MICROGRAM(S): 225 CAPSULE ORAL at 10:12

## 2022-07-20 RX ADMIN — AMIODARONE HYDROCHLORIDE 200 MILLIGRAM(S): 400 TABLET ORAL at 10:13

## 2022-07-20 RX ADMIN — Medication 40 MILLIGRAM(S): at 10:12

## 2022-07-20 RX ADMIN — ATORVASTATIN CALCIUM 10 MILLIGRAM(S): 80 TABLET, FILM COATED ORAL at 21:34

## 2022-07-20 RX ADMIN — LOSARTAN POTASSIUM 50 MILLIGRAM(S): 100 TABLET, FILM COATED ORAL at 10:13

## 2022-07-20 RX ADMIN — AMLODIPINE BESYLATE 10 MILLIGRAM(S): 2.5 TABLET ORAL at 10:11

## 2022-07-20 RX ADMIN — Medication 40 MILLIEQUIVALENT(S): at 15:36

## 2022-07-20 RX ADMIN — Medication 10 MILLIGRAM(S): at 10:13

## 2022-07-20 RX ADMIN — Medication 100 MILLIGRAM(S): at 15:36

## 2022-07-20 RX ADMIN — Medication 2000 UNIT(S): at 10:12

## 2022-07-20 RX ADMIN — FAMOTIDINE 20 MILLIGRAM(S): 10 INJECTION INTRAVENOUS at 10:14

## 2022-07-20 RX ADMIN — FLUDROCORTISONE ACETATE 0.05 MILLIGRAM(S): 0.1 TABLET ORAL at 10:13

## 2022-07-20 NOTE — PROGRESS NOTE ADULT - SUBJECTIVE AND OBJECTIVE BOX
Chief Complaint: Dyspnea, swelling    Interval History: Patient seen and examined. Patient reports feeling somewhat improved since admission. Overall slightly less edema. Still with some dependent edema in her L elbow. Note LUE venous duplex US today negative for DVT. Also still with some B/L LE edema. No dyspnea at rest. Patient able to ambulate to bathroom, less dyspneic that prior to admission. No chest pain or tightness. No other complaints.     ROS: Multi system review is comprehensively negative x 10 systems except as above    Vitals:  T(F): 98.2 (20 Jul 2022 19:20), Max: 98.2 (20 Jul 2022 19:20)  HR: 61 (20 Jul 2022 19:20) (60 - 70)  BP: 147/41 (20 Jul 2022 19:20) (125/40 - 156/39)  RR: 18 (20 Jul 2022 19:20) (18 - 18)  SpO2: 99% (20 Jul 2022 19:20) (98% - 99%) on room air    Exam:  Gen: Comfortable appearing  HEENT: NCAT PERRL EOMI MMM clear oropharynx  Neck: Supple, no JVD  Chest: Normal resp effort at rest, lungs CTA B/L  CVS: s1 s2 normal, rrr  Abd: +BS, soft NT ND  Ext: Mild pitting edema in L elbow and B/L LEs. No redness. No induration. No tenderness.   Skin: Warm, dry, intact  Neuro: A+OX3, no deficits  Mood: Calm, pleasant    Labs:                9.9    5.95  )-----------( 108                   30.9       133  |  98  |  23  ----------------------<  88  3.4   |  32  |  1.52    Ca  9.1        BNP 1170   Troponin (-)    Micro:  COVID19 PCR 7/17: Negative    Imaging:  LUE venous duplex US 7/20: No evidence of left upper extremity deep venous thrombosis.    CXR 7/17: Central bronchiectasis on both sides along with prominent interstitial markings in both upper lobes are unchanged. Calcified bilateral pleural plaque near the apices along with bibasilar pleural effusions, grossly unchanged. Cardiac loop recorder however there is no pulmonary edema.    Cardiac Testing:  Telemetry 7/19: Sinus rhythm, rate 60-70, no ectopy    Telemetry 7/18: Sinus rhythm, rate 50-60    EKG 7/17: NSR, rate WNL, old anteroseptal infarct (?)    TTE from prior visit, 6/22/22: Mild asymmetrical septal left ventricular hypertrophy present. Estimated left ventricular ejection fraction is 60-65 %. The left atrium is mildly dilated. Mild (1+) aortic regurgitation Moderate (2+) tricuspid valve regurgitation. Mild pulmonary hypertension. Trace pericardial effusion is present.    Meds:  MEDICATIONS  (STANDING):  aMIOdarone    Tablet 200 milliGRAM(s) Oral daily  amLODIPine   Tablet 10 milliGRAM(s) Oral daily  atorvastatin 10 milliGRAM(s) Oral at bedtime  carvedilol 25 milliGRAM(s) Oral every 12 hours  chlorhexidine 4% Liquid 1 Application(s) Topical <User Schedule>  cholecalciferol 2000 Unit(s) Oral daily  cyanocobalamin 1000 MICROGram(s) Oral daily  famotidine    Tablet 20 milliGRAM(s) Oral daily  fludroCORTISONE 0.05 milliGRAM(s) Oral daily  furosemide   Injectable 40 milliGRAM(s) IV Push daily  hydrALAZINE 100 milliGRAM(s) Oral every 8 hours  hydrocortisone 10 milliGRAM(s) Oral <User Schedule>  hydrocortisone 5 milliGRAM(s) Oral <User Schedule>  losartan 50 milliGRAM(s) Oral daily  senna 2 Tablet(s) Oral at bedtime    MEDICATIONS  (PRN):  acetaminophen     Tablet .. 650 milliGRAM(s) Oral every 6 hours PRN Temp greater or equal to 38C (100.4F), Mild Pain (1 - 3)  bismuth subsalicylate Liquid 15 milliLiter(s) Oral four times a day PRN Dyspsea/GI DIscomfort  loratadine 10 milliGRAM(s) Oral daily PRN Allergies  melatonin 3 milliGRAM(s) Oral at bedtime PRN Insomnia  ondansetron Injectable 4 milliGRAM(s) IV Push every 8 hours PRN Nausea and/or Vomiting  polyethylene glycol 3350 17 Gram(s) Oral daily PRN Constipation

## 2022-07-20 NOTE — PROGRESS NOTE ADULT - SUBJECTIVE AND OBJECTIVE BOX
Cardiology Progress Note    HPI: 89 y/o F with PMH HTN, TIA, A fib not on AC d/t falls, CKD, lymphoma in remission, CHF, hyponatremia, adrenal insufficiency presents from [facility] with increased SOB/Edema.   In the ER, Tmax 97.6F, HR 56-60, /72, RR 17-20, SpO2 96% on RA (reported SpO2 89-91% on RA in ER documentation). Hgb 10.6 (baseline), Plt 115, K 2.8, Cr 1.52 (baseline 1.1-1.3), BUN 22, BNP 1170.     7/19. SOB improved, edema is persistent. No events last pm.   Cr increased to 1.57. No fevers. Feels weak/tired.    7/20. No events last pm. No CP. SOB improved.   Awaiting today's labs. No fevers.     PAST MEDICAL & SURGICAL HISTORY:  Iron deficiency  HTN (hypertension)  Lymphoma  H/O adrenal insufficiency  Hyponatremia  Hypokalemia  Chronic kidney disease, unspecified CKD stage  Diastolic heart failure  Paroxysmal atrial fibrillation  S/P appendectomy  S/P hysterectomy  H/O intracranial hemorrhage    MEDICATIONS  (STANDING):  aMIOdarone    Tablet 200 milliGRAM(s) Oral daily  amLODIPine   Tablet 10 milliGRAM(s) Oral daily  atorvastatin 10 milliGRAM(s) Oral at bedtime  carvedilol 25 milliGRAM(s) Oral every 12 hours  cholecalciferol 2000 Unit(s) Oral daily  cyanocobalamin 1000 MICROGram(s) Oral daily  famotidine    Tablet 20 milliGRAM(s) Oral daily  fludroCORTISONE 0.1 milliGRAM(s) Oral daily  hydrALAZINE 100 milliGRAM(s) Oral every 8 hours  hydrocortisone 10 milliGRAM(s) Oral <User Schedule>  hydrocortisone 5 milliGRAM(s) Oral <User Schedule>  senna 2 Tablet(s) Oral at bedtime    MEDICATIONS  (PRN):  acetaminophen     Tablet .. 650 milliGRAM(s) Oral every 6 hours PRN Temp greater or equal to 38C (100.4F), Mild Pain (1 - 3)  aluminum hydroxide/magnesium hydroxide/simethicone Suspension 30 milliLiter(s) Oral every 4 hours PRN Dyspepsia  bismuth subsalicylate Liquid 15 milliLiter(s) Oral four times a day PRN Dyspsea/GI DIscomfort  loratadine 10 milliGRAM(s) Oral daily PRN Allergies  melatonin 3 milliGRAM(s) Oral at bedtime PRN Insomnia  ondansetron Injectable 4 milliGRAM(s) IV Push every 8 hours PRN Nausea and/or Vomiting  polyethylene glycol 3350 17 Gram(s) Oral daily PRN Constipation    FAMILY HISTORY:  No known problems (Father, Mother)    SOCIAL HISTORY: no recent smoking     REVIEW OF SYSTEMS:  CONSTITUTIONAL:    No fatigue, malaise, lethargy.  No fever or chills.  RESPIRATORY:  No cough.  No wheeze.  No hemoptysis.  c/o shortness of breath.  CARDIOVASCULAR:  No chest pains.  No palpitations. c/o shortness of breath, No orthopnea or PND.  GASTROINTESTINAL:  No abdominal pain.  No nausea or vomiting.    GENITOURINARY:    No hematuria.    MUSCULOSKELETAL:  c/o musculoskeletal pain.  No joint swelling.  No arthritis.  NEUROLOGICAL:  No tingling or numbness or weakness.    Vital Signs Last 24 Hrs  T(C): 36.3 (17 Jul 2022 20:39), Max: 37.1 (17 Jul 2022 19:00)  T(F): 97.3 (17 Jul 2022 20:39), Max: 98.8 (17 Jul 2022 19:00)  HR: 61 (18 Jul 2022 05:31) (49 - 61)  BP: 144/41 (18 Jul 2022 05:31) (139/86 - 158/53)  BP(mean): 83 (17 Jul 2022 20:02) (73 - 94)  RR: 18 (17 Jul 2022 20:39) (17 - 20)  SpO2: 98% (17 Jul 2022 20:39) (96% - 98%)    Parameters below as of 17 Jul 2022 20:39  Patient On (Oxygen Delivery Method): nasal cannula  O2 Flow (L/min): 2      PHYSICAL EXAM-  Constitutional: elderly ill looking female in no acute distress  Head: Head is normocephalic and atraumatic.    Neck: + JVD.   Cardiovascular: Regular rate and rhythm without S3, S4. No murmurs or rubs are appreciated.    Respiratory: B/lrales. No wheezing.  Abdomen: Soft, nontender, nondistended with positive bowel sounds.    Extremity: No tenderness. No  pitting edema   Neurologic: The patient is alert and oriented.      INTERPRETATION OF TELEMETRY: SR     ECG: Sinus rythm ,  no ST T changes. poor R wave progression.    I&O's Detail    17 Jul 2022 07:01  -  18 Jul 2022 07:00  --------------------------------------------------------  IN:  Total IN: 0 mL    OUT:    Voided (mL): 700 mL  Total OUT: 700 mL    Total NET: -700 mL    LABS:                        9.3    4.31  )-----------( 104      ( 18 Jul 2022 06:30 )             29.7     07-18    134<L>  |  98  |  20  ----------------------------<  86  3.5   |  31  |  1.34<H>    Ca    8.9      18 Jul 2022 06:30  Phos  2.6     07-18  Mg     2.1     07-18    TPro  6.1  /  Alb  2.6<L>  /  TBili  0.3  /  DBili  x   /  AST  52<H>  /  ALT  49  /  AlkPhos  99  07-17        PT/INR - ( 17 Jul 2022 13:11 )   PT: 11.3 sec;   INR: 0.97 ratio         PTT - ( 17 Jul 2022 13:11 )  PTT:28.0 sec    I&O's Summary    17 Jul 2022 07:01  -  18 Jul 2022 07:00  --------------------------------------------------------  IN: 0 mL / OUT: 700 mL / NET: -700 mL      BNPSerum Pro-Brain Natriuretic Peptide: 1170 pg/mL (07-17 @ 13:59)    RADIOLOGY & ADDITIONAL STUDIES:

## 2022-07-20 NOTE — PROGRESS NOTE ADULT - ASSESSMENT
89 year old woman with HTN, HFpEF, afib not on AC due to falls. hx of TIA, lymphoma in remission, chronic hyponatremia, adrenal insufficiency on fludrocortisone and hydrocortisone, presented from [facility] with complaints of worsening dyspnea and increased extremity swelling, found to have hypoxia, likely acute on chronic diastolic CHF, admitted to Medicine for further management.     Acute hypoxic respiratory failure   Likely due to acute on chronic diastolic CHF, see below. Patient now improved. Weaned to room air.   - Obtain ambulatory pulse ox assessment, home O2 eval    Acute on chronic diastolic CHF  No angina. Trop negative. EKG without acute ischemic changes. TTE recently done, 6/2022, preserved EF. ILR interrogated. No events. Appreciate input from Cardiology. Overall, patient gradually improving with gentle diuresis with Lasix IV. Unclear to what extent patient's home regimen of fludrocortisone has been contributing to her leg and arm swelling, dyspnea on exertion. Reduced fludrocortisone to 0.05mg daily.    - Continue Lasix IV daily  - Continue carvedilol, losartan, hydralazine  - Is and Os, daily wt, fluid restriction    Paroxysmal atrial fibrillation  HR controlled on carvedilol and amiodarone. Not on AC due to hx of frequent falls.   - Continue carvedilol and amiodarone    HTN  BP stable. On amlodipine, carvedilol, losartan, hydralazine and furosemide  - Continue current regimen    Adrenal insufficiency  On hydrocortisone and fludrocortisone. This admission, patient's dose of fludrocortisone cut in half to 0.05mg daily to see if helps w/ swelling and dyspnea.   - Continue hydrocortisone 10/5  - Continue fludrocortisone, now 0.05mg daily    HI  Upon admission Cr 1.52 (Cr 1.06 on 6/25/22). Etiology unclear.   - Ordered for urinalysis, urine Na, urine Cr, urine prot/Cr ratio, UPEP, SPEP  - Trend Cr  - Strict IS and OS    HypoK  K 3.4 today. Ordered for supplementation.  - Replete K to goal ~4     Chronic normocytic anemia, thrombocytopenia  Hgb stable, approx at baseline. No leukopenia. Platelets are chronically low ~100. Iron studies from 6/2022 reviewed. Iron 29. TIBC 263. Iron sat 11%. Ferritin 86.  - Continue to monitor, may need Hematology referral upon discharge    Severe protein calorie malnutrition  Appreciate input from Nutrition  - Encourage Po intake, trend patient weight        DVT px: Venodyne boots  Code status: DNR/DNI  Dispo: Return to [facility] once clinically improved and inpatient workup is complete, possibly Fri 7/22. Will need HHA reinstated 24 hrs prior to discharge

## 2022-07-20 NOTE — PROGRESS NOTE ADULT - ASSESSMENT
A/P: 89 y/o F with PMH HTN, TIA, A fib not on AC d/t falls, CKD, lymphoma in remission, CHF, hyponatremia, adrenal insufficiency presents from [facility] with increased SOB/Edema.     1. Acute on chronic diastolic HF. Pt was on fludrocortisone and hydrocortisone as outpt exacerbating fluid retention.   Will speak with hosptialist attending today.  Florinef already reduced to half dose. 10mg of Amlodipine also likely contributing.   Cr did increase to 1.57- awaiting today's labs. If Cr increases further, will need to d/c.    Cont tight BP control.   Diuresis with close monitoring of the renal function and electrolytes.  Goal potassium of 4 and magnesium of 2.   Strict I/O and daily wt checks. Low sodium diet. Nutrition education.     2. HTN. BP relatively stable at present in 140s systolic.     3. Hyponatremia- monitor with diuresis. Normalized.     4. CKD. Cr increased today. Will follow daily.     5. PAF- not on AC secondary to falls and risk of bleed outweighs benefit of AC  on amiodarone.     6. Palliative care eval. D/C planning.

## 2022-07-21 LAB
ANION GAP SERPL CALC-SCNC: 4 MMOL/L — LOW (ref 5–17)
APPEARANCE UR: CLEAR — SIGNIFICANT CHANGE UP
BILIRUB UR-MCNC: NEGATIVE — SIGNIFICANT CHANGE UP
BUN SERPL-MCNC: 24 MG/DL — HIGH (ref 7–23)
CALCIUM SERPL-MCNC: 9.1 MG/DL — SIGNIFICANT CHANGE UP (ref 8.5–10.1)
CHLORIDE SERPL-SCNC: 99 MMOL/L — SIGNIFICANT CHANGE UP (ref 96–108)
CO2 SERPL-SCNC: 31 MMOL/L — SIGNIFICANT CHANGE UP (ref 22–31)
COLOR SPEC: YELLOW — SIGNIFICANT CHANGE UP
CREAT ?TM UR-MCNC: 33 MG/DL — SIGNIFICANT CHANGE UP
CREAT SERPL-MCNC: 1.36 MG/DL — HIGH (ref 0.5–1.3)
DIFF PNL FLD: NEGATIVE — SIGNIFICANT CHANGE UP
EGFR: 35 ML/MIN/1.73M2 — LOW
GLUCOSE SERPL-MCNC: 91 MG/DL — SIGNIFICANT CHANGE UP (ref 70–99)
GLUCOSE UR QL: NEGATIVE — SIGNIFICANT CHANGE UP
KETONES UR-MCNC: NEGATIVE — SIGNIFICANT CHANGE UP
LEUKOCYTE ESTERASE UR-ACNC: NEGATIVE — SIGNIFICANT CHANGE UP
MAGNESIUM SERPL-MCNC: 2.3 MG/DL — SIGNIFICANT CHANGE UP (ref 1.6–2.6)
NITRITE UR-MCNC: NEGATIVE — SIGNIFICANT CHANGE UP
PH UR: 7 — SIGNIFICANT CHANGE UP (ref 5–8)
POTASSIUM SERPL-MCNC: 4.1 MMOL/L — SIGNIFICANT CHANGE UP (ref 3.5–5.3)
POTASSIUM SERPL-SCNC: 4.1 MMOL/L — SIGNIFICANT CHANGE UP (ref 3.5–5.3)
PROT ?TM UR-MCNC: 92 MG/DL — HIGH (ref 0–12)
PROT SERPL-MCNC: 5 G/DL — LOW (ref 6–8.3)
PROT SERPL-MCNC: 5 G/DL — LOW (ref 6–8.3)
PROT UR-MCNC: 100
PROT/CREAT UR-RTO: 2.8 RATIO — HIGH (ref 0–0.2)
SARS-COV-2 RNA SPEC QL NAA+PROBE: SIGNIFICANT CHANGE UP
SODIUM SERPL-SCNC: 134 MMOL/L — LOW (ref 135–145)
SODIUM UR-SCNC: 98 MMOL/L — SIGNIFICANT CHANGE UP
SP GR SPEC: 1.01 — SIGNIFICANT CHANGE UP (ref 1.01–1.02)
UROBILINOGEN FLD QL: NEGATIVE — SIGNIFICANT CHANGE UP

## 2022-07-21 PROCEDURE — 99232 SBSQ HOSP IP/OBS MODERATE 35: CPT

## 2022-07-21 PROCEDURE — 93971 EXTREMITY STUDY: CPT | Mod: 26,LT

## 2022-07-21 PROCEDURE — 73521 X-RAY EXAM HIPS BI 2 VIEWS: CPT | Mod: 26

## 2022-07-21 RX ORDER — ACETAMINOPHEN 500 MG
975 TABLET ORAL ONCE
Refills: 0 | Status: COMPLETED | OUTPATIENT
Start: 2022-07-21 | End: 2022-07-21

## 2022-07-21 RX ORDER — ACETAMINOPHEN 500 MG
975 TABLET ORAL EVERY 8 HOURS
Refills: 0 | Status: COMPLETED | OUTPATIENT
Start: 2022-07-21 | End: 2022-07-24

## 2022-07-21 RX ADMIN — Medication 100 MILLIGRAM(S): at 21:09

## 2022-07-21 RX ADMIN — AMIODARONE HYDROCHLORIDE 200 MILLIGRAM(S): 400 TABLET ORAL at 09:43

## 2022-07-21 RX ADMIN — Medication 40 MILLIGRAM(S): at 09:43

## 2022-07-21 RX ADMIN — Medication 975 MILLIGRAM(S): at 22:10

## 2022-07-21 RX ADMIN — CHLORHEXIDINE GLUCONATE 1 APPLICATION(S): 213 SOLUTION TOPICAL at 09:41

## 2022-07-21 RX ADMIN — CARVEDILOL PHOSPHATE 25 MILLIGRAM(S): 80 CAPSULE, EXTENDED RELEASE ORAL at 09:42

## 2022-07-21 RX ADMIN — Medication 100 MILLIGRAM(S): at 14:02

## 2022-07-21 RX ADMIN — SENNA PLUS 2 TABLET(S): 8.6 TABLET ORAL at 21:10

## 2022-07-21 RX ADMIN — Medication 5 MILLIGRAM(S): at 16:32

## 2022-07-21 RX ADMIN — LOSARTAN POTASSIUM 50 MILLIGRAM(S): 100 TABLET, FILM COATED ORAL at 09:42

## 2022-07-21 RX ADMIN — FAMOTIDINE 20 MILLIGRAM(S): 10 INJECTION INTRAVENOUS at 09:42

## 2022-07-21 RX ADMIN — Medication 100 MILLIGRAM(S): at 05:46

## 2022-07-21 RX ADMIN — Medication 10 MILLIGRAM(S): at 09:41

## 2022-07-21 RX ADMIN — Medication 975 MILLIGRAM(S): at 21:09

## 2022-07-21 RX ADMIN — PREGABALIN 1000 MICROGRAM(S): 225 CAPSULE ORAL at 09:42

## 2022-07-21 RX ADMIN — FLUDROCORTISONE ACETATE 0.05 MILLIGRAM(S): 0.1 TABLET ORAL at 09:43

## 2022-07-21 RX ADMIN — Medication 975 MILLIGRAM(S): at 12:29

## 2022-07-21 RX ADMIN — CARVEDILOL PHOSPHATE 25 MILLIGRAM(S): 80 CAPSULE, EXTENDED RELEASE ORAL at 21:09

## 2022-07-21 RX ADMIN — AMLODIPINE BESYLATE 10 MILLIGRAM(S): 2.5 TABLET ORAL at 09:43

## 2022-07-21 RX ADMIN — ATORVASTATIN CALCIUM 10 MILLIGRAM(S): 80 TABLET, FILM COATED ORAL at 21:09

## 2022-07-21 RX ADMIN — Medication 2000 UNIT(S): at 09:43

## 2022-07-21 NOTE — PROGRESS NOTE ADULT - ASSESSMENT
89 year old woman with HTN, HFpEF, afib not on AC due to falls. hx of TIA, lymphoma in remission, chronic hyponatremia, adrenal insufficiency on fludrocortisone and hydrocortisone, presented from [facility] with complaints of worsening dyspnea and increased extremity swelling, found to have hypoxia, likely acute on chronic diastolic CHF, admitted to Medicine for further management.     Acute hypoxic respiratory failure   Likely due to acute on chronic diastolic CHF, see below. Patient gradually improving with IV diuresis. Weaned to room air yesterday. Back on supplemental oxygen intermittently today.    - Obtain ambulatory pulse ox assessment, home O2 eval    Acute on chronic diastolic CHF  No angina. Trop negative. EKG without acute ischemic changes. TTE recently done, 6/2022, preserved EF. ILR interrogated. No events. Appreciate input from Cardiology. Overall, patient gradually improving with gentle diuresis with Lasix IV. Unclear to what extent patient's home regimen of fludrocortisone has been contributing to her leg and arm swelling, dyspnea on exertion. Reduced fludrocortisone to 0.05mg daily.    - Continue Lasix IV daily  - Continue carvedilol, losartan, hydralazine  - Is and Os, daily wt, fluid restriction    Paroxysmal atrial fibrillation  HR controlled on carvedilol and amiodarone. Not on AC due to hx of frequent falls.   - Continue carvedilol and amiodarone    HTN  BP stable. On amlodipine, carvedilol, losartan, hydralazine and furosemide  - Continue current regimen    Adrenal insufficiency  On hydrocortisone and fludrocortisone. This admission, patient's dose of fludrocortisone cut in half to 0.05mg daily to see if helps w/ swelling and dyspnea.   - Continue hydrocortisone 10/5  - Continue fludrocortisone, now 0.05mg daily    HI  Upon admission Cr 1.52 (Cr 1.06 on 6/25/22). 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   - Trend Cr, monitor Is and Os    Hypokalemia  Resolved with potassium supplementation.  - Continue to monitor K    Chronic normocytic anemia, thrombocytopenia  Hgb stable, approx at baseline. No leukopenia. Platelets are chronically low ~100. Iron studies from 6/2022 reviewed. Iron 29. TIBC 263. Iron sat 11%. Ferritin 86.  - Continue to monitor, may need Hematology referral upon discharge    Severe protein calorie malnutrition  Appreciate input from Nutrition  - Encourage Po intake, trend patient weight        DVT px: Venodyne boots  Code status: DNR/DNI  Dispo: Return to [facility] once clinically improved and inpatient workup is complete, possibly 7/22 or 7/23 but note that HHA will need to be reinstated, possibly 24hrs prior to discharge

## 2022-07-21 NOTE — PROGRESS NOTE ADULT - ASSESSMENT
SOB, Acute on chronic decompensated HFPEF- hypervolemic , Hypervolemic, NYHA class III-   Pt is on fludrocortisone and hydrocortisone for adrenal insufficiency  This might have caused fluid retention on top of other factors.  Spoke to Dr Merrill hospitalist attending and we reduced dose of fludrocortisone.   renal function improving.   WIll continue lasix iv 40 mg daily.  she has cardiorenal syndrome too.  I had discussion with her daughter on Tuesday evening.  She is asking about prognosis and mgmt.   Diuresis with close monitoring of the renal function and electrolytes.  Goal potassium of 4 and magnesium of 2.   Strict I/O and daily wt checks. Low sodium diet. Nutrition education.     HTN- cont home meds.    Hyponatremia- monitor with diuresis. mild in nature for now.     CKD- mild fluctuations in cr noted.   monitor for now.  Improving with diuresis now.    PAF- not on AC secondary to falls and risk of bleed outweighs benefit of AC  on amiodarone.       Other medical issues- Management per primary team.   Thank you for allowing me to participate in the care of this patient. Please feel free to contact me with any questions.

## 2022-07-21 NOTE — PROGRESS NOTE ADULT - SUBJECTIVE AND OBJECTIVE BOX
Patient is a 89y old  Female who presents with a chief complaint of SOB/Edema.     HPI:  89 y/o F with PMH HTN, TIA, A fib not on AC d/t falls, CKD, lymphoma in remission, CHF, hyponatremia, adrenal insufficiency presents from [facility] with increased SOB/Edema.       In the ER, Tmax 97.6F, HR 56-60, /72, RR 17-20, SpO2 96% on RA (reported SpO2 89-91% on RA in ER documentation). Hgb 10.6 (baseline), Plt 115, K 2.8, Cr 1.52 (baseline 1.1-1.3), BUN 22, BNP 1170.   7/21-   Pt seen and examined this am.  Pt was able to sleep in bed with slight incline.  No overnight events.      PAST MEDICAL & SURGICAL HISTORY:  Iron deficiency      HTN (hypertension)      Lymphoma      H/O adrenal insufficiency      Hyponatremia      Hypokalemia      Chronic kidney disease, unspecified CKD stage      Diastolic heart failure      Paroxysmal atrial fibrillation      S/P appendectomy      S/P hysterectomy      H/O intracranial hemorrhage          MEDICATIONS  (STANDING):  aMIOdarone    Tablet 200 milliGRAM(s) Oral daily  amLODIPine   Tablet 10 milliGRAM(s) Oral daily  atorvastatin 10 milliGRAM(s) Oral at bedtime  carvedilol 25 milliGRAM(s) Oral every 12 hours  cholecalciferol 2000 Unit(s) Oral daily  cyanocobalamin 1000 MICROGram(s) Oral daily  famotidine    Tablet 20 milliGRAM(s) Oral daily  fludroCORTISONE 0.1 milliGRAM(s) Oral daily  hydrALAZINE 100 milliGRAM(s) Oral every 8 hours  hydrocortisone 10 milliGRAM(s) Oral <User Schedule>  hydrocortisone 5 milliGRAM(s) Oral <User Schedule>  senna 2 Tablet(s) Oral at bedtime    MEDICATIONS  (PRN):  acetaminophen     Tablet .. 650 milliGRAM(s) Oral every 6 hours PRN Temp greater or equal to 38C (100.4F), Mild Pain (1 - 3)  aluminum hydroxide/magnesium hydroxide/simethicone Suspension 30 milliLiter(s) Oral every 4 hours PRN Dyspepsia  bismuth subsalicylate Liquid 15 milliLiter(s) Oral four times a day PRN Dyspsea/GI DIscomfort  loratadine 10 milliGRAM(s) Oral daily PRN Allergies  melatonin 3 milliGRAM(s) Oral at bedtime PRN Insomnia  ondansetron Injectable 4 milliGRAM(s) IV Push every 8 hours PRN Nausea and/or Vomiting  polyethylene glycol 3350 17 Gram(s) Oral daily PRN Constipation      FAMILY HISTORY:  No known problems (Father, Mother)        SOCIAL HISTORY: no recent smoking     REVIEW OF SYSTEMS:  CONSTITUTIONAL:    No fatigue, malaise, lethargy.  No fever or chills.  RESPIRATORY:  No cough.  No wheeze.  No hemoptysis.  c/o shortness of breath.  CARDIOVASCULAR:  No chest pains.  No palpitations. c/o shortness of breath, No orthopnea or PND.  GASTROINTESTINAL:  No abdominal pain.  No nausea or vomiting.    GENITOURINARY:    No hematuria.    MUSCULOSKELETAL:  c/o musculoskeletal pain.  No joint swelling.  No arthritis.  NEUROLOGICAL:  No tingling or numbness or weakness.  PSYCHIATRIC:  No confusion  SKIN:  No rashes.            ICU Vital Signs Last 24 Hrs  T(C): 36.8 (20 Jul 2022 19:20), Max: 36.8 (20 Jul 2022 19:20)  T(F): 98.2 (20 Jul 2022 19:20), Max: 98.2 (20 Jul 2022 19:20)  HR: 61 (20 Jul 2022 19:20) (60 - 61)  BP: 154/41 (21 Jul 2022 05:43) (125/40 - 157/41)  BP(mean): --  ABP: --  ABP(mean): --  RR: 18 (20 Jul 2022 19:20) (18 - 18)  SpO2: 99% (20 Jul 2022 19:20) (99% - 99%)    O2 Parameters below as of 20 Jul 2022 19:20  Patient On (Oxygen Delivery Method): room air            PHYSICAL EXAM-    Constitutional: elderly ill looking female in no acute distress    Head: Head is normocephalic and atraumatic.      Neck: + JVD.     Cardiovascular: Regular rate and rhythm without S3, S4. No murmurs or rubs are appreciated.      Respiratory: B/lrales. No wheezing.    Abdomen: Soft, nontender, nondistended with positive bowel sounds.      Extremity: No tenderness. No  pitting edema     Neurologic: The patient is alert and oriented.      Skin: No rash, no obvious lesions noted.      Psychiatric: The patient appears to be emotionally stable.      INTERPRETATION OF TELEMETRY: SR     ECG: Sinus rythm ,  no ST T changes. poor R wave progression.    I&O's Detail    17 Jul 2022 07:01  -  18 Jul 2022 07:00  --------------------------------------------------------  IN:  Total IN: 0 mL    OUT:    Voided (mL): 700 mL  Total OUT: 700 mL    Total NET: -700 mL          LABS:                                   9.9    5.95  )-----------( 108      ( 20 Jul 2022 07:04 )             30.9     07-21    134<L>  |  99  |  24<H>  ----------------------------<  91  4.1   |  31  |  1.36<H>    Ca    9.1      21 Jul 2022 06:54  Mg     2.3     07-21      BNPSerum Pro-Brain Natriuretic Peptide: 1170 pg/mL (07-17 @ 13:59)    RADIOLOGY & ADDITIONAL STUDIES:  < from: TTE Echo Complete w/o Contrast w/ Doppler (06.22.22 @ 10:32) >   Impression     Summary     Mild asymmetrical septal left ventricular hypertrophy is present.   Estimated left ventricular ejection fraction is 60-65 %.   The left atrium is mildly dilated.   Mild (1+) aortic regurgitation   Moderate (2+) tricuspid valve regurgitation. Mild pulmonary hypertension.   Trace pericardial effusion is present.     Signature     ----------------------------------------------------------------   Electronically signed by Valentin Quesada MD(Interpreting   physician) on 06/23/2022 03:11 PM   ----------------------------------------------------------------    Valves    < end of copied text >

## 2022-07-21 NOTE — PROGRESS NOTE ADULT - SUBJECTIVE AND OBJECTIVE BOX
Spoke with daughter . Asking if MD would see patient since the test was on 10/20 did not receive results until 10/23 . Aware we can not see pt until he is released [place] [facility] [place] [facility] [place] will be [place]. Will keep appt on Monday. Chief Complaint: Dyspnea, swelling    Interval History: Patient seen and examined. Patient reports feeling somewhat improved since admission. Overall slightly less edema. Still with some dependent edema in her L elbow, L hand and B/L LEs. She was seen by Cardiology today who advised continued IV diuresis. Patient also reported new complaint of L groin discomfort, mild in severity, worse with palpation, has some difficulty flexing at the hip, believes she may have pulled a muscle. Exam rather unrevealing aside from her edema. Hip XR ordered. LLE venous duplex ordered. Tylenol 975mg ordered. Otherwise, patient states that she is feeling fairly well with no dyspnea at rest, no chest pain or tightness. No other complaints.     ROS: Multi system review is comprehensively negative x 10 systems except as above    Vitals:  T(F): 98.1 (21 Jul 2022 09:57), Max: 98.2 (20 Jul 2022 19:20)  HR: 64 (21 Jul 2022 14:03) (60 - 68)  BP: 126/46 (21 Jul 2022 14:03) (125/40 - 157/41)  RR: 18 (21 Jul 2022 09:57) (18 - 18)  SpO2: 96% (21 Jul 2022 09:57) (96% - 99%) on     Exam:  Gen: Comfortable appearing  HEENT: NCAT PERRL EOMI MMM clear oropharynx  Neck: Supple, no JVD  Chest: Normal resp effort at rest, lungs CTA B/L  CVS: s1 s2 normal, rrr  Abd: +BS, soft NT ND  Ext: Mild pitting edema in L elbow and B/L LEs. No redness. No induration. No tenderness.   Skin: Warm, dry, intact  Neuro: A+OX3, no deficits  Mood: Calm, pleasant    Labs:                        9.9    5.95 )-----------( 108              30.9       134  |  99  |  24  --------------------<  91  4.1   |  31  |  1.36    Ca 9.1  Mg 2.3    BNP 1170   Troponin (-)    Micro:  COVID19 PCR 7/17: Negative    Imaging:  Hip XR requested    LLE venous duplex US requested    LUE venous duplex US 7/20: No evidence of left upper extremity deep venous thrombosis.    CXR 7/17: Central bronchiectasis on both sides along with prominent interstitial markings in both upper lobes are unchanged. Calcified bilateral pleural plaque near the apices along with bibasilar pleural effusions, grossly unchanged. Cardiac loop recorder however there is no pulmonary edema.    Cardiac Testing:  Telemetry 7/19: Sinus rhythm, rate 60-70, no ectopy    Telemetry 7/18: Sinus rhythm, rate 50-60    EKG 7/17: NSR, rate WNL, old anteroseptal infarct (?)    TTE from prior visit, 6/22/22: Mild asymmetrical septal left ventricular hypertrophy present. Estimated left ventricular ejection fraction is 60-65 %. The left atrium is mildly dilated. Mild AI. Mod TR. Mild pulmonary hypertension. Trace pericardial effusion is present.    Meds:  MEDICATIONS  (STANDING):  acetaminophen     Tablet .. 975 milliGRAM(s) Oral every 8 hours  aMIOdarone    Tablet 200 milliGRAM(s) Oral daily  amLODIPine   Tablet 10 milliGRAM(s) Oral daily  atorvastatin 10 milliGRAM(s) Oral at bedtime  carvedilol 25 milliGRAM(s) Oral every 12 hours  chlorhexidine 4% Liquid 1 Application(s) Topical <User Schedule>  cholecalciferol 2000 Unit(s) Oral daily  cyanocobalamin 1000 MICROGram(s) Oral daily  famotidine    Tablet 20 milliGRAM(s) Oral daily  fludroCORTISONE 0.05 milliGRAM(s) Oral daily  furosemide   Injectable 40 milliGRAM(s) IV Push daily  hydrALAZINE 100 milliGRAM(s) Oral every 8 hours  hydrocortisone 10 milliGRAM(s) Oral <User Schedule>  hydrocortisone 5 milliGRAM(s) Oral <User Schedule>  losartan 50 milliGRAM(s) Oral daily  senna 2 Tablet(s) Oral at bedtime    MEDICATIONS  (PRN):  bismuth subsalicylate Liquid 15 milliLiter(s) Oral four times a day PRN Dyspsea/GI DIscomfort  loratadine 10 milliGRAM(s) Oral daily PRN Allergies  melatonin 3 milliGRAM(s) Oral at bedtime PRN Insomnia  ondansetron Injectable 4 milliGRAM(s) IV Push every 8 hours PRN Nausea and/or Vomiting  polyethylene glycol 3350 17 Gram(s) Oral daily PRN Constipation

## 2022-07-22 ENCOUNTER — TRANSCRIPTION ENCOUNTER (OUTPATIENT)
Age: 87
End: 2022-07-22

## 2022-07-22 LAB
ANION GAP SERPL CALC-SCNC: 5 MMOL/L — SIGNIFICANT CHANGE UP (ref 5–17)
BUN SERPL-MCNC: 25 MG/DL — HIGH (ref 7–23)
CALCIUM SERPL-MCNC: 8.9 MG/DL — SIGNIFICANT CHANGE UP (ref 8.5–10.1)
CHLORIDE SERPL-SCNC: 98 MMOL/L — SIGNIFICANT CHANGE UP (ref 96–108)
CO2 SERPL-SCNC: 30 MMOL/L — SIGNIFICANT CHANGE UP (ref 22–31)
CREAT SERPL-MCNC: 1.3 MG/DL — SIGNIFICANT CHANGE UP (ref 0.5–1.3)
CREATININE, URINE RESULT: 32 MG/DL — SIGNIFICANT CHANGE UP
EGFR: 37 ML/MIN/1.73M2 — LOW
GLUCOSE SERPL-MCNC: 79 MG/DL — SIGNIFICANT CHANGE UP (ref 70–99)
POTASSIUM SERPL-MCNC: 3.5 MMOL/L — SIGNIFICANT CHANGE UP (ref 3.5–5.3)
POTASSIUM SERPL-SCNC: 3.5 MMOL/L — SIGNIFICANT CHANGE UP (ref 3.5–5.3)
PROT ?TM UR-MCNC: 84 MG/DL — HIGH (ref 0–12)
SODIUM SERPL-SCNC: 133 MMOL/L — LOW (ref 135–145)

## 2022-07-22 PROCEDURE — 99232 SBSQ HOSP IP/OBS MODERATE 35: CPT

## 2022-07-22 RX ORDER — HEPARIN SODIUM 5000 [USP'U]/ML
5000 INJECTION INTRAVENOUS; SUBCUTANEOUS EVERY 12 HOURS
Refills: 0 | Status: DISCONTINUED | OUTPATIENT
Start: 2022-07-22 | End: 2022-07-27

## 2022-07-22 RX ORDER — TRAMADOL HYDROCHLORIDE 50 MG/1
25 TABLET ORAL EVERY 12 HOURS
Refills: 0 | Status: DISCONTINUED | OUTPATIENT
Start: 2022-07-22 | End: 2022-07-26

## 2022-07-22 RX ADMIN — Medication 2000 UNIT(S): at 10:56

## 2022-07-22 RX ADMIN — Medication 975 MILLIGRAM(S): at 13:15

## 2022-07-22 RX ADMIN — Medication 5 MILLIGRAM(S): at 18:28

## 2022-07-22 RX ADMIN — Medication 10 MILLIGRAM(S): at 08:03

## 2022-07-22 RX ADMIN — AMLODIPINE BESYLATE 10 MILLIGRAM(S): 2.5 TABLET ORAL at 10:57

## 2022-07-22 RX ADMIN — HEPARIN SODIUM 5000 UNIT(S): 5000 INJECTION INTRAVENOUS; SUBCUTANEOUS at 10:57

## 2022-07-22 RX ADMIN — CARVEDILOL PHOSPHATE 25 MILLIGRAM(S): 80 CAPSULE, EXTENDED RELEASE ORAL at 10:56

## 2022-07-22 RX ADMIN — Medication 100 MILLIGRAM(S): at 06:00

## 2022-07-22 RX ADMIN — FLUDROCORTISONE ACETATE 0.05 MILLIGRAM(S): 0.1 TABLET ORAL at 10:58

## 2022-07-22 RX ADMIN — PREGABALIN 1000 MICROGRAM(S): 225 CAPSULE ORAL at 10:56

## 2022-07-22 RX ADMIN — Medication 975 MILLIGRAM(S): at 07:27

## 2022-07-22 RX ADMIN — TRAMADOL HYDROCHLORIDE 25 MILLIGRAM(S): 50 TABLET ORAL at 15:00

## 2022-07-22 RX ADMIN — Medication 975 MILLIGRAM(S): at 21:11

## 2022-07-22 RX ADMIN — CARVEDILOL PHOSPHATE 25 MILLIGRAM(S): 80 CAPSULE, EXTENDED RELEASE ORAL at 21:12

## 2022-07-22 RX ADMIN — FAMOTIDINE 20 MILLIGRAM(S): 10 INJECTION INTRAVENOUS at 10:56

## 2022-07-22 RX ADMIN — Medication 975 MILLIGRAM(S): at 05:59

## 2022-07-22 RX ADMIN — Medication 975 MILLIGRAM(S): at 14:30

## 2022-07-22 RX ADMIN — SENNA PLUS 2 TABLET(S): 8.6 TABLET ORAL at 21:11

## 2022-07-22 RX ADMIN — CHLORHEXIDINE GLUCONATE 1 APPLICATION(S): 213 SOLUTION TOPICAL at 10:55

## 2022-07-22 RX ADMIN — Medication 40 MILLIGRAM(S): at 10:57

## 2022-07-22 RX ADMIN — ATORVASTATIN CALCIUM 10 MILLIGRAM(S): 80 TABLET, FILM COATED ORAL at 21:11

## 2022-07-22 RX ADMIN — Medication 100 MILLIGRAM(S): at 13:15

## 2022-07-22 RX ADMIN — LOSARTAN POTASSIUM 50 MILLIGRAM(S): 100 TABLET, FILM COATED ORAL at 10:57

## 2022-07-22 RX ADMIN — Medication 100 MILLIGRAM(S): at 21:11

## 2022-07-22 RX ADMIN — AMIODARONE HYDROCHLORIDE 200 MILLIGRAM(S): 400 TABLET ORAL at 10:57

## 2022-07-22 RX ADMIN — Medication 975 MILLIGRAM(S): at 22:30

## 2022-07-22 RX ADMIN — HEPARIN SODIUM 5000 UNIT(S): 5000 INJECTION INTRAVENOUS; SUBCUTANEOUS at 21:11

## 2022-07-22 RX ADMIN — Medication 3 MILLIGRAM(S): at 21:11

## 2022-07-22 NOTE — DISCHARGE NOTE NURSING/CASE MANAGEMENT/SOCIAL WORK - NSDCPEFALRISK_GEN_ALL_CORE
For information on Fall & Injury Prevention, visit: [email or web address] OR  [email or web address] OR  https://www.cdc.gov/steadi/patient.html

## 2022-07-22 NOTE — PROGRESS NOTE ADULT - ASSESSMENT
SOB, Acute on chronic decompensated HFPEF- hypervolemic , Hypervolemic, NYHA class III-   Pt is on fludrocortisone and hydrocortisone for adrenal insufficiency  This might have caused fluid retention on top of other factors.  renal function improving.   WIll continue lasix iv 40 mg daily over weekend.   she has cardiorenal syndrome too.  Diuresis with close monitoring of the renal function and electrolytes.  Goal potassium of 4 and magnesium of 2.   Strict I/O and daily wt checks. Low sodium diet. Nutrition education.     HTN- cont home meds.    Hyponatremia- monitor with diuresis.   mild in nature for now.     CKD- mild fluctuations in cr noted.   monitor for now.  Improving with diuresis now.    PAF- not on AC secondary to falls and risk of bleed outweighs benefit of AC  on amiodarone.       Other medical issues- Management per primary team.   Thank you for allowing me to participate in the care of this patient. Please feel free to contact me with any questions.

## 2022-07-22 NOTE — PROGRESS NOTE ADULT - SUBJECTIVE AND OBJECTIVE BOX
Chief Complaint: Dyspnea, swelling    Interval History: Patient seen and examined. Patient reports feeling somewhat improved since admission. Overall slightly less edema. Still with some dependent edema in her L elbow, L hand and B/L LEs. She was seen by Cardiology today who advised continued IV diuresis. Patient also reported new complaint of L groin discomfort, mild in severity, worse with palpation, has some difficulty flexing at the hip, believes she may have pulled a muscle. Exam rather unrevealing aside from her edema. Hip XR ordered, report pending but likely severe DJD. LLE venous duplex negative for DVT, no significant finding in the groin. Tylenol 975mg ordered. Otherwise, patient states that she is feeling fairly well with no dyspnea at rest, no chest pain or tightness. No other complaints.     ROS: Multi system review is comprehensively negative x 10 systems except as above    Vitals:  T(F): 97.5 (22 Jul 2022 08:22), Max: 97.7 (21 Jul 2022 19:52)  HR: 60 (22 Jul 2022 08:22) (60 - 64)  BP: 140/39 (22 Jul 2022 08:22) (126/46 - 159/49)  RR: 18 (22 Jul 2022 08:22) (18 - 18)  SpO2: 95% (22 Jul 2022 08:22) (95% - 96%) on room air    Exam:  Gen: Comfortable appearing  HEENT: NCAT PERRL EOMI MMM clear oropharynx  Neck: Supple, no JVD  Chest: Normal resp effort at rest, lungs CTA B/L  CVS: s1 s2 normal, rrr  Abd: +BS, soft NT ND  Ext: Mild pitting edema in L elbow and B/L LEs. No redness. No induration. No tenderness.   Skin: Warm, dry, intact  Neuro: A+OX3, no deficits  Mood: Calm, pleasant    Labs:    133  |  98  |  25  --------------------<  79  3.5   |  30  |  1.30    Ca 8.9  Mg 2.3    BNP 1170   Troponin (-)    Micro:  COVID19 PCR 7/17: Negative    Imaging:  Hip XR done 7/21, report pending    LLE venous duplex US 7/21: No evidence of left lower extremity deep venous thrombosis. Left groin is unremarkable.    LUE venous duplex US 7/20: No evidence of left upper extremity deep venous thrombosis.    CXR 7/17: Central bronchiectasis on both sides along with prominent interstitial markings in both upper lobes are unchanged. Calcified bilateral pleural plaque near the apices along with bibasilar pleural effusions, grossly unchanged. Cardiac loop recorder however there is no pulmonary edema.    Cardiac Testing:  Telemetry 7/19: Sinus rhythm, rate 60-70, no ectopy    Telemetry 7/18: Sinus rhythm, rate 50-60    EKG 7/17: NSR, rate WNL, old anteroseptal infarct (?)    TTE from prior visit, 6/22/22: Mild asymmetrical septal left ventricular hypertrophy present. Estimated left ventricular ejection fraction is 60-65 %. The left atrium is mildly dilated. Mild AI. Mod TR. Mild pulmonary hypertension. Trace pericardial effusion is present.    Meds:  MEDICATIONS  (STANDING):  acetaminophen     Tablet .. 975 milliGRAM(s) Oral every 8 hours  aMIOdarone    Tablet 200 milliGRAM(s) Oral daily  amLODIPine   Tablet 10 milliGRAM(s) Oral daily  atorvastatin 10 milliGRAM(s) Oral at bedtime  carvedilol 25 milliGRAM(s) Oral every 12 hours  chlorhexidine 4% Liquid 1 Application(s) Topical <User Schedule>  cholecalciferol 2000 Unit(s) Oral daily  cyanocobalamin 1000 MICROGram(s) Oral daily  famotidine    Tablet 20 milliGRAM(s) Oral daily  fludroCORTISONE 0.05 milliGRAM(s) Oral daily  furosemide   Injectable 40 milliGRAM(s) IV Push daily  heparin   Injectable 5000 Unit(s) SubCutaneous every 12 hours  hydrALAZINE 100 milliGRAM(s) Oral every 8 hours  hydrocortisone 10 milliGRAM(s) Oral <User Schedule>  hydrocortisone 5 milliGRAM(s) Oral <User Schedule>  losartan 50 milliGRAM(s) Oral daily  senna 2 Tablet(s) Oral at bedtime    MEDICATIONS  (PRN):  bismuth subsalicylate Liquid 15 milliLiter(s) Oral four times a day PRN Dyspsea/GI DIscomfort  loratadine 10 milliGRAM(s) Oral daily PRN Allergies  melatonin 3 milliGRAM(s) Oral at bedtime PRN Insomnia  ondansetron Injectable 4 milliGRAM(s) IV Push every 8 hours PRN Nausea and/or Vomiting  polyethylene glycol 3350 17 Gram(s) Oral daily PRN Constipation                         Chief Complaint: Dyspnea, swelling    Interval History: Patient seen and examined. Patient reports feeling somewhat improved since admission. Overall slightly less edema. Still with some dependent edema in her L elbow, L hand and B/L LEs. She was seen by Cardiology today who advised continued IV diuresis. Patient also reported new complaint of L groin discomfort, mild in severity, worse with palpation, has some difficulty flexing at the hip, believes she may have pulled a muscle. Exam rather unrevealing aside from her edema. Hip XR obtained, no significant abnormality. LLE venous duplex done as well, negative for DVT, no significant finding in the groin. On Tylenol 975mg q8h. Added low-dose Tramadol this afternoon as needed for more significant pain. Otherwise, patient states that she is feeling fairly well with no dyspnea at rest, no chest pain or tightness. No other complaints.     ROS: Multi system review is comprehensively negative x 10 systems except as above    Vitals:  T(F): 97.5 (22 Jul 2022 08:22), Max: 97.7 (21 Jul 2022 19:52)  HR: 60 (22 Jul 2022 08:22) (60 - 64)  BP: 140/39 (22 Jul 2022 08:22) (126/46 - 159/49)  RR: 18 (22 Jul 2022 08:22) (18 - 18)  SpO2: 95% (22 Jul 2022 08:22) (95% - 96%) on room air    Exam:  Gen: Comfortable appearing  HEENT: NCAT PERRL EOMI MMM clear oropharynx  Neck: Supple, no JVD  Chest: Normal resp effort at rest, lungs CTA B/L  CVS: s1 s2 normal, rrr  Abd: +BS, soft NT ND  Ext: Mild pitting edema in L elbow and B/L LEs. No redness. No induration. No tenderness.   Skin: Warm, dry, intact  Neuro: A+OX3, no deficits  Mood: Calm, pleasant    Labs:    133  |  98  |  25  --------------------<  79  3.5   |  30  |  1.30    Ca 8.9  Mg 2.3    BNP 1170   Troponin (-)    Micro:  COVID19 PCR 7/17: Negative    Imaging:  Hip XR done 7/21:  No hip  fracture or dislocation. Osseous pelvis intact. The SI joints and symphysis pubis are within normal limits. Vertebral plasty cement in  lower lumbar vertebra.    LLE venous duplex US 7/21: No evidence of left lower extremity deep venous thrombosis. Left groin is unremarkable.    LUE venous duplex US 7/20: No evidence of left upper extremity deep venous thrombosis.    CXR 7/17: Central bronchiectasis on both sides along with prominent interstitial markings in both upper lobes are unchanged. Calcified bilateral pleural plaque near the apices along with bibasilar pleural effusions, grossly unchanged. Cardiac loop recorder however there is no pulmonary edema.    Cardiac Testing:  Telemetry 7/19: Sinus rhythm, rate 60-70, no ectopy    Telemetry 7/18: Sinus rhythm, rate 50-60    EKG 7/17: NSR, rate WNL, old anteroseptal infarct (?)    TTE from prior visit, 6/22/22: Mild asymmetrical septal left ventricular hypertrophy present. Estimated left ventricular ejection fraction is 60-65 %. The left atrium is mildly dilated. Mild AI. Mod TR. Mild pulmonary hypertension. Trace pericardial effusion is present.    Meds:  MEDICATIONS  (STANDING):  acetaminophen     Tablet .. 975 milliGRAM(s) Oral every 8 hours  aMIOdarone    Tablet 200 milliGRAM(s) Oral daily  amLODIPine   Tablet 10 milliGRAM(s) Oral daily  atorvastatin 10 milliGRAM(s) Oral at bedtime  carvedilol 25 milliGRAM(s) Oral every 12 hours  chlorhexidine 4% Liquid 1 Application(s) Topical <User Schedule>  cholecalciferol 2000 Unit(s) Oral daily  cyanocobalamin 1000 MICROGram(s) Oral daily  famotidine    Tablet 20 milliGRAM(s) Oral daily  fludroCORTISONE 0.05 milliGRAM(s) Oral daily  furosemide   Injectable 40 milliGRAM(s) IV Push daily  heparin   Injectable 5000 Unit(s) SubCutaneous every 12 hours  hydrALAZINE 100 milliGRAM(s) Oral every 8 hours  hydrocortisone 10 milliGRAM(s) Oral <User Schedule>  hydrocortisone 5 milliGRAM(s) Oral <User Schedule>  losartan 50 milliGRAM(s) Oral daily  senna 2 Tablet(s) Oral at bedtime    MEDICATIONS  (PRN):  bismuth subsalicylate Liquid 15 milliLiter(s) Oral four times a day PRN Dyspsea/GI DIscomfort  loratadine 10 milliGRAM(s) Oral daily PRN Allergies  melatonin 3 milliGRAM(s) Oral at bedtime PRN Insomnia  ondansetron Injectable 4 milliGRAM(s) IV Push every 8 hours PRN Nausea and/or Vomiting  polyethylene glycol 3350 17 Gram(s) Oral daily PRN Constipation  traMADol 25 milliGRAM(s) Oral every 12 hours PRN Moderate Pain (4 - 6)

## 2022-07-22 NOTE — PROGRESS NOTE ADULT - ASSESSMENT
89 year old woman with HTN, HFpEF, afib not on AC due to falls. hx of TIA, lymphoma in remission, chronic hyponatremia, adrenal insufficiency on fludrocortisone and hydrocortisone, presented from [facility] with complaints of worsening dyspnea and increased extremity swelling, found to have hypoxia, likely acute on chronic diastolic CHF, admitted to Medicine for further management.     Acute hypoxic respiratory failure   Likely due to acute on chronic diastolic CHF, see below. Patient gradually improving with IV diuresis. Weaned to room air.   - Obtain ambulatory pulse ox assessment, home O2 eval    Acute on chronic diastolic CHF  No angina. Trop negative. EKG without acute ischemic changes. TTE recently done, 6/2022, preserved EF. ILR interrogated. No events. Appreciate input from Cardiology. Overall, patient gradually improving with gentle diuresis with Lasix IV. Unclear to what extent patient's home regimen of fludrocortisone has been contributing to her leg and arm swelling, dyspnea on exertion. Reduced fludrocortisone to 0.05mg daily.    - Continue Lasix IV daily  - Continue carvedilol, losartan, hydralazine  - Is and Os, daily wt, fluid restriction    Paroxysmal atrial fibrillation  HR controlled on carvedilol and amiodarone. Not on AC due to hx of frequent falls.   - Continue carvedilol and amiodarone    HTN  BP stable. On amlodipine, carvedilol, losartan, hydralazine and furosemide  - Continue current regimen    Adrenal insufficiency  On hydrocortisone and fludrocortisone. This admission, patient's dose of fludrocortisone cut in half to 0.05mg daily to see if helps w/ swelling and dyspnea.   - Continue hydrocortisone 10/5  - Continue fludrocortisone, now 0.05mg daily    HI  Upon admission Cr 1.52 (Cr 1.06 on 6/25/22). 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   - Trend Cr, monitor Is and Os    Hypokalemia  Resolved with potassium supplementation.  - Continue to monitor K    Chronic normocytic anemia, thrombocytopenia  Hgb stable, approx at baseline. No leukopenia. Platelets are chronically low ~100. Iron studies from 6/2022 reviewed. Iron 29. TIBC 263. Iron sat 11%. Ferritin 86.  - Continue to monitor, may need Hematology referral upon discharge    Severe protein calorie malnutrition  Appreciate input from Nutrition  - Encourage Po intake, trend patient weight        DVT px: Venodyne boots  Code status: DNR/DNI  Dispo: Return to [facility] once clinically improved and inpatient workup is complete, possibly 7/23 but note that HHA will need to be reinstated, possibly 24hrs prior to discharge   89 year old woman with HTN, HFpEF, afib not on AC due to falls. hx of TIA, lymphoma in remission, chronic hyponatremia, adrenal insufficiency on fludrocortisone and hydrocortisone, presented from [facility] with complaints of worsening dyspnea and increased extremity swelling, found to have hypoxia, likely acute on chronic diastolic CHF, admitted to Medicine for further management.     Acute hypoxic respiratory failure   Likely due to acute on chronic diastolic CHF, see below. Patient gradually improving with IV diuresis. Weaned to room air.   - Obtain ambulatory pulse ox assessment, home O2 eval    Acute on chronic diastolic CHF  No angina. Trop negative. EKG without acute ischemic changes. TTE recently done, 6/2022, preserved EF. ILR interrogated. No events. Appreciate input from Cardiology. Overall, patient gradually improving with gentle diuresis with Lasix IV. Unclear to what extent patient's home regimen of fludrocortisone has been contributing to her leg and arm swelling, dyspnea on exertion. Reduced fludrocortisone to 0.05mg daily.    - Continue Lasix IV daily  - Continue carvedilol, losartan, hydralazine  - Is and Os, daily wt, fluid restriction    Paroxysmal atrial fibrillation  HR controlled on carvedilol and amiodarone. Not on AC due to hx of frequent falls.   - Continue carvedilol and amiodarone    HTN  BP stable. On amlodipine, carvedilol, losartan, hydralazine and furosemide  - Continue current regimen    Adrenal insufficiency  On hydrocortisone and fludrocortisone. This admission, patient's dose of fludrocortisone cut in half to 0.05mg daily to see if helps w/ swelling and dyspnea.   - Continue hydrocortisone 10/5  - Continue fludrocortisone, now 0.05mg daily    HI  Upon admission Cr 1.52 (Cr 1.06 on 6/25/22). 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   - Trend Cr, monitor Is and Os    Hypokalemia  Resolved with potassium supplementation.  - Continue to monitor K    L hip / groin discomfort  Etiology unclear but appears to be due to severe hip DJD. LE venous duplex negative for DVT. No groin pathology noted on US. L hip XR done, report pending but appears to show advanced DJD. Pain is minimal at rest but increased with activity. On Tylenol 975 q8h. Requested something stronger today. Added Tramadol 25mg twice a day prn.     Chronic normocytic anemia, thrombocytopenia  Hgb stable, approx at baseline. No leukopenia. Platelets are chronically low ~100. Iron studies from 6/2022 reviewed. Iron 29. TIBC 263. Iron sat 11%. Ferritin 86.  - Continue to monitor, may need Hematology referral upon discharge    Severe protein calorie malnutrition  Appreciate input from Nutrition  - Encourage Po intake, trend patient weight        DVT px: Venodyne boots  Code status: DNR/DNI  Dispo: Return to [facility] once clinically improved and inpatient workup is complete, possibly 7/23 but note that HHA will need to be reinstated, possibly 24hrs prior to discharge   89 year old woman with HTN, HFpEF, afib not on AC due to falls. hx of TIA, lymphoma in remission, chronic hyponatremia, adrenal insufficiency on fludrocortisone and hydrocortisone, presented from [facility] with complaints of worsening dyspnea and increased extremity swelling, found to have hypoxia, likely acute on chronic diastolic CHF, admitted to Medicine for further management.     Acute hypoxic respiratory failure   Likely due to acute on chronic diastolic CHF, see below. Patient gradually improving with IV diuresis. Weaned to room air.   - Obtain ambulatory pulse ox assessment, home O2 eval    Acute on chronic diastolic CHF  No angina. Trop negative. EKG without acute ischemic changes. TTE recently done, 6/2022, preserved EF. ILR interrogated. No events. Appreciate input from Cardiology. Overall, patient gradually improving with gentle diuresis with Lasix IV. Unclear to what extent patient's home regimen of fludrocortisone has been contributing to her leg and arm swelling, dyspnea on exertion. Reduced fludrocortisone to 0.05mg daily.    - Continue Lasix IV daily  - Continue carvedilol, losartan, hydralazine  - Is and Os, daily wt, fluid restriction    Paroxysmal atrial fibrillation  HR controlled on carvedilol and amiodarone. Not on AC due to hx of frequent falls.   - Continue carvedilol and amiodarone    HTN  BP stable. On amlodipine, carvedilol, losartan, hydralazine and furosemide  - Continue current regimen    Adrenal insufficiency  On hydrocortisone and fludrocortisone. This admission, patient's dose of fludrocortisone cut in half to 0.05mg daily to see if helps w/ swelling and dyspnea.   - Continue hydrocortisone 10/5  - Continue fludrocortisone, now 0.05mg daily    Chronic hyponatremia  Na 132-134 of late, similar to her baseline.   - Continue to monitor    HI  Upon admission Cr 1.52 (Cr 1.06 on 6/25/22). 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   - Trend Cr, monitor Is and Os    Hypokalemia  Resolved with potassium supplementation.  - Continue to monitor K    L hip / groin discomfort  Etiology unclear but appears to be due to severe hip DJD. LE venous duplex negative for DVT. No groin pathology noted on US. L hip XR done, report pending but appears to show advanced DJD. Pain is minimal at rest but increased with activity. On Tylenol 975 q8h. Requested something stronger today. Added Tramadol 25mg twice a day prn.     Chronic normocytic anemia, thrombocytopenia  Hgb stable, approx at baseline. No leukopenia. Platelets are chronically low ~100. Iron studies from 6/2022 reviewed. Iron 29. TIBC 263. Iron sat 11%. Ferritin 86.  - Continue to monitor, may need Hematology referral upon discharge    Severe protein calorie malnutrition  Appreciate input from Nutrition  - Encourage Po intake, trend patient weight        DVT px: Venodyne boots  Code status: DNR/DNI  Dispo: Return to [facility] once clinically improved and inpatient workup is complete, possibly 7/23 but note that HHA will need to be reinstated, possibly 24hrs prior to discharge   89 year-old woman with HTN, HFpEF, afib not on AC due to falls. hx of TIA, lymphoma in remission, chronic hyponatremia, adrenal insufficiency on fludrocortisone and hydrocortisone, presented from [facility] 7/18 with complaints of worsening dyspnea and increased extremity swelling, found to have hypoxia, likely acute on chronic diastolic CHF, admitted to Medicine for further management.     Acute hypoxic respiratory failure   Resolved. Likely due to acute on chronic diastolic CHF, see below. Patient gradually improving with IV diuresis, now weaned to room air while at rest.   - Obtain ambulatory pulse ox assessment, home O2 eval    Acute on chronic diastolic CHF  No angina. Trop negative. EKG without acute ischemic changes. TTE recently done, 6/2022, preserved EF. ILR interrogated. No events. Appreciate input from Cardiology. Overall, patient gradually improving with gentle diuresis with Lasix IV. Unclear to what extent patient's home regimen of fludrocortisone has been contributing to her leg and arm swelling, dyspnea on exertion. Reduced fludrocortisone to 0.05mg daily.    - Continue Lasix IV daily  - Continue carvedilol, losartan, hydralazine  - Is and Os, daily wt, fluid restriction    Paroxysmal atrial fibrillation  HR controlled on carvedilol and amiodarone. Not on AC due to hx of frequent falls.   - Continue carvedilol and amiodarone    HTN  BP stable. On amlodipine, carvedilol, losartan, hydralazine and furosemide  - Continue current regimen    Adrenal insufficiency  On hydrocortisone and fludrocortisone. This admission, patient's dose of fludrocortisone cut in half to 0.05mg daily to see if helps w/ swelling and dyspnea.   - Continue hydrocortisone 10/5  - Continue fludrocortisone, now 0.05mg daily    Chronic hyponatremia  Na 132-134 of late, similar to her baseline.   - Continue to monitor    HI  Upon admission Cr 1.52 (Cr 1.06 on 6/25/22). 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   - Trend Cr, monitor Is and Os    Hypokalemia  Resolved with potassium supplementation.  - Continue to monitor K    L hip / groin discomfort  Etiology unclear. Hip and pelvis XRs unremarkable. LE venous duplex negative for DVT. No groin pathology noted on US. Pain is minimal at rest but increased with activity. On Tylenol 975 q8h. Requested something stronger today. Added Tramadol 25mg twice a day prn mod to severe pain.     Chronic normocytic anemia, thrombocytopenia  Hgb stable, approx at baseline. No leukopenia. Platelets are chronically low ~100. Iron studies from 6/2022 reviewed. Iron 29. TIBC 263. Iron sat 11%. Ferritin 86.  - Continue to monitor, may need Hematology referral upon discharge    Severe protein calorie malnutrition  Appreciate input from Nutrition  - Encourage Po intake, trend patient weight        DVT px: Heparin subcut  Code status: DNR/DNI  Dispo: Return to [facility] once clinically improved and inpatient workup is complete, possibly 7/23 but note that HHA will need to be reinstated, possibly 24hrs prior to discharge   89 year-old woman with HTN, HFpEF, afib not on AC due to falls. hx of TIA, lymphoma in remission, chronic hyponatremia, adrenal insufficiency on fludrocortisone and hydrocortisone, presented from [facility] 7/18 with complaints of worsening dyspnea and increased extremity swelling, found to have hypoxia, likely acute on chronic diastolic CHF, admitted to Medicine for further management.     Acute hypoxic respiratory failure   Resolved. Likely due to acute on chronic diastolic CHF, see below. Patient gradually improving with IV diuresis, now weaned to room air while at rest.   - Obtain ambulatory pulse ox assessment, home O2 eval    Acute on chronic diastolic CHF  No angina. Trop negative. EKG without acute ischemic changes. TTE recently done, 6/2022, preserved EF. ILR interrogated. No events. Appreciate input from Cardiology. Overall, patient gradually improving with gentle diuresis with Lasix IV. Unclear to what extent patient's home regimen of fludrocortisone has been contributing to her leg and arm swelling, dyspnea on exertion. Reduced fludrocortisone to 0.05mg daily.    - Continue Lasix IV daily  - Continue carvedilol, losartan, hydralazine  - Is and Os, daily wt, fluid restriction    Paroxysmal atrial fibrillation  HR controlled on carvedilol and amiodarone. Not on AC due to hx of frequent falls.   - Continue carvedilol and amiodarone    HTN  BP stable. On amlodipine, carvedilol, losartan, hydralazine and furosemide  - Continue current regimen    Adrenal insufficiency  On hydrocortisone and fludrocortisone. This admission, patient's dose of fludrocortisone cut in half to 0.05mg daily to see if helps w/ swelling and dyspnea.   - Continue hydrocortisone 10/5  - Continue fludrocortisone, now 0.05mg daily    Chronic hyponatremia  Na 132-134 of late, similar to her baseline.   - Continue to monitor    HI  Upon admission Cr 1.52 (Cr 1.06 on 6/25/22). 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   - Trend Cr, monitor Is and Os    Hypokalemia  Resolved with potassium supplementation.  - Continue to monitor K    L hip / groin discomfort  Etiology unclear. Hip and pelvis XRs unremarkable. LE venous duplex negative for DVT. No groin pathology noted on US. Pain is minimal at rest but increased with activity. On Tylenol 975 q8h. Requested something stronger today. Added Tramadol 25mg twice a day prn mod to severe pain.     Chronic normocytic anemia, thrombocytopenia  Hgb stable, approx at baseline. No leukopenia. Platelets are chronically low ~100. Iron studies from 6/2022 reviewed. Iron 29. TIBC 263. Iron sat 11%. Ferritin 86.  - Continue to monitor, may need Hematology referral upon discharge    Severe protein calorie malnutrition  Appreciate input from Nutrition  - Encourage Po intake, trend patient weight        DVT px: Heparin subcut  Code status: DNR/DNI  Dispo: Return to [facility] once clinically improved and inpatient workup is complete, anticipated 7/25 but note that HHA will need to be reinstated, possibly 24hrs prior to discharge. Home care service already made aware of anticipated DC date of 7/25.

## 2022-07-22 NOTE — PROGRESS NOTE ADULT - SUBJECTIVE AND OBJECTIVE BOX
Patient is a 89y old  Female who presents with a chief complaint of SOB/Edema.     HPI:  89 y/o F with PMH HTN, TIA, A fib not on AC d/t falls, CKD, lymphoma in remission, CHF, hyponatremia, adrenal insufficiency presents from [facility] with increased SOB/Edema.       In the ER, Tmax 97.6F, HR 56-60, /72, RR 17-20, SpO2 96% on RA (reported SpO2 89-91% on RA in ER documentation). Hgb 10.6 (baseline), Plt 115, K 2.8, Cr 1.52 (baseline 1.1-1.3), BUN 22, BNP 1170.   7/21-   Pt seen and examined this am.  Pt was able to sleep in bed with slight incline.  No overnight events.  7/22- pt seen and examined this am.  She states that she is " not urinating as much."      PAST MEDICAL & SURGICAL HISTORY:  Iron deficiency      HTN (hypertension)      Lymphoma      H/O adrenal insufficiency      Hyponatremia      Hypokalemia      Chronic kidney disease, unspecified CKD stage      Diastolic heart failure      Paroxysmal atrial fibrillation      S/P appendectomy      S/P hysterectomy      H/O intracranial hemorrhage          MEDICATIONS  (STANDING):  aMIOdarone    Tablet 200 milliGRAM(s) Oral daily  amLODIPine   Tablet 10 milliGRAM(s) Oral daily  atorvastatin 10 milliGRAM(s) Oral at bedtime  carvedilol 25 milliGRAM(s) Oral every 12 hours  cholecalciferol 2000 Unit(s) Oral daily  cyanocobalamin 1000 MICROGram(s) Oral daily  famotidine    Tablet 20 milliGRAM(s) Oral daily  fludroCORTISONE 0.1 milliGRAM(s) Oral daily  hydrALAZINE 100 milliGRAM(s) Oral every 8 hours  hydrocortisone 10 milliGRAM(s) Oral <User Schedule>  hydrocortisone 5 milliGRAM(s) Oral <User Schedule>  senna 2 Tablet(s) Oral at bedtime    MEDICATIONS  (PRN):  acetaminophen     Tablet .. 650 milliGRAM(s) Oral every 6 hours PRN Temp greater or equal to 38C (100.4F), Mild Pain (1 - 3)  aluminum hydroxide/magnesium hydroxide/simethicone Suspension 30 milliLiter(s) Oral every 4 hours PRN Dyspepsia  bismuth subsalicylate Liquid 15 milliLiter(s) Oral four times a day PRN Dyspsea/GI DIscomfort  loratadine 10 milliGRAM(s) Oral daily PRN Allergies  melatonin 3 milliGRAM(s) Oral at bedtime PRN Insomnia  ondansetron Injectable 4 milliGRAM(s) IV Push every 8 hours PRN Nausea and/or Vomiting  polyethylene glycol 3350 17 Gram(s) Oral daily PRN Constipation      FAMILY HISTORY:  No known problems (Father, Mother)        SOCIAL HISTORY: no recent smoking     REVIEW OF SYSTEMS:  CONSTITUTIONAL:    No fatigue, malaise, lethargy.  No fever or chills.  RESPIRATORY:  No cough.  No wheeze.  No hemoptysis.  c/o shortness of breath.  CARDIOVASCULAR:  No chest pains.  No palpitations. c/o shortness of breath, No orthopnea or PND.  GASTROINTESTINAL:  No abdominal pain.  No nausea or vomiting.    GENITOURINARY:    No hematuria.    MUSCULOSKELETAL:  c/o musculoskeletal pain.  No joint swelling.  No arthritis.  NEUROLOGICAL:  No tingling or numbness or weakness.  PSYCHIATRIC:  No confusion  SKIN:  No rashes.        ICU Vital Signs Last 24 Hrs  T(C): 36.2 (22 Jul 2022 05:57), Max: 36.7 (21 Jul 2022 09:57)  T(F): 97.1 (22 Jul 2022 05:57), Max: 98.1 (21 Jul 2022 09:57)  HR: 61 (22 Jul 2022 05:57) (60 - 68)  BP: 159/49 (22 Jul 2022 05:57) (126/46 - 159/49)  BP(mean): --  ABP: --  ABP(mean): --  RR: 18 (22 Jul 2022 05:57) (18 - 18)  SpO2: 96% (22 Jul 2022 05:57) (96% - 96%)    O2 Parameters below as of 22 Jul 2022 05:57  Patient On (Oxygen Delivery Method): nasal cannula  O2 Flow (L/min): 1                PHYSICAL EXAM-    Constitutional: elderly ill looking female in no acute distress    Head: Head is normocephalic and atraumatic.      Neck: + JVD.     Cardiovascular: Regular rate and rhythm without S3, S4. No murmurs or rubs are appreciated.      Respiratory: B/lrales. No wheezing.    Abdomen: Soft, nontender, nondistended with positive bowel sounds.      Extremity: No tenderness. No  pitting edema     Neurologic: The patient is alert and oriented.      Skin: No rash, no obvious lesions noted.      Psychiatric: The patient appears to be emotionally stable.      INTERPRETATION OF TELEMETRY: SR     ECG: Sinus rythm ,  no ST T changes. poor R wave progression.    I&O's Detail    17 Jul 2022 07:01  -  18 Jul 2022 07:00  --------------------------------------------------------  IN:  Total IN: 0 mL    OUT:    Voided (mL): 700 mL  Total OUT: 700 mL    Total NET: -700 mL          LABS:                                   9.9    5.95  )-----------( 108      ( 20 Jul 2022 07:04 )             30.9     07-21    134<L>  |  99  |  24<H>  ----------------------------<  91  4.1   |  31  |  1.36<H>    Ca    9.1      21 Jul 2022 06:54  Mg     2.3     07-21      BNPSerum Pro-Brain Natriuretic Peptide: 1170 pg/mL (07-17 @ 13:59)    RADIOLOGY & ADDITIONAL STUDIES:  < from: TTE Echo Complete w/o Contrast w/ Doppler (06.22.22 @ 10:32) >   Impression     Summary     Mild asymmetrical septal left ventricular hypertrophy is present.   Estimated left ventricular ejection fraction is 60-65 %.   The left atrium is mildly dilated.   Mild (1+) aortic regurgitation   Moderate (2+) tricuspid valve regurgitation. Mild pulmonary hypertension.   Trace pericardial effusion is present.     Signature     ----------------------------------------------------------------   Electronically signed by Valentin Quesada MD(Interpreting   physician) on 06/23/2022 03:11 PM   ----------------------------------------------------------------    Valves    < end of copied text >

## 2022-07-22 NOTE — DISCHARGE NOTE NURSING/CASE MANAGEMENT/SOCIAL WORK - NSDCVIVACCINE_GEN_ALL_CORE_FT
Tdap; 10-Jul-2021 13:25; Starr Zazueta (KENNETH); [employer]; [id number] (Exp. Date: 25-Nov-2022); IntraMuscular; Deltoid Left.; 0.5 milliLiter(s); VIS (VIS Published: 09-May-2013, VIS Presented: 10-Jul-2021);

## 2022-07-22 NOTE — DISCHARGE NOTE NURSING/CASE MANAGEMENT/SOCIAL WORK - PATIENT PORTAL LINK FT
You can access the FollowMyHealth Patient Portal offered by [facility] by registering at the following website: [email or web address]. By joining [employer]’s FollowMyHealth portal, you will also be able to view your health information using other applications (apps) compatible with our system.

## 2022-07-23 LAB
ANION GAP SERPL CALC-SCNC: 7 MMOL/L — SIGNIFICANT CHANGE UP (ref 5–17)
BUN SERPL-MCNC: 25 MG/DL — HIGH (ref 7–23)
CALCIUM SERPL-MCNC: 8.7 MG/DL — SIGNIFICANT CHANGE UP (ref 8.5–10.1)
CHLORIDE SERPL-SCNC: 97 MMOL/L — SIGNIFICANT CHANGE UP (ref 96–108)
CO2 SERPL-SCNC: 29 MMOL/L — SIGNIFICANT CHANGE UP (ref 22–31)
CREAT SERPL-MCNC: 1.42 MG/DL — HIGH (ref 0.5–1.3)
EGFR: 34 ML/MIN/1.73M2 — LOW
GLUCOSE SERPL-MCNC: 109 MG/DL — HIGH (ref 70–99)
HCT VFR BLD CALC: 28.5 % — LOW (ref 34.5–45)
HGB BLD-MCNC: 9.1 G/DL — LOW (ref 11.5–15.5)
MAGNESIUM SERPL-MCNC: 2.2 MG/DL — SIGNIFICANT CHANGE UP (ref 1.6–2.6)
MCHC RBC-ENTMCNC: 30.2 PG — SIGNIFICANT CHANGE UP (ref 27–34)
MCHC RBC-ENTMCNC: 31.9 GM/DL — LOW (ref 32–36)
MCV RBC AUTO: 94.7 FL — SIGNIFICANT CHANGE UP (ref 80–100)
PHOSPHATE SERPL-MCNC: 3.5 MG/DL — SIGNIFICANT CHANGE UP (ref 2.5–4.5)
PLATELET # BLD AUTO: 107 K/UL — LOW (ref 150–400)
POTASSIUM SERPL-MCNC: 3.3 MMOL/L — LOW (ref 3.5–5.3)
POTASSIUM SERPL-SCNC: 3.3 MMOL/L — LOW (ref 3.5–5.3)
RBC # BLD: 3.01 M/UL — LOW (ref 3.8–5.2)
RBC # FLD: 14.7 % — HIGH (ref 10.3–14.5)
SODIUM SERPL-SCNC: 133 MMOL/L — LOW (ref 135–145)
WBC # BLD: 3.98 K/UL — SIGNIFICANT CHANGE UP (ref 3.8–10.5)
WBC # FLD AUTO: 3.98 K/UL — SIGNIFICANT CHANGE UP (ref 3.8–10.5)

## 2022-07-23 PROCEDURE — 99232 SBSQ HOSP IP/OBS MODERATE 35: CPT

## 2022-07-23 RX ORDER — POTASSIUM CHLORIDE 20 MEQ
40 PACKET (EA) ORAL EVERY 4 HOURS
Refills: 0 | Status: COMPLETED | OUTPATIENT
Start: 2022-07-23 | End: 2022-07-23

## 2022-07-23 RX ORDER — FUROSEMIDE 40 MG
40 TABLET ORAL
Refills: 0 | Status: DISCONTINUED | OUTPATIENT
Start: 2022-07-23 | End: 2022-07-27

## 2022-07-23 RX ADMIN — Medication 40 MILLIEQUIVALENT(S): at 13:49

## 2022-07-23 RX ADMIN — Medication 975 MILLIGRAM(S): at 15:17

## 2022-07-23 RX ADMIN — Medication 975 MILLIGRAM(S): at 13:49

## 2022-07-23 RX ADMIN — Medication 10 MILLIGRAM(S): at 08:53

## 2022-07-23 RX ADMIN — SENNA PLUS 2 TABLET(S): 8.6 TABLET ORAL at 21:38

## 2022-07-23 RX ADMIN — FLUDROCORTISONE ACETATE 0.05 MILLIGRAM(S): 0.1 TABLET ORAL at 10:48

## 2022-07-23 RX ADMIN — AMIODARONE HYDROCHLORIDE 200 MILLIGRAM(S): 400 TABLET ORAL at 10:48

## 2022-07-23 RX ADMIN — CHLORHEXIDINE GLUCONATE 1 APPLICATION(S): 213 SOLUTION TOPICAL at 10:46

## 2022-07-23 RX ADMIN — Medication 2000 UNIT(S): at 10:48

## 2022-07-23 RX ADMIN — POLYETHYLENE GLYCOL 3350 17 GRAM(S): 17 POWDER, FOR SOLUTION ORAL at 05:44

## 2022-07-23 RX ADMIN — PREGABALIN 1000 MICROGRAM(S): 225 CAPSULE ORAL at 10:47

## 2022-07-23 RX ADMIN — Medication 40 MILLIEQUIVALENT(S): at 17:25

## 2022-07-23 RX ADMIN — Medication 100 MILLIGRAM(S): at 21:52

## 2022-07-23 RX ADMIN — HEPARIN SODIUM 5000 UNIT(S): 5000 INJECTION INTRAVENOUS; SUBCUTANEOUS at 21:37

## 2022-07-23 RX ADMIN — Medication 975 MILLIGRAM(S): at 22:08

## 2022-07-23 RX ADMIN — Medication 40 MILLIGRAM(S): at 10:46

## 2022-07-23 RX ADMIN — Medication 5 MILLIGRAM(S): at 17:22

## 2022-07-23 RX ADMIN — Medication 100 MILLIGRAM(S): at 13:50

## 2022-07-23 RX ADMIN — Medication 975 MILLIGRAM(S): at 21:38

## 2022-07-23 RX ADMIN — FAMOTIDINE 20 MILLIGRAM(S): 10 INJECTION INTRAVENOUS at 10:47

## 2022-07-23 RX ADMIN — Medication 975 MILLIGRAM(S): at 05:45

## 2022-07-23 RX ADMIN — Medication 40 MILLIGRAM(S): at 18:09

## 2022-07-23 RX ADMIN — AMLODIPINE BESYLATE 10 MILLIGRAM(S): 2.5 TABLET ORAL at 10:47

## 2022-07-23 RX ADMIN — ATORVASTATIN CALCIUM 10 MILLIGRAM(S): 80 TABLET, FILM COATED ORAL at 21:38

## 2022-07-23 RX ADMIN — LOSARTAN POTASSIUM 50 MILLIGRAM(S): 100 TABLET, FILM COATED ORAL at 10:46

## 2022-07-23 RX ADMIN — HEPARIN SODIUM 5000 UNIT(S): 5000 INJECTION INTRAVENOUS; SUBCUTANEOUS at 10:46

## 2022-07-23 RX ADMIN — Medication 975 MILLIGRAM(S): at 08:52

## 2022-07-23 NOTE — PROGRESS NOTE ADULT - SUBJECTIVE AND OBJECTIVE BOX
Chief Complaint: Dyspnea, swelling    Interval History: Patient seen and examined. Patient reports feeling somewhat improved since admission. Overall slightly less edema. Still with some dependent edema in her L elbow, L hand and B/L LEs. She was seen by Cardiology today who advised continued IV diuresis. Patient also reported new complaint of L groin discomfort, mild in severity, worse with palpation, has some difficulty flexing at the hip, believes she may have pulled a muscle. Exam rather unrevealing aside from her edema. Hip XR obtained, no significant abnormality. LLE venous duplex done as well, negative for DVT, no significant finding in the groin. On Tylenol 975mg q8h. Added low-dose Tramadol this afternoon as needed for more significant pain. Otherwise, patient states that she is feeling fairly well with no dyspnea at rest, no chest pain or tightness. No other complaints.     : Lying in bed, alert, comfortable, has some groin area discomfort, still with edema but improving.  Also complaining of indigestion following apple sauce ingestion.     ROS: Multi system review is comprehensively negative x 10 systems except as above    Vitals:  Vital Signs Last 24 Hrs  T(C): 36.3 (2022 07:02), Max: 36.4 (2022 05:38)  T(F): 97.3 (2022 07:02), Max: 97.6 (2022 05:38)  HR: 58 (2022 07:02) (53 - 58)  BP: 129/39 (2022 07:02) (119/35 - 137/43)  BP(mean): --  RR: 18 (2022 07:02) (17 - 18)  SpO2: 98% (2022 07:02) (96% - 98%)    Parameters below as of 2022 07:02  Patient On (Oxygen Delivery Method): room air        Exam:  Gen: Comfortable appearing  HEENT: NCAT PERRL EOMI MMM clear oropharynx  Neck: Supple, no JVD  Chest: Normal resp effort at rest, lungs CTA B/L  CVS: s1 s2 normal, rrr  Abd: +BS, soft NT ND  Ext: Mild pitting edema in L elbow and B/L LEs. No redness. No induration. No tenderness.  +1-2 pitting Edema left leg greater than right  Skin: Warm, dry, intact  Neuro: A+OX3, no deficits  Mood: Calm, pleasant    Labs:                              9.1    3.98  )-----------( 107      ( 2022 06:37 )             28.5     07-23    133<L>  |  97  |  25<H>  ----------------------------<  109<H>  3.3<L>   |  29  |  1.42<H>    Ca    8.7      2022 06:37  Phos  3.5     07-23  Mg     2.2     07-23      CAPILLARY BLOOD GLUCOSE        Micro:  COVID19 PCR : Negative    Imaging:  Hip XR done :  No hip  fracture or dislocation. Osseous pelvis intact. The SI joints and symphysis pubis are within normal limits. Vertebral plasty cement in  lower lumbar vertebra.    LLE venous duplex US : No evidence of left lower extremity deep venous thrombosis. Left groin is unremarkable.    LUE venous duplex US : No evidence of left upper extremity deep venous thrombosis.    CXR : Central bronchiectasis on both sides along with prominent interstitial markings in both upper lobes are unchanged. Calcified bilateral pleural plaque near the apices along with bibasilar pleural effusions, grossly unchanged. Cardiac loop recorder however there is no pulmonary edema.    Cardiac Testing:  Telemetry : Sinus rhythm, rate 60-70, no ectopy    Telemetry : Sinus rhythm, rate 50-60    EKG : NSR, rate WNL, old anteroseptal infarct (?)    TTE from prior visit, 22: Mild asymmetrical septal left ventricular hypertrophy present. Estimated left ventricular ejection fraction is 60-65 %. The left atrium is mildly dilated. Mild AI. Mod TR. Mild pulmonary hypertension. Trace pericardial effusion is present.    Meds:  MEDICATIONS  (STANDING):  acetaminophen     Tablet .. 975 milliGRAM(s) Oral every 8 hours  aMIOdarone    Tablet 200 milliGRAM(s) Oral daily  amLODIPine   Tablet 10 milliGRAM(s) Oral daily  atorvastatin 10 milliGRAM(s) Oral at bedtime  carvedilol 25 milliGRAM(s) Oral every 12 hours  chlorhexidine 4% Liquid 1 Application(s) Topical <User Schedule>  cholecalciferol 2000 Unit(s) Oral daily  cyanocobalamin 1000 MICROGram(s) Oral daily  famotidine    Tablet 20 milliGRAM(s) Oral daily  fludroCORTISONE 0.05 milliGRAM(s) Oral daily  furosemide   Injectable 40 milliGRAM(s) IV Push daily  heparin   Injectable 5000 Unit(s) SubCutaneous every 12 hours  hydrALAZINE 100 milliGRAM(s) Oral every 8 hours  hydrocortisone 10 milliGRAM(s) Oral <User Schedule>  hydrocortisone 5 milliGRAM(s) Oral <User Schedule>  losartan 50 milliGRAM(s) Oral daily  potassium chloride    Tablet ER 40 milliEquivalent(s) Oral every 4 hours  senna 2 Tablet(s) Oral at bedtime    MEDICATIONS  (PRN):  bismuth subsalicylate Liquid 15 milliLiter(s) Oral four times a day PRN Dyspsea/GI DIscomfort  loratadine 10 milliGRAM(s) Oral daily PRN Allergies  melatonin 3 milliGRAM(s) Oral at bedtime PRN Insomnia  ondansetron Injectable 4 milliGRAM(s) IV Push every 8 hours PRN Nausea and/or Vomiting  polyethylene glycol 3350 17 Gram(s) Oral daily PRN Constipation  traMADol 25 milliGRAM(s) Oral every 12 hours PRN Moderate Pain (4 - 6)          Assessment and Plan:   89 year-old woman with HTN, HFpEF, afib not on AC due to falls. hx of TIA, lymphoma in remission, chronic hyponatremia, adrenal insufficiency on fludrocortisone and hydrocortisone, presented from [facility]  with complaints of worsening dyspnea and increased extremity swelling, found to have hypoxia, likely acute on chronic diastolic CHF, admitted to Medicine for further management.     Acute hypoxic respiratory failure   Resolved. Likely due to acute on chronic diastolic CHF, see below. Patient gradually improving with IV diuresis, now weaned to room air while at rest.   - Obtain ambulatory pulse ox assessment, home O2 eval    Acute on chronic diastolic CHF  No angina. Trop negative. EKG without acute ischemic changes. TTE recently done, 2022, preserved EF. ILR interrogated. No events. Appreciate input from Cardiology. Overall, patient gradually improving with gentle diuresis with Lasix IV. Unclear to what extent patient's home regimen of fludrocortisone has been contributing to her leg and arm swelling, dyspnea on exertion. Reduced fludrocortisone to 0.05mg daily.    - Continue Lasix IV daily  - Continue carvedilol, losartan, hydralazine  - Is and Os, daily wt, fluid restriction    Paroxysmal atrial fibrillation  HR controlled on carvedilol and amiodarone. Not on AC due to hx of frequent falls.   - Continue carvedilol and amiodarone    HTN  BP stable. On amlodipine, carvedilol, losartan, hydralazine and furosemide  - Continue current regimen    Adrenal insufficiency  On hydrocortisone and fludrocortisone. This admission, patient's dose of fludrocortisone cut in half to 0.05mg daily to see if helps w/ swelling and dyspnea.   - Continue hydrocortisone 10/5  - Continue fludrocortisone, now 0.05mg daily    Chronic hyponatremia  Na 132-134 of late, similar to her baseline.   - Continue to monitor    HI  Upon admission Cr 1.52 (Cr 1.06 on 22). 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   - Trend Cr, monitor Is and Os    Hypokalemia  Resolved with potassium supplementation.  - Continue to monitor K    L hip / groin discomfort  Etiology unclear. Hip and pelvis XRs unremarkable. LE venous duplex negative for DVT. No groin pathology noted on US. Pain is minimal at rest but increased with activity. On Tylenol 975 q8h. Requested something stronger today. Added Tramadol 25mg twice a day prn mod to severe pain.     Chronic normocytic anemia, thrombocytopenia  Hgb stable, approx at baseline. No leukopenia. Platelets are chronically low ~100. Iron studies from 2022 reviewed. Iron 29. TIBC 263. Iron sat 11%. Ferritin 86.  - Continue to monitor, may need Hematology referral upon discharge    Severe protein calorie malnutrition  Appreciate input from Nutrition  - Encourage Po intake, trend patient weight        DVT px: Heparin subcut  Code status: DNR/DNI  Dispo: Return to [facility] once clinically improved and inpatient workup is complete, anticipated  but note that HHA will need to be reinstated, possibly 24hrs prior to discharge. Home care service already made aware of anticipated DC date of .       Nutritional Assessment:  · Nutritional Assessment  This patient has been assessed with a concern for Malnutrition and has been determined to have a diagnosis/diagnoses of Severe protein-calorie malnutrition.    This patient is being managed with:   Diet DASH/TLC-  Sodium & Cholesterol Restricted  1500mL Fluid Restriction ([id number])     Special Instructions for Nursin milliLiter(s) to 2000 milliLiter(s) fluid restriction  Entered: 2022  3:58PM       Chief Complaint: Dyspnea, swelling    Interval History: Patient seen and examined. Patient reports feeling somewhat improved since admission. Overall slightly less edema. Still with some dependent edema in her L elbow, L hand and B/L LEs. She was seen by Cardiology today who advised continued IV diuresis. Patient also reported new complaint of L groin discomfort, mild in severity, worse with palpation, has some difficulty flexing at the hip, believes she may have pulled a muscle. Exam rather unrevealing aside from her edema. Hip XR obtained, no significant abnormality. LLE venous duplex done as well, negative for DVT, no significant finding in the groin. On Tylenol 975mg q8h. Added low-dose Tramadol this afternoon as needed for more significant pain. Otherwise, patient states that she is feeling fairly well with no dyspnea at rest, no chest pain or tightness. No other complaints.     : Lying in bed, alert, comfortable, has some groin area discomfort, still with edema but improving.  Also complaining of indigestion following apple sauce ingestion.     ROS: Multi system review is comprehensively negative x 10 systems except as above    Vitals:  Vital Signs Last 24 Hrs  T(C): 36.3 (2022 07:02), Max: 36.4 (2022 05:38)  T(F): 97.3 (2022 07:02), Max: 97.6 (2022 05:38)  HR: 58 (2022 07:02) (53 - 58)  BP: 129/39 (2022 07:02) (119/35 - 137/43)  BP(mean): --  RR: 18 (2022 07:02) (17 - 18)  SpO2: 98% (2022 07:02) (96% - 98%)    Parameters below as of 2022 07:02  Patient On (Oxygen Delivery Method): room air        Exam:  Gen: Comfortable appearing  HEENT: NCAT PERRL EOMI MMM clear oropharynx  Neck: Supple, no JVD  Chest: Normal resp effort at rest, lungs CTA B/L  CVS: s1 s2 normal, rrr  Abd: +BS, soft NT ND  Ext: Mild pitting edema in L elbow and B/L LEs. No redness. No induration. No tenderness.  +1-2 pitting Edema left leg greater than right  Skin: Warm, dry, intact  Neuro: A+OX3, no deficits  Mood: Calm, pleasant    Labs:                              9.1    3.98  )-----------( 107      ( 2022 06:37 )             28.5     07-23    133<L>  |  97  |  25<H>  ----------------------------<  109<H>  3.3<L>   |  29  |  1.42<H>    Ca    8.7      2022 06:37  Phos  3.5     07-23  Mg     2.2     07-23      CAPILLARY BLOOD GLUCOSE        Micro:  COVID19 PCR : Negative    Imaging:  Hip XR done :  No hip  fracture or dislocation. Osseous pelvis intact. The SI joints and symphysis pubis are within normal limits. Vertebral plasty cement in  lower lumbar vertebra.    LLE venous duplex US : No evidence of left lower extremity deep venous thrombosis. Left groin is unremarkable.    LUE venous duplex US : No evidence of left upper extremity deep venous thrombosis.    CXR : Central bronchiectasis on both sides along with prominent interstitial markings in both upper lobes are unchanged. Calcified bilateral pleural plaque near the apices along with bibasilar pleural effusions, grossly unchanged. Cardiac loop recorder however there is no pulmonary edema.    Cardiac Testing:  Telemetry : Sinus rhythm, rate 60-70, no ectopy    Telemetry : Sinus rhythm, rate 50-60    EKG : NSR, rate WNL, old anteroseptal infarct (?)    TTE from prior visit, 22: Mild asymmetrical septal left ventricular hypertrophy present. Estimated left ventricular ejection fraction is 60-65 %. The left atrium is mildly dilated. Mild AI. Mod TR. Mild pulmonary hypertension. Trace pericardial effusion is present.    Meds:  MEDICATIONS  (STANDING):  acetaminophen     Tablet .. 975 milliGRAM(s) Oral every 8 hours  aMIOdarone    Tablet 200 milliGRAM(s) Oral daily  amLODIPine   Tablet 10 milliGRAM(s) Oral daily  atorvastatin 10 milliGRAM(s) Oral at bedtime  carvedilol 25 milliGRAM(s) Oral every 12 hours  chlorhexidine 4% Liquid 1 Application(s) Topical <User Schedule>  cholecalciferol 2000 Unit(s) Oral daily  cyanocobalamin 1000 MICROGram(s) Oral daily  famotidine    Tablet 20 milliGRAM(s) Oral daily  fludroCORTISONE 0.05 milliGRAM(s) Oral daily  furosemide   Injectable 40 milliGRAM(s) IV Push daily  heparin   Injectable 5000 Unit(s) SubCutaneous every 12 hours  hydrALAZINE 100 milliGRAM(s) Oral every 8 hours  hydrocortisone 10 milliGRAM(s) Oral <User Schedule>  hydrocortisone 5 milliGRAM(s) Oral <User Schedule>  losartan 50 milliGRAM(s) Oral daily  potassium chloride    Tablet ER 40 milliEquivalent(s) Oral every 4 hours  senna 2 Tablet(s) Oral at bedtime    MEDICATIONS  (PRN):  bismuth subsalicylate Liquid 15 milliLiter(s) Oral four times a day PRN Dyspsea/GI DIscomfort  loratadine 10 milliGRAM(s) Oral daily PRN Allergies  melatonin 3 milliGRAM(s) Oral at bedtime PRN Insomnia  ondansetron Injectable 4 milliGRAM(s) IV Push every 8 hours PRN Nausea and/or Vomiting  polyethylene glycol 3350 17 Gram(s) Oral daily PRN Constipation  traMADol 25 milliGRAM(s) Oral every 12 hours PRN Moderate Pain (4 - 6)          Assessment and Plan:   89 year-old woman with HTN, HFpEF, afib not on AC due to falls. hx of TIA, lymphoma in remission, chronic hyponatremia, adrenal insufficiency on fludrocortisone and hydrocortisone, presented from [facility]  with complaints of worsening dyspnea and increased extremity swelling, found to have hypoxia, likely acute on chronic diastolic CHF, admitted to Medicine for further management.     Acute hypoxic respiratory failure   Resolved. Likely due to acute on chronic diastolic CHF, see below. Patient gradually improving with IV diuresis, now weaned to room air while at rest.   - Obtain ambulatory pulse ox assessment, home O2 eval    Acute on chronic diastolic CHF  No angina. Trop negative. EKG without acute ischemic changes. TTE recently done, 2022, preserved EF. ILR interrogated. No events. Appreciate input from Cardiology. Overall, patient gradually improving with gentle diuresis with Lasix IV. Unclear to what extent patient's home regimen of fludrocortisone has been contributing to her leg and arm swelling, dyspnea on exertion. Reduced fludrocortisone to 0.05mg daily.    - IV lasix increase qd to BID due to increase weight gain and worsening leg edema, possibility amlodipine 10mg contributing though overall this is multifactorial   - Continue carvedilol, losartan, hydralazine  - Is and Os, daily wt, fluid restriction    Paroxysmal atrial fibrillation  HR controlled on carvedilol and amiodarone. Not on AC due to hx of frequent falls.   - Continue carvedilol and amiodarone    HTN  BP stable. On amlodipine, carvedilol, losartan, hydralazine and furosemide  - Continue current regimen    Adrenal insufficiency  On hydrocortisone and fludrocortisone. This admission, patient's dose of fludrocortisone cut in half to 0.05mg daily to see if helps w/ swelling and dyspnea.   - Continue hydrocortisone 10/  - Continue fludrocortisone, now 0.05mg daily    Chronic hyponatremia  Na 132-134 of late, similar to her baseline.   - Continue to monitor    HI  Upon admission Cr 1.52 (Cr 1.06 on 22). 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   - Trend Cr, monitor Is and Os    Hypokalemia  Resolved with potassium supplementation.  - Continue to monitor K    L hip / groin discomfort  Etiology unclear. Hip and pelvis XRs unremarkable. LE venous duplex negative for DVT. No groin pathology noted on US. Pain is minimal at rest but increased with activity. On Tylenol 975 q8h. Requested something stronger today. Added Tramadol 25mg twice a day prn mod to severe pain.     Chronic normocytic anemia, thrombocytopenia  Hgb stable, approx at baseline. No leukopenia. Platelets are chronically low ~100. Iron studies from 2022 reviewed. Iron 29. TIBC 263. Iron sat 11%. Ferritin 86.  - Continue to monitor, may need Hematology referral upon discharge    Severe protein calorie malnutrition  Appreciate input from Nutrition  - Encourage Po intake, trend patient weight        DVT px: Heparin subcut  Code status: DNR/DNI  updated family on plan of care   Dispo: Return to [facility] once clinically improved and inpatient workup is complete, anticipated  but note that HHA will need to be reinstated, possibly 24hrs prior to discharge. Home care service already made aware of anticipated DC date of .       Nutritional Assessment:  · Nutritional Assessment	This patient has been assessed with a concern for Malnutrition and has been determined to have a diagnosis/diagnoses of Severe protein-calorie malnutrition.    This patient is being managed with:   Diet DASH/TLC-  Sodium & Cholesterol Restricted  1500mL Fluid Restriction ([id number])     Special Instructions for Nursin milliLiter(s) to 2000 milliLiter(s) fluid restriction  Entered: 2022  3:58PM

## 2022-07-24 LAB
ANION GAP SERPL CALC-SCNC: 4 MMOL/L — LOW (ref 5–17)
BUN SERPL-MCNC: 25 MG/DL — HIGH (ref 7–23)
CALCIUM SERPL-MCNC: 8.9 MG/DL — SIGNIFICANT CHANGE UP (ref 8.5–10.1)
CHLORIDE SERPL-SCNC: 96 MMOL/L — SIGNIFICANT CHANGE UP (ref 96–108)
CO2 SERPL-SCNC: 32 MMOL/L — HIGH (ref 22–31)
CREAT SERPL-MCNC: 1.39 MG/DL — HIGH (ref 0.5–1.3)
EGFR: 35 ML/MIN/1.73M2 — LOW
GLUCOSE SERPL-MCNC: 83 MG/DL — SIGNIFICANT CHANGE UP (ref 70–99)
NT-PROBNP SERPL-SCNC: 1078 PG/ML — HIGH (ref 0–450)
POTASSIUM SERPL-MCNC: 3.8 MMOL/L — SIGNIFICANT CHANGE UP (ref 3.5–5.3)
POTASSIUM SERPL-SCNC: 3.8 MMOL/L — SIGNIFICANT CHANGE UP (ref 3.5–5.3)
SODIUM SERPL-SCNC: 132 MMOL/L — LOW (ref 135–145)

## 2022-07-24 PROCEDURE — 99232 SBSQ HOSP IP/OBS MODERATE 35: CPT

## 2022-07-24 RX ORDER — POTASSIUM CHLORIDE 20 MEQ
20 PACKET (EA) ORAL ONCE
Refills: 0 | Status: COMPLETED | OUTPATIENT
Start: 2022-07-24 | End: 2022-07-24

## 2022-07-24 RX ADMIN — Medication 20 MILLIEQUIVALENT(S): at 10:53

## 2022-07-24 RX ADMIN — AMLODIPINE BESYLATE 10 MILLIGRAM(S): 2.5 TABLET ORAL at 10:47

## 2022-07-24 RX ADMIN — Medication 100 MILLIGRAM(S): at 21:31

## 2022-07-24 RX ADMIN — AMIODARONE HYDROCHLORIDE 200 MILLIGRAM(S): 400 TABLET ORAL at 10:46

## 2022-07-24 RX ADMIN — HEPARIN SODIUM 5000 UNIT(S): 5000 INJECTION INTRAVENOUS; SUBCUTANEOUS at 21:32

## 2022-07-24 RX ADMIN — Medication 5 MILLIGRAM(S): at 17:49

## 2022-07-24 RX ADMIN — Medication 2000 UNIT(S): at 10:46

## 2022-07-24 RX ADMIN — HEPARIN SODIUM 5000 UNIT(S): 5000 INJECTION INTRAVENOUS; SUBCUTANEOUS at 10:48

## 2022-07-24 RX ADMIN — Medication 100 MILLIGRAM(S): at 17:47

## 2022-07-24 RX ADMIN — CARVEDILOL PHOSPHATE 25 MILLIGRAM(S): 80 CAPSULE, EXTENDED RELEASE ORAL at 10:49

## 2022-07-24 RX ADMIN — FLUDROCORTISONE ACETATE 0.05 MILLIGRAM(S): 0.1 TABLET ORAL at 10:48

## 2022-07-24 RX ADMIN — Medication 975 MILLIGRAM(S): at 05:41

## 2022-07-24 RX ADMIN — LOSARTAN POTASSIUM 50 MILLIGRAM(S): 100 TABLET, FILM COATED ORAL at 10:49

## 2022-07-24 RX ADMIN — FAMOTIDINE 20 MILLIGRAM(S): 10 INJECTION INTRAVENOUS at 10:49

## 2022-07-24 RX ADMIN — ATORVASTATIN CALCIUM 10 MILLIGRAM(S): 80 TABLET, FILM COATED ORAL at 21:32

## 2022-07-24 RX ADMIN — Medication 40 MILLIGRAM(S): at 17:48

## 2022-07-24 RX ADMIN — CHLORHEXIDINE GLUCONATE 1 APPLICATION(S): 213 SOLUTION TOPICAL at 10:46

## 2022-07-24 RX ADMIN — CARVEDILOL PHOSPHATE 25 MILLIGRAM(S): 80 CAPSULE, EXTENDED RELEASE ORAL at 21:32

## 2022-07-24 RX ADMIN — PREGABALIN 1000 MICROGRAM(S): 225 CAPSULE ORAL at 10:46

## 2022-07-24 RX ADMIN — Medication 975 MILLIGRAM(S): at 05:11

## 2022-07-24 RX ADMIN — Medication 40 MILLIGRAM(S): at 05:12

## 2022-07-24 RX ADMIN — Medication 100 MILLIGRAM(S): at 05:11

## 2022-07-24 RX ADMIN — Medication 10 MILLIGRAM(S): at 08:20

## 2022-07-24 NOTE — PROGRESS NOTE ADULT - SUBJECTIVE AND OBJECTIVE BOX
Chief Complaint: Dyspnea, swelling    Interval History: Patient seen and examined. Patient reports feeling somewhat improved since admission. Overall slightly less edema. Still with some dependent edema in her L elbow, L hand and B/L LEs. She was seen by Cardiology today who advised continued IV diuresis. Patient also reported new complaint of L groin discomfort, mild in severity, worse with palpation, has some difficulty flexing at the hip, believes she may have pulled a muscle. Exam rather unrevealing aside from her edema. Hip XR obtained, no significant abnormality. LLE venous duplex done as well, negative for DVT, no significant finding in the groin. On Tylenol 975mg q8h. Added low-dose Tramadol this afternoon as needed for more significant pain. Otherwise, patient states that she is feeling fairly well with no dyspnea at rest, no chest pain or tightness. No other complaints.     : Lying in bed, alert, comfortable, has some groin area discomfort, still with edema but improving.  Also complaining of indigestion following apple sauce ingestion.   : Still with bilateral leg swelling, arm swelling, but overall slowly improving.  SPoke to pt and daughter at bedside, discussed need for further diuresis, all agree with plan of care.    ROS: Multi system review is comprehensively negative x 10 systems except as above    Vitals:  Vital Signs Last 24 Hrs  T(C): 36.9 (2022 07:50), Max: 36.9 (2022 07:50)  T(F): 98.5 (2022 07:50), Max: 98.5 (2022 07:50)  HR: 61 (2022 07:50) (58 - 62)  BP: 143/33 (2022 07:50) (139/43 - 153/45)  BP(mean): 70 (2022 21:50) (70 - 70)  RR: 19 (2022 07:50) (18 - 19)  SpO2: 95% (2022 07:50) (95% - 96%)    Parameters below as of 2022 07:50  Patient On (Oxygen Delivery Method): room air        Exam:  Gen: Comfortable appearing  HEENT: NCAT PERRL EOMI MMM clear oropharynx  Neck: Supple, no JVD  Chest: Normal resp effort at rest, lungs CTA B/L  CVS: s1 s2 normal, rrr  Abd: +BS, soft NT ND  Ext: Mild pitting edema in L elbow and B/L LEs. No redness. No induration. No tenderness.  +1-2 pitting Edema left leg greater than right  Skin: Warm, dry, intact  Neuro: A+OX3, no deficits  Mood: Calm, pleasant    Labs:                              9.1    3.98  )-----------( 107      ( 2022 06:37 )             28.5     07-24    132<L>  |  96  |  25<H>  ----------------------------<  83  3.8   |  32<H>  |  1.39<H>    Ca    8.9      2022 06:56  Phos  3.5     07-23  Mg     2.2     07-23      CAPILLARY BLOOD GLUCOSE          Micro:  COVID19 PCR : Negative    Imaging:  Hip XR done :  No hip  fracture or dislocation. Osseous pelvis intact. The SI joints and symphysis pubis are within normal limits. Vertebral plasty cement in  lower lumbar vertebra.    LLE venous duplex US : No evidence of left lower extremity deep venous thrombosis. Left groin is unremarkable.    LUE venous duplex US : No evidence of left upper extremity deep venous thrombosis.    CXR : Central bronchiectasis on both sides along with prominent interstitial markings in both upper lobes are unchanged. Calcified bilateral pleural plaque near the apices along with bibasilar pleural effusions, grossly unchanged. Cardiac loop recorder however there is no pulmonary edema.    Cardiac Testing:  Telemetry : Sinus rhythm, rate 60-70, no ectopy    Telemetry : Sinus rhythm, rate 50-60    EKG : NSR, rate WNL, old anteroseptal infarct (?)    TTE from prior visit, 22: Mild asymmetrical septal left ventricular hypertrophy present. Estimated left ventricular ejection fraction is 60-65 %. The left atrium is mildly dilated. Mild AI. Mod TR. Mild pulmonary hypertension. Trace pericardial effusion is present.    Meds:  MEDICATIONS  (STANDING):  aMIOdarone    Tablet 200 milliGRAM(s) Oral daily  amLODIPine   Tablet 10 milliGRAM(s) Oral daily  atorvastatin 10 milliGRAM(s) Oral at bedtime  carvedilol 25 milliGRAM(s) Oral every 12 hours  chlorhexidine 4% Liquid 1 Application(s) Topical <User Schedule>  cholecalciferol 2000 Unit(s) Oral daily  cyanocobalamin 1000 MICROGram(s) Oral daily  famotidine    Tablet 20 milliGRAM(s) Oral daily  fludroCORTISONE 0.05 milliGRAM(s) Oral daily  furosemide   Injectable 40 milliGRAM(s) IV Push two times a day  heparin   Injectable 5000 Unit(s) SubCutaneous every 12 hours  hydrALAZINE 100 milliGRAM(s) Oral every 8 hours  hydrocortisone 10 milliGRAM(s) Oral <User Schedule>  hydrocortisone 5 milliGRAM(s) Oral <User Schedule>  losartan 50 milliGRAM(s) Oral daily  senna 2 Tablet(s) Oral at bedtime    MEDICATIONS  (PRN):  bismuth subsalicylate Liquid 15 milliLiter(s) Oral four times a day PRN Dyspsea/GI DIscomfort  loratadine 10 milliGRAM(s) Oral daily PRN Allergies  melatonin 3 milliGRAM(s) Oral at bedtime PRN Insomnia  ondansetron Injectable 4 milliGRAM(s) IV Push every 8 hours PRN Nausea and/or Vomiting  polyethylene glycol 3350 17 Gram(s) Oral daily PRN Constipation  traMADol 25 milliGRAM(s) Oral every 12 hours PRN Moderate Pain (4 - 6)          Assessment and Plan:   89 year-old woman with HTN, HFpEF, afib not on AC due to falls. hx of TIA, lymphoma in remission, chronic hyponatremia, adrenal insufficiency on fludrocortisone and hydrocortisone, presented from [facility]  with complaints of worsening dyspnea and increased extremity swelling, found to have hypoxia, likely acute on chronic diastolic CHF, admitted to Medicine for further management.     Acute hypoxic respiratory failure   Resolved. Likely due to acute on chronic diastolic CHF, see below. Patient gradually improving with IV diuresis, now weaned to room air while at rest.   - Obtain ambulatory pulse ox assessment, home O2 eval    Acute on chronic diastolic CHF  No angina. Trop negative. EKG without acute ischemic changes. TTE recently done, 2022, preserved EF. ILR interrogated. No events. Appreciate input from Cardiology. Overall, patient gradually improving with gentle diuresis with Lasix IV. Unclear to what extent patient's home regimen of fludrocortisone has been contributing to her leg and arm swelling, dyspnea on exertion. Reduced fludrocortisone to 0.05mg daily.    - IV lasix increase qd to BID   due to increase weight gain and worsening leg edema -- continue, pt diuresing well   -  possibility amlodipine 10mg contributing though overall this is multifactorial    - Continue carvedilol, losartan, hydralazine  - Is and Os, daily wt, fluid restriction    Paroxysmal atrial fibrillation  HR controlled on carvedilol and amiodarone. Not on AC due to hx of frequent falls.   - Continue carvedilol and amiodarone    HTN  BP stable. On amlodipine, carvedilol, losartan, hydralazine and furosemide  - Continue current regimen    Adrenal insufficiency  On hydrocortisone and fludrocortisone. This admission, patient's dose of fludrocortisone cut in half to 0.05mg daily to see if helps w/ swelling and dyspnea.   - Continue hydrocortisone 10/5  - Continue fludrocortisone, now 0.05mg daily    Chronic hyponatremia  Na 132-134 of late, similar to her baseline.   - Continue to monitor    HI  Upon admission Cr 1.52 (Cr 1.06 on 22). 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   - Trend Cr, monitor Is and Os    Hypokalemia  Resolved with potassium supplementation.  - Continue to monitor K    L hip / groin discomfort  Etiology unclear. Hip and pelvis XRs unremarkable. LE venous duplex negative for DVT. No groin pathology noted on US. Pain is minimal at rest but increased with activity. On Tylenol 975 q8h. Requested something stronger today. Added Tramadol 25mg twice a day prn mod to severe pain.     Chronic normocytic anemia, thrombocytopenia  Hgb stable, approx at baseline. No leukopenia. Platelets are chronically low ~100. Iron studies from 2022 reviewed. Iron 29. TIBC 263. Iron sat 11%. Ferritin 86.  - Continue to monitor, may need Hematology referral upon discharge    Severe protein calorie malnutrition  Appreciate input from Nutrition  - Encourage Po intake, trend patient weight        DVT px: Heparin subcut  Code status: DNR/DNI  updated family on plan of care   Dispo: Return to [facility] once clinically improved.  Spoke to daughter and updated on plan of care.       Nutritional Assessment:  · Nutritional Assessment  This patient has been assessed with a concern for Malnutrition and has been determined to have a diagnosis/diagnoses of Severe protein-calorie malnutrition.    This patient is being managed with:   Diet DASH/TLC-  Sodium & Cholesterol Restricted  1500mL Fluid Restriction ([id number])     Special Instructions for Nursin milliLiter(s) to 2000 milliLiter(s) fluid restriction  Entered: 2022  3:58PM

## 2022-07-25 LAB
% ALBUMIN: 55.1 % — SIGNIFICANT CHANGE UP
% ALPHA 1: 8.8 % — SIGNIFICANT CHANGE UP
% ALPHA 2: 15.5 % — SIGNIFICANT CHANGE UP
% BETA: 12.9 % — SIGNIFICANT CHANGE UP
% GAMMA: 7.7 % — SIGNIFICANT CHANGE UP
% M SPIKE: 2.3 % — SIGNIFICANT CHANGE UP
ALBUMIN SERPL ELPH-MCNC: 2.8 G/DL — LOW (ref 3.6–5.5)
ALBUMIN/GLOB SERPL ELPH: 1.3 RATIO — SIGNIFICANT CHANGE UP
ALPHA1 GLOB SERPL ELPH-MCNC: 0.4 G/DL — SIGNIFICANT CHANGE UP (ref 0.1–0.4)
ALPHA2 GLOB SERPL ELPH-MCNC: 0.8 G/DL — SIGNIFICANT CHANGE UP (ref 0.5–1)
ANION GAP SERPL CALC-SCNC: 6 MMOL/L — SIGNIFICANT CHANGE UP (ref 5–17)
B-GLOBULIN SERPL ELPH-MCNC: 0.6 G/DL — SIGNIFICANT CHANGE UP (ref 0.5–1)
BUN SERPL-MCNC: 23 MG/DL — SIGNIFICANT CHANGE UP (ref 7–23)
CALCIUM SERPL-MCNC: 9 MG/DL — SIGNIFICANT CHANGE UP (ref 8.5–10.1)
CHLORIDE SERPL-SCNC: 97 MMOL/L — SIGNIFICANT CHANGE UP (ref 96–108)
CO2 SERPL-SCNC: 32 MMOL/L — HIGH (ref 22–31)
CREAT SERPL-MCNC: 1.45 MG/DL — HIGH (ref 0.5–1.3)
EGFR: 33 ML/MIN/1.73M2 — LOW
GAMMA GLOBULIN: 0.4 G/DL — LOW (ref 0.6–1.6)
GLUCOSE SERPL-MCNC: 81 MG/DL — SIGNIFICANT CHANGE UP (ref 70–99)
M-SPIKE: 0.1 G/DL — HIGH (ref 0–0)
NT-PROBNP SERPL-SCNC: 1527 PG/ML — HIGH (ref 0–450)
POTASSIUM SERPL-MCNC: 3.5 MMOL/L — SIGNIFICANT CHANGE UP (ref 3.5–5.3)
POTASSIUM SERPL-SCNC: 3.5 MMOL/L — SIGNIFICANT CHANGE UP (ref 3.5–5.3)
PROT PATTERN SERPL ELPH-IMP: SIGNIFICANT CHANGE UP
SARS-COV-2 RNA SPEC QL NAA+PROBE: SIGNIFICANT CHANGE UP
SODIUM SERPL-SCNC: 135 MMOL/L — SIGNIFICANT CHANGE UP (ref 135–145)

## 2022-07-25 PROCEDURE — 72192 CT PELVIS W/O DYE: CPT | Mod: 26

## 2022-07-25 PROCEDURE — 76376 3D RENDER W/INTRP POSTPROCES: CPT | Mod: 26

## 2022-07-25 PROCEDURE — 99232 SBSQ HOSP IP/OBS MODERATE 35: CPT

## 2022-07-25 RX ORDER — POTASSIUM CHLORIDE 20 MEQ
40 PACKET (EA) ORAL ONCE
Refills: 0 | Status: COMPLETED | OUTPATIENT
Start: 2022-07-25 | End: 2022-07-25

## 2022-07-25 RX ADMIN — HEPARIN SODIUM 5000 UNIT(S): 5000 INJECTION INTRAVENOUS; SUBCUTANEOUS at 22:04

## 2022-07-25 RX ADMIN — Medication 100 MILLIGRAM(S): at 14:09

## 2022-07-25 RX ADMIN — FAMOTIDINE 20 MILLIGRAM(S): 10 INJECTION INTRAVENOUS at 10:25

## 2022-07-25 RX ADMIN — Medication 100 MILLIGRAM(S): at 22:04

## 2022-07-25 RX ADMIN — Medication 10 MILLIGRAM(S): at 08:34

## 2022-07-25 RX ADMIN — Medication 40 MILLIGRAM(S): at 05:41

## 2022-07-25 RX ADMIN — LOSARTAN POTASSIUM 50 MILLIGRAM(S): 100 TABLET, FILM COATED ORAL at 10:27

## 2022-07-25 RX ADMIN — AMLODIPINE BESYLATE 10 MILLIGRAM(S): 2.5 TABLET ORAL at 10:27

## 2022-07-25 RX ADMIN — PREGABALIN 1000 MICROGRAM(S): 225 CAPSULE ORAL at 10:24

## 2022-07-25 RX ADMIN — Medication 40 MILLIEQUIVALENT(S): at 10:30

## 2022-07-25 RX ADMIN — CARVEDILOL PHOSPHATE 25 MILLIGRAM(S): 80 CAPSULE, EXTENDED RELEASE ORAL at 22:04

## 2022-07-25 RX ADMIN — FLUDROCORTISONE ACETATE 0.05 MILLIGRAM(S): 0.1 TABLET ORAL at 10:26

## 2022-07-25 RX ADMIN — Medication 100 MILLIGRAM(S): at 05:41

## 2022-07-25 RX ADMIN — ATORVASTATIN CALCIUM 10 MILLIGRAM(S): 80 TABLET, FILM COATED ORAL at 22:03

## 2022-07-25 RX ADMIN — AMIODARONE HYDROCHLORIDE 200 MILLIGRAM(S): 400 TABLET ORAL at 10:25

## 2022-07-25 RX ADMIN — Medication 40 MILLIGRAM(S): at 14:09

## 2022-07-25 RX ADMIN — HEPARIN SODIUM 5000 UNIT(S): 5000 INJECTION INTRAVENOUS; SUBCUTANEOUS at 10:28

## 2022-07-25 RX ADMIN — Medication 2000 UNIT(S): at 10:25

## 2022-07-25 RX ADMIN — SENNA PLUS 2 TABLET(S): 8.6 TABLET ORAL at 22:04

## 2022-07-25 RX ADMIN — Medication 5 MILLIGRAM(S): at 17:56

## 2022-07-25 RX ADMIN — TRAMADOL HYDROCHLORIDE 25 MILLIGRAM(S): 50 TABLET ORAL at 14:14

## 2022-07-25 RX ADMIN — CARVEDILOL PHOSPHATE 25 MILLIGRAM(S): 80 CAPSULE, EXTENDED RELEASE ORAL at 10:26

## 2022-07-25 NOTE — PROGRESS NOTE ADULT - SUBJECTIVE AND OBJECTIVE BOX
Chief Complaint: Dyspnea, swelling    Interval History: Patient seen and examined. Patient reports feeling somewhat improved since admission. Overall slightly less edema. Still with some dependent edema in her L elbow, L hand and B/L LEs. She was seen by Cardiology today who advised continued IV diuresis. Patient also reported new complaint of L groin discomfort, mild in severity, worse with palpation, has some difficulty flexing at the hip, believes she may have pulled a muscle. Exam rather unrevealing aside from her edema. Hip XR obtained, no significant abnormality. LLE venous duplex done as well, negative for DVT, no significant finding in the groin. On Tylenol 975mg q8h. Added low-dose Tramadol this afternoon as needed for more significant pain. Otherwise, patient states that she is feeling fairly well with no dyspnea at rest, no chest pain or tightness. No other complaints.     : Lying in bed, alert, comfortable, has some groin area discomfort, still with edema but improving.  Also complaining of indigestion following apple sauce ingestion.   : Still with bilateral leg swelling, arm swelling, but overall slowly improving.  SPoke to pt and daughter at bedside, discussed need for further diuresis, all agree with plan of care.  : Swelling slowly improving, tolerating higher frequency of diuretics. Still with left hip pain.      ROS: Multi system review is comprehensively negative x 10 systems except as above    Vitals:  Vital Signs Last 24 Hrs  T(C): 37 (2022 07:14), Max: 37 (2022 07:14)  T(F): 98.6 (2022 07:14), Max: 98.6 (2022 07:14)  HR: 70 (2022 07:14) (63 - 71)  BP: 143/42 (2022 07:14) (139/33 - 146/37)  BP(mean): 67 (2022 07:14) (67 - 67)  RR: 18 (2022 07:14) (18 - 19)  SpO2: 92% (2022 07:14) (92% - 94%)    Parameters below as of 2022 07:14  Patient On (Oxygen Delivery Method): room air          Exam:  Gen: Comfortable appearing  HEENT: NCAT PERRL EOMI MMM clear oropharynx  Neck: Supple, no JVD  Chest: Normal resp effort at rest, lungs CTA B/L  CVS: s1 s2 normal, rrr  Abd: +BS, soft NT ND  Ext: Mild pitting edema in L elbow and B/L LEs. No redness. No induration. No tenderness.  +1-2 pitting Edema left leg greater than right  Skin: Warm, dry, intact  Neuro: A+OX3, no deficits  Mood: Calm, pleasant    Labs:              135  |  97  |  23  ----------------------------<  81  3.5   |  32<H>  |  1.45<H>    Ca    9.0      2022 07:28      CAPILLARY BLOOD GLUCOSE                 Micro:  COVID19 PCR : Negative    Imaging:  Hip XR done :  No hip  fracture or dislocation. Osseous pelvis intact. The SI joints and symphysis pubis are within normal limits. Vertebral plasty cement in  lower lumbar vertebra.    LLE venous duplex US : No evidence of left lower extremity deep venous thrombosis. Left groin is unremarkable.    LUE venous duplex US : No evidence of left upper extremity deep venous thrombosis.    CXR : Central bronchiectasis on both sides along with prominent interstitial markings in both upper lobes are unchanged. Calcified bilateral pleural plaque near the apices along with bibasilar pleural effusions, grossly unchanged. Cardiac loop recorder however there is no pulmonary edema.    Cardiac Testing:  Telemetry : Sinus rhythm, rate 60-70, no ectopy    Telemetry : Sinus rhythm, rate 50-60    EKG : NSR, rate WNL, old anteroseptal infarct (?)    TTE from prior visit, 22: Mild asymmetrical septal left ventricular hypertrophy present. Estimated left ventricular ejection fraction is 60-65 %. The left atrium is mildly dilated. Mild AI. Mod TR. Mild pulmonary hypertension. Trace pericardial effusion is present.    Meds:  MEDICATIONS  (STANDING):  aMIOdarone    Tablet 200 milliGRAM(s) Oral daily  amLODIPine   Tablet 10 milliGRAM(s) Oral daily  atorvastatin 10 milliGRAM(s) Oral at bedtime  carvedilol 25 milliGRAM(s) Oral every 12 hours  chlorhexidine 4% Liquid 1 Application(s) Topical <User Schedule>  cholecalciferol 2000 Unit(s) Oral daily  cyanocobalamin 1000 MICROGram(s) Oral daily  famotidine    Tablet 20 milliGRAM(s) Oral daily  fludroCORTISONE 0.05 milliGRAM(s) Oral daily  furosemide   Injectable 40 milliGRAM(s) IV Push two times a day  heparin   Injectable 5000 Unit(s) SubCutaneous every 12 hours  hydrALAZINE 100 milliGRAM(s) Oral every 8 hours  hydrocortisone 10 milliGRAM(s) Oral <User Schedule>  hydrocortisone 5 milliGRAM(s) Oral <User Schedule>  losartan 50 milliGRAM(s) Oral daily  senna 2 Tablet(s) Oral at bedtime    MEDICATIONS  (PRN):  bismuth subsalicylate Liquid 15 milliLiter(s) Oral four times a day PRN Dyspsea/GI DIscomfort  loratadine 10 milliGRAM(s) Oral daily PRN Allergies  melatonin 3 milliGRAM(s) Oral at bedtime PRN Insomnia  ondansetron Injectable 4 milliGRAM(s) IV Push every 8 hours PRN Nausea and/or Vomiting  polyethylene glycol 3350 17 Gram(s) Oral daily PRN Constipation  traMADol 25 milliGRAM(s) Oral every 12 hours PRN Moderate Pain (4 - 6)          Assessment and Plan:   89 year-old woman with HTN, HFpEF, afib not on AC due to falls. hx of TIA, lymphoma in remission, chronic hyponatremia, adrenal insufficiency on fludrocortisone and hydrocortisone, presented from [facility]  with complaints of worsening dyspnea and increased extremity swelling, found to have hypoxia, likely acute on chronic diastolic CHF, admitted to Medicine for further management.     Acute hypoxic respiratory failure: Resolved.  - Likely due to acute on chronic diastolic CHF, see below. Patient gradually improving with IV diuresis, now weaned to room air while at rest.     Acute on chronic diastolic CHF:  - ACS ruled out  - TTE recently done, 2022, preserved EF.   - ILR interrogated. No events  - Unclear to what extent patient's home regimen of fludrocortisone has been contributing to her leg and arm swelling, dyspnea on exertion. Reduced fludrocortisone to 0.05mg daily.    - IV lasix increase qd to BID   due to increase weight gain and worsening leg edema -- continue, pt diuresing well   -  possibility amlodipine 10mg contributing though overall this is multifactorial    - Continue carvedilol, losartan, hydralazine  - Is and Os, daily wt, fluid restriction  - hyponatremia resolved    Paroxysmal atrial fibrillation:  - HR controlled on carvedilol and amiodarone. Not on AC due to hx of frequent falls.   - Continue carvedilol and amiodarone    HTN  BP stable. On amlodipine, carvedilol, losartan, hydralazine and furosemide  - Continue current regimen    Adrenal insufficiency  On hydrocortisone and fludrocortisone. This admission, patient's dose of fludrocortisone cut in half to 0.05mg daily to see if helps w/ swelling and dyspnea.   - Continue hydrocortisone 10/5  - Continue fludrocortisone, now 0.05mg daily      HI  Upon admission Cr 1.52 (Cr 1.06 on 22). 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   - Trend Cr, monitor Is and Os      L hip / groin discomfort  - Hip and pelvis XRs unremarkable.  - LE venous duplex negative for DVT.   - No groin pathology noted on US. Pain is minimal at rest but increased with activity.   - pain management  - CT left pelvis    Chronic normocytic anemia, thrombocytopenia  Hgb stable, approx at baseline. No leukopenia. Platelets are chronically low ~100. Iron studies from 2022 reviewed. Iron 29. TIBC 263. Iron sat 11%. Ferritin 86.  - Continue to monitor, may need Hematology referral upon discharge    Severe protein calorie malnutrition  Appreciate input from Nutrition  - Encourage Po intake, trend patient weight        DVT px: Heparin subcut  Code status: DNR/DNI  updated family on plan of care   Dispo: Return to [facility] once clinically improved.        Nutritional Assessment:  · Nutritional Assessment	This patient has been assessed with a concern for Malnutrition and has been determined to have a diagnosis/diagnoses of Severe protein-calorie malnutrition.    This patient is being managed with:   Diet DASH/TLC-  Sodium & Cholesterol Restricted  1500mL Fluid Restriction ([id number])     Special Instructions for Nursin milliLiter(s) to 2000 milliLiter(s) fluid restriction  Entered: 2022  3:58PM

## 2022-07-25 NOTE — PROGRESS NOTE ADULT - SUBJECTIVE AND OBJECTIVE BOX
Patient is a 89y old  Female who presents with a chief complaint of SOB/Edema.     HPI:  89 y/o F with PMH HTN, TIA, A fib not on AC d/t falls, CKD, lymphoma in remission, CHF, hyponatremia, adrenal insufficiency presents from [facility] with increased SOB/Edema.       In the ER, Tmax 97.6F, HR 56-60, /72, RR 17-20, SpO2 96% on RA (reported SpO2 89-91% on RA in ER documentation). Hgb 10.6 (baseline), Plt 115, K 2.8, Cr 1.52 (baseline 1.1-1.3), BUN 22, BNP 1170.   7/21-   Pt seen and examined this am.  Pt was able to sleep in bed with slight incline.  No overnight events.  7/22- pt seen and examined this am.  She states that she is " not urinating as much."      PAST MEDICAL & SURGICAL HISTORY:  Iron deficiency      HTN (hypertension)      Lymphoma      H/O adrenal insufficiency      Hyponatremia      Hypokalemia      Chronic kidney disease, unspecified CKD stage      Diastolic heart failure      Paroxysmal atrial fibrillation      S/P appendectomy      S/P hysterectomy      H/O intracranial hemorrhage          MEDICATIONS  (STANDING):  aMIOdarone    Tablet 200 milliGRAM(s) Oral daily  amLODIPine   Tablet 10 milliGRAM(s) Oral daily  atorvastatin 10 milliGRAM(s) Oral at bedtime  carvedilol 25 milliGRAM(s) Oral every 12 hours  cholecalciferol 2000 Unit(s) Oral daily  cyanocobalamin 1000 MICROGram(s) Oral daily  famotidine    Tablet 20 milliGRAM(s) Oral daily  fludroCORTISONE 0.1 milliGRAM(s) Oral daily  hydrALAZINE 100 milliGRAM(s) Oral every 8 hours  hydrocortisone 10 milliGRAM(s) Oral <User Schedule>  hydrocortisone 5 milliGRAM(s) Oral <User Schedule>  senna 2 Tablet(s) Oral at bedtime    MEDICATIONS  (PRN):  acetaminophen     Tablet .. 650 milliGRAM(s) Oral every 6 hours PRN Temp greater or equal to 38C (100.4F), Mild Pain (1 - 3)  aluminum hydroxide/magnesium hydroxide/simethicone Suspension 30 milliLiter(s) Oral every 4 hours PRN Dyspepsia  bismuth subsalicylate Liquid 15 milliLiter(s) Oral four times a day PRN Dyspsea/GI DIscomfort  loratadine 10 milliGRAM(s) Oral daily PRN Allergies  melatonin 3 milliGRAM(s) Oral at bedtime PRN Insomnia  ondansetron Injectable 4 milliGRAM(s) IV Push every 8 hours PRN Nausea and/or Vomiting  polyethylene glycol 3350 17 Gram(s) Oral daily PRN Constipation      FAMILY HISTORY:  No known problems (Father, Mother)        SOCIAL HISTORY: no recent smoking     REVIEW OF SYSTEMS:  CONSTITUTIONAL:    No fatigue, malaise, lethargy.  No fever or chills.  RESPIRATORY:  No cough.  No wheeze.  No hemoptysis.  c/o shortness of breath.  CARDIOVASCULAR:  No chest pains.  No palpitations. c/o shortness of breath, No orthopnea or PND.  GASTROINTESTINAL:  No abdominal pain.  No nausea or vomiting.    GENITOURINARY:    No hematuria.    MUSCULOSKELETAL:  c/o musculoskeletal pain.  No joint swelling.  No arthritis.  NEUROLOGICAL:  No tingling or numbness or weakness.  PSYCHIATRIC:  No confusion  SKIN:  No rashes.        ICU Vital Signs Last 24 Hrs  T(C): 36.2 (22 Jul 2022 05:57), Max: 36.7 (21 Jul 2022 09:57)  T(F): 97.1 (22 Jul 2022 05:57), Max: 98.1 (21 Jul 2022 09:57)  HR: 61 (22 Jul 2022 05:57) (60 - 68)  BP: 159/49 (22 Jul 2022 05:57) (126/46 - 159/49)  BP(mean): --  ABP: --  ABP(mean): --  RR: 18 (22 Jul 2022 05:57) (18 - 18)  SpO2: 96% (22 Jul 2022 05:57) (96% - 96%)    O2 Parameters below as of 22 Jul 2022 05:57  Patient On (Oxygen Delivery Method): nasal cannula  O2 Flow (L/min): 1                PHYSICAL EXAM-    Constitutional: elderly ill looking female in no acute distress    Head: Head is normocephalic and atraumatic.      Neck: + JVD.     Cardiovascular: Regular rate and rhythm without S3, S4. No murmurs or rubs are appreciated.      Respiratory: B/lrales. No wheezing.    Abdomen: Soft, nontender, nondistended with positive bowel sounds.      Extremity: No tenderness. No  pitting edema     Neurologic: The patient is alert and oriented.      Skin: No rash, no obvious lesions noted.      Psychiatric: The patient appears to be emotionally stable.      INTERPRETATION OF TELEMETRY: SR     ECG: Sinus rythm ,  no ST T changes. poor R wave progression.    I&O's Detail    17 Jul 2022 07:01  -  18 Jul 2022 07:00  --------------------------------------------------------  IN:  Total IN: 0 mL    OUT:    Voided (mL): 700 mL  Total OUT: 700 mL    Total NET: -700 mL          LABS:                                   9.9    5.95  )-----------( 108      ( 20 Jul 2022 07:04 )             30.9     07-21    134<L>  |  99  |  24<H>  ----------------------------<  91  4.1   |  31  |  1.36<H>    Ca    9.1      21 Jul 2022 06:54  Mg     2.3     07-21      BNPSerum Pro-Brain Natriuretic Peptide: 1170 pg/mL (07-17 @ 13:59)    RADIOLOGY & ADDITIONAL STUDIES:  < from: TTE Echo Complete w/o Contrast w/ Doppler (06.22.22 @ 10:32) >   Impression     Summary     Mild asymmetrical septal left ventricular hypertrophy is present.   Estimated left ventricular ejection fraction is 60-65 %.   The left atrium is mildly dilated.   Mild (1+) aortic regurgitation   Moderate (2+) tricuspid valve regurgitation. Mild pulmonary hypertension.   Trace pericardial effusion is present.     Signature     ----------------------------------------------------------------   Electronically signed by Valentin Quesada MD(Interpreting   physician) on 06/23/2022 03:11 PM   ----------------------------------------------------------------    Valves    < end of copied text >     Patient is a 89y old  Female who presents with a chief complaint of SOB/Edema.     HPI:  89 y/o F with PMH HTN, TIA, A fib not on AC d/t falls, CKD, lymphoma in remission, CHF, hyponatremia, adrenal insufficiency presents from [facility] with increased SOB/Edema.       In the ER, Tmax 97.6F, HR 56-60, /72, RR 17-20, SpO2 96% on RA (reported SpO2 89-91% on RA in ER documentation). Hgb 10.6 (baseline), Plt 115, K 2.8, Cr 1.52 (baseline 1.1-1.3), BUN 22, BNP 1170.   7/21-   Pt seen and examined this am.  Pt was able to sleep in bed with slight incline.  No overnight events.  7/22- pt seen and examined this am.  She states that she is " not urinating as much."      7/25- Pt seen this am.  Pt denies any symptoms and states her SOB is improving.   PAST MEDICAL & SURGICAL HISTORY:  Iron deficiency      HTN (hypertension)      Lymphoma      H/O adrenal insufficiency      Hyponatremia      Hypokalemia      Chronic kidney disease, unspecified CKD stage      Diastolic heart failure      Paroxysmal atrial fibrillation      S/P appendectomy      S/P hysterectomy      H/O intracranial hemorrhage          MEDICATIONS  (STANDING):  aMIOdarone    Tablet 200 milliGRAM(s) Oral daily  amLODIPine   Tablet 10 milliGRAM(s) Oral daily  atorvastatin 10 milliGRAM(s) Oral at bedtime  carvedilol 25 milliGRAM(s) Oral every 12 hours  cholecalciferol 2000 Unit(s) Oral daily  cyanocobalamin 1000 MICROGram(s) Oral daily  famotidine    Tablet 20 milliGRAM(s) Oral daily  fludroCORTISONE 0.1 milliGRAM(s) Oral daily  hydrALAZINE 100 milliGRAM(s) Oral every 8 hours  hydrocortisone 10 milliGRAM(s) Oral <User Schedule>  hydrocortisone 5 milliGRAM(s) Oral <User Schedule>  senna 2 Tablet(s) Oral at bedtime    MEDICATIONS  (PRN):  acetaminophen     Tablet .. 650 milliGRAM(s) Oral every 6 hours PRN Temp greater or equal to 38C (100.4F), Mild Pain (1 - 3)  aluminum hydroxide/magnesium hydroxide/simethicone Suspension 30 milliLiter(s) Oral every 4 hours PRN Dyspepsia  bismuth subsalicylate Liquid 15 milliLiter(s) Oral four times a day PRN Dyspsea/GI DIscomfort  loratadine 10 milliGRAM(s) Oral daily PRN Allergies  melatonin 3 milliGRAM(s) Oral at bedtime PRN Insomnia  ondansetron Injectable 4 milliGRAM(s) IV Push every 8 hours PRN Nausea and/or Vomiting  polyethylene glycol 3350 17 Gram(s) Oral daily PRN Constipation      FAMILY HISTORY:  No known problems (Father, Mother)        SOCIAL HISTORY: no recent smoking     REVIEW OF SYSTEMS:  CONSTITUTIONAL:    No fatigue, malaise, lethargy.  No fever or chills.  RESPIRATORY:  No cough.  No wheeze.  No hemoptysis.  c/o shortness of breath.  CARDIOVASCULAR:  No chest pains.  No palpitations. c/o shortness of breath, No orthopnea or PND.  GASTROINTESTINAL:  No abdominal pain.  No nausea or vomiting.    GENITOURINARY:    No hematuria.    MUSCULOSKELETAL:  c/o musculoskeletal pain.  No joint swelling.  No arthritis.  NEUROLOGICAL:  No tingling or numbness or weakness.  PSYCHIATRIC:  No confusion  SKIN:  No rashes.        ICU Vital Signs Last 24 Hrs  T(C): 37 (25 Jul 2022 07:14), Max: 37 (25 Jul 2022 07:14)  T(F): 98.6 (25 Jul 2022 07:14), Max: 98.6 (25 Jul 2022 07:14)  HR: 70 (25 Jul 2022 07:14) (63 - 71)  BP: 143/42 (25 Jul 2022 07:14) (139/33 - 146/37)  BP(mean): 67 (25 Jul 2022 07:14) (67 - 67)  ABP: --  ABP(mean): --  RR: 18 (25 Jul 2022 07:14) (18 - 19)  SpO2: 92% (25 Jul 2022 07:14) (92% - 94%)    O2 Parameters below as of 25 Jul 2022 07:14  Patient On (Oxygen Delivery Method): room air                      PHYSICAL EXAM-    Constitutional: elderly ill looking female in no acute distress    Head: Head is normocephalic and atraumatic.      Neck: + JVD.     Cardiovascular: Regular rate and rhythm without S3, S4. No murmurs or rubs are appreciated.      Respiratory: CTA b/l No wheezing.    Abdomen: Soft, nontender, nondistended with positive bowel sounds.      Extremity: No tenderness. No  pitting edema     Neurologic: The patient is alert and oriented.      Skin: No rash, no obvious lesions noted.      Psychiatric: The patient appears to be emotionally stable.      INTERPRETATION OF TELEMETRY: SR     ECG: Sinus rythm ,  no ST T changes. poor R wave progression.    I&O's Detail    17 Jul 2022 07:01  -  18 Jul 2022 07:00  --------------------------------------------------------  IN:  Total IN: 0 mL    OUT:    Voided (mL): 700 mL  Total OUT: 700 mL    Total NET: -700 mL          LABS:               07-24    132<L>  |  96  |  25<H>  ----------------------------<  83  3.8   |  32<H>  |  1.39<H>    Ca    8.9      24 Jul 2022 06:56                    BNPSerum Pro-Brain Natriuretic Peptide: 1170 pg/mL (07-17 @ 13:59)    RADIOLOGY & ADDITIONAL STUDIES:  < from: TTE Echo Complete w/o Contrast w/ Doppler (06.22.22 @ 10:32) >   Impression     Summary     Mild asymmetrical septal left ventricular hypertrophy is present.   Estimated left ventricular ejection fraction is 60-65 %.   The left atrium is mildly dilated.   Mild (1+) aortic regurgitation   Moderate (2+) tricuspid valve regurgitation. Mild pulmonary hypertension.   Trace pericardial effusion is present.     Signature     ----------------------------------------------------------------   Electronically signed by Valentin Quesada MD(Interpreting   physician) on 06/23/2022 03:11 PM   ----------------------------------------------------------------    Valves    < end of copied text >

## 2022-07-25 NOTE — PROGRESS NOTE ADULT - ASSESSMENT
SOB, Acute on chronic decompensated HFPEF- hypervolemic , Hypervolemic, NYHA class III-   Pt is on fludrocortisone and hydrocortisone for adrenal insufficiency  This might have caused fluid retention on top of other factors.  renal function improving.   WIll continue lasix iv 40 mg daily over weekend.   she has cardiorenal syndrome too.  Diuresis with close monitoring of the renal function and electrolytes.  Goal potassium of 4 and magnesium of 2.   Strict I/O and daily wt checks. Low sodium diet. Nutrition education.     HTN- cont home meds.    Hyponatremia- monitor with diuresis.   mild in nature for now.     CKD- mild fluctuations in cr noted.   monitor for now.  Improving with diuresis now.    PAF- not on AC secondary to falls and risk of bleed outweighs benefit of AC  on amiodarone.       Other medical issues- Management per primary team.   Thank you for allowing me to participate in the care of this patient. Please feel free to contact me with any questions.   SOB, Acute on chronic decompensated HFPEF- hypervolemic , Hypervolemic, NYHA class III-   Pt is on fludrocortisone and hydrocortisone for adrenal insufficiency  This might have caused fluid retention on top of other factors.  renal function improving.   WIll continue lasix iv 40 mg BID  Dose increased over the weekend  check o2 sat with ambulation  she is nearing DC.  she has cardiorenal syndrome too.  Diuresis with close monitoring of the renal function and electrolytes.  Goal potassium of 4 and magnesium of 2.   Strict I/O and daily wt checks. Low sodium diet. Nutrition education.     HTN- cont home meds.    Hyponatremia- monitor with diuresis.   mild in nature for now.     CKD- mild fluctuations in cr noted.   monitor for now.  Improving with diuresis now.    PAF- not on AC secondary to falls and risk of bleed outweighs benefit of AC  on amiodarone.       Other medical issues- Management per primary team.   Thank you for allowing me to participate in the care of this patient. Please feel free to contact me with any questions.

## 2022-07-26 LAB
% GAMMA, URINE: 3.4 % — SIGNIFICANT CHANGE UP
ALBUMIN 24H MFR UR ELPH: 63.6 % — SIGNIFICANT CHANGE UP
ALPHA1 GLOB 24H MFR UR ELPH: 18.6 % — SIGNIFICANT CHANGE UP
ALPHA2 GLOB 24H MFR UR ELPH: 5.6 % — SIGNIFICANT CHANGE UP
ANION GAP SERPL CALC-SCNC: 7 MMOL/L — SIGNIFICANT CHANGE UP (ref 5–17)
B-GLOBULIN 24H MFR UR ELPH: 8.8 % — SIGNIFICANT CHANGE UP
BUN SERPL-MCNC: 23 MG/DL — SIGNIFICANT CHANGE UP (ref 7–23)
CALCIUM SERPL-MCNC: 9.3 MG/DL — SIGNIFICANT CHANGE UP (ref 8.5–10.1)
CHLORIDE SERPL-SCNC: 95 MMOL/L — LOW (ref 96–108)
CO2 SERPL-SCNC: 31 MMOL/L — SIGNIFICANT CHANGE UP (ref 22–31)
CREAT SERPL-MCNC: 1.53 MG/DL — HIGH (ref 0.5–1.3)
EGFR: 31 ML/MIN/1.73M2 — LOW
GLUCOSE SERPL-MCNC: 91 MG/DL — SIGNIFICANT CHANGE UP (ref 70–99)
INTERPRETATION 24H UR IFE-IMP: SIGNIFICANT CHANGE UP
INTERPRETATION 24H UR IFE-IMP: SIGNIFICANT CHANGE UP
M PROTEIN 24H UR ELPH-MRATE: SIGNIFICANT CHANGE UP
NT-PROBNP SERPL-SCNC: 1557 PG/ML — HIGH (ref 0–450)
POTASSIUM SERPL-MCNC: 3.5 MMOL/L — SIGNIFICANT CHANGE UP (ref 3.5–5.3)
POTASSIUM SERPL-SCNC: 3.5 MMOL/L — SIGNIFICANT CHANGE UP (ref 3.5–5.3)
PROT ?TM UR-MCNC: 84 MG/DL — HIGH (ref 0–12)
PROT PATTERN 24H UR ELPH-IMP: SIGNIFICANT CHANGE UP
SODIUM SERPL-SCNC: 133 MMOL/L — LOW (ref 135–145)
TOTAL VOLUME - 24 HOUR: SIGNIFICANT CHANGE UP ML
URINE CREATININE CALCULATION: SIGNIFICANT CHANGE UP G/24 H (ref 0.8–1.8)

## 2022-07-26 PROCEDURE — 99232 SBSQ HOSP IP/OBS MODERATE 35: CPT

## 2022-07-26 RX ORDER — TRAMADOL HYDROCHLORIDE 50 MG/1
50 TABLET ORAL EVERY 8 HOURS
Refills: 0 | Status: DISCONTINUED | OUTPATIENT
Start: 2022-07-26 | End: 2022-07-27

## 2022-07-26 RX ADMIN — Medication 100 MILLIGRAM(S): at 22:41

## 2022-07-26 RX ADMIN — Medication 10 MILLIGRAM(S): at 11:04

## 2022-07-26 RX ADMIN — TRAMADOL HYDROCHLORIDE 50 MILLIGRAM(S): 50 TABLET ORAL at 22:44

## 2022-07-26 RX ADMIN — CHLORHEXIDINE GLUCONATE 1 APPLICATION(S): 213 SOLUTION TOPICAL at 11:02

## 2022-07-26 RX ADMIN — HEPARIN SODIUM 5000 UNIT(S): 5000 INJECTION INTRAVENOUS; SUBCUTANEOUS at 22:42

## 2022-07-26 RX ADMIN — CARVEDILOL PHOSPHATE 25 MILLIGRAM(S): 80 CAPSULE, EXTENDED RELEASE ORAL at 11:05

## 2022-07-26 RX ADMIN — Medication 3 MILLIGRAM(S): at 22:41

## 2022-07-26 RX ADMIN — AMIODARONE HYDROCHLORIDE 200 MILLIGRAM(S): 400 TABLET ORAL at 11:05

## 2022-07-26 RX ADMIN — FLUDROCORTISONE ACETATE 0.05 MILLIGRAM(S): 0.1 TABLET ORAL at 11:03

## 2022-07-26 RX ADMIN — Medication 40 MILLIGRAM(S): at 06:30

## 2022-07-26 RX ADMIN — Medication 5 MILLIGRAM(S): at 22:41

## 2022-07-26 RX ADMIN — CARVEDILOL PHOSPHATE 25 MILLIGRAM(S): 80 CAPSULE, EXTENDED RELEASE ORAL at 22:41

## 2022-07-26 RX ADMIN — HEPARIN SODIUM 5000 UNIT(S): 5000 INJECTION INTRAVENOUS; SUBCUTANEOUS at 11:05

## 2022-07-26 RX ADMIN — AMLODIPINE BESYLATE 10 MILLIGRAM(S): 2.5 TABLET ORAL at 11:04

## 2022-07-26 RX ADMIN — ATORVASTATIN CALCIUM 10 MILLIGRAM(S): 80 TABLET, FILM COATED ORAL at 22:41

## 2022-07-26 RX ADMIN — Medication 40 MILLIGRAM(S): at 13:00

## 2022-07-26 RX ADMIN — LOSARTAN POTASSIUM 50 MILLIGRAM(S): 100 TABLET, FILM COATED ORAL at 11:05

## 2022-07-26 RX ADMIN — Medication 2000 UNIT(S): at 11:04

## 2022-07-26 RX ADMIN — TRAMADOL HYDROCHLORIDE 50 MILLIGRAM(S): 50 TABLET ORAL at 23:30

## 2022-07-26 RX ADMIN — PREGABALIN 1000 MICROGRAM(S): 225 CAPSULE ORAL at 11:03

## 2022-07-26 RX ADMIN — Medication 100 MILLIGRAM(S): at 06:30

## 2022-07-26 RX ADMIN — SENNA PLUS 2 TABLET(S): 8.6 TABLET ORAL at 22:41

## 2022-07-26 RX ADMIN — FAMOTIDINE 20 MILLIGRAM(S): 10 INJECTION INTRAVENOUS at 11:05

## 2022-07-26 NOTE — PROGRESS NOTE ADULT - SUBJECTIVE AND OBJECTIVE BOX
Chief Complaint: Dyspnea, swelling    Interval History: Patient seen and examined. Patient reports feeling somewhat improved since admission. Overall slightly less edema. Still with some dependent edema in her L elbow, L hand and B/L LEs. She was seen by Cardiology today who advised continued IV diuresis. Patient also reported new complaint of L groin discomfort, mild in severity, worse with palpation, has some difficulty flexing at the hip, believes she may have pulled a muscle. Exam rather unrevealing aside from her edema. Hip XR obtained, no significant abnormality. LLE venous duplex done as well, negative for DVT, no significant finding in the groin. On Tylenol 975mg q8h. Added low-dose Tramadol this afternoon as needed for more significant pain. Otherwise, patient states that she is feeling fairly well with no dyspnea at rest, no chest pain or tightness. No other complaints.     : Lying in bed, alert, comfortable, has some groin area discomfort, still with edema but improving.  Also complaining of indigestion following apple sauce ingestion.   : Still with bilateral leg swelling, arm swelling, but overall slowly improving.  SPoke to pt and daughter at bedside, discussed need for further diuresis, all agree with plan of care.  : Swelling slowly improving, tolerating higher frequency of diuretics. Still with left hip pain.  : slowly losing weight again, doing well with BID dosing of lasix.  No fever, chills, n, v.  Discussed weakness, muscle atrophy.  Pt declines TANG.    ROS: Multi system review is comprehensively negative x 10 systems except as above    Vitals:  Vital Signs Last 24 Hrs  T(C): 36.8 (2022 07:23), Max: 36.8 (2022 07:23)  T(F): 98.2 (2022 07:23), Max: 98.2 (2022 07:23)  HR: 65 (2022 07:23) (65 - 68)  BP: 141/39 (2022 07:23) (139/38 - 142/38)  BP(mean): 64 (2022 07:23) (64 - 64)  RR: 18 (2022 07:23) (18 - 18)  SpO2: 93% (2022 07:23) (93% - 94%)    Parameters below as of 2022 07:23  Patient On (Oxygen Delivery Method): room air        Exam:  Gen: Comfortable appearing  HEENT: NCAT PERRL EOMI MMM clear oropharynx  Neck: Supple, no JVD  Chest: Normal resp effort at rest, lungs CTA B/L  CVS: s1 s2 normal, rrr  Abd: +BS, soft NT ND  Ext: Mild pitting edema in L elbow and B/L LEs. No redness. No induration. No tenderness.  +1-2 pitting Edema left leg greater than right  Skin: Warm, dry, intact  Neuro: A+OX3, no deficits  Mood: Calm, pleasant    Labs:            133<L>  |  95<L>  |  23  ----------------------------<  91  3.5   |  31  |  1.53<H>    Ca    9.3      2022 07:12      CAPILLARY BLOOD GLUCOSE               Micro:  COVID19 PCR : Negative    Imaging:  Hip XR done :  No hip  fracture or dislocation. Osseous pelvis intact. The SI joints and symphysis pubis are within normal limits. Vertebral plasty cement in  lower lumbar vertebra.    LLE venous duplex US : No evidence of left lower extremity deep venous thrombosis. Left groin is unremarkable.    LUE venous duplex US : No evidence of left upper extremity deep venous thrombosis.    CXR : Central bronchiectasis on both sides along with prominent interstitial markings in both upper lobes are unchanged. Calcified bilateral pleural plaque near the apices along with bibasilar pleural effusions, grossly unchanged. Cardiac loop recorder however there is no pulmonary edema.    Cardiac Testing:  Telemetry : Sinus rhythm, rate 60-70, no ectopy    Telemetry : Sinus rhythm, rate 50-60    EKG : NSR, rate WNL, old anteroseptal infarct (?)    TTE from prior visit, 22: Mild asymmetrical septal left ventricular hypertrophy present. Estimated left ventricular ejection fraction is 60-65 %. The left atrium is mildly dilated. Mild AI. Mod TR. Mild pulmonary hypertension. Trace pericardial effusion is present.    Meds:  MEDICATIONS  (STANDING):  aMIOdarone    Tablet 200 milliGRAM(s) Oral daily  amLODIPine   Tablet 10 milliGRAM(s) Oral daily  atorvastatin 10 milliGRAM(s) Oral at bedtime  carvedilol 25 milliGRAM(s) Oral every 12 hours  chlorhexidine 4% Liquid 1 Application(s) Topical <User Schedule>  cholecalciferol 2000 Unit(s) Oral daily  cyanocobalamin 1000 MICROGram(s) Oral daily  famotidine    Tablet 20 milliGRAM(s) Oral daily  fludroCORTISONE 0.05 milliGRAM(s) Oral daily  furosemide   Injectable 40 milliGRAM(s) IV Push two times a day  heparin   Injectable 5000 Unit(s) SubCutaneous every 12 hours  hydrALAZINE 100 milliGRAM(s) Oral every 8 hours  hydrocortisone 10 milliGRAM(s) Oral <User Schedule>  hydrocortisone 5 milliGRAM(s) Oral <User Schedule>  losartan 50 milliGRAM(s) Oral daily  senna 2 Tablet(s) Oral at bedtime    MEDICATIONS  (PRN):  bismuth subsalicylate Liquid 15 milliLiter(s) Oral four times a day PRN Dyspsea/GI DIscomfort  loratadine 10 milliGRAM(s) Oral daily PRN Allergies  melatonin 3 milliGRAM(s) Oral at bedtime PRN Insomnia  ondansetron Injectable 4 milliGRAM(s) IV Push every 8 hours PRN Nausea and/or Vomiting  polyethylene glycol 3350 17 Gram(s) Oral daily PRN Constipation  traMADol 50 milliGRAM(s) Oral every 8 hours PRN Severe Pain (7 - 10)          Assessment and Plan:   89 year-old woman with HTN, HFpEF, afib not on AC due to falls. hx of TIA, lymphoma in remission, chronic hyponatremia, adrenal insufficiency on fludrocortisone and hydrocortisone, presented from [facility]  with complaints of worsening dyspnea and increased extremity swelling, found to have hypoxia, likely acute on chronic diastolic CHF, admitted to Medicine for further management.     Acute hypoxic respiratory failure: Resolved.  - Likely due to acute on chronic diastolic CHF, see below. Patient gradually improving with IV diuresis, now weaned to room air while at rest.     Acute on chronic diastolic CHF:  - ACS ruled out  - TTE recently done, 2022, preserved EF.   - ILR interrogated. No events  - Unclear to what extent patient's home regimen of fludrocortisone has been contributing to her leg and arm swelling, dyspnea on exertion. Reduced fludrocortisone to 0.05mg daily.    - IV lasix increase qd to BID   due to increase weight gain and worsening leg edema -- continue, pt diuresing well   -  possibility amlodipine 10mg contributing though overall this is multifactorial    - Continue carvedilol, losartan, hydralazine  - Is and Os, daily wt, fluid restriction  - hyponatremia borderline, chronic     Paroxysmal atrial fibrillation:  - HR controlled on carvedilol and amiodarone. Not on AC due to hx of frequent falls.   - Continue carvedilol and amiodarone    HTN  BP stable. On amlodipine, carvedilol, losartan, hydralazine and furosemide  - Continue current regimen    Adrenal insufficiency  On hydrocortisone and fludrocortisone. This admission, patient's dose of fludrocortisone cut in half to 0.05mg daily to see if helps w/ swelling and dyspnea.   - Continue hydrocortisone 10/5  - Continue fludrocortisone, now 0.05mg daily      CKD3: Stable  - monitor on lasix       L hip / groin discomfort  - Hip and pelvis XRs unremarkable.  - LE venous duplex negative for DVT.   - CT left pelvis noted, degenerative spine, atrophy, edema, no fractures - c/w pain management    Chronic normocytic anemia, thrombocytopenia  Hgb stable, approx at baseline. No leukopenia. Platelets are chronically low ~100. Iron studies from 2022 reviewed. Iron 29. TIBC 263. Iron sat 11%. Ferritin 86.  - Continue to monitor, may need Hematology referral upon discharge    Severe protein calorie malnutrition  Appreciate input from Nutrition  - Encourage Po intake, trend patient weight        DVT px: Heparin subcut  Code status: DNR/DNI  Dispo: Return to [facility] once clinically improved.  Likely d/c over next 24-48 hours maximum.  f/u cardio.  Pt declines TANG though may be beneficial.  Family updated on plan of care.      Nutritional Assessment:  · Nutritional Assessment	This patient has been assessed with a concern for Malnutrition and has been determined to have a diagnosis/diagnoses of Severe protein-calorie malnutrition.    This patient is being managed with:   Diet DASH/TLC-  Sodium & Cholesterol Restricted  1500mL Fluid Restriction ([id number])     Special Instructions for Nursin milliLiter(s) to 2000 milliLiter(s) fluid restriction  Entered: 2022  3:58PM

## 2022-07-26 NOTE — PROGRESS NOTE ADULT - REASON FOR ADMISSION
SOB/Edema
SOB/Edema
Acute hypoxic respiratory failure  Acute on chronic diastolic CHF
Acute respiratory failure with hypoxia, acute on chronic diastolic CHF
SOB/Edema
Dyspnea, edema
SOB/Edema

## 2022-07-26 NOTE — PROGRESS NOTE ADULT - SUBJECTIVE AND OBJECTIVE BOX
Patient is a 89y old  Female who presents with a chief complaint of SOB/Edema.     HPI:  89 y/o F with PMH HTN, TIA, A fib not on AC d/t falls, CKD, lymphoma in remission, CHF, hyponatremia, adrenal insufficiency presents from [facility] with increased SOB/Edema.       In the ER, Tmax 97.6F, HR 56-60, /72, RR 17-20, SpO2 96% on RA (reported SpO2 89-91% on RA in ER documentation). Hgb 10.6 (baseline), Plt 115, K 2.8, Cr 1.52 (baseline 1.1-1.3), BUN 22, BNP 1170.   7/21-   Pt seen and examined this am.  Pt was able to sleep in bed with slight incline.  No overnight events.  7/22- pt seen and examined this am.  She states that she is " not urinating as much."      7/25- Pt seen this am.  Pt denies any symptoms and states her SOB is improving.     7/26- pt seen and examined.  She just walked with PT and c/o fatigue.  No SOB    PAST MEDICAL & SURGICAL HISTORY:  Iron deficiency      HTN (hypertension)      Lymphoma      H/O adrenal insufficiency      Hyponatremia      Hypokalemia      Chronic kidney disease, unspecified CKD stage      Diastolic heart failure      Paroxysmal atrial fibrillation      S/P appendectomy      S/P hysterectomy      H/O intracranial hemorrhage          MEDICATIONS  (STANDING):  aMIOdarone    Tablet 200 milliGRAM(s) Oral daily  amLODIPine   Tablet 10 milliGRAM(s) Oral daily  atorvastatin 10 milliGRAM(s) Oral at bedtime  carvedilol 25 milliGRAM(s) Oral every 12 hours  cholecalciferol 2000 Unit(s) Oral daily  cyanocobalamin 1000 MICROGram(s) Oral daily  famotidine    Tablet 20 milliGRAM(s) Oral daily  fludroCORTISONE 0.1 milliGRAM(s) Oral daily  hydrALAZINE 100 milliGRAM(s) Oral every 8 hours  hydrocortisone 10 milliGRAM(s) Oral <User Schedule>  hydrocortisone 5 milliGRAM(s) Oral <User Schedule>  senna 2 Tablet(s) Oral at bedtime    MEDICATIONS  (PRN):  acetaminophen     Tablet .. 650 milliGRAM(s) Oral every 6 hours PRN Temp greater or equal to 38C (100.4F), Mild Pain (1 - 3)  aluminum hydroxide/magnesium hydroxide/simethicone Suspension 30 milliLiter(s) Oral every 4 hours PRN Dyspepsia  bismuth subsalicylate Liquid 15 milliLiter(s) Oral four times a day PRN Dyspsea/GI DIscomfort  loratadine 10 milliGRAM(s) Oral daily PRN Allergies  melatonin 3 milliGRAM(s) Oral at bedtime PRN Insomnia  ondansetron Injectable 4 milliGRAM(s) IV Push every 8 hours PRN Nausea and/or Vomiting  polyethylene glycol 3350 17 Gram(s) Oral daily PRN Constipation      FAMILY HISTORY:  No known problems (Father, Mother)        SOCIAL HISTORY: no recent smoking     REVIEW OF SYSTEMS:  CONSTITUTIONAL:    No fatigue, malaise, lethargy.  No fever or chills.  RESPIRATORY:  No cough.  No wheeze.  No hemoptysis.  c/o shortness of breath.  CARDIOVASCULAR:  No chest pains.  No palpitations. c/o shortness of breath, No orthopnea or PND.  GASTROINTESTINAL:  No abdominal pain.  No nausea or vomiting.    GENITOURINARY:    No hematuria.    MUSCULOSKELETAL:  c/o musculoskeletal pain.  No joint swelling.  No arthritis.  NEUROLOGICAL:  No tingling or numbness or weakness.  PSYCHIATRIC:  No confusion  SKIN:  No rashes.        ICU Vital Signs Last 24 Hrs  T(C): 36.8 (26 Jul 2022 07:23), Max: 36.8 (26 Jul 2022 07:23)  T(F): 98.2 (26 Jul 2022 07:23), Max: 98.2 (26 Jul 2022 07:23)  HR: 65 (26 Jul 2022 07:23) (65 - 68)  BP: 141/39 (26 Jul 2022 07:23) (139/38 - 142/38)  BP(mean): 64 (26 Jul 2022 07:23) (64 - 64)  ABP: --  ABP(mean): --  RR: 18 (26 Jul 2022 07:23) (18 - 18)  SpO2: 93% (26 Jul 2022 07:23) (93% - 94%)    O2 Parameters below as of 26 Jul 2022 07:23  Patient On (Oxygen Delivery Method): room air      PHYSICAL EXAM-    Constitutional: elderly ill looking female in no acute distress    Head: Head is normocephalic and atraumatic.      Neck: + JVD.     Cardiovascular: Regular rate and rhythm without S3, S4. No murmurs or rubs are appreciated.      Respiratory: CTA b/l No wheezing.    Abdomen: Soft, nontender, nondistended with positive bowel sounds.      Extremity: No tenderness. No  pitting edema     Neurologic: The patient is alert and oriented.      Skin: No rash, no obvious lesions noted.      Psychiatric: The patient appears to be emotionally stable.      INTERPRETATION OF TELEMETRY: SR     ECG: Sinus rythm ,  no ST T changes. poor R wave progression.    I&O's Detail    17 Jul 2022 07:01  -  18 Jul 2022 07:00  --------------------------------------------------------  IN:  Total IN: 0 mL    OUT:    Voided (mL): 700 mL  Total OUT: 700 mL    Total NET: -700 mL          LABS:               07-26    133<L>  |  95<L>  |  23  ----------------------------<  91  3.5   |  31  |  1.53<H>    Ca    9.3      26 Jul 2022 07:12          BNPSerum Pro-Brain Natriuretic Peptide: 1170 pg/mL (07-17 @ 13:59)    RADIOLOGY & ADDITIONAL STUDIES:  < from: TTE Echo Complete w/o Contrast w/ Doppler (06.22.22 @ 10:32) >   Impression     Summary     Mild asymmetrical septal left ventricular hypertrophy is present.   Estimated left ventricular ejection fraction is 60-65 %.   The left atrium is mildly dilated.   Mild (1+) aortic regurgitation   Moderate (2+) tricuspid valve regurgitation. Mild pulmonary hypertension.   Trace pericardial effusion is present.     Signature     ----------------------------------------------------------------   Electronically signed by Valentin Quesada MD(Interpreting   physician) on 06/23/2022 03:11 PM   ----------------------------------------------------------------    Valves    < end of copied text >

## 2022-07-26 NOTE — PROGRESS NOTE ADULT - NUTRITIONAL ASSESSMENT
This patient has been assessed with a concern for Malnutrition and has been determined to have a diagnosis/diagnoses of Severe protein-calorie malnutrition.    This patient is being managed with:   Diet DASH/TLC-  Sodium & Cholesterol Restricted  1500mL Fluid Restriction ([id number])     Special Instructions for Nursin milliLiter(s) to 2000 milliLiter(s) fluid restriction  Entered: 2022  3:58PM    
This patient has been assessed with a concern for Malnutrition and has been determined to have a diagnosis/diagnoses of Severe protein-calorie malnutrition.    This patient is being managed with:   Diet DASH/TLC-  Sodium & Cholesterol Restricted  1500mL Fluid Restriction ([id number])     Special Instructions for Nursin milliLiter(s) to 2000 milliLiter(s) fluid restriction  Entered: 2022  3:58PM    
This patient has been assessed with a concern for Malnutrition and has been determined to have a diagnosis/diagnoses of Severe protein-calorie malnutrition.    This patient is being managed with:   Diet DASH/TLC-  Sodium & Cholesterol Restricted  1500mL Fluid Restriction ([id number])     Special Instructions for Nursin milliLiter(s) to 2000 milliLiter(s) fluid restriction  Entered: 2022  3:58PM    
This patient has been assessed with a concern for Malnutrition and has been determined to have a diagnosis/diagnoses of Severe protein-calorie malnutrition.    This patient is being managed with:   Diet DASH/TLC-  Sodium & Cholesterol Restricted  1500mL Fluid Restriction ([id number])     Special Instructions for Nursin milliLiter(s) to 2000 milliLiter(s) fluid restriction  Entered: 2022  3:58PM    
This patient has been assessed with a concern for Malnutrition and has been determined to have a diagnosis/diagnoses of Severe protein-calorie malnutrition.    This patient is being managed with:   Diet DASH/TLC-  Sodium & Cholesterol Restricted  1500mL Fluid Restriction ([id number])     Special Instructions for Nursin milliLiter(s) to 2000 milliLiter(s) fluid restriction  Entered: 2022  3:58PM    
This patient has been assessed with a concern for Malnutrition and has been determined to have a diagnosis/diagnoses of Severe protein-calorie malnutrition.    This patient is being managed with:   Diet DASH/TLC-  Sodium & Cholesterol Restricted  1500mL Fluid Restriction ([id number])     Special Instructions for Nursin milliLiter(s) to 2000 milliLiter(s) fluid restriction  Entered: 2022  3:58PM    
This patient has been assessed with a concern for Malnutrition and has been determined to have a diagnosis/diagnoses of Severe protein-calorie malnutrition.    This patient is being managed with:   Diet DASH/TLC-  Sodium & Cholesterol Restricted  1500mL Fluid Restriction ([id number])     Special Instructions for Nursin milliLiter(s) to 2000 milliLiter(s) fluid restriction  Entered: 2022  3:58PM    
This patient has been assessed with a concern for Malnutrition and has been determined to have a diagnosis/diagnoses of Severe protein-calorie malnutrition.    This patient is being managed with:   Diet DASH/TLC-  Sodium & Cholesterol Restricted  1500mL Fluid Restriction ([id number])     Special Instructions for Nursin milliLiter(s) to 2000 milliLiter(s) fluid restriction  Entered: 2022  3:58PM    
This patient has been assessed with a concern for Malnutrition and has been determined to have a diagnosis/diagnoses of Severe protein-calorie malnutrition.    This patient is being managed with:   Diet DASH/TLC-  Sodium & Cholesterol Restricted  1500mL Fluid Restriction ([id number])     Special Instructions for Nursin milliLiter(s) to 2000 milliLiter(s) fluid restriction  Entered: 2022  3:58PM

## 2022-07-26 NOTE — PROGRESS NOTE ADULT - ASSESSMENT
SOB, Acute on chronic decompensated HFPEF- hypervolemic , Hypervolemic, NYHA class III-   Pt is on fludrocortisone and hydrocortisone for adrenal insufficiency  This might have caused fluid retention on top of other factors.  renal function islight worsening today.   Lasix hold for one day and restart at 40mg po BID  O2 sat normal with ambulation   DC planning   she has cardiorenal syndrome too.  Diuresis with close monitoring of the renal function and electrolytes.  Goal potassium of 4 and magnesium of 2.   Strict I/O and daily wt checks. Low sodium diet. Nutrition education.     HTN- cont home meds.    Hyponatremia- monitor with diuresis.   mild in nature for now.     CKD- mild fluctuations in cr noted.   monitor for now.  Improving with diuresis now.    PAF- not on AC secondary to falls and risk of bleed outweighs benefit of AC  on amiodarone.       Other medical issues- Management per primary team.   Thank you for allowing me to participate in the care of this patient. Please feel free to contact me with any questions.

## 2022-07-27 ENCOUNTER — TRANSCRIPTION ENCOUNTER (OUTPATIENT)
Age: 87
End: 2022-07-27

## 2022-07-27 VITALS
HEART RATE: 60 BPM | RESPIRATION RATE: 18 BRPM | SYSTOLIC BLOOD PRESSURE: 130 MMHG | OXYGEN SATURATION: 96 % | DIASTOLIC BLOOD PRESSURE: 41 MMHG

## 2022-07-27 LAB
ANION GAP SERPL CALC-SCNC: 5 MMOL/L — SIGNIFICANT CHANGE UP (ref 5–17)
BUN SERPL-MCNC: 29 MG/DL — HIGH (ref 7–23)
CALCIUM SERPL-MCNC: 8.9 MG/DL — SIGNIFICANT CHANGE UP (ref 8.5–10.1)
CHLORIDE SERPL-SCNC: 93 MMOL/L — LOW (ref 96–108)
CO2 SERPL-SCNC: 32 MMOL/L — HIGH (ref 22–31)
CREAT SERPL-MCNC: 1.4 MG/DL — HIGH (ref 0.5–1.3)
EGFR: 34 ML/MIN/1.73M2 — LOW
GLUCOSE SERPL-MCNC: 90 MG/DL — SIGNIFICANT CHANGE UP (ref 70–99)
NT-PROBNP SERPL-SCNC: 1436 PG/ML — HIGH (ref 0–450)
POTASSIUM SERPL-MCNC: 3.1 MMOL/L — LOW (ref 3.5–5.3)
POTASSIUM SERPL-SCNC: 3.1 MMOL/L — LOW (ref 3.5–5.3)
SODIUM SERPL-SCNC: 130 MMOL/L — LOW (ref 135–145)

## 2022-07-27 PROCEDURE — 99239 HOSP IP/OBS DSCHRG MGMT >30: CPT

## 2022-07-27 RX ORDER — TRAMADOL HYDROCHLORIDE 50 MG/1
1 TABLET ORAL
Qty: 21 | Refills: 0
Start: 2022-07-27 | End: 2022-08-02

## 2022-07-27 RX ORDER — FUROSEMIDE 40 MG
1 TABLET ORAL
Qty: 0 | Refills: 0 | DISCHARGE

## 2022-07-27 RX ORDER — POTASSIUM CHLORIDE 20 MEQ
40 PACKET (EA) ORAL EVERY 4 HOURS
Refills: 0 | Status: COMPLETED | OUTPATIENT
Start: 2022-07-27 | End: 2022-07-27

## 2022-07-27 RX ORDER — POTASSIUM CHLORIDE 20 MEQ
1 PACKET (EA) ORAL
Qty: 0 | Refills: 0 | DISCHARGE

## 2022-07-27 RX ADMIN — PREGABALIN 1000 MICROGRAM(S): 225 CAPSULE ORAL at 10:02

## 2022-07-27 RX ADMIN — LOSARTAN POTASSIUM 50 MILLIGRAM(S): 100 TABLET, FILM COATED ORAL at 10:06

## 2022-07-27 RX ADMIN — AMIODARONE HYDROCHLORIDE 200 MILLIGRAM(S): 400 TABLET ORAL at 10:02

## 2022-07-27 RX ADMIN — Medication 100 MILLIGRAM(S): at 15:02

## 2022-07-27 RX ADMIN — Medication 2000 UNIT(S): at 10:02

## 2022-07-27 RX ADMIN — Medication 100 MILLIGRAM(S): at 06:01

## 2022-07-27 RX ADMIN — FAMOTIDINE 20 MILLIGRAM(S): 10 INJECTION INTRAVENOUS at 10:02

## 2022-07-27 RX ADMIN — TRAMADOL HYDROCHLORIDE 50 MILLIGRAM(S): 50 TABLET ORAL at 06:10

## 2022-07-27 RX ADMIN — Medication 40 MILLIEQUIVALENT(S): at 10:03

## 2022-07-27 RX ADMIN — HEPARIN SODIUM 5000 UNIT(S): 5000 INJECTION INTRAVENOUS; SUBCUTANEOUS at 10:06

## 2022-07-27 RX ADMIN — CHLORHEXIDINE GLUCONATE 1 APPLICATION(S): 213 SOLUTION TOPICAL at 10:12

## 2022-07-27 RX ADMIN — FLUDROCORTISONE ACETATE 0.05 MILLIGRAM(S): 0.1 TABLET ORAL at 10:06

## 2022-07-27 RX ADMIN — Medication 40 MILLIGRAM(S): at 06:02

## 2022-07-27 RX ADMIN — Medication 40 MILLIEQUIVALENT(S): at 15:02

## 2022-07-27 RX ADMIN — TRAMADOL HYDROCHLORIDE 50 MILLIGRAM(S): 50 TABLET ORAL at 15:41

## 2022-07-27 RX ADMIN — Medication 10 MILLIGRAM(S): at 10:12

## 2022-07-27 NOTE — DISCHARGE NOTE PROVIDER - NSDCCPCAREPLAN_GEN_ALL_CORE_FT
PRINCIPAL DISCHARGE DIAGNOSIS  Diagnosis: CHF exacerbation  Assessment and Plan of Treatment: Take lasix twice a day instead of once  take 20meq of potassium instead of 10meq  follow up with Dr. Blount 5-7 days      SECONDARY DISCHARGE DIAGNOSES  Diagnosis: Weakness  Assessment and Plan of Treatment: physical therapy  pain management for arthritis, degenerative spine.

## 2022-07-27 NOTE — DISCHARGE NOTE PROVIDER - CARE PROVIDER_API CALL
Kimmy Merida)  [facility]; Cardiovascular Disease  [address] [place] [address]  Phone: [phone]  Fax: [phone]  Follow Up Time: 1 week

## 2022-07-27 NOTE — DISCHARGE NOTE PROVIDER - NSDCFUSCHEDAPPT_GEN_ALL_CORE_FT
Kristie Gong  [facility]  [facility] [id number]  Scheduled Appointment: 10/11/2022

## 2022-07-27 NOTE — DISCHARGE NOTE PROVIDER - HOSPITAL COURSE
Reason for Admission: SOB/Edema  History of Present Illness:   89 y/o F with PMH HTN, TIA, A fib not on AC d/t falls, CKD, lymphoma in remission, CHF, hyponatremia, adrenal insufficiency presents from [facility] with increased SOB/Edema. Admitted for acute CHF exacerbation towards end of June. At that time patient had baseline GREENBERG and fatigue with exertion. However in the last week leading up to admission worsening fatigue and GREENBERG and edema. Unknown if there is weight gain/loss.  Denies fever, chills, syncope, chest pain, nausea, vomiting, Abdominal pain/discomfort.  Recent admission end of June with CHF exacerbation. At discharge increased lasix dose and coreg dosing.   mIn the ER, Tmax 97.6F, HR 56-60, /72, RR 17-20, SpO2 96% on RA (reported SpO2 89-91% on RA in ER documentation). Hgb 10.6 (baseline), Plt 115, K 2.8, Cr 1.52 (baseline 1.1-1.3), BUN 22, BNP 1170.    Hospital course:  Pt admitted and treated for acute respiratory failure due to acute on chronic diastolic CHF.  Pt treated with IV lasix qd with improvement in weight overall.  Over weekend pt started gaining more weight, lasix increase to 40mg IV BID with improvement.  Pt off O2, HD stable, satting well on room air.  EDema and weight slowly improving.  Pt weak, frail, muscle atrophy but declines TANG.  hip pain likely due to degenerative spine and arthritis.    REVIEW OF SYSTEMS: All other review of systems is negative unless indicated above.    Vital Signs Last 24 Hrs  T(C): 36.8 (27 Jul 2022 07:13), Max: 36.9 (26 Jul 2022 19:43)  T(F): 98.2 (27 Jul 2022 07:13), Max: 98.5 (26 Jul 2022 19:43)  HR: 60 (27 Jul 2022 13:26) (60 - 66)  BP: 130/41 (27 Jul 2022 13:26) (106/33 - 158/38)  BP(mean): 66 (27 Jul 2022 06:00) (66 - 71)  RR: 18 (27 Jul 2022 13:26) (18 - 19)  SpO2: 96% (27 Jul 2022 13:26) (93% - 96%)    Parameters below as of 27 Jul 2022 13:26  Patient On (Oxygen Delivery Method): room air    Exam:  Gen: Comfortable appearing  HEENT: NCAT PERRL EOMI MMM clear oropharynx  Neck: Supple, no JVD  Chest: Normal resp effort at rest, lungs CTA B/L  CVS: s1 s2 normal, rrr  Abd: +BS, soft NT ND  Ext: Mild pitting edema in L elbow and B/L LEs. No redness. No induration. No tenderness.  leg edema improving  Skin: Warm, dry, intact  Neuro: A+OX3, no deficits  Mood: Calm, pleasant    med/labs: Reviewed and interpreted     a/p: 89 year old woman with acute on chronic diastolic CHF.    Acute hypoxic respiratory failure: Resolved.  - Likely due to acute on chronic diastolic CHF, see below. Patient gradually improving with IV diuresis, now weaned to room air while at rest.     Acute on chronic diastolic CHF:  - ACS ruled out  - TTE recently done, 6/2022, preserved EF.   - ILR interrogated. No events  - Unclear to what extent patient's home regimen of fludrocortisone has been contributing to her leg and arm swelling, dyspnea on exertion. Reduced fludrocortisone to 0.05mg daily.    - IV lasix increase qd to BID  7/23 due to increase weight gain and worsening leg edema -- continue, pt diuresing well. d/c on oral lasix bid with kcl supplementation upon d/c.  -  possibility amlodipine 10mg contributing though overall this is multifactorial    - Continue carvedilol, losartan, hydralazine  - hyponatremia borderline, chronic     Paroxysmal atrial fibrillation:  - HR controlled on carvedilol and amiodarone. Not on AC due to hx of frequent falls.   - Continue carvedilol and amiodarone    HTN / CKD3: Stable  - Continue current regimen    Adrenal insufficiency  On hydrocortisone and fludrocortisone. This admission, patient's dose of fludrocortisone cut in half to 0.05mg daily to see if helps w/ swelling and dyspnea.   - Continue hydrocortisone 10/5  - Continue fludrocortisone, now 0.05mg daily    L hip / groin discomfort  - Hip and pelvis XRs unremarkable.  - LE venous duplex negative for DVT.   - CT left pelvis noted, degenerative spine, atrophy, edema, no fractures - c/w pain management    Chronic normocytic anemia, thrombocytopenia  Hgb stable, approx at baseline. No leukopenia. Platelets are chronically low ~100. Iron studies from 6/2022 reviewed. Iron 29. TIBC 263. Iron sat 11%. Ferritin 86.  - Continue to monitor, may need Hematology referral upon discharge    d/c to [facility]    Attending Statement: 40 minutes spent on total encounter and discharge planning.

## 2022-07-27 NOTE — DISCHARGE NOTE PROVIDER - NSDCFUADDAPPT_GEN_ALL_CORE_FT
no rashes , no suspicious lesions , no areas of discoloration , no jaundice present , good turgor , no masses , no tenderness on palpation Follow up appointment with Dr. Hwang on 8/28 at 11:15am

## 2022-07-28 RX ORDER — ERYTHROMYCIN 5 MG/G
5 OINTMENT OPHTHALMIC
Qty: 4 | Refills: 0 | Status: COMPLETED | COMMUNITY
Start: 2022-06-09 | End: 2022-07-28

## 2022-07-29 ENCOUNTER — NON-APPOINTMENT (OUTPATIENT)
Age: 87
End: 2022-07-29

## 2022-07-30 DIAGNOSIS — J96.01 ACUTE RESPIRATORY FAILURE WITH HYPOXIA: ICD-10-CM

## 2022-07-30 DIAGNOSIS — N18.30 CHRONIC KIDNEY DISEASE, STAGE 3 UNSPECIFIED: ICD-10-CM

## 2022-07-30 DIAGNOSIS — Z88.0 ALLERGY STATUS TO PENICILLIN: ICD-10-CM

## 2022-07-30 DIAGNOSIS — I48.0 PAROXYSMAL ATRIAL FIBRILLATION: ICD-10-CM

## 2022-07-30 DIAGNOSIS — Z86.73 PERSONAL HISTORY OF TRANSIENT ISCHEMIC ATTACK (TIA), AND CEREBRAL INFARCTION WITHOUT RESIDUAL DEFICITS: ICD-10-CM

## 2022-07-30 DIAGNOSIS — Z88.2 ALLERGY STATUS TO SULFONAMIDES: ICD-10-CM

## 2022-07-30 DIAGNOSIS — I08.1 RHEUMATIC DISORDERS OF BOTH MITRAL AND TRICUSPID VALVES: ICD-10-CM

## 2022-07-30 DIAGNOSIS — E43 UNSPECIFIED SEVERE PROTEIN-CALORIE MALNUTRITION: ICD-10-CM

## 2022-07-30 DIAGNOSIS — E87.1 HYPO-OSMOLALITY AND HYPONATREMIA: ICD-10-CM

## 2022-07-30 DIAGNOSIS — I13.0 HYPERTENSIVE HEART AND CHRONIC KIDNEY DISEASE WITH HEART FAILURE AND STAGE 1 THROUGH STAGE 4 CHRONIC KIDNEY DISEASE, OR UNSPECIFIED CHRONIC KIDNEY DISEASE: ICD-10-CM

## 2022-07-30 DIAGNOSIS — E87.6 HYPOKALEMIA: ICD-10-CM

## 2022-07-30 DIAGNOSIS — C85.90 NON-HODGKIN LYMPHOMA, UNSPECIFIED, UNSPECIFIED SITE: ICD-10-CM

## 2022-07-30 DIAGNOSIS — D69.6 THROMBOCYTOPENIA, UNSPECIFIED: ICD-10-CM

## 2022-07-30 DIAGNOSIS — I50.33 ACUTE ON CHRONIC DIASTOLIC (CONGESTIVE) HEART FAILURE: ICD-10-CM

## 2022-07-30 DIAGNOSIS — E27.40 UNSPECIFIED ADRENOCORTICAL INSUFFICIENCY: ICD-10-CM

## 2022-07-30 DIAGNOSIS — Z79.01 LONG TERM (CURRENT) USE OF ANTICOAGULANTS: ICD-10-CM

## 2022-07-30 DIAGNOSIS — Z90.710 ACQUIRED ABSENCE OF BOTH CERVIX AND UTERUS: ICD-10-CM

## 2022-08-01 ENCOUNTER — FORM ENCOUNTER (OUTPATIENT)
Age: 87
End: 2022-08-01

## 2022-08-03 ENCOUNTER — NON-APPOINTMENT (OUTPATIENT)
Age: 87
End: 2022-08-03

## 2022-08-03 ENCOUNTER — APPOINTMENT (OUTPATIENT)
Dept: INTERNAL MEDICINE | Facility: CLINIC | Age: 87
End: 2022-08-03

## 2022-08-03 VITALS
RESPIRATION RATE: 15 BRPM | WEIGHT: 116 LBS | TEMPERATURE: 97.7 F | HEIGHT: 60 IN | DIASTOLIC BLOOD PRESSURE: 40 MMHG | HEART RATE: 56 BPM | OXYGEN SATURATION: 99 % | SYSTOLIC BLOOD PRESSURE: 122 MMHG | BODY MASS INDEX: 22.78 KG/M2

## 2022-08-03 DIAGNOSIS — E27.40 UNSPECIFIED ADRENOCORTICAL INSUFFICIENCY: ICD-10-CM

## 2022-08-03 DIAGNOSIS — R42 DIZZINESS AND GIDDINESS: ICD-10-CM

## 2022-08-03 DIAGNOSIS — R10.13 EPIGASTRIC PAIN: ICD-10-CM

## 2022-08-03 DIAGNOSIS — C85.90 NON-HODGKIN LYMPHOMA, UNSPECIFIED, UNSPECIFIED SITE: ICD-10-CM

## 2022-08-03 DIAGNOSIS — Z11.1 ENCOUNTER FOR SCREENING FOR RESPIRATORY TUBERCULOSIS: ICD-10-CM

## 2022-08-03 DIAGNOSIS — E87.6 HYPOKALEMIA: ICD-10-CM

## 2022-08-03 PROCEDURE — 99496 TRANSJ CARE MGMT HIGH F2F 7D: CPT | Mod: 25

## 2022-08-03 PROCEDURE — 36415 COLL VENOUS BLD VENIPUNCTURE: CPT

## 2022-08-03 PROCEDURE — 93000 ELECTROCARDIOGRAM COMPLETE: CPT

## 2022-08-04 ENCOUNTER — NON-APPOINTMENT (OUTPATIENT)
Age: 87
End: 2022-08-04

## 2022-08-04 PROBLEM — E87.6 HYPOKALEMIA: Status: ACTIVE | Noted: 2022-07-05

## 2022-08-04 PROBLEM — C85.90 LYMPHOMA: Status: ACTIVE | Noted: 2022-07-05

## 2022-08-04 LAB
ALBUMIN SERPL ELPH-MCNC: 3.3 G/DL
ALP BLD-CCNC: 224 U/L
ALT SERPL-CCNC: 314 U/L
AMYLASE/CREAT SERPL: 71 U/L
ANION GAP SERPL CALC-SCNC: 13 MMOL/L
AST SERPL-CCNC: 304 U/L
BASOPHILS # BLD AUTO: 0.01 K/UL
BASOPHILS NFR BLD AUTO: 0.2 %
BILIRUB SERPL-MCNC: 0.5 MG/DL
BUN SERPL-MCNC: 30 MG/DL
CALCIUM SERPL-MCNC: 9.2 MG/DL
CHLORIDE SERPL-SCNC: 91 MMOL/L
CHOLEST SERPL-MCNC: 203 MG/DL
CO2 SERPL-SCNC: 30 MMOL/L
CREAT SERPL-MCNC: 1.53 MG/DL
EGFR: 31 ML/MIN/1.73M2
EOSINOPHIL # BLD AUTO: 0 K/UL
EOSINOPHIL NFR BLD AUTO: 0 %
FERRITIN SERPL-MCNC: 278 NG/ML
FOLATE SERPL-MCNC: 12.1 NG/ML
GGT SERPL-CCNC: 331 U/L
GLUCOSE SERPL-MCNC: 94 MG/DL
HAV IGM SER QL: NONREACTIVE
HBV CORE IGG+IGM SER QL: REACTIVE
HBV CORE IGM SER QL: NONREACTIVE
HBV SURFACE AB SER QL: REACTIVE
HBV SURFACE AG SER QL: NONREACTIVE
HCT VFR BLD CALC: 30.9 %
HCV AB SER QL: NONREACTIVE
HCV S/CO RATIO: 0.05 S/CO
HDLC SERPL-MCNC: 103 MG/DL
HGB BLD-MCNC: 10 G/DL
IMM GRANULOCYTES NFR BLD AUTO: 0.4 %
IRON SATN MFR SERPL: NORMAL %
IRON SERPL-MCNC: 276 UG/DL
LDH SERPL-CCNC: 239 U/L
LDLC SERPL CALC-MCNC: 87 MG/DL
LPL SERPL-CCNC: 77 U/L
LYMPHOCYTES # BLD AUTO: 1.36 K/UL
LYMPHOCYTES NFR BLD AUTO: 25.9 %
MAGNESIUM SERPL-MCNC: 2.2 MG/DL
MAN DIFF?: NORMAL
MCHC RBC-ENTMCNC: 30.7 PG
MCHC RBC-ENTMCNC: 32.4 GM/DL
MCV RBC AUTO: 94.8 FL
MONOCYTES # BLD AUTO: 0.62 K/UL
MONOCYTES NFR BLD AUTO: 11.8 %
NEUTROPHILS # BLD AUTO: 3.25 K/UL
NEUTROPHILS NFR BLD AUTO: 61.7 %
NONHDLC SERPL-MCNC: 101 MG/DL
PLATELET # BLD AUTO: 184 K/UL
POTASSIUM SERPL-SCNC: 3.6 MMOL/L
PROT SERPL-MCNC: 5.4 G/DL
RBC # BLD: 3.26 M/UL
RBC # FLD: 15.1 %
SODIUM SERPL-SCNC: 133 MMOL/L
T4 FREE SERPL-MCNC: 2.3 NG/DL
TIBC SERPL-MCNC: NORMAL UG/DL
TRIGL SERPL-MCNC: 68 MG/DL
TSH SERPL-ACNC: 2.43 UIU/ML
UIBC SERPL-MCNC: <20 UG/DL
VIT B12 SERPL-MCNC: >2000 PG/ML
WBC # FLD AUTO: 5.26 K/UL

## 2022-08-04 NOTE — REVIEW OF SYSTEMS
[Nausea] : nausea [Negative] : Neurological [Fever] : no fever [Chills] : no chills [Fatigue] : no fatigue [Chest Pain] : no chest pain [Palpitations] : no palpitations [Shortness Of Breath] : no shortness of breath [Wheezing] : no wheezing [Cough] : no cough [Dyspnea on Exertion] : no dyspnea on exertion [Diarrhea] : diarrhea [Vomiting] : no vomiting [Heartburn] : no heartburn [FreeTextEntry2] : Weight loss [FreeTextEntry5] : Mild lower extremity edema bilaterally [FreeTextEntry7] : See HPI [de-identified] : Her daughter-in-law feels that she might be depressed because she does not like to do things that she used to like to do and does not like leaving her room very much

## 2022-08-04 NOTE — ASSESSMENT
[FreeTextEntry1] : \par Congestive heart failure\par -She had recent hospitalization for CHF exacerbation\par -She currently denies chest pain or shortness of breath\par -She currently has mild bilateral lower extremity edema which could be due to CHF versus amlodipine\par -She should adhere to a low-salt diet\par -Check labs\par -She has appointment to see cardiologist in 2 days\par -She is currently on amiodarone, amlodipine, Coreg, furosemide, hydralazine, labetalol, losartan\par \par Hypertension\par -BP today 122/40\par -Her diastolic BP is low and hypotension could be contributing to dizziness\par -I have advised that she decrease her amlodipine to 5 mg daily\par -She will be seeing cardiologist in 2 days for BP check\par -Check labs\par \par Atrial fibrillation/history of TIA\par -She is currently on Coreg and amiodarone\par -She is not on anticoagulation due to history of falls\par -She has appointment to see cardiologist in 2 days\par \par Dizziness\par -May be due to hypotension and amlodipine dose has been decreased\par -Check labs\par -EKG today sinus bradycardia at 54, LAE, Q waves in V1 through V4-no significant change compared to her hospital EKG\par -She has appointment to see cardiologist in 2 days\par -If her symptoms worsen she should go to the emergency room\par \par Epigastric pain/dysphagia/GERD\par -Etiology of symptoms not entirely clear\par -Unclear if she is taking her famotidine every day\par -I have advised that she take famotidine regularly as GERD/gastritis could be contributing\par -Check abdominal ultrasound\par -Check labs\par -She had a chest x-ray in the hospital which was essentially normal\par -I advised her that if her symptoms persisted that she may need to see GI as EGD may be warranted for work-up of symptoms/dysphagia-she is 89 years old so any procedure might have increased risks\par \par CKD\par -Her last creatinine was 1.40\par -Check labs\par -She is on losartan\par \par History of lymphoma\par -In remission\par -Check labs\par \par Adrenal insufficiency\par -She is on fludrocortisone 0.5 mg half tab daily and hydrocortisone 10 mg in the a.m. and 5 mg in the p.m.\par -She may benefit from seeing endocrinologist at some point\par -Check labs\par \par Anemia/thrombocytopenia\par -Her last hemoglobin was 9.1\par -Her last platelet count was 10.7\par -Check labs\par \par Left hip pain\par -Uses tramadol or Tylenol as needed\par \par Malnutrition\par -Her daughter-in-law reports that she has lost some weight because she does not like food at the current assisted living facility\par -Check labs\par \par Hyponatremia\par -Last sodium was 130\par -Check labs\par \par Hypokalemia\par -She is on potassium supplementation\par -Check labs\par \par Unsteady gait\par -Uses rolling walker and transport chair\par \par Hearing loss\par -She uses hearing aids\par \par ?  Depression/anhedonia\par -Daughter in law feels that there may be some depression\par -We will monitor for now\par -Check labs\par \par Possible small hypertensive hemorrhage seen on CNS imaging showing 6/2022\par -I spoke with daughter-in-law who states this was seen during hospitalization and she was evaluated by palliative care.  No follow-up MRI or work-up was recommended by palliative care and she states family was in agreement\par \par HCM\par \par Depression screening: Due 9 score 87/5/2022\par \par EKG 8/3/2022\par \par Flu shot: 10/2021\par \par Tdap: 7/2021\par \par Pneumovax: 2018 approximately\par \par Prevnar 20: will discuss at next visit\par \par Shingles vaccine: 2019\par \par COVID-vaccine: Pfizer x4\par \par She is DNR/DNI per daughter-in-law Samira.  She states they have MOLST form at home\par \par Follow-up 6 weeks for BP check.  Labs drawn in office today.  She needs QuantiFERON TB test for her assisted living facility form.  Will complete form after lab review\par \par \par \par

## 2022-08-04 NOTE — HISTORY OF PRESENT ILLNESS
[Post-hospitalization from ___ Hospital] : Post-hospitalization from [unfilled] Hospital [Admitted on: ___] : The patient was admitted on [unfilled] [Discharged on ___] : discharged on [unfilled] [Discharge Summary] : discharge summary [Pertinent Labs] : pertinent labs [Radiology Findings] : radiology findings [Discharge Med List] : discharge medication list [Med Reconciliation] : medication reconciliation has been completed [Patient Contacted By: ____] : and contacted by [unfilled] [FreeTextEntry2] : Nesha is here today with her daughter-in-law Samira to follow-up after recent hospitalization at [facility] from 7/18/2022 through 7/27/2022.  She presented to the emergency room from assisted living facility with increased shortness of breath and edema.  She was diagnosed with acute respiratory failure due to CHF exacerbation.  She was treated with IV Lasix.\par \par She reports since discharge she has been having some mild dizziness for the past week.  She denies any chest pain, shortness of breath, palpitations\par \par She reports over the past year she has had epigastric pain.  She states over the past week symptoms have gotten worse.  She reports some nausea but no vomiting.  No diarrhea reported.  No fever/chills reported.  She reports that when she tries to eat solid food she feels like it gets stuck in her epigastric area.  She states she has no difficulty with liquids.  She has had some weight loss recently but her daughter-in-law attributes this to her not liking the food at her current assisted living facility\par \par She needs a form filled out and she will be transferring to a new assisted living facility next week\par \par

## 2022-08-04 NOTE — PHYSICAL EXAM
[No Acute Distress] : no acute distress [Normal Sclera/Conjunctiva] : normal sclera/conjunctiva [PERRL] : pupils equal round and reactive to light [EOMI] : extraocular movements intact [Normal Oropharynx] : the oropharynx was normal [Normal] : normal rate, regular rhythm, normal S1 and S2 and no murmur heard [No Carotid Bruits] : no carotid bruits [Soft] : abdomen soft [Non Tender] : non-tender [Non-distended] : non-distended [No Masses] : no abdominal mass palpated [No HSM] : no HSM [Normal Bowel Sounds] : normal bowel sounds [No Rash] : no rash [No Skin Lesions] : no skin lesions [No Focal Deficits] : no focal deficits [de-identified] : Elderly female, sitting in chair, answers questions appropriately [de-identified] : Trace edema lower extremities bilaterally, no calf tenderness [de-identified] : Answers questions appropriately, mildly depressed mood

## 2022-08-05 ENCOUNTER — APPOINTMENT (OUTPATIENT)
Dept: ULTRASOUND IMAGING | Facility: CLINIC | Age: 87
End: 2022-08-05

## 2022-08-05 ENCOUNTER — OUTPATIENT (OUTPATIENT)
Dept: OUTPATIENT SERVICES | Facility: HOSPITAL | Age: 87
LOS: 1 days | End: 2022-08-05
Payer: MEDICARE

## 2022-08-05 DIAGNOSIS — Z86.79 PERSONAL HISTORY OF OTHER DISEASES OF THE CIRCULATORY SYSTEM: Chronic | ICD-10-CM

## 2022-08-05 DIAGNOSIS — Z00.8 ENCOUNTER FOR OTHER GENERAL EXAMINATION: ICD-10-CM

## 2022-08-05 DIAGNOSIS — Z90.49 ACQUIRED ABSENCE OF OTHER SPECIFIED PARTS OF DIGESTIVE TRACT: Chronic | ICD-10-CM

## 2022-08-05 DIAGNOSIS — Z90.710 ACQUIRED ABSENCE OF BOTH CERVIX AND UTERUS: Chronic | ICD-10-CM

## 2022-08-05 PROCEDURE — 76700 US EXAM ABDOM COMPLETE: CPT

## 2022-08-05 PROCEDURE — 76700 US EXAM ABDOM COMPLETE: CPT | Mod: 26

## 2022-08-08 ENCOUNTER — NON-APPOINTMENT (OUTPATIENT)
Age: 87
End: 2022-08-08

## 2022-08-08 DIAGNOSIS — K59.00 CONSTIPATION, UNSPECIFIED: ICD-10-CM

## 2022-08-08 DIAGNOSIS — M25.559 PAIN IN UNSPECIFIED HIP: ICD-10-CM

## 2022-08-08 DIAGNOSIS — G47.00 INSOMNIA, UNSPECIFIED: ICD-10-CM

## 2022-08-08 LAB
APPEARANCE: CLEAR
BACTERIA: NEGATIVE
BILIRUBIN URINE: NEGATIVE
BLOOD URINE: NEGATIVE
COLOR: NORMAL
CREAT SPEC-SCNC: 23 MG/DL
CREAT/PROT UR: 1.9 RATIO
GLUCOSE QUALITATIVE U: NEGATIVE
HYALINE CASTS: 1 /LPF
KETONES URINE: NEGATIVE
LEUKOCYTE ESTERASE URINE: NEGATIVE
M TB IFN-G BLD-IMP: NEGATIVE
MICROSCOPIC-UA: NORMAL
NITRITE URINE: NEGATIVE
PH URINE: 7
PROT UR-MCNC: 42 MG/DL
PROTEIN URINE: ABNORMAL
QUANTIFERON TB PLUS MITOGEN MINUS NIL: 5.71 IU/ML
QUANTIFERON TB PLUS NIL: 0.04 IU/ML
QUANTIFERON TB PLUS TB1 MINUS NIL: -0.01 IU/ML
QUANTIFERON TB PLUS TB2 MINUS NIL: -0.01 IU/ML
RED BLOOD CELLS URINE: 1 /HPF
SPECIFIC GRAVITY URINE: 1.01
SQUAMOUS EPITHELIAL CELLS: 1 /HPF
UROBILINOGEN URINE: NORMAL
WHITE BLOOD CELLS URINE: 1 /HPF

## 2022-08-08 RX ORDER — AMIODARONE HYDROCHLORIDE 200 MG/1
200 TABLET ORAL DAILY
Qty: 90 | Refills: 0 | Status: ACTIVE | COMMUNITY
Start: 2022-05-18 | End: 1900-01-01

## 2022-08-08 RX ORDER — HYDROCORTISONE 5 MG/1
5 TABLET ORAL
Qty: 90 | Refills: 0 | Status: ACTIVE | COMMUNITY
Start: 2022-07-08 | End: 1900-01-01

## 2022-08-08 RX ORDER — GLUCOSAMINE HCL/CHONDROITIN SU 500-400 MG
3 CAPSULE ORAL
Qty: 90 | Refills: 0 | Status: ACTIVE | COMMUNITY
Start: 2022-05-17 | End: 1900-01-01

## 2022-08-08 RX ORDER — CARVEDILOL 25 MG/1
25 TABLET, FILM COATED ORAL
Qty: 180 | Refills: 0 | Status: ACTIVE | COMMUNITY
Start: 2022-06-30 | End: 1900-01-01

## 2022-08-08 RX ORDER — CHOLECALCIFEROL (VITAMIN D3) 50 MCG
50 MCG TABLET ORAL
Qty: 90 | Refills: 0 | Status: ACTIVE | COMMUNITY
Start: 2022-05-18 | End: 1900-01-01

## 2022-08-08 RX ORDER — HYDROCORTISONE 10 MG/1
10 TABLET ORAL
Qty: 90 | Refills: 0 | Status: ACTIVE | COMMUNITY
Start: 2022-07-08 | End: 1900-01-01

## 2022-08-08 RX ORDER — FLUDROCORTISONE ACETATE 0.1 MG/1
0.1 TABLET ORAL DAILY
Qty: 45 | Refills: 0 | Status: ACTIVE | COMMUNITY
Start: 2022-05-17 | End: 1900-01-01

## 2022-08-11 ENCOUNTER — APPOINTMENT (OUTPATIENT)
Dept: ULTRASOUND IMAGING | Facility: CLINIC | Age: 87
End: 2022-08-11

## 2022-08-16 ENCOUNTER — NON-APPOINTMENT (OUTPATIENT)
Age: 87
End: 2022-08-16

## 2022-08-17 ENCOUNTER — NON-APPOINTMENT (OUTPATIENT)
Age: 87
End: 2022-08-17

## 2022-08-23 ENCOUNTER — NON-APPOINTMENT (OUTPATIENT)
Age: 87
End: 2022-08-23

## 2022-09-05 ENCOUNTER — INPATIENT (INPATIENT)
Facility: HOSPITAL | Age: 87
LOS: 2 days | Discharge: NURSING, REHAB OR LONG-TERM CARE | DRG: 522 | End: 2022-09-08
Attending: ORTHOPAEDIC SURGERY | Admitting: FAMILY MEDICINE
Payer: MEDICARE

## 2022-09-05 VITALS
HEIGHT: 60 IN | DIASTOLIC BLOOD PRESSURE: 93 MMHG | SYSTOLIC BLOOD PRESSURE: 138 MMHG | HEART RATE: 55 BPM | TEMPERATURE: 98 F | OXYGEN SATURATION: 97 % | WEIGHT: 108.03 LBS | RESPIRATION RATE: 18 BRPM

## 2022-09-05 DIAGNOSIS — S72.001A FRACTURE OF UNSPECIFIED PART OF NECK OF RIGHT FEMUR, INITIAL ENCOUNTER FOR CLOSED FRACTURE: ICD-10-CM

## 2022-09-05 DIAGNOSIS — Z90.49 ACQUIRED ABSENCE OF OTHER SPECIFIED PARTS OF DIGESTIVE TRACT: Chronic | ICD-10-CM

## 2022-09-05 DIAGNOSIS — Z90.710 ACQUIRED ABSENCE OF BOTH CERVIX AND UTERUS: Chronic | ICD-10-CM

## 2022-09-05 DIAGNOSIS — S72.009A FRACTURE OF UNSPECIFIED PART OF NECK OF UNSPECIFIED FEMUR, INITIAL ENCOUNTER FOR CLOSED FRACTURE: ICD-10-CM

## 2022-09-05 DIAGNOSIS — Z86.79 PERSONAL HISTORY OF OTHER DISEASES OF THE CIRCULATORY SYSTEM: Chronic | ICD-10-CM

## 2022-09-05 LAB
ALBUMIN SERPL ELPH-MCNC: 3.2 G/DL — LOW (ref 3.3–5)
ALP SERPL-CCNC: 118 U/L — SIGNIFICANT CHANGE UP (ref 40–120)
ALT FLD-CCNC: 42 U/L — SIGNIFICANT CHANGE UP (ref 12–78)
ANION GAP SERPL CALC-SCNC: 7 MMOL/L — SIGNIFICANT CHANGE UP (ref 5–17)
APPEARANCE UR: CLEAR — SIGNIFICANT CHANGE UP
APTT BLD: 27.8 SEC — SIGNIFICANT CHANGE UP (ref 27.5–35.5)
AST SERPL-CCNC: 41 U/L — HIGH (ref 15–37)
BASOPHILS # BLD AUTO: 0.01 K/UL — SIGNIFICANT CHANGE UP (ref 0–0.2)
BASOPHILS NFR BLD AUTO: 0.2 % — SIGNIFICANT CHANGE UP (ref 0–2)
BILIRUB SERPL-MCNC: 0.3 MG/DL — SIGNIFICANT CHANGE UP (ref 0.2–1.2)
BILIRUB UR-MCNC: NEGATIVE — SIGNIFICANT CHANGE UP
BUN SERPL-MCNC: 37 MG/DL — HIGH (ref 7–23)
CALCIUM SERPL-MCNC: 9.2 MG/DL — SIGNIFICANT CHANGE UP (ref 8.5–10.1)
CHLORIDE SERPL-SCNC: 99 MMOL/L — SIGNIFICANT CHANGE UP (ref 96–108)
CO2 SERPL-SCNC: 31 MMOL/L — SIGNIFICANT CHANGE UP (ref 22–31)
COLOR SPEC: YELLOW — SIGNIFICANT CHANGE UP
CREAT SERPL-MCNC: 1.96 MG/DL — HIGH (ref 0.5–1.3)
DIFF PNL FLD: NEGATIVE — SIGNIFICANT CHANGE UP
EGFR: 23 ML/MIN/1.73M2 — LOW
EOSINOPHIL # BLD AUTO: 0.01 K/UL — SIGNIFICANT CHANGE UP (ref 0–0.5)
EOSINOPHIL NFR BLD AUTO: 0.2 % — SIGNIFICANT CHANGE UP (ref 0–6)
GLUCOSE SERPL-MCNC: 123 MG/DL — HIGH (ref 70–99)
GLUCOSE UR QL: NEGATIVE — SIGNIFICANT CHANGE UP
HCT VFR BLD CALC: 37.9 % — SIGNIFICANT CHANGE UP (ref 34.5–45)
HGB BLD-MCNC: 12.3 G/DL — SIGNIFICANT CHANGE UP (ref 11.5–15.5)
IMM GRANULOCYTES NFR BLD AUTO: 0.2 % — SIGNIFICANT CHANGE UP (ref 0–1.5)
INR BLD: 0.91 RATIO — SIGNIFICANT CHANGE UP (ref 0.88–1.16)
KETONES UR-MCNC: NEGATIVE — SIGNIFICANT CHANGE UP
LEUKOCYTE ESTERASE UR-ACNC: NEGATIVE — SIGNIFICANT CHANGE UP
LYMPHOCYTES # BLD AUTO: 2.19 K/UL — SIGNIFICANT CHANGE UP (ref 1–3.3)
LYMPHOCYTES # BLD AUTO: 42.4 % — SIGNIFICANT CHANGE UP (ref 13–44)
MCHC RBC-ENTMCNC: 29.9 PG — SIGNIFICANT CHANGE UP (ref 27–34)
MCHC RBC-ENTMCNC: 32.5 GM/DL — SIGNIFICANT CHANGE UP (ref 32–36)
MCV RBC AUTO: 92.2 FL — SIGNIFICANT CHANGE UP (ref 80–100)
MONOCYTES # BLD AUTO: 0.48 K/UL — SIGNIFICANT CHANGE UP (ref 0–0.9)
MONOCYTES NFR BLD AUTO: 9.3 % — SIGNIFICANT CHANGE UP (ref 2–14)
NEUTROPHILS # BLD AUTO: 2.46 K/UL — SIGNIFICANT CHANGE UP (ref 1.8–7.4)
NEUTROPHILS NFR BLD AUTO: 47.7 % — SIGNIFICANT CHANGE UP (ref 43–77)
NITRITE UR-MCNC: NEGATIVE — SIGNIFICANT CHANGE UP
PH UR: 7 — SIGNIFICANT CHANGE UP (ref 5–8)
PLATELET # BLD AUTO: 124 K/UL — LOW (ref 150–400)
POTASSIUM SERPL-MCNC: 3.6 MMOL/L — SIGNIFICANT CHANGE UP (ref 3.5–5.3)
POTASSIUM SERPL-SCNC: 3.6 MMOL/L — SIGNIFICANT CHANGE UP (ref 3.5–5.3)
PROT SERPL-MCNC: 6.7 GM/DL — SIGNIFICANT CHANGE UP (ref 6–8.3)
PROT UR-MCNC: 30 MG/DL
PROTHROM AB SERPL-ACNC: 10.5 SEC — SIGNIFICANT CHANGE UP (ref 10.5–13.4)
RBC # BLD: 4.11 M/UL — SIGNIFICANT CHANGE UP (ref 3.8–5.2)
RBC # FLD: 16.3 % — HIGH (ref 10.3–14.5)
SARS-COV-2 RNA SPEC QL NAA+PROBE: SIGNIFICANT CHANGE UP
SODIUM SERPL-SCNC: 137 MMOL/L — SIGNIFICANT CHANGE UP (ref 135–145)
SP GR SPEC: 1.01 — SIGNIFICANT CHANGE UP (ref 1.01–1.02)
TROPONIN I, HIGH SENSITIVITY RESULT: 16.81 NG/L — SIGNIFICANT CHANGE UP
UROBILINOGEN FLD QL: NEGATIVE — SIGNIFICANT CHANGE UP
WBC # BLD: 5.16 K/UL — SIGNIFICANT CHANGE UP (ref 3.8–10.5)
WBC # FLD AUTO: 5.16 K/UL — SIGNIFICANT CHANGE UP (ref 3.8–10.5)

## 2022-09-05 PROCEDURE — 88311 DECALCIFY TISSUE: CPT

## 2022-09-05 PROCEDURE — 93306 TTE W/DOPPLER COMPLETE: CPT

## 2022-09-05 PROCEDURE — 97116 GAIT TRAINING THERAPY: CPT | Mod: GP

## 2022-09-05 PROCEDURE — 84132 ASSAY OF SERUM POTASSIUM: CPT

## 2022-09-05 PROCEDURE — 73700 CT LOWER EXTREMITY W/O DYE: CPT | Mod: 26,RT

## 2022-09-05 PROCEDURE — 71045 X-RAY EXAM CHEST 1 VIEW: CPT | Mod: 26

## 2022-09-05 PROCEDURE — 87086 URINE CULTURE/COLONY COUNT: CPT

## 2022-09-05 PROCEDURE — 85610 PROTHROMBIN TIME: CPT

## 2022-09-05 PROCEDURE — 73552 X-RAY EXAM OF FEMUR 2/>: CPT | Mod: 26,RT

## 2022-09-05 PROCEDURE — 88305 TISSUE EXAM BY PATHOLOGIST: CPT

## 2022-09-05 PROCEDURE — 85025 COMPLETE CBC W/AUTO DIFF WBC: CPT

## 2022-09-05 PROCEDURE — 80048 BASIC METABOLIC PNL TOTAL CA: CPT

## 2022-09-05 PROCEDURE — 85730 THROMBOPLASTIN TIME PARTIAL: CPT

## 2022-09-05 PROCEDURE — 76376 3D RENDER W/INTRP POSTPROCES: CPT

## 2022-09-05 PROCEDURE — 97530 THERAPEUTIC ACTIVITIES: CPT | Mod: GP

## 2022-09-05 PROCEDURE — 73502 X-RAY EXAM HIP UNI 2-3 VIEWS: CPT | Mod: 26,RT

## 2022-09-05 PROCEDURE — 86923 COMPATIBILITY TEST ELECTRIC: CPT

## 2022-09-05 PROCEDURE — 99285 EMERGENCY DEPT VISIT HI MDM: CPT

## 2022-09-05 PROCEDURE — 70450 CT HEAD/BRAIN W/O DYE: CPT | Mod: 26,MA

## 2022-09-05 PROCEDURE — 82962 GLUCOSE BLOOD TEST: CPT

## 2022-09-05 PROCEDURE — 97164 PT RE-EVAL EST PLAN CARE: CPT | Mod: GP

## 2022-09-05 PROCEDURE — 87635 SARS-COV-2 COVID-19 AMP PRB: CPT

## 2022-09-05 PROCEDURE — 81001 URINALYSIS AUTO W/SCOPE: CPT

## 2022-09-05 PROCEDURE — 73700 CT LOWER EXTREMITY W/O DYE: CPT | Mod: RT

## 2022-09-05 PROCEDURE — 73501 X-RAY EXAM HIP UNI 1 VIEW: CPT | Mod: RT

## 2022-09-05 PROCEDURE — 93005 ELECTROCARDIOGRAM TRACING: CPT

## 2022-09-05 PROCEDURE — 99221 1ST HOSP IP/OBS SF/LOW 40: CPT

## 2022-09-05 PROCEDURE — C1776: CPT

## 2022-09-05 PROCEDURE — 93970 EXTREMITY STUDY: CPT

## 2022-09-05 PROCEDURE — 80053 COMPREHEN METABOLIC PANEL: CPT

## 2022-09-05 PROCEDURE — 97162 PT EVAL MOD COMPLEX 30 MIN: CPT | Mod: GP

## 2022-09-05 PROCEDURE — 76376 3D RENDER W/INTRP POSTPROCES: CPT | Mod: 26

## 2022-09-05 PROCEDURE — 85027 COMPLETE CBC AUTOMATED: CPT

## 2022-09-05 PROCEDURE — 36415 COLL VENOUS BLD VENIPUNCTURE: CPT

## 2022-09-05 PROCEDURE — 99223 1ST HOSP IP/OBS HIGH 75: CPT

## 2022-09-05 PROCEDURE — 93010 ELECTROCARDIOGRAM REPORT: CPT

## 2022-09-05 PROCEDURE — 73562 X-RAY EXAM OF KNEE 3: CPT | Mod: 26,RT

## 2022-09-05 RX ORDER — HYDROCORTISONE 20 MG
1 TABLET ORAL
Qty: 0 | Refills: 0 | DISCHARGE

## 2022-09-05 RX ORDER — FAMOTIDINE 10 MG/ML
20 INJECTION INTRAVENOUS DAILY
Refills: 0 | Status: DISCONTINUED | OUTPATIENT
Start: 2022-09-05 | End: 2022-09-08

## 2022-09-05 RX ORDER — POTASSIUM CHLORIDE 20 MEQ
2 PACKET (EA) ORAL
Qty: 0 | Refills: 0 | DISCHARGE

## 2022-09-05 RX ORDER — MORPHINE SULFATE 50 MG/1
2 CAPSULE, EXTENDED RELEASE ORAL ONCE
Refills: 0 | Status: DISCONTINUED | OUTPATIENT
Start: 2022-09-05 | End: 2022-09-05

## 2022-09-05 RX ORDER — LOSARTAN POTASSIUM 100 MG/1
100 TABLET, FILM COATED ORAL DAILY
Refills: 0 | Status: DISCONTINUED | OUTPATIENT
Start: 2022-09-05 | End: 2022-09-08

## 2022-09-05 RX ORDER — ACETAMINOPHEN 500 MG
650 TABLET ORAL EVERY 6 HOURS
Refills: 0 | Status: DISCONTINUED | OUTPATIENT
Start: 2022-09-05 | End: 2022-09-08

## 2022-09-05 RX ORDER — METHENAMINE, SODIUM SALICYLATE 162; 162.5 MG/1; MG/1
1 TABLET ORAL
Qty: 0 | Refills: 0 | DISCHARGE

## 2022-09-05 RX ORDER — ACETAMINOPHEN 500 MG
725 TABLET ORAL ONCE
Refills: 0 | Status: COMPLETED | OUTPATIENT
Start: 2022-09-05 | End: 2022-09-05

## 2022-09-05 RX ORDER — FLUDROCORTISONE ACETATE 0.1 MG/1
0.1 TABLET ORAL DAILY
Refills: 0 | Status: DISCONTINUED | OUTPATIENT
Start: 2022-09-05 | End: 2022-09-08

## 2022-09-05 RX ORDER — FUROSEMIDE 40 MG
40 TABLET ORAL
Refills: 0 | Status: DISCONTINUED | OUTPATIENT
Start: 2022-09-05 | End: 2022-09-05

## 2022-09-05 RX ORDER — HYDRALAZINE HCL 50 MG
100 TABLET ORAL THREE TIMES A DAY
Refills: 0 | Status: DISCONTINUED | OUTPATIENT
Start: 2022-09-05 | End: 2022-09-08

## 2022-09-05 RX ORDER — POLYETHYLENE GLYCOL 3350 17 G/17G
17 POWDER, FOR SOLUTION ORAL DAILY
Refills: 0 | Status: DISCONTINUED | OUTPATIENT
Start: 2022-09-05 | End: 2022-09-08

## 2022-09-05 RX ORDER — FERROUS SULFATE 325(65) MG
325 TABLET ORAL DAILY
Refills: 0 | Status: DISCONTINUED | OUTPATIENT
Start: 2022-09-05 | End: 2022-09-08

## 2022-09-05 RX ORDER — CHOLECALCIFEROL (VITAMIN D3) 125 MCG
2000 CAPSULE ORAL DAILY
Refills: 0 | Status: DISCONTINUED | OUTPATIENT
Start: 2022-09-05 | End: 2022-09-08

## 2022-09-05 RX ORDER — LANOLIN ALCOHOL/MO/W.PET/CERES
3 CREAM (GRAM) TOPICAL AT BEDTIME
Refills: 0 | Status: DISCONTINUED | OUTPATIENT
Start: 2022-09-05 | End: 2022-09-08

## 2022-09-05 RX ORDER — AMIODARONE HYDROCHLORIDE 400 MG/1
200 TABLET ORAL DAILY
Refills: 0 | Status: DISCONTINUED | OUTPATIENT
Start: 2022-09-05 | End: 2022-09-08

## 2022-09-05 RX ORDER — SODIUM CHLORIDE 9 MG/ML
1000 INJECTION, SOLUTION INTRAVENOUS
Refills: 0 | Status: DISCONTINUED | OUTPATIENT
Start: 2022-09-05 | End: 2022-09-06

## 2022-09-05 RX ORDER — HYDROCORTISONE 20 MG
5 TABLET ORAL DAILY
Refills: 0 | Status: DISCONTINUED | OUTPATIENT
Start: 2022-09-05 | End: 2022-09-06

## 2022-09-05 RX ORDER — TRAMADOL HYDROCHLORIDE 50 MG/1
50 TABLET ORAL EVERY 8 HOURS
Refills: 0 | Status: DISCONTINUED | OUTPATIENT
Start: 2022-09-05 | End: 2022-09-08

## 2022-09-05 RX ORDER — TRANEXAMIC ACID 100 MG/ML
1000 INJECTION, SOLUTION INTRAVENOUS ONCE
Refills: 0 | Status: DISCONTINUED | OUTPATIENT
Start: 2022-09-05 | End: 2022-09-08

## 2022-09-05 RX ORDER — SENNA PLUS 8.6 MG/1
2 TABLET ORAL DAILY
Refills: 0 | Status: DISCONTINUED | OUTPATIENT
Start: 2022-09-05 | End: 2022-09-08

## 2022-09-05 RX ORDER — SODIUM CHLORIDE 9 MG/ML
500 INJECTION INTRAMUSCULAR; INTRAVENOUS; SUBCUTANEOUS ONCE
Refills: 0 | Status: COMPLETED | OUTPATIENT
Start: 2022-09-05 | End: 2022-09-05

## 2022-09-05 RX ORDER — HEPARIN SODIUM 5000 [USP'U]/ML
5000 INJECTION INTRAVENOUS; SUBCUTANEOUS EVERY 12 HOURS
Refills: 0 | Status: COMPLETED | OUTPATIENT
Start: 2022-09-05 | End: 2022-09-05

## 2022-09-05 RX ORDER — AMLODIPINE BESYLATE 2.5 MG/1
5 TABLET ORAL DAILY
Refills: 0 | Status: DISCONTINUED | OUTPATIENT
Start: 2022-09-05 | End: 2022-09-08

## 2022-09-05 RX ORDER — ONDANSETRON 8 MG/1
4 TABLET, FILM COATED ORAL EVERY 8 HOURS
Refills: 0 | Status: DISCONTINUED | OUTPATIENT
Start: 2022-09-05 | End: 2022-09-08

## 2022-09-05 RX ORDER — SODIUM CHLORIDE 9 MG/ML
500 INJECTION INTRAMUSCULAR; INTRAVENOUS; SUBCUTANEOUS ONCE
Refills: 0 | Status: DISCONTINUED | OUTPATIENT
Start: 2022-09-05 | End: 2022-09-08

## 2022-09-05 RX ORDER — POTASSIUM CHLORIDE 20 MEQ
20 PACKET (EA) ORAL DAILY
Refills: 0 | Status: DISCONTINUED | OUTPATIENT
Start: 2022-09-05 | End: 2022-09-08

## 2022-09-05 RX ORDER — ATORVASTATIN CALCIUM 80 MG/1
10 TABLET, FILM COATED ORAL AT BEDTIME
Refills: 0 | Status: DISCONTINUED | OUTPATIENT
Start: 2022-09-05 | End: 2022-09-08

## 2022-09-05 RX ORDER — CARVEDILOL PHOSPHATE 80 MG/1
25 CAPSULE, EXTENDED RELEASE ORAL EVERY 12 HOURS
Refills: 0 | Status: DISCONTINUED | OUTPATIENT
Start: 2022-09-05 | End: 2022-09-08

## 2022-09-05 RX ORDER — MORPHINE SULFATE 50 MG/1
2 CAPSULE, EXTENDED RELEASE ORAL EVERY 4 HOURS
Refills: 0 | Status: DISCONTINUED | OUTPATIENT
Start: 2022-09-05 | End: 2022-09-08

## 2022-09-05 RX ORDER — PREGABALIN 225 MG/1
1000 CAPSULE ORAL DAILY
Refills: 0 | Status: DISCONTINUED | OUTPATIENT
Start: 2022-09-05 | End: 2022-09-08

## 2022-09-05 RX ORDER — PITAVASTATIN CALCIUM 1.04 MG/1
1 TABLET, FILM COATED ORAL
Qty: 0 | Refills: 0 | DISCHARGE

## 2022-09-05 RX ADMIN — Medication 100 MILLIGRAM(S): at 22:51

## 2022-09-05 RX ADMIN — CARVEDILOL PHOSPHATE 25 MILLIGRAM(S): 80 CAPSULE, EXTENDED RELEASE ORAL at 22:52

## 2022-09-05 RX ADMIN — Medication 290 MILLIGRAM(S): at 19:30

## 2022-09-05 RX ADMIN — HEPARIN SODIUM 5000 UNIT(S): 5000 INJECTION INTRAVENOUS; SUBCUTANEOUS at 22:51

## 2022-09-05 RX ADMIN — MORPHINE SULFATE 2 MILLIGRAM(S): 50 CAPSULE, EXTENDED RELEASE ORAL at 21:53

## 2022-09-05 RX ADMIN — MORPHINE SULFATE 2 MILLIGRAM(S): 50 CAPSULE, EXTENDED RELEASE ORAL at 17:28

## 2022-09-05 RX ADMIN — SODIUM CHLORIDE 500 MILLILITER(S): 9 INJECTION INTRAMUSCULAR; INTRAVENOUS; SUBCUTANEOUS at 19:34

## 2022-09-05 RX ADMIN — ATORVASTATIN CALCIUM 10 MILLIGRAM(S): 80 TABLET, FILM COATED ORAL at 22:51

## 2022-09-05 NOTE — CONSULT NOTE ADULT - SUBJECTIVE AND OBJECTIVE BOX
Orthopedics Consult Note:    This is a 89y Female who presents to the ED today with c/o right hip pain and inability to bear weight s/p fall today. Pt was in the garden at [place] where she is a resident when she became dizzy lightheaded after getting up and walkihng and subsequently fell. Pt denies any other injuries. Pt denies head trauma or LOC. Pt denies any numbness, tingling or paresthesias. Pt is a community ambulator without use of any assistive devices at baseline.    Past Medical & Surgical History:  Fracture of unspecified part of neck of unspecified femur, initial encounter for closed fracture    No known problems (Father, Mother)    MEWS Score    Prior    Iron deficiency    HTN (hypertension)    Lymphoma    H/O adrenal insufficiency    Hyponatremia    Hypokalemia    Chronic kidney disease, unspecified CKD stage    Diastolic heart failure    Paroxysmal atrial fibrillation    Iron deficiency    HTN (hypertension)    Lymphoma    Hip fracture    No significant past surgical history    S/P appendectomy    S/P hysterectomy    H/O intracranial hemorrhage    FALL    39    HI (acute kidney injury)    Near syncope    SysAdmin_VisitLink        Allergies:  penicillin (Hives)  sulfa drugs (Hives)  tetracycline (Hives)      Vital Signs:  T(C): 36.8 (09-05-22 @ 16:02), Max: 36.8 (09-05-22 @ 16:02)  HR: 55 (09-05-22 @ 16:02) (55 - 55)  BP: 138/93 (09-05-22 @ 16:02) (138/93 - 138/93)  RR: 18 (09-05-22 @ 16:02) (18 - 18)  SpO2: 97% (09-05-22 @ 16:02) (97% - 97%)    Labs:  CBC ( 09-05-22 @ 17:16 )                   12.3  5.16 )-----------( 124                37.9      Chem ( 09-05-22 @ 17:16 )  137  |  99  |  37  ----------------------------<  123  3.6   |  31  |  1.96      PT/INR ( 09-05-22 @ 17:16 )   PT: 10.5;   INR: 0.91      PTT ( 09-05-22 @ 17:16 )  PTT: 27.8      Imaging:  X-rays of the pelvis, right hip and femur demonstrate a femoral neck hip fracture.    PE right hip:  RLE is short and ER. +mild swelling, no ecchymosis, no erythema, skin intact, normothermic. +TTP over groin. LROM 2' pain. Moving ankle and all toes, +EHL/FHL/TA/GS. SILT throughout. DP and PT pulses 2+.    Secondary Survey:  Right knee, right ankle, LLE, and B/L UEs with no swelling, no ecchymoses, no abrasions, no lacerations or any other signs of injury with full painless baseline ROM and no bony TTP. Able to SLR LLE. No pain with axial loading or log roll LLE. Sensation intact distally, moving all digits. Distal pulses intact.     Spine and ribs with no bony TTP.     Assessment:  89y Female with a right femoral neck hip fracture s/p fall today.    Plan:  Pt and family advised that surgical intervention for right hip fracture with Closed Reduction Percutaneous Pinning versus Hemiarthroplasty is necessary.  Surgical procedure was discussed in detail with patient and family including all R/B/As; Pt and family express understanding and consent for surgery was obtained.  Admit to Medical service for optimization and medical clearance.  f/u medical clearance.  f/u labs/imaging.  Complete bedrest.  Pain control.  Ice application.  DVT prophylaxis.  NPO and hold chemical anticoagulation after midnight with plan for possible OR tomorrow pending medical optimization and clearance.   IVF while NPO.    Case discussed with on call Orthopedic Attending who agrees with plan. Orthopedics Consult Note:    This is a 89y Female who presents to the ED today with c/o right hip pain and inability to bear weight s/p fall today. Pt was in the garden at [place] where she is a resident when she became dizzy lightheaded after getting up and walkihng and subsequently fell. Pt denies any other injuries. Pt denies head trauma or LOC. Pt denies any numbness, tingling or paresthesias. Pt is a community ambulator without use of any assistive devices at baseline.    Past Medical & Surgical History:  Fracture of unspecified part of neck of unspecified femur, initial encounter for closed fracture    No known problems (Father, Mother)    MEWS Score    Prior    Iron deficiency    HTN (hypertension)    Lymphoma    H/O adrenal insufficiency    Hyponatremia    Hypokalemia    Chronic kidney disease, unspecified CKD stage    Diastolic heart failure    Paroxysmal atrial fibrillation    Iron deficiency    HTN (hypertension)    Lymphoma    Hip fracture    No significant past surgical history    S/P appendectomy    S/P hysterectomy    H/O intracranial hemorrhage    FALL    39    HI (acute kidney injury)    Near syncope    SysAdmin_VisitLink        Allergies:  penicillin (Hives)  sulfa drugs (Hives)  tetracycline (Hives)      Vital Signs:  T(C): 36.8 (09-05-22 @ 16:02), Max: 36.8 (09-05-22 @ 16:02)  HR: 55 (09-05-22 @ 16:02) (55 - 55)  BP: 138/93 (09-05-22 @ 16:02) (138/93 - 138/93)  RR: 18 (09-05-22 @ 16:02) (18 - 18)  SpO2: 97% (09-05-22 @ 16:02) (97% - 97%)    Labs:  CBC ( 09-05-22 @ 17:16 )                   12.3  5.16 )-----------( 124                37.9      Chem ( 09-05-22 @ 17:16 )  137  |  99  |  37  ----------------------------<  123  3.6   |  31  |  1.96      PT/INR ( 09-05-22 @ 17:16 )   PT: 10.5;   INR: 0.91      PTT ( 09-05-22 @ 17:16 )  PTT: 27.8      Imaging:  X-rays of the pelvis, right hip and femur demonstrate a femoral neck hip fracture.    PE right hip:  RLE is short and ER. +mild swelling, no ecchymosis, no erythema, skin intact, normothermic. +TTP over groin. LROM 2' pain. Moving ankle and all toes, +EHL/FHL/TA/GS. SILT throughout. DP and PT pulses 2+.    Secondary Survey:  RUE with superficial skin tear just above posterolateral elbow. Right knee, right ankle, LLE, and LUE with no swelling, no ecchymoses, no abrasions, no lacerations or any other signs of injury with full painless baseline ROM and no bony TTP. Able to SLR LLE. No pain with axial loading or log roll LLE. Sensation intact distally, moving all digits. Distal pulses intact.     Spine and ribs with no bony TTP.     Assessment:  89y Female with a right femoral neck hip fracture s/p fall today.    Plan:  Pt and family advised that surgical intervention for right hip fracture with Closed Reduction Percutaneous Pinning versus Hemiarthroplasty is necessary.  Surgical procedure was discussed in detail with patient and family including all R/B/As; Pt and family express understanding and consent for surgery was obtained.  Admit to Medical service for optimization and medical clearance.  f/u medical clearance.  f/u labs/imaging.  Complete bedrest.  Pain control.  Ice application.  DVT prophylaxis.  NPO and hold chemical anticoagulation after midnight with plan for possible OR tomorrow pending medical optimization and clearance.   IVF while NPO.    Case discussed with on call Orthopedic Attending who agrees with plan. Orthopedics Consult Note:    This is a 89y Female who presents to the ED today with c/o right hip pain and inability to bear weight s/p fall today. Pt was in the garden at [place] where she is a resident when she became dizzy lightheaded after getting up and walkihng and subsequently fell. Pt denies any other injuries. Pt denies head trauma or LOC. Pt denies any numbness, tingling or paresthesias. Pt is a community ambulator without use of any assistive devices at baseline.    Past Medical & Surgical History:  Fracture of unspecified part of neck of unspecified femur, initial encounter for closed fracture    No known problems (Father, Mother)    MEWS Score    Prior    Iron deficiency    HTN (hypertension)    Lymphoma    H/O adrenal insufficiency    Hyponatremia    Hypokalemia    Chronic kidney disease, unspecified CKD stage    Diastolic heart failure    Paroxysmal atrial fibrillation    Iron deficiency    HTN (hypertension)    Lymphoma    Hip fracture    No significant past surgical history    S/P appendectomy    S/P hysterectomy    H/O intracranial hemorrhage    FALL    39    HI (acute kidney injury)    Near syncope    SysAdmin_VisitLink        Allergies:  penicillin (Hives)  sulfa drugs (Hives)  tetracycline (Hives)      Vital Signs:  T(C): 36.8 (09-05-22 @ 16:02), Max: 36.8 (09-05-22 @ 16:02)  HR: 55 (09-05-22 @ 16:02) (55 - 55)  BP: 138/93 (09-05-22 @ 16:02) (138/93 - 138/93)  RR: 18 (09-05-22 @ 16:02) (18 - 18)  SpO2: 97% (09-05-22 @ 16:02) (97% - 97%)    Labs:  CBC ( 09-05-22 @ 17:16 )                   12.3  5.16 )-----------( 124                37.9      Chem ( 09-05-22 @ 17:16 )  137  |  99  |  37  ----------------------------<  123  3.6   |  31  |  1.96      PT/INR ( 09-05-22 @ 17:16 )   PT: 10.5;   INR: 0.91      PTT ( 09-05-22 @ 17:16 )  PTT: 27.8      Imaging:  X-rays of the pelvis, right hip and femur demonstrate a femoral neck hip fracture.    Physical Exam:  General:  No acute distress, AAOx3.  Musculoskeletal:  Right Hip:  RLE is short and ER. +mild swelling, no ecchymosis, no erythema, skin intact, normothermic. +TTP over groin. LROM 2' pain. Moving ankle and all toes, +EHL/FHL/TA/GS. SILT throughout. DP and PT pulses 2+.    Secondary Survey:  RUE with superficial skin tear just above posterolateral elbow. Right knee, right ankle, LLE, and LUE with no swelling, no ecchymoses, no abrasions, no lacerations or any other signs of injury with full painless baseline ROM and no bony TTP. Able to SLR LLE. No pain with axial loading or log roll LLE. Sensation intact distally, moving all digits. Distal pulses intact.     Spine and ribs with no bony TTP.     Assessment:  89y Female with a right femoral neck hip fracture s/p fall today.    Plan:  Pt and family advised that surgical intervention for right hip fracture with Closed Reduction Percutaneous Pinning versus Hemiarthroplasty is necessary.  Surgical procedure was discussed in detail with patient and family including all R/B/As; Pt and family express understanding and consent for surgery was obtained.  Admit to Medical service for optimization and medical clearance.  f/u medical clearance.  f/u labs/imaging.  Complete bedrest.  Pain control.  Ice application.  DVT prophylaxis.  NPO and hold chemical anticoagulation after midnight with plan for possible OR tomorrow pending medical optimization and clearance.   IVF while NPO.    Case discussed with Dr. Pollock who agrees with plan.

## 2022-09-05 NOTE — ED PROVIDER NOTE - OBJECTIVE STATEMENT
89 y/o female w/ PMHx of CHF, HTN, lymphoma, CKD presents to the ED s/p fall. Pt lives at [place] and was out in the garden sitting. She got up and started walking, upon which she became dizzy and fell onto her right hip. Pt denies head strike. Pt is allergic to penicillin, tetracycline, and sulfur.

## 2022-09-05 NOTE — H&P ADULT - NSHPPHYSICALEXAM_GEN_ALL_CORE
Vital Signs Last 24 Hrs  T(C): 36.8 (05 Sep 2022 16:02), Max: 36.8 (05 Sep 2022 16:02)  T(F): 98.3 (05 Sep 2022 16:02), Max: 98.3 (05 Sep 2022 16:02)  HR: 55 (05 Sep 2022 16:02) (55 - 55)  BP: 138/93 (05 Sep 2022 16:02) (138/93 - 138/93)  BP(mean): --  RR: 18 (05 Sep 2022 16:02) (18 - 18)  SpO2: 97% (05 Sep 2022 16:02) (97% - 97%)    Parameters below as of 05 Sep 2022 16:02  Patient On (Oxygen Delivery Method): room air

## 2022-09-05 NOTE — ED ADULT TRIAGE NOTE - CHIEF COMPLAINT QUOTE
pt presents to ed via ems from [facility] for evaluation- pt reports walking with walker outside , became weak and dizzy and fell onto right hip - pt denies head strike or LOC. dizziness resolved  DNR DNI in chart , not on blood thinners . reports recent weight loss and poor po intake. vitals stable bgm wnl pt presents to ed via ems from [facility] for evaluation- pt reports walking with walker outside , became weak and dizzy and fell onto right hip - pt denies head strike or LOC. dizziness resolved  DNR DNI in chart , not on blood thinners . reports recent weight loss and poor po intake. vitals stable bgm wnl, be fast neg

## 2022-09-05 NOTE — PATIENT PROFILE ADULT - FALL HARM RISK - HARM RISK INTERVENTIONS
Assistance with ambulation/Assistance OOB with selected safe patient handling equipment/Communicate Risk of Fall with Harm to all staff/Discuss with provider need for PT consult/Monitor gait and stability/Reinforce activity limits and safety measures with patient and family/Tailored Fall Risk Interventions/Visual Cue: Yellow wristband and red socks/Bed in lowest position, wheels locked, appropriate side rails in place/Call bell, personal items and telephone in reach/Instruct patient to call for assistance before getting out of bed or chair/Non-slip footwear when patient is out of bed/[place] to call system/Physically safe environment - no spills, clutter or unnecessary equipment/Purposeful Proactive Rounding/Room/bathroom lighting operational, light cord in reach

## 2022-09-05 NOTE — ED PROVIDER NOTE - CPE EDP EYES NORM

## 2022-09-05 NOTE — ED PROVIDER NOTE - WR ORDER STATUS 3
"[facility]   Physical Therapy Treatment Note    Name: Tomás Ventura  Clinic Number: [id number]    Therapy Diagnosis:   Encounter Diagnosis   Name Primary?    Decreased range of motion of right knee      Physician: Gregory Fitzpatrick MD     Visit Date: 1/10/2022    Physician Orders: PT Eval and Treat   Medical Diagnosis from Referral: M17.11 (ICD-10-CM) - Osteoarthritis of right knee  Evaluation Date: 11/15/2021  Authorization Period Expiration: 02/18/2022  Plan of Care Expiration: 2/15/2022  Visit # / Visits authorized: 3/8     Time In: 113pm  Time Out: 213pm  Total Billable Time:  55 minutes  (medicaid)    Precautions: Standard and s/p R TKA on 11/11/2021    SUBJECTIVE     Pt reports: that he has been noticing some pain under his knee cap recently. He continues to walk around his neighborhood daily and feel like his is helping with his stiffness.   He was compliant with home exercise program.  Response to previous treatment: no adverse effects   Functional change: ongoing  Pain: 4/10   Location: Right knee        OBJECTIVE     No objective measures taken today    Treatment     Tomás received the treatments listed below:      Tomás received therapeutic exercises to develop strength, endurance and ROM for 40 minutes including:    Taiwanese NMES, 10"/10", quad sets 6.5 minutes  T[place]wanese NMES, 10"/10", SLRs 4 minutes  [place]ese NMES, 10"/10", standing TKEs 3 minutes    Upright Bike, 5 minutes xL2, Seat 12, able to complete full revolutions this date- not today  Shuttle Leg press, bilateral 3  ropes, 3 minutes (GTB around knees)  Shuttle Leg press, unilateral 2 ropes, 3 minutes  Modified lunges in //bars, putting knee onto 4in step w/airex on it 3x10  Bridges w/legs up on green SB, 3 x 10   Seated hamstring stretch 3x30"    manual therapy techniques: Soft tissue Mobilization were applied to the: right knee for 15 minutes, including:     Inferior Patellar Mobilizations + Knee Flexion  Superior " Patellar Mobilizations + Knee Extension  MFR to Hamstrings with Skin-Rolling + Muscle Bending  Anterior Tibial Glides Grade IV  Tibial E.R Mobilizations, 3x30  Posterior tibial glides with IR, grade IV,  PROM into extension    Patient Education and Home Exercises     Home Exercises Provided and Patient Education Provided     Education provided:   - HEP  - proper heel to toe gait     Written Home Exercises Provided: Patient instructed to cont prior HEP. Exercises were reviewed and Tomás was able to demonstrate them prior to the end of the session.  Tomás demonstrated good  understanding of the education provided. See EMR under Patient Instructions for exercises provided during therapy sessions    ASSESSMENT     Pt ambulates into the clinic today with a stiff knee gait. At the onset of treatment today he was lacking about 8 degrees of knee extension. After manual interventions and exercises to reinforce his extension and quad strength his extension improved to lacking only 4 degrees. He was a bit stiff into flexion at the onset of treatment but by the end he was able to achieve 110 degrees. He continues to be educated on the importance of his HEP.     Tomsá is progressing well towards his goals.   Pt prognosis is Good.     Pt will continue to benefit from skilled outpatient physical therapy to address the deficits listed in the problem list box on initial evaluation, provide pt/family education and to maximize pt's level of independence in the home and community environment.     Pt's spiritual, cultural and educational needs considered and pt agreeable to plan of care and goals.     Anticipated Barriers for therapy: history of knee problems.       Goals:  Short Term Goals (6 Weeks):   1. Pt will be compliant with HEP to supplement PT in restoring pain free function. (MET)  2. Pt will report pain less than or equal to 4/10 during activity to display improved functional ability. (progressing)  3. Pt improve impaired  R knee ROM to 0-95 deg to improve mobility for normal movement patterns.  (progressing)  Long Term Goals (12 Weeks):  1. Pt will improve FOTO score to </= 29% limited to decrease perceived limitation with mobility. (progressing)  2. Pt will report pain less than or equal to 1/10 during activity to display improved functional ability. (progressing)  3. Pt improve impaired R knee ROM to 0-120 deg to improve mobility for normal movement patterns. (progressing)  4. Pt will be able to ambulate independently without an AD to demonstrated improved function. (progressing)    PLAN     Continue to progress quadriceps strength and functional movement.     KAIT BANEGAS, PT     Performed

## 2022-09-05 NOTE — H&P ADULT - ASSESSMENT
89 year old female patient with fall found to have a right hip fracture        -Admit to [facility] with remote tele      # Right Hip Fracture  -strict bed rest until Orthopedic intervention  -pain control  -patient optimized for Surgical intervention  -Orthopedics to evaluate patient    # Near Syncope  -patient with mild dizziness  -no events noted on tele  -give gentle hydration  -get echo  -no indication for Cardiology evaluation at this moment    # HI on CKD  -likely pre-renal  -will hold lasix and give gentle hydration  -trend kidney function    # Hx of CHF  -patient with diastolic heart failure  -currently euvolemic  -will hold lasix in lieu of HI on CKD    #CKD  -patient with baseline CKD 3    # HTN  -on amlidipine    # Adrenal Insuffiency   -on fludrocortisone    # Hx of Afib  -on coreg and amiodarone    # Advanced Directives  -DNR/DNI  -MOLST on file    # DVT   -give heparin

## 2022-09-05 NOTE — ED PROVIDER NOTE - NSICDXPASTSURGICALHX_GEN_ALL_CORE_FT
PAST SURGICAL HISTORY:  H/O intracranial hemorrhage     S/P appendectomy     S/P hysterectomy

## 2022-09-05 NOTE — PATIENT PROFILE ADULT - FUNCTIONAL ASSESSMENT - BASIC MOBILITY 6.
2-calculated by average/Not able to assess (calculate score using [facility] averaging method)

## 2022-09-05 NOTE — H&P ADULT - HISTORY OF PRESENT ILLNESS
no
89 year old female patient who ambulates at baseline with a walker who currently resides at a local assisted living presented to the ED after a fall resulting in right hip pain/ difficulty ambulating. Patient states she got up from a seated position and ambulating a few feet before feel really weak. States she may have felt a "little dizzy" and fell on to her right hip. She was unable to get up/ ambulate after fall. Patient did not pass out and denies any head strike, preceding chest pain, palpitations, headache or visual changes. According to her son who was present at bedside, patient had been doing much better since being moved to [facility] assisted living- she had been  more ambulatory prior to falling.      In the ED patient found to have radiographic and physical examination findings consistent with a right hip fracture

## 2022-09-05 NOTE — ED PROVIDER NOTE - CARE PLAN
1 Principal Discharge DX:	Hip fracture  Secondary Diagnosis:	HI (acute kidney injury)  Secondary Diagnosis:	Near syncope

## 2022-09-06 ENCOUNTER — RESULT REVIEW (OUTPATIENT)
Age: 87
End: 2022-09-06

## 2022-09-06 ENCOUNTER — TRANSCRIPTION ENCOUNTER (OUTPATIENT)
Age: 87
End: 2022-09-06

## 2022-09-06 LAB
ALBUMIN SERPL ELPH-MCNC: 2.9 G/DL — LOW (ref 3.3–5)
ALP SERPL-CCNC: 102 U/L — SIGNIFICANT CHANGE UP (ref 40–120)
ALT FLD-CCNC: 38 U/L — SIGNIFICANT CHANGE UP (ref 12–78)
ANION GAP SERPL CALC-SCNC: 6 MMOL/L — SIGNIFICANT CHANGE UP (ref 5–17)
ANION GAP SERPL CALC-SCNC: 8 MMOL/L — SIGNIFICANT CHANGE UP (ref 5–17)
APTT BLD: 28.3 SEC — SIGNIFICANT CHANGE UP (ref 27.5–35.5)
AST SERPL-CCNC: 37 U/L — SIGNIFICANT CHANGE UP (ref 15–37)
BASOPHILS # BLD AUTO: 0 K/UL — SIGNIFICANT CHANGE UP (ref 0–0.2)
BASOPHILS # BLD AUTO: 0.01 K/UL — SIGNIFICANT CHANGE UP (ref 0–0.2)
BASOPHILS NFR BLD AUTO: 0 % — SIGNIFICANT CHANGE UP (ref 0–2)
BASOPHILS NFR BLD AUTO: 0.2 % — SIGNIFICANT CHANGE UP (ref 0–2)
BILIRUB SERPL-MCNC: 0.4 MG/DL — SIGNIFICANT CHANGE UP (ref 0.2–1.2)
BUN SERPL-MCNC: 24 MG/DL — HIGH (ref 7–23)
BUN SERPL-MCNC: 28 MG/DL — HIGH (ref 7–23)
CALCIUM SERPL-MCNC: 9 MG/DL — SIGNIFICANT CHANGE UP (ref 8.5–10.1)
CALCIUM SERPL-MCNC: 9.1 MG/DL — SIGNIFICANT CHANGE UP (ref 8.5–10.1)
CHLORIDE SERPL-SCNC: 102 MMOL/L — SIGNIFICANT CHANGE UP (ref 96–108)
CHLORIDE SERPL-SCNC: 99 MMOL/L — SIGNIFICANT CHANGE UP (ref 96–108)
CO2 SERPL-SCNC: 32 MMOL/L — HIGH (ref 22–31)
CO2 SERPL-SCNC: 33 MMOL/L — HIGH (ref 22–31)
CREAT SERPL-MCNC: 1.34 MG/DL — HIGH (ref 0.5–1.3)
CREAT SERPL-MCNC: 1.37 MG/DL — HIGH (ref 0.5–1.3)
EGFR: 35 ML/MIN/1.73M2 — LOW
EGFR: 36 ML/MIN/1.73M2 — LOW
EOSINOPHIL # BLD AUTO: 0 K/UL — SIGNIFICANT CHANGE UP (ref 0–0.5)
EOSINOPHIL # BLD AUTO: 0.01 K/UL — SIGNIFICANT CHANGE UP (ref 0–0.5)
EOSINOPHIL NFR BLD AUTO: 0 % — SIGNIFICANT CHANGE UP (ref 0–6)
EOSINOPHIL NFR BLD AUTO: 0.2 % — SIGNIFICANT CHANGE UP (ref 0–6)
GLUCOSE SERPL-MCNC: 107 MG/DL — HIGH (ref 70–99)
GLUCOSE SERPL-MCNC: 136 MG/DL — HIGH (ref 70–99)
HCT VFR BLD CALC: 36.3 % — SIGNIFICANT CHANGE UP (ref 34.5–45)
HCT VFR BLD CALC: 36.7 % — SIGNIFICANT CHANGE UP (ref 34.5–45)
HGB BLD-MCNC: 11.6 G/DL — SIGNIFICANT CHANGE UP (ref 11.5–15.5)
HGB BLD-MCNC: 11.9 G/DL — SIGNIFICANT CHANGE UP (ref 11.5–15.5)
IMM GRANULOCYTES NFR BLD AUTO: 0.5 % — SIGNIFICANT CHANGE UP (ref 0–1.5)
IMM GRANULOCYTES NFR BLD AUTO: 0.6 % — SIGNIFICANT CHANGE UP (ref 0–1.5)
INR BLD: 0.95 RATIO — SIGNIFICANT CHANGE UP (ref 0.88–1.16)
LYMPHOCYTES # BLD AUTO: 1.25 K/UL — SIGNIFICANT CHANGE UP (ref 1–3.3)
LYMPHOCYTES # BLD AUTO: 1.35 K/UL — SIGNIFICANT CHANGE UP (ref 1–3.3)
LYMPHOCYTES # BLD AUTO: 19.5 % — SIGNIFICANT CHANGE UP (ref 13–44)
LYMPHOCYTES # BLD AUTO: 24.9 % — SIGNIFICANT CHANGE UP (ref 13–44)
MCHC RBC-ENTMCNC: 29.9 PG — SIGNIFICANT CHANGE UP (ref 27–34)
MCHC RBC-ENTMCNC: 30 PG — SIGNIFICANT CHANGE UP (ref 27–34)
MCHC RBC-ENTMCNC: 32 GM/DL — SIGNIFICANT CHANGE UP (ref 32–36)
MCHC RBC-ENTMCNC: 32.4 GM/DL — SIGNIFICANT CHANGE UP (ref 32–36)
MCV RBC AUTO: 92.4 FL — SIGNIFICANT CHANGE UP (ref 80–100)
MCV RBC AUTO: 93.6 FL — SIGNIFICANT CHANGE UP (ref 80–100)
MONOCYTES # BLD AUTO: 0.43 K/UL — SIGNIFICANT CHANGE UP (ref 0–0.9)
MONOCYTES # BLD AUTO: 0.51 K/UL — SIGNIFICANT CHANGE UP (ref 0–0.9)
MONOCYTES NFR BLD AUTO: 6.7 % — SIGNIFICANT CHANGE UP (ref 2–14)
MONOCYTES NFR BLD AUTO: 9.4 % — SIGNIFICANT CHANGE UP (ref 2–14)
NEUTROPHILS # BLD AUTO: 3.52 K/UL — SIGNIFICANT CHANGE UP (ref 1.8–7.4)
NEUTROPHILS # BLD AUTO: 4.71 K/UL — SIGNIFICANT CHANGE UP (ref 1.8–7.4)
NEUTROPHILS NFR BLD AUTO: 64.7 % — SIGNIFICANT CHANGE UP (ref 43–77)
NEUTROPHILS NFR BLD AUTO: 73.3 % — SIGNIFICANT CHANGE UP (ref 43–77)
PLATELET # BLD AUTO: 112 K/UL — LOW (ref 150–400)
PLATELET # BLD AUTO: 117 K/UL — LOW (ref 150–400)
POTASSIUM SERPL-MCNC: 3 MMOL/L — LOW (ref 3.5–5.3)
POTASSIUM SERPL-MCNC: 3.4 MMOL/L — LOW (ref 3.5–5.3)
POTASSIUM SERPL-MCNC: 4.1 MMOL/L — SIGNIFICANT CHANGE UP (ref 3.5–5.3)
POTASSIUM SERPL-SCNC: 3 MMOL/L — LOW (ref 3.5–5.3)
POTASSIUM SERPL-SCNC: 3.4 MMOL/L — LOW (ref 3.5–5.3)
POTASSIUM SERPL-SCNC: 4.1 MMOL/L — SIGNIFICANT CHANGE UP (ref 3.5–5.3)
PROT SERPL-MCNC: 6.2 GM/DL — SIGNIFICANT CHANGE UP (ref 6–8.3)
PROTHROM AB SERPL-ACNC: 11 SEC — SIGNIFICANT CHANGE UP (ref 10.5–13.4)
RBC # BLD: 3.88 M/UL — SIGNIFICANT CHANGE UP (ref 3.8–5.2)
RBC # BLD: 3.97 M/UL — SIGNIFICANT CHANGE UP (ref 3.8–5.2)
RBC # FLD: 16.5 % — HIGH (ref 10.3–14.5)
RBC # FLD: 16.7 % — HIGH (ref 10.3–14.5)
SODIUM SERPL-SCNC: 139 MMOL/L — SIGNIFICANT CHANGE UP (ref 135–145)
SODIUM SERPL-SCNC: 141 MMOL/L — SIGNIFICANT CHANGE UP (ref 135–145)
WBC # BLD: 5.43 K/UL — SIGNIFICANT CHANGE UP (ref 3.8–10.5)
WBC # BLD: 6.42 K/UL — SIGNIFICANT CHANGE UP (ref 3.8–10.5)
WBC # FLD AUTO: 5.43 K/UL — SIGNIFICANT CHANGE UP (ref 3.8–10.5)
WBC # FLD AUTO: 6.42 K/UL — SIGNIFICANT CHANGE UP (ref 3.8–10.5)

## 2022-09-06 PROCEDURE — 93970 EXTREMITY STUDY: CPT | Mod: 26

## 2022-09-06 PROCEDURE — 88305 TISSUE EXAM BY PATHOLOGIST: CPT | Mod: 26

## 2022-09-06 PROCEDURE — 88311 DECALCIFY TISSUE: CPT | Mod: 26

## 2022-09-06 PROCEDURE — 93306 TTE W/DOPPLER COMPLETE: CPT | Mod: 26

## 2022-09-06 PROCEDURE — 93010 ELECTROCARDIOGRAM REPORT: CPT

## 2022-09-06 PROCEDURE — 73501 X-RAY EXAM HIP UNI 1 VIEW: CPT | Mod: 26,RT

## 2022-09-06 PROCEDURE — 27125 PARTIAL HIP REPLACEMENT: CPT | Mod: AS

## 2022-09-06 PROCEDURE — 99233 SBSQ HOSP IP/OBS HIGH 50: CPT

## 2022-09-06 RX ORDER — LANOLIN ALCOHOL/MO/W.PET/CERES
3 CREAM (GRAM) TOPICAL AT BEDTIME
Refills: 0 | Status: DISCONTINUED | OUTPATIENT
Start: 2022-09-06 | End: 2022-09-08

## 2022-09-06 RX ORDER — POTASSIUM PHOSPHATE, MONOBASIC POTASSIUM PHOSPHATE, DIBASIC 236; 224 MG/ML; MG/ML
30 INJECTION, SOLUTION INTRAVENOUS EVERY 6 HOURS
Refills: 0 | Status: DISCONTINUED | OUTPATIENT
Start: 2022-09-06 | End: 2022-09-06

## 2022-09-06 RX ORDER — ACETAMINOPHEN 500 MG
725 TABLET ORAL ONCE
Refills: 0 | Status: DISCONTINUED | OUTPATIENT
Start: 2022-09-06 | End: 2022-09-06

## 2022-09-06 RX ORDER — HYDROCORTISONE 20 MG
100 TABLET ORAL EVERY 8 HOURS
Refills: 0 | Status: COMPLETED | OUTPATIENT
Start: 2022-09-06 | End: 2022-09-07

## 2022-09-06 RX ORDER — ONDANSETRON 8 MG/1
4 TABLET, FILM COATED ORAL EVERY 6 HOURS
Refills: 0 | Status: DISCONTINUED | OUTPATIENT
Start: 2022-09-06 | End: 2022-09-08

## 2022-09-06 RX ORDER — OXYCODONE HYDROCHLORIDE 5 MG/1
5 TABLET ORAL EVERY 4 HOURS
Refills: 0 | Status: DISCONTINUED | OUTPATIENT
Start: 2022-09-06 | End: 2022-09-08

## 2022-09-06 RX ORDER — SODIUM CHLORIDE 9 MG/ML
1000 INJECTION, SOLUTION INTRAVENOUS
Refills: 0 | Status: DISCONTINUED | OUTPATIENT
Start: 2022-09-06 | End: 2022-09-06

## 2022-09-06 RX ORDER — POLYETHYLENE GLYCOL 3350 17 G/17G
17 POWDER, FOR SOLUTION ORAL DAILY
Refills: 0 | Status: DISCONTINUED | OUTPATIENT
Start: 2022-09-06 | End: 2022-09-08

## 2022-09-06 RX ORDER — FENTANYL CITRATE 50 UG/ML
25 INJECTION INTRAVENOUS
Refills: 0 | Status: DISCONTINUED | OUTPATIENT
Start: 2022-09-06 | End: 2022-09-06

## 2022-09-06 RX ORDER — ACETAMINOPHEN 500 MG
1 TABLET ORAL
Qty: 0 | Refills: 0 | DISCHARGE

## 2022-09-06 RX ORDER — POTASSIUM CHLORIDE 20 MEQ
10 PACKET (EA) ORAL
Refills: 0 | Status: COMPLETED | OUTPATIENT
Start: 2022-09-06 | End: 2022-09-06

## 2022-09-06 RX ORDER — OXYCODONE HYDROCHLORIDE 5 MG/1
2.5 TABLET ORAL EVERY 4 HOURS
Refills: 0 | Status: DISCONTINUED | OUTPATIENT
Start: 2022-09-06 | End: 2022-09-08

## 2022-09-06 RX ORDER — HYDROCORTISONE 20 MG
10 TABLET ORAL DAILY
Refills: 0 | Status: DISCONTINUED | OUTPATIENT
Start: 2022-09-06 | End: 2022-09-06

## 2022-09-06 RX ORDER — SODIUM CHLORIDE 9 MG/ML
1000 INJECTION INTRAMUSCULAR; INTRAVENOUS; SUBCUTANEOUS
Refills: 0 | Status: DISCONTINUED | OUTPATIENT
Start: 2022-09-06 | End: 2022-09-07

## 2022-09-06 RX ORDER — PREGABALIN 225 MG/1
1 CAPSULE ORAL
Qty: 0 | Refills: 0 | DISCHARGE

## 2022-09-06 RX ORDER — SENNA PLUS 8.6 MG/1
2 TABLET ORAL AT BEDTIME
Refills: 0 | Status: DISCONTINUED | OUTPATIENT
Start: 2022-09-06 | End: 2022-09-08

## 2022-09-06 RX ADMIN — FENTANYL CITRATE 25 MICROGRAM(S): 50 INJECTION INTRAVENOUS at 20:31

## 2022-09-06 RX ADMIN — MORPHINE SULFATE 2 MILLIGRAM(S): 50 CAPSULE, EXTENDED RELEASE ORAL at 16:01

## 2022-09-06 RX ADMIN — ATORVASTATIN CALCIUM 10 MILLIGRAM(S): 80 TABLET, FILM COATED ORAL at 22:35

## 2022-09-06 RX ADMIN — TRAMADOL HYDROCHLORIDE 50 MILLIGRAM(S): 50 TABLET ORAL at 10:28

## 2022-09-06 RX ADMIN — Medication 5 MILLIGRAM(S): at 10:00

## 2022-09-06 RX ADMIN — Medication 100 MILLIGRAM(S): at 22:35

## 2022-09-06 RX ADMIN — AMLODIPINE BESYLATE 5 MILLIGRAM(S): 2.5 TABLET ORAL at 09:57

## 2022-09-06 RX ADMIN — SENNA PLUS 2 TABLET(S): 8.6 TABLET ORAL at 22:35

## 2022-09-06 RX ADMIN — AMIODARONE HYDROCHLORIDE 200 MILLIGRAM(S): 400 TABLET ORAL at 10:01

## 2022-09-06 RX ADMIN — Medication 100 MILLIEQUIVALENT(S): at 14:44

## 2022-09-06 RX ADMIN — CARVEDILOL PHOSPHATE 25 MILLIGRAM(S): 80 CAPSULE, EXTENDED RELEASE ORAL at 10:28

## 2022-09-06 RX ADMIN — TRAMADOL HYDROCHLORIDE 50 MILLIGRAM(S): 50 TABLET ORAL at 11:02

## 2022-09-06 RX ADMIN — TRAMADOL HYDROCHLORIDE 50 MILLIGRAM(S): 50 TABLET ORAL at 00:42

## 2022-09-06 RX ADMIN — MORPHINE SULFATE 2 MILLIGRAM(S): 50 CAPSULE, EXTENDED RELEASE ORAL at 07:35

## 2022-09-06 RX ADMIN — LOSARTAN POTASSIUM 100 MILLIGRAM(S): 100 TABLET, FILM COATED ORAL at 09:57

## 2022-09-06 RX ADMIN — MORPHINE SULFATE 2 MILLIGRAM(S): 50 CAPSULE, EXTENDED RELEASE ORAL at 16:16

## 2022-09-06 RX ADMIN — MORPHINE SULFATE 2 MILLIGRAM(S): 50 CAPSULE, EXTENDED RELEASE ORAL at 11:50

## 2022-09-06 RX ADMIN — FLUDROCORTISONE ACETATE 0.1 MILLIGRAM(S): 0.1 TABLET ORAL at 09:58

## 2022-09-06 RX ADMIN — CARVEDILOL PHOSPHATE 25 MILLIGRAM(S): 80 CAPSULE, EXTENDED RELEASE ORAL at 22:34

## 2022-09-06 RX ADMIN — Medication 100 MILLIGRAM(S): at 15:01

## 2022-09-06 RX ADMIN — FAMOTIDINE 20 MILLIGRAM(S): 10 INJECTION INTRAVENOUS at 09:58

## 2022-09-06 RX ADMIN — TRAMADOL HYDROCHLORIDE 50 MILLIGRAM(S): 50 TABLET ORAL at 00:12

## 2022-09-06 RX ADMIN — MORPHINE SULFATE 2 MILLIGRAM(S): 50 CAPSULE, EXTENDED RELEASE ORAL at 11:36

## 2022-09-06 RX ADMIN — SODIUM CHLORIDE 75 MILLILITER(S): 9 INJECTION, SOLUTION INTRAVENOUS at 00:00

## 2022-09-06 RX ADMIN — Medication 100 MILLIGRAM(S): at 14:43

## 2022-09-06 RX ADMIN — Medication 100 MILLIEQUIVALENT(S): at 16:54

## 2022-09-06 RX ADMIN — FENTANYL CITRATE 25 MICROGRAM(S): 50 INJECTION INTRAVENOUS at 19:35

## 2022-09-06 RX ADMIN — Medication 100 MILLIEQUIVALENT(S): at 12:46

## 2022-09-06 NOTE — PROGRESS NOTE ADULT - SUBJECTIVE AND OBJECTIVE BOX
Orthopedics      Patient seen and examined at bedside. Feeling well. Pain controlled. No n/v. No acute events overnight.    Vital Signs Last 24 Hrs  T(C): 36.4 (09-06-22 @ 00:07), Max: 36.8 (09-05-22 @ 16:02)  T(F): 97.5 (09-06-22 @ 00:07), Max: 98.3 (09-05-22 @ 16:02)  HR: 55 (09-06-22 @ 00:07) (55 - 64)  BP: 145/36 (09-06-22 @ 00:07) (138/93 - 176/43)  BP(mean): 64 (09-06-22 @ 00:07) (64 - 78)  RR: 16 (09-06-22 @ 00:07) (16 - 18)  SpO2: 94% (09-06-22 @ 00:07) (94% - 99%)                        12.3   5.16  )-----------( 124      ( 05 Sep 2022 17:16 )             37.9     05 Sep 2022 17:16    137    |  99     |  37     ----------------------------<  123    3.6     |  31     |  1.96     Ca    9.2        05 Sep 2022 17:16    TPro  6.7    /  Alb  3.2    /  TBili  0.3    /  DBili  x      /  AST  41     /  ALT  42     /  AlkPhos  118    05 Sep 2022 17:16    PT/INR - ( 05 Sep 2022 17:16 )   PT: 10.5 sec;   INR: 0.91 ratio         PTT - ( 05 Sep 2022 17:16 )  PTT:27.8 sec    PE right hip:  RLE is short and ER. +mild swelling, no ecchymosis, no erythema, skin intact, normothermic. +TTP over groin. LROM 2' pain. Moving ankle and all toes, +EHL/FHL/TA/GS. SILT throughout. DP and PT pulses 2+.      Assessment:  89y Female with a right femoral neck hip fracture s/p fall today.    Plan:  Pt and family advised that surgical intervention for right hip fracture with Closed Reduction Percutaneous Pinning versus Hemiarthroplasty is necessary.  Surgical procedure was discussed in detail with patient and family including all R/B/As; Pt and family express understanding and consent for surgery was obtained.  Admit to Medical service for optimization and medical clearance.  FU TTE  Complete bedrest.  Pain control.  Ice application.  DVT prophylaxis.  NPO and hold chemical anticoagulation with plan for OR today  IVF while NPO.

## 2022-09-06 NOTE — DISCHARGE NOTE PROVIDER - CARE PROVIDER_API CALL
Ankit Pollock (DO)  Orthopaedic Surgery  [address] [place] [address]  Phone: [phone]  Fax: [phone]  Follow Up Time:

## 2022-09-06 NOTE — DISCHARGE NOTE PROVIDER - NSDCCPCAREPLAN_GEN_ALL_CORE_FT
PRINCIPAL DISCHARGE DIAGNOSIS  Diagnosis: Hip fracture  Assessment and Plan of Treatment:       SECONDARY DISCHARGE DIAGNOSES  Diagnosis: HI (acute kidney injury)  Assessment and Plan of Treatment:     Diagnosis: Near syncope  Assessment and Plan of Treatment:      PRINCIPAL DISCHARGE DIAGNOSIS  Diagnosis: Hip fracture  Assessment and Plan of Treatment: physical therapy at rehab.   follow up with ortho as advised.   see post op ortho instructions.      SECONDARY DISCHARGE DIAGNOSES  Diagnosis: Near syncope  Assessment and Plan of Treatment: your lasix was decreased to 1/2 dose due to dehydration.   this medication will be increased to your usual dose at [facility] if needed.

## 2022-09-06 NOTE — PROGRESS NOTE ADULT - SUBJECTIVE AND OBJECTIVE BOX
The Patient seen and examined.  Complaining of right hip pain.    PE:  Dressing D/C/I  Abduction pillow in place.  NVI RLE  Compartments soft all extremities  FHL/EHL/TA/GS intact RLE

## 2022-09-06 NOTE — PROGRESS NOTE ADULT - SUBJECTIVE AND OBJECTIVE BOX
Postop Check    Patient tolerated the procedure well. Patient seen and examined at bedside. Pt responds to all questions with "ok." Appears comfortable and not in pain.     PE:  Vital Signs Last 24 Hrs  T(C): 36.8 (09-06-22 @ 19:09), Max: 36.8 (09-06-22 @ 09:42)  T(F): 98.2 (09-06-22 @ 19:09), Max: 98.3 (09-06-22 @ 09:42)  HR: 53 (09-06-22 @ 20:50) (52 - 63)  BP: 129/40 (09-06-22 @ 20:50) (129/40 - 176/43)  BP(mean): 64 (09-06-22 @ 00:07) (64 - 78)  RR: 18 (09-06-22 @ 20:50) (8 - 18)  SpO2: 93% (09-06-22 @ 20:50) (93% - 100%)               11.6   6.42  )-----------( 112      ( 06 Sep 2022 19:30 )             36.3     06 Sep 2022 19:30    141    |  102    |  24     ----------------------------<  136    4.1     |  33     |  1.34     Ca    9.0        06 Sep 2022 19:30    TPro  6.2    /  Alb  2.9    /  TBili  0.4    /  DBili  x      /  AST  37     /  ALT  38     /  AlkPhos  102    06 Sep 2022 09:15    PT/INR - ( 06 Sep 2022 09:15 )   PT: 11.0 sec;   INR: 0.95 ratio         PTT - ( 06 Sep 2022 09:15 )  PTT:28.3 sec        PE:  General: NAD, resting comfortably in bed  RLE:   Dressing C/D/I  SCDs present bilaterally  Compartments soft and compressible  No calf tenderness bilaterally  +TA/EHL/FHL/GSC  sensation intact to noxious stimuli L3-S1  2+ DP/PT                   A/P:  89y f s/p R hemiarthroplasty POD 0  -PT/OT   -WBAT on the RLE  -Pain Control  -DVT ppx per AC team  -Continue perioperative abx x 24 hours  -FU AM Labs  -Rest, ice, compress the extremity as needed  -Incentive Spirometry  -Medical management appreciated

## 2022-09-06 NOTE — DISCHARGE NOTE PROVIDER - NSDCFUSCHEDAPPT_GEN_ALL_CORE_FT
Theo Mcgrath  [facility]  [facility] [address]  Scheduled Appointment: 09/29/2022    Kristie Gong  [facility] [address]  Scheduled Appointment: 10/11/2022

## 2022-09-06 NOTE — PROGRESS NOTE ADULT - SUBJECTIVE AND OBJECTIVE BOX
CC- RT hip fracture    HPI:  89yo/F with PMH adrenal insufficiency on chronic steroids, chr diastolic CHF, CKD 2-3,  afib not on AC due to falls  presented to the ED after a fall resulting in right hip pain/ difficulty ambulating. Patient states she got up from a seated position and ambulating a few feet before feel really weak. States she may have felt a "little dizzy" and fell on to her right hip. She was unable to get up/ ambulate after fall. Patient did not pass out and denies any head strike, preceding chest pain, palpitations, headache or visual changes. According to her son who was present at bedside, patient had been doing much better since being moved to [facility] assisted living- she had been  more ambulatory prior to falling.  In the ED patient found to have radiographic and physical examination findings consistent with a right hip fracture (05 Sep 2022 18:55)    22- NPO for OR    Review of system- All 10 systems reviewed and is as per HPI otherwise negative.     T(C): 36.8 (22 @ 09:42), Max: 36.8 (22 @ 16:02)  HR: 63 (22 @ 09:42) (55 - 64)  BP: 154/48 (22 @ 09:42) (138/93 - 176/43)  RR: 17 (22 @ 09:42) (16 - 18)  SpO2: 94% (22 @ 09:42) (94% - 99%)  Wt(kg): --    LABS:                        11.9   5.43  )-----------( 117      ( 06 Sep 2022 09:15 )             36.7         139  |  99  |  28<H>  ----------------------------<  107<H>  3.0<L>   |  32<H>  |  1.37<H>    Ca    9.1      06 Sep 2022 09:15    TPro  6.2  /  Alb  2.9<L>  /  TBili  0.4  /  DBili  x   /  AST  37  /  ALT  38  /  AlkPhos  102      PT/INR - ( 06 Sep 2022 09:15 )   PT: 11.0 sec;   INR: 0.95 ratio       PTT - ( 06 Sep 2022 09:15 )  PTT:28.3 sec    Urinalysis Basic - ( 05 Sep 2022 21:51 )  Color: Yellow / Appearance: Clear / S.010 / pH: x  Gluc: x / Ketone: Negative  / Bili: Negative / Urobili: Negative   Blood: x / Protein: 30 mg/dL / Nitrite: Negative   Leuk Esterase: Negative / RBC: Negative /HPF / WBC 0-2   Sq Epi: x / Non Sq Epi: Occasional / Bacteria: Moderate    CAPILLARY BLOOD GLUCOSE  POCT Blood Glucose.: 143 mg/dL (05 Sep 2022 16:09)    RADIOLOGY & ADDITIONAL TESTS:      PHYSICAL EXAM:  GENERAL: NAD, well-groomed, well-developed  HEAD:  Atraumatic, Normocephalic  EYES: EOMI, PERRLA, conjunctiva and sclera clear  HEENT: Moist mucous membranes  NECK: Supple, No JVD  NERVOUS SYSTEM:  Alert & Oriented X3, Motor Strength 5/5 B/L upper and lower extremities; DTRs 2+ intact and symmetric  CHEST/LUNG: Clear to auscultation bilaterally; No rales, rhonchi, wheezing, or rubs  HEART: Regular rate and rhythm; No murmurs, rubs, or gallops  ABDOMEN: Soft, Nontender, Nondistended; Bowel sounds present  GENITOURINARY- Voiding, no palpable bladder  EXTREMITIES:  2+ Peripheral Pulses, No clubbing, cyanosis, or edema  MUSCULOSKELTAL- No muscle tenderness, Muscle tone normal, No joint tenderness, no Joint swelling, Joint range of motion-normal  SKIN-no rash, no lesion  CNS- alert, oriented X3, non focal       Daily Height in cm: 152.4 (05 Sep 2022 16:02)    Daily       acetaminophen     Tablet .. 650 milliGRAM(s) Oral every 6 hours PRN  aluminum hydroxide/magnesium hydroxide/simethicone Suspension 30 milliLiter(s) Oral every 4 hours PRN  aMIOdarone    Tablet 200 milliGRAM(s) Oral daily  amLODIPine   Tablet 5 milliGRAM(s) Oral daily  atorvastatin 10 milliGRAM(s) Oral at bedtime  carvedilol 25 milliGRAM(s) Oral every 12 hours  cholecalciferol Oral Tab/Cap - Peds 2000 Unit(s) Oral daily  cyanocobalamin 1000 MICROGram(s) Oral daily  famotidine    Tablet 20 milliGRAM(s) Oral daily  ferrous    sulfate 325 milliGRAM(s) Oral daily  fludroCORTISONE 0.1 milliGRAM(s) Oral daily  hydrALAZINE  Oral Tab/Cap - Peds 100 milliGRAM(s) Oral three times a day  hydrocortisone sodium succinate Injectable 100 milliGRAM(s) IV Push every 8 hours  lactated ringers. 1000 milliLiter(s) IV Continuous <Continuous>  losartan 100 milliGRAM(s) Oral daily  melatonin 3 milliGRAM(s) Oral at bedtime PRN  morphine  - Injectable 2 milliGRAM(s) IV Push every 4 hours PRN  ondansetron Injectable 4 milliGRAM(s) IV Push every 8 hours PRN  polyethylene glycol 3350 17 Gram(s) Oral daily PRN  potassium chloride   Powder 20 milliEquivalent(s) Oral daily  potassium chloride  10 mEq/100 mL IVPB 10 milliEquivalent(s) IV Intermittent every 1 hour  senna 8.6 milliGRAM(s) Oral Tablet - Peds 2 Tablet(s) Oral daily  sodium chloride 0.9% Bolus 500 milliLiter(s) IV Bolus once  traMADol 50 milliGRAM(s) Oral every 8 hours PRN  tranexamic acid IVPB 1000 milliGRAM(s) IV Intermittent once  tranexamic acid IVPB 1000 milliGRAM(s) IV Intermittent once    Assessment/Plan  # Right Hip Fracture 2/2 mechanical fall  Ortho following  NPO for OR  Bedrest  Pain meds prn  Needs stress dose of IV steroids periop  Patient is medically optimized for OR    # Near Syncope  -patient with mild dizziness  -no events noted on tele  -give gentle hydration  -get echo  -no indication for Cardiology evaluation at this moment    # HI on CKD  -likely pre-renal  -will hold lasix and give gentle hydration  -trend kidney function    # Hx of CHF  -patient with diastolic heart failure  -currently euvolemic  -will hold lasix in lieu of HI on CKD    #CKD  -patient with baseline CKD 3    # HTN  -on amlidipine    # Adrenal Insuffiency   -on fludrocortisone    # Hx of Afib  -on coreg and amiodarone    # Advanced Directives  -DNR/DNI  -MOLST on file    # DVT   -give heparin     CC- RT hip fracture    HPI:  89yo/F with PMH adrenal insufficiency on chronic steroids, chr diastolic CHF, CKD 2-3, paroxysmal afib not on AC due to falls  presented to the ED after a fall resulting in right hip pain/ difficulty ambulating. Patient states she got up from a seated position and ambulating a few feet before feel really weak. States she may have felt a "little dizzy" and fell on to her right hip. She was unable to get up/ ambulate after fall. Patient did not pass out and denies any head strike, preceding chest pain, palpitations, headache or visual changes. According to her son who was present at bedside, patient had been doing much better since being moved to [facility] assisted living- she had been  more ambulatory prior to falling.  In the ED patient found to have radiographic and physical examination findings consistent with a right hip fracture (05 Sep 2022 18:55)    22- NPO for OR    Review of system- All 10 systems reviewed and is as per HPI otherwise negative.     T(C): 36.8 (22 @ 09:42), Max: 36.8 (22 @ 16:02)  HR: 63 (22 @ 09:42) (55 - 64)  BP: 154/48 (22 @ 09:42) (138/93 - 176/43)  RR: 17 (22 @ 09:42) (16 - 18)  SpO2: 94% (22 @ 09:42) (94% - 99%)  Wt(kg): --    LABS:                        11.9   5.43  )-----------( 117      ( 06 Sep 2022 09:15 )             36.7         139  |  99  |  28<H>  ----------------------------<  107<H>  3.0<L>   |  32<H>  |  1.37<H>    Ca    9.1      06 Sep 2022 09:15    TPro  6.2  /  Alb  2.9<L>  /  TBili  0.4  /  DBili  x   /  AST  37  /  ALT  38  /  AlkPhos  102      PT/INR - ( 06 Sep 2022 09:15 )   PT: 11.0 sec;   INR: 0.95 ratio       PTT - ( 06 Sep 2022 09:15 )  PTT:28.3 sec    Urinalysis Basic - ( 05 Sep 2022 21:51 )  Color: Yellow / Appearance: Clear / S.010 / pH: x  Gluc: x / Ketone: Negative  / Bili: Negative / Urobili: Negative   Blood: x / Protein: 30 mg/dL / Nitrite: Negative   Leuk Esterase: Negative / RBC: Negative /HPF / WBC 0-2   Sq Epi: x / Non Sq Epi: Occasional / Bacteria: Moderate    CAPILLARY BLOOD GLUCOSE  POCT Blood Glucose.: 143 mg/dL (05 Sep 2022 16:09)    RADIOLOGY & ADDITIONAL TESTS:   ECHO TTE WO CON COMP W DOPP                          PROCEDURE DATE:  2022       Impression     Summary     The left ventricle is normal in size and wall motion.   Mild asymmetrical basal septal left ventricular hypertrophy is present.   Estimated left ventricular ejection fraction is > 75 %.   There is an intracavity gradient which is likely due to a hyperdynamic   ventricle.   Normal appearing left atrium.   Normal appearing right atrium.   Normal appearing right ventricle structure and function.   The aortic valve appears mildly calcified. Valve opening seems to be   restricted.   Transaortic gradients are elevated consistent with mild aortic stenosis.   EMMANUEL by continuity equation suggests mild stenosis ( 1.7 cm 2).   Moderate (2+) eccentric aortic regurgitation is present.   Moderate mitral annular calcification is present.   The mitral valve leaflets appear thickened.   Mild (1+) mitral regurgitation is present.   EA reversal of the mitral inflow consistent with reduced compliance of   the   left ventricle.   The tricuspid valve leaflets appear mildly thickened and/or calcified,   but   open well.   Moderate (2+) tricuspid valve regurgitation is present.   Mild to moderate pulmonary hypertension.   No evidence of pericardial effusion.   The IVC appears mildly underdistended.    CT FEMUR ONLY RT                        PROCEDURE DATE:  2022      INTERPRETATION:  CT OF THE RIGHT FEMUR    CLINICAL INFORMATION: For evaluation of hip fracture.    COMPARISON: Radiographs performed same date and CT dated 3/5/2022.    TECHNIQUE: Axial CT images were obtained of the right femur with coronal   and sagittal reconstructions. 3-D volume rendered reformats were also   provided. No intravenous contrast was administered.    FINDINGS:    Osseous: Acute, mildly comminuted transcervical fracture of right femoral   neck with moderate impaction, external rotary subluxation, and varus   angulation of the distal fragment. Small nondisplaced butterfly cortical   fragment along its ventral margin. Femoral head is well located. Mild   arthrosis of the hip joint. Moderate arthrosis at the pubic symphysis   with chondrocalcinosis. Right knee joints are maintained. Bony   mineralization is decreased. No aggressive osseous neoplasm.    Soft tissues: No mass lesion, hematoma, or drainable encapsulated fluid   collection. Chronic grade 2 strain of the gluteus minimus muscle body.   Muscle bulk and attenuation are otherwise within normal limits.    IMPRESSION:    Acute Garden type III transcervical fracture of right femoral neck.    PHYSICAL EXAM:  GENERAL: NAD, well-groomed, well-developed  HEAD:  Atraumatic, Normocephalic  EYES: EOMI, PERRLA, conjunctiva and sclera clear  HEENT: Moist mucous membranes  NECK: Supple, No JVD  NERVOUS SYSTEM:  Alert & Oriented X3, Motor Strength 5/5 B/L upper and lower extremities; DTRs 2+ intact and symmetric  CHEST/LUNG: Clear to auscultation bilaterally; No rales, rhonchi, wheezing, or rubs  HEART: Regular rate and rhythm; No murmurs, rubs, or gallops  ABDOMEN: Soft, Nontender, Nondistended; Bowel sounds present  GENITOURINARY- Voiding, no palpable bladder  EXTREMITIES:  2+ Peripheral Pulses, No clubbing, cyanosis, or edema  MUSCULOSKELTAL- RLE slightly rotated  SKIN-no rash, no lesion  CNS- alert, oriented X3, non focal     Daily Height in cm: 152.4 (05 Sep 2022 16:02)      MEDICATIONS  (STANDING):  aMIOdarone    Tablet 200 milliGRAM(s) Oral daily  amLODIPine   Tablet 5 milliGRAM(s) Oral daily  atorvastatin 10 milliGRAM(s) Oral at bedtime  carvedilol 25 milliGRAM(s) Oral every 12 hours  cholecalciferol Oral Tab/Cap - Peds 2000 Unit(s) Oral daily  cyanocobalamin 1000 MICROGram(s) Oral daily  famotidine    Tablet 20 milliGRAM(s) Oral daily  ferrous    sulfate 325 milliGRAM(s) Oral daily  fludroCORTISONE 0.1 milliGRAM(s) Oral daily  hydrALAZINE  Oral Tab/Cap - Peds 100 milliGRAM(s) Oral three times a day  hydrocortisone sodium succinate Injectable 100 milliGRAM(s) IV Push every 8 hours  lactated ringers. 1000 milliLiter(s) (75 mL/Hr) IV Continuous <Continuous>  losartan 100 milliGRAM(s) Oral daily  potassium chloride   Powder 20 milliEquivalent(s) Oral daily  potassium chloride  10 mEq/100 mL IVPB 10 milliEquivalent(s) IV Intermittent every 1 hour  senna 8.6 milliGRAM(s) Oral Tablet - Peds 2 Tablet(s) Oral daily  sodium chloride 0.9% Bolus 500 milliLiter(s) IV Bolus once  tranexamic acid IVPB 1000 milliGRAM(s) IV Intermittent once  tranexamic acid IVPB 1000 milliGRAM(s) IV Intermittent once    MEDICATIONS  (PRN):  acetaminophen     Tablet .. 650 milliGRAM(s) Oral every 6 hours PRN Temp greater or equal to 38C (100.4F), Mild Pain (1 - 3)  aluminum hydroxide/magnesium hydroxide/simethicone Suspension 30 milliLiter(s) Oral every 4 hours PRN Dyspepsia  melatonin 3 milliGRAM(s) Oral at bedtime PRN Insomnia  morphine  - Injectable 2 milliGRAM(s) IV Push every 4 hours PRN Severe Pain (7 - 10)  ondansetron Injectable 4 milliGRAM(s) IV Push every 8 hours PRN Nausea and/or Vomiting  polyethylene glycol 3350 17 Gram(s) Oral daily PRN Constipation  traMADol 50 milliGRAM(s) Oral every 8 hours PRN Severe Pain (7 - 10)      Assessment/Plan  # Right Hip Fracture 2/2 mechanical fall  Ortho following  NPO for OR  Bedrest  Pain meds prn  Needs stress dose of IV steroids periop  Patient is medically optimized for OR    # Near Syncope  -patient with mild dizziness  -no events noted on tele  -ECHO reviewed,     #CKD 2-3  Fluctuating but stable creatinine    #Chronic diastolic CHF  Currently euvolemic  Off lasix periop, will reassess the need to resume postop    #HTN  so far stable    #Adrenal Insuffiency   Needs stress dose of steroids IV periop  Cont Florinef    #Hx of paroxysmal Afib  -on coreg and amiodarone  -not on AC at home due to falls    #Advanced Directives  -DNR/DNI  -MOLST on file    #Dispo- medically stable for OR

## 2022-09-06 NOTE — PROGRESS NOTE ADULT - ASSESSMENT
S/P right hip hemiarthroplasty    Plan:  PT/WBAT RLE with walker and posterior hip dislocation precaution.  Pain managements  DVT prophylaxis  F/U labs  Incentive spirometry.  NV and compartments check RLE  Ice Rt hip  Please  keep abduction pillow all the time while on bed.  May discharge to rehab tomorrow ar after when stable and cleared by PT and medicine and follow as outpatient.

## 2022-09-06 NOTE — DISCHARGE NOTE PROVIDER - HOSPITAL COURSE
Orthopedic Summary  H&P:  Pt is a 89y Female    PAST MEDICAL & SURGICAL HISTORY:  Iron deficiency      HTN (hypertension)      Lymphoma      H/O adrenal insufficiency      Hyponatremia      Hypokalemia      Chronic kidney disease, unspecified CKD stage      Diastolic heart failure      Paroxysmal atrial fibrillation      S/P appendectomy      S/P hysterectomy      H/O intracranial hemorrhage            Now s/p Right Hip Hemiarthroplasty for fracture. Pt is afebrile with stable vital signs. Pain is controlled. Exam reveals intact EHL FHL TA GS, +DP. Dressing is clean and dry.    Hospital Course:  Patient presented to [facility] ED after a fall, found to have a right hip fracture, and admitted to the Medical Service. Pt was medically cleared prior to surgery. Prophylactic antibiotics were started before the procedure and continued for 24 hours. They were admitted after surgery to the orthopedic floor.  There were no orthopedic complications during the hospital stay. All home medications were continued.    Routine consults were obtained from the Anticoagulation Team for DVT/PE prophylaxis, from Physical Therapy, and followed by Medicine for Co-management. Patient was placed on  anticoagulation.  Pertinent home medications were continued.  Daily labs were followed.      On POD 0 there were no major issues. Pt received PT daily and was Discharged once cleared per Medicine.  The orthopedic Attending is aware and agrees. See addendum to DC summary per medical team below for any additional info or if any changes. 89yo/F with PMH adrenal insufficiency on chronic steroids, HFpEF, CKD 2-3, paroxysmal afib not on AC due to falls  presented to the ED after a fall resulting in right hip pain/ difficulty ambulating. Patient states she got up from a seated position and ambulating a few feet before feel really weak. States she may have felt a "little dizzy" and fell on to her right hip. She was unable to get up/ ambulate after fall. Patient did not pass out and denies any head strike, preceding chest pain, palpitations, headache or visual changes. According to her son who was present at bedside, patient had been doing much better since being moved to [facility] assisted living- she had been  more ambulatory prior to falling.  She was admitted with right hip fracture.   Underwent Right hip aleksander.   Post op course notable for acute blood loss anemia, but did not require prbcs.   SHe was given stress dose steroids perioperatively. Her lasix was held.   She is now medically stable for dc to [facility]. Her lasix will be resumed at 1/2 dose. This should be titrated back to usual dose as bp/hydration status allows.     for physical exam please see progress note from 9/8.    LABS:                        9.8    8.67  )-----------( 101      ( 08 Sep 2022 08:44 )             29.3     09-08    137  |  102  |  36<H>  ----------------------------<  103<H>  3.4<L>   |  29  |  1.28    Ca    9.0      08 Sep 2022 08:44     CT Femur No Cont, Right (09.05.22 @ 22:17) >  Osseous: Acute, mildly comminuted transcervical fracture of right femoral   neck with moderate impaction, external rotary subluxation, and varus   angulation of the distal fragment. Small nondisplaced butterfly cortical   fragment along its ventral margin. Femoral head is well located. Mild   arthrosis of the hip joint. Moderate arthrosis at the pubic symphysis   with chondrocalcinosis. Right knee joints are maintained. Bony   mineralization is decreased. No aggressive osseous neoplasm.  Soft tissues: No mass lesion, hematoma, or drainable encapsulated fluid   collection. Chronic grade 2 strain of the gluteus minimus muscle body.   Muscle bulk and attenuation are otherwise within normal limits.  IMPRESSION:  Acute Garden type III transcervical fracture of right femoral neck.        FINAL DIAGNOSIS:  #Right Hip Fracture 2/2 mechanical fall- s/p RT hip aleksander POD#2.  #Anemia acute blood loss from fracture/postop  # Near Syncope-LIKELY related to dehydration.   #CKD 2-3  #HFpEF  #HTN  #Chronic Adrenal Insufficency   #Hx of paroxysmal Afib    time taken for dc 47 min  d/w pt  summary to be faxed to pcp upon dc.

## 2022-09-06 NOTE — PHYSICAL THERAPY INITIAL EVALUATION ADULT - GENERAL OBSERVATIONS, REHAB EVAL
The pt was received on 2N, supine, on bedrest as per orders, case discussed with RN. The pt is an add on for today's OR schedule. Will need a post-operative PT order.

## 2022-09-06 NOTE — DISCHARGE NOTE PROVIDER - NSDCMRMEDTOKEN_GEN_ALL_CORE_FT
amiodarone 200 mg oral tablet: 1 tab(s) orally once a day  amLODIPine 5 mg oral tablet: 1 tab(s) orally once a day  carvedilol 25 mg oral tablet: 1 tab(s) orally 2 times a day  Claritin 10 mg oral tablet: 1 tab(s) orally once a day, As Needed  cyanocobalamin 1000 mcg oral tablet, extended release: 1 tab(s) orally once a day  famotidine 20 mg oral tablet: 1 tab(s) orally 2 times a day  ferrous sulfate 325 mg (65 mg elemental iron) oral tablet: 1 tab(s) orally once a day  fludrocortisone 0.1 mg oral tablet: 0.5 tab(s) orally once a day  furosemide 40 mg oral tablet: 1 tab(s) orally 2 times a day  hydrALAZINE 100 mg oral tablet: 1 tab(s) orally 3 times a day   hydrocortisone 10 mg oral tablet: 1 tab(s) orally once a day  hydrocortisone 5 mg oral tablet: 1 tab(s) orally once a day (in the evening)  losartan 50 mg oral tablet: 2 tab(s) orally once a day  Melatonin 3 mg oral tablet: 1 tab(s) orally once a day (at bedtime), As Needed  Pepto-Bismol 262 mg/15 mL oral suspension: 15 milliliter(s) orally every 8 hours, As Needed - for heartburn  potassium chloride 20 mEq oral powder for reconstitution: 1 each orally once a day  Senna 8.6 mg oral tablet: 2 tab(s) orally once a day  traMADol 50 mg oral tablet: 1 tab(s) orally every 8 hours, As needed  Vitamin D3 50 mcg (2000 intl units) oral tablet: 1 tab(s) orally once a day   acetaminophen 325 mg oral tablet: 2 tab(s) orally every 6 hours, As needed, Temp greater or equal to 38C (100.4F), Mild Pain (1 - 3)  amiodarone 200 mg oral tablet: 1 tab(s) orally once a day  amLODIPine 5 mg oral tablet: 1 tab(s) orally once a day  carvedilol 25 mg oral tablet: 1 tab(s) orally 2 times a day  Claritin 10 mg oral tablet: 1 tab(s) orally once a day, As Needed  cyanocobalamin 1000 mcg oral tablet, extended release: 1 tab(s) orally once a day  famotidine 20 mg oral tablet: 1 tab(s) orally 2 times a day  ferrous sulfate 325 mg (65 mg elemental iron) oral tablet: 1 tab(s) orally once a day  fludrocortisone 0.1 mg oral tablet: 0.5 tab(s) orally once a day  furosemide 20 mg oral tablet: 1 tab(s) orally 2 times a day  heparin: 5000 unit(s) subcutaneous every 12 hours last dose on 10-5-2022  hydrALAZINE 100 mg oral tablet: 1 tab(s) orally 3 times a day   hydrocortisone 10 mg oral tablet: 1 tab(s) orally once a day  hydrocortisone 5 mg oral tablet: 1 tab(s) orally once a day (in the evening)  losartan 50 mg oral tablet: 2 tab(s) orally once a day  Melatonin 3 mg oral tablet: 1 tab(s) orally once a day (at bedtime), As Needed  oxyCODONE: 2.5 milligram(s) orally every 6 hours, As Needed for moderate to severe pain.   Pepto-Bismol 262 mg/15 mL oral suspension: 15 milliliter(s) orally every 8 hours, As Needed - for heartburn  polyethylene glycol 3350 oral powder for reconstitution: 17 gram(s) orally once a day, As needed, Constipation  potassium chloride 20 mEq oral powder for reconstitution: 1 each orally once a day  Senna 8.6 mg oral tablet: 2 tab(s) orally once a day  Vitamin D3 50 mcg (2000 intl units) oral tablet: 1 tab(s) orally once a day

## 2022-09-06 NOTE — PHARMACOTHERAPY INTERVENTION NOTE - COMMENTS
Medication history complete, reviewed medications with patient provided med list and confirmed with doctor first med hx.
Medication history complete, med rec was left incomplete so reviewed paperwork from [facility] and updated the outpatient medication review.

## 2022-09-06 NOTE — DISCHARGE NOTE PROVIDER - NSDCFUADDINST_GEN_ALL_CORE_FT
Discharge Instructions for Right Hip Hemiarthroplasty:    1. PAIN CONTROL: See Med Rec.  2. ACTIVITY: Weight Bearing as Tolerated with assistance and rolling walker. Posterior Hip Precautions. Abduction pillow while in bed or chair for 6 weeks.  3. PT: daily, Posterior Hip Precautions.  4. DVT/PE PROPHYLAXIS: Continue DVT/PE Prophylaxis. See Med Rec for Duration and dose.  5. BANDAGE: Change dressing to a new Mepilex Ag bandage POD7 (9/13/22). May change sooner if dressing saturated or falling off. DO NOT REMOVE BANDAGE TO CHECK WOUND ON INTAKE.  6. STAPLES: RN Remove Staples POD14 (9/20/22).  7. SHOWER: Okay to shower. Do not soak, submerge or let shower stream beat on dressing/wound.  8. FOLLOW UP: Follow-up with Orthopedic Surgeon Dr. Pollock in 14 Days. Call Office For Appointment.

## 2022-09-07 ENCOUNTER — TRANSCRIPTION ENCOUNTER (OUTPATIENT)
Age: 87
End: 2022-09-07

## 2022-09-07 LAB
ANION GAP SERPL CALC-SCNC: 7 MMOL/L — SIGNIFICANT CHANGE UP (ref 5–17)
BUN SERPL-MCNC: 26 MG/DL — HIGH (ref 7–23)
CALCIUM SERPL-MCNC: 8.8 MG/DL — SIGNIFICANT CHANGE UP (ref 8.5–10.1)
CHLORIDE SERPL-SCNC: 102 MMOL/L — SIGNIFICANT CHANGE UP (ref 96–108)
CO2 SERPL-SCNC: 30 MMOL/L — SIGNIFICANT CHANGE UP (ref 22–31)
CREAT SERPL-MCNC: 1.16 MG/DL — SIGNIFICANT CHANGE UP (ref 0.5–1.3)
CULTURE RESULTS: SIGNIFICANT CHANGE UP
EGFR: 43 ML/MIN/1.73M2 — LOW
GLUCOSE SERPL-MCNC: 126 MG/DL — HIGH (ref 70–99)
HCT VFR BLD CALC: 33.3 % — LOW (ref 34.5–45)
HGB BLD-MCNC: 10.7 G/DL — LOW (ref 11.5–15.5)
MCHC RBC-ENTMCNC: 30.1 PG — SIGNIFICANT CHANGE UP (ref 27–34)
MCHC RBC-ENTMCNC: 32.1 GM/DL — SIGNIFICANT CHANGE UP (ref 32–36)
MCV RBC AUTO: 93.8 FL — SIGNIFICANT CHANGE UP (ref 80–100)
PLATELET # BLD AUTO: 101 K/UL — LOW (ref 150–400)
POTASSIUM SERPL-MCNC: 3.6 MMOL/L — SIGNIFICANT CHANGE UP (ref 3.5–5.3)
POTASSIUM SERPL-SCNC: 3.6 MMOL/L — SIGNIFICANT CHANGE UP (ref 3.5–5.3)
RBC # BLD: 3.55 M/UL — LOW (ref 3.8–5.2)
RBC # FLD: 16.5 % — HIGH (ref 10.3–14.5)
SODIUM SERPL-SCNC: 139 MMOL/L — SIGNIFICANT CHANGE UP (ref 135–145)
SPECIMEN SOURCE: SIGNIFICANT CHANGE UP
WBC # BLD: 6.97 K/UL — SIGNIFICANT CHANGE UP (ref 3.8–10.5)
WBC # FLD AUTO: 6.97 K/UL — SIGNIFICANT CHANGE UP (ref 3.8–10.5)

## 2022-09-07 PROCEDURE — 99223 1ST HOSP IP/OBS HIGH 75: CPT

## 2022-09-07 PROCEDURE — 99232 SBSQ HOSP IP/OBS MODERATE 35: CPT

## 2022-09-07 RX ORDER — HEPARIN SODIUM 5000 [USP'U]/ML
5000 INJECTION INTRAVENOUS; SUBCUTANEOUS
Qty: 0 | Refills: 0 | DISCHARGE
Start: 2022-09-07

## 2022-09-07 RX ORDER — HYDROCORTISONE 20 MG
10 TABLET ORAL DAILY
Refills: 0 | Status: DISCONTINUED | OUTPATIENT
Start: 2022-09-09 | End: 2022-09-08

## 2022-09-07 RX ORDER — HYDROCORTISONE 20 MG
50 TABLET ORAL EVERY 8 HOURS
Refills: 0 | Status: COMPLETED | OUTPATIENT
Start: 2022-09-07 | End: 2022-09-08

## 2022-09-07 RX ORDER — HEPARIN SODIUM 5000 [USP'U]/ML
5000 INJECTION INTRAVENOUS; SUBCUTANEOUS EVERY 12 HOURS
Refills: 0 | Status: DISCONTINUED | OUTPATIENT
Start: 2022-09-07 | End: 2022-09-08

## 2022-09-07 RX ORDER — HYDROCORTISONE 20 MG
5 TABLET ORAL AT BEDTIME
Refills: 0 | Status: DISCONTINUED | OUTPATIENT
Start: 2022-09-08 | End: 2022-09-08

## 2022-09-07 RX ADMIN — SODIUM CHLORIDE 100 MILLILITER(S): 9 INJECTION INTRAMUSCULAR; INTRAVENOUS; SUBCUTANEOUS at 01:21

## 2022-09-07 RX ADMIN — HEPARIN SODIUM 5000 UNIT(S): 5000 INJECTION INTRAVENOUS; SUBCUTANEOUS at 09:44

## 2022-09-07 RX ADMIN — LOSARTAN POTASSIUM 100 MILLIGRAM(S): 100 TABLET, FILM COATED ORAL at 09:41

## 2022-09-07 RX ADMIN — Medication 100 MILLIGRAM(S): at 06:16

## 2022-09-07 RX ADMIN — SENNA PLUS 2 TABLET(S): 8.6 TABLET ORAL at 09:53

## 2022-09-07 RX ADMIN — Medication 100 MILLIGRAM(S): at 13:58

## 2022-09-07 RX ADMIN — HEPARIN SODIUM 5000 UNIT(S): 5000 INJECTION INTRAVENOUS; SUBCUTANEOUS at 22:22

## 2022-09-07 RX ADMIN — Medication 2000 UNIT(S): at 09:42

## 2022-09-07 RX ADMIN — SENNA PLUS 2 TABLET(S): 8.6 TABLET ORAL at 22:22

## 2022-09-07 RX ADMIN — Medication 50 MILLIGRAM(S): at 22:22

## 2022-09-07 RX ADMIN — Medication 325 MILLIGRAM(S): at 09:43

## 2022-09-07 RX ADMIN — ATORVASTATIN CALCIUM 10 MILLIGRAM(S): 80 TABLET, FILM COATED ORAL at 22:21

## 2022-09-07 RX ADMIN — FLUDROCORTISONE ACETATE 0.1 MILLIGRAM(S): 0.1 TABLET ORAL at 09:42

## 2022-09-07 RX ADMIN — AMLODIPINE BESYLATE 5 MILLIGRAM(S): 2.5 TABLET ORAL at 09:42

## 2022-09-07 RX ADMIN — Medication 100 MILLIGRAM(S): at 01:20

## 2022-09-07 RX ADMIN — Medication 100 MILLIGRAM(S): at 13:59

## 2022-09-07 RX ADMIN — PREGABALIN 1000 MICROGRAM(S): 225 CAPSULE ORAL at 09:52

## 2022-09-07 RX ADMIN — FAMOTIDINE 20 MILLIGRAM(S): 10 INJECTION INTRAVENOUS at 09:42

## 2022-09-07 RX ADMIN — AMIODARONE HYDROCHLORIDE 200 MILLIGRAM(S): 400 TABLET ORAL at 09:42

## 2022-09-07 RX ADMIN — POLYETHYLENE GLYCOL 3350 17 GRAM(S): 17 POWDER, FOR SOLUTION ORAL at 09:43

## 2022-09-07 RX ADMIN — CARVEDILOL PHOSPHATE 25 MILLIGRAM(S): 80 CAPSULE, EXTENDED RELEASE ORAL at 09:52

## 2022-09-07 RX ADMIN — Medication 20 MILLIEQUIVALENT(S): at 09:42

## 2022-09-07 RX ADMIN — Medication 100 MILLIGRAM(S): at 09:41

## 2022-09-07 RX ADMIN — CARVEDILOL PHOSPHATE 25 MILLIGRAM(S): 80 CAPSULE, EXTENDED RELEASE ORAL at 22:22

## 2022-09-07 RX ADMIN — Medication 100 MILLIGRAM(S): at 22:22

## 2022-09-07 NOTE — CONSULT NOTE ADULT - CONSULT REASON
fx hip, risk stratification anticoagulation management
Right Femoral Neck Hip Fracture    Consult called ~1830  Pt evaluated ~1915

## 2022-09-07 NOTE — PROVIDER CONTACT NOTE (OTHER) - SITUATION
Pt returned from PACU. IVF order for 100cc NS/ hr with hx of diastolic HF. Pt had an open dose of IV KCl from pre-op, K+ now 4.1. Pt /40 with 2 BP meds to give

## 2022-09-07 NOTE — PHYSICAL THERAPY INITIAL EVALUATION ADULT - TRANSFER TRAINING, PT EVAL
The pt will be able to perform all transfer training activities independently by the time of discharge from  acute care PT program.'
The pt will be able to perform all transfer training activities independently by the time of discharge from  acute care PT program.'

## 2022-09-07 NOTE — PROVIDER CONTACT NOTE (OTHER) - ACTION/TREATMENT ORDERED:
MD stated she will look into IVF rate and decide to change it or not based on kidney function. MD stated ok to hold IV KCl d/t normal K+ labs. MD stated to hold hydralazine and give other BP med.

## 2022-09-07 NOTE — PROGRESS NOTE ADULT - SUBJECTIVE AND OBJECTIVE BOX
CC- RT hip fracture    HPI:  89yo/F with PMH adrenal insufficiency on chronic steroids, chr diastolic CHF, CKD 2-3, paroxysmal afib not on AC due to falls  presented to the ED after a fall resulting in right hip pain/ difficulty ambulating. Patient states she got up from a seated position and ambulating a few feet before feel really weak. States she may have felt a "little dizzy" and fell on to her right hip. She was unable to get up/ ambulate after fall. Patient did not pass out and denies any head strike, preceding chest pain, palpitations, headache or visual changes. According to her son who was present at bedside, patient had been doing much better since being moved to [facility] assisted living- she had been  more ambulatory prior to falling.  In the ED patient found to have radiographic and physical examination findings consistent with a right hip fracture (05 Sep 2022 18:55)    22- NPO for OR  22- feeling good, waiting for PT to get up    Review of system- All 10 systems reviewed and is as per HPI otherwise negative.     Vital Signs Last 24 Hrs  T(C): 36.6 (07 Sep 2022 09:08), Max: 36.8 (06 Sep 2022 19:09)  T(F): 97.8 (07 Sep 2022 09:08), Max: 98.2 (06 Sep 2022 19:09)  HR: 58 (07 Sep 2022 13:50) (52 - 58)  BP: 141/47 (07 Sep 2022 13:50) (119/38 - 142/44)  BP(mean): --  RR: 18 (07 Sep 2022 13:50) (8 - 18)  SpO2: 96% (07 Sep 2022 13:50) (93% - 100%)    Parameters below as of 07 Sep 2022 13:50  Patient On (Oxygen Delivery Method): room air    LABS:                        10.7   6.97  )-----------( 101      ( 07 Sep 2022 07:38 )             33.3     07 Sep 2022 07:38    139    |  102    |  26     ----------------------------<  126    3.6     |  30     |  1.16     Ca    8.8        07 Sep 2022 07:38    TPro  6.2    /  Alb  2.9    /  TBili  0.4    /  DBili  x      /  AST  37     /  ALT  38     /  AlkPhos  102    06 Sep 2022 09:15    LIVER FUNCTIONS - ( 06 Sep 2022 09:15 )  Alb: 2.9 g/dL / Pro: 6.2 gm/dL / ALK PHOS: 102 U/L / ALT: 38 U/L / AST: 37 U/L / GGT: x           PT/INR - ( 06 Sep 2022 09:15 )   PT: 11.0 sec;   INR: 0.95 ratio      PTT - ( 06 Sep 2022 09:15 )  PTT:28.3 sec    CAPILLARY BLOOD GLUCOSE  POCT Blood Glucose.: 108 mg/dL (06 Sep 2022 19:12)    Urinalysis Basic - ( 05 Sep 2022 21:51 )  Color: Yellow / Appearance: Clear / S.010 / pH: x  Gluc: x / Ketone: Negative  / Bili: Negative / Urobili: Negative   Blood: x / Protein: 30 mg/dL / Nitrite: Negative   Leuk Esterase: Negative / RBC: Negative /HPF / WBC 0-2   Sq Epi: x / Non Sq Epi: Occasional / Bacteria: Moderate    RADIOLOGY & ADDITIONAL TESTS:   ECHO TTE WO CON COMP W DOPP                          PROCEDURE DATE:  2022       Impression     Summary     The left ventricle is normal in size and wall motion.   Mild asymmetrical basal septal left ventricular hypertrophy is present.   Estimated left ventricular ejection fraction is > 75 %.   There is an intracavity gradient which is likely due to a hyperdynamic   ventricle.   Normal appearing left atrium.   Normal appearing right atrium.   Normal appearing right ventricle structure and function.   The aortic valve appears mildly calcified. Valve opening seems to be   restricted.   Transaortic gradients are elevated consistent with mild aortic stenosis.   EMMANUEL by continuity equation suggests mild stenosis ( 1.7 cm 2).   Moderate (2+) eccentric aortic regurgitation is present.   Moderate mitral annular calcification is present.   The mitral valve leaflets appear thickened.   Mild (1+) mitral regurgitation is present.   EA reversal of the mitral inflow consistent with reduced compliance of   the   left ventricle.   The tricuspid valve leaflets appear mildly thickened and/or calcified,   but   open well.   Moderate (2+) tricuspid valve regurgitation is present.   Mild to moderate pulmonary hypertension.   No evidence of pericardial effusion.   The IVC appears mildly underdistended.    CT FEMUR ONLY RT                        PROCEDURE DATE:  2022      INTERPRETATION:  CT OF THE RIGHT FEMUR    CLINICAL INFORMATION: For evaluation of hip fracture.    COMPARISON: Radiographs performed same date and CT dated 3/5/2022.    TECHNIQUE: Axial CT images were obtained of the right femur with coronal   and sagittal reconstructions. 3-D volume rendered reformats were also   provided. No intravenous contrast was administered.    FINDINGS:    Osseous: Acute, mildly comminuted transcervical fracture of right femoral   neck with moderate impaction, external rotary subluxation, and varus   angulation of the distal fragment. Small nondisplaced butterfly cortical   fragment along its ventral margin. Femoral head is well located. Mild   arthrosis of the hip joint. Moderate arthrosis at the pubic symphysis   with chondrocalcinosis. Right knee joints are maintained. Bony   mineralization is decreased. No aggressive osseous neoplasm.    Soft tissues: No mass lesion, hematoma, or drainable encapsulated fluid   collection. Chronic grade 2 strain of the gluteus minimus muscle body.   Muscle bulk and attenuation are otherwise within normal limits.    IMPRESSION:    Acute Garden type III transcervical fracture of right femoral neck.    PHYSICAL EXAM:  GENERAL: NAD, well-groomed, well-developed  HEAD:  Atraumatic, Normocephalic  EYES: EOMI, PERRLA, conjunctiva and sclera clear  HEENT: Moist mucous membranes  NECK: Supple, No JVD  NERVOUS SYSTEM:  Alert & Oriented X3, Motor Strength 5/5 B/L upper and lower extremities; DTRs 2+ intact and symmetric  CHEST/LUNG: Clear to auscultation bilaterally; No rales, rhonchi, wheezing, or rubs  HEART: Regular rate and rhythm; No murmurs, rubs, or gallops  ABDOMEN: Soft, Nontender, Nondistended; Bowel sounds present  GENITOURINARY- Voiding, no palpable bladder  EXTREMITIES:  2+ Peripheral Pulses, No clubbing, cyanosis, or edema  MUSCULOSKELTAL- RT hip dressing dry  SKIN-no rash, no lesion  CNS- alert, oriented X3, non focal     Daily Height in cm: 152.4 (05 Sep 2022 16:02)      MEDICATIONS  (STANDING):  aMIOdarone    Tablet 200 milliGRAM(s) Oral daily  amLODIPine   Tablet 5 milliGRAM(s) Oral daily  atorvastatin 10 milliGRAM(s) Oral at bedtime  carvedilol 25 milliGRAM(s) Oral every 12 hours  cholecalciferol Oral Tab/Cap - Peds 2000 Unit(s) Oral daily  cyanocobalamin 1000 MICROGram(s) Oral daily  famotidine    Tablet 20 milliGRAM(s) Oral daily  ferrous    sulfate 325 milliGRAM(s) Oral daily  fludroCORTISONE 0.1 milliGRAM(s) Oral daily  hydrALAZINE  Oral Tab/Cap - Peds 100 milliGRAM(s) Oral three times a day  hydrocortisone sodium succinate Injectable 100 milliGRAM(s) IV Push every 8 hours  lactated ringers. 1000 milliLiter(s) (75 mL/Hr) IV Continuous <Continuous>  losartan 100 milliGRAM(s) Oral daily  potassium chloride   Powder 20 milliEquivalent(s) Oral daily  potassium chloride  10 mEq/100 mL IVPB 10 milliEquivalent(s) IV Intermittent every 1 hour  senna 8.6 milliGRAM(s) Oral Tablet - Peds 2 Tablet(s) Oral daily  sodium chloride 0.9% Bolus 500 milliLiter(s) IV Bolus once  tranexamic acid IVPB 1000 milliGRAM(s) IV Intermittent once  tranexamic acid IVPB 1000 milliGRAM(s) IV Intermittent once    MEDICATIONS  (PRN):  acetaminophen     Tablet .. 650 milliGRAM(s) Oral every 6 hours PRN Temp greater or equal to 38C (100.4F), Mild Pain (1 - 3)  aluminum hydroxide/magnesium hydroxide/simethicone Suspension 30 milliLiter(s) Oral every 4 hours PRN Dyspepsia  melatonin 3 milliGRAM(s) Oral at bedtime PRN Insomnia  morphine  - Injectable 2 milliGRAM(s) IV Push every 4 hours PRN Severe Pain (7 - 10)  ondansetron Injectable 4 milliGRAM(s) IV Push every 8 hours PRN Nausea and/or Vomiting  polyethylene glycol 3350 17 Gram(s) Oral daily PRN Constipation  traMADol 50 milliGRAM(s) Oral every 8 hours PRN Severe Pain (7 - 10)      Assessment/Plan  # Right Hip Fracture 2/2 mechanical fall- s/p RT hip aleksander  #Anemia acute blood loss from fracture/postop  Ortho following  PT as tolerated  Pain meds prn  Monitor   Incentive spirometry  Bowel regimen    # Near Syncope  -patient with mild dizziness  -no events noted on tele  -ECHO reviewed,     #CKD 2-3  Fluctuating but stable creatinine    #Chronic diastolic CHF  Currently euvolemic  Off lasix periop, will resume tomorrow if BP stable    #HTN  so far stable    #Adrenal Insuffiency   Taper stress dose of steroids today to 5omg q8h  Cont Florinef    #Hx of paroxysmal Afib  -on coreg and amiodarone  -not on AC at home due to falls    #Advanced Directives  -DNR/DNI  -MOLST on file    #Dispo- mobilize with PT, likely rehab when clinically stable

## 2022-09-07 NOTE — PHYSICAL THERAPY INITIAL EVALUATION ADULT - LIVES WITH, PROFILE
The pt reports that she was living in a new assisted living facility, the pt reports that she recently [facility] into [facility] but was living in [facility] in the past. She reports that while at [facility] she had private hire aides who would assist her with bathing and dressing. At [facility] she was doing much of this without assistance, (unknown accuracy of the pt's statements).
The pt reports that she was living in a new assisted living facility, the pt reports that she recently moved into [facility] but was living in [facility] in the past. She reports that while at [facility] she had private hire aides who would assist her with bathing and dressing. At [facility] she was doing much of this without assistance, (unknown accuracy of the pt's statements).

## 2022-09-07 NOTE — PHYSICAL THERAPY INITIAL EVALUATION ADULT - IMPAIRMENTS FOUND, PT EVAL
gait, locomotion, and balance/poor safety awareness/posture
gait, locomotion, and balance/poor safety awareness/posture

## 2022-09-07 NOTE — PHYSICAL THERAPY INITIAL EVALUATION ADULT - BED MOBILITY TRAINING, PT EVAL
The pt will be able to perform all bed mobility activities independently by the time of discharge from  acute care PT program.
The pt will be able to perform all bed mobility activities independently by the time of discharge from  acute care PT program.

## 2022-09-07 NOTE — PHYSICAL THERAPY INITIAL EVALUATION ADULT - MODALITIES TREATMENT COMMENTS
The pt was received on 2N, supine, pleasant and willing to participate in PT eval/ interview and therex in supine. The pt reports that she is open to TANG if necessary. The pt reports that she was independently ambulating at the assisted living facility using a RW prior to her fall and this admission.
The pt was received on 2N, supine, pleasant and willing to participate in PT eval/ interview and therex in supine. The pt reports that she is open to TANG if necessary. The pt reports that she was independently ambulating at the assisted living facility using a RW prior to her fall and this admission.

## 2022-09-07 NOTE — PHYSICAL THERAPY INITIAL EVALUATION ADULT - MANUAL MUSCLE TESTING RESULTS, REHAB EVAL
Right LE not tested, all other extremities grossly 3/5/grossly assessed due to
Right LE not tested, all other extremities grossly 3/5/grossly assessed due to

## 2022-09-07 NOTE — PHYSICAL THERAPY INITIAL EVALUATION ADULT - DIAGNOSIS, PT EVAL
89 y.o. female presenting with right hip femoral neck fracture s/p fall, plan for OR today. Bedrest until post-op as per Ortho.
89 y.o. female presenting with right hip femoral neck fracture s/p fall, R Hemiarthroplasty

## 2022-09-07 NOTE — PHYSICAL THERAPY INITIAL EVALUATION ADULT - GAIT TRAINING, PT EVAL
The pt will be able to ambulate 200 feet with RW independently by time of discharge from  acute care PT program, WB status pending.
The pt will be able to ambulate 200 feet with RW independently by time of discharge from  acute care PT program, WB status pending.

## 2022-09-07 NOTE — PROGRESS NOTE ADULT - SUBJECTIVE AND OBJECTIVE BOX
Patient seen and examined at bedside. Pain well controlled with medication. Patient denies any numbness, tingling, weakness, or any other orthopaedic complaint. Denies N/V/CP/SOB.         VITAL SIGNS:  T(C): 36.4 (22 @ 00:24), Max: 36.8 (22 @ 09:42)  HR: 53 (22 @ 00:24) (52 - 63)  BP: 142/44 (22 @ 00:24) (129/40 - 154/48)  RR: 17 (22 @ 00:24) (8 - 18)  SpO2: 94% (22 @ 00:24) (93% - 100%)      LABS:                        11.6   6.42  )-----------( 112      ( 06 Sep 2022 19:30 )             36.3         141  |  102  |  24<H>  ----------------------------<  136<H>  4.1   |  33<H>  |  1.34<H>    Ca    9.0      06 Sep 2022 19:30    TPro  6.2  /  Alb  2.9<L>  /  TBili  0.4  /  DBili  x   /  AST  37  /  ALT  38  /  AlkPhos  102  09-06    PT/INR - ( 06 Sep 2022 09:15 )   PT: 11.0 sec;   INR: 0.95 ratio         PTT - ( 06 Sep 2022 09:15 )  PTT:28.3 sec  Urinalysis Basic - ( 05 Sep 2022 21:51 )    Color: Yellow / Appearance: Clear / S.010 / pH: x  Gluc: x / Ketone: Negative  / Bili: Negative / Urobili: Negative   Blood: x / Protein: 30 mg/dL / Nitrite: Negative   Leuk Esterase: Negative / RBC: Negative /HPF / WBC 0-2   Sq Epi: x / Non Sq Epi: Occasional / Bacteria: Moderate          PE:  General: NAD, resting comfortably in bed  RLE:   Dressing C/D/I  SCDs present bilaterally  Abduction pillow in place  Compartments soft and compressible  No calf tenderness bilaterally  +TA/EHL/FHL/GSC  sensation intact to noxious stimuli L2-S1  2+ DP/PT               A/P:  89y f s/p R hemiarthroplasty POD 1    -PT/OT   -WBAT on the RLE with posterior hip precautions  -abduction pillow when supine or seated  -Pain Control  -DVT ppx per AC team  -Continue perioperative abx x 24 hours  -FU AM Labs  -Rest, ice, compress the extremity as needed  -Incentive Spirometry  -Medical management appreciated  -Dispo pending PT eval  -Will discuss with Douglas Pollock

## 2022-09-07 NOTE — PROGRESS NOTE ADULT - ATTENDING COMMENTS
Ortho stable. May discharge to rehab and follow as outpatient when cleared by medicine and PT.
For surgical fixation of right hip femoral neck fracture tonight.

## 2022-09-07 NOTE — CONSULT NOTE ADULT - ASSESSMENT
This is a 89 year old female with pmh of PAF [id number]   not on ac due to frequent fall, CKD, now s/p mechanical fall causing fx to her rt hip.  Pt s/p rt hip aleksander on 9-6-2022.  Pt has bot high bleed and a high thrombosis risk.  Pt requires anticoagulation prophylaxis.       Plan:  :Heparin 5,000 units sq daily for four weeks post procedure last dose on 10-5-2022 noted cr cl 18.2  :daily cbc/bmp  :LE Venodynes  : increase mobility as tolerated  :Thanks for consult will f/u  dispo: rehab

## 2022-09-07 NOTE — PHYSICAL THERAPY INITIAL EVALUATION ADULT - GENERAL OBSERVATIONS, REHAB EVAL
The pt was received on 2N, supine, The pt was received on 2N, supine in bed abd wedge in place + IV + bed alarm, R hip precautions reviewed. Pt. agreeable to PT. Bed mob with Max A. Sit to stand to RW to BS chair with Max A very slow. Pt. sat on hip chair with alarm and all belongs WR.

## 2022-09-07 NOTE — PHYSICAL THERAPY INITIAL EVALUATION ADULT - RANGE OF MOTION EXAMINATION, REHAB EVAL
Right LE not tested, all other extremities a/aa/prom WFL/WNL Right LE not tested, all other extremities a/aa/prom WFL/WNL/no ROM deficits were identified

## 2022-09-07 NOTE — PHYSICAL THERAPY INITIAL EVALUATION ADULT - PATIENT/FAMILY/SIGNIFICANT OTHER GOALS STATEMENT, PT EVAL
The pt is open to going to [facility] once medically stable for discharge
The pt is open to going to [facility] once medically stable for discharge

## 2022-09-07 NOTE — PHYSICAL THERAPY INITIAL EVALUATION ADULT - NSACTIVITYREC_GEN_A_PT
Will await post-operative PT order and re-evaluate/ re-assess patient post-operatively
Will await post-operative PT order and re-evaluate/ re-assess patient post-operatively

## 2022-09-07 NOTE — PHYSICAL THERAPY INITIAL EVALUATION ADULT - PERTINENT HX OF CURRENT PROBLEM, REHAB EVAL
89y Female with a right femoral neck hip fracture s/p fall today.
89y Female with a right femoral neck hip fracture s/p fall, near syncope. Pt. s/p R Hemiarthroplasty

## 2022-09-07 NOTE — PHYSICAL THERAPY INITIAL EVALUATION ADULT - PREDICTED DURATION OF THERAPY (DAYS/WKS), PT EVAL
1-3 days. re-assess/ assess OOB once the patient is post-operative
1-3 days. re-assess/ assess OOB once the patient is post-operative

## 2022-09-07 NOTE — CONSULT NOTE ADULT - SUBJECTIVE AND OBJECTIVE BOX
HPI:  This is a 89 year old female with PMH adrenal insufficiency on chronic steroids, chr diastolic CHF, CKD 2-3, paroxysmal afib not on AC due to falls.  patient who ambulates at baseline with a walker who currently resides at a local assisted living presented to the ED after a fall resulting in right hip pain/ difficulty ambulating. Patient states she got up from a seated position and ambulating a few feet before feel really weak. States she may have felt a "little dizzy" and fell on to her right hip. She was unable to get up/ ambulate after fall. Patient did not pass out and denies any head strike, preceding chest pain, palpitations, headache or visual changes. According to her son who was present at bedside, patient had been doing much better since being moved to [facility] assisted living- she had been  more ambulatory prior to falling.      In the ED patient found to have radiographic and physical examination findings consistent with a right hip fracture (05 Sep 2022 18:55)      Patient is a 89y old  Female who presents with a chief complaint of Right hip fracture  near syncope (07 Sep 2022 04:40)      Consulted by Dr. Ankit Shaver  for VTE prophylaxis, risk stratification, and anticoagulation management.    PAST MEDICAL & SURGICAL HISTORY:  Iron deficiency      HTN (hypertension)      Lymphoma      H/O adrenal insufficiency      Hyponatremia      Hypokalemia      Chronic kidney disease, unspecified CKD stage      Diastolic heart failure      Paroxysmal atrial fibrillation not on ac       S/P appendectomy      S/P hysterectomy      H/O intracranial hemorrhage          FAMILY HISTORY:  No known problems (Father, Mother)        Interval Note:  2022 Pt seen at bedside on [facility].  Discussed her anticoagulation with heparin.  Pt is [place] a&o to person and place.         CAPRINI SCORE  AGE RELATED RISK FACTORS                                                       MOBILITY RELATED FACTORS  [ ] Age 41-60 years                                            (1 Point)                  [ ] Bed rest                                                        (1 Point)  [ ] Age: 61-74 years                                           (2 Points)                [ ] Plaster cast                                                   (2 Points)  [ x] Age= 75 years                                              (3 Points)                 [ ] Bed bound for more than 72 hours                   (2 Points)    DISEASE RELATED RISK FACTORS                                               GENDER SPECIFIC FACTORS  [ ] Edema in the lower extremities                       (1 Point)           [ ] Pregnancy                                                            (1 Point)  [ ] Varicose veins                                               (1 Point)                  [ ] Post-partum < 6 weeks                                      (1 Point)             [ ] BMI > 25 Kg/m2                                            (1 Point)                  [ ] Hormonal therapy or oral contraception       (1 Point)                 [ ] Sepsis (in the previous month)                        (1 Point)             [ ] History of pregnancy complications                (1Point)  [ ] Pneumonia or serious lung disease                                             [ ] Unexplained or recurrent  (=/>3), premature                                 (In the previous month)                               (1 Point)                birth with toxemia or growth-restricted infant (1 Point)  [ ] Abnormal pulmonary function test            (1 Point)                                   SURGERY RELATED RISK FACTORS  [ ] Acute myocardial infarction                       (1 Point)                  [ ]  Section                                         (1 Point)  [ ] Congestive heart failure (in the previous month) (1 Point)   [ ] Minor surgery   lasting <45 minutes       (1 Point)   [ ] Inflammatory bowel disease                             (1 Point)          [ ] Arthroscopic surgery                                  (2 Points)  [ ] Central venous access                                    (2 Points)            [ ] General surgery lasting >45 minutes      (2 Points)       [ ] Stroke (in the previous month)                  (5 Points)            [ ] Elective major lower extremity arthroplasty (5 Points)                                   [  ] Malignancy (present or past include skin melanoma                                          but exclude  basal skin cell)    (2 points)                                      TRAUMA RELATED RISK FACTORS                HEMATOLOGY RELATED FACTORS                                  [x ] Fracture of the hip, pelvis, or leg                       (5 Points)  [ ] Prior episodes of VTE                                     (3 Points)          [ ] Acute spinal cord injury (in the previous month)  (5 Points)  [ ] Positive family history for VTE                         (3 Points)       [ ] Paralysis (less than 1 month)                          (5 Points)  [ ] Prothrombin 98481 A                                      (3 Points)         [ ] Multiple Trauma (within 1month)                 (5Points)                                                                                                                                                                [ ] Factor V Leiden                                          (3 Points)                                OTHER RISK FACTORS                          [ ] Lupus anticoagulants                                     (3 Points)                       [ ] BMI > 40                          (1 Point)                                                         [ ] Anticardiolipin antibodies                                (3 Points)                   [ ] Smoking                              (1Point)                                                [ ] High homocysteine in the blood                      (3 Points)                [  ] Diabetes requiring insulin (1point)                         [ ] Other congenital or acquired thrombophilia       (3 Points)          [  ] Chemotherapy                   (1 Point)  [ ] Heparin induced thrombocytopenia                  (3 Points)             [  ] Blood Transfusion                (1 point)                                                                                                             Total Score [8 ]                                                                                                                                                                                                                                                                                                                                       [id number]                                                                                                    IMPROVE Bleeding Risk Score: 6    Falls Risk:   High ( x )  Mod (  )  Low (  )  crcl: 18.2        cr: 1.34         BMI  21.1           EBL: 150  ml  Time procedure    : starts:              :ends          Denies any personal or familial history of clotting or bleeding disorders.    Allergies    penicillin (Hives)  sulfa drugs (Hives)  tetracycline (Hives)    Intolerances        REVIEW OF SYSTEMS    (  )Fever	     (  )Constipation	(  )SOB				(  )Headache	(  )Dysuria  (  )Chills	     (  )Melena	(  )Dyspnea present on exertion	                    (  )Dizziness                    (  )Polyuria  (  )Nausea	     (  )Hematochezia	(  )Cough			                    (  )Syncope   	(  )Hematuria  (  )Vomiting    (  )Chest Pain	(  )Wheezing			(  )Weakness  (  )Diarrhea     (  )Palpitations	(  )Anorexia			( x )Myalgia  (x) hip pain    Pertinent positives in HPI and daily subjective.  All other ROS negative.      Vital Signs Last 24 Hrs  T(C): 36.6 (07 Sep 2022 09:08), Max: 36.8 (06 Sep 2022 19:09)  T(F): 97.8 (07 Sep 2022 09:08), Max: 98.2 (06 Sep 2022 19:09)  HR: 58 (07 Sep 2022 09:08) (52 - 58)  BP: 119/38 (07 Sep 2022 09:08) (119/38 - 142/44)  BP(mean): --  RR: 18 (07 Sep 2022 09:08) (8 - 18)  SpO2: 96% (07 Sep 2022 09:08) (93% - 100%)    Parameters below as of 07 Sep 2022 09:08  Patient On (Oxygen Delivery Method): room air        PHYSICAL EXAM:    Constitutional: Appears Well    Neurological: A& O x 2 person and place    Skin: Warm    Respiratory and Thorax: normal effort; Breath sounds: normal; No rales/wheezing/rhonchi  	  Cardiovascular: S1, S2, regular, NMBR	    Gastrointestinal: BS + x 4Q, nontender	    Genitourinary:  Bladder nondistended, nontender    Musculoskeletal:   General Right:   no muscle/joint tenderness,   normal tone, no joint swelling,   ROM: limited	    General Left:   no muscle/joint tenderness,   normal tone, no joint swelling,   ROM: full    Hip:  Right: Dressing CDI;    Lower extrems:   Right: no calf tenderness              negative riky's sign               + pedal pulses    Left:   no calf tenderness              negative riky's sign               + pedal pulses                          10.7   6.97  )-----------( 101      ( 07 Sep 2022 07:38 )             33.3       09-07    139  |  102  |  26<H>  ----------------------------<  126<H>  3.6   |  30  |  1.16    Ca    8.8      07 Sep 2022 07:38    TPro  6.2  /  Alb  2.9<L>  /  TBili  0.4  /  DBili  x   /  AST  37  /  ALT  38  /  AlkPhos  102  09-06      PT/INR - ( 06 Sep 2022 09:15 )   PT: 11.0 sec;   INR: 0.95 ratio         PTT - ( 06 Sep 2022 09:15 )  PTT:28.3 sec				    MEDICATIONS  (STANDING):  aMIOdarone    Tablet 200 milliGRAM(s) Oral daily  amLODIPine   Tablet 5 milliGRAM(s) Oral daily  atorvastatin 10 milliGRAM(s) Oral at bedtime  carvedilol 25 milliGRAM(s) Oral every 12 hours  cholecalciferol Oral Tab/Cap - Peds 2000 Unit(s) Oral daily  cyanocobalamin 1000 MICROGram(s) Oral daily  famotidine    Tablet 20 milliGRAM(s) Oral daily  ferrous    sulfate 325 milliGRAM(s) Oral daily  fludroCORTISONE 0.1 milliGRAM(s) Oral daily  heparin   Injectable 5000 Unit(s) SubCutaneous every 12 hours  hydrALAZINE  Oral Tab/Cap - Peds 100 milliGRAM(s) Oral three times a day  hydrocortisone sodium succinate Injectable 100 milliGRAM(s) IV Push every 8 hours  losartan 100 milliGRAM(s) Oral daily  polyethylene glycol 3350 17 Gram(s) Oral daily  potassium chloride   Powder 20 milliEquivalent(s) Oral daily  senna 2 Tablet(s) Oral at bedtime  senna 8.6 milliGRAM(s) Oral Tablet - Peds 2 Tablet(s) Oral daily  sodium chloride 0.9% Bolus 500 milliLiter(s) IV Bolus once  tranexamic acid IVPB 1000 milliGRAM(s) IV Intermittent once  tranexamic acid IVPB 1000 milliGRAM(s) IV Intermittent once          DVT Prophylaxis:  LMWH                   (  )  Heparin SQ           (  )  Coumadin             (  )  Xarelto                  (  )  Eliquis                   (  )  Venodynes           (  )  Ambulation          (  )  UFH                       (  )  Contraindicated  (  )  EC Aspirin             (  )           HPI:  This is a 89 year old female with PMH adrenal insufficiency on chronic steroids, chr diastolic CHF, CKD 2-3, paroxysmal afib not on AC due to falls.  patient who ambulates at baseline with a walker who currently resides at a local assisted living presented to the ED after a fall resulting in right hip pain/ difficulty ambulating. Patient states she got up from a seated position and ambulating a few feet before feel really weak. States she may have felt a "little dizzy" and fell on to her right hip. She was unable to get up/ ambulate after fall. Patient did not pass out and denies any head strike, preceding chest pain, palpitations, headache or visual changes. According to her son who was present at bedside, patient had been doing much better since being moved to [facility] assisted living- she had been  more ambulatory prior to falling.      In the ED patient found to have radiographic and physical examination findings consistent with a right hip fracture (05 Sep 2022 18:55)      Patient is a 89y old  Female who presents with a chief complaint of Right hip fracture  near syncope (07 Sep 2022 04:40)      Consulted by Dr. Ankit Shaver  for VTE prophylaxis, risk stratification, and anticoagulation management.    PAST MEDICAL & SURGICAL HISTORY:  Iron deficiency      HTN (hypertension)      Lymphoma      H/O adrenal insufficiency      Hyponatremia      Hypokalemia      Chronic kidney disease, unspecified CKD stage      Diastolic heart failure      Paroxysmal atrial fibrillation not on ac       S/P appendectomy      S/P hysterectomy      H/O intracranial hemorrhage          FAMILY HISTORY:  No known problems (Father, Mother)        Interval Note:  2022 Pt seen at bedside on [facility].  Discussed her anticoagulation with heparin.  Pt is [place] a&o to person and place.         CAPRINI SCORE  AGE RELATED RISK FACTORS                                                       MOBILITY RELATED FACTORS  [ ] Age 41-60 years                                            (1 Point)                  [ ] Bed rest                                                        (1 Point)  [ ] Age: 61-74 years                                           (2 Points)                [ ] Plaster cast                                                   (2 Points)  [ x] Age= 75 years                                              (3 Points)                 [ ] Bed bound for more than 72 hours                   (2 Points)    DISEASE RELATED RISK FACTORS                                               GENDER SPECIFIC FACTORS  [ ] Edema in the lower extremities                       (1 Point)           [ ] Pregnancy                                                            (1 Point)  [ ] Varicose veins                                               (1 Point)                  [ ] Post-partum < 6 weeks                                      (1 Point)             [ ] BMI > 25 Kg/m2                                            (1 Point)                  [ ] Hormonal therapy or oral contraception       (1 Point)                 [ ] Sepsis (in the previous month)                        (1 Point)             [ ] History of pregnancy complications                (1Point)  [ ] Pneumonia or serious lung disease                                             [ ] Unexplained or recurrent  (=/>3), premature                                 (In the previous month)                               (1 Point)                birth with toxemia or growth-restricted infant (1 Point)  [ ] Abnormal pulmonary function test            (1 Point)                                   SURGERY RELATED RISK FACTORS  [ ] Acute myocardial infarction                       (1 Point)                  [ ]  Section                                         (1 Point)  [ ] Congestive heart failure (in the previous month) (1 Point)   [ ] Minor surgery   lasting <45 minutes       (1 Point)   [ ] Inflammatory bowel disease                             (1 Point)          [ ] Arthroscopic surgery                                  (2 Points)  [ ] Central venous access                                    (2 Points)            [ ] General surgery lasting >45 minutes      (2 Points)       [ ] Stroke (in the previous month)                  (5 Points)            [ ] Elective major lower extremity arthroplasty (5 Points)                                   [  ] Malignancy (present or past include skin melanoma                                          but exclude  basal skin cell)    (2 points)                                      TRAUMA RELATED RISK FACTORS                HEMATOLOGY RELATED FACTORS                                  [x ] Fracture of the hip, pelvis, or leg                       (5 Points)  [ ] Prior episodes of VTE                                     (3 Points)          [ ] Acute spinal cord injury (in the previous month)  (5 Points)  [ ] Positive family history for VTE                         (3 Points)       [ ] Paralysis (less than 1 month)                          (5 Points)  [ ] Prothrombin 97530 A                                      (3 Points)         [ ] Multiple Trauma (within 1month)                 (5Points)                                                                                                                                                                [ ] Factor V Leiden                                          (3 Points)                                OTHER RISK FACTORS                          [ ] Lupus anticoagulants                                     (3 Points)                       [ ] BMI > 40                          (1 Point)                                                         [ ] Anticardiolipin antibodies                                (3 Points)                   [ ] Smoking                              (1Point)                                                [ ] High homocysteine in the blood                      (3 Points)                [  ] Diabetes requiring insulin (1point)                         [ ] Other congenital or acquired thrombophilia       (3 Points)          [  ] Chemotherapy                   (1 Point)  [ ] Heparin induced thrombocytopenia                  (3 Points)             [  ] Blood Transfusion                (1 point)                                                                                                             Total Score [8 ]                                                                                                                                                                                                                                                                                                                                       [id number]                                                                                                    IMPROVE Bleeding Risk Score: 6    Falls Risk:   High ( x )  Mod (  )  Low (  )  crcl: 18.2        cr: 1.34         BMI  21.1           EBL: 150  ml  Time procedure    : starts:  17:55            :ends: 19:04          Denies any personal or familial history of clotting or bleeding disorders.    Allergies    penicillin (Hives)  sulfa drugs (Hives)  tetracycline (Hives)    Intolerances        REVIEW OF SYSTEMS    (  )Fever	     (  )Constipation	(  )SOB				(  )Headache	(  )Dysuria  (  )Chills	     (  )Melena	(  )Dyspnea present on exertion	                    (  )Dizziness                    (  )Polyuria  (  )Nausea	     (  )Hematochezia	(  )Cough			                    (  )Syncope   	(  )Hematuria  (  )Vomiting    (  )Chest Pain	(  )Wheezing			(  )Weakness  (  )Diarrhea     (  )Palpitations	(  )Anorexia			( x )Myalgia  (x) hip pain    Pertinent positives in HPI and daily subjective.  All other ROS negative.      Vital Signs Last 24 Hrs  T(C): 36.6 (07 Sep 2022 09:08), Max: 36.8 (06 Sep 2022 19:09)  T(F): 97.8 (07 Sep 2022 09:08), Max: 98.2 (06 Sep 2022 19:09)  HR: 58 (07 Sep 2022 09:08) (52 - 58)  BP: 119/38 (07 Sep 2022 09:08) (119/38 - 142/44)  BP(mean): --  RR: 18 (07 Sep 2022 09:08) (8 - 18)  SpO2: 96% (07 Sep 2022 09:08) (93% - 100%)    Parameters below as of 07 Sep 2022 09:08  Patient On (Oxygen Delivery Method): room air        PHYSICAL EXAM:    Constitutional: Appears Well    Neurological: A& O x 2 person and place    Skin: Warm    Respiratory and Thorax: normal effort; Breath sounds: normal; No rales/wheezing/rhonchi  	  Cardiovascular: S1, S2, regular, NMBR	    Gastrointestinal: BS + x 4Q, nontender	    Genitourinary:  Bladder nondistended, nontender    Musculoskeletal:   General Right:   no muscle/joint tenderness,   normal tone, no joint swelling,   ROM: limited	    General Left:   no muscle/joint tenderness,   normal tone, no joint swelling,   ROM: full    Hip:  Right: Dressing CDI;    Lower extrems:   Right: no calf tenderness              negative riky's sign               + pedal pulses    Left:   no calf tenderness              negative riky's sign               + pedal pulses                          10.7   6.97  )-----------( 101      ( 07 Sep 2022 07:38 )             33.3       09-07    139  |  102  |  26<H>  ----------------------------<  126<H>  3.6   |  30  |  1.16    Ca    8.8      07 Sep 2022 07:38    TPro  6.2  /  Alb  2.9<L>  /  TBili  0.4  /  DBili  x   /  AST  37  /  ALT  38  /  AlkPhos  102  09-06      PT/INR - ( 06 Sep 2022 09:15 )   PT: 11.0 sec;   INR: 0.95 ratio         PTT - ( 06 Sep 2022 09:15 )  PTT:28.3 sec				    MEDICATIONS  (STANDING):  aMIOdarone    Tablet 200 milliGRAM(s) Oral daily  amLODIPine   Tablet 5 milliGRAM(s) Oral daily  atorvastatin 10 milliGRAM(s) Oral at bedtime  carvedilol 25 milliGRAM(s) Oral every 12 hours  cholecalciferol Oral Tab/Cap - Peds 2000 Unit(s) Oral daily  cyanocobalamin 1000 MICROGram(s) Oral daily  famotidine    Tablet 20 milliGRAM(s) Oral daily  ferrous    sulfate 325 milliGRAM(s) Oral daily  fludroCORTISONE 0.1 milliGRAM(s) Oral daily  heparin   Injectable 5000 Unit(s) SubCutaneous every 12 hours  hydrALAZINE  Oral Tab/Cap - Peds 100 milliGRAM(s) Oral three times a day  hydrocortisone sodium succinate Injectable 100 milliGRAM(s) IV Push every 8 hours  losartan 100 milliGRAM(s) Oral daily  polyethylene glycol 3350 17 Gram(s) Oral daily  potassium chloride   Powder 20 milliEquivalent(s) Oral daily  senna 2 Tablet(s) Oral at bedtime  senna 8.6 milliGRAM(s) Oral Tablet - Peds 2 Tablet(s) Oral daily  sodium chloride 0.9% Bolus 500 milliLiter(s) IV Bolus once  tranexamic acid IVPB 1000 milliGRAM(s) IV Intermittent once  tranexamic acid IVPB 1000 milliGRAM(s) IV Intermittent once          DVT Prophylaxis:  LMWH                   (  )  Heparin SQ           (  )  Coumadin             (  )  Xarelto                  (  )  Eliquis                   (  )  Venodynes           (  )  Ambulation          (  )  UFH                       (  )  Contraindicated  (  )  EC Aspirin             (  )

## 2022-09-08 ENCOUNTER — TRANSCRIPTION ENCOUNTER (OUTPATIENT)
Age: 87
End: 2022-09-08

## 2022-09-08 VITALS
SYSTOLIC BLOOD PRESSURE: 131 MMHG | OXYGEN SATURATION: 95 % | DIASTOLIC BLOOD PRESSURE: 39 MMHG | HEART RATE: 62 BPM | TEMPERATURE: 98 F | RESPIRATION RATE: 18 BRPM

## 2022-09-08 LAB
ANION GAP SERPL CALC-SCNC: 6 MMOL/L — SIGNIFICANT CHANGE UP (ref 5–17)
BUN SERPL-MCNC: 36 MG/DL — HIGH (ref 7–23)
CALCIUM SERPL-MCNC: 9 MG/DL — SIGNIFICANT CHANGE UP (ref 8.5–10.1)
CHLORIDE SERPL-SCNC: 102 MMOL/L — SIGNIFICANT CHANGE UP (ref 96–108)
CO2 SERPL-SCNC: 29 MMOL/L — SIGNIFICANT CHANGE UP (ref 22–31)
CREAT SERPL-MCNC: 1.28 MG/DL — SIGNIFICANT CHANGE UP (ref 0.5–1.3)
EGFR: 38 ML/MIN/1.73M2 — LOW
GLUCOSE SERPL-MCNC: 103 MG/DL — HIGH (ref 70–99)
HCT VFR BLD CALC: 29.3 % — LOW (ref 34.5–45)
HGB BLD-MCNC: 9.8 G/DL — LOW (ref 11.5–15.5)
MCHC RBC-ENTMCNC: 30.4 PG — SIGNIFICANT CHANGE UP (ref 27–34)
MCHC RBC-ENTMCNC: 33.4 GM/DL — SIGNIFICANT CHANGE UP (ref 32–36)
MCV RBC AUTO: 91 FL — SIGNIFICANT CHANGE UP (ref 80–100)
PLATELET # BLD AUTO: 101 K/UL — LOW (ref 150–400)
POTASSIUM SERPL-MCNC: 3.4 MMOL/L — LOW (ref 3.5–5.3)
POTASSIUM SERPL-SCNC: 3.4 MMOL/L — LOW (ref 3.5–5.3)
RBC # BLD: 3.22 M/UL — LOW (ref 3.8–5.2)
RBC # FLD: 16.8 % — HIGH (ref 10.3–14.5)
SARS-COV-2 RNA SPEC QL NAA+PROBE: SIGNIFICANT CHANGE UP
SODIUM SERPL-SCNC: 137 MMOL/L — SIGNIFICANT CHANGE UP (ref 135–145)
WBC # BLD: 8.67 K/UL — SIGNIFICANT CHANGE UP (ref 3.8–10.5)
WBC # FLD AUTO: 8.67 K/UL — SIGNIFICANT CHANGE UP (ref 3.8–10.5)

## 2022-09-08 PROCEDURE — 99231 SBSQ HOSP IP/OBS SF/LOW 25: CPT

## 2022-09-08 PROCEDURE — 99239 HOSP IP/OBS DSCHRG MGMT >30: CPT

## 2022-09-08 RX ORDER — ACETAMINOPHEN 500 MG
2 TABLET ORAL
Qty: 0 | Refills: 0 | DISCHARGE
Start: 2022-09-08

## 2022-09-08 RX ORDER — FUROSEMIDE 40 MG
1 TABLET ORAL
Qty: 0 | Refills: 0 | DISCHARGE

## 2022-09-08 RX ORDER — POTASSIUM CHLORIDE 20 MEQ
20 PACKET (EA) ORAL ONCE
Refills: 0 | Status: COMPLETED | OUTPATIENT
Start: 2022-09-08 | End: 2022-09-08

## 2022-09-08 RX ORDER — POLYETHYLENE GLYCOL 3350 17 G/17G
17 POWDER, FOR SOLUTION ORAL
Qty: 0 | Refills: 0 | DISCHARGE
Start: 2022-09-08

## 2022-09-08 RX ORDER — OXYCODONE HYDROCHLORIDE 5 MG/1
2.5 TABLET ORAL
Qty: 0 | Refills: 0 | DISCHARGE
Start: 2022-09-08

## 2022-09-08 RX ORDER — BNT162B2 0.23 MG/2.25ML
0.3 INJECTION, SUSPENSION INTRAMUSCULAR ONCE
Refills: 0 | Status: COMPLETED | OUTPATIENT
Start: 2022-09-08 | End: 2022-09-08

## 2022-09-08 RX ADMIN — TRAMADOL HYDROCHLORIDE 50 MILLIGRAM(S): 50 TABLET ORAL at 13:52

## 2022-09-08 RX ADMIN — Medication 50 MILLIGRAM(S): at 05:44

## 2022-09-08 RX ADMIN — Medication 100 MILLIGRAM(S): at 05:45

## 2022-09-08 RX ADMIN — FAMOTIDINE 20 MILLIGRAM(S): 10 INJECTION INTRAVENOUS at 10:48

## 2022-09-08 RX ADMIN — HEPARIN SODIUM 5000 UNIT(S): 5000 INJECTION INTRAVENOUS; SUBCUTANEOUS at 10:46

## 2022-09-08 RX ADMIN — SENNA PLUS 2 TABLET(S): 8.6 TABLET ORAL at 10:47

## 2022-09-08 RX ADMIN — BNT162B2 0.3 MILLILITER(S): 0.23 INJECTION, SUSPENSION INTRAMUSCULAR at 14:07

## 2022-09-08 RX ADMIN — Medication 20 MILLIEQUIVALENT(S): at 12:26

## 2022-09-08 RX ADMIN — Medication 2000 UNIT(S): at 10:47

## 2022-09-08 RX ADMIN — FLUDROCORTISONE ACETATE 0.1 MILLIGRAM(S): 0.1 TABLET ORAL at 10:47

## 2022-09-08 RX ADMIN — Medication 50 MILLIGRAM(S): at 13:54

## 2022-09-08 RX ADMIN — CARVEDILOL PHOSPHATE 25 MILLIGRAM(S): 80 CAPSULE, EXTENDED RELEASE ORAL at 10:47

## 2022-09-08 RX ADMIN — PREGABALIN 1000 MICROGRAM(S): 225 CAPSULE ORAL at 10:48

## 2022-09-08 RX ADMIN — AMLODIPINE BESYLATE 5 MILLIGRAM(S): 2.5 TABLET ORAL at 10:47

## 2022-09-08 RX ADMIN — AMIODARONE HYDROCHLORIDE 200 MILLIGRAM(S): 400 TABLET ORAL at 10:48

## 2022-09-08 RX ADMIN — Medication 20 MILLIEQUIVALENT(S): at 10:46

## 2022-09-08 RX ADMIN — POLYETHYLENE GLYCOL 3350 17 GRAM(S): 17 POWDER, FOR SOLUTION ORAL at 10:46

## 2022-09-08 RX ADMIN — Medication 100 MILLIGRAM(S): at 13:54

## 2022-09-08 RX ADMIN — Medication 325 MILLIGRAM(S): at 10:47

## 2022-09-08 RX ADMIN — LOSARTAN POTASSIUM 100 MILLIGRAM(S): 100 TABLET, FILM COATED ORAL at 10:48

## 2022-09-08 NOTE — PROGRESS NOTE ADULT - PROVIDER SPECIALTY LIST ADULT
Anticoag Management
Orthopedics
Hospitalist
Hospitalist
Orthopedics
Hospitalist
Orthopedics

## 2022-09-08 NOTE — PROGRESS NOTE ADULT - ASSESSMENT
This is a 89 year old female with pmh of PAF [id number]   not on ac due to frequent fall, CKD, now s/p mechanical fall causing fx to her rt hip.  Pt s/p rt hip aleksander on 9-6-2022.  Pt has bot high bleed and a high thrombosis risk.  Pt requires anticoagulation prophylaxis.       Plan:  :Heparin 5,000 units sq daily for four weeks post procedure last dose on 10-5-2022 noted cr cl 18.2  :daily cbc/bmp  :LE Venodynes  : increase mobility as tolerated  :Thanks for consult will f/u  dispo: rehab today at [facility]

## 2022-09-08 NOTE — DISCHARGE NOTE NURSING/CASE MANAGEMENT/SOCIAL WORK - [id number]_GEN_ALL_CORE
For information on Fall & Injury Prevention, visit: [email or web address]  [email or web address] OR  https://www.cdc.gov/steadi/patient.html

## 2022-09-08 NOTE — PROGRESS NOTE ADULT - REASON FOR ADMISSION
Right hip fracture  near syncope

## 2022-09-08 NOTE — PROGRESS NOTE ADULT - SUBJECTIVE AND OBJECTIVE BOX
HPI:  This is a 89 year old female with PMH adrenal insufficiency on chronic steroids, chr diastolic CHF, CKD 2-3, paroxysmal afib not on AC due to falls.  patient who ambulates at baseline with a walker who currently resides at a local assisted living presented to the ED after a fall resulting in right hip pain/ difficulty ambulating. Patient states she got up from a seated position and ambulating a few feet before feel really weak. States she may have felt a "little dizzy" and fell on to her right hip. She was unable to get up/ ambulate after fall. Patient did not pass out and denies any head strike, preceding chest pain, palpitations, headache or visual changes. According to her son who was present at bedside, patient had been doing much better since being moved to [facility] assisted living- she had been  more ambulatory prior to falling.      In the ED patient found to have radiographic and physical examination findings consistent with a right hip fracture (05 Sep 2022 18:55)      Patient is a 89y old  Female who presents with a chief complaint of Right hip fracture  near syncope (07 Sep 2022 04:40)      Consulted by Dr. Ankit Shaver  for VTE prophylaxis, risk stratification, and anticoagulation management.    PAST MEDICAL & SURGICAL HISTORY:  Iron deficiency      HTN (hypertension)      Lymphoma      H/O adrenal insufficiency      Hyponatremia      Hypokalemia      Chronic kidney disease, unspecified CKD stage      Diastolic heart failure      Paroxysmal atrial fibrillation not on ac       S/P appendectomy      S/P hysterectomy      H/O intracranial hemorrhage          FAMILY HISTORY:  No known problems (Father, Mother)        Interval Note:  2022 Pt seen at bedside on [facility].  Discussed her anticoagulation with heparin.  Pt is [place] a&o to person and place.   2022: Pt seen at bedside on [facility].  No concerns cont on her heparin sc for dvt prophylaxis.  For transfer today to rehab at [facility].         CAPRINI SCORE  AGE RELATED RISK FACTORS                                                       MOBILITY RELATED FACTORS  [ ] Age 41-60 years                                            (1 Point)                  [ ] Bed rest                                                        (1 Point)  [ ] Age: 61-74 years                                           (2 Points)                [ ] Plaster cast                                                   (2 Points)  [ x] Age= 75 years                                              (3 Points)                 [ ] Bed bound for more than 72 hours                   (2 Points)    DISEASE RELATED RISK FACTORS                                               GENDER SPECIFIC FACTORS  [ ] Edema in the lower extremities                       (1 Point)           [ ] Pregnancy                                                            (1 Point)  [ ] Varicose veins                                               (1 Point)                  [ ] Post-partum < 6 weeks                                      (1 Point)             [ ] BMI > 25 Kg/m2                                            (1 Point)                  [ ] Hormonal therapy or oral contraception       (1 Point)                 [ ] Sepsis (in the previous month)                        (1 Point)             [ ] History of pregnancy complications                (1Point)  [ ] Pneumonia or serious lung disease                                             [ ] Unexplained or recurrent  (=/>3), premature                                 (In the previous month)                               (1 Point)                birth with toxemia or growth-restricted infant (1 Point)  [ ] Abnormal pulmonary function test            (1 Point)                                   SURGERY RELATED RISK FACTORS  [ ] Acute myocardial infarction                       (1 Point)                  [ ]  Section                                         (1 Point)  [ ] Congestive heart failure (in the previous month) (1 Point)   [ ] Minor surgery   lasting <45 minutes       (1 Point)   [ ] Inflammatory bowel disease                             (1 Point)          [ ] Arthroscopic surgery                                  (2 Points)  [ ] Central venous access                                    (2 Points)            [ ] General surgery lasting >45 minutes      (2 Points)       [ ] Stroke (in the previous month)                  (5 Points)            [ ] Elective major lower extremity arthroplasty (5 Points)                                   [  ] Malignancy (present or past include skin melanoma                                          but exclude  basal skin cell)    (2 points)                                      TRAUMA RELATED RISK FACTORS                HEMATOLOGY RELATED FACTORS                                  [x ] Fracture of the hip, pelvis, or leg                       (5 Points)  [ ] Prior episodes of VTE                                     (3 Points)          [ ] Acute spinal cord injury (in the previous month)  (5 Points)  [ ] Positive family history for VTE                         (3 Points)       [ ] Paralysis (less than 1 month)                          (5 Points)  [ ] Prothrombin 53412 A                                      (3 Points)         [ ] Multiple Trauma (within 1month)                 (5Points)                                                                                                                                                                [ ] Factor V Leiden                                          (3 Points)                                OTHER RISK FACTORS                          [ ] Lupus anticoagulants                                     (3 Points)                       [ ] BMI > 40                          (1 Point)                                                         [ ] Anticardiolipin antibodies                                (3 Points)                   [ ] Smoking                              (1Point)                                                [ ] High homocysteine in the blood                      (3 Points)                [  ] Diabetes requiring insulin (1point)                         [ ] Other congenital or acquired thrombophilia       (3 Points)          [  ] Chemotherapy                   (1 Point)  [ ] Heparin induced thrombocytopenia                  (3 Points)             [  ] Blood Transfusion                (1 point)                                                                                                             Total Score [8 ]                                                                                                                                                                                                                                                                                                                                       [id number]                                                                                                    IMPROVE Bleeding Risk Score: 6    Falls Risk:   High ( x )  Mod (  )  Low (  )  crcl: 18.2        cr: 1.34         BMI  21.1           EBL: 150  ml  Time procedure    : starts: 17:55             :ends:           Denies any personal or familial history of clotting or bleeding disorders.    Allergies    penicillin (Hives)  sulfa drugs (Hives)  tetracycline (Hives)    Intolerances        REVIEW OF SYSTEMS    (  )Fever	     (  )Constipation	(  )SOB				(  )Headache	(  )Dysuria  (  )Chills	     (  )Melena	(  )Dyspnea present on exertion	                    (  )Dizziness                    (  )Polyuria  (  )Nausea	     (  )Hematochezia	(  )Cough			                    (  )Syncope   	(  )Hematuria  (  )Vomiting    (  )Chest Pain	(  )Wheezing			(  )Weakness  (  )Diarrhea     (  )Palpitations	(  )Anorexia			(  )Myalgia  (x) hip pain    Pertinent positives in HPI and daily subjective.  All other ROS negative.      Vital Signs Last 24 Hrs  T(C): 36.8 (22 @ 08:47), Max: 36.8 (22 @ 08:47)  T(F): 98.2 (22 @ 08:47), Max: 98.2 (22 @ 08:47)  HR: 62 (22 @ 08:47) (58 - 62)  BP: 131/39 (22 @ 08:47) (131/39 - 148/45)  BP(mean): 70 (22 @ 21:08) (70 - 70)  RR: 18 (22 @ 08:47) (18 - 18)  SpO2: 95% (22 @ 08:47) (93% - 96%)    Parameters below as of 07 Sep 2022 09:08  Patient On (Oxygen Delivery Method): room air        PHYSICAL EXAM:    Constitutional: Appears Well    Neurological: A& O x 2 person and place    Skin: Warm    Respiratory and Thorax: normal effort; Breath sounds: normal; No rales/wheezing/rhonchi  	  Cardiovascular: S1, S2, regular, NMBR	    Gastrointestinal: BS + x 4Q, nontender	    Genitourinary:  Bladder nondistended, nontender    Musculoskeletal:   General Right:   no muscle/joint tenderness,   normal tone, no joint swelling,   ROM: limited	    General Left:   no muscle/joint tenderness,   normal tone, no joint swelling,   ROM: full    Hip:  Right: Dressing CDI;    Lower extrems:   Right: no calf tenderness              negative riky's sign               + pedal pulses    Left:   no calf tenderness              negative riky's sign               + pedal pulses                                   9.8    8.67  )-----------( 101      ( 08 Sep 2022 08:44 )             29.3       09-08    137  |  102  |  36<H>  ----------------------------<  103<H>  3.4<L>   |  29  |  1.28    Ca    9.0      08 Sep 2022 08:44                     10.7   6.97  )-----------( 101      ( 07 Sep 2022 07:38 )             33.3       09-07    139  |  102  |  26<H>  ----------------------------<  126<H>  3.6   |  30  |  1.16    Ca    8.8      07 Sep 2022 07:38    TPro  6.2  /  Alb  2.9<L>  /  TBili  0.4  /  DBili  x   /  AST  37  /  ALT  38  /  AlkPhos  102  09-06      PT/INR - ( 06 Sep 2022 09:15 )   PT: 11.0 sec;   INR: 0.95 ratio         PTT - ( 06 Sep 2022 09:15 )  PTT:28.3 sec				    MEDICATIONS  (STANDING):  aMIOdarone    Tablet 200 milliGRAM(s) Oral daily  amLODIPine   Tablet 5 milliGRAM(s) Oral daily  atorvastatin 10 milliGRAM(s) Oral at bedtime  carvedilol 25 milliGRAM(s) Oral every 12 hours  cholecalciferol Oral Tab/Cap - Peds 2000 Unit(s) Oral daily  coronavirus (EUA) Vaccine (PFIZER) 0.3 milliLiter(s) IntraMuscular once  cyanocobalamin 1000 MICROGram(s) Oral daily  famotidine    Tablet 20 milliGRAM(s) Oral daily  ferrous    sulfate 325 milliGRAM(s) Oral daily  fludroCORTISONE 0.1 milliGRAM(s) Oral daily  heparin   Injectable 5000 Unit(s) SubCutaneous every 12 hours  hydrALAZINE  Oral Tab/Cap - Peds 100 milliGRAM(s) Oral three times a day  hydrocortisone 5 milliGRAM(s) Oral at bedtime  hydrocortisone sodium succinate Injectable 50 milliGRAM(s) IV Push every 8 hours  losartan 100 milliGRAM(s) Oral daily  polyethylene glycol 3350 17 Gram(s) Oral daily  potassium chloride   Powder 20 milliEquivalent(s) Oral daily  senna 2 Tablet(s) Oral at bedtime  senna 8.6 milliGRAM(s) Oral Tablet - Peds 2 Tablet(s) Oral daily  sodium chloride 0.9% Bolus 500 milliLiter(s) IV Bolus once  tranexamic acid IVPB 1000 milliGRAM(s) IV Intermittent once  tranexamic acid IVPB 1000 milliGRAM(s) IV Intermittent once          DVT Prophylaxis:  LMWH                   (  )  Heparin SQ           ( x )  Coumadin             (  )  Xarelto                  (  )  Eliquis                   (  )  Venodynes           (x  )  Ambulation          ( x )  UFH                       (  )  Contraindicated  (  )  EC Aspirin             (  )

## 2022-09-08 NOTE — PROGRESS NOTE ADULT - SUBJECTIVE AND OBJECTIVE BOX
Patient seen and examined at bedside. Pain well controlled with medication. Patient denies any numbness, tingling, weakness, or any other orthopaedic complaint. Denies N/V/CP/SOB.     LABS:                        10.7   6.97  )-----------( 101      ( 07 Sep 2022 07:38 )             33.3     09-07    139  |  102  |  26<H>  ----------------------------<  126<H>  3.6   |  30  |  1.16    Ca    8.8      07 Sep 2022 07:38    TPro  6.2  /  Alb  2.9<L>  /  TBili  0.4  /  DBili  x   /  AST  37  /  ALT  38  /  AlkPhos  102  09-06    PT/INR - ( 06 Sep 2022 09:15 )   PT: 11.0 sec;   INR: 0.95 ratio         PTT - ( 06 Sep 2022 09:15 )  PTT:28.3 sec      VITAL SIGNS:  T(C): 36.6 (09-07-22 @ 23:15), Max: 36.6 (09-07-22 @ 09:08)  HR: 62 (09-07-22 @ 23:15) (58 - 62)  BP: 142/37 (09-07-22 @ 23:15) (119/38 - 148/45)  RR: 18 (09-07-22 @ 23:15) (18 - 18)  SpO2: 95% (09-07-22 @ 23:15) (93% - 96%)          PE:  General: NAD, resting comfortably in bed  RLE:   Dressing C/D/I  SCDs present bilaterally  Abduction pillow in place  Compartments soft and compressible  No calf tenderness bilaterally  +TA/EHL/FHL/GSC  SILT L2-S1  2+ DP/PT               A/P:  89y f s/p R hemiarthroplasty POD2    -PT/OT   -WBAT on the RLE with posterior hip precautions  -abduction pillow when supine or seated  -Pain Control  -DVT ppx per AC team  -FU AM Labs  -Rest, ice, compress the extremity as needed  -Incentive Spirometry  -Medical management appreciated  -Dispo: [facility]  -Will discuss with Dr. Pollock and advise with any changes

## 2022-09-08 NOTE — PROGRESS NOTE ADULT - SUBJECTIVE AND OBJECTIVE BOX
CC- RT hip fracture    HPI:  89yo/F with PMH adrenal insufficiency on chronic steroids, chr diastolic CHF, CKD 2-3, paroxysmal afib not on AC due to falls  presented to the ED after a fall resulting in right hip pain/ difficulty ambulating. Patient states she got up from a seated position and ambulating a few feet before feel really weak. States she may have felt a "little dizzy" and fell on to her right hip. She was unable to get up/ ambulate after fall. Patient did not pass out and denies any head strike, preceding chest pain, palpitations, headache or visual changes. According to her son who was present at bedside, patient had been doing much better since being moved to [facility] assisted living- she had been  more ambulatory prior to falling.  She was admitted with right hip fracture.   Underwent Right hip aleksander.   Post op course notable for acute blood loss anemia, but did not require prbcs.   SHe was given stress dose steroids perioperatively.      pt seen and examined this am. felt well. no sob/chest pain. Tolerating po. C/o right hip pain wiht movement.     Review of system- All 10 systems reviewed and is as per HPI otherwise negative.     Vital Signs Last 24 Hrs  T(C): 36.8 (08 Sep 2022 08:47), Max: 36.8 (08 Sep 2022 08:47)  T(F): 98.2 (08 Sep 2022 08:47), Max: 98.2 (08 Sep 2022 08:47)  HR: 62 (08 Sep 2022 08:47) (58 - 62)  BP: 131/39 (08 Sep 2022 08:47) (131/39 - 148/45)  BP(mean): 70 (07 Sep 2022 21:08) (70 - 70)  RR: 18 (08 Sep 2022 08:47) (18 - 18)  SpO2: 95% (08 Sep 2022 08:47) (93% - 96%)    Parameters below as of 08 Sep 2022 08:47  Patient On (Oxygen Delivery Method): room air    LABS:                        9.8    8.67  )-----------( 101      ( 08 Sep 2022 08:44 )             29.3     09-08    137  |  102  |  36<H>  ----------------------------<  103<H>  3.4<L>   |  29  |  1.28    Ca    9.0      08 Sep 2022 08:44          RADIOLOGY & ADDITIONAL TESTS:   ECHO TTE WO CON COMP W DOPP                          PROCEDURE DATE:  09/06/2022       Impression     Summary     The left ventricle is normal in size and wall motion.   Mild asymmetrical basal septal left ventricular hypertrophy is present.   Estimated left ventricular ejection fraction is > 75 %.   There is an intracavity gradient which is likely due to a hyperdynamic   ventricle.   Normal appearing left atrium.   Normal appearing right atrium.   Normal appearing right ventricle structure and function.   The aortic valve appears mildly calcified. Valve opening seems to be   restricted.   Transaortic gradients are elevated consistent with mild aortic stenosis.   EMMANUEL by continuity equation suggests mild stenosis ( 1.7 cm 2).   Moderate (2+) eccentric aortic regurgitation is present.   Moderate mitral annular calcification is present.   The mitral valve leaflets appear thickened.   Mild (1+) mitral regurgitation is present.   EA reversal of the mitral inflow consistent with reduced compliance of   the   left ventricle.   The tricuspid valve leaflets appear mildly thickened and/or calcified,   but   open well.   Moderate (2+) tricuspid valve regurgitation is present.   Mild to moderate pulmonary hypertension.   No evidence of pericardial effusion.   The IVC appears mildly underdistended.    CT FEMUR ONLY RT                        PROCEDURE DATE:  09/05/2022      INTERPRETATION:  CT OF THE RIGHT FEMUR    CLINICAL INFORMATION: For evaluation of hip fracture.    COMPARISON: Radiographs performed same date and CT dated 3/5/2022.    TECHNIQUE: Axial CT images were obtained of the right femur with coronal   and sagittal reconstructions. 3-D volume rendered reformats were also   provided. No intravenous contrast was administered.    FINDINGS:    Osseous: Acute, mildly comminuted transcervical fracture of right femoral   neck with moderate impaction, external rotary subluxation, and varus   angulation of the distal fragment. Small nondisplaced butterfly cortical   fragment along its ventral margin. Femoral head is well located. Mild   arthrosis of the hip joint. Moderate arthrosis at the pubic symphysis   with chondrocalcinosis. Right knee joints are maintained. Bony   mineralization is decreased. No aggressive osseous neoplasm.    Soft tissues: No mass lesion, hematoma, or drainable encapsulated fluid   collection. Chronic grade 2 strain of the gluteus minimus muscle body.   Muscle bulk and attenuation are otherwise within normal limits.    IMPRESSION:    Acute [place] type III transcervical fracture of right femoral neck.  MEDICATIONS  (STANDING):  aMIOdarone    Tablet 200 milliGRAM(s) Oral daily  amLODIPine   Tablet 5 milliGRAM(s) Oral daily  atorvastatin 10 milliGRAM(s) Oral at bedtime  carvedilol 25 milliGRAM(s) Oral every 12 hours  cholecalciferol Oral Tab/Cap - Peds 2000 Unit(s) Oral daily  cyanocobalamin 1000 MICROGram(s) Oral daily  famotidine    Tablet 20 milliGRAM(s) Oral daily  ferrous    sulfate 325 milliGRAM(s) Oral daily  fludroCORTISONE 0.1 milliGRAM(s) Oral daily  heparin   Injectable 5000 Unit(s) SubCutaneous every 12 hours  hydrALAZINE  Oral Tab/Cap - Peds 100 milliGRAM(s) Oral three times a day  hydrocortisone 5 milliGRAM(s) Oral at bedtime  hydrocortisone sodium succinate Injectable 50 milliGRAM(s) IV Push every 8 hours  losartan 100 milliGRAM(s) Oral daily  polyethylene glycol 3350 17 Gram(s) Oral daily  potassium chloride    Tablet ER 20 milliEquivalent(s) Oral once  potassium chloride   Powder 20 milliEquivalent(s) Oral daily  senna 2 Tablet(s) Oral at bedtime  senna 8.6 milliGRAM(s) Oral Tablet - Peds 2 Tablet(s) Oral daily  sodium chloride 0.9% Bolus 500 milliLiter(s) IV Bolus once  tranexamic acid IVPB 1000 milliGRAM(s) IV Intermittent once  tranexamic acid IVPB 1000 milliGRAM(s) IV Intermittent once    MEDICATIONS  (PRN):  acetaminophen     Tablet .. 650 milliGRAM(s) Oral every 6 hours PRN Temp greater or equal to 38C (100.4F), Mild Pain (1 - 3)  aluminum hydroxide/magnesium hydroxide/simethicone Suspension 30 milliLiter(s) Oral every 4 hours PRN Dyspepsia  melatonin 3 milliGRAM(s) Oral at bedtime PRN Insomnia  melatonin 3 milliGRAM(s) Oral at bedtime PRN Insomnia  morphine  - Injectable 2 milliGRAM(s) IV Push every 4 hours PRN Severe Pain (7 - 10)  ondansetron Injectable 4 milliGRAM(s) IV Push every 8 hours PRN Nausea and/or Vomiting  ondansetron Injectable 4 milliGRAM(s) IV Push every 6 hours PRN Nausea and/or Vomiting  oxyCODONE    IR 2.5 milliGRAM(s) Oral every 4 hours PRN Moderate Pain (4 - 6)  oxyCODONE    IR 5 milliGRAM(s) Oral every 4 hours PRN Severe Pain (7 - 10)  polyethylene glycol 3350 17 Gram(s) Oral daily PRN Constipation  traMADol 50 milliGRAM(s) Oral every 8 hours PRN Severe Pain (7 - 10)      Assessment/Plan  # Right Hip Fracture 2/2 mechanical fall- s/p RT hip aleksander POD#2.  #Anemia acute blood loss from fracture/postop  -pain control  -physical therapy.   -incentive spirometry  -bowel regimen.     # Near Syncope  -patient with mild dizziness  -no events noted on tele  -ECHO reviewed,   -possibly related to dehydratin.     #CKD 2-3  Fluctuating but stable creatinine    #HFpEF  -Currently euvolemic  -resume lasix on dc.   -continue arb/bb    #HTN  -stable.   -continue hydralazine, norvasc, coreg, losartan    #Adrenal Insufficency   -s/p stress dose steroids.   -home dosing resumed.   -continue florinef    #Hx of paroxysmal Afib  -on coreg and amiodarone  -not on AC at home due to falls    #Advanced Directives  -DNR/DNI  -MOLST on file    #Dispo:  [facility] today

## 2022-09-08 NOTE — DISCHARGE NOTE NURSING/CASE MANAGEMENT/SOCIAL WORK - NSDCVIVACCINE_GEN_ALL_CORE_FT
COVID-19, mRNA, LNP-S, PF, 30 mcg/0.3 mL dose, andrew-sucrose (Pfizer); 08-Sep-2022 14:07; Akua Brumfield (RN); [employer]; [id number] (Exp. Date: 31-Oct-2022); IntraMuscular; Deltoid Left.; 0.3 milliLiter(s);   Tdap; 10-Jul-2021 13:25; Starr Zazueta (KENNETH); [employer]; [id number] (Exp. Date: 25-Nov-2022); IntraMuscular; Deltoid Left.; 0.5 milliLiter(s); VIS (VIS Published: 09-May-2013, VIS Presented: 10-Jul-2021);

## 2022-09-08 NOTE — DISCHARGE NOTE NURSING/CASE MANAGEMENT/SOCIAL WORK - PATIENT PORTAL LINK FT
You can access the FollowMyHealth Patient Portal offered by [facility] by registering at the following website: [email or web address]. By joining [facility]’s FollowMyHealth portal, you will also be able to view your health information using other applications (apps) compatible with our system.

## 2022-09-09 ENCOUNTER — NON-APPOINTMENT (OUTPATIENT)
Age: 87
End: 2022-09-09

## 2022-09-13 DIAGNOSIS — N18.30 CHRONIC KIDNEY DISEASE, STAGE 3 UNSPECIFIED: ICD-10-CM

## 2022-09-13 DIAGNOSIS — Z88.1 ALLERGY STATUS TO OTHER ANTIBIOTIC AGENTS STATUS: ICD-10-CM

## 2022-09-13 DIAGNOSIS — E27.40 UNSPECIFIED ADRENOCORTICAL INSUFFICIENCY: ICD-10-CM

## 2022-09-13 DIAGNOSIS — Z90.710 ACQUIRED ABSENCE OF BOTH CERVIX AND UTERUS: ICD-10-CM

## 2022-09-13 DIAGNOSIS — I13.0 HYPERTENSIVE HEART AND CHRONIC KIDNEY DISEASE WITH HEART FAILURE AND STAGE 1 THROUGH STAGE 4 CHRONIC KIDNEY DISEASE, OR UNSPECIFIED CHRONIC KIDNEY DISEASE: ICD-10-CM

## 2022-09-13 DIAGNOSIS — N17.9 ACUTE KIDNEY FAILURE, UNSPECIFIED: ICD-10-CM

## 2022-09-13 DIAGNOSIS — I48.0 PAROXYSMAL ATRIAL FIBRILLATION: ICD-10-CM

## 2022-09-13 DIAGNOSIS — E86.0 DEHYDRATION: ICD-10-CM

## 2022-09-13 DIAGNOSIS — Z91.81 HISTORY OF FALLING: ICD-10-CM

## 2022-09-13 DIAGNOSIS — D62 ACUTE POSTHEMORRHAGIC ANEMIA: ICD-10-CM

## 2022-09-13 DIAGNOSIS — Z79.52 LONG TERM (CURRENT) USE OF SYSTEMIC STEROIDS: ICD-10-CM

## 2022-09-13 DIAGNOSIS — H91.90 UNSPECIFIED HEARING LOSS, UNSPECIFIED EAR: ICD-10-CM

## 2022-09-13 DIAGNOSIS — Z79.899 OTHER LONG TERM (CURRENT) DRUG THERAPY: ICD-10-CM

## 2022-09-13 DIAGNOSIS — R55 SYNCOPE AND COLLAPSE: ICD-10-CM
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
## 2022-09-13 DIAGNOSIS — I50.32 CHRONIC DIASTOLIC (CONGESTIVE) HEART FAILURE: ICD-10-CM

## 2022-09-13 DIAGNOSIS — Z66 DO NOT RESUSCITATE: ICD-10-CM

## 2022-09-13 DIAGNOSIS — Z88.2 ALLERGY STATUS TO SULFONAMIDES: ICD-10-CM

## 2022-09-13 DIAGNOSIS — Z88.0 ALLERGY STATUS TO PENICILLIN: ICD-10-CM
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
## 2022-09-13 DIAGNOSIS — Z85.72 PERSONAL HISTORY OF NON-HODGKIN LYMPHOMAS: ICD-10-CM
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
## 2022-09-13 DIAGNOSIS — S72.001A FRACTURE OF UNSPECIFIED PART OF NECK OF RIGHT FEMUR, INITIAL ENCOUNTER FOR CLOSED FRACTURE: ICD-10-CM

## 2022-09-17 NOTE — CONSULT NOTE ADULT - CONSULT REASON
Patient: Shaka Hughes Date: 2022   : 1967 Attending: Alverto Figueredo MD   55 year old male      Nephrology Progress Note     Subjective: No acute events overnight. Feels well today. Denies chest pain, shortness of breath, nausea or vomiting.    Reviewed: Epic Notes, Imaging, Medications, Allergies, PMH    Vital Last Value 24 Hour Range   Temperature 97.9 °F (36.6 °C) Temp  Min: 97.9 °F (36.6 °C)  Max: 97.9 °F (36.6 °C)   Pulse 66 Pulse  Min: 59  Max: 66   Respiratory 16 Resp  Min: 16  Max: 16   Blood Pressure 118/57 BP  Min: 112/56  Max: 182/85   Art BP   No data recorded   Pulse Oximetry 96 % SpO2  Min: 96 %  Max: 96 %     Vital Today Admitted   Weight 116.5 kg (256 lb 13.4 oz) Weight: 118.2 kg (260 lb 9.3 oz)   Height N/A Height: 6' 1\" (185.4 cm)   BMI N/A BMI (Calculated): 34.38       Medications/Infusions:  Scheduled:   Current Facility-Administered Medications   Medication Dose Route Frequency Provider Last Rate Last Admin   • insulin lispro (ADMELOG,HumaLOG) - Correction Dose   Subcutaneous TID  John Paul Gates NP   1 Units at 22 1328   • losartan (COZAAR) tablet 50 mg  50 mg Oral Daily Priscila Lyman APNP   50 mg at 22 0909   • torsemide (DEMADEX) tablet 20 mg  20 mg Oral Every Other Day DAPHNE SerraNP   20 mg at 22 1013   • torsemide (DEMADEX) tablet 40 mg  40 mg Oral Every Other Day Priscila Lyman APNP   40 mg at 22 0909   • insulin lispro (ADMELOG, HumaLOG) injection 10 Units  10 Units Subcutaneous TID  John Paul Gates NP   10 Units at 22 1327   • pantoprazole (PROTONIX) EC tablet 40 mg  40 mg Oral Every Other Day Alise Torrez MD   40 mg at 22 0909   • allopurinol (ZYLOPRIM) tablet 100 mg  100 mg Oral Daily Alise Torrez MD   100 mg at 22 0908   • vitamin D3 (CHOLECALCIFEROL) 1.25 MG (50,000 UT) 1.25 mg(50,000 units) capsule 1.25 mg  1.25 mg Oral Once per day on Fri CECILLE Serra   1.25 mg at 
09/16/22 0857   • aspirin (ECOTRIN) enteric coated tablet 81 mg  81 mg Oral Daily Alverto Figueredo MD   81 mg at 09/17/22 0909   • gabapentin (NEURONTIN) capsule 600 mg  600 mg Oral Daily Alverto Figueredo MD   600 mg at 09/17/22 0909   • lamoTRIgine (LaMICtal) tablet 100 mg  100 mg Oral BID Alverto Figueredo MD   100 mg at 09/17/22 0909   • escitalopram (LEXAPRO) tablet 10 mg  10 mg Oral Daily Alverto Figueredo MD   10 mg at 09/17/22 0909   • insulin glargine (LANTUS) injection 30 Units  30 Units Subcutaneous Daily Alverto Figueredo MD   30 Units at 09/17/22 0909   • insulin lispro (ADMELOG,HumaLOG) - Correction Dose   Subcutaneous Nightly Alverto Figueredo MD       • atorvastatin (LIPITOR) tablet 20 mg  20 mg Oral Daily Alverto Figueredo MD   20 mg at 09/17/22 0909   • hydrALAZINE (APRESOLINE) tablet 100 mg  100 mg Oral TID Alise Torrez MD   100 mg at 09/17/22 0909   • carvedilol (COREG) tablet 12.5 mg  12.5 mg Oral BID WC Alverto Figueredo MD   12.5 mg at 09/17/22 0909   • amLODIPine (NORVASC) tablet 10 mg  10 mg Oral Daily Alverto Figueredo MD   10 mg at 09/17/22 0909   • ferrous sulfate (65 mg Fe per 325 mg) tablet 325 mg  325 mg Oral Every Other Day Alverto Figueredo MD   325 mg at 09/16/22 0825   • folic acid (FOLATE) tablet 5 mg  5 mg Oral Daily Alverto Figueredo MD   5 mg at 09/17/22 0909   • Potassium Replacement (Levels 3.6 - 4)   Does not apply See Admin Instructions Alverto Figueredo MD       • Potassium Standard Replacement Protocol (Levels 3.5 and lower)   Does not apply See Admin Instructions Alverto Figueredo MD       • clopidogrel (PLAVIX) tablet 75 mg  75 mg Oral Daily Alverto Figueredo MD   75 mg at 09/17/22 0908       Continuous Infusions:   Current Facility-Administered Medications   Medication Dose Route Frequency Provider Last Rate Last Admin   • sodium chloride 0.9% infusion   Intravenous Continuous PRN Alverto Figueredo MD           Physical Exam:    General: Awake, Alert, in no acute 
? ICH
distress  Neck: Supple, trachea midline  Head: Normocephalic,  atraumatic  CVS: S1S2+, No rub, no murmur  Chest/Lungs: Respiratory effort normal,  no wheezes, no crackles, nonlabored breathing   Abdomen: Soft, non distended, non tender, + bs  Neuro: Moving 4 extremities spontaneously, no focal deficits  Psychiatric: Appropriate mood and affect.   Skin: Warm, dry, intact.  No rashes.   Extremities:  Atraumatic, no left lower extremity edema, right BKA wrapped      Laboratory Results:  Recent Labs   Lab 09/17/22  0829 09/16/22  0520 09/15/22  0609   SODIUM 137 138 137   POTASSIUM 4.9 4.2 4.3   CHLORIDE 108 107 106   CO2 24 24 23   ANIONGAP 10 11 12   BUN 66* 67* 72*   CREATININE 1.91* 2.05* 2.30*   GLUCOSE 178* 154* 165*   CALCIUM 8.8 8.5 8.8       Recent Labs   Lab 09/17/22  0829 09/15/22  0609 09/13/22  0516   WBC 6.4 7.3 7.6   HGB 10.1* 9.9* 9.1*   HCT 32.8* 32.2* 29.2*    212 214           ASSESSMENT PLAN AND RECOMMENDATIONS:      Acute kidney injury on the top of chronic kidney disease stage IIIB  CKD is secondary to 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    NEW Baseline creatinine is 2.1-2.3 mg/dL with EGFR 31-35 mL/min.    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    He underwent 
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   Plan for Losartan and Torsemide as above     Vitamin-D deficiency  Vitamin-D low at 20.6  Continue ergocalciferol 61869 units weekly for 8 weeks then monthly after that  Phos level is normal  OFF Phoslo.      Secondary hyperparathyroidism  Intact PTH down to 70 <--124  Vitamin-D supplement as above    Hyperuricemia  Continue allopurinol 100 mg daily     Anemia of chronic disease  Iron deficiency  Iron is low 30, ferritin normal to 63, % iron saturation 18  Patient declined IV insertion, will hold off on IV Venofer  Continue ferrous sulfate 325 mg every other day     Chronic heart failure with recovered EF  Ischemic cardiomyopathy  Coronary artery disease  No signs of acute decompensation  On aspirin 81 mg daily, atorvastatin 20 mg daily, Coreg 12.5 mg b.i.d.  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 I have reviewed and edited the above note as needed. I have personally formulated the clinical impression and recommendations as stated.       Alise Torrez MD  9/17/2022     
GOC

## 2022-09-29 ENCOUNTER — APPOINTMENT (OUTPATIENT)
Dept: INTERNAL MEDICINE | Facility: CLINIC | Age: 87
End: 2022-09-29

## 2022-10-11 ENCOUNTER — APPOINTMENT (OUTPATIENT)
Dept: INTERNAL MEDICINE | Facility: CLINIC | Age: 87
End: 2022-10-11

## 2022-10-13 RX ORDER — OXYCODONE HYDROCHLORIDE 5 MG/1
0.5 TABLET ORAL
Qty: 0 | Refills: 0 | DISCHARGE
Start: 2022-10-13 | End: 2022-10-21

## 2022-10-18 ENCOUNTER — EMERGENCY (EMERGENCY)
Facility: HOSPITAL | Age: 87
LOS: 0 days | Discharge: HOME | End: 2022-10-18
Attending: EMERGENCY MEDICINE
Payer: MEDICARE

## 2022-10-18 VITALS
WEIGHT: 125 LBS | HEART RATE: 63 BPM | OXYGEN SATURATION: 96 % | RESPIRATION RATE: 16 BRPM | DIASTOLIC BLOOD PRESSURE: 71 MMHG | HEIGHT: 60 IN | SYSTOLIC BLOOD PRESSURE: 157 MMHG | TEMPERATURE: 98 F

## 2022-10-18 VITALS
RESPIRATION RATE: 18 BRPM | OXYGEN SATURATION: 94 % | HEART RATE: 58 BPM | DIASTOLIC BLOOD PRESSURE: 53 MMHG | SYSTOLIC BLOOD PRESSURE: 163 MMHG | TEMPERATURE: 98 F

## 2022-10-18 DIAGNOSIS — Z90.710 ACQUIRED ABSENCE OF BOTH CERVIX AND UTERUS: Chronic | ICD-10-CM

## 2022-10-18 DIAGNOSIS — R05.9 COUGH, UNSPECIFIED: ICD-10-CM

## 2022-10-18 DIAGNOSIS — C85.90 NON-HODGKIN LYMPHOMA, UNSPECIFIED, UNSPECIFIED SITE: ICD-10-CM

## 2022-10-18 DIAGNOSIS — Z88.0 ALLERGY STATUS TO PENICILLIN: ICD-10-CM

## 2022-10-18 DIAGNOSIS — I48.0 PAROXYSMAL ATRIAL FIBRILLATION: ICD-10-CM

## 2022-10-18 DIAGNOSIS — Z79.01 LONG TERM (CURRENT) USE OF ANTICOAGULANTS: ICD-10-CM

## 2022-10-18 DIAGNOSIS — Z90.49 ACQUIRED ABSENCE OF OTHER SPECIFIED PARTS OF DIGESTIVE TRACT: Chronic | ICD-10-CM

## 2022-10-18 DIAGNOSIS — J06.9 ACUTE UPPER RESPIRATORY INFECTION, UNSPECIFIED: ICD-10-CM

## 2022-10-18 DIAGNOSIS — Z88.2 ALLERGY STATUS TO SULFONAMIDES: ICD-10-CM

## 2022-10-18 DIAGNOSIS — Z86.79 PERSONAL HISTORY OF OTHER DISEASES OF THE CIRCULATORY SYSTEM: Chronic | ICD-10-CM

## 2022-10-18 DIAGNOSIS — Z20.822 CONTACT WITH AND (SUSPECTED) EXPOSURE TO COVID-19: ICD-10-CM

## 2022-10-18 DIAGNOSIS — N18.9 CHRONIC KIDNEY DISEASE, UNSPECIFIED: ICD-10-CM

## 2022-10-18 DIAGNOSIS — I13.0 HYPERTENSIVE HEART AND CHRONIC KIDNEY DISEASE WITH HEART FAILURE AND STAGE 1 THROUGH STAGE 4 CHRONIC KIDNEY DISEASE, OR UNSPECIFIED CHRONIC KIDNEY DISEASE: ICD-10-CM

## 2022-10-18 DIAGNOSIS — J90 PLEURAL EFFUSION, NOT ELSEWHERE CLASSIFIED: ICD-10-CM

## 2022-10-18 DIAGNOSIS — I50.30 UNSPECIFIED DIASTOLIC (CONGESTIVE) HEART FAILURE: ICD-10-CM

## 2022-10-18 DIAGNOSIS — Z88.1 ALLERGY STATUS TO OTHER ANTIBIOTIC AGENTS STATUS: ICD-10-CM

## 2022-10-18 LAB
ADD ON TEST-SPECIMEN IN LAB: SIGNIFICANT CHANGE UP
ALBUMIN SERPL ELPH-MCNC: 2.1 G/DL — LOW (ref 3.3–5)
ALP SERPL-CCNC: 89 U/L — SIGNIFICANT CHANGE UP (ref 40–120)
ALT FLD-CCNC: 44 U/L — SIGNIFICANT CHANGE UP (ref 12–78)
ANION GAP SERPL CALC-SCNC: 5 MMOL/L — SIGNIFICANT CHANGE UP (ref 5–17)
AST SERPL-CCNC: 38 U/L — HIGH (ref 15–37)
BASOPHILS # BLD AUTO: 0.01 K/UL — SIGNIFICANT CHANGE UP (ref 0–0.2)
BASOPHILS NFR BLD AUTO: 0.1 % — SIGNIFICANT CHANGE UP (ref 0–2)
BILIRUB SERPL-MCNC: 0.3 MG/DL — SIGNIFICANT CHANGE UP (ref 0.2–1.2)
BUN SERPL-MCNC: 23 MG/DL — SIGNIFICANT CHANGE UP (ref 7–23)
CALCIUM SERPL-MCNC: 9 MG/DL — SIGNIFICANT CHANGE UP (ref 8.5–10.1)
CHLORIDE SERPL-SCNC: 102 MMOL/L — SIGNIFICANT CHANGE UP (ref 96–108)
CO2 SERPL-SCNC: 31 MMOL/L — SIGNIFICANT CHANGE UP (ref 22–31)
CREAT SERPL-MCNC: 1.37 MG/DL — HIGH (ref 0.5–1.3)
EGFR: 35 ML/MIN/1.73M2 — LOW
EOSINOPHIL # BLD AUTO: 0.04 K/UL — SIGNIFICANT CHANGE UP (ref 0–0.5)
EOSINOPHIL NFR BLD AUTO: 0.4 % — SIGNIFICANT CHANGE UP (ref 0–6)
FLUAV AG NPH QL: SIGNIFICANT CHANGE UP
FLUBV AG NPH QL: SIGNIFICANT CHANGE UP
GLUCOSE SERPL-MCNC: 124 MG/DL — HIGH (ref 70–99)
HCT VFR BLD CALC: 30.8 % — LOW (ref 34.5–45)
HGB BLD-MCNC: 9.8 G/DL — LOW (ref 11.5–15.5)
IMM GRANULOCYTES NFR BLD AUTO: 0.5 % — SIGNIFICANT CHANGE UP (ref 0–0.9)
LYMPHOCYTES # BLD AUTO: 2.45 K/UL — SIGNIFICANT CHANGE UP (ref 1–3.3)
LYMPHOCYTES # BLD AUTO: 26.6 % — SIGNIFICANT CHANGE UP (ref 13–44)
MCHC RBC-ENTMCNC: 29.5 PG — SIGNIFICANT CHANGE UP (ref 27–34)
MCHC RBC-ENTMCNC: 31.8 GM/DL — LOW (ref 32–36)
MCV RBC AUTO: 92.8 FL — SIGNIFICANT CHANGE UP (ref 80–100)
MONOCYTES # BLD AUTO: 0.93 K/UL — HIGH (ref 0–0.9)
MONOCYTES NFR BLD AUTO: 10.1 % — SIGNIFICANT CHANGE UP (ref 2–14)
NEUTROPHILS # BLD AUTO: 5.72 K/UL — SIGNIFICANT CHANGE UP (ref 1.8–7.4)
NEUTROPHILS NFR BLD AUTO: 62.3 % — SIGNIFICANT CHANGE UP (ref 43–77)
NT-PROBNP SERPL-SCNC: 1570 PG/ML — HIGH (ref 0–450)
PLATELET # BLD AUTO: 186 K/UL — SIGNIFICANT CHANGE UP (ref 150–400)
POTASSIUM SERPL-MCNC: 3.3 MMOL/L — LOW (ref 3.5–5.3)
POTASSIUM SERPL-SCNC: 3.3 MMOL/L — LOW (ref 3.5–5.3)
PROT SERPL-MCNC: 5.9 GM/DL — LOW (ref 6–8.3)
RBC # BLD: 3.32 M/UL — LOW (ref 3.8–5.2)
RBC # FLD: 16.2 % — HIGH (ref 10.3–14.5)
RSV RNA NPH QL NAA+NON-PROBE: SIGNIFICANT CHANGE UP
SARS-COV-2 RNA SPEC QL NAA+PROBE: SIGNIFICANT CHANGE UP
SODIUM SERPL-SCNC: 138 MMOL/L — SIGNIFICANT CHANGE UP (ref 135–145)
WBC # BLD: 9.2 K/UL — SIGNIFICANT CHANGE UP (ref 3.8–10.5)
WBC # FLD AUTO: 9.2 K/UL — SIGNIFICANT CHANGE UP (ref 3.8–10.5)

## 2022-10-18 PROCEDURE — 0241U: CPT

## 2022-10-18 PROCEDURE — 99285 EMERGENCY DEPT VISIT HI MDM: CPT | Mod: 25

## 2022-10-18 PROCEDURE — 93010 ELECTROCARDIOGRAM REPORT: CPT

## 2022-10-18 PROCEDURE — 36415 COLL VENOUS BLD VENIPUNCTURE: CPT

## 2022-10-18 PROCEDURE — 71250 CT THORAX DX C-: CPT | Mod: MA

## 2022-10-18 PROCEDURE — 85025 COMPLETE CBC W/AUTO DIFF WBC: CPT

## 2022-10-18 PROCEDURE — 71045 X-RAY EXAM CHEST 1 VIEW: CPT

## 2022-10-18 PROCEDURE — 71250 CT THORAX DX C-: CPT | Mod: 26,MA

## 2022-10-18 PROCEDURE — 83880 ASSAY OF NATRIURETIC PEPTIDE: CPT

## 2022-10-18 PROCEDURE — 99284 EMERGENCY DEPT VISIT MOD MDM: CPT | Mod: CS

## 2022-10-18 PROCEDURE — 93005 ELECTROCARDIOGRAM TRACING: CPT

## 2022-10-18 PROCEDURE — 80053 COMPREHEN METABOLIC PANEL: CPT

## 2022-10-18 PROCEDURE — 71045 X-RAY EXAM CHEST 1 VIEW: CPT | Mod: 26

## 2022-10-18 RX ORDER — LORATADINE 10 MG/1
1 TABLET ORAL
Qty: 0 | Refills: 0 | DISCHARGE

## 2022-10-18 RX ORDER — FLUDROCORTISONE ACETATE 0.1 MG/1
0.5 TABLET ORAL
Qty: 0 | Refills: 0 | DISCHARGE

## 2022-10-18 RX ORDER — LANOLIN ALCOHOL/MO/W.PET/CERES
1 CREAM (GRAM) TOPICAL
Qty: 0 | Refills: 0 | DISCHARGE

## 2022-10-18 NOTE — ED ADULT TRIAGE NOTE - CHIEF COMPLAINT QUOTE
Pt BIBEMS from nursing home, c/o "cough". as per EMS "pt was recently d/c from rehab back to nursing facility, and then the nurse developed a cough". pt denies fever,chills,N/V/D,CP/SOB, sick contacts.

## 2022-10-18 NOTE — PHARMACOTHERAPY INTERVENTION NOTE - COMMENTS
Medication reconciliation completed.  Reviewed Medication list and confirmed med allergies with list from Care Facility "[facility]"; confirmed with Dr. [facility].

## 2022-10-18 NOTE — ED PROVIDER NOTE - PHYSICAL EXAMINATION
Constitutional: NAD AAOx3  Eyes: PERRL, EOMI  Head: Normocephalic atraumatic  Mouth: MMM  Cardiac: regular rate   Resp: Lungs CTAB  GI: Abd s/nt/nd  Neuro: CN2-12 intact  Extremities: Intact distal pulses b/l, no calf tenderness, normal ROM b/l UE and LE   Skin: No rashes Constitutional: NAD AAOx3  Eyes: PERRL, EOMI  Head: Normocephalic atraumatic  Mouth: MMM  Cardiac: regular rate   Resp: diminished b/l  GI: Abd s/nt/nd  Neuro: CN2-12 intact  Extremities: Intact distal pulses b/l, no calf tenderness   Skin: No rashes

## 2022-10-18 NOTE — ED PROVIDER NOTE - DOMESTIC TRAVEL HIGH RISK QUESTION
Continued Stay Note  [facility]     Patient Name: Brooklyn Marciano  MRN: [id number]  Today's Date: 5/1/2018    Admit Date: 4/27/2018          Discharge Plan     Row Name 05/01/18 1202       Plan    Plan Comments Pt is discharging home today and is qualifying for oxygen at home. Pt had no preference of a DME company. EDU sent order to [facility]. [facility] will bring pt a portable O2 tank for pt to get home on and set up up at home with oxygen.              Discharge Codes    No documentation.       Expected Discharge Date and Time     Expected Discharge Date Expected Discharge Time    May 1, 2018             JENIFER Hanley    
No

## 2022-10-18 NOTE — ED PROVIDER NOTE - CLINICAL SUMMARY MEDICAL DECISION MAKING FREE TEXT BOX
EKG is sinus at rate of 63 with no acute ST changes. EKG is sinus at rate of 63 with no acute ST changes. Labs are non actionable, BNP around her baseline, imaging is pending

## 2022-10-18 NOTE — ED PROVIDER NOTE - PROGRESS NOTE DETAILS
Received signout from Dr. Ricci.  Pending CT chest.  CT demonstrates improved ground glass opacities from prior, small bilateral pleural effusions, and chronic fibrotic lung changes.  Patient has been satting well since arrival.  Viral swabs are negative.  Dr. Ricci questions viral URI as diagnosis.  Okay for d/c back at this time with strict return precautions. Raghavendra Tatum D.O.

## 2022-10-18 NOTE — ED CLERICAL - NS ED CLERK NOTE PRE-ARRIVAL INFORMATION; ADDITIONAL PRE-ARRIVAL INFORMATION
This patient is enrolled in the comprehensive joint replacement (CJR) program and has active care navigation.  This patient can be followed up by the care navigation team within 24 hours. To arrange close follow-up or to obtain additional clinical information about this patient, please call the contact number above. Please call the hospitalist for medical consultation ([phone]) PRIOR to admission or observation.  The hospitalist is part of the care team and can assist in acute medical management. Please call the orthopedic team () for ALL patients who require admission.

## 2022-10-18 NOTE — ED ADULT NURSE NOTE - OBJECTIVE STATEMENT
Pt presents to ED for productive coughx1 week. pt hx of CHF, afib HTN. pt denies any chest pain, fever/chills, swelling in legs. pt is from [facility] nursing facility as well. pt denies any dizziness, LOC.

## 2022-10-18 NOTE — ED PROVIDER NOTE - OBJECTIVE STATEMENT
89 y/o female with PMHx of CKD, HTN, CHF on Lasix, Afib, lymphoma, hyponatremia, hypokalemia, s/p appendectomy, h/o intracranial hemorrhage presents to the ED c/o a productive cough and intermittent dizziness x1 week. Denies chest pain, denies SOB, denies pain/swelling in legs. 89 y/o female with PMHx of CKD, HTN, CHF on Lasix, Afib, lymphoma, hyponatremia, hypokalemia, s/p appendectomy, h/o intracranial hemorrhage presents to the ED c/o a productive cough x 1 week. Denies chest pain, denies SOB, denies pain/swelling in legs. She was recently hospitalized in September for a right hip fx. No AC due to multiple falls.

## 2022-10-18 NOTE — ED PROVIDER NOTE - PATIENT PORTAL LINK FT
You can access the FollowMyHealth Patient Portal offered by [facility] by registering at the following website: [email or web address]. By joining [facility]’s FollowMyHealth portal, you will also be able to view your health information using other applications (apps) compatible with our system.

## 2022-10-18 NOTE — ED PROVIDER NOTE - NS ED ROS FT
Constitutional: No fever or chills  Eyes: No visual changes  HEENT: No throat pain  CV: No chest pain  Resp: +cough, no SOB  GI: No abd pain, nausea or vomiting  : No dysuria  MSK: No musculoskeletal pain  Skin: No rash  Neuro: No headache, +dizziness Constitutional: No fever or chills  Eyes: No visual changes  HEENT: No throat pain  CV: No chest pain  Resp: +cough, no SOB  GI: No abd pain, nausea or vomiting  : No dysuria  MSK: No musculoskeletal pain  Skin: No rash  Neuro: No headache

## 2022-10-26 ENCOUNTER — APPOINTMENT (OUTPATIENT)
Dept: INTERNAL MEDICINE | Facility: CLINIC | Age: 87
End: 2022-10-26

## 2022-10-26 VITALS
DIASTOLIC BLOOD PRESSURE: 65 MMHG | SYSTOLIC BLOOD PRESSURE: 132 MMHG | HEIGHT: 60 IN | HEART RATE: 78 BPM | TEMPERATURE: 98.1 F | RESPIRATION RATE: 15 BRPM | OXYGEN SATURATION: 98 %

## 2022-10-26 DIAGNOSIS — I10 ESSENTIAL (PRIMARY) HYPERTENSION: ICD-10-CM

## 2022-10-26 DIAGNOSIS — R63.4 ABNORMAL WEIGHT LOSS: ICD-10-CM

## 2022-10-26 DIAGNOSIS — R63.0 ANOREXIA: ICD-10-CM

## 2022-10-26 DIAGNOSIS — S91.001A UNSPECIFIED OPEN WOUND, RIGHT ANKLE, INITIAL ENCOUNTER: ICD-10-CM

## 2022-10-26 DIAGNOSIS — I50.9 HEART FAILURE, UNSPECIFIED: ICD-10-CM

## 2022-10-26 DIAGNOSIS — R79.89 OTHER SPECIFIED ABNORMAL FINDINGS OF BLOOD CHEMISTRY: ICD-10-CM

## 2022-10-26 DIAGNOSIS — D64.9 ANEMIA, UNSPECIFIED: ICD-10-CM

## 2022-10-26 DIAGNOSIS — M25.551 PAIN IN RIGHT HIP: ICD-10-CM

## 2022-10-26 PROCEDURE — 99215 OFFICE O/P EST HI 40 MIN: CPT | Mod: 25

## 2022-10-26 PROCEDURE — 36415 COLL VENOUS BLD VENIPUNCTURE: CPT

## 2022-10-26 RX ORDER — MULTIVIT-MIN/FERROUS GLUCONATE 9 MG/15 ML
LIQUID (ML) ORAL DAILY
Qty: 1 | Refills: 5 | Status: ACTIVE | COMMUNITY
Start: 2022-10-26 | End: 1900-01-01

## 2022-10-26 RX ORDER — NUT.TX.GLUC.INTOLER,LAC-FR,SOY
LIQUID (ML) ORAL DAILY
Qty: 60 | Refills: 5 | Status: ACTIVE | COMMUNITY
Start: 2022-10-26 | End: 1900-01-01

## 2022-10-26 NOTE — PHYSICAL EXAM
[Normal Rate] : normal rate  [Regular Rhythm] : with a regular rhythm [Normal] : affect was normal and insight and judgment were intact [de-identified] : Bilateral nasal mucosa red and swollen [de-identified] : + Systolic murmur [de-identified] : on the wheel chair, not able to stand even with support, wound in the right lower leg and ankle . [de-identified] : Generalized weakness, uses walker

## 2022-10-26 NOTE — ASSESSMENT
[FreeTextEntry1] : Ms. HERRERA is a 89-year-old female here with her daughter  and son.\par \par Uncontrolled hypertension:\par Blood pressure is on goal today.\par She is on amlodipine 5 mg, carvedilol 25 mg twice daily, furosemide 40 mg once a day, hydralazine 100 mg tid, losartan 50 mg once a day.\par Also on amiodarone 200 mg once a day.\par she will continue current medication.\par \par CHF:\par Was recently admitted for exacerbation.\par no edema , no weight gain, under the care of cardiology.\par \par GERD:\par Symptoms are controlled on famotidine 20 mg once a day.\par \par Lymphoma:\par Currently on remission.\par \par Macular degeneration both eyes:\par Sees ophthalmology.  She is on erythromycin ophthalmic ointment as needed\par \par right hip :\par advised to take Tylenol prn not more then 2 a day.\par ordered CMP .\par she have appt. with pain management .\par she will f/u with ortho.\par she gets PT from [facility].\par \par WOUND IN THE LOWER rt. leg:\par gets wound care.\par \par loss of appetite :\par advised protein drinks 2 bottles in a day \par \par Return for follow-up in 2 months.\par \par \par

## 2022-10-26 NOTE — HISTORY OF PRESENT ILLNESS
[Family Member] : family member [FreeTextEntry1] : f/u on HTN, Right hip pain, CHF , loss of weight . [de-identified] : 89 y/o female with PMHx of CKD, HTN, CHF on Lasix,Afib, lymphoma, hyponatremia, hypokalemia\par surgical hx of  appendectomy, h/o intracranial hemorrhage.\par she went to ED  last week for  productive cough x 1 week.  Chest x-ray and CT chest were done which were inconclusive she was discharged home for viral infection.  Patient stated she still continues to have cough mostly at night and no shortness of breath no fever.\par She had right hip fracture in September 9 had a partial right hip replacement.  She is on severe pain in the right hip joint still she had a repeat x-ray done by the orthopedics and was told that it is healing .\par She also have a wound in the right ankle .\par The daughter told me that she is most likely getting wound care from [place] [facility] [place] or [facility] is she will find out and let me know.  \par She is losing weight, she do not have a appetite.  She is not able to cooperate with physical therapy. \par

## 2022-10-26 NOTE — REVIEW OF SYSTEMS
[Cough] : cough [Negative] : Gastrointestinal [Fatigue] : fatigue [Recent Change In Weight] : ~T recent weight change [Joint Pain] : joint pain [Muscle Weakness] : muscle weakness [Muscle Pain] : muscle pain [Fever] : no fever [Earache] : no earache [Hearing Loss] : no hearing loss [Nosebleed] : no nosebleeds [Hoarseness] : no hoarseness [Nasal Discharge] : no nasal discharge [Sore Throat] : no sore throat [Postnasal Drip] : no postnasal drip [Chest Pain] : no chest pain [Palpitations] : no palpitations [Leg Claudication] : no leg claudication [Lower Ext Edema] : no lower extremity edema [Paroxysmal Nocturnal Dyspnea] : no paroxysmal nocturnal dyspnea [Shortness Of Breath] : no shortness of breath [Wheezing] : no wheezing [Dyspnea on Exertion] : no dyspnea on exertion [FreeTextEntry2] : frail [FreeTextEntry9] : as hpi

## 2022-10-27 LAB
ALBUMIN SERPL ELPH-MCNC: 3 G/DL
ALP BLD-CCNC: 95 U/L
ALT SERPL-CCNC: 26 U/L
ANION GAP SERPL CALC-SCNC: 10 MMOL/L
AST SERPL-CCNC: 33 U/L
BASOPHILS # BLD AUTO: 0.01 K/UL
BASOPHILS NFR BLD AUTO: 0.1 %
BILIRUB SERPL-MCNC: 0.2 MG/DL
BUN SERPL-MCNC: 15 MG/DL
CALCIUM SERPL-MCNC: 9.7 MG/DL
CHLORIDE SERPL-SCNC: 97 MMOL/L
CO2 SERPL-SCNC: 30 MMOL/L
CREAT SERPL-MCNC: 1.1 MG/DL
EGFR: 45 ML/MIN/1.73M2
EOSINOPHIL # BLD AUTO: 0.1 K/UL
EOSINOPHIL NFR BLD AUTO: 1.4 %
GLUCOSE SERPL-MCNC: 131 MG/DL
HCT VFR BLD CALC: 33 %
HGB BLD-MCNC: 10.3 G/DL
IMM GRANULOCYTES NFR BLD AUTO: 0.4 %
LYMPHOCYTES # BLD AUTO: 2.11 K/UL
LYMPHOCYTES NFR BLD AUTO: 28.7 %
MAN DIFF?: NORMAL
MCHC RBC-ENTMCNC: 29.1 PG
MCHC RBC-ENTMCNC: 31.2 GM/DL
MCV RBC AUTO: 93.2 FL
MONOCYTES # BLD AUTO: 0.89 K/UL
MONOCYTES NFR BLD AUTO: 12.1 %
NEUTROPHILS # BLD AUTO: 4.22 K/UL
NEUTROPHILS NFR BLD AUTO: 57.3 %
PLATELET # BLD AUTO: 201 K/UL
POTASSIUM SERPL-SCNC: 3.4 MMOL/L
PROT SERPL-MCNC: 5.6 G/DL
RBC # BLD: 3.54 M/UL
RBC # FLD: 15.9 %
SODIUM SERPL-SCNC: 138 MMOL/L
WBC # FLD AUTO: 7.36 K/UL

## 2022-10-28 RX ORDER — PITAVASTATIN CALCIUM 2.09 MG/1
2 TABLET, FILM COATED ORAL
Qty: 90 | Refills: 0 | Status: DISCONTINUED | COMMUNITY
Start: 2022-05-17 | End: 2022-10-28

## 2022-10-28 RX ORDER — ACETAMINOPHEN EXTRA STRENGTH 500 MG/1
500 TABLET ORAL
Qty: 120 | Refills: 0 | Status: DISCONTINUED | COMMUNITY
Start: 2022-03-15 | End: 2022-10-28

## 2022-10-28 RX ORDER — GABAPENTIN 100 MG/1
100 CAPSULE ORAL
Qty: 60 | Refills: 0 | Status: DISCONTINUED | COMMUNITY
Start: 2022-08-17 | End: 2022-10-28

## 2022-10-28 RX ORDER — TRAMADOL HYDROCHLORIDE 50 MG/1
50 TABLET, COATED ORAL
Qty: 21 | Refills: 0 | Status: DISCONTINUED | COMMUNITY
Start: 2022-07-27 | End: 2022-10-28

## 2022-11-01 ENCOUNTER — APPOINTMENT (OUTPATIENT)
Dept: INTERNAL MEDICINE | Facility: CLINIC | Age: 87
End: 2022-11-01

## 2022-11-03 ENCOUNTER — NON-APPOINTMENT (OUTPATIENT)
Age: 87
End: 2022-11-03

## 2022-11-03 RX ORDER — FERROUS SULFATE TAB 325 MG (65 MG ELEMENTAL FE) 325 (65 FE) MG
325 (65 FE) TAB ORAL
Qty: 90 | Refills: 0 | Status: COMPLETED | COMMUNITY
Start: 2022-06-25 | End: 2022-11-03

## 2022-11-03 RX ORDER — CHLORHEXIDINE GLUCONATE 4 %
1000 LIQUID (ML) TOPICAL DAILY
Qty: 90 | Refills: 0 | Status: COMPLETED | COMMUNITY
Start: 2022-05-18 | End: 2022-11-03

## 2022-11-04 DIAGNOSIS — R11.0 NAUSEA: ICD-10-CM

## 2022-11-04 RX ORDER — SENNA 8.6 MG/1
8.6 TABLET, FILM COATED ORAL
Qty: 180 | Refills: 0 | Status: DISCONTINUED | COMMUNITY
Start: 2022-06-26 | End: 2022-11-04

## 2022-11-04 RX ORDER — BISMUTH SUBSALICYLATE 262 MG
262 TABLET,CHEWABLE ORAL
Qty: 1 | Refills: 0 | Status: ACTIVE | COMMUNITY
Start: 2022-08-03 | End: 1900-01-01

## 2022-11-04 RX ORDER — FAMOTIDINE 20 MG/1
20 TABLET, FILM COATED ORAL
Qty: 90 | Refills: 0 | Status: DISCONTINUED | COMMUNITY
Start: 2022-05-18 | End: 2022-11-04

## 2022-11-04 RX ORDER — ONDANSETRON 4 MG/5ML
4 SOLUTION ORAL
Qty: 120 | Refills: 0 | Status: ACTIVE | COMMUNITY
Start: 2022-11-04 | End: 1900-01-01

## 2022-11-04 RX ORDER — HYDRALAZINE HYDROCHLORIDE 100 MG/1
100 TABLET ORAL 3 TIMES DAILY
Qty: 270 | Refills: 0 | Status: ACTIVE | COMMUNITY
Start: 2022-06-05 | End: 1900-01-01

## 2022-11-11 RX ORDER — AMLODIPINE BESYLATE 5 MG/1
5 TABLET ORAL DAILY
Qty: 90 | Refills: 0 | Status: ACTIVE | COMMUNITY
Start: 2022-06-05 | End: 1900-01-01

## 2022-11-11 RX ORDER — FUROSEMIDE 20 MG/1
20 TABLET ORAL
Qty: 180 | Refills: 0 | Status: ACTIVE | COMMUNITY
Start: 2022-06-30 | End: 1900-01-01

## 2022-11-11 RX ORDER — LOSARTAN POTASSIUM 50 MG/1
50 TABLET, FILM COATED ORAL
Qty: 180 | Refills: 0 | Status: ACTIVE | COMMUNITY
Start: 2022-06-05 | End: 1900-01-01

## 2022-11-11 RX ORDER — POTASSIUM CHLORIDE 1500 MG/1
20 TABLET, FILM COATED, EXTENDED RELEASE ORAL
Qty: 90 | Refills: 0 | Status: ACTIVE | COMMUNITY
Start: 2022-06-30 | End: 1900-01-01

## 2022-11-22 ENCOUNTER — INPATIENT (INPATIENT)
Facility: HOSPITAL | Age: 87
LOS: 6 days | Discharge: NURSING, REHAB OR LONG-TERM CARE | DRG: 189 | End: 2022-11-29
Attending: FAMILY MEDICINE | Admitting: INTERNAL MEDICINE
Payer: MEDICARE

## 2022-11-22 VITALS
OXYGEN SATURATION: 94 % | RESPIRATION RATE: 18 BRPM | DIASTOLIC BLOOD PRESSURE: 57 MMHG | TEMPERATURE: 98 F | HEART RATE: 71 BPM | WEIGHT: 100.09 LBS | SYSTOLIC BLOOD PRESSURE: 171 MMHG | HEIGHT: 60 IN

## 2022-11-22 DIAGNOSIS — Z86.79 PERSONAL HISTORY OF OTHER DISEASES OF THE CIRCULATORY SYSTEM: Chronic | ICD-10-CM

## 2022-11-22 DIAGNOSIS — Z90.710 ACQUIRED ABSENCE OF BOTH CERVIX AND UTERUS: Chronic | ICD-10-CM

## 2022-11-22 DIAGNOSIS — R60.0 LOCALIZED EDEMA: ICD-10-CM

## 2022-11-22 DIAGNOSIS — Z90.49 ACQUIRED ABSENCE OF OTHER SPECIFIED PARTS OF DIGESTIVE TRACT: Chronic | ICD-10-CM

## 2022-11-22 DIAGNOSIS — E43 UNSPECIFIED SEVERE PROTEIN-CALORIE MALNUTRITION: ICD-10-CM

## 2022-11-22 DIAGNOSIS — J96.01 ACUTE RESPIRATORY FAILURE WITH HYPOXIA: ICD-10-CM

## 2022-11-22 DIAGNOSIS — R79.89 OTHER SPECIFIED ABNORMAL FINDINGS OF BLOOD CHEMISTRY: ICD-10-CM

## 2022-11-22 DIAGNOSIS — R07.9 CHEST PAIN, UNSPECIFIED: ICD-10-CM

## 2022-11-22 DIAGNOSIS — R74.01 ELEVATION OF LEVELS OF LIVER TRANSAMINASE LEVELS: ICD-10-CM

## 2022-11-22 DIAGNOSIS — I48.0 PAROXYSMAL ATRIAL FIBRILLATION: ICD-10-CM

## 2022-11-22 DIAGNOSIS — Z86.39 PERSONAL HISTORY OF OTHER ENDOCRINE, NUTRITIONAL AND METABOLIC DISEASE: ICD-10-CM

## 2022-11-22 LAB
ALBUMIN SERPL ELPH-MCNC: 1.9 G/DL — LOW (ref 3.3–5)
ALP SERPL-CCNC: 86 U/L — SIGNIFICANT CHANGE UP (ref 40–120)
ALT FLD-CCNC: 113 U/L — HIGH (ref 12–78)
ANION GAP SERPL CALC-SCNC: 5 MMOL/L — SIGNIFICANT CHANGE UP (ref 5–17)
APTT BLD: 28.8 SEC — SIGNIFICANT CHANGE UP (ref 27.5–35.5)
AST SERPL-CCNC: 92 U/L — HIGH (ref 15–37)
BASOPHILS # BLD AUTO: 0.02 K/UL — SIGNIFICANT CHANGE UP (ref 0–0.2)
BASOPHILS NFR BLD AUTO: 0.3 % — SIGNIFICANT CHANGE UP (ref 0–2)
BILIRUB SERPL-MCNC: 0.5 MG/DL — SIGNIFICANT CHANGE UP (ref 0.2–1.2)
BUN SERPL-MCNC: 21 MG/DL — SIGNIFICANT CHANGE UP (ref 7–23)
CALCIUM SERPL-MCNC: 8.8 MG/DL — SIGNIFICANT CHANGE UP (ref 8.5–10.1)
CHLORIDE SERPL-SCNC: 96 MMOL/L — SIGNIFICANT CHANGE UP (ref 96–108)
CO2 SERPL-SCNC: 33 MMOL/L — HIGH (ref 22–31)
CREAT SERPL-MCNC: 0.98 MG/DL — SIGNIFICANT CHANGE UP (ref 0.5–1.3)
D DIMER BLD IA.RAPID-MCNC: 748 NG/ML DDU — HIGH
EGFR: 52 ML/MIN/1.73M2 — LOW
EOSINOPHIL # BLD AUTO: 0.1 K/UL — SIGNIFICANT CHANGE UP (ref 0–0.5)
EOSINOPHIL NFR BLD AUTO: 1.4 % — SIGNIFICANT CHANGE UP (ref 0–6)
GLUCOSE SERPL-MCNC: 121 MG/DL — HIGH (ref 70–99)
HCT VFR BLD CALC: 32.4 % — LOW (ref 34.5–45)
HGB BLD-MCNC: 10.6 G/DL — LOW (ref 11.5–15.5)
IMM GRANULOCYTES NFR BLD AUTO: 0.5 % — SIGNIFICANT CHANGE UP (ref 0–0.9)
INR BLD: 1.01 RATIO — SIGNIFICANT CHANGE UP (ref 0.88–1.16)
LYMPHOCYTES # BLD AUTO: 1.69 K/UL — SIGNIFICANT CHANGE UP (ref 1–3.3)
LYMPHOCYTES # BLD AUTO: 22.8 % — SIGNIFICANT CHANGE UP (ref 13–44)
MCHC RBC-ENTMCNC: 29 PG — SIGNIFICANT CHANGE UP (ref 27–34)
MCHC RBC-ENTMCNC: 32.7 GM/DL — SIGNIFICANT CHANGE UP (ref 32–36)
MCV RBC AUTO: 88.5 FL — SIGNIFICANT CHANGE UP (ref 80–100)
MONOCYTES # BLD AUTO: 0.84 K/UL — SIGNIFICANT CHANGE UP (ref 0–0.9)
MONOCYTES NFR BLD AUTO: 11.4 % — SIGNIFICANT CHANGE UP (ref 2–14)
NEUTROPHILS # BLD AUTO: 4.71 K/UL — SIGNIFICANT CHANGE UP (ref 1.8–7.4)
NEUTROPHILS NFR BLD AUTO: 63.6 % — SIGNIFICANT CHANGE UP (ref 43–77)
NT-PROBNP SERPL-SCNC: 4014 PG/ML — HIGH (ref 0–450)
PLATELET # BLD AUTO: 178 K/UL — SIGNIFICANT CHANGE UP (ref 150–400)
POTASSIUM SERPL-MCNC: 2.9 MMOL/L — CRITICAL LOW (ref 3.5–5.3)
POTASSIUM SERPL-SCNC: 2.9 MMOL/L — CRITICAL LOW (ref 3.5–5.3)
PROT SERPL-MCNC: 6 GM/DL — SIGNIFICANT CHANGE UP (ref 6–8.3)
PROTHROM AB SERPL-ACNC: 11.7 SEC — SIGNIFICANT CHANGE UP (ref 10.5–13.4)
RBC # BLD: 3.66 M/UL — LOW (ref 3.8–5.2)
RBC # FLD: 16.1 % — HIGH (ref 10.3–14.5)
SARS-COV-2 RNA SPEC QL NAA+PROBE: SIGNIFICANT CHANGE UP
SODIUM SERPL-SCNC: 134 MMOL/L — LOW (ref 135–145)
TROPONIN I, HIGH SENSITIVITY RESULT: 31.12 NG/L — SIGNIFICANT CHANGE UP
WBC # BLD: 7.4 K/UL — SIGNIFICANT CHANGE UP (ref 3.8–10.5)
WBC # FLD AUTO: 7.4 K/UL — SIGNIFICANT CHANGE UP (ref 3.8–10.5)

## 2022-11-22 PROCEDURE — 99223 1ST HOSP IP/OBS HIGH 75: CPT

## 2022-11-22 PROCEDURE — 97116 GAIT TRAINING THERAPY: CPT | Mod: GP

## 2022-11-22 PROCEDURE — 71045 X-RAY EXAM CHEST 1 VIEW: CPT | Mod: 26

## 2022-11-22 PROCEDURE — 93971 EXTREMITY STUDY: CPT | Mod: 26,RT

## 2022-11-22 PROCEDURE — 97161 PT EVAL LOW COMPLEX 20 MIN: CPT | Mod: GP

## 2022-11-22 PROCEDURE — 97530 THERAPEUTIC ACTIVITIES: CPT | Mod: GP

## 2022-11-22 PROCEDURE — 74177 CT ABD & PELVIS W/CONTRAST: CPT | Mod: MA

## 2022-11-22 PROCEDURE — 71275 CT ANGIOGRAPHY CHEST: CPT | Mod: MA

## 2022-11-22 PROCEDURE — 74177 CT ABD & PELVIS W/CONTRAST: CPT | Mod: 26

## 2022-11-22 PROCEDURE — 87635 SARS-COV-2 COVID-19 AMP PRB: CPT

## 2022-11-22 PROCEDURE — 93005 ELECTROCARDIOGRAM TRACING: CPT

## 2022-11-22 PROCEDURE — 85027 COMPLETE CBC AUTOMATED: CPT

## 2022-11-22 PROCEDURE — 80076 HEPATIC FUNCTION PANEL: CPT

## 2022-11-22 PROCEDURE — 83735 ASSAY OF MAGNESIUM: CPT

## 2022-11-22 PROCEDURE — 36415 COLL VENOUS BLD VENIPUNCTURE: CPT

## 2022-11-22 PROCEDURE — 71275 CT ANGIOGRAPHY CHEST: CPT | Mod: 26

## 2022-11-22 PROCEDURE — 80048 BASIC METABOLIC PNL TOTAL CA: CPT

## 2022-11-22 PROCEDURE — 80053 COMPREHEN METABOLIC PANEL: CPT

## 2022-11-22 PROCEDURE — 99285 EMERGENCY DEPT VISIT HI MDM: CPT

## 2022-11-22 PROCEDURE — 84484 ASSAY OF TROPONIN QUANT: CPT

## 2022-11-22 RX ORDER — FUROSEMIDE 40 MG
40 TABLET ORAL DAILY
Refills: 0 | Status: DISCONTINUED | OUTPATIENT
Start: 2022-11-22 | End: 2022-11-23

## 2022-11-22 RX ORDER — FAMOTIDINE 10 MG/ML
20 INJECTION INTRAVENOUS DAILY
Refills: 0 | Status: DISCONTINUED | OUTPATIENT
Start: 2022-11-22 | End: 2022-11-29

## 2022-11-22 RX ORDER — CEFEPIME 1 G/1
1000 INJECTION, POWDER, FOR SOLUTION INTRAMUSCULAR; INTRAVENOUS ONCE
Refills: 0 | Status: COMPLETED | OUTPATIENT
Start: 2022-11-22 | End: 2022-11-22

## 2022-11-22 RX ORDER — POTASSIUM CHLORIDE 20 MEQ
10 PACKET (EA) ORAL
Refills: 0 | Status: COMPLETED | OUTPATIENT
Start: 2022-11-22 | End: 2022-11-22

## 2022-11-22 RX ORDER — LOSARTAN POTASSIUM 100 MG/1
100 TABLET, FILM COATED ORAL DAILY
Refills: 0 | Status: DISCONTINUED | OUTPATIENT
Start: 2022-11-22 | End: 2022-11-29

## 2022-11-22 RX ORDER — ONDANSETRON 8 MG/1
4 TABLET, FILM COATED ORAL EVERY 8 HOURS
Refills: 0 | Status: DISCONTINUED | OUTPATIENT
Start: 2022-11-22 | End: 2022-11-29

## 2022-11-22 RX ORDER — FLUDROCORTISONE ACETATE 0.1 MG/1
0.05 TABLET ORAL
Refills: 0 | Status: DISCONTINUED | OUTPATIENT
Start: 2022-11-22 | End: 2022-11-29

## 2022-11-22 RX ORDER — POTASSIUM CHLORIDE 20 MEQ
40 PACKET (EA) ORAL EVERY 12 HOURS
Refills: 0 | Status: DISCONTINUED | OUTPATIENT
Start: 2022-11-22 | End: 2022-11-27

## 2022-11-22 RX ORDER — CEFEPIME 1 G/1
INJECTION, POWDER, FOR SOLUTION INTRAMUSCULAR; INTRAVENOUS
Refills: 0 | Status: DISCONTINUED | OUTPATIENT
Start: 2022-11-22 | End: 2022-11-22

## 2022-11-22 RX ORDER — FAMOTIDINE 10 MG/ML
1 INJECTION INTRAVENOUS
Qty: 0 | Refills: 0 | DISCHARGE

## 2022-11-22 RX ORDER — ZINC SULFATE TAB 220 MG (50 MG ZINC EQUIVALENT) 220 (50 ZN) MG
1 TAB ORAL
Qty: 0 | Refills: 0 | DISCHARGE

## 2022-11-22 RX ORDER — HYDROCORTISONE 20 MG
5 TABLET ORAL AT BEDTIME
Refills: 0 | Status: DISCONTINUED | OUTPATIENT
Start: 2022-11-22 | End: 2022-11-29

## 2022-11-22 RX ORDER — AMIODARONE HYDROCHLORIDE 400 MG/1
200 TABLET ORAL DAILY
Refills: 0 | Status: DISCONTINUED | OUTPATIENT
Start: 2022-11-22 | End: 2022-11-22

## 2022-11-22 RX ORDER — HYDROCORTISONE 20 MG
10 TABLET ORAL DAILY
Refills: 0 | Status: DISCONTINUED | OUTPATIENT
Start: 2022-11-22 | End: 2022-11-29

## 2022-11-22 RX ORDER — CEFEPIME 1 G/1
1000 INJECTION, POWDER, FOR SOLUTION INTRAMUSCULAR; INTRAVENOUS EVERY 12 HOURS
Refills: 0 | Status: DISCONTINUED | OUTPATIENT
Start: 2022-11-23 | End: 2022-11-23

## 2022-11-22 RX ORDER — CARVEDILOL PHOSPHATE 80 MG/1
25 CAPSULE, EXTENDED RELEASE ORAL EVERY 12 HOURS
Refills: 0 | Status: DISCONTINUED | OUTPATIENT
Start: 2022-11-22 | End: 2022-11-29

## 2022-11-22 RX ORDER — LANOLIN ALCOHOL/MO/W.PET/CERES
3 CREAM (GRAM) TOPICAL AT BEDTIME
Refills: 0 | Status: DISCONTINUED | OUTPATIENT
Start: 2022-11-22 | End: 2022-11-29

## 2022-11-22 RX ORDER — FERROUS SULFATE 325(65) MG
325 TABLET ORAL DAILY
Refills: 0 | Status: DISCONTINUED | OUTPATIENT
Start: 2022-11-22 | End: 2022-11-29

## 2022-11-22 RX ORDER — HYDRALAZINE HCL 50 MG
100 TABLET ORAL EVERY 12 HOURS
Refills: 0 | Status: DISCONTINUED | OUTPATIENT
Start: 2022-11-22 | End: 2022-11-29

## 2022-11-22 RX ORDER — HYDROCORTISONE 20 MG
1 TABLET ORAL
Qty: 0 | Refills: 0 | DISCHARGE

## 2022-11-22 RX ORDER — ACETAMINOPHEN 500 MG
650 TABLET ORAL EVERY 6 HOURS
Refills: 0 | Status: DISCONTINUED | OUTPATIENT
Start: 2022-11-22 | End: 2022-11-29

## 2022-11-22 RX ORDER — CEFEPIME 1 G/1
INJECTION, POWDER, FOR SOLUTION INTRAMUSCULAR; INTRAVENOUS
Refills: 0 | Status: DISCONTINUED | OUTPATIENT
Start: 2022-11-22 | End: 2022-11-23

## 2022-11-22 RX ORDER — PREGABALIN 225 MG/1
1 CAPSULE ORAL
Qty: 0 | Refills: 0 | DISCHARGE

## 2022-11-22 RX ADMIN — CARVEDILOL PHOSPHATE 25 MILLIGRAM(S): 80 CAPSULE, EXTENDED RELEASE ORAL at 22:04

## 2022-11-22 RX ADMIN — FLUDROCORTISONE ACETATE 0.05 MILLIGRAM(S): 0.1 TABLET ORAL at 22:05

## 2022-11-22 RX ADMIN — CEFEPIME 1000 MILLIGRAM(S): 1 INJECTION, POWDER, FOR SOLUTION INTRAMUSCULAR; INTRAVENOUS at 18:40

## 2022-11-22 RX ADMIN — Medication 5 MILLIGRAM(S): at 22:04

## 2022-11-22 RX ADMIN — Medication 3 MILLIGRAM(S): at 22:03

## 2022-11-22 RX ADMIN — Medication 100 MILLIEQUIVALENT(S): at 18:48

## 2022-11-22 RX ADMIN — Medication 100 MILLIEQUIVALENT(S): at 20:08

## 2022-11-22 RX ADMIN — Medication 40 MILLIGRAM(S): at 22:06

## 2022-11-22 RX ADMIN — Medication 100 MILLIGRAM(S): at 19:52

## 2022-11-22 RX ADMIN — Medication 40 MILLIEQUIVALENT(S): at 22:03

## 2022-11-22 RX ADMIN — Medication 40 MILLIEQUIVALENT(S): at 18:42

## 2022-11-22 NOTE — H&P ADULT - NSHPREVIEWOFSYSTEMS_GEN_ALL_CORE
Patient is hard of hearing and somewhat limited historian. Patient currently very comfortable. Denies any HA, CP, SOB. Comfortable.

## 2022-11-22 NOTE — H&P ADULT - HISTORY OF PRESENT ILLNESS
Patient is a 89 y/o female with PMHx of CKD, HTN, CHF on Lasix, Afib, lymphoma, hyponatremia, hypokalemia, s/p appendectomy, h/o intracranial hemorrhage presents to the ED c/o a productive cough x 1 week. Denies chest pain, denies SOB, denies pain/swelling in legs. She was recently hospitalized in September for a right hip fx. No AC due to multiple falls. Patient is 89 y/o sick appearing individual with extensive chronic medical problems HEFpEF, CKD stage III, pAF (not on AC due to falls), recent hospitalization with (R) hip fracture -  admitted in the hospital with progressive worsening chest tightness and shortness of breath /  cough. Onset of symptoms week or two ago but not constant. Notes that, this morning she woke with SOB / Chest tightness -  ambulance was called was told that she had a high BPs in the 180s - as per patient received a nitroglycerin and felt better. Associated symptoms include fatigue, not feeling well, progressively worsening shortness of breath worth with exertion. Never been in the hospital in the past with similar events, but has had similar events on and off.     ED -  patient with worsening pleural effusion          Patient is 89 y/o sick appearing individual with extensive chronic medical problems HEFpEF, CKD stage III, pAF (not on AC due to falls), recent hospitalization with (R) hip fracture, adrenal insufficiency, -  admitted in the hospital with progressive worsening chest tightness and shortness of breath /  cough. Onset of symptoms week or two ago but not constant. Notes that, this morning she woke with SOB / Chest tightness -  ambulance was called was told that she had a high BPs in the 180s - as per patient received a nitroglycerin and felt better. Associated symptoms include fatigue, not feeling well, progressively worsening shortness of breath worth with exertion. Never been in the hospital in the past with similar events, but has had similar events on and off.     ED -  patient with worsening pleural effusion

## 2022-11-22 NOTE — ED ADULT NURSE NOTE - CHIEF COMPLAINT QUOTE
Pt brought in by ambulance from [facility] for chest pain after eating. Pt was given 1 SL nitro in route by EMS and states that she is feeling better. Pt with no complaints at this time. PT DNR/DNI with MOLST on chart

## 2022-11-22 NOTE — H&P ADULT - NSHPLABSRESULTS_GEN_ALL_CORE
CBC Full  -  ( 22 Nov 2022 13:21 )  WBC Count : 7.40 K/uL  RBC Count : 3.66 M/uL  Hemoglobin : 10.6 g/dL  Hematocrit : 32.4 %  Platelet Count - Automated : 178 K/uL  Mean Cell Volume : 88.5 fl  Mean Cell Hemoglobin : 29.0 pg  Mean Cell Hemoglobin Concentration : 32.7 gm/dL  Auto Neutrophil # : 4.71 K/uL  Auto Lymphocyte # : 1.69 K/uL  Auto Monocyte # : 0.84 K/uL  Auto Eosinophil # : 0.10 K/uL  Auto Basophil # : 0.02 K/uL  Auto Neutrophil % : 63.6 %  Auto Lymphocyte % : 22.8 %  Auto Monocyte % : 11.4 %  Auto Eosinophil % : 1.4 %  Auto Basophil % : 0.3 %    PT/INR - ( 22 Nov 2022 13:21 )   PT: 11.7 sec;   INR: 1.01 ratio         PTT - ( 22 Nov 2022 13:21 )  PTT:28.8 sec    11-22    134<L>  |  96  |  21  ----------------------------<  121<H>  2.9<LL>   |  33<H>  |  0.98    Ca    8.8      22 Nov 2022 13:21    TPro  6.0  /  Alb  1.9<L>  /  TBili  0.5  /  DBili  x   /  AST  92<H>  /  ALT  113<H>  /  AlkPhos  86  11-22 CBC Full  -  ( 22 Nov 2022 13:21 )  WBC Count : 7.40 K/uL  RBC Count : 3.66 M/uL  Hemoglobin : 10.6 g/dL  Hematocrit : 32.4 %  Platelet Count - Automated : 178 K/uL    PT/INR - ( 22 Nov 2022 13:21 )   PT: 11.7 sec;   INR: 1.01 ratio         PTT - ( 22 Nov 2022 13:21 )  PTT:28.8 sec    134<L>  |  96  |  21  ----------------------------<  121<H>  2.9<LL>   |  33<H>  |  0.98    Ca    8.8      22 Nov 2022 13:21    TPro  6.0  /  Alb  1.9<L>  /  TBili  0.5  /  DBili  x   /  AST  92<H>  /  ALT  113<H>  /  AlkPhos  86  11-22

## 2022-11-22 NOTE — H&P ADULT - PROBLEM SELECTOR PLAN 2
- stress dose steroids initiated  - will continue with florinef  - monitor BP /  electrolytes - no need for stress dose steroids at this time  - continue with home reigment of florinef  - continue with hydrocortisone 10 mg am /  5 mg pm

## 2022-11-22 NOTE — ED PROVIDER NOTE - PROGRESS NOTE DETAILS
neg dvt in rle, ddimer 748, age corrected,ddimer is negative. will not order ctangio chest. MD ASIYA left vm on hospitalist admit phone MD ASIYA d/w dr hamilton. we agreed pt will need ct angio chest and ct abd/pel . pt is DNM until CTs done. DR Hamilton will follow the results of cts. MD ASIYA

## 2022-11-22 NOTE — ED ADULT NURSE NOTE - NSIMPLEMENTINTERV_GEN_ALL_ED
Implemented All Fall with Harm Risk Interventions:  [place] to call system. Call bell, personal items and telephone within reach. Instruct patient to call for assistance. Room bathroom lighting operational. Non-slip footwear when patient is off stretcher. Physically safe environment: no spills, clutter or unnecessary equipment. Stretcher in lowest position, wheels locked, appropriate side rails in place. Provide visual cue, wrist band, yellow gown, etc. Monitor gait and stability. Monitor for mental status changes and reorient to person, place, and time. Review medications for side effects contributing to fall risk. Reinforce activity limits and safety measures with patient and family. Provide visual clues: red socks.

## 2022-11-22 NOTE — ED ADULT TRIAGE NOTE - CHIEF COMPLAINT QUOTE
Pt brought in by ambulance from [facility] for chest pain after eating. Pt was given 1 SL nitro in route by EMS and states that she is feeling better. Pt with no complaints at this time. PT DNR/DNI with MOLST on chart

## 2022-11-22 NOTE — ED PROVIDER NOTE - OBJECTIVE STATEMENT
89 y/o female with a PMHx of adrenal insufficiency, CKD, diastolic heart failure, HTN, hypokalemia, hyponatremia, iron deficiency, lymphoma, PAF presents to the ED BIBA from [facility] for intermittent chest discomfort x2 weeks, worsening today. Pt was given 1SL nitro en route to ED. Denies cough, fevers. No other complaints at this time.

## 2022-11-22 NOTE — H&P ADULT - PROBLEM SELECTOR PLAN 1
- this is most likely secondary to combination of acute on chronic HEFpEF in combination with Pneumonia suspected gram negative organism  - IV cefepime has been initiated  - IV Lasix  - BNP 4000  - strict i/o  - daily weights  - low salt diet  - CT: No pulmonary embolism from the main pulmonary artery through the lobar branches. Several segmental and subsegmental branches are not well evaluated due to motion. Increased moderate pleural effusions. New opacities in the upper lobes, differential for which includes pneumonia and pulmonary edema.  - Pulmonary evaluation /  Cardiology evaluation

## 2022-11-22 NOTE — H&P ADULT - PROBLEM SELECTOR PLAN 4
- right sided leg edema with non-healing ulcers  - wound - care evaluation pending  - (R) LE doppler -  No evidence of right lower extremity deep venous thrombosis.

## 2022-11-22 NOTE — CONSULT NOTE ADULT - SUBJECTIVE AND OBJECTIVE BOX
HPI:    89 y/o sick appearing individual with extensive chronic medical problems HEFpEF, CKD stage III, pAF (not on AC due to falls), recent hospitalization with (R) hip fracture, adrenal insufficiency, -  admitted in the hospital with progressive worsening chest tightness and shortness of breath /  cough. Onset of symptoms week or two ago but not constant. Notes that, this morning she woke with SOB / Chest tightness -  ambulance was called was told that she had a high BPs in the 180s - as per patient received a nitroglycerin and felt better. Associated symptoms include fatigue, not feeling well, progressively worsening shortness of breath worth with exertion. Never been in the hospital in the past with similar events, but has had similar events on and off. In ED -  patient with worsening pleural effusion, PAT seen for pulmonary eval.    PAST MEDICAL & SURGICAL HISTORY:  Iron deficiency      HTN (hypertension)      Lymphoma      H/O adrenal insufficiency      Hyponatremia      Hypokalemia      Chronic kidney disease, unspecified CKD stage      Diastolic heart failure      Paroxysmal atrial fibrillation      S/P appendectomy      S/P hysterectomy      H/O intracranial hemorrhage          Home Medications:  amiodarone 200 mg oral tablet: 1 tab(s) orally once a day (22 Nov 2022 15:35)  amLODIPine 5 mg oral tablet: 1 tab(s) orally once a day (22 Nov 2022 15:35)  carvedilol 25 mg oral tablet: 1 tab(s) orally 2 times a day (22 Nov 2022 15:35)  Claritin 10 mg oral tablet: 1 tab(s) orally once a day, As Needed (22 Nov 2022 15:35)  Colace 100 mg oral capsule: 2 cap(s) orally once a day (at bedtime) (22 Nov 2022 15:35)  fludrocortisone 0.1 mg oral tablet: 0.5 tab(s) orally 2 times a day (22 Nov 2022 15:35)  furosemide 20 mg oral tablet: 1 tab(s) orally 2 times a day (22 Nov 2022 15:35)  hydrALAZINE 100 mg oral tablet: 1 tab(s) orally 3 times a day (22 Nov 2022 15:35)  hydrocortisone 10 mg oral tablet: 1 tab(s) orally once a day (in the morning) (22 Nov 2022 15:35)  hydrocortisone 5 mg oral tablet: 1 tab(s) orally once a day (in the evening) (22 Nov 2022 15:35)  Iodosorb 0.9% topical gel: Apply topically to affected area Monday, Wednesday, and Friday (22 Nov 2022 15:35)  losartan 100 mg oral tablet: 1 tab(s) orally once a day (22 Nov 2022 15:35)  Multiple Vitamins oral liquid: 5 milliliter(s) orally once a day (at bedtime) (22 Nov 2022 15:35)  Pepto-Bismol 262 mg/15 mL oral suspension: 15 milliliter(s) orally every 8 hours, As Needed (22 Nov 2022 15:35)  polyethylene glycol 3350 oral powder for reconstitution: 17 gram(s) orally once a day, As Needed (22 Nov 2022 15:35)  potassium chloride 20 mEq oral powder for reconstitution: 1 each orally once a day (22 Nov 2022 15:35)  Senna 8.6 mg oral tablet: 2 tab(s) orally once a day (at bedtime), As Needed (22 Nov 2022 15:35)  Tylenol Regular Strength 325 mg oral tablet: 1 tab(s) orally 2 times a day, As Needed (22 Nov 2022 15:35)  Vitamin D3 50 mcg (2000 intl units) oral tablet: 1 tab(s) orally once a day (22 Nov 2022 15:35)      MEDICATIONS  (STANDING):  aMIOdarone    Tablet 200 milliGRAM(s) Oral daily  carvedilol 25 milliGRAM(s) Oral every 12 hours  cefepime  Injectable.      cefepime  Injectable. 1000 milliGRAM(s) IV Push once  famotidine    Tablet 20 milliGRAM(s) Oral daily  ferrous    sulfate 325 milliGRAM(s) Oral daily  fludroCORTISONE 0.05 milliGRAM(s) Oral two times a day  furosemide   Injectable 40 milliGRAM(s) IV Push daily  hydrALAZINE 100 milliGRAM(s) Oral every 12 hours  hydrocortisone 10 milliGRAM(s) Oral daily  hydrocortisone 5 milliGRAM(s) Oral at bedtime  losartan 100 milliGRAM(s) Oral daily  potassium chloride   Powder 40 milliEquivalent(s) Oral every 12 hours  potassium chloride  10 mEq/100 mL IVPB 10 milliEquivalent(s) IV Intermittent every 1 hour    MEDICATIONS  (PRN):  acetaminophen     Tablet .. 650 milliGRAM(s) Oral every 6 hours PRN Temp greater or equal to 38C (100.4F), Mild Pain (1 - 3)  aluminum hydroxide/magnesium hydroxide/simethicone Suspension 30 milliLiter(s) Oral every 4 hours PRN Dyspepsia  melatonin 3 milliGRAM(s) Oral at bedtime PRN Insomnia  ondansetron Injectable 4 milliGRAM(s) IV Push every 8 hours PRN Nausea and/or Vomiting      Allergies    penicillin (Hives)  sulfa drugs (Hives)  tetracycline (Hives)    Intolerances        SOCIAL HISTORY: Denies tobacco, etoh abuse or illicit drug use    FAMILY HISTORY:  No known problems (Father, Mother)        Vital Signs Last 24 Hrs  T(C): 36.7 (22 Nov 2022 15:04), Max: 36.7 (22 Nov 2022 12:10)  T(F): 98.1 (22 Nov 2022 15:04), Max: 98.1 (22 Nov 2022 12:10)  HR: 67 (22 Nov 2022 15:04) (67 - 71)  BP: 164/49 (22 Nov 2022 15:04) (164/49 - 171/57)  BP(mean): 83 (22 Nov 2022 15:04) (83 - 83)  RR: 18 (22 Nov 2022 15:04) (18 - 18)  SpO2: 94% (22 Nov 2022 15:04) (94% - 94%)    Parameters below as of 22 Nov 2022 15:04  Patient On (Oxygen Delivery Method): room air            REVIEW OF SYSTEMS:    CONSTITUTIONAL:  As per HPI.  HEENT:  Eyes:  No diplopia or blurred vision. ENT:  No earache, sore throat or runny nose.  CARDIOVASCULAR:  No pressure, squeezing, tightness, heaviness or aching about the chest, neck, axilla or epigastrium.  RESPIRATORY:  No cough, +shortness of breath, PND or orthopnea.  GASTROINTESTINAL:  No nausea, vomiting or diarrhea.  GENITOURINARY:  No dysuria, frequency or urgency.  MUSCULOSKELETAL:  As per HPI.  SKIN:  No change in skin, hair or nails.  NEUROLOGIC:  No paresthesias, fasciculations, seizures or weakness.  PSYCHIATRIC:  No disorder of thought or mood.  ENDOCRINE:  No heat or cold intolerance, polyuria or polydipsia.  HEMATOLOGICAL:  No easy bruising or bleedings:  .     PHYSICAL EXAMINATION:    GENERAL APPEARANCE:  Pt. is not currently dyspneic, in no distress. Pt. is alert, oriented, and pleasant.  HEENT:  Pupils are normal and react normally. No icterus. Mucous membranes well colored.  NECK:  Supple. No lymphadenopathy. Jugular venous pressure not elevated. Carotids equal.   HEART:   The cardiac impulse has a normal quality. Regular. Normal S1 and S2. There are no murmurs, rubs or gallops noted  CHEST:  Chest crackles to auscultation. Normal respiratory effort.  ABDOMEN:  Soft and nontender.   EXTREMITIES:  There is no cyanosis, clubbing or edema.   SKIN:  No rash or significant lesions are noted.    LABS:                        10.6   7.40  )-----------( 178      ( 22 Nov 2022 13:21 )             32.4     11-22    134<L>  |  96  |  21  ----------------------------<  121<H>  2.9<LL>   |  33<H>  |  0.98    Ca    8.8      22 Nov 2022 13:21    TPro  6.0  /  Alb  1.9<L>  /  TBili  0.5  /  DBili  x   /  AST  92<H>  /  ALT  113<H>  /  AlkPhos  86  11-22    LIVER FUNCTIONS - ( 22 Nov 2022 13:21 )  Alb: 1.9 g/dL / Pro: 6.0 gm/dL / ALK PHOS: 86 U/L / ALT: 113 U/L / AST: 92 U/L / GGT: x           PT/INR - ( 22 Nov 2022 13:21 )   PT: 11.7 sec;   INR: 1.01 ratio         PTT - ( 22 Nov 2022 13:21 )  PTT:28.8 sec      RADIOLOGY & ADDITIONAL STUDIES:     CT Angio Chest PE Protocol w/ IV Cont (11.22.22 @ 14:53) >  IMPRESSION:    No pulmonary embolism from the main pulmonary artery through the lobar   branches. Several segmental and subsegmental branches are not well   evaluated due to motion.    Increased moderate pleural effusions.    New opacities in the upper lobes, differential for which includes   pneumonia and pulmonary edema.    New mild periportal edema.    Multiple pancreatic cystic lesions, some of which are larger compared to   January 2020.    US Duplex Venous Lower Ext Ltd, Right (11.22.22 @ 13:11) >  IMPRESSION:  No evidence of right lower extremity deep venous thrombosis.    CT Chest No Cont (10.18.22 @ 13:17) >  Basilar and peripheral predominant fibrotic interstitial lung disease,   worsening since March 2022. Drug toxicity is a consideration, given   diffusely increased liver attenuation which can be seen with amiodarone   use.       HPI:    89 y/o sick appearing individual with extensive chronic medical problems HEFpEF, CKD stage III, pAF (not on AC due to falls), recent hospitalization with (R) hip fracture, adrenal insufficiency, -  admitted in the hospital with progressive worsening chest tightness and shortness of breath /  cough. Onset of symptoms week or two ago but not constant. Notes that, this morning she woke with SOB / Chest tightness -  ambulance was called was told that she had a high BPs in the 180s - as per patient received a nitroglycerin and felt better. Associated symptoms include fatigue, not feeling well, progressively worsening shortness of breath worth with exertion. Never been in the hospital in the past with similar events, but has had similar events on and off. In ED-patient with worsening pleural effusion, PAT seen for pulmonary eval.    PAST MEDICAL & SURGICAL HISTORY:  Iron deficiency      HTN (hypertension)      Lymphoma      H/O adrenal insufficiency      Hyponatremia      Hypokalemia      Chronic kidney disease, unspecified CKD stage      Diastolic heart failure      Paroxysmal atrial fibrillation      S/P appendectomy      S/P hysterectomy      H/O intracranial hemorrhage          Home Medications:  amiodarone 200 mg oral tablet: 1 tab(s) orally once a day (22 Nov 2022 15:35)  amLODIPine 5 mg oral tablet: 1 tab(s) orally once a day (22 Nov 2022 15:35)  carvedilol 25 mg oral tablet: 1 tab(s) orally 2 times a day (22 Nov 2022 15:35)  Claritin 10 mg oral tablet: 1 tab(s) orally once a day, As Needed (22 Nov 2022 15:35)  Colace 100 mg oral capsule: 2 cap(s) orally once a day (at bedtime) (22 Nov 2022 15:35)  fludrocortisone 0.1 mg oral tablet: 0.5 tab(s) orally 2 times a day (22 Nov 2022 15:35)  furosemide 20 mg oral tablet: 1 tab(s) orally 2 times a day (22 Nov 2022 15:35)  hydrALAZINE 100 mg oral tablet: 1 tab(s) orally 3 times a day (22 Nov 2022 15:35)  hydrocortisone 10 mg oral tablet: 1 tab(s) orally once a day (in the morning) (22 Nov 2022 15:35)  hydrocortisone 5 mg oral tablet: 1 tab(s) orally once a day (in the evening) (22 Nov 2022 15:35)  Iodosorb 0.9% topical gel: Apply topically to affected area Monday, Wednesday, and Friday (22 Nov 2022 15:35)  losartan 100 mg oral tablet: 1 tab(s) orally once a day (22 Nov 2022 15:35)  Multiple Vitamins oral liquid: 5 milliliter(s) orally once a day (at bedtime) (22 Nov 2022 15:35)  Pepto-Bismol 262 mg/15 mL oral suspension: 15 milliliter(s) orally every 8 hours, As Needed (22 Nov 2022 15:35)  polyethylene glycol 3350 oral powder for reconstitution: 17 gram(s) orally once a day, As Needed (22 Nov 2022 15:35)  potassium chloride 20 mEq oral powder for reconstitution: 1 each orally once a day (22 Nov 2022 15:35)  Senna 8.6 mg oral tablet: 2 tab(s) orally once a day (at bedtime), As Needed (22 Nov 2022 15:35)  Tylenol Regular Strength 325 mg oral tablet: 1 tab(s) orally 2 times a day, As Needed (22 Nov 2022 15:35)  Vitamin D3 50 mcg (2000 intl units) oral tablet: 1 tab(s) orally once a day (22 Nov 2022 15:35)      MEDICATIONS  (STANDING):  aMIOdarone    Tablet 200 milliGRAM(s) Oral daily  carvedilol 25 milliGRAM(s) Oral every 12 hours  cefepime  Injectable.      cefepime  Injectable. 1000 milliGRAM(s) IV Push once  famotidine    Tablet 20 milliGRAM(s) Oral daily  ferrous    sulfate 325 milliGRAM(s) Oral daily  fludroCORTISONE 0.05 milliGRAM(s) Oral two times a day  furosemide   Injectable 40 milliGRAM(s) IV Push daily  hydrALAZINE 100 milliGRAM(s) Oral every 12 hours  hydrocortisone 10 milliGRAM(s) Oral daily  hydrocortisone 5 milliGRAM(s) Oral at bedtime  losartan 100 milliGRAM(s) Oral daily  potassium chloride   Powder 40 milliEquivalent(s) Oral every 12 hours  potassium chloride  10 mEq/100 mL IVPB 10 milliEquivalent(s) IV Intermittent every 1 hour    MEDICATIONS  (PRN):  acetaminophen     Tablet .. 650 milliGRAM(s) Oral every 6 hours PRN Temp greater or equal to 38C (100.4F), Mild Pain (1 - 3)  aluminum hydroxide/magnesium hydroxide/simethicone Suspension 30 milliLiter(s) Oral every 4 hours PRN Dyspepsia  melatonin 3 milliGRAM(s) Oral at bedtime PRN Insomnia  ondansetron Injectable 4 milliGRAM(s) IV Push every 8 hours PRN Nausea and/or Vomiting      Allergies    penicillin (Hives)  sulfa drugs (Hives)  tetracycline (Hives)    Intolerances        SOCIAL HISTORY: Denies tobacco, etoh abuse or illicit drug use    FAMILY HISTORY:  No known problems (Father, Mother)        Vital Signs Last 24 Hrs  T(C): 36.7 (22 Nov 2022 15:04), Max: 36.7 (22 Nov 2022 12:10)  T(F): 98.1 (22 Nov 2022 15:04), Max: 98.1 (22 Nov 2022 12:10)  HR: 67 (22 Nov 2022 15:04) (67 - 71)  BP: 164/49 (22 Nov 2022 15:04) (164/49 - 171/57)  BP(mean): 83 (22 Nov 2022 15:04) (83 - 83)  RR: 18 (22 Nov 2022 15:04) (18 - 18)  SpO2: 94% (22 Nov 2022 15:04) (94% - 94%)    Parameters below as of 22 Nov 2022 15:04  Patient On (Oxygen Delivery Method): room air            REVIEW OF SYSTEMS:    CONSTITUTIONAL:  As per HPI.  HEENT:  Eyes:  No diplopia or blurred vision. ENT:  No earache, sore throat or runny nose.  CARDIOVASCULAR:  No pressure, squeezing, tightness, heaviness or aching about the chest, neck, axilla or epigastrium.  RESPIRATORY:  No cough, +shortness of breath, PND or orthopnea.  GASTROINTESTINAL:  No nausea, vomiting or diarrhea.  GENITOURINARY:  No dysuria, frequency or urgency.  MUSCULOSKELETAL:  As per HPI.  SKIN:  No change in skin, hair or nails.  NEUROLOGIC:  No paresthesias, fasciculations, seizures or weakness.  PSYCHIATRIC:  No disorder of thought or mood.  ENDOCRINE:  No heat or cold intolerance, polyuria or polydipsia.  HEMATOLOGICAL:  No easy bruising or bleedings:  .     PHYSICAL EXAMINATION:    GENERAL APPEARANCE:  Pt. is not currently dyspneic, in no distress. Pt. is alert, oriented, and pleasant.  HEENT:  Pupils are normal and react normally. No icterus. Mucous membranes well colored.  NECK:  Supple. No lymphadenopathy. Jugular venous pressure not elevated. Carotids equal.   HEART:   The cardiac impulse has a normal quality. Regular. Normal S1 and S2. There are no murmurs, rubs or gallops noted  CHEST:  Chest crackles to auscultation. Normal respiratory effort.  ABDOMEN:  Soft and nontender.   EXTREMITIES:  There is no cyanosis, clubbing or edema.   SKIN:  No rash or significant lesions are noted.    LABS:                        10.6   7.40  )-----------( 178      ( 22 Nov 2022 13:21 )             32.4     11-22    134<L>  |  96  |  21  ----------------------------<  121<H>  2.9<LL>   |  33<H>  |  0.98    Ca    8.8      22 Nov 2022 13:21    TPro  6.0  /  Alb  1.9<L>  /  TBili  0.5  /  DBili  x   /  AST  92<H>  /  ALT  113<H>  /  AlkPhos  86  11-22    LIVER FUNCTIONS - ( 22 Nov 2022 13:21 )  Alb: 1.9 g/dL / Pro: 6.0 gm/dL / ALK PHOS: 86 U/L / ALT: 113 U/L / AST: 92 U/L / GGT: x           PT/INR - ( 22 Nov 2022 13:21 )   PT: 11.7 sec;   INR: 1.01 ratio         PTT - ( 22 Nov 2022 13:21 )  PTT:28.8 sec      RADIOLOGY & ADDITIONAL STUDIES:     CT Angio Chest PE Protocol w/ IV Cont (11.22.22 @ 14:53) >  IMPRESSION:    No pulmonary embolism from the main pulmonary artery through the lobar   branches. Several segmental and subsegmental branches are not well   evaluated due to motion.    Increased moderate pleural effusions.    New opacities in the upper lobes, differential for which includes   pneumonia and pulmonary edema.    New mild periportal edema.    Multiple pancreatic cystic lesions, some of which are larger compared to   January 2020.    US Duplex Venous Lower Ext Ltd, Right (11.22.22 @ 13:11) >  IMPRESSION:  No evidence of right lower extremity deep venous thrombosis.    CT Chest No Cont (10.18.22 @ 13:17) >  Basilar and peripheral predominant fibrotic interstitial lung disease,   worsening since March 2022. Drug toxicity is a consideration, given   diffusely increased liver attenuation which can be seen with amiodarone   use.

## 2022-11-22 NOTE — ED ADULT NURSE REASSESSMENT NOTE - NS ED NURSE REASSESS COMMENT FT1
Assumed care of Pt from KENNETH Cheema. Pt received resting comfortably in stretcher. Pt's daughter at beside. Vital signs reassessed, BP is elevated, will continue to monitor. Placed Pt on bedpan as per request. Provided Pt with menu to order lunch/dinner. Pt's daughter concerned about ulcer to right heel that is currently being treated outpatient as well as swelling to right lower extremity. Pt's daughter also noted that Pt had right hip replaced in September and is still complaining of pain. No additional requests or complaints. Patient safety maintained. Will continue to monitor.

## 2022-11-22 NOTE — ED PROVIDER NOTE - NS ED ROS FT
Constitutional: No fever or chills  Eyes: No visual changes  HEENT: No throat pain  CV: +chest discomfort  Resp: No SOB no cough  GI: No abd pain, nausea or vomiting  : No dysuria  MSK: No musculoskeletal pain  Skin: No rash  Neuro: No headache

## 2022-11-22 NOTE — PATIENT PROFILE ADULT - FUNCTIONAL ASSESSMENT - BASIC MOBILITY 6.
2-calculated by average/Not able to assess (calculate score using [facility] averaging method)

## 2022-11-22 NOTE — ED PROVIDER NOTE - PHYSICAL EXAMINATION
Constitutional: Thin, NAD AOx3  Eyes: PERRL EOMI  Head: Normocephalic atraumatic  Mouth: MMM  Cardiac: regular rate and rhythm  Resp: Lungs CTAB  GI: Abd s/nd/nt  Neuro: CN2-12 grossly intact, HECTOR x 4  Skin: RLE with edema. 2 open ulcers. One on heel and one on her lateral lower shin. No erythema or warmth.

## 2022-11-22 NOTE — H&P ADULT - NSHPPHYSICALEXAM_GEN_ALL_CORE
Physical Exam:   GENERAL APPEARANCE: Elderly, frail, sick, deconditioned.   T(C): 36.7 (11-22-22 @ 15:04), Max: 36.7 (11-22-22 @ 12:10)  HR: 67 (11-22-22 @ 15:04) (67 - 71)  BP: 164/49 (11-22-22 @ 15:04) (164/49 - 171/57)  RR: 18 (11-22-22 @ 15:04) (18 - 18)  SpO2: 94% (11-22-22 @ 15:04) (94% - 94%)  HEENT:  Head is normocephalic    Skin:  Warm and dry without any rash   NECK:  Supple without lymphadenopathy.   HEART:  Regular rate and rhythm. normal S1 and S2, No M/R/G  LUNGS:  Good ins/exp effort, no W/R/R/C  ABDOMEN:  Soft, nontender, nondistended with good bowel sounds heard  EXTREMITIES:  Without cyanosis, clubbing or edema.   NEUROLOGICAL:  Gross nonfocal Physical Exam:   GENERAL APPEARANCE: Elderly, frail, sick, deconditioned.   T(C): 36.7 (11-22-22 @ 15:04), Max: 36.7 (11-22-22 @ 12:10)  HR: 67 (11-22-22 @ 15:04) (67 - 71)  BP: 164/49 (11-22-22 @ 15:04) (164/49 - 171/57)  RR: 18 (11-22-22 @ 15:04) (18 - 18)  SpO2: 94% (11-22-22 @ 15:04) (94% - 94%)  HEENT:  Head is normocephalic    Skin:  Warm and dry without any rash   NECK:  Supple without lymphadenopathy.   HEART:  Regular rate and rhythm. normal S1 and S2, No M/R/G  LUNGS:  decreased breath sounds around the basis  ABDOMEN:  Soft, nontender, nondistended with good bowel sounds heard  EXTREMITIES: (R) lower extremity swelling /  2 non healing ulcerations  NEUROLOGICAL:  Gross nonfocal

## 2022-11-22 NOTE — PATIENT PROFILE ADULT - FALL HARM RISK - HARM RISK INTERVENTIONS
Assistance with ambulation/Assistance OOB with selected safe patient handling equipment/Communicate Risk of Fall with Harm to all staff/Discuss with provider need for PT consult/Monitor gait and stability/Reinforce activity limits and safety measures with patient and family/Tailored Fall Risk Interventions/Visual Cue: Yellow wristband and red socks/Bed in lowest position, wheels locked, appropriate side rails in place/Call bell, personal items and telephone in reach/Instruct patient to call for assistance before getting out of bed or chair/Non-slip footwear when patient is out of bed/[place] to call system/Physically safe environment - no spills, clutter or unnecessary equipment/Purposeful Proactive Rounding/Room/bathroom lighting operational, light cord in reach

## 2022-11-22 NOTE — PATIENT PROFILE ADULT - OVER THE PAST TWO WEEKS, HAVE YOU FELT LITTLE INTEREST OR PLEASURE IN DOING THINGS?
PT DAILY NOTE FOR OUTPATIENT THERAPY    Patient: Alecia Cruz   MRN: [id number]  : 1958 61 y.o.  Referring Physician: Yudi Long MD  Date of Visit: 2020    Certification Dates: 20 through 20    Diagnosis:   1. Weakness of both lower limbs    2. Numbness    3. Right low back pain, unspecified chronicity, unspecified whether sciatica present        Chief Complaints:  LBP with RLE pain    Precautions:   Existing Precautions/Restrictions: limb restrictions     TODAY'S VISIT    History/Vitals/Pain/Encounter Info - 20 0843        Injury History/Precautions/Daily Required Info    Primary Therapist  Sonja Herring PT     Chief Complaint/Reason for Visit   LBP with RLE pain     Referring Physician  Yudi Long MD     Existing Precautions/Restrictions  limb restrictions     Pertinent History of Current Functional Problem  pt reports has also been seeing a neurologist for low back and right leg pain. She states she is also having some left-sided neck pain. Her history includes afib,RTC repair in , L breast Ca and lymphedema.. She is referred to PT for both left upper extremity lymphedema and low back pain.      [facility]  Orthopedics     Document Type  daily treatment     Patient/Family/Caregiver Comments/Observations  feeling a bit better, pain with sleeping on L side at night     Start Time  0838     Stop Time  0930     Time Calculation (min)  52 min     Patient reported fall since last visit  No        Pain Assessment    Currently in pain  Yes     Preferred Pain Scale  number (Numeric Rating Pain Scale)    pain isn't as intense, still some discomfort in R post LE , it's better but still there    Pain: Body location  Back;Leg     Pain Rating (0-10): Pre Activity  2    back intermittent 10/10; 2/10 now    Pain Rating (0-10): Activity  2    R post thigh buttock thigh       Pain Intervention    Intervention   MHP, manual,  TE     Post Intervention Comments  less pain          Daily Treatment Assessment and Plan - 07/22/20 0919        Daily Treatment Assessment and Plan    Progress toward goals  Progressing     Daily Outcome Summary  see below   pt reports decr in pain freq and intensity  RLE post thigh pain does not extend past mid thigh   initiated with MHP LB seated  manual work as noted  pt performed ther exer as noted with cues for pacing and positioning  denies pain and RLE radic sx at end of session  Patient has been instructed to continue with HEP   Pt demo improvement in LBP, decr RLE radic sx intensity and freq, compliance with HEP will benefit from continued skilled Physical Therapy visits to work toward established treatment goals. Focus on sx mgmt, ROM, core strengthen/stabilization, for less pain to improve functional mobility.      Plan and Recommendations  Cont POC with progression of core strength/stabilization exer as able           OBJECTIVE DATA TAKEN TODAY:    None taken    Today's Treatment:      ORTHO PT FLOWSHEET     Exercises Current Session Time   MODALITIES- HEAT/ICE TOTAL TIME FOR SESSION 8-22 min   heat seated MHP Lsp prior to exer 8 min   Ice prn post Rx  - declined   THER EX TOTAL TIME FOR SESSION 23-37 Minutes   pt ed  Pt educated in HEP per written materials  6/8: TAC  repeated standing ext  seated sciatic N glides  7/6: prone pressup, prone femoral nerve slides, clamshell YTB, pallof press YTB       seated pball rollouts x10  seated sciatic N flossers x5 ea    Standing ther exer:  Repeated extension standing 2x10  Lateral shifting to neutral posture x8  standing at ballet bar rockerboard taps and holdsx3 cycles    RTB palloff press 2x10 ea  RTB rows 2x10      MAT ther-ex  SL clamshell YTBx10 ea held       mat Prone lying on  2 pillow  Prone on elbows - repeated modified press up x10     prone knee flex x10 ea  prone hip ext x10 ea       Nustep  11 min level 1 seat 8 arms 11   MANUAL TOTAL TIME FOR SESSION 8-22 min   Mobilization  Prone lumbar mobs for  mobility L2-5 PA gr I II      Joint mobs Inf sacral mob   - painful                                   Manual                                                                            no

## 2022-11-22 NOTE — H&P ADULT - PROBLEM SELECTOR PLAN 5
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

## 2022-11-22 NOTE — ED PROVIDER NOTE - CARE PLAN
Principal Discharge DX:	Chest pain   1 Principal Discharge DX:	Chest pain  Secondary Diagnosis:	Hypokalemia   Principal Discharge DX:	Chest pain  Secondary Diagnosis:	Hypokalemia  Secondary Diagnosis:	Elevated LFTs

## 2022-11-22 NOTE — PHARMACOTHERAPY INTERVENTION NOTE - COMMENTS
Medication reconciliation completed.  Reviewed Medication list and confirmed med allergies with list from Care Facility "[facility]"; confirmed with Dr. First Reardonjose.

## 2022-11-22 NOTE — ED CLERICAL - NS ED CLERK NOTE PRE-ARRIVAL INFORMATION; ADDITIONAL PRE-ARRIVAL INFORMATION
This patient is enrolled in the comprehensive joint replacement (CJR) program and has active care navigation.  This patient can be followed up by the care navigation team within 24 hours. To arrange close follow-up or to obtain additional clinical information about this patient, please call the contact number above. Please call the hospitalist for medical consultation ([phone]) PRIOR to admission or observation.  The hospitalist is part of the care team and can assist in acute medical management. Please call the orthopedic team () for ALL patients who require admission.

## 2022-11-23 LAB
ADD ON TEST-SPECIMEN IN LAB: SIGNIFICANT CHANGE UP
ANION GAP SERPL CALC-SCNC: 6 MMOL/L — SIGNIFICANT CHANGE UP (ref 5–17)
BUN SERPL-MCNC: 18 MG/DL — SIGNIFICANT CHANGE UP (ref 7–23)
CALCIUM SERPL-MCNC: 8.7 MG/DL — SIGNIFICANT CHANGE UP (ref 8.5–10.1)
CHLORIDE SERPL-SCNC: 98 MMOL/L — SIGNIFICANT CHANGE UP (ref 96–108)
CO2 SERPL-SCNC: 32 MMOL/L — HIGH (ref 22–31)
CREAT SERPL-MCNC: 0.86 MG/DL — SIGNIFICANT CHANGE UP (ref 0.5–1.3)
EGFR: 61 ML/MIN/1.73M2 — SIGNIFICANT CHANGE UP
GLUCOSE SERPL-MCNC: 89 MG/DL — SIGNIFICANT CHANGE UP (ref 70–99)
HCT VFR BLD CALC: 30 % — LOW (ref 34.5–45)
HGB BLD-MCNC: 9.5 G/DL — LOW (ref 11.5–15.5)
MCHC RBC-ENTMCNC: 28.4 PG — SIGNIFICANT CHANGE UP (ref 27–34)
MCHC RBC-ENTMCNC: 31.7 GM/DL — LOW (ref 32–36)
MCV RBC AUTO: 89.8 FL — SIGNIFICANT CHANGE UP (ref 80–100)
PLATELET # BLD AUTO: 167 K/UL — SIGNIFICANT CHANGE UP (ref 150–400)
POTASSIUM SERPL-MCNC: 3.4 MMOL/L — LOW (ref 3.5–5.3)
POTASSIUM SERPL-SCNC: 3.4 MMOL/L — LOW (ref 3.5–5.3)
RBC # BLD: 3.34 M/UL — LOW (ref 3.8–5.2)
RBC # FLD: 16.6 % — HIGH (ref 10.3–14.5)
SODIUM SERPL-SCNC: 136 MMOL/L — SIGNIFICANT CHANGE UP (ref 135–145)
WBC # BLD: 7.65 K/UL — SIGNIFICANT CHANGE UP (ref 3.8–10.5)
WBC # FLD AUTO: 7.65 K/UL — SIGNIFICANT CHANGE UP (ref 3.8–10.5)

## 2022-11-23 PROCEDURE — 99233 SBSQ HOSP IP/OBS HIGH 50: CPT

## 2022-11-23 RX ORDER — AZITHROMYCIN 500 MG/1
500 TABLET, FILM COATED ORAL DAILY
Refills: 0 | Status: COMPLETED | OUTPATIENT
Start: 2022-11-23 | End: 2022-11-25

## 2022-11-23 RX ORDER — CEFTRIAXONE 500 MG/1
1000 INJECTION, POWDER, FOR SOLUTION INTRAMUSCULAR; INTRAVENOUS EVERY 24 HOURS
Refills: 0 | Status: COMPLETED | OUTPATIENT
Start: 2022-11-23 | End: 2022-11-27

## 2022-11-23 RX ORDER — POTASSIUM CHLORIDE 20 MEQ
40 PACKET (EA) ORAL ONCE
Refills: 0 | Status: COMPLETED | OUTPATIENT
Start: 2022-11-23 | End: 2022-11-23

## 2022-11-23 RX ORDER — CADEXOMER IODINE 0.9 %
1 PADS, MEDICATED (EA) TOPICAL
Refills: 0 | Status: DISCONTINUED | OUTPATIENT
Start: 2022-11-23 | End: 2022-11-29

## 2022-11-23 RX ADMIN — CARVEDILOL PHOSPHATE 25 MILLIGRAM(S): 80 CAPSULE, EXTENDED RELEASE ORAL at 21:32

## 2022-11-23 RX ADMIN — CEFTRIAXONE 1000 MILLIGRAM(S): 500 INJECTION, POWDER, FOR SOLUTION INTRAMUSCULAR; INTRAVENOUS at 10:44

## 2022-11-23 RX ADMIN — FAMOTIDINE 20 MILLIGRAM(S): 10 INJECTION INTRAVENOUS at 10:44

## 2022-11-23 RX ADMIN — Medication 100 MILLIGRAM(S): at 18:26

## 2022-11-23 RX ADMIN — FLUDROCORTISONE ACETATE 0.05 MILLIGRAM(S): 0.1 TABLET ORAL at 21:32

## 2022-11-23 RX ADMIN — Medication 40 MILLIEQUIVALENT(S): at 10:48

## 2022-11-23 RX ADMIN — Medication 40 MILLIEQUIVALENT(S): at 10:45

## 2022-11-23 RX ADMIN — CEFEPIME 1000 MILLIGRAM(S): 1 INJECTION, POWDER, FOR SOLUTION INTRAMUSCULAR; INTRAVENOUS at 05:33

## 2022-11-23 RX ADMIN — Medication 5 MILLIGRAM(S): at 21:33

## 2022-11-23 RX ADMIN — Medication 100 MILLIGRAM(S): at 05:32

## 2022-11-23 RX ADMIN — CARVEDILOL PHOSPHATE 25 MILLIGRAM(S): 80 CAPSULE, EXTENDED RELEASE ORAL at 10:44

## 2022-11-23 RX ADMIN — AZITHROMYCIN 500 MILLIGRAM(S): 500 TABLET, FILM COATED ORAL at 10:44

## 2022-11-23 RX ADMIN — Medication 40 MILLIEQUIVALENT(S): at 21:33

## 2022-11-23 RX ADMIN — Medication 100 MILLIEQUIVALENT(S): at 00:14

## 2022-11-23 RX ADMIN — FLUDROCORTISONE ACETATE 0.05 MILLIGRAM(S): 0.1 TABLET ORAL at 10:44

## 2022-11-23 RX ADMIN — Medication 325 MILLIGRAM(S): at 10:43

## 2022-11-23 RX ADMIN — Medication 10 MILLIGRAM(S): at 10:48

## 2022-11-23 RX ADMIN — Medication 40 MILLIGRAM(S): at 10:43

## 2022-11-23 RX ADMIN — LOSARTAN POTASSIUM 100 MILLIGRAM(S): 100 TABLET, FILM COATED ORAL at 10:44

## 2022-11-23 NOTE — CONSULT NOTE ADULT - ASSESSMENT
PROBLEMS:    Acute respiratory failure with hypoxia  History of adrenal insufficiency  Paroxysmal atrial fibrillation  Acute on chronic HEFpEF in combination with Pneumonia suspected gram negative organism  CT: No pulmonary embolism from the main pulmonary artery through the lobar branches. Several segmental and subsegmental branches are not well evaluated due to motion. Increased moderate pleural effusions. New opacities in the upper lobes, differential for which includes pneumonia and pulmonary edema    PLAN:    IV cefepime   IV Lasix  Daily weights  Low salt diet  Continue with home reigment of florinef, hydrocortisone 10 mg am /  5 mg pm  On amiodarone-? toxicity  DVT prophylasix  
Elderly female with SOB  Mixed picture of PNa and diastolic dysfunction  Continue with ABx  Will continue to monitor BP and adjust meds if needed  Patient euvolemic at present  Hold Lasix  Will follow

## 2022-11-23 NOTE — PROGRESS NOTE ADULT - ASSESSMENT
89 y/o sick appearing individual with extensive chronic medical problems HEFpEF, CKD stage III, pAF (not on AC due to falls), recent hospitalization with (R) hip fracture, adrenal insufficiency, -  admitted in the hospital with progressive worsening chest tightness and shortness of breath /  cough 2/2 likely GNR PNA.    #GNR PNA  -on 3L, wean O2, unclear if actually hypoxic  -Azithro 500 x3d, CTX while inpt, transition to po abx upon d/c to complete 5d course  -appreciate pulm input    #HFpEF  -appreciate cards input, pt currently euvolemic, will hold lasix    #hx adrenal insuff  -home hydrocortisone and florinef    #pAfib  -coreg  -amio stopped by pulm given c/f possibly toxicity  -not on a/c 2/2 falls hx    #RLE swelling  -doppler neg for DVT  -wound care eval appreciated    #transaminitis  -improved    #hypokalemia  -replete    #DVT ppx- SCDs    #PT eval    #DNR/DNI

## 2022-11-23 NOTE — ADVANCED PRACTICE NURSE CONSULT - RECOMMEDATIONS
·Cavilon  (liquid skin barrier) to coccyx - daily.    -Right Lateral Lower Extremity wound and apply iodosorb gel to wound and cover with dry sterile dressing and change every other day.    ·Recommendation: ·Turn and Reposition Q2H  ·Offload sacral-coccygeal area with positioner pillow, alternate sides Q2H,   ·Incontinence care with repositioning Q2H    -FOR  INCONTINENCE - Cleanse with NS, pat dry, paint with cavilon, apply TRIAD (Zinc-oxide barrier paste) BID and PRN for soiling: It is alright to leave a thin layer of cream on the skin after cleansing as this will not impede wound healing  -Continue turning/positioning patient from side-to-side q2h while in bed, q1h when/if OOB chair, or in accordance w/ pt's plan of care. Utilize pillows and/or z-antonella fluidized positioner to assist w/ turning/positioning. When/if OOB chair, utilize pillows or air waffle chair cushion to offload pressure. If patietn a fall risk ensure chair alarm is on and working. Apply Antiskid mat under chair alarm to prevent patient from sliding off chair.   -Continue to offload heels from bed surface with soft pillow under calfs or by applying offloading boots to BLEs. May use foam boots or Total CAIR boot (while in bed please remove the plastic support/when out of bed in chair place the plastic suppot back in)   -Continue utilizing one underpad underneath patient to contain incontinence episodes; change pad when saturated/soiled. For profuse incontinence use 1 Purple pad under patient accept and wick away moisture.   -Consider utilizing Female External Device  (if patient candidate) to contain any urinary incontinence. (Please ensure placement and correct sution for external femal device follow guidlines   -Continue nutrition consult for optimal wound healing & nutritional status.   -Assess skin/wound qshift, report changes to primary provider.

## 2022-11-23 NOTE — CONSULT NOTE ADULT - SUBJECTIVE AND OBJECTIVE BOX
Patient is a 89y old  Female who presents with a chief complaint of Increased cough (22 Nov 2022 17:34)      HPI:  Patient is 89 y/o sick appearing individual with extensive chronic medical problems HEFpEF, CKD stage III, pAF (not on AC due to falls), recent hospitalization with (R) hip fracture, adrenal insufficiency, -  admitted in the hospital with progressive worsening chest tightness and shortness of breath /  cough. Onset of symptoms week or two ago but not constant. Notes that, this morning she woke with SOB / Chest tightness -  ambulance was called was told that she had a high BPs in the 180s - as per patient received a nitroglycerin and felt better. Associated symptoms include fatigue, not feeling well, progressively worsening shortness of breath worth with exertion. Never been in the hospital in the past with similar events, but has had similar events on and off.   Being treated for PNa and HEFpEF  BP not fully optimized    ED -  patient with worsening pleural effusion          (22 Nov 2022 15:13)      PAST MEDICAL & SURGICAL HISTORY:  Iron deficiency      HTN (hypertension)      Lymphoma      H/O adrenal insufficiency      Hyponatremia      Hypokalemia      Chronic kidney disease, unspecified CKD stage      Diastolic heart failure      Paroxysmal atrial fibrillation      S/P appendectomy      S/P hysterectomy      H/O intracranial hemorrhage          HPI:                PREVIOUS DIAGNOSTIC TESTING:      ECHO  FINDINGS:    STRESS  FINDINGS:    CATHETERIZATION  FINDINGS:    MEDICATIONS  (STANDING):  azithromycin   Tablet 500 milliGRAM(s) Oral daily  carvedilol 25 milliGRAM(s) Oral every 12 hours  cefTRIAXone Injectable. 1000 milliGRAM(s) IV Push every 24 hours  famotidine    Tablet 20 milliGRAM(s) Oral daily  ferrous    sulfate 325 milliGRAM(s) Oral daily  fludroCORTISONE 0.05 milliGRAM(s) Oral two times a day  furosemide   Injectable 40 milliGRAM(s) IV Push daily  hydrALAZINE 100 milliGRAM(s) Oral every 12 hours  hydrocortisone 10 milliGRAM(s) Oral daily  hydrocortisone 5 milliGRAM(s) Oral at bedtime  losartan 100 milliGRAM(s) Oral daily  potassium chloride    Tablet ER 40 milliEquivalent(s) Oral once  potassium chloride   Powder 40 milliEquivalent(s) Oral every 12 hours    MEDICATIONS  (PRN):  acetaminophen     Tablet .. 650 milliGRAM(s) Oral every 6 hours PRN Temp greater or equal to 38C (100.4F), Mild Pain (1 - 3)  aluminum hydroxide/magnesium hydroxide/simethicone Suspension 30 milliLiter(s) Oral every 4 hours PRN Dyspepsia  melatonin 3 milliGRAM(s) Oral at bedtime PRN Insomnia  ondansetron Injectable 4 milliGRAM(s) IV Push every 8 hours PRN Nausea and/or Vomiting      FAMILY HISTORY:  No known problems (Father, Mother)        SOCIAL HISTORY:    CIGARETTES:    ALCOHOL:    REVIEW OF SYSTEMS:  CONSTITUTIONAL:  No night sweats.  No fatigue, malaise, lethargy.  No fever or chills.  HEENT:  Eyes:  No visual changes.  No eye pain.  No eye discharge.    ENT:  No runny nose.  No epistaxis.  No sinus pain.  No sore throat.  No odynophagia.  No ear pain.  No congestion.  RESPIRATORY:  No cough.  No wheeze.  No hemoptysis.  No shortness of breath.  CARDIOVASCULAR:  No chest pains.  No palpitations. No shortness of breath, orthopnea or PND.  GASTROINTESTINAL:  No abdominal pain.  No nausea or vomiting.  No diarrhea or constipation.  No hematemesis.  No hematochezia.  No melena.  GENITOURINARY:  No urgency.  No frequency.  No dysuria.  No hematuria.  No obstructive symptoms.  No discharge.  No pain.  No significant abnormal bleeding.  MUSCULOSKELETAL:  No musculoskeletal pain.  No joint swelling.  No arthritis.  NEUROLOGICAL:  No tingling or numbness or weakness.  PSYCHIATRIC:  No confusion  SKIN:  No rashes.  No lesions.  No wounds.  ENDOCRINE:  No unexplained weight loss.  No polydipsia.  No polyuria.  No polyphagia.  HEMATOLOGIC:  No anemia.  No purpura.  No petechiae.  No prolonged or excessive bleeding.   ALLERGIC AND IMMUNOLOGIC:  No pruritus.  No swelling.         Vital Signs Last 24 Hrs  T(C): 36.9 (23 Nov 2022 07:08), Max: 37.2 (22 Nov 2022 20:37)  T(F): 98.4 (23 Nov 2022 07:08), Max: 98.9 (22 Nov 2022 20:37)  HR: 66 (23 Nov 2022 07:08) (66 - 79)  BP: 146/40 (23 Nov 2022 07:08) (146/40 - 172/51)  BP(mean): 68 (23 Nov 2022 07:08) (68 - 83)  RR: 18 (23 Nov 2022 07:08) (16 - 19)  SpO2: 98% (23 Nov 2022 07:08) (91% - 98%)    Parameters below as of 23 Nov 2022 07:08  Patient On (Oxygen Delivery Method): nasal cannula  O2 Flow (L/min): 3      PHYSICAL EXAM-    Constitutional: The patient appears to be normal, well developed, well nourished and alert and oriented to time, place and person. The patient does not appear acutely ill. The patient is alert.     Head: Head is normocephalic and atraumatic.      Neck: The patient's neck is supple without enlargement, has no palpable thyromegaly nor thyroid nodules and has no jugular venous distention. No audible carotid bruits. There are strong carotid pulses bilaterally. No JVD.     Cardiovascular: Regular rate and rhythm without S3, S4. No murmurs or rubs are appreciated.      Respiratory: The breath sounds in the left lung field are diminished through out. The breath sounds in the right lung field are diminished through out. The patient has no rales and no rhonchi. The patient has no wheezes.     Abdomen: Soft, nontender, nondistended with positive bowel sounds.      Extremity: No tenderness. There is no pitting edema, skin discoloration, clubbing and cyanosis.     Neurologic: The patient is alert and oriented.      Skin: No rash, no obvious lesions noted.      Psychiatric: The patient appears to be emotionally stable.      INTERPRETATION OF TELEMETRY:    ECG:    I&O's Detail      LABS:                        9.5    7.65  )-----------( 167      ( 23 Nov 2022 07:41 )             30.0     11-23    136  |  98  |  18  ----------------------------<  89  3.4<L>   |  32<H>  |  0.86    Ca    8.7      23 Nov 2022 07:41    TPro  5.3<L>  /  Alb  1.7<L>  /  TBili  0.4  /  DBili  0.2  /  AST  81<H>  /  ALT  94<H>  /  AlkPhos  79  11-23        PT/INR - ( 22 Nov 2022 13:21 )   PT: 11.7 sec;   INR: 1.01 ratio         PTT - ( 22 Nov 2022 13:21 )  PTT:28.8 sec    I&O's Summary    BNPSerum Pro-Brain Natriuretic Peptide: 4014 pg/mL (11-22 @ 13:21)    RADIOLOGY & ADDITIONAL STUDIES:

## 2022-11-23 NOTE — ADVANCED PRACTICE NURSE CONSULT - ASSESSMENT
Patient is 89 y/o female with PMHx: HEFpEF, CKD stage III, pAF (not on AC due to falls), recent hospitalization with (R) hip fracture, adrenal insufficiency, -  admitted in the hospital with progressive worsening chest tightness and shortness of breath /  cough. Onset of symptoms week or two ago but not constant. Notes that, this morning she woke with SOB / Chest tightness .   Assessed patient on 3 East laying on a [employer] low sir loss mattress.   Noted Foam Boots on patent and heels off mattress.   Right Lateral Lower extremity superior to lateral malleolus with 2.1cm x 1.8cm x 0.2cm partial thickness wound with eschar. Recommend Iodosorb to wound bed to maintain stable eschar for History of Peripheral arterial disease. Cover with dry sterile dressing change every other day.   Right heel dryness and boggy skin. nonblanchable redness.   Noted mild erythema to gluteal cleft and recommend utilizing barrier cream and 1 purple pad no diapering . Turn and position every two hours.

## 2022-11-23 NOTE — PROGRESS NOTE ADULT - SUBJECTIVE AND OBJECTIVE BOX
HOSPITALIST ATTENDING PROGRESS NOTE    Chart and meds reviewed.  Patient seen and examined.    CC: cough    Subjective: Reports breathing feels improved from prior. Denies current SOB, CP, cough.    All other systems reviewed and found to be negative with the exception of what has been described above.    MEDICATIONS  (STANDING):  azithromycin   Tablet 500 milliGRAM(s) Oral daily  cadexomer iodine 0.9% Gel 1 Application(s) Topical <User Schedule>  carvedilol 25 milliGRAM(s) Oral every 12 hours  cefTRIAXone Injectable. 1000 milliGRAM(s) IV Push every 24 hours  famotidine    Tablet 20 milliGRAM(s) Oral daily  ferrous    sulfate 325 milliGRAM(s) Oral daily  fludroCORTISONE 0.05 milliGRAM(s) Oral two times a day  furosemide   Injectable 40 milliGRAM(s) IV Push daily  hydrALAZINE 100 milliGRAM(s) Oral every 12 hours  hydrocortisone 10 milliGRAM(s) Oral daily  hydrocortisone 5 milliGRAM(s) Oral at bedtime  losartan 100 milliGRAM(s) Oral daily  potassium chloride   Powder 40 milliEquivalent(s) Oral every 12 hours    MEDICATIONS  (PRN):  acetaminophen     Tablet .. 650 milliGRAM(s) Oral every 6 hours PRN Temp greater or equal to 38C (100.4F), Mild Pain (1 - 3)  aluminum hydroxide/magnesium hydroxide/simethicone Suspension 30 milliLiter(s) Oral every 4 hours PRN Dyspepsia  melatonin 3 milliGRAM(s) Oral at bedtime PRN Insomnia  ondansetron Injectable 4 milliGRAM(s) IV Push every 8 hours PRN Nausea and/or Vomiting      VITALS:  T(F): 98.4 (11-23-22 @ 07:08), Max: 98.9 (11-22-22 @ 20:37)  HR: 66 (11-23-22 @ 07:08) (66 - 79)  BP: 146/40 (11-23-22 @ 07:08) (146/40 - 172/51)  RR: 18 (11-23-22 @ 07:08) (16 - 19)  SpO2: 98% (11-23-22 @ 07:08) (91% - 98%)  Wt(kg): --    I&O's Summary      CAPILLARY BLOOD GLUCOSE          PHYSICAL EXAM:  Gen: NAD  HEENT:  pupils equal and reactive, EOMI, no oropharyngeal lesions, erythema, exudates, oral thrush  NECK:   supple, no carotid bruits, no palpable lymph nodes, no thyromegaly  CV:  +S1, +S2, regular, no murmurs or rubs  RESP:   lungs clear to auscultation bilaterally, no wheezing, rales, rhonchi, good air entry bilaterally  BREAST:  not examined  GI:  abdomen soft, non-tender, non-distended, normal BS, no bruits, no abdominal masses, no palpable masses  RECTAL:  not examined  :  not examined  MSK:   normal muscle tone, no atrophy, no rigidity, no contractions  EXT:  no clubbing, no cyanosis, no edema, no calf pain, swelling or erythema  VASCULAR:  pulses equal and symmetric in the upper and lower extremities  NEURO:  AAOX3, no focal neurological deficits, follows all commands, able to move extremities spontaneously  SKIN:  no ulcers, lesions or rashes    LABS:                            9.5    7.65  )-----------( 167      ( 23 Nov 2022 07:41 )             30.0     11-23    136  |  98  |  18  ----------------------------<  89  3.4<L>   |  32<H>  |  0.86    Ca    8.7      23 Nov 2022 07:41    TPro  5.3<L>  /  Alb  1.7<L>  /  TBili  0.4  /  DBili  0.2  /  AST  81<H>  /  ALT  94<H>  /  AlkPhos  79  11-23        LIVER FUNCTIONS - ( 23 Nov 2022 07:41 )  Alb: 1.7 g/dL / Pro: 5.3 gm/dL / ALK PHOS: 79 U/L / ALT: 94 U/L / AST: 81 U/L / GGT: x           PT/INR - ( 22 Nov 2022 13:21 )   PT: 11.7 sec;   INR: 1.01 ratio         PTT - ( 22 Nov 2022 13:21 )  PTT:28.8 sec    CULTURES:  no new    Additional results/Imaging, I have personally reviewed:  RLE doppler 11/22/22: No evidence of right lower extremity deep venous thrombosis.    CXR 11/22/22:  Increasing CHF. Other findings stable.    CTA PE protocol, CT a/p w iv contrast 11/22/22: No pulmonary embolism from the main pulmonary artery through the lobar branches. Several segmental and subsegmental branches are not well evaluated due to motion. Increased moderate pleural effusions. New opacities in the upper lobes, differential for which includes pneumonia and pulmonary edema. New mild periportal edema. Multiple pancreatic cystic lesions, some of which are larger compared to January 2020.    Echo 9/6/22: The left ventricle is normal in size and wall motion. Mild asymmetrical basal septal left ventricular hypertrophy is present. Estimated left ventricular ejection fraction is > 75 %. There is an intracavity gradient which is likely due to a hyperdynamic ventricle. Normal appearing left atrium. Normal appearing right atrium. Normal appearing right ventricle structure and function. The aortic valve appears mildly calcified. Valve opening seems to be restricted. Transaortic gradients are elevated consistent with mild aortic stenosis. EMMANUEL by continuity equation suggests mild stenosis ( 1.7 cm 2). Moderate (2+) eccentric aortic regurgitation is present. Moderate mitral annular calcification is present. The mitral valve leaflets appear thickened. Mild (1+) mitral regurgitation is present. EA reversal of the mitral inflow consistent with reduced compliance of the left ventricle. The tricuspid valve leaflets appear mildly thickened and/or calcified, but open well. Moderate (2+) tricuspid valve regurgitation is present. Mild to moderate pulmonary hypertension. No evidence of pericardial effusion. The IVC appears mildly underdistended.    Telemetry, personally reviewed:  11/23/22: sinus 60-70, PVCs

## 2022-11-23 NOTE — PROGRESS NOTE ADULT - ASSESSMENT
PROBLEMS:    Acute respiratory failure with hypoxia  History of adrenal insufficiency  Paroxysmal atrial fibrillation  Acute on chronic HEFpEF in combination with Pneumonia suspected gram negative organism  CT: No pulmonary embolism from the main pulmonary artery through the lobar branches. Several segmental and subsegmental branches are not well evaluated due to motion. Increased moderate pleural effusions. New opacities in the upper lobes, differential for which includes pneumonia and pulmonary edema    PLAN:    pulmonary better  cardic note read  IV cefepime   IV Lasix  Daily weights  Low salt diet  Continue with home reigment of florinef, hydrocortisone 10 mg am /  5 mg pm  On amiodarone-? toxicity  DVT prophylasix

## 2022-11-23 NOTE — PROGRESS NOTE ADULT - SUBJECTIVE AND OBJECTIVE BOX
Subjective:    pat better, lying in bed, no new complaint.    Home Medications:  amiodarone 200 mg oral tablet: 1 tab(s) orally once a day (22 Nov 2022 15:35)  amLODIPine 5 mg oral tablet: 1 tab(s) orally once a day (22 Nov 2022 15:35)  carvedilol 25 mg oral tablet: 1 tab(s) orally 2 times a day (22 Nov 2022 15:35)  Claritin 10 mg oral tablet: 1 tab(s) orally once a day, As Needed (22 Nov 2022 15:35)  Colace 100 mg oral capsule: 2 cap(s) orally once a day (at bedtime) (22 Nov 2022 15:35)  fludrocortisone 0.1 mg oral tablet: 0.5 tab(s) orally 2 times a day (22 Nov 2022 15:35)  furosemide 20 mg oral tablet: 1 tab(s) orally 2 times a day (22 Nov 2022 15:35)  hydrALAZINE 100 mg oral tablet: 1 tab(s) orally 3 times a day (22 Nov 2022 15:35)  hydrocortisone 10 mg oral tablet: 1 tab(s) orally once a day (in the morning) (22 Nov 2022 15:35)  hydrocortisone 5 mg oral tablet: 1 tab(s) orally once a day (in the evening) (22 Nov 2022 15:35)  Iodosorb 0.9% topical gel: Apply topically to affected area Monday, Wednesday, and Friday (22 Nov 2022 15:35)  losartan 100 mg oral tablet: 1 tab(s) orally once a day (22 Nov 2022 15:35)  Multiple Vitamins oral liquid: 5 milliliter(s) orally once a day (at bedtime) (22 Nov 2022 15:35)  Pepto-Bismol 262 mg/15 mL oral suspension: 15 milliliter(s) orally every 8 hours, As Needed (22 Nov 2022 15:35)  polyethylene glycol 3350 oral powder for reconstitution: 17 gram(s) orally once a day, As Needed (22 Nov 2022 15:35)  potassium chloride 20 mEq oral powder for reconstitution: 1 each orally once a day (22 Nov 2022 15:35)  Senna 8.6 mg oral tablet: 2 tab(s) orally once a day (at bedtime), As Needed (22 Nov 2022 15:35)  Tylenol Regular Strength 325 mg oral tablet: 1 tab(s) orally 2 times a day, As Needed (22 Nov 2022 15:35)  Vitamin D3 50 mcg (2000 intl units) oral tablet: 1 tab(s) orally once a day (22 Nov 2022 15:35)    MEDICATIONS  (STANDING):  azithromycin   Tablet 500 milliGRAM(s) Oral daily  cadexomer iodine 0.9% Gel 1 Application(s) Topical <User Schedule>  carvedilol 25 milliGRAM(s) Oral every 12 hours  cefTRIAXone Injectable. 1000 milliGRAM(s) IV Push every 24 hours  famotidine    Tablet 20 milliGRAM(s) Oral daily  ferrous    sulfate 325 milliGRAM(s) Oral daily  fludroCORTISONE 0.05 milliGRAM(s) Oral two times a day  furosemide   Injectable 40 milliGRAM(s) IV Push daily  hydrALAZINE 100 milliGRAM(s) Oral every 12 hours  hydrocortisone 10 milliGRAM(s) Oral daily  hydrocortisone 5 milliGRAM(s) Oral at bedtime  losartan 100 milliGRAM(s) Oral daily  potassium chloride   Powder 40 milliEquivalent(s) Oral every 12 hours    MEDICATIONS  (PRN):  acetaminophen     Tablet .. 650 milliGRAM(s) Oral every 6 hours PRN Temp greater or equal to 38C (100.4F), Mild Pain (1 - 3)  aluminum hydroxide/magnesium hydroxide/simethicone Suspension 30 milliLiter(s) Oral every 4 hours PRN Dyspepsia  melatonin 3 milliGRAM(s) Oral at bedtime PRN Insomnia  ondansetron Injectable 4 milliGRAM(s) IV Push every 8 hours PRN Nausea and/or Vomiting      Allergies    penicillin (Hives)  sulfa drugs (Hives)  tetracycline (Hives)    Intolerances        Vital Signs Last 24 Hrs  T(C): 36.9 (23 Nov 2022 07:08), Max: 37.2 (22 Nov 2022 20:37)  T(F): 98.4 (23 Nov 2022 07:08), Max: 98.9 (22 Nov 2022 20:37)  HR: 66 (23 Nov 2022 07:08) (66 - 79)  BP: 146/40 (23 Nov 2022 07:08) (146/40 - 172/51)  BP(mean): 68 (23 Nov 2022 07:08) (68 - 77)  RR: 18 (23 Nov 2022 07:08) (16 - 19)  SpO2: 98% (23 Nov 2022 07:08) (91% - 98%)    Parameters below as of 23 Nov 2022 07:08  Patient On (Oxygen Delivery Method): nasal cannula  O2 Flow (L/min): 3        PHYSICAL EXAMINATION:    NECK:  Supple. No lymphadenopathy. Jugular venous pressure not elevated. Carotids equal.   HEART:   The cardiac impulse has a normal quality. Reg., Nl S1 and S2.  There are no murmurs, rubs or gallops noted  CHEST:  Chest crackles to auscultation. Normal respiratory effort.  ABDOMEN:  Soft and nontender.   EXTREMITIES:  There is no edema.       LABS:                        9.5    7.65  )-----------( 167      ( 23 Nov 2022 07:41 )             30.0     11-23    136  |  98  |  18  ----------------------------<  89  3.4<L>   |  32<H>  |  0.86    Ca    8.7      23 Nov 2022 07:41    TPro  5.3<L>  /  Alb  1.7<L>  /  TBili  0.4  /  DBili  0.2  /  AST  81<H>  /  ALT  94<H>  /  AlkPhos  79  11-23    PT/INR - ( 22 Nov 2022 13:21 )   PT: 11.7 sec;   INR: 1.01 ratio         PTT - ( 22 Nov 2022 13:21 )  PTT:28.8 sec

## 2022-11-24 LAB
ALBUMIN SERPL ELPH-MCNC: 1.7 G/DL — LOW (ref 3.3–5)
ALP SERPL-CCNC: 79 U/L — SIGNIFICANT CHANGE UP (ref 40–120)
ALT FLD-CCNC: 91 U/L — HIGH (ref 12–78)
ANION GAP SERPL CALC-SCNC: 4 MMOL/L — LOW (ref 5–17)
AST SERPL-CCNC: 75 U/L — HIGH (ref 15–37)
BILIRUB SERPL-MCNC: 0.3 MG/DL — SIGNIFICANT CHANGE UP (ref 0.2–1.2)
BUN SERPL-MCNC: 18 MG/DL — SIGNIFICANT CHANGE UP (ref 7–23)
CALCIUM SERPL-MCNC: 9 MG/DL — SIGNIFICANT CHANGE UP (ref 8.5–10.1)
CHLORIDE SERPL-SCNC: 98 MMOL/L — SIGNIFICANT CHANGE UP (ref 96–108)
CO2 SERPL-SCNC: 32 MMOL/L — HIGH (ref 22–31)
CREAT SERPL-MCNC: 0.88 MG/DL — SIGNIFICANT CHANGE UP (ref 0.5–1.3)
EGFR: 59 ML/MIN/1.73M2 — LOW
GLUCOSE SERPL-MCNC: 85 MG/DL — SIGNIFICANT CHANGE UP (ref 70–99)
HCT VFR BLD CALC: 30.5 % — LOW (ref 34.5–45)
HGB BLD-MCNC: 9.8 G/DL — LOW (ref 11.5–15.5)
MCHC RBC-ENTMCNC: 29.4 PG — SIGNIFICANT CHANGE UP (ref 27–34)
MCHC RBC-ENTMCNC: 32.1 GM/DL — SIGNIFICANT CHANGE UP (ref 32–36)
MCV RBC AUTO: 91.6 FL — SIGNIFICANT CHANGE UP (ref 80–100)
PLATELET # BLD AUTO: 178 K/UL — SIGNIFICANT CHANGE UP (ref 150–400)
POTASSIUM SERPL-MCNC: 3.9 MMOL/L — SIGNIFICANT CHANGE UP (ref 3.5–5.3)
POTASSIUM SERPL-SCNC: 3.9 MMOL/L — SIGNIFICANT CHANGE UP (ref 3.5–5.3)
PROT SERPL-MCNC: 5.4 GM/DL — LOW (ref 6–8.3)
RBC # BLD: 3.33 M/UL — LOW (ref 3.8–5.2)
RBC # FLD: 16.4 % — HIGH (ref 10.3–14.5)
SODIUM SERPL-SCNC: 134 MMOL/L — LOW (ref 135–145)
WBC # BLD: 6.25 K/UL — SIGNIFICANT CHANGE UP (ref 3.8–10.5)
WBC # FLD AUTO: 6.25 K/UL — SIGNIFICANT CHANGE UP (ref 3.8–10.5)

## 2022-11-24 PROCEDURE — 99232 SBSQ HOSP IP/OBS MODERATE 35: CPT

## 2022-11-24 RX ORDER — FUROSEMIDE 40 MG
20 TABLET ORAL
Refills: 0 | Status: DISCONTINUED | OUTPATIENT
Start: 2022-11-24 | End: 2022-11-29

## 2022-11-24 RX ADMIN — Medication 1 APPLICATION(S): at 13:36

## 2022-11-24 RX ADMIN — CARVEDILOL PHOSPHATE 25 MILLIGRAM(S): 80 CAPSULE, EXTENDED RELEASE ORAL at 22:10

## 2022-11-24 RX ADMIN — FLUDROCORTISONE ACETATE 0.05 MILLIGRAM(S): 0.1 TABLET ORAL at 22:08

## 2022-11-24 RX ADMIN — Medication 20 MILLIGRAM(S): at 09:46

## 2022-11-24 RX ADMIN — Medication 10 MILLIGRAM(S): at 09:47

## 2022-11-24 RX ADMIN — Medication 40 MILLIEQUIVALENT(S): at 13:38

## 2022-11-24 RX ADMIN — Medication 3 MILLIGRAM(S): at 22:10

## 2022-11-24 RX ADMIN — AZITHROMYCIN 500 MILLIGRAM(S): 500 TABLET, FILM COATED ORAL at 09:47

## 2022-11-24 RX ADMIN — Medication 100 MILLIGRAM(S): at 05:33

## 2022-11-24 RX ADMIN — FLUDROCORTISONE ACETATE 0.05 MILLIGRAM(S): 0.1 TABLET ORAL at 09:46

## 2022-11-24 RX ADMIN — LOSARTAN POTASSIUM 100 MILLIGRAM(S): 100 TABLET, FILM COATED ORAL at 09:47

## 2022-11-24 RX ADMIN — Medication 40 MILLIEQUIVALENT(S): at 22:07

## 2022-11-24 RX ADMIN — CEFTRIAXONE 1000 MILLIGRAM(S): 500 INJECTION, POWDER, FOR SOLUTION INTRAMUSCULAR; INTRAVENOUS at 09:47

## 2022-11-24 RX ADMIN — CARVEDILOL PHOSPHATE 25 MILLIGRAM(S): 80 CAPSULE, EXTENDED RELEASE ORAL at 09:46

## 2022-11-24 RX ADMIN — Medication 325 MILLIGRAM(S): at 09:46

## 2022-11-24 RX ADMIN — Medication 5 MILLIGRAM(S): at 22:09

## 2022-11-24 RX ADMIN — FAMOTIDINE 20 MILLIGRAM(S): 10 INJECTION INTRAVENOUS at 09:46

## 2022-11-24 RX ADMIN — Medication 20 MILLIGRAM(S): at 13:37

## 2022-11-24 RX ADMIN — Medication 100 MILLIGRAM(S): at 17:18

## 2022-11-24 NOTE — PHYSICAL THERAPY INITIAL EVALUATION ADULT - PLANNED THERAPY INTERVENTIONS, PT EVAL
needs oob orders/DC of bedrest/balance training/bed mobility training/gait training/transfer training

## 2022-11-24 NOTE — PHYSICAL THERAPY INITIAL EVALUATION ADULT - AMBULATION SKILLS, REHAB EVAL
reports amb in HW at [facility] for exercise w/ RW and WC follow, goes to  for meals via WC

## 2022-11-24 NOTE — PROGRESS NOTE ADULT - ASSESSMENT
89 y/o sick appearing individual with extensive chronic medical problems HEFpEF, CKD stage III, pAF (not on AC due to falls), recent hospitalization with (R) hip fracture, adrenal insufficiency, -  admitted in the hospital with progressive worsening chest tightness and shortness of breath /  cough 2/2 likely GNR PNA.    #GNR PNA  -on 2L, wean O2, unclear if actually hypoxic  -Azithro 500 x3d, CTX while inpt, transition to po abx upon d/c to complete 5d course  -appreciate pulm input    #HFpEF  -appreciate cards input, pt currently euvolemic, will resume home po lasix    #hx adrenal insuff  -home hydrocortisone and florinef    #pAfib  -coreg  -amio stopped by pulm given c/f possibly toxicity  -not on a/c 2/2 falls hx    #RLE swelling  -doppler neg for DVT  -wound care eval appreciated    #transaminitis  -improved    #hypokalemia  -replete    #DVT ppx- SCDs    #PT eval    #DNR/DNI

## 2022-11-24 NOTE — PROGRESS NOTE ADULT - SUBJECTIVE AND OBJECTIVE BOX
Subjective:    pat better, lying in bed.    Home Medications:  amiodarone 200 mg oral tablet: 1 tab(s) orally once a day (22 Nov 2022 15:35)  amLODIPine 5 mg oral tablet: 1 tab(s) orally once a day (22 Nov 2022 15:35)  carvedilol 25 mg oral tablet: 1 tab(s) orally 2 times a day (22 Nov 2022 15:35)  Claritin 10 mg oral tablet: 1 tab(s) orally once a day, As Needed (22 Nov 2022 15:35)  Colace 100 mg oral capsule: 2 cap(s) orally once a day (at bedtime) (22 Nov 2022 15:35)  fludrocortisone 0.1 mg oral tablet: 0.5 tab(s) orally 2 times a day (22 Nov 2022 15:35)  furosemide 20 mg oral tablet: 1 tab(s) orally 2 times a day (22 Nov 2022 15:35)  hydrALAZINE 100 mg oral tablet: 1 tab(s) orally 3 times a day (22 Nov 2022 15:35)  hydrocortisone 10 mg oral tablet: 1 tab(s) orally once a day (in the morning) (22 Nov 2022 15:35)  hydrocortisone 5 mg oral tablet: 1 tab(s) orally once a day (in the evening) (22 Nov 2022 15:35)  Iodosorb 0.9% topical gel: Apply topically to affected area Monday, Wednesday, and Friday (22 Nov 2022 15:35)  losartan 100 mg oral tablet: 1 tab(s) orally once a day (22 Nov 2022 15:35)  Multiple Vitamins oral liquid: 5 milliliter(s) orally once a day (at bedtime) (22 Nov 2022 15:35)  Pepto-Bismol 262 mg/15 mL oral suspension: 15 milliliter(s) orally every 8 hours, As Needed (22 Nov 2022 15:35)  polyethylene glycol 3350 oral powder for reconstitution: 17 gram(s) orally once a day, As Needed (22 Nov 2022 15:35)  potassium chloride 20 mEq oral powder for reconstitution: 1 each orally once a day (22 Nov 2022 15:35)  Senna 8.6 mg oral tablet: 2 tab(s) orally once a day (at bedtime), As Needed (22 Nov 2022 15:35)  Tylenol Regular Strength 325 mg oral tablet: 1 tab(s) orally 2 times a day, As Needed (22 Nov 2022 15:35)  Vitamin D3 50 mcg (2000 intl units) oral tablet: 1 tab(s) orally once a day (22 Nov 2022 15:35)    MEDICATIONS  (STANDING):  azithromycin   Tablet 500 milliGRAM(s) Oral daily  cadexomer iodine 0.9% Gel 1 Application(s) Topical <User Schedule>  carvedilol 25 milliGRAM(s) Oral every 12 hours  cefTRIAXone Injectable. 1000 milliGRAM(s) IV Push every 24 hours  famotidine    Tablet 20 milliGRAM(s) Oral daily  ferrous    sulfate 325 milliGRAM(s) Oral daily  fludroCORTISONE 0.05 milliGRAM(s) Oral two times a day  furosemide    Tablet 20 milliGRAM(s) Oral two times a day  hydrALAZINE 100 milliGRAM(s) Oral every 12 hours  hydrocortisone 10 milliGRAM(s) Oral daily  hydrocortisone 5 milliGRAM(s) Oral at bedtime  losartan 100 milliGRAM(s) Oral daily  potassium chloride   Powder 40 milliEquivalent(s) Oral every 12 hours    MEDICATIONS  (PRN):  acetaminophen     Tablet .. 650 milliGRAM(s) Oral every 6 hours PRN Temp greater or equal to 38C (100.4F), Mild Pain (1 - 3)  aluminum hydroxide/magnesium hydroxide/simethicone Suspension 30 milliLiter(s) Oral every 4 hours PRN Dyspepsia  melatonin 3 milliGRAM(s) Oral at bedtime PRN Insomnia  ondansetron Injectable 4 milliGRAM(s) IV Push every 8 hours PRN Nausea and/or Vomiting      Allergies    penicillin (Hives)  sulfa drugs (Hives)  tetracycline (Hives)    Intolerances        Vital Signs Last 24 Hrs  T(C): 36.6 (24 Nov 2022 08:02), Max: 36.8 (23 Nov 2022 19:40)  T(F): 97.9 (24 Nov 2022 08:02), Max: 98.3 (23 Nov 2022 19:40)  HR: 66 (24 Nov 2022 13:12) (59 - 98)  BP: 143/42 (24 Nov 2022 13:12) (141/47 - 172/50)  BP(mean): --  RR: 18 (24 Nov 2022 08:02) (17 - 18)  SpO2: 96% (24 Nov 2022 08:02) (96% - 99%)    Parameters below as of 24 Nov 2022 08:02  Patient On (Oxygen Delivery Method): room air          PHYSICAL EXAMINATION:    NECK:  Supple. No lymphadenopathy. Jugular venous pressure not elevated. Carotids equal.   HEART:   The cardiac impulse has a normal quality. Reg., Nl S1 and S2.  There are no murmurs, rubs or gallops noted  CHEST:  Chest crackles to auscultation. Normal respiratory effort.  ABDOMEN:  Soft and nontender.   EXTREMITIES:  There is no edema.       LABS:                        9.8    6.25  )-----------( 178      ( 24 Nov 2022 07:20 )             30.5     11-24    134<L>  |  98  |  18  ----------------------------<  85  3.9   |  32<H>  |  0.88    Ca    9.0      24 Nov 2022 07:20    TPro  5.4<L>  /  Alb  1.7<L>  /  TBili  0.3  /  DBili  x   /  AST  75<H>  /  ALT  91<H>  /  AlkPhos  79  11-24

## 2022-11-24 NOTE — PHYSICAL THERAPY INITIAL EVALUATION ADULT - PERTINENT HX OF CURRENT PROBLEM, REHAB EVAL
recent hospitalization with (R) hip fracture, adrenal insufficiency, -  admitted in the hospital with progressive worsening chest tightness and shortness of breath /  cough 2/2 likely GNR PNA. Mod b/l pleural effusions on CT chest. CTA neg. PE.

## 2022-11-24 NOTE — PROGRESS NOTE ADULT - SUBJECTIVE AND OBJECTIVE BOX
HOSPITALIST ATTENDING PROGRESS NOTE    Chart and meds reviewed.  Patient seen and examined.    CC: cough    Subjective: Reports feeling improved, some cough, some SOB. Denies CP.    All other systems reviewed and found to be negative with the exception of what has been described above.    MEDICATIONS  (STANDING):  azithromycin   Tablet 500 milliGRAM(s) Oral daily  cadexomer iodine 0.9% Gel 1 Application(s) Topical <User Schedule>  carvedilol 25 milliGRAM(s) Oral every 12 hours  cefTRIAXone Injectable. 1000 milliGRAM(s) IV Push every 24 hours  famotidine    Tablet 20 milliGRAM(s) Oral daily  ferrous    sulfate 325 milliGRAM(s) Oral daily  fludroCORTISONE 0.05 milliGRAM(s) Oral two times a day  furosemide    Tablet 20 milliGRAM(s) Oral two times a day  hydrALAZINE 100 milliGRAM(s) Oral every 12 hours  hydrocortisone 10 milliGRAM(s) Oral daily  hydrocortisone 5 milliGRAM(s) Oral at bedtime  losartan 100 milliGRAM(s) Oral daily  potassium chloride   Powder 40 milliEquivalent(s) Oral every 12 hours    MEDICATIONS  (PRN):  acetaminophen     Tablet .. 650 milliGRAM(s) Oral every 6 hours PRN Temp greater or equal to 38C (100.4F), Mild Pain (1 - 3)  aluminum hydroxide/magnesium hydroxide/simethicone Suspension 30 milliLiter(s) Oral every 4 hours PRN Dyspepsia  melatonin 3 milliGRAM(s) Oral at bedtime PRN Insomnia  ondansetron Injectable 4 milliGRAM(s) IV Push every 8 hours PRN Nausea and/or Vomiting      VITALS:  T(F): 97.9 (11-24-22 @ 08:02), Max: 98.3 (11-23-22 @ 19:40)  HR: 66 (11-24-22 @ 13:12) (59 - 98)  BP: 143/42 (11-24-22 @ 13:12) (141/47 - 172/50)  RR: 18 (11-24-22 @ 08:02) (17 - 18)  SpO2: 96% (11-24-22 @ 08:02) (96% - 99%)  Wt(kg): --    I&O's Summary      CAPILLARY BLOOD GLUCOSE          PHYSICAL EXAM:  Gen: NAD  HEENT:  pupils equal and reactive, EOMI, no oropharyngeal lesions, erythema, exudates, oral thrush  NECK:   supple, no carotid bruits, no palpable lymph nodes, no thyromegaly  CV:  +S1, +S2, regular, no murmurs or rubs  RESP:   lungs clear to auscultation bilaterally, no wheezing, rales, rhonchi, good air entry bilaterally  BREAST:  not examined  GI:  abdomen soft, non-tender, non-distended, normal BS, no bruits, no abdominal masses, no palpable masses  RECTAL:  not examined  :  not examined  MSK:   normal muscle tone, no atrophy, no rigidity, no contractions  EXT:  no clubbing, no cyanosis, no edema, no calf pain, swelling or erythema  VASCULAR:  pulses equal and symmetric in the upper and lower extremities  NEURO:  AAOX3, no focal neurological deficits, follows all commands, able to move extremities spontaneously  SKIN:  no ulcers, lesions or rashes    LABS:                            9.8    6.25  )-----------( 178      ( 24 Nov 2022 07:20 )             30.5     11-24    134<L>  |  98  |  18  ----------------------------<  85  3.9   |  32<H>  |  0.88    Ca    9.0      24 Nov 2022 07:20    TPro  5.4<L>  /  Alb  1.7<L>  /  TBili  0.3  /  DBili  x   /  AST  75<H>  /  ALT  91<H>  /  AlkPhos  79  11-24        LIVER FUNCTIONS - ( 24 Nov 2022 07:20 )  Alb: 1.7 g/dL / Pro: 5.4 gm/dL / ALK PHOS: 79 U/L / ALT: 91 U/L / AST: 75 U/L / GGT: x           CULTURES:      Additional results/Imaging, I have personally reviewed:    Telemetry, personally reviewed: HOSPITALIST ATTENDING PROGRESS NOTE    Chart and meds reviewed.  Patient seen and examined.    CC: cough    Subjective: Reports feeling improved, some cough, some SOB. Denies CP.    All other systems reviewed and found to be negative with the exception of what has been described above.    MEDICATIONS  (STANDING):  azithromycin   Tablet 500 milliGRAM(s) Oral daily  cadexomer iodine 0.9% Gel 1 Application(s) Topical <User Schedule>  carvedilol 25 milliGRAM(s) Oral every 12 hours  cefTRIAXone Injectable. 1000 milliGRAM(s) IV Push every 24 hours  famotidine    Tablet 20 milliGRAM(s) Oral daily  ferrous    sulfate 325 milliGRAM(s) Oral daily  fludroCORTISONE 0.05 milliGRAM(s) Oral two times a day  furosemide    Tablet 20 milliGRAM(s) Oral two times a day  hydrALAZINE 100 milliGRAM(s) Oral every 12 hours  hydrocortisone 10 milliGRAM(s) Oral daily  hydrocortisone 5 milliGRAM(s) Oral at bedtime  losartan 100 milliGRAM(s) Oral daily  potassium chloride   Powder 40 milliEquivalent(s) Oral every 12 hours    MEDICATIONS  (PRN):  acetaminophen     Tablet .. 650 milliGRAM(s) Oral every 6 hours PRN Temp greater or equal to 38C (100.4F), Mild Pain (1 - 3)  aluminum hydroxide/magnesium hydroxide/simethicone Suspension 30 milliLiter(s) Oral every 4 hours PRN Dyspepsia  melatonin 3 milliGRAM(s) Oral at bedtime PRN Insomnia  ondansetron Injectable 4 milliGRAM(s) IV Push every 8 hours PRN Nausea and/or Vomiting      VITALS:  T(F): 97.9 (11-24-22 @ 08:02), Max: 98.3 (11-23-22 @ 19:40)  HR: 66 (11-24-22 @ 13:12) (59 - 98)  BP: 143/42 (11-24-22 @ 13:12) (141/47 - 172/50)  RR: 18 (11-24-22 @ 08:02) (17 - 18)  SpO2: 96% (11-24-22 @ 08:02) (96% - 99%)  Wt(kg): --    I&O's Summary      CAPILLARY BLOOD GLUCOSE          PHYSICAL EXAM:  Gen: NAD  HEENT:  pupils equal and reactive, EOMI, no oropharyngeal lesions, erythema, exudates, oral thrush  NECK:   supple, no carotid bruits, no palpable lymph nodes, no thyromegaly  CV:  +S1, +S2, regular, no murmurs or rubs  RESP:   lungs clear to auscultation bilaterally, no wheezing, rales, rhonchi, good air entry bilaterally  BREAST:  not examined  GI:  abdomen soft, non-tender, non-distended, normal BS, no bruits, no abdominal masses, no palpable masses  RECTAL:  not examined  :  not examined  MSK:   normal muscle tone, no atrophy, no rigidity, no contractions  EXT:  no clubbing, no cyanosis, no edema, no calf pain, swelling or erythema  VASCULAR:  pulses equal and symmetric in the upper and lower extremities  NEURO:  AAOX3, no focal neurological deficits, follows all commands, able to move extremities spontaneously  SKIN:  no ulcers, lesions or rashes    LABS:                            9.8    6.25  )-----------( 178      ( 24 Nov 2022 07:20 )             30.5     11-24    134<L>  |  98  |  18  ----------------------------<  85  3.9   |  32<H>  |  0.88    Ca    9.0      24 Nov 2022 07:20    TPro  5.4<L>  /  Alb  1.7<L>  /  TBili  0.3  /  DBili  x   /  AST  75<H>  /  ALT  91<H>  /  AlkPhos  79  11-24        LIVER FUNCTIONS - ( 24 Nov 2022 07:20 )  Alb: 1.7 g/dL / Pro: 5.4 gm/dL / ALK PHOS: 79 U/L / ALT: 91 U/L / AST: 75 U/L / GGT: x           CULTURES:  no new    Additional results/Imaging, I have personally reviewed:  RLE doppler 11/22/22: No evidence of right lower extremity deep venous thrombosis.    CXR 11/22/22:  Increasing CHF. Other findings stable.    CTA PE protocol, CT a/p w iv contrast 11/22/22: No pulmonary embolism from the main pulmonary artery through the lobar branches. Several segmental and subsegmental branches are not well evaluated due to motion. Increased moderate pleural effusions. New opacities in the upper lobes, differential for which includes pneumonia and pulmonary edema. New mild periportal edema. Multiple pancreatic cystic lesions, some of which are larger compared to January 2020.    Echo 9/6/22: The left ventricle is normal in size and wall motion. Mild asymmetrical basal septal left ventricular hypertrophy is present. Estimated left ventricular ejection fraction is > 75 %. There is an intracavity gradient which is likely due to a hyperdynamic ventricle. Normal appearing left atrium. Normal appearing right atrium. Normal appearing right ventricle structure and function. The aortic valve appears mildly calcified. Valve opening seems to be restricted. Transaortic gradients are elevated consistent with mild aortic stenosis. EMMANUEL by continuity equation suggests mild stenosis ( 1.7 cm 2). Moderate (2+) eccentric aortic regurgitation is present. Moderate mitral annular calcification is present. The mitral valve leaflets appear thickened. Mild (1+) mitral regurgitation is present. EA reversal of the mitral inflow consistent with reduced compliance of the left ventricle. The tricuspid valve leaflets appear mildly thickened and/or calcified, but open well. Moderate (2+) tricuspid valve regurgitation is present. Mild to moderate pulmonary hypertension. No evidence of pericardial effusion. The IVC appears mildly underdistended.    Telemetry, personally reviewed:  11/23/22: sinus 60-70, PVCs  11/24/22: sinus, VPCs, d/c tele

## 2022-11-24 NOTE — PROGRESS NOTE ADULT - ASSESSMENT
A/P: 89 y/o sick appearing individual with extensive chronic medical problems HEFpEF, CKD stage III, pAF (not on AC due to falls), recent hospitalization with (R) hip fracture, adrenal insufficiency, -  admitted in the hospital with progressive worsening chest tightness and shortness of breath /  cough 2/2 likely GNR PNA.    1. PNA. Cont abx and mgmt as per primary team.     2. HFpEF. Pt with h/o of multiple HF exacerbations- NYHA class III.  Pt also on fludrocortisone and hydrocortisone for adrenal insufficiency which can cause additional fluid retention on top of other factors.  Mod b/l pleural effusions on CT chest. Cr is currently stable.   Would restart low dose IV lasix today- 20mg BID. Strict I/Os, daily wts.   Cont GDMT, BP control.   Diuresis with close monitoring of the renal function and electrolytes.  Goal potassium of 4 and magnesium of 2.     3. HTN- cont home meds.    4. CKD. Cr stable today. H/o cardiorenal syndrome.    5. PAF. Not on AC secondary to falls and risk of bleed outweighs benefit of AC.  Saray recently d/c'd. Cont to follow on tele. SR at present.      6. Suggest palliative care eval with age, comorbidities and mult recent hospitalizations.    7. DVT proph.

## 2022-11-24 NOTE — PHYSICAL THERAPY INITIAL EVALUATION ADULT - PRECAUTIONS/LIMITATIONS, REHAB EVAL
bedrest order. R hemiarthroplasty 9/7/22/fall precautions/right hip precautions/oxygen therapy device and L/min

## 2022-11-24 NOTE — PROGRESS NOTE ADULT - ASSESSMENT
PROBLEMS:    Acute respiratory failure with hypoxia  History of adrenal insufficiency  Paroxysmal atrial fibrillation  Acute on chronic HEFpEF in combination with Pneumonia suspected gram negative organism  CT: No pulmonary embolism from the main pulmonary artery through the lobar branches. Several segmental and subsegmental branches are not well evaluated due to motion. Increased moderate pleural effusions. New opacities in the upper lobes, differential for which includes pneumonia and pulmonary edema    PLAN:    pulmonary stable  IV rhocephin/azithromycin  IV Lasix  Daily weights  Low salt diet  Continue with home reigment of florinef, hydrocortisone 10 mg am /  5 mg pm  On amiodarone-? toxicity  DVT prophylasix

## 2022-11-24 NOTE — PHYSICAL THERAPY INITIAL EVALUATION ADULT - ACTIVE RANGE OF MOTION EXAMINATION, REHAB EVAL
R hip flex <90, R knee appears stiff but WFL/bilateral upper extremity Active ROM was WFL (within functional limits)/bilateral  lower extremity Active ROM was WFL (within functional limits)

## 2022-11-24 NOTE — PROGRESS NOTE ADULT - SUBJECTIVE AND OBJECTIVE BOX
Cardiology Consultation    HPI: Patient is 89 y/o sick appearing individual with extensive chronic medical problems HEFpEF, CKD stage III, pAF (not on AC due to falls), recent hospitalization with (R) hip fracture, adrenal insufficiency, -  admitted in the hospital with progressive worsening chest tightness and shortness of breath /  cough. Onset of symptoms week or two ago but not constant. Notes that, this morning she woke with SOB / Chest tightness -  ambulance was called was told that she had a high BPs in the 180s - as per patient received a nitroglycerin and felt better. Associated symptoms include fatigue, not feeling well, progressively worsening shortness of breath worth with exertion. Never been in the hospital in the past with similar events, but has had similar events on and off.       PAST MEDICAL & SURGICAL HISTORY:  Iron deficiency  HTN (hypertension)  Lymphoma  H/O adrenal insufficiency  Hyponatremia  Hypokalemia  Chronic kidney disease, unspecified CKD stage  Diastolic heart failure  Paroxysmal atrial fibrillation  S/P appendectomy  S/P hysterectomy  H/O intracranial hemorrhage    Allergies  penicillin (Hives)  sulfa drugs (Hives)  tetracycline (Hives)    SOCIAL HISTORY: Denies tobacco, etoh abuse or illicit drug use    FAMILY HISTORY:  No known problems (Father, Mother)    MEDICATIONS  (STANDING):  azithromycin   Tablet 500 milliGRAM(s) Oral daily  cadexomer iodine 0.9% Gel 1 Application(s) Topical <User Schedule>  carvedilol 25 milliGRAM(s) Oral every 12 hours  cefTRIAXone Injectable. 1000 milliGRAM(s) IV Push every 24 hours  famotidine    Tablet 20 milliGRAM(s) Oral daily  ferrous    sulfate 325 milliGRAM(s) Oral daily  fludroCORTISONE 0.05 milliGRAM(s) Oral two times a day  hydrALAZINE 100 milliGRAM(s) Oral every 12 hours  hydrocortisone 10 milliGRAM(s) Oral daily  hydrocortisone 5 milliGRAM(s) Oral at bedtime  losartan 100 milliGRAM(s) Oral daily  potassium chloride   Powder 40 milliEquivalent(s) Oral every 12 hours    MEDICATIONS  (PRN):  acetaminophen     Tablet .. 650 milliGRAM(s) Oral every 6 hours PRN Temp greater or equal to 38C (100.4F), Mild Pain (1 - 3)  aluminum hydroxide/magnesium hydroxide/simethicone Suspension 30 milliLiter(s) Oral every 4 hours PRN Dyspepsia  melatonin 3 milliGRAM(s) Oral at bedtime PRN Insomnia  ondansetron Injectable 4 milliGRAM(s) IV Push every 8 hours PRN Nausea and/or Vomiting    Vital Signs Last 24 Hrs  T(C): 36.8 (23 Nov 2022 19:40), Max: 36.9 (23 Nov 2022 07:08)  T(F): 98.3 (23 Nov 2022 19:40), Max: 98.4 (23 Nov 2022 07:08)  HR: 63 (24 Nov 2022 05:27) (59 - 98)  BP: 170/45 (24 Nov 2022 05:27) (141/47 - 170/45)  BP(mean): 68 (23 Nov 2022 07:08) (68 - 68)  RR: 18 (24 Nov 2022 05:27) (17 - 18)  SpO2: 97% (24 Nov 2022 05:27) (97% - 99%)    Parameters below as of 24 Nov 2022 05:27  Patient On (Oxygen Delivery Method): nasal cannula  O2 Flow (L/min): 2      REVIEW OF SYSTEMS:    CONSTITUTIONAL:  As per HPI.  HEENT:  Eyes:  No diplopia or blurred vision. ENT:  No earache, sore throat or runny nose.  CARDIOVASCULAR:  No pressure, squeezing, strangling, tightness, heaviness or aching about the chest, neck, axilla or epigastrium.  RESPIRATORY:  +SOB.  GASTROINTESTINAL:  No nausea, vomiting or diarrhea.  GENITOURINARY:  No dysuria, frequency or urgency.  MUSCULOSKELETAL:  As per HPI.  SKIN:  No change in skin, hair or nails.  NEUROLOGIC:  No paresthesias, fasciculations, seizures or weakness.    PHYSICAL EXAMINATION:    GENERAL APPEARANCE:  Pt. is not currently dyspneic, in no distress. Pt. is alert, oriented, and pleasant.  HEENT:  Pupils are normal and react normally. No icterus. Mucous membranes well colored.  NECK:  Supple. No lymphadenopathy. Jugular venous pressure not elevated. Carotids equal.   HEART:   The cardiac impulse has a normal quality. There are no murmurs, rubs or gallops noted  CHEST:  Chest is clear to auscultation. Normal respiratory effort.  ABDOMEN:  Soft and nontender.   EXTREMITIES:  There is no edema.     LABS:                        9.5    7.65  )-----------( 167      ( 23 Nov 2022 07:41 )             30.0     11-23    136  |  98  |  18  ----------------------------<  89  3.4<L>   |  32<H>  |  0.86    Ca    8.7      23 Nov 2022 07:41    TPro  5.3<L>  /  Alb  1.7<L>  /  TBili  0.4  /  DBili  0.2  /  AST  81<H>  /  ALT  94<H>  /  AlkPhos  79  11-23    LIVER FUNCTIONS - ( 23 Nov 2022 07:41 )  Alb: 1.7 g/dL / Pro: 5.3 gm/dL / ALK PHOS: 79 U/L / ALT: 94 U/L / AST: 81 U/L / GGT: x           PT/INR - ( 22 Nov 2022 13:21 )   PT: 11.7 sec;   INR: 1.01 ratio       PTT - ( 22 Nov 2022 13:21 )  PTT:28.8 sec    EKG: < from: 12 Lead ECG (11.22.22 @ 12:24) >   Normal sinus rhythm  Anteroseptal infarct (cited on or before 18-OCT-2022)  Abnormal ECG    TELEMETRY: SR    CARDIAC TESTS: < from: TTE Echo Complete w/o Contrast w/ Doppler (09.06.22 @ 08:27) >   The left ventricle is normal in size and wall motion.   Mild asymmetrical basal septal left ventricular hypertrophy is present.   Estimated left ventricular ejection fraction is > 75 %.   There is an intracavity gradient which is likely due to a hyperdynamic   ventricle.   Normal appearing left atrium.   Normal appearing right atrium.   Normal appearing right ventricle structure and function.   The aortic valve appears mildly calcified. Valve opening seems to be   restricted.   Transaortic gradients are elevated consistent with mild aortic stenosis.   EMMANUEL by continuity equation suggests mild stenosis ( 1.7 cm 2).   Moderate (2+) eccentric aortic regurgitation is present.   Moderate mitral annular calcification is present.   The mitral valve leaflets appear thickened.   Mild (1+) mitral regurgitation is present.   EA reversal of the mitral inflow consistent with reduced compliance of   the   left ventricle.   The tricuspid valve leaflets appear mildly thickened and/or calcified,   but   open well.   Moderate (2+) tricuspid valve regurgitation is present.   Mild to moderate pulmonary hypertension.   No evidence of pericardial effusion.   The IVC appears mildly underdistended.    RADIOLOGY & ADDITIONAL STUDIES: < from: CT Abdomen and Pelvis w/ IV Cont (11.22.22 @ 14:53) >  No pulmonary embolism from the main pulmonary artery through the lobar   branches. Several segmental and subsegmental branches are not well   evaluated due to motion.    Increased moderate pleural effusions.    New opacities in the upper lobes, differential for which includes   pneumonia and pulmonary edema.    New mild periportal edema.    Multiple pancreatic cystic lesions, some of which are larger compared to   January 2020.    < end of copied text >      ASSESSMENT & PLAN:

## 2022-11-25 LAB
ANION GAP SERPL CALC-SCNC: 2 MMOL/L — LOW (ref 5–17)
BUN SERPL-MCNC: 22 MG/DL — SIGNIFICANT CHANGE UP (ref 7–23)
CALCIUM SERPL-MCNC: 8.8 MG/DL — SIGNIFICANT CHANGE UP (ref 8.5–10.1)
CHLORIDE SERPL-SCNC: 100 MMOL/L — SIGNIFICANT CHANGE UP (ref 96–108)
CO2 SERPL-SCNC: 33 MMOL/L — HIGH (ref 22–31)
CREAT SERPL-MCNC: 0.96 MG/DL — SIGNIFICANT CHANGE UP (ref 0.5–1.3)
EGFR: 53 ML/MIN/1.73M2 — LOW
GLUCOSE SERPL-MCNC: 99 MG/DL — SIGNIFICANT CHANGE UP (ref 70–99)
POTASSIUM SERPL-MCNC: 4.6 MMOL/L — SIGNIFICANT CHANGE UP (ref 3.5–5.3)
POTASSIUM SERPL-SCNC: 4.6 MMOL/L — SIGNIFICANT CHANGE UP (ref 3.5–5.3)
SODIUM SERPL-SCNC: 135 MMOL/L — SIGNIFICANT CHANGE UP (ref 135–145)

## 2022-11-25 PROCEDURE — 99233 SBSQ HOSP IP/OBS HIGH 50: CPT

## 2022-11-25 RX ADMIN — CARVEDILOL PHOSPHATE 25 MILLIGRAM(S): 80 CAPSULE, EXTENDED RELEASE ORAL at 21:26

## 2022-11-25 RX ADMIN — FLUDROCORTISONE ACETATE 0.05 MILLIGRAM(S): 0.1 TABLET ORAL at 21:27

## 2022-11-25 RX ADMIN — Medication 5 MILLIGRAM(S): at 21:34

## 2022-11-25 RX ADMIN — LOSARTAN POTASSIUM 100 MILLIGRAM(S): 100 TABLET, FILM COATED ORAL at 11:40

## 2022-11-25 RX ADMIN — Medication 650 MILLIGRAM(S): at 17:50

## 2022-11-25 RX ADMIN — Medication 100 MILLIGRAM(S): at 05:49

## 2022-11-25 RX ADMIN — Medication 325 MILLIGRAM(S): at 11:40

## 2022-11-25 RX ADMIN — Medication 3 MILLIGRAM(S): at 21:27

## 2022-11-25 RX ADMIN — Medication 40 MILLIEQUIVALENT(S): at 21:28

## 2022-11-25 RX ADMIN — Medication 40 MILLIEQUIVALENT(S): at 11:39

## 2022-11-25 RX ADMIN — FAMOTIDINE 20 MILLIGRAM(S): 10 INJECTION INTRAVENOUS at 11:40

## 2022-11-25 RX ADMIN — Medication 20 MILLIGRAM(S): at 13:42

## 2022-11-25 RX ADMIN — Medication 100 MILLIGRAM(S): at 17:18

## 2022-11-25 RX ADMIN — CEFTRIAXONE 1000 MILLIGRAM(S): 500 INJECTION, POWDER, FOR SOLUTION INTRAMUSCULAR; INTRAVENOUS at 11:39

## 2022-11-25 RX ADMIN — CARVEDILOL PHOSPHATE 25 MILLIGRAM(S): 80 CAPSULE, EXTENDED RELEASE ORAL at 11:40

## 2022-11-25 RX ADMIN — Medication 10 MILLIGRAM(S): at 11:40

## 2022-11-25 RX ADMIN — Medication 650 MILLIGRAM(S): at 16:50

## 2022-11-25 RX ADMIN — FLUDROCORTISONE ACETATE 0.05 MILLIGRAM(S): 0.1 TABLET ORAL at 11:40

## 2022-11-25 RX ADMIN — Medication 20 MILLIGRAM(S): at 05:49

## 2022-11-25 RX ADMIN — AZITHROMYCIN 500 MILLIGRAM(S): 500 TABLET, FILM COATED ORAL at 11:39

## 2022-11-25 NOTE — PROGRESS NOTE ADULT - SUBJECTIVE AND OBJECTIVE BOX
Subjective:    pat better, sitting in chair, weak on walking.    Home Medications:  amiodarone 200 mg oral tablet: 1 tab(s) orally once a day (22 Nov 2022 15:35)  amLODIPine 5 mg oral tablet: 1 tab(s) orally once a day (22 Nov 2022 15:35)  carvedilol 25 mg oral tablet: 1 tab(s) orally 2 times a day (22 Nov 2022 15:35)  Claritin 10 mg oral tablet: 1 tab(s) orally once a day, As Needed (22 Nov 2022 15:35)  Colace 100 mg oral capsule: 2 cap(s) orally once a day (at bedtime) (22 Nov 2022 15:35)  fludrocortisone 0.1 mg oral tablet: 0.5 tab(s) orally 2 times a day (22 Nov 2022 15:35)  furosemide 20 mg oral tablet: 1 tab(s) orally 2 times a day (22 Nov 2022 15:35)  hydrALAZINE 100 mg oral tablet: 1 tab(s) orally 3 times a day (22 Nov 2022 15:35)  hydrocortisone 10 mg oral tablet: 1 tab(s) orally once a day (in the morning) (22 Nov 2022 15:35)  hydrocortisone 5 mg oral tablet: 1 tab(s) orally once a day (in the evening) (22 Nov 2022 15:35)  Iodosorb 0.9% topical gel: Apply topically to affected area Monday, Wednesday, and Friday (22 Nov 2022 15:35)  losartan 100 mg oral tablet: 1 tab(s) orally once a day (22 Nov 2022 15:35)  Multiple Vitamins oral liquid: 5 milliliter(s) orally once a day (at bedtime) (22 Nov 2022 15:35)  Pepto-Bismol 262 mg/15 mL oral suspension: 15 milliliter(s) orally every 8 hours, As Needed (22 Nov 2022 15:35)  polyethylene glycol 3350 oral powder for reconstitution: 17 gram(s) orally once a day, As Needed (22 Nov 2022 15:35)  potassium chloride 20 mEq oral powder for reconstitution: 1 each orally once a day (22 Nov 2022 15:35)  Senna 8.6 mg oral tablet: 2 tab(s) orally once a day (at bedtime), As Needed (22 Nov 2022 15:35)  Tylenol Regular Strength 325 mg oral tablet: 1 tab(s) orally 2 times a day, As Needed (22 Nov 2022 15:35)  Vitamin D3 50 mcg (2000 intl units) oral tablet: 1 tab(s) orally once a day (22 Nov 2022 15:35)    MEDICATIONS  (STANDING):  cadexomer iodine 0.9% Gel 1 Application(s) Topical <User Schedule>  carvedilol 25 milliGRAM(s) Oral every 12 hours  cefTRIAXone Injectable. 1000 milliGRAM(s) IV Push every 24 hours  famotidine    Tablet 20 milliGRAM(s) Oral daily  ferrous    sulfate 325 milliGRAM(s) Oral daily  fludroCORTISONE 0.05 milliGRAM(s) Oral two times a day  furosemide    Tablet 20 milliGRAM(s) Oral two times a day  hydrALAZINE 100 milliGRAM(s) Oral every 12 hours  hydrocortisone 10 milliGRAM(s) Oral daily  hydrocortisone 5 milliGRAM(s) Oral at bedtime  losartan 100 milliGRAM(s) Oral daily  potassium chloride   Powder 40 milliEquivalent(s) Oral every 12 hours    MEDICATIONS  (PRN):  acetaminophen     Tablet .. 650 milliGRAM(s) Oral every 6 hours PRN Temp greater or equal to 38C (100.4F), Mild Pain (1 - 3)  aluminum hydroxide/magnesium hydroxide/simethicone Suspension 30 milliLiter(s) Oral every 4 hours PRN Dyspepsia  melatonin 3 milliGRAM(s) Oral at bedtime PRN Insomnia  ondansetron Injectable 4 milliGRAM(s) IV Push every 8 hours PRN Nausea and/or Vomiting      Allergies    penicillin (Hives)  sulfa drugs (Hives)  tetracycline (Hives)    Intolerances        Vital Signs Last 24 Hrs  T(C): 36.7 (25 Nov 2022 07:56), Max: 36.9 (24 Nov 2022 19:55)  T(F): 98 (25 Nov 2022 07:56), Max: 98.5 (24 Nov 2022 19:55)  HR: 66 (25 Nov 2022 07:56) (66 - 71)  BP: 132/56 (25 Nov 2022 07:56) (132/56 - 181/55)  BP(mean): 76 (25 Nov 2022 07:56) (76 - 76)  RR: 18 (25 Nov 2022 07:56) (17 - 18)  SpO2: 94% (25 Nov 2022 07:56) (94% - 96%)    Parameters below as of 25 Nov 2022 07:56  Patient On (Oxygen Delivery Method): nasal cannula  O2 Flow (L/min): 2        PHYSICAL EXAMINATION:    NECK:  Supple. No lymphadenopathy. Jugular venous pressure not elevated. Carotids equal.   HEART:   The cardiac impulse has a normal quality. Reg., Nl S1 and S2.  There are no murmurs, rubs or gallops noted  CHEST:  Chest is clear to auscultation. Normal respiratory effort.  ABDOMEN:  Soft and nontender.   EXTREMITIES:  There is no edema.       LABS:                        9.8    6.25  )-----------( 178      ( 24 Nov 2022 07:20 )             30.5     11-25    135  |  100  |  22  ----------------------------<  99  4.6   |  33<H>  |  0.96    Ca    8.8      25 Nov 2022 06:23    TPro  5.4<L>  /  Alb  1.7<L>  /  TBili  0.3  /  DBili  x   /  AST  75<H>  /  ALT  91<H>  /  AlkPhos  79  11-24

## 2022-11-25 NOTE — PROGRESS NOTE ADULT - ASSESSMENT
A/P: 89 y/o sick appearing individual with extensive chronic medical problems HEFpEF, CKD stage III, pAF (not on AC due to falls), recent hospitalization with (R) hip fracture, adrenal insufficiency, -  admitted in the hospital with progressive worsening chest tightness and shortness of breath /  cough 2/2 likely GNR PNA.    1. PNA. Cont abx and mgmt as per primary team.     2. HFpEF. Pt with h/o of multiple HF exacerbations- NYHA class III.  Pt also on fludrocortisone and hydrocortisone for adrenal insufficiency which can cause additional fluid retention on top of other factors.  Mod b/l pleural effusions on CT chest. Cr is currently stable.   Pt now on PO lasix. I/Os not calculated, but pt has had 2# wt loss since admisison.   Cont GDMT, BP control.   Diuresis with close monitoring of the renal function and electrolytes.  Goal potassium of 4 and magnesium of 2.     3. HTN- cont home meds.    4. CKD. Cr stable today. H/o cardiorenal syndrome.    5. PAF. Not on AC secondary to falls and risk of bleed outweighs benefit of AC.  Saray recently d/c'd. Cont to follow on tele. SR at present.      6. Suggest palliative care eval with age, comorbidities and mult recent hospitalizations.    7. DVT proph.

## 2022-11-25 NOTE — PROGRESS NOTE ADULT - SUBJECTIVE AND OBJECTIVE BOX
HOSPITALIST ATTENDING PROGRESS NOTE    Chart and meds reviewed.  Patient seen and examined.    CC: SOB    Subjective:     All other systems reviewed and found to be negative with the exception of what has been described above.    MEDICATIONS  (STANDING):  azithromycin   Tablet 500 milliGRAM(s) Oral daily  cadexomer iodine 0.9% Gel 1 Application(s) Topical <User Schedule>  carvedilol 25 milliGRAM(s) Oral every 12 hours  cefTRIAXone Injectable. 1000 milliGRAM(s) IV Push every 24 hours  famotidine    Tablet 20 milliGRAM(s) Oral daily  ferrous    sulfate 325 milliGRAM(s) Oral daily  fludroCORTISONE 0.05 milliGRAM(s) Oral two times a day  furosemide    Tablet 20 milliGRAM(s) Oral two times a day  hydrALAZINE 100 milliGRAM(s) Oral every 12 hours  hydrocortisone 10 milliGRAM(s) Oral daily  hydrocortisone 5 milliGRAM(s) Oral at bedtime  losartan 100 milliGRAM(s) Oral daily  potassium chloride   Powder 40 milliEquivalent(s) Oral every 12 hours    MEDICATIONS  (PRN):  acetaminophen     Tablet .. 650 milliGRAM(s) Oral every 6 hours PRN Temp greater or equal to 38C (100.4F), Mild Pain (1 - 3)  aluminum hydroxide/magnesium hydroxide/simethicone Suspension 30 milliLiter(s) Oral every 4 hours PRN Dyspepsia  melatonin 3 milliGRAM(s) Oral at bedtime PRN Insomnia  ondansetron Injectable 4 milliGRAM(s) IV Push every 8 hours PRN Nausea and/or Vomiting      VITALS:  T(F): 98 (11-25-22 @ 07:56), Max: 98.5 (11-24-22 @ 19:55)  HR: 66 (11-25-22 @ 07:56) (66 - 71)  BP: 132/56 (11-25-22 @ 07:56) (132/56 - 181/55)  RR: 18 (11-25-22 @ 07:56) (17 - 18)  SpO2: 94% (11-25-22 @ 07:56) (94% - 96%)  Wt(kg): --    I&O's Summary      CAPILLARY BLOOD GLUCOSE          PHYSICAL EXAM:  HEENT:  pupils equal and reactive, EOMI, no oropharyngeal lesions, erythema, exudates, oral thrush  NECK:   supple, no carotid bruits, no palpable lymph nodes, no thyromegaly  CV:  +S1, +S2, regular, no murmurs or rubs  RESP:   lungs clear to auscultation bilaterally, no wheezing, rales, rhonchi, good air entry bilaterally  BREAST:  not examined  GI:  abdomen soft, non-tender, non-distended, normal BS, no bruits, no abdominal masses, no palpable masses  RECTAL:  not examined  :  not examined  MSK:   normal muscle tone, no atrophy, no rigidity, no contractions  EXT:  no clubbing, no cyanosis, no edema, no calf pain, swelling or erythema  VASCULAR:  pulses equal and symmetric in the upper and lower extremities  NEURO:  AAOX3, no focal neurological deficits, follows all commands, able to move extremities spontaneously  SKIN:  no ulcers, lesions or rashes    LABS:                            9.8    6.25  )-----------( 178      ( 24 Nov 2022 07:20 )             30.5     11-25    135  |  100  |  22  ----------------------------<  99  4.6   |  33<H>  |  0.96    Ca    8.8      25 Nov 2022 06:23    TPro  5.4<L>  /  Alb  1.7<L>  /  TBili  0.3  /  DBili  x   /  AST  75<H>  /  ALT  91<H>  /  AlkPhos  79  11-24        LIVER FUNCTIONS - ( 24 Nov 2022 07:20 )  Alb: 1.7 g/dL / Pro: 5.4 gm/dL / ALK PHOS: 79 U/L / ALT: 91 U/L / AST: 75 U/L / GGT: x                                             CULTURES:      Additional results/Imaging, I have personally reviewed:    Telemetry, personally reviewed: HOSPITALIST ATTENDING PROGRESS NOTE    Chart and meds reviewed.  Patient seen and examined.    CC: SOB    Subjective:     All other systems reviewed and found to be negative with the exception of what has been described above.    MEDICATIONS  (STANDING):  azithromycin   Tablet 500 milliGRAM(s) Oral daily  cadexomer iodine 0.9% Gel 1 Application(s) Topical <User Schedule>  carvedilol 25 milliGRAM(s) Oral every 12 hours  cefTRIAXone Injectable. 1000 milliGRAM(s) IV Push every 24 hours  famotidine    Tablet 20 milliGRAM(s) Oral daily  ferrous    sulfate 325 milliGRAM(s) Oral daily  fludroCORTISONE 0.05 milliGRAM(s) Oral two times a day  furosemide    Tablet 20 milliGRAM(s) Oral two times a day  hydrALAZINE 100 milliGRAM(s) Oral every 12 hours  hydrocortisone 10 milliGRAM(s) Oral daily  hydrocortisone 5 milliGRAM(s) Oral at bedtime  losartan 100 milliGRAM(s) Oral daily  potassium chloride   Powder 40 milliEquivalent(s) Oral every 12 hours    MEDICATIONS  (PRN):  acetaminophen     Tablet .. 650 milliGRAM(s) Oral every 6 hours PRN Temp greater or equal to 38C (100.4F), Mild Pain (1 - 3)  aluminum hydroxide/magnesium hydroxide/simethicone Suspension 30 milliLiter(s) Oral every 4 hours PRN Dyspepsia  melatonin 3 milliGRAM(s) Oral at bedtime PRN Insomnia  ondansetron Injectable 4 milliGRAM(s) IV Push every 8 hours PRN Nausea and/or Vomiting      VITALS:  T(F): 98 (11-25-22 @ 07:56), Max: 98.5 (11-24-22 @ 19:55)  HR: 66 (11-25-22 @ 07:56) (66 - 71)  BP: 132/56 (11-25-22 @ 07:56) (132/56 - 181/55)  RR: 18 (11-25-22 @ 07:56) (17 - 18)  SpO2: 94% (11-25-22 @ 07:56) (94% - 96%)  Wt(kg): --    I&O's Summary      CAPILLARY BLOOD GLUCOSE          PHYSICAL EXAM:  Gen: NAD  HEENT:  pupils equal and reactive, EOMI, no oropharyngeal lesions, erythema, exudates, oral thrush  NECK:   supple, no carotid bruits, no palpable lymph nodes, no thyromegaly  CV:  +S1, +S2, regular, no murmurs or rubs  RESP:   lungs clear to auscultation bilaterally, no wheezing, rales, rhonchi, good air entry bilaterally  BREAST:  not examined  GI:  abdomen soft, non-tender, non-distended, normal BS, no bruits, no abdominal masses, no palpable masses  RECTAL:  not examined  :  not examined  MSK:   normal muscle tone, no atrophy, no rigidity, no contractions  EXT:  no clubbing, no cyanosis, no edema, no calf pain, swelling or erythema  VASCULAR:  pulses equal and symmetric in the upper and lower extremities  NEURO:  AAOX3, no focal neurological deficits, follows all commands, able to move extremities spontaneously  SKIN:  no ulcers, lesions or rashes    LABS:                            9.8    6.25  )-----------( 178      ( 24 Nov 2022 07:20 )             30.5     11-25    135  |  100  |  22  ----------------------------<  99  4.6   |  33<H>  |  0.96    Ca    8.8      25 Nov 2022 06:23    TPro  5.4<L>  /  Alb  1.7<L>  /  TBili  0.3  /  DBili  x   /  AST  75<H>  /  ALT  91<H>  /  AlkPhos  79  11-24        LIVER FUNCTIONS - ( 24 Nov 2022 07:20 )  Alb: 1.7 g/dL / Pro: 5.4 gm/dL / ALK PHOS: 79 U/L / ALT: 91 U/L / AST: 75 U/L / GGT: x           CULTURES:  no new    Additional results/Imaging, I have personally reviewed:  RLE doppler 11/22/22: No evidence of right lower extremity deep venous thrombosis.    CXR 11/22/22:  Increasing CHF. Other findings stable.    CTA PE protocol, CT a/p w iv contrast 11/22/22: No pulmonary embolism from the main pulmonary artery through the lobar branches. Several segmental and subsegmental branches are not well evaluated due to motion. Increased moderate pleural effusions. New opacities in the upper lobes, differential for which includes pneumonia and pulmonary edema. New mild periportal edema. Multiple pancreatic cystic lesions, some of which are larger compared to January 2020.    Echo 9/6/22: The left ventricle is normal in size and wall motion. Mild asymmetrical basal septal left ventricular hypertrophy is present. Estimated left ventricular ejection fraction is > 75 %. There is an intracavity gradient which is likely due to a hyperdynamic ventricle. Normal appearing left atrium. Normal appearing right atrium. Normal appearing right ventricle structure and function. The aortic valve appears mildly calcified. Valve opening seems to be restricted. Transaortic gradients are elevated consistent with mild aortic stenosis. EMMANUEL by continuity equation suggests mild stenosis ( 1.7 cm 2). Moderate (2+) eccentric aortic regurgitation is present. Moderate mitral annular calcification is present. The mitral valve leaflets appear thickened. Mild (1+) mitral regurgitation is present. EA reversal of the mitral inflow consistent with reduced compliance of the left ventricle. The tricuspid valve leaflets appear mildly thickened and/or calcified, but open well. Moderate (2+) tricuspid valve regurgitation is present. Mild to moderate pulmonary hypertension. No evidence of pericardial effusion. The IVC appears mildly underdistended.    Telemetry, personally reviewed:  11/23/22: sinus 60-70, PVCs  11/24/22: sinus, VPCs, d/c tele

## 2022-11-25 NOTE — PROGRESS NOTE ADULT - ASSESSMENT
89 y/o sick appearing individual with extensive chronic medical problems HEFpEF, CKD stage III, pAF (not on AC due to falls), recent hospitalization with (R) hip fracture, adrenal insufficiency, -  admitted in the hospital with progressive worsening chest tightness and shortness of breath /  cough 2/2 likely GNR PNA.    #GNR PNA  -today 80% on ambulation on RA  -Azithro 500 x3d, CTX while inpt  -appreciate pulm input    #HFpEF  -appreciate cards input, pt currently euvolemic, resumed home po lasix    #hx adrenal insuff  -home hydrocortisone and florinef    #pAfib  -coreg  -amio stopped by pulm given c/f possibly toxicity  -not on a/c 2/2 falls hx    #RLE swelling  -doppler neg for DVT  -wound care eval appreciated    #transaminitis  -improved    #hypokalemia  -replete    #DVT ppx- SCDs    #for return to [place], anticipate stay thru weekend for improved oxygenation, may need d/c w O2    #DNR/DNI

## 2022-11-25 NOTE — PROGRESS NOTE ADULT - SUBJECTIVE AND OBJECTIVE BOX
Cardiology Progress Note    HPI: Patient is 89 y/o sick appearing individual with extensive chronic medical problems HEFpEF, CKD stage III, pAF (not on AC due to falls), recent hospitalization with (R) hip fracture, adrenal insufficiency, -  admitted in the hospital with progressive worsening chest tightness and shortness of breath /  cough. Onset of symptoms week or two ago but not constant. Notes that, this morning she woke with SOB / Chest tightness -  ambulance was called was told that she had a high BPs in the 180s - as per patient received a nitroglycerin and felt better. Associated symptoms include fatigue, not feeling well, progressively worsening shortness of breath worth with exertion. Never been in the hospital in the past with similar events, but has had similar events on and off.     11/25. No events last pm. Now on PO lasix. No CP. SOB is stable.  SR on tele. No new complaints. Cough at times.     PAST MEDICAL & SURGICAL HISTORY:  Iron deficiency  HTN (hypertension)  Lymphoma  H/O adrenal insufficiency  Hyponatremia  Hypokalemia  Chronic kidney disease, unspecified CKD stage  Diastolic heart failure  Paroxysmal atrial fibrillation  S/P appendectomy  S/P hysterectomy  H/O intracranial hemorrhage    Allergies  penicillin (Hives)  sulfa drugs (Hives)  tetracycline (Hives)    SOCIAL HISTORY: Denies tobacco, etoh abuse or illicit drug use    FAMILY HISTORY:  No known problems (Father, Mother)    MEDICATIONS  (STANDING):  azithromycin   Tablet 500 milliGRAM(s) Oral daily  cadexomer iodine 0.9% Gel 1 Application(s) Topical <User Schedule>  carvedilol 25 milliGRAM(s) Oral every 12 hours  cefTRIAXone Injectable. 1000 milliGRAM(s) IV Push every 24 hours  famotidine    Tablet 20 milliGRAM(s) Oral daily  ferrous    sulfate 325 milliGRAM(s) Oral daily  fludroCORTISONE 0.05 milliGRAM(s) Oral two times a day  hydrALAZINE 100 milliGRAM(s) Oral every 12 hours  hydrocortisone 10 milliGRAM(s) Oral daily  hydrocortisone 5 milliGRAM(s) Oral at bedtime  losartan 100 milliGRAM(s) Oral daily  potassium chloride   Powder 40 milliEquivalent(s) Oral every 12 hours    MEDICATIONS  (PRN):  acetaminophen     Tablet .. 650 milliGRAM(s) Oral every 6 hours PRN Temp greater or equal to 38C (100.4F), Mild Pain (1 - 3)  aluminum hydroxide/magnesium hydroxide/simethicone Suspension 30 milliLiter(s) Oral every 4 hours PRN Dyspepsia  melatonin 3 milliGRAM(s) Oral at bedtime PRN Insomnia  ondansetron Injectable 4 milliGRAM(s) IV Push every 8 hours PRN Nausea and/or Vomiting    Vital Signs Last 24 Hrs  T(C): 36.8 (23 Nov 2022 19:40), Max: 36.9 (23 Nov 2022 07:08)  T(F): 98.3 (23 Nov 2022 19:40), Max: 98.4 (23 Nov 2022 07:08)  HR: 63 (24 Nov 2022 05:27) (59 - 98)  BP: 170/45 (24 Nov 2022 05:27) (141/47 - 170/45)  BP(mean): 68 (23 Nov 2022 07:08) (68 - 68)  RR: 18 (24 Nov 2022 05:27) (17 - 18)  SpO2: 97% (24 Nov 2022 05:27) (97% - 99%)    Parameters below as of 24 Nov 2022 05:27  Patient On (Oxygen Delivery Method): nasal cannula  O2 Flow (L/min): 2      REVIEW OF SYSTEMS:    CONSTITUTIONAL:  As per HPI.  HEENT:  Eyes:  No diplopia or blurred vision. ENT:  No earache, sore throat or runny nose.  CARDIOVASCULAR:  No pressure, squeezing, strangling, tightness, heaviness or aching about the chest, neck, axilla or epigastrium.  RESPIRATORY:  +SOB.  GASTROINTESTINAL:  No nausea, vomiting or diarrhea.  GENITOURINARY:  No dysuria, frequency or urgency.  MUSCULOSKELETAL:  As per HPI.  SKIN:  No change in skin, hair or nails.  NEUROLOGIC:  No paresthesias, fasciculations, seizures or weakness.    PHYSICAL EXAMINATION:    GENERAL APPEARANCE:  Pt. is not currently dyspneic, in no distress. Pt. is alert, oriented, and pleasant.  HEENT:  Pupils are normal and react normally. No icterus. Mucous membranes well colored.  NECK:  Supple. No lymphadenopathy. Jugular venous pressure not elevated. Carotids equal.   HEART:   The cardiac impulse has a normal quality. There are no murmurs, rubs or gallops noted  CHEST:  Chest is clear to auscultation. Normal respiratory effort.  ABDOMEN:  Soft and nontender.   EXTREMITIES:  There is no edema.     LABS:                        9.5    7.65  )-----------( 167      ( 23 Nov 2022 07:41 )             30.0     11-23    136  |  98  |  18  ----------------------------<  89  3.4<L>   |  32<H>  |  0.86    Ca    8.7      23 Nov 2022 07:41    TPro  5.3<L>  /  Alb  1.7<L>  /  TBili  0.4  /  DBili  0.2  /  AST  81<H>  /  ALT  94<H>  /  AlkPhos  79  11-23    LIVER FUNCTIONS - ( 23 Nov 2022 07:41 )  Alb: 1.7 g/dL / Pro: 5.3 gm/dL / ALK PHOS: 79 U/L / ALT: 94 U/L / AST: 81 U/L / GGT: x           PT/INR - ( 22 Nov 2022 13:21 )   PT: 11.7 sec;   INR: 1.01 ratio       PTT - ( 22 Nov 2022 13:21 )  PTT:28.8 sec    EKG: < from: 12 Lead ECG (11.22.22 @ 12:24) >   Normal sinus rhythm  Anteroseptal infarct (cited on or before 18-OCT-2022)  Abnormal ECG    TELEMETRY: SR    CARDIAC TESTS: < from: TTE Echo Complete w/o Contrast w/ Doppler (09.06.22 @ 08:27) >   The left ventricle is normal in size and wall motion.   Mild asymmetrical basal septal left ventricular hypertrophy is present.   Estimated left ventricular ejection fraction is > 75 %.   There is an intracavity gradient which is likely due to a hyperdynamic   ventricle.   Normal appearing left atrium.   Normal appearing right atrium.   Normal appearing right ventricle structure and function.   The aortic valve appears mildly calcified. Valve opening seems to be   restricted.   Transaortic gradients are elevated consistent with mild aortic stenosis.   EMMANUEL by continuity equation suggests mild stenosis ( 1.7 cm 2).   Moderate (2+) eccentric aortic regurgitation is present.   Moderate mitral annular calcification is present.   The mitral valve leaflets appear thickened.   Mild (1+) mitral regurgitation is present.   EA reversal of the mitral inflow consistent with reduced compliance of   the   left ventricle.   The tricuspid valve leaflets appear mildly thickened and/or calcified,   but   open well.   Moderate (2+) tricuspid valve regurgitation is present.   Mild to moderate pulmonary hypertension.   No evidence of pericardial effusion.   The IVC appears mildly underdistended.    RADIOLOGY & ADDITIONAL STUDIES: < from: CT Abdomen and Pelvis w/ IV Cont (11.22.22 @ 14:53) >  No pulmonary embolism from the main pulmonary artery through the lobar   branches. Several segmental and subsegmental branches are not well   evaluated due to motion.    Increased moderate pleural effusions.    New opacities in the upper lobes, differential for which includes   pneumonia and pulmonary edema.    New mild periportal edema.    Multiple pancreatic cystic lesions, some of which are larger compared to   January 2020.    < end of copied text >      ASSESSMENT & PLAN:

## 2022-11-25 NOTE — PROGRESS NOTE ADULT - ASSESSMENT
PROBLEMS:    Acute respiratory failure with hypoxia  History of adrenal insufficiency  Paroxysmal atrial fibrillation  Acute on chronic HEFpEF in combination with Pneumonia suspected gram negative organism  CT: No pulmonary embolism from the main pulmonary artery through the lobar branches. Several segmental and subsegmental branches are not well evaluated due to motion. Increased moderate pleural effusions. New opacities in the upper lobes, differential for which includes pneumonia and pulmonary edema    PLAN:    pulmonary stable  IV rhocephin  IV Lasix  Daily weights  Low salt diet  Continue with home reigment of florinef, hydrocortisone 10 mg am /  5 mg pm  On amiodarone-? toxicity  DVT prophylasix

## 2022-11-26 PROCEDURE — 99232 SBSQ HOSP IP/OBS MODERATE 35: CPT

## 2022-11-26 RX ADMIN — Medication 40 MILLIEQUIVALENT(S): at 09:55

## 2022-11-26 RX ADMIN — Medication 10 MILLIGRAM(S): at 09:55

## 2022-11-26 RX ADMIN — FLUDROCORTISONE ACETATE 0.05 MILLIGRAM(S): 0.1 TABLET ORAL at 09:54

## 2022-11-26 RX ADMIN — Medication 20 MILLIGRAM(S): at 06:20

## 2022-11-26 RX ADMIN — Medication 100 MILLIGRAM(S): at 17:38

## 2022-11-26 RX ADMIN — Medication 1 APPLICATION(S): at 09:55

## 2022-11-26 RX ADMIN — Medication 100 MILLIGRAM(S): at 06:20

## 2022-11-26 RX ADMIN — FLUDROCORTISONE ACETATE 0.05 MILLIGRAM(S): 0.1 TABLET ORAL at 22:04

## 2022-11-26 RX ADMIN — LOSARTAN POTASSIUM 100 MILLIGRAM(S): 100 TABLET, FILM COATED ORAL at 09:54

## 2022-11-26 RX ADMIN — CARVEDILOL PHOSPHATE 25 MILLIGRAM(S): 80 CAPSULE, EXTENDED RELEASE ORAL at 22:04

## 2022-11-26 RX ADMIN — CARVEDILOL PHOSPHATE 25 MILLIGRAM(S): 80 CAPSULE, EXTENDED RELEASE ORAL at 09:55

## 2022-11-26 RX ADMIN — CEFTRIAXONE 1000 MILLIGRAM(S): 500 INJECTION, POWDER, FOR SOLUTION INTRAMUSCULAR; INTRAVENOUS at 09:53

## 2022-11-26 RX ADMIN — FAMOTIDINE 20 MILLIGRAM(S): 10 INJECTION INTRAVENOUS at 09:54

## 2022-11-26 RX ADMIN — Medication 325 MILLIGRAM(S): at 09:54

## 2022-11-26 RX ADMIN — Medication 20 MILLIGRAM(S): at 13:58

## 2022-11-26 RX ADMIN — Medication 5 MILLIGRAM(S): at 22:06

## 2022-11-26 NOTE — PROGRESS NOTE ADULT - SUBJECTIVE AND OBJECTIVE BOX
Subjective:    pat better, sitting in chair, no respiratory complaint.    Home Medications:  amiodarone 200 mg oral tablet: 1 tab(s) orally once a day (22 Nov 2022 15:35)  amLODIPine 5 mg oral tablet: 1 tab(s) orally once a day (22 Nov 2022 15:35)  carvedilol 25 mg oral tablet: 1 tab(s) orally 2 times a day (22 Nov 2022 15:35)  Claritin 10 mg oral tablet: 1 tab(s) orally once a day, As Needed (22 Nov 2022 15:35)  Colace 100 mg oral capsule: 2 cap(s) orally once a day (at bedtime) (22 Nov 2022 15:35)  fludrocortisone 0.1 mg oral tablet: 0.5 tab(s) orally 2 times a day (22 Nov 2022 15:35)  furosemide 20 mg oral tablet: 1 tab(s) orally 2 times a day (22 Nov 2022 15:35)  hydrALAZINE 100 mg oral tablet: 1 tab(s) orally 3 times a day (22 Nov 2022 15:35)  hydrocortisone 10 mg oral tablet: 1 tab(s) orally once a day (in the morning) (22 Nov 2022 15:35)  hydrocortisone 5 mg oral tablet: 1 tab(s) orally once a day (in the evening) (22 Nov 2022 15:35)  Iodosorb 0.9% topical gel: Apply topically to affected area Monday, Wednesday, and Friday (22 Nov 2022 15:35)  losartan 100 mg oral tablet: 1 tab(s) orally once a day (22 Nov 2022 15:35)  Multiple Vitamins oral liquid: 5 milliliter(s) orally once a day (at bedtime) (22 Nov 2022 15:35)  Pepto-Bismol 262 mg/15 mL oral suspension: 15 milliliter(s) orally every 8 hours, As Needed (22 Nov 2022 15:35)  polyethylene glycol 3350 oral powder for reconstitution: 17 gram(s) orally once a day, As Needed (22 Nov 2022 15:35)  potassium chloride 20 mEq oral powder for reconstitution: 1 each orally once a day (22 Nov 2022 15:35)  Senna 8.6 mg oral tablet: 2 tab(s) orally once a day (at bedtime), As Needed (22 Nov 2022 15:35)  Tylenol Regular Strength 325 mg oral tablet: 1 tab(s) orally 2 times a day, As Needed (22 Nov 2022 15:35)  Vitamin D3 50 mcg (2000 intl units) oral tablet: 1 tab(s) orally once a day (22 Nov 2022 15:35)    MEDICATIONS  (STANDING):  cadexomer iodine 0.9% Gel 1 Application(s) Topical <User Schedule>  carvedilol 25 milliGRAM(s) Oral every 12 hours  cefTRIAXone Injectable. 1000 milliGRAM(s) IV Push every 24 hours  famotidine    Tablet 20 milliGRAM(s) Oral daily  ferrous    sulfate 325 milliGRAM(s) Oral daily  fludroCORTISONE 0.05 milliGRAM(s) Oral two times a day  furosemide    Tablet 20 milliGRAM(s) Oral two times a day  hydrALAZINE 100 milliGRAM(s) Oral every 12 hours  hydrocortisone 10 milliGRAM(s) Oral daily  hydrocortisone 5 milliGRAM(s) Oral at bedtime  losartan 100 milliGRAM(s) Oral daily  potassium chloride   Powder 40 milliEquivalent(s) Oral every 12 hours    MEDICATIONS  (PRN):  acetaminophen     Tablet .. 650 milliGRAM(s) Oral every 6 hours PRN Temp greater or equal to 38C (100.4F), Mild Pain (1 - 3)  aluminum hydroxide/magnesium hydroxide/simethicone Suspension 30 milliLiter(s) Oral every 4 hours PRN Dyspepsia  melatonin 3 milliGRAM(s) Oral at bedtime PRN Insomnia  ondansetron Injectable 4 milliGRAM(s) IV Push every 8 hours PRN Nausea and/or Vomiting      Allergies    penicillin (Hives)  sulfa drugs (Hives)  tetracycline (Hives)    Intolerances        Vital Signs Last 24 Hrs  T(C): 36.6 (26 Nov 2022 07:30), Max: 36.6 (25 Nov 2022 17:20)  T(F): 97.8 (26 Nov 2022 07:30), Max: 97.9 (25 Nov 2022 17:20)  HR: 66 (26 Nov 2022 07:30) (58 - 68)  BP: 150/38 (26 Nov 2022 07:30) (139/33 - 172/47)  BP(mean): --  RR: 17 (26 Nov 2022 07:30) (17 - 18)  SpO2: 97% (26 Nov 2022 07:30) (97% - 99%)    Parameters below as of 26 Nov 2022 07:30  Patient On (Oxygen Delivery Method): nasal cannula  O2 Flow (L/min): 2        PHYSICAL EXAMINATION:    NECK:  Supple. No lymphadenopathy. Jugular venous pressure not elevated. Carotids equal.   HEART:   The cardiac impulse has a normal quality. Reg., Nl S1 and S2.  There are no murmurs, rubs or gallops noted  CHEST:  Chest crackles to auscultation. Normal respiratory effort.  ABDOMEN:  Soft and nontender.   EXTREMITIES:  There is no edema.       LABS:    11-25    135  |  100  |  22  ----------------------------<  99  4.6   |  33<H>  |  0.96    Ca    8.8      25 Nov 2022 06:23

## 2022-11-26 NOTE — PROGRESS NOTE ADULT - ASSESSMENT
89 y/o sick appearing individual with extensive chronic medical problems HEFpEF, CKD stage III, pAF (not on AC due to falls), recent hospitalization with (R) hip fracture, adrenal insufficiency, -  admitted in the hospital with progressive worsening chest tightness and shortness of breath /  cough 2/2 likely GNR PNA.    #GNR PNA  -11/25, 80% on ambulation on RA, will check again tmrw  -Azithro 500 x3d, CTX while inpt  -appreciate pulm input    #HFpEF  -appreciate cards input, pt currently euvolemic, resumed home po lasix    #hx adrenal insuff  -home hydrocortisone and florinef    #pAfib  -coreg  -amio stopped by pulm given c/f possibly toxicity  -not on a/c 2/2 falls hx    #RLE swelling  -doppler neg for DVT  -wound care eval appreciated    #transaminitis  -improved    #hypokalemia  -replete    #DVT ppx- SCDs    #for return to [place], anticipate stay thru weekend for improved oxygenation, may need d/c w O2    #DNR/DNI

## 2022-11-26 NOTE — PROGRESS NOTE ADULT - SUBJECTIVE AND OBJECTIVE BOX
Cardiology Progress Note    HPI: Patient is 89 y/o sick appearing individual with extensive chronic medical problems HEFpEF, CKD stage III, pAF (not on AC due to falls), recent hospitalization with (R) hip fracture, adrenal insufficiency, -  admitted in the hospital with progressive worsening chest tightness and shortness of breath /  cough. Onset of symptoms week or two ago but not constant. Notes that, this morning she woke with SOB / Chest tightness -  ambulance was called was told that she had a high BPs in the 180s - as per patient received a nitroglycerin and felt better. Associated symptoms include fatigue, not feeling well, progressively worsening shortness of breath worth with exertion. Never been in the hospital in the past with similar events, but has had similar events on and off.     11/25. No events last pm. Now on PO lasix. No CP. SOB is stable.  SR on tele. No new complaints. Cough at times.     11/26. No events last pm. Hypoxic with ambulation yesterday. No CP.  SOB improved. SR on tele.     PAST MEDICAL & SURGICAL HISTORY:  Iron deficiency  HTN (hypertension)  Lymphoma  H/O adrenal insufficiency  Hyponatremia  Hypokalemia  Chronic kidney disease, unspecified CKD stage  Diastolic heart failure  Paroxysmal atrial fibrillation  S/P appendectomy  S/P hysterectomy  H/O intracranial hemorrhage    Allergies  penicillin (Hives)  sulfa drugs (Hives)  tetracycline (Hives)    SOCIAL HISTORY: Denies tobacco, etoh abuse or illicit drug use    FAMILY HISTORY:  No known problems (Father, Mother)    MEDICATIONS  (STANDING):  azithromycin   Tablet 500 milliGRAM(s) Oral daily  cadexomer iodine 0.9% Gel 1 Application(s) Topical <User Schedule>  carvedilol 25 milliGRAM(s) Oral every 12 hours  cefTRIAXone Injectable. 1000 milliGRAM(s) IV Push every 24 hours  famotidine    Tablet 20 milliGRAM(s) Oral daily  ferrous    sulfate 325 milliGRAM(s) Oral daily  fludroCORTISONE 0.05 milliGRAM(s) Oral two times a day  hydrALAZINE 100 milliGRAM(s) Oral every 12 hours  hydrocortisone 10 milliGRAM(s) Oral daily  hydrocortisone 5 milliGRAM(s) Oral at bedtime  losartan 100 milliGRAM(s) Oral daily  potassium chloride   Powder 40 milliEquivalent(s) Oral every 12 hours    MEDICATIONS  (PRN):  acetaminophen     Tablet .. 650 milliGRAM(s) Oral every 6 hours PRN Temp greater or equal to 38C (100.4F), Mild Pain (1 - 3)  aluminum hydroxide/magnesium hydroxide/simethicone Suspension 30 milliLiter(s) Oral every 4 hours PRN Dyspepsia  melatonin 3 milliGRAM(s) Oral at bedtime PRN Insomnia  ondansetron Injectable 4 milliGRAM(s) IV Push every 8 hours PRN Nausea and/or Vomiting    Vital Signs Last 24 Hrs  T(C): 36.8 (23 Nov 2022 19:40), Max: 36.9 (23 Nov 2022 07:08)  T(F): 98.3 (23 Nov 2022 19:40), Max: 98.4 (23 Nov 2022 07:08)  HR: 63 (24 Nov 2022 05:27) (59 - 98)  BP: 170/45 (24 Nov 2022 05:27) (141/47 - 170/45)  BP(mean): 68 (23 Nov 2022 07:08) (68 - 68)  RR: 18 (24 Nov 2022 05:27) (17 - 18)  SpO2: 97% (24 Nov 2022 05:27) (97% - 99%)    Parameters below as of 24 Nov 2022 05:27  Patient On (Oxygen Delivery Method): nasal cannula  O2 Flow (L/min): 2      REVIEW OF SYSTEMS:    CONSTITUTIONAL:  As per HPI.  HEENT:  Eyes:  No diplopia or blurred vision. ENT:  No earache, sore throat or runny nose.  CARDIOVASCULAR:  No pressure, squeezing, strangling, tightness, heaviness or aching about the chest, neck, axilla or epigastrium.  RESPIRATORY:  +SOB.  GASTROINTESTINAL:  No nausea, vomiting or diarrhea.  GENITOURINARY:  No dysuria, frequency or urgency.  MUSCULOSKELETAL:  As per HPI.  SKIN:  No change in skin, hair or nails.  NEUROLOGIC:  No paresthesias, fasciculations, seizures or weakness.    PHYSICAL EXAMINATION:    GENERAL APPEARANCE:  Pt. is not currently dyspneic, in no distress. Pt. is alert, oriented, and pleasant.  HEENT:  Pupils are normal and react normally. No icterus. Mucous membranes well colored.  NECK:  Supple. No lymphadenopathy. Jugular venous pressure not elevated. Carotids equal.   HEART:   The cardiac impulse has a normal quality. There are no murmurs, rubs or gallops noted  CHEST:  Chest is clear to auscultation. Normal respiratory effort.  ABDOMEN:  Soft and nontender.   EXTREMITIES:  There is no edema.     LABS:                        9.5    7.65  )-----------( 167      ( 23 Nov 2022 07:41 )             30.0     11-23    136  |  98  |  18  ----------------------------<  89  3.4<L>   |  32<H>  |  0.86    Ca    8.7      23 Nov 2022 07:41    TPro  5.3<L>  /  Alb  1.7<L>  /  TBili  0.4  /  DBili  0.2  /  AST  81<H>  /  ALT  94<H>  /  AlkPhos  79  11-23    LIVER FUNCTIONS - ( 23 Nov 2022 07:41 )  Alb: 1.7 g/dL / Pro: 5.3 gm/dL / ALK PHOS: 79 U/L / ALT: 94 U/L / AST: 81 U/L / GGT: x           PT/INR - ( 22 Nov 2022 13:21 )   PT: 11.7 sec;   INR: 1.01 ratio       PTT - ( 22 Nov 2022 13:21 )  PTT:28.8 sec    EKG: < from: 12 Lead ECG (11.22.22 @ 12:24) >   Normal sinus rhythm  Anteroseptal infarct (cited on or before 18-OCT-2022)  Abnormal ECG    TELEMETRY: SR    CARDIAC TESTS: < from: TTE Echo Complete w/o Contrast w/ Doppler (09.06.22 @ 08:27) >   The left ventricle is normal in size and wall motion.   Mild asymmetrical basal septal left ventricular hypertrophy is present.   Estimated left ventricular ejection fraction is > 75 %.   There is an intracavity gradient which is likely due to a hyperdynamic   ventricle.   Normal appearing left atrium.   Normal appearing right atrium.   Normal appearing right ventricle structure and function.   The aortic valve appears mildly calcified. Valve opening seems to be   restricted.   Transaortic gradients are elevated consistent with mild aortic stenosis.   EMMANUEL by continuity equation suggests mild stenosis ( 1.7 cm 2).   Moderate (2+) eccentric aortic regurgitation is present.   Moderate mitral annular calcification is present.   The mitral valve leaflets appear thickened.   Mild (1+) mitral regurgitation is present.   EA reversal of the mitral inflow consistent with reduced compliance of   the   left ventricle.   The tricuspid valve leaflets appear mildly thickened and/or calcified,   but   open well.   Moderate (2+) tricuspid valve regurgitation is present.   Mild to moderate pulmonary hypertension.   No evidence of pericardial effusion.   The IVC appears mildly underdistended.    RADIOLOGY & ADDITIONAL STUDIES: < from: CT Abdomen and Pelvis w/ IV Cont (11.22.22 @ 14:53) >  No pulmonary embolism from the main pulmonary artery through the lobar   branches. Several segmental and subsegmental branches are not well   evaluated due to motion.    Increased moderate pleural effusions.    New opacities in the upper lobes, differential for which includes   pneumonia and pulmonary edema.    New mild periportal edema.    Multiple pancreatic cystic lesions, some of which are larger compared to   January 2020.    < end of copied text >      ASSESSMENT & PLAN:

## 2022-11-26 NOTE — PROGRESS NOTE ADULT - SUBJECTIVE AND OBJECTIVE BOX
HOSPITALIST ATTENDING PROGRESS NOTE    Chart and meds reviewed.  Patient seen and examined.    CC: cough    Subjective: Feeling well, denies cough, SOB, CP.    All other systems reviewed and found to be negative with the exception of what has been described above.    MEDICATIONS  (STANDING):  cadexomer iodine 0.9% Gel 1 Application(s) Topical <User Schedule>  carvedilol 25 milliGRAM(s) Oral every 12 hours  cefTRIAXone Injectable. 1000 milliGRAM(s) IV Push every 24 hours  famotidine    Tablet 20 milliGRAM(s) Oral daily  ferrous    sulfate 325 milliGRAM(s) Oral daily  fludroCORTISONE 0.05 milliGRAM(s) Oral two times a day  furosemide    Tablet 20 milliGRAM(s) Oral two times a day  hydrALAZINE 100 milliGRAM(s) Oral every 12 hours  hydrocortisone 10 milliGRAM(s) Oral daily  hydrocortisone 5 milliGRAM(s) Oral at bedtime  losartan 100 milliGRAM(s) Oral daily  potassium chloride   Powder 40 milliEquivalent(s) Oral every 12 hours    MEDICATIONS  (PRN):  acetaminophen     Tablet .. 650 milliGRAM(s) Oral every 6 hours PRN Temp greater or equal to 38C (100.4F), Mild Pain (1 - 3)  aluminum hydroxide/magnesium hydroxide/simethicone Suspension 30 milliLiter(s) Oral every 4 hours PRN Dyspepsia  melatonin 3 milliGRAM(s) Oral at bedtime PRN Insomnia  ondansetron Injectable 4 milliGRAM(s) IV Push every 8 hours PRN Nausea and/or Vomiting      VITALS:  T(F): 97.8 (11-26-22 @ 07:30), Max: 97.9 (11-25-22 @ 17:20)  HR: 66 (11-26-22 @ 07:30) (58 - 68)  BP: 150/38 (11-26-22 @ 07:30) (139/33 - 172/47)  RR: 17 (11-26-22 @ 07:30) (17 - 18)  SpO2: 97% (11-26-22 @ 07:30) (97% - 99%)  Wt(kg): --    I&O's Summary    25 Nov 2022 07:01  -  26 Nov 2022 07:00  --------------------------------------------------------  IN: 0 mL / OUT: 400 mL / NET: -400 mL        CAPILLARY BLOOD GLUCOSE          PHYSICAL EXAM:  Gen: NAD  HEENT:  pupils equal and reactive, EOMI, no oropharyngeal lesions, erythema, exudates, oral thrush  NECK:   supple, no carotid bruits, no palpable lymph nodes, no thyromegaly  CV:  +S1, +S2, regular, no murmurs or rubs  RESP:   lungs clear to auscultation bilaterally, no wheezing, rales, rhonchi, good air entry bilaterally  BREAST:  not examined  GI:  abdomen soft, non-tender, non-distended, normal BS, no bruits, no abdominal masses, no palpable masses  RECTAL:  not examined  :  not examined  MSK:   normal muscle tone, no atrophy, no rigidity, no contractions  EXT:  no clubbing, no cyanosis, no edema, no calf pain, swelling or erythema  VASCULAR:  pulses equal and symmetric in the upper and lower extremities  NEURO:  AAOX3, no focal neurological deficits, follows all commands, able to move extremities spontaneously  SKIN:  no ulcers, lesions or rashes    LABS:        11-25    135  |  100  |  22  ----------------------------<  99  4.6   |  33<H>  |  0.96    Ca    8.8      25 Nov 2022 06:23                                              CULTURES:      Additional results/Imaging, I have personally reviewed:    Telemetry, personally reviewed: HOSPITALIST ATTENDING PROGRESS NOTE    Chart and meds reviewed.  Patient seen and examined.    CC: cough    Subjective: Feeling well, denies cough, SOB, CP.    All other systems reviewed and found to be negative with the exception of what has been described above.    MEDICATIONS  (STANDING):  cadexomer iodine 0.9% Gel 1 Application(s) Topical <User Schedule>  carvedilol 25 milliGRAM(s) Oral every 12 hours  cefTRIAXone Injectable. 1000 milliGRAM(s) IV Push every 24 hours  famotidine    Tablet 20 milliGRAM(s) Oral daily  ferrous    sulfate 325 milliGRAM(s) Oral daily  fludroCORTISONE 0.05 milliGRAM(s) Oral two times a day  furosemide    Tablet 20 milliGRAM(s) Oral two times a day  hydrALAZINE 100 milliGRAM(s) Oral every 12 hours  hydrocortisone 10 milliGRAM(s) Oral daily  hydrocortisone 5 milliGRAM(s) Oral at bedtime  losartan 100 milliGRAM(s) Oral daily  potassium chloride   Powder 40 milliEquivalent(s) Oral every 12 hours    MEDICATIONS  (PRN):  acetaminophen     Tablet .. 650 milliGRAM(s) Oral every 6 hours PRN Temp greater or equal to 38C (100.4F), Mild Pain (1 - 3)  aluminum hydroxide/magnesium hydroxide/simethicone Suspension 30 milliLiter(s) Oral every 4 hours PRN Dyspepsia  melatonin 3 milliGRAM(s) Oral at bedtime PRN Insomnia  ondansetron Injectable 4 milliGRAM(s) IV Push every 8 hours PRN Nausea and/or Vomiting      VITALS:  T(F): 97.8 (11-26-22 @ 07:30), Max: 97.9 (11-25-22 @ 17:20)  HR: 66 (11-26-22 @ 07:30) (58 - 68)  BP: 150/38 (11-26-22 @ 07:30) (139/33 - 172/47)  RR: 17 (11-26-22 @ 07:30) (17 - 18)  SpO2: 97% (11-26-22 @ 07:30) (97% - 99%)  Wt(kg): --    I&O's Summary    25 Nov 2022 07:01  -  26 Nov 2022 07:00  --------------------------------------------------------  IN: 0 mL / OUT: 400 mL / NET: -400 mL        CAPILLARY BLOOD GLUCOSE          PHYSICAL EXAM:  Gen: NAD  HEENT:  pupils equal and reactive, EOMI, no oropharyngeal lesions, erythema, exudates, oral thrush  NECK:   supple, no carotid bruits, no palpable lymph nodes, no thyromegaly  CV:  +S1, +S2, regular, no murmurs or rubs  RESP:   lungs clear to auscultation bilaterally, no wheezing, rales, rhonchi, good air entry bilaterally  BREAST:  not examined  GI:  abdomen soft, non-tender, non-distended, normal BS, no bruits, no abdominal masses, no palpable masses  RECTAL:  not examined  :  not examined  MSK:   normal muscle tone, no atrophy, no rigidity, no contractions  EXT:  no clubbing, no cyanosis, no edema, no calf pain, swelling or erythema  VASCULAR:  pulses equal and symmetric in the upper and lower extremities  NEURO:  AAOX3, no focal neurological deficits, follows all commands, able to move extremities spontaneously  SKIN:  no ulcers, lesions or rashes    LABS:        11-25    135  |  100  |  22  ----------------------------<  99  4.6   |  33<H>  |  0.96    Ca    8.8      25 Nov 2022 06:23    CULTURES:  no new    Additional results/Imaging, I have personally reviewed:  RLE doppler 11/22/22: No evidence of right lower extremity deep venous thrombosis.    CXR 11/22/22:  Increasing CHF. Other findings stable.    CTA PE protocol, CT a/p w iv contrast 11/22/22: No pulmonary embolism from the main pulmonary artery through the lobar branches. Several segmental and subsegmental branches are not well evaluated due to motion. Increased moderate pleural effusions. New opacities in the upper lobes, differential for which includes pneumonia and pulmonary edema. New mild periportal edema. Multiple pancreatic cystic lesions, some of which are larger compared to January 2020.    Echo 9/6/22: The left ventricle is normal in size and wall motion. Mild asymmetrical basal septal left ventricular hypertrophy is present. Estimated left ventricular ejection fraction is > 75 %. There is an intracavity gradient which is likely due to a hyperdynamic ventricle. Normal appearing left atrium. Normal appearing right atrium. Normal appearing right ventricle structure and function. The aortic valve appears mildly calcified. Valve opening seems to be restricted. Transaortic gradients are elevated consistent with mild aortic stenosis. EMMANUEL by continuity equation suggests mild stenosis ( 1.7 cm 2). Moderate (2+) eccentric aortic regurgitation is present. Moderate mitral annular calcification is present. The mitral valve leaflets appear thickened. Mild (1+) mitral regurgitation is present. EA reversal of the mitral inflow consistent with reduced compliance of the left ventricle. The tricuspid valve leaflets appear mildly thickened and/or calcified, but open well. Moderate (2+) tricuspid valve regurgitation is present. Mild to moderate pulmonary hypertension. No evidence of pericardial effusion. The IVC appears mildly underdistended.    Telemetry, personally reviewed:  11/23/22: sinus 60-70, PVCs  11/24/22: sinus, VPCs, d/c tele

## 2022-11-26 NOTE — PROGRESS NOTE ADULT - ASSESSMENT
A/P: 89 y/o sick appearing individual with extensive chronic medical problems HEFpEF, CKD stage III, pAF (not on AC due to falls), recent hospitalization with (R) hip fracture, adrenal insufficiency, -  admitted in the hospital with progressive worsening chest tightness and shortness of breath /  cough 2/2 likely GNR PNA.    1. PNA. Cont abx and mgmt as per primary team. Improving but remains hypoxic with ambulation.  May need O2 upon d/c.     2. HFpEF. Pt with h/o of multiple HF exacerbations- NYHA class III.  Pt also on fludrocortisone and hydrocortisone for adrenal insufficiency which can cause additional fluid retention on top of other factors.  Mod b/l pleural effusions on CT chest. Cr is currently stable.   Cont PO lasix for some diuresis.  I/Os not calculated, but pt has had 7#? wt loss since admisison.   Cont GDMT, BP control.   Diuresis with close monitoring of the renal function and electrolytes.  Goal potassium of 4 and magnesium of 2.     3. HTN- cont home meds.    4. CKD. Cr stable today. H/o cardiorenal syndrome.    5. PAF. Not on AC secondary to falls and risk of bleed outweighs benefit of AC.  Amio recently d/c'd. Cont to follow on tele. SR at present.      6. Suggest palliative care eval with age, comorbidities and mult recent hospitalizations.    7. DVT proph. Will sign off for now. Please call with any questions.

## 2022-11-27 LAB
ADD ON TEST-SPECIMEN IN LAB: SIGNIFICANT CHANGE UP
ANION GAP SERPL CALC-SCNC: 3 MMOL/L — LOW (ref 5–17)
BUN SERPL-MCNC: 21 MG/DL — SIGNIFICANT CHANGE UP (ref 7–23)
CALCIUM SERPL-MCNC: 9 MG/DL — SIGNIFICANT CHANGE UP (ref 8.5–10.1)
CHLORIDE SERPL-SCNC: 97 MMOL/L — SIGNIFICANT CHANGE UP (ref 96–108)
CO2 SERPL-SCNC: 34 MMOL/L — HIGH (ref 22–31)
CREAT SERPL-MCNC: 0.96 MG/DL — SIGNIFICANT CHANGE UP (ref 0.5–1.3)
EGFR: 53 ML/MIN/1.73M2 — LOW
GLUCOSE SERPL-MCNC: 90 MG/DL — SIGNIFICANT CHANGE UP (ref 70–99)
POTASSIUM SERPL-MCNC: 3.1 MMOL/L — LOW (ref 3.5–5.3)
POTASSIUM SERPL-SCNC: 3.1 MMOL/L — LOW (ref 3.5–5.3)
SARS-COV-2 RNA SPEC QL NAA+PROBE: SIGNIFICANT CHANGE UP
SODIUM SERPL-SCNC: 134 MMOL/L — LOW (ref 135–145)

## 2022-11-27 PROCEDURE — 99232 SBSQ HOSP IP/OBS MODERATE 35: CPT

## 2022-11-27 RX ORDER — POTASSIUM CHLORIDE 20 MEQ
40 PACKET (EA) ORAL ONCE
Refills: 0 | Status: COMPLETED | OUTPATIENT
Start: 2022-11-27 | End: 2022-11-27

## 2022-11-27 RX ORDER — CEFUROXIME AXETIL 250 MG
250 TABLET ORAL EVERY 12 HOURS
Refills: 0 | Status: DISCONTINUED | OUTPATIENT
Start: 2022-11-28 | End: 2022-11-29

## 2022-11-27 RX ADMIN — CEFTRIAXONE 1000 MILLIGRAM(S): 500 INJECTION, POWDER, FOR SOLUTION INTRAMUSCULAR; INTRAVENOUS at 10:09

## 2022-11-27 RX ADMIN — CARVEDILOL PHOSPHATE 25 MILLIGRAM(S): 80 CAPSULE, EXTENDED RELEASE ORAL at 23:35

## 2022-11-27 RX ADMIN — Medication 10 MILLIGRAM(S): at 10:09

## 2022-11-27 RX ADMIN — Medication 20 MILLIGRAM(S): at 14:35

## 2022-11-27 RX ADMIN — Medication 20 MILLIGRAM(S): at 05:43

## 2022-11-27 RX ADMIN — Medication 100 MILLIGRAM(S): at 05:43

## 2022-11-27 RX ADMIN — CARVEDILOL PHOSPHATE 25 MILLIGRAM(S): 80 CAPSULE, EXTENDED RELEASE ORAL at 10:10

## 2022-11-27 RX ADMIN — Medication 5 MILLIGRAM(S): at 23:36

## 2022-11-27 RX ADMIN — FLUDROCORTISONE ACETATE 0.05 MILLIGRAM(S): 0.1 TABLET ORAL at 23:35

## 2022-11-27 RX ADMIN — Medication 40 MILLIEQUIVALENT(S): at 10:09

## 2022-11-27 RX ADMIN — Medication 100 MILLIGRAM(S): at 17:56

## 2022-11-27 RX ADMIN — Medication 325 MILLIGRAM(S): at 10:10

## 2022-11-27 RX ADMIN — FLUDROCORTISONE ACETATE 0.05 MILLIGRAM(S): 0.1 TABLET ORAL at 10:10

## 2022-11-27 RX ADMIN — LOSARTAN POTASSIUM 100 MILLIGRAM(S): 100 TABLET, FILM COATED ORAL at 10:10

## 2022-11-27 RX ADMIN — FAMOTIDINE 20 MILLIGRAM(S): 10 INJECTION INTRAVENOUS at 10:09

## 2022-11-27 NOTE — PROGRESS NOTE ADULT - ASSESSMENT
PROBLEMS:    Acute respiratory failure with hypoxia  History of adrenal insufficiency  Paroxysmal atrial fibrillation  Acute on chronic HEFpEF in combination with Pneumonia suspected gram negative organism  CT: No pulmonary embolism from the main pulmonary artery through the lobar branches. Several segmental and subsegmental branches are not well evaluated due to motion. Increased moderate pleural effusions. New opacities in the upper lobes, differential for which includes pneumonia and pulmonary edema    PLAN:    pulmonary stable  po ceftin  IV Lasix  Daily weights  Low salt diet  Continue with home reigment of florinef, hydrocortisone 10 mg am /  5 mg pm  On amiodarone-? toxicity-off amiodarone  DVT prophylasix

## 2022-11-27 NOTE — PROGRESS NOTE ADULT - SUBJECTIVE AND OBJECTIVE BOX
Subjective:    pat better, still desaturate on ambulation.    Home Medications:  amiodarone 200 mg oral tablet: 1 tab(s) orally once a day (22 Nov 2022 15:35)  amLODIPine 5 mg oral tablet: 1 tab(s) orally once a day (22 Nov 2022 15:35)  carvedilol 25 mg oral tablet: 1 tab(s) orally 2 times a day (22 Nov 2022 15:35)  Claritin 10 mg oral tablet: 1 tab(s) orally once a day, As Needed (22 Nov 2022 15:35)  Colace 100 mg oral capsule: 2 cap(s) orally once a day (at bedtime) (22 Nov 2022 15:35)  fludrocortisone 0.1 mg oral tablet: 0.5 tab(s) orally 2 times a day (22 Nov 2022 15:35)  furosemide 20 mg oral tablet: 1 tab(s) orally 2 times a day (22 Nov 2022 15:35)  hydrALAZINE 100 mg oral tablet: 1 tab(s) orally 3 times a day (22 Nov 2022 15:35)  hydrocortisone 10 mg oral tablet: 1 tab(s) orally once a day (in the morning) (22 Nov 2022 15:35)  hydrocortisone 5 mg oral tablet: 1 tab(s) orally once a day (in the evening) (22 Nov 2022 15:35)  Iodosorb 0.9% topical gel: Apply topically to affected area Monday, Wednesday, and Friday (22 Nov 2022 15:35)  losartan 100 mg oral tablet: 1 tab(s) orally once a day (22 Nov 2022 15:35)  Multiple Vitamins oral liquid: 5 milliliter(s) orally once a day (at bedtime) (22 Nov 2022 15:35)  Pepto-Bismol 262 mg/15 mL oral suspension: 15 milliliter(s) orally every 8 hours, As Needed (22 Nov 2022 15:35)  polyethylene glycol 3350 oral powder for reconstitution: 17 gram(s) orally once a day, As Needed (22 Nov 2022 15:35)  potassium chloride 20 mEq oral powder for reconstitution: 1 each orally once a day (22 Nov 2022 15:35)  Senna 8.6 mg oral tablet: 2 tab(s) orally once a day (at bedtime), As Needed (22 Nov 2022 15:35)  Tylenol Regular Strength 325 mg oral tablet: 1 tab(s) orally 2 times a day, As Needed (22 Nov 2022 15:35)  Vitamin D3 50 mcg (2000 intl units) oral tablet: 1 tab(s) orally once a day (22 Nov 2022 15:35)    MEDICATIONS  (STANDING):  cadexomer iodine 0.9% Gel 1 Application(s) Topical <User Schedule>  carvedilol 25 milliGRAM(s) Oral every 12 hours  famotidine    Tablet 20 milliGRAM(s) Oral daily  ferrous    sulfate 325 milliGRAM(s) Oral daily  fludroCORTISONE 0.05 milliGRAM(s) Oral two times a day  furosemide    Tablet 20 milliGRAM(s) Oral two times a day  hydrALAZINE 100 milliGRAM(s) Oral every 12 hours  hydrocortisone 10 milliGRAM(s) Oral daily  hydrocortisone 5 milliGRAM(s) Oral at bedtime  losartan 100 milliGRAM(s) Oral daily    MEDICATIONS  (PRN):  acetaminophen     Tablet .. 650 milliGRAM(s) Oral every 6 hours PRN Temp greater or equal to 38C (100.4F), Mild Pain (1 - 3)  aluminum hydroxide/magnesium hydroxide/simethicone Suspension 30 milliLiter(s) Oral every 4 hours PRN Dyspepsia  melatonin 3 milliGRAM(s) Oral at bedtime PRN Insomnia  ondansetron Injectable 4 milliGRAM(s) IV Push every 8 hours PRN Nausea and/or Vomiting      Allergies    penicillin (Hives)  sulfa drugs (Hives)  tetracycline (Hives)    Intolerances        Vital Signs Last 24 Hrs  T(C): 36.9 (27 Nov 2022 07:56), Max: 36.9 (27 Nov 2022 07:56)  T(F): 98.5 (27 Nov 2022 07:56), Max: 98.5 (27 Nov 2022 07:56)  HR: 68 (27 Nov 2022 07:56) (63 - 68)  BP: 159/43 (27 Nov 2022 07:56) (140/40 - 168/49)  BP(mean): --  RR: 18 (27 Nov 2022 07:56) (17 - 18)  SpO2: 97% (27 Nov 2022 07:56) (97% - 99%)    Parameters below as of 27 Nov 2022 07:56  Patient On (Oxygen Delivery Method): nasal cannula  O2 Flow (L/min): 2        PHYSICAL EXAMINATION:    NECK:  Supple. No lymphadenopathy. Jugular venous pressure not elevated. Carotids equal.   HEART:   The cardiac impulse has a normal quality. Reg., Nl S1 and S2.  There are no murmurs, rubs or gallops noted  CHEST:  Chest crackles to auscultation. Normal respiratory effort.  ABDOMEN:  Soft and nontender.   EXTREMITIES:  There is no edema.       LABS:    11-27    134<L>  |  97  |  21  ----------------------------<  90  3.1<L>   |  34<H>  |  0.96    Ca    9.0      27 Nov 2022 08:44  Mg     2.0     11-27

## 2022-11-27 NOTE — PROGRESS NOTE ADULT - SUBJECTIVE AND OBJECTIVE BOX
HOSPITALIST ATTENDING PROGRESS NOTE    Chart and meds reviewed.  Patient seen and examined.    CC: cough    Subjective: Reports feeling tired/fatigued. Denies SOB, cough.     All other systems reviewed and found to be negative with the exception of what has been described above.    MEDICATIONS  (STANDING):  cadexomer iodine 0.9% Gel 1 Application(s) Topical <User Schedule>  carvedilol 25 milliGRAM(s) Oral every 12 hours  famotidine    Tablet 20 milliGRAM(s) Oral daily  ferrous    sulfate 325 milliGRAM(s) Oral daily  fludroCORTISONE 0.05 milliGRAM(s) Oral two times a day  furosemide    Tablet 20 milliGRAM(s) Oral two times a day  hydrALAZINE 100 milliGRAM(s) Oral every 12 hours  hydrocortisone 10 milliGRAM(s) Oral daily  hydrocortisone 5 milliGRAM(s) Oral at bedtime  losartan 100 milliGRAM(s) Oral daily    MEDICATIONS  (PRN):  acetaminophen     Tablet .. 650 milliGRAM(s) Oral every 6 hours PRN Temp greater or equal to 38C (100.4F), Mild Pain (1 - 3)  aluminum hydroxide/magnesium hydroxide/simethicone Suspension 30 milliLiter(s) Oral every 4 hours PRN Dyspepsia  melatonin 3 milliGRAM(s) Oral at bedtime PRN Insomnia  ondansetron Injectable 4 milliGRAM(s) IV Push every 8 hours PRN Nausea and/or Vomiting      VITALS:  T(F): 98.5 (11-27-22 @ 07:56), Max: 98.5 (11-27-22 @ 07:56)  HR: 68 (11-27-22 @ 07:56) (63 - 68)  BP: 159/43 (11-27-22 @ 07:56) (140/40 - 168/49)  RR: 18 (11-27-22 @ 07:56) (17 - 18)  SpO2: 97% (11-27-22 @ 07:56) (97% - 99%)  Wt(kg): --    I&O's Summary      CAPILLARY BLOOD GLUCOSE          PHYSICAL EXAM:  Gen: NAD  HEENT:  pupils equal and reactive, EOMI, no oropharyngeal lesions, erythema, exudates, oral thrush  NECK:   supple, no carotid bruits, no palpable lymph nodes, no thyromegaly  CV:  +S1, +S2, regular, no murmurs or rubs  RESP:   lungs clear to auscultation bilaterally, no wheezing, rales, rhonchi, good air entry bilaterally  BREAST:  not examined  GI:  abdomen soft, non-tender, non-distended, normal BS, no bruits, no abdominal masses, no palpable masses  RECTAL:  not examined  :  not examined  MSK:   normal muscle tone, no atrophy, no rigidity, no contractions  EXT:  no clubbing, no cyanosis, no edema, no calf pain, swelling or erythema  VASCULAR:  pulses equal and symmetric in the upper and lower extremities  NEURO:  AAOX3, no focal neurological deficits, follows all commands, able to move extremities spontaneously  SKIN:  no ulcers, lesions or rashes    LABS:        11-27    134<L>  |  97  |  21  ----------------------------<  90  3.1<L>   |  34<H>  |  0.96    Ca    9.0      27 Nov 2022 08:44  Mg     2.0     11-27    CULTURES:  no new    Additional results/Imaging, I have personally reviewed:  RLE doppler 11/22/22: No evidence of right lower extremity deep venous thrombosis.    CXR 11/22/22:  Increasing CHF. Other findings stable.    CTA PE protocol, CT a/p w iv contrast 11/22/22: No pulmonary embolism from the main pulmonary artery through the lobar branches. Several segmental and subsegmental branches are not well evaluated due to motion. Increased moderate pleural effusions. New opacities in the upper lobes, differential for which includes pneumonia and pulmonary edema. New mild periportal edema. Multiple pancreatic cystic lesions, some of which are larger compared to January 2020.    Echo 9/6/22: The left ventricle is normal in size and wall motion. Mild asymmetrical basal septal left ventricular hypertrophy is present. Estimated left ventricular ejection fraction is > 75 %. There is an intracavity gradient which is likely due to a hyperdynamic ventricle. Normal appearing left atrium. Normal appearing right atrium. Normal appearing right ventricle structure and function. The aortic valve appears mildly calcified. Valve opening seems to be restricted. Transaortic gradients are elevated consistent with mild aortic stenosis. EMMANUEL by continuity equation suggests mild stenosis ( 1.7 cm 2). Moderate (2+) eccentric aortic regurgitation is present. Moderate mitral annular calcification is present. The mitral valve leaflets appear thickened. Mild (1+) mitral regurgitation is present. EA reversal of the mitral inflow consistent with reduced compliance of the left ventricle. The tricuspid valve leaflets appear mildly thickened and/or calcified, but open well. Moderate (2+) tricuspid valve regurgitation is present. Mild to moderate pulmonary hypertension. No evidence of pericardial effusion. The IVC appears mildly underdistended.    Telemetry, personally reviewed:  11/23/22: sinus 60-70, PVCs  11/24/22: sinus, VPCs, d/c tele

## 2022-11-27 NOTE — PROGRESS NOTE ADULT - ASSESSMENT
89 y/o sick appearing individual with extensive chronic medical problems HEFpEF, CKD stage III, pAF (not on AC due to falls), recent hospitalization with (R) hip fracture, adrenal insufficiency, -  admitted in the hospital with progressive worsening chest tightness and shortness of breath /  cough 2/2 likely GNR PNA.    #GNR PNA  -11/25, 80% on ambulation on RA, will check again today  -Azithro 500 x3d, CTX x5d completed  -appreciate pulm input    #HFpEF  -appreciate cards input, pt currently euvolemic, resumed home po lasix    #hx adrenal insuff  -home hydrocortisone and florinef    #pAfib  -coreg  -amio stopped by pulm given c/f possibly toxicity  -not on a/c 2/2 falls hx    #RLE swelling  -doppler neg for DVT  -wound care eval appreciated    #transaminitis  -improved    #hypokalemia  -replete    #DVT ppx- SCDs    #for return to [place], anticipate stay thru weekend for improved oxygenation, may need d/c w O2, check amb puls ox again today    #DNR/DNI              89 y/o sick appearing individual with extensive chronic medical problems HEFpEF, CKD stage III, pAF (not on AC due to falls), recent hospitalization with (R) hip fracture, adrenal insufficiency, -  admitted in the hospital with progressive worsening chest tightness and shortness of breath /  cough 2/2 likely GNR PNA.    #GNR PNA  -11/27- still hypoxic w ambulation, will need home O2  -Azithro 500 x3d, CTX x5d completed, will give 2 more days Ceftin  -appreciate pulm input    #HFpEF  -appreciate cards input, pt currently euvolemic, resumed home po lasix    #hx adrenal insuff  -home hydrocortisone and florinef    #pAfib  -coreg  -amio stopped by pulm given c/f possibly toxicity  -not on a/c 2/2 falls hx    #RLE swelling  -doppler neg for DVT  -wound care eval appreciated    #transaminitis  -improved    #hypokalemia  -replete    #DVT ppx- SCDs    #for return to [place], [place] stay thru weekend for improved oxygenation, may need d/c w O2, check amb puls ox again today    #DNR/DNI              89 y/o sick appearing individual with extensive chronic medical problems HEFpEF, CKD stage III, pAF (not on AC due to falls), recent hospitalization with (R) hip fracture, adrenal insufficiency, -  admitted in the hospital with progressive worsening chest tightness and shortness of breath /  cough 2/2 likely GNR PNA.    #GNR PNA  -11/27- still hypoxic w ambulation, will need home O2  -Azithro 500 x3d, CTX x5d completed, will give 2 more days Ceftin  -appreciate pulm input    #HFpEF  -appreciate cards input, pt currently euvolemic, resumed home po lasix    #hx adrenal insuff  -home hydrocortisone and florinef    #pAfib  -coreg  -amio stopped by pulm given c/f possibly toxicity  -not on a/c 2/2 falls hx    #RLE swelling  -doppler neg for DVT  -wound care eval appreciated    #transaminitis  -improved    #hypokalemia  -replete    #DVT ppx- SCDs    #for return to [place], will need home O2    #DNR/DNI

## 2022-11-28 LAB
ANION GAP SERPL CALC-SCNC: 3 MMOL/L — LOW (ref 5–17)
BUN SERPL-MCNC: 24 MG/DL — HIGH (ref 7–23)
CALCIUM SERPL-MCNC: 8.8 MG/DL — SIGNIFICANT CHANGE UP (ref 8.5–10.1)
CHLORIDE SERPL-SCNC: 98 MMOL/L — SIGNIFICANT CHANGE UP (ref 96–108)
CO2 SERPL-SCNC: 35 MMOL/L — HIGH (ref 22–31)
CREAT SERPL-MCNC: 0.94 MG/DL — SIGNIFICANT CHANGE UP (ref 0.5–1.3)
EGFR: 55 ML/MIN/1.73M2 — LOW
GLUCOSE SERPL-MCNC: 94 MG/DL — SIGNIFICANT CHANGE UP (ref 70–99)
MAGNESIUM SERPL-MCNC: 2 MG/DL — SIGNIFICANT CHANGE UP (ref 1.6–2.6)
POTASSIUM SERPL-MCNC: 3.3 MMOL/L — LOW (ref 3.5–5.3)
POTASSIUM SERPL-SCNC: 3.3 MMOL/L — LOW (ref 3.5–5.3)
SODIUM SERPL-SCNC: 136 MMOL/L — SIGNIFICANT CHANGE UP (ref 135–145)
TROPONIN I, HIGH SENSITIVITY RESULT: 14.48 NG/L — SIGNIFICANT CHANGE UP

## 2022-11-28 PROCEDURE — 99233 SBSQ HOSP IP/OBS HIGH 50: CPT

## 2022-11-28 PROCEDURE — 93010 ELECTROCARDIOGRAM REPORT: CPT

## 2022-11-28 RX ORDER — POTASSIUM CHLORIDE 20 MEQ
40 PACKET (EA) ORAL EVERY 4 HOURS
Refills: 0 | Status: DISCONTINUED | OUTPATIENT
Start: 2022-11-28 | End: 2022-11-28

## 2022-11-28 RX ORDER — MORPHINE SULFATE 50 MG/1
0.5 CAPSULE, EXTENDED RELEASE ORAL ONCE
Refills: 0 | Status: DISCONTINUED | OUTPATIENT
Start: 2022-11-28 | End: 2022-11-28

## 2022-11-28 RX ORDER — POTASSIUM CHLORIDE 20 MEQ
20 PACKET (EA) ORAL
Refills: 0 | Status: COMPLETED | OUTPATIENT
Start: 2022-11-28 | End: 2022-11-28

## 2022-11-28 RX ORDER — AMLODIPINE BESYLATE 2.5 MG/1
5 TABLET ORAL DAILY
Refills: 0 | Status: DISCONTINUED | OUTPATIENT
Start: 2022-11-28 | End: 2022-11-29

## 2022-11-28 RX ORDER — HEPARIN SODIUM 5000 [USP'U]/ML
5000 INJECTION INTRAVENOUS; SUBCUTANEOUS EVERY 12 HOURS
Refills: 0 | Status: DISCONTINUED | OUTPATIENT
Start: 2022-11-28 | End: 2022-11-29

## 2022-11-28 RX ADMIN — HEPARIN SODIUM 5000 UNIT(S): 5000 INJECTION INTRAVENOUS; SUBCUTANEOUS at 21:49

## 2022-11-28 RX ADMIN — MORPHINE SULFATE 0.5 MILLIGRAM(S): 50 CAPSULE, EXTENDED RELEASE ORAL at 13:08

## 2022-11-28 RX ADMIN — Medication 1 APPLICATION(S): at 12:42

## 2022-11-28 RX ADMIN — Medication 20 MILLIEQUIVALENT(S): at 18:51

## 2022-11-28 RX ADMIN — FLUDROCORTISONE ACETATE 0.05 MILLIGRAM(S): 0.1 TABLET ORAL at 10:24

## 2022-11-28 RX ADMIN — Medication 10 MILLIGRAM(S): at 10:23

## 2022-11-28 RX ADMIN — CARVEDILOL PHOSPHATE 25 MILLIGRAM(S): 80 CAPSULE, EXTENDED RELEASE ORAL at 10:22

## 2022-11-28 RX ADMIN — LOSARTAN POTASSIUM 100 MILLIGRAM(S): 100 TABLET, FILM COATED ORAL at 10:23

## 2022-11-28 RX ADMIN — CARVEDILOL PHOSPHATE 25 MILLIGRAM(S): 80 CAPSULE, EXTENDED RELEASE ORAL at 21:50

## 2022-11-28 RX ADMIN — Medication 250 MILLIGRAM(S): at 10:23

## 2022-11-28 RX ADMIN — Medication 20 MILLIEQUIVALENT(S): at 16:33

## 2022-11-28 RX ADMIN — Medication 250 MILLIGRAM(S): at 21:50

## 2022-11-28 RX ADMIN — Medication 100 MILLIGRAM(S): at 05:35

## 2022-11-28 RX ADMIN — Medication 5 MILLIGRAM(S): at 21:50

## 2022-11-28 RX ADMIN — FAMOTIDINE 20 MILLIGRAM(S): 10 INJECTION INTRAVENOUS at 10:22

## 2022-11-28 RX ADMIN — Medication 100 MILLIGRAM(S): at 21:49

## 2022-11-28 RX ADMIN — Medication 20 MILLIGRAM(S): at 15:07

## 2022-11-28 RX ADMIN — Medication 40 MILLIEQUIVALENT(S): at 15:06

## 2022-11-28 RX ADMIN — FLUDROCORTISONE ACETATE 0.05 MILLIGRAM(S): 0.1 TABLET ORAL at 22:37

## 2022-11-28 RX ADMIN — AMLODIPINE BESYLATE 5 MILLIGRAM(S): 2.5 TABLET ORAL at 16:34

## 2022-11-28 RX ADMIN — Medication 20 MILLIGRAM(S): at 05:35

## 2022-11-28 RX ADMIN — Medication 325 MILLIGRAM(S): at 10:23

## 2022-11-28 NOTE — PROGRESS NOTE ADULT - ASSESSMENT
89 y/o sick appearing individual with extensive chronic medical problems HEFpEF, CKD stage III, pAF (not on AC due to falls), recent hospitalization with (R) hip fracture, adrenal insufficiency, -  admitted in the hospital with progressive worsening chest tightness and shortness of breath /  cough 2/2 likely GNR PNA.    #GNR PNA  -11/27- still hypoxic w ambulation, will need home O2  -Azithro 500 x3d, CTX x5d completed, will give 2 more days Ceftin  -appreciate pulm input    #HFpEF  #11/28- complaining of chest pain- send troponin- repeat EKG done- no changes  - cardiology notified- Dr Mera   - morphine for pain   -appreciate cards input, pt currently euvolemic, resumed home po lasix    #hx adrenal insuff  -home hydrocortisone and florinef    #pAfib  -coreg  -amio stopped by pulm given c/f possibly toxicity  -not on a/c 2/2 falls hx    #RLE swelling  -doppler neg for DVT  -wound care eval appreciated    #transaminitis  -improved    #hypokalemia  -replete    #DVT ppx- SCDs    #for return to [place], will need home O2- will likely need TANG since patient needs supplemental O2     #DNR/DNI      Case d/w team on IDR.

## 2022-11-28 NOTE — PROGRESS NOTE ADULT - ASSESSMENT
PROBLEMS:    Acute respiratory failure with hypoxia  History of adrenal insufficiency  Paroxysmal atrial fibrillation  Acute on chronic HEFpEF in combination with Pneumonia suspected gram negative organism  CT: No pulmonary embolism from the main pulmonary artery through the lobar branches. Several segmental and subsegmental branches are not well evaluated due to motion. Increased moderate pleural effusions. New opacities in the upper lobes, differential for which includes pneumonia and pulmonary edema    PLAN:    pulmonary stable-decd planning  po ceftin  IV Lasix  Daily weights  Low salt diet  Continue with home reigment of florinef, hydrocortisone 10 mg am /  5 mg pm  On amiodarone-? toxicity-off amiodarone  DVT prophylasix

## 2022-11-28 NOTE — PROGRESS NOTE ADULT - SUBJECTIVE AND OBJECTIVE BOX
89 y/o sick appearing individual with extensive chronic medical problems HEFpEF, CKD stage III, pAF (not on AC due to falls), recent hospitalization with (R) hip fracture, adrenal insufficiency, -  admitted in the hospital with progressive worsening chest tightness and shortness of breath /  cough 2/2 likely GNR PNA.    11/28 - patient was seen and examined. Denies pain, fever or chills.   12pm- patient complaining of chest pain - EKG completed- compared with previous EKG- no changes. Dr Mera- cardiologist notified. Troponin sent. Morphine x1, replete electrolytes.     All other systems reviewed and found to be negative with the exception of what has been described above.    Vital Signs Last 24 Hrs  T(C): 36.9 (28 Nov 2022 07:50), Max: 37 (27 Nov 2022 19:58)  T(F): 98.4 (28 Nov 2022 07:50), Max: 98.6 (27 Nov 2022 19:58)  HR: 64 (28 Nov 2022 12:19) (61 - 67)  BP: 165/40 (28 Nov 2022 12:19) (147/42 - 165/40)  BP(mean): --  RR: 18 (28 Nov 2022 07:50) (16 - 18)  SpO2: 97% (28 Nov 2022 07:50) (91% - 100%)    Parameters below as of 28 Nov 2022 07:50  Patient On (Oxygen Delivery Method): nasal cannula  O2 Flow (L/min): 2      PE:  Constitutional: NAD, laying in bed- cachectic   HEENT: NC/AT  Back: no tenderness  Respiratory: respirations even and non labored, diminished breath sounds/ on supplemental O2   Cardiovascular: S1S2 regular, no murmurs  Abdomen: soft, not tender, not distended, positive BS  Genitourinary: voiding  Musculoskeletal: no muscle tenderness, no joint swelling or tenderness  Extremities: no pedal edema   Neurological: no focal deficits    MEDICATIONS  (STANDING):  cadexomer iodine 0.9% Gel 1 Application(s) Topical <User Schedule>  carvedilol 25 milliGRAM(s) Oral every 12 hours  cefuroxime   Tablet 250 milliGRAM(s) Oral every 12 hours  famotidine    Tablet 20 milliGRAM(s) Oral daily  ferrous    sulfate 325 milliGRAM(s) Oral daily  fludroCORTISONE 0.05 milliGRAM(s) Oral two times a day  furosemide    Tablet 20 milliGRAM(s) Oral two times a day  hydrALAZINE 100 milliGRAM(s) Oral every 12 hours  hydrocortisone 10 milliGRAM(s) Oral daily  hydrocortisone 5 milliGRAM(s) Oral at bedtime  losartan 100 milliGRAM(s) Oral daily  morphine  - Injectable 0.5 milliGRAM(s) IV Push once  potassium chloride    Tablet ER 40 milliEquivalent(s) Oral every 4 hours    MEDICATIONS  (PRN):  acetaminophen     Tablet .. 650 milliGRAM(s) Oral every 6 hours PRN Temp greater or equal to 38C (100.4F), Mild Pain (1 - 3)  aluminum hydroxide/magnesium hydroxide/simethicone Suspension 30 milliLiter(s) Oral every 4 hours PRN Dyspepsia  melatonin 3 milliGRAM(s) Oral at bedtime PRN Insomnia  ondansetron Injectable 4 milliGRAM(s) IV Push every 8 hours PRN Nausea and/or Vomiting      LABS:- reviewed       RLE doppler 11/22/22: No evidence of right lower extremity deep venous thrombosis.    CXR 11/22/22:  Increasing CHF. Other findings stable.    CTA PE protocol, CT a/p w iv contrast 11/22/22: No pulmonary embolism from the main pulmonary artery through the lobar branches. Several segmental and subsegmental branches are not well evaluated due to motion. Increased moderate pleural effusions. New opacities in the upper lobes, differential for which includes pneumonia and pulmonary edema. New mild periportal edema. Multiple pancreatic cystic lesions, some of which are larger compared to January 2020.

## 2022-11-28 NOTE — PROGRESS NOTE ADULT - SUBJECTIVE AND OBJECTIVE BOX
Subjective:    pat better, sitting in chair, no new complaint.    Home Medications:  amiodarone 200 mg oral tablet: 1 tab(s) orally once a day (22 Nov 2022 15:35)  amLODIPine 5 mg oral tablet: 1 tab(s) orally once a day (22 Nov 2022 15:35)  carvedilol 25 mg oral tablet: 1 tab(s) orally 2 times a day (22 Nov 2022 15:35)  Claritin 10 mg oral tablet: 1 tab(s) orally once a day, As Needed (22 Nov 2022 15:35)  Colace 100 mg oral capsule: 2 cap(s) orally once a day (at bedtime) (22 Nov 2022 15:35)  fludrocortisone 0.1 mg oral tablet: 0.5 tab(s) orally 2 times a day (22 Nov 2022 15:35)  furosemide 20 mg oral tablet: 1 tab(s) orally 2 times a day (22 Nov 2022 15:35)  hydrALAZINE 100 mg oral tablet: 1 tab(s) orally 3 times a day (22 Nov 2022 15:35)  hydrocortisone 10 mg oral tablet: 1 tab(s) orally once a day (in the morning) (22 Nov 2022 15:35)  hydrocortisone 5 mg oral tablet: 1 tab(s) orally once a day (in the evening) (22 Nov 2022 15:35)  Iodosorb 0.9% topical gel: Apply topically to affected area Monday, Wednesday, and Friday (22 Nov 2022 15:35)  losartan 100 mg oral tablet: 1 tab(s) orally once a day (22 Nov 2022 15:35)  Multiple Vitamins oral liquid: 5 milliliter(s) orally once a day (at bedtime) (22 Nov 2022 15:35)  Pepto-Bismol 262 mg/15 mL oral suspension: 15 milliliter(s) orally every 8 hours, As Needed (22 Nov 2022 15:35)  polyethylene glycol 3350 oral powder for reconstitution: 17 gram(s) orally once a day, As Needed (22 Nov 2022 15:35)  potassium chloride 20 mEq oral powder for reconstitution: 1 each orally once a day (22 Nov 2022 15:35)  Senna 8.6 mg oral tablet: 2 tab(s) orally once a day (at bedtime), As Needed (22 Nov 2022 15:35)  Tylenol Regular Strength 325 mg oral tablet: 1 tab(s) orally 2 times a day, As Needed (22 Nov 2022 15:35)  Vitamin D3 50 mcg (2000 intl units) oral tablet: 1 tab(s) orally once a day (22 Nov 2022 15:35)    MEDICATIONS  (STANDING):  amLODIPine   Tablet 5 milliGRAM(s) Oral daily  cadexomer iodine 0.9% Gel 1 Application(s) Topical <User Schedule>  carvedilol 25 milliGRAM(s) Oral every 12 hours  cefuroxime   Tablet 250 milliGRAM(s) Oral every 12 hours  famotidine    Tablet 20 milliGRAM(s) Oral daily  ferrous    sulfate 325 milliGRAM(s) Oral daily  fludroCORTISONE 0.05 milliGRAM(s) Oral two times a day  furosemide    Tablet 20 milliGRAM(s) Oral two times a day  heparin   Injectable 5000 Unit(s) SubCutaneous every 12 hours  hydrALAZINE 100 milliGRAM(s) Oral every 8 hours  hydrocortisone 10 milliGRAM(s) Oral daily  hydrocortisone 5 milliGRAM(s) Oral at bedtime  losartan 100 milliGRAM(s) Oral daily  potassium chloride   Powder 20 milliEquivalent(s) Oral every 2 hours    MEDICATIONS  (PRN):  acetaminophen     Tablet .. 650 milliGRAM(s) Oral every 6 hours PRN Temp greater or equal to 38C (100.4F), Mild Pain (1 - 3)  aluminum hydroxide/magnesium hydroxide/simethicone Suspension 30 milliLiter(s) Oral every 4 hours PRN Dyspepsia  melatonin 3 milliGRAM(s) Oral at bedtime PRN Insomnia  ondansetron Injectable 4 milliGRAM(s) IV Push every 8 hours PRN Nausea and/or Vomiting      Allergies    penicillin (Hives)  sulfa drugs (Hives)  tetracycline (Hives)    Intolerances        Vital Signs Last 24 Hrs  T(C): 37 (28 Nov 2022 15:01), Max: 37 (27 Nov 2022 19:58)  T(F): 98.6 (28 Nov 2022 15:01), Max: 98.6 (27 Nov 2022 19:58)  HR: 62 (28 Nov 2022 15:01) (61 - 66)  BP: 165/45 (28 Nov 2022 15:01) (147/42 - 165/45)  BP(mean): --  RR: 16 (28 Nov 2022 15:01) (16 - 18)  SpO2: 97% (28 Nov 2022 15:01) (91% - 100%)    Parameters below as of 28 Nov 2022 15:01  Patient On (Oxygen Delivery Method): nasal cannula  O2 Flow (L/min): 2        PHYSICAL EXAMINATION:    NECK:  Supple. No lymphadenopathy. Jugular venous pressure not elevated. Carotids equal.   HEART:   The cardiac impulse has a normal quality. Reg., Nl S1 and S2.  There are no murmurs, rubs or gallops noted  CHEST:  Chest crackles to auscultation. Normal respiratory effort.  ABDOMEN:  Soft and nontender.   EXTREMITIES:  There is no edema.       LABS:    11-28    136  |  98  |  24<H>  ----------------------------<  94  3.3<L>   |  35<H>  |  0.94    Ca    8.8      28 Nov 2022 06:53  Mg     2.0     11-28

## 2022-11-29 ENCOUNTER — TRANSCRIPTION ENCOUNTER (OUTPATIENT)
Age: 87
End: 2022-11-29

## 2022-11-29 VITALS
SYSTOLIC BLOOD PRESSURE: 155 MMHG | DIASTOLIC BLOOD PRESSURE: 40 MMHG | TEMPERATURE: 98 F | HEART RATE: 63 BPM | OXYGEN SATURATION: 96 % | RESPIRATION RATE: 17 BRPM

## 2022-11-29 LAB
ANION GAP SERPL CALC-SCNC: 6 MMOL/L — SIGNIFICANT CHANGE UP (ref 5–17)
BUN SERPL-MCNC: 24 MG/DL — HIGH (ref 7–23)
CALCIUM SERPL-MCNC: 9.4 MG/DL — SIGNIFICANT CHANGE UP (ref 8.5–10.1)
CHLORIDE SERPL-SCNC: 99 MMOL/L — SIGNIFICANT CHANGE UP (ref 96–108)
CO2 SERPL-SCNC: 32 MMOL/L — HIGH (ref 22–31)
CREAT SERPL-MCNC: 0.93 MG/DL — SIGNIFICANT CHANGE UP (ref 0.5–1.3)
EGFR: 56 ML/MIN/1.73M2 — LOW
GLUCOSE SERPL-MCNC: 90 MG/DL — SIGNIFICANT CHANGE UP (ref 70–99)
HCT VFR BLD CALC: 29.2 % — LOW (ref 34.5–45)
HGB BLD-MCNC: 9.1 G/DL — LOW (ref 11.5–15.5)
MCHC RBC-ENTMCNC: 28.1 PG — SIGNIFICANT CHANGE UP (ref 27–34)
MCHC RBC-ENTMCNC: 31.2 GM/DL — LOW (ref 32–36)
MCV RBC AUTO: 90.1 FL — SIGNIFICANT CHANGE UP (ref 80–100)
PLATELET # BLD AUTO: 184 K/UL — SIGNIFICANT CHANGE UP (ref 150–400)
POTASSIUM SERPL-MCNC: 3.4 MMOL/L — LOW (ref 3.5–5.3)
POTASSIUM SERPL-SCNC: 3.4 MMOL/L — LOW (ref 3.5–5.3)
RBC # BLD: 3.24 M/UL — LOW (ref 3.8–5.2)
RBC # FLD: 15.7 % — HIGH (ref 10.3–14.5)
SODIUM SERPL-SCNC: 137 MMOL/L — SIGNIFICANT CHANGE UP (ref 135–145)
WBC # BLD: 4.37 K/UL — SIGNIFICANT CHANGE UP (ref 3.8–10.5)
WBC # FLD AUTO: 4.37 K/UL — SIGNIFICANT CHANGE UP (ref 3.8–10.5)

## 2022-11-29 PROCEDURE — 99239 HOSP IP/OBS DSCHRG MGMT >30: CPT

## 2022-11-29 RX ORDER — FAMOTIDINE 10 MG/ML
1 INJECTION INTRAVENOUS
Qty: 0 | Refills: 0 | DISCHARGE
Start: 2022-11-29

## 2022-11-29 RX ORDER — AMLODIPINE BESYLATE 2.5 MG/1
1 TABLET ORAL
Qty: 0 | Refills: 0 | DISCHARGE
Start: 2022-11-29

## 2022-11-29 RX ORDER — HYDRALAZINE HCL 50 MG
1 TABLET ORAL
Qty: 0 | Refills: 0 | DISCHARGE
Start: 2022-11-29

## 2022-11-29 RX ORDER — POTASSIUM CHLORIDE 20 MEQ
2 PACKET (EA) ORAL
Qty: 0 | Refills: 0 | DISCHARGE
Start: 2022-11-29

## 2022-11-29 RX ORDER — FERROUS SULFATE 325(65) MG
1 TABLET ORAL
Qty: 0 | Refills: 0 | DISCHARGE
Start: 2022-11-29

## 2022-11-29 RX ORDER — AMIODARONE HYDROCHLORIDE 400 MG/1
1 TABLET ORAL
Qty: 0 | Refills: 0 | DISCHARGE

## 2022-11-29 RX ORDER — POTASSIUM CHLORIDE 20 MEQ
40 PACKET (EA) ORAL ONCE
Refills: 0 | Status: COMPLETED | OUTPATIENT
Start: 2022-11-29 | End: 2022-11-29

## 2022-11-29 RX ORDER — CARVEDILOL PHOSPHATE 80 MG/1
1 CAPSULE, EXTENDED RELEASE ORAL
Qty: 0 | Refills: 0 | DISCHARGE
Start: 2022-11-29

## 2022-11-29 RX ORDER — FUROSEMIDE 40 MG
1 TABLET ORAL
Qty: 0 | Refills: 0 | DISCHARGE

## 2022-11-29 RX ORDER — FUROSEMIDE 40 MG
1 TABLET ORAL
Qty: 0 | Refills: 0 | DISCHARGE
Start: 2022-11-29

## 2022-11-29 RX ORDER — LANOLIN ALCOHOL/MO/W.PET/CERES
1 CREAM (GRAM) TOPICAL
Refills: 0 | DISCHARGE
Start: 2022-11-29

## 2022-11-29 RX ORDER — AMLODIPINE BESYLATE 2.5 MG/1
1 TABLET ORAL
Qty: 0 | Refills: 0 | DISCHARGE

## 2022-11-29 RX ORDER — POTASSIUM CHLORIDE 20 MEQ
1 PACKET (EA) ORAL
Qty: 0 | Refills: 0 | DISCHARGE

## 2022-11-29 RX ADMIN — CARVEDILOL PHOSPHATE 25 MILLIGRAM(S): 80 CAPSULE, EXTENDED RELEASE ORAL at 10:18

## 2022-11-29 RX ADMIN — Medication 100 MILLIGRAM(S): at 05:47

## 2022-11-29 RX ADMIN — Medication 40 MILLIEQUIVALENT(S): at 13:40

## 2022-11-29 RX ADMIN — FLUDROCORTISONE ACETATE 0.05 MILLIGRAM(S): 0.1 TABLET ORAL at 10:20

## 2022-11-29 RX ADMIN — AMLODIPINE BESYLATE 5 MILLIGRAM(S): 2.5 TABLET ORAL at 10:21

## 2022-11-29 RX ADMIN — Medication 10 MILLIGRAM(S): at 10:19

## 2022-11-29 RX ADMIN — Medication 20 MILLIGRAM(S): at 05:47

## 2022-11-29 RX ADMIN — Medication 325 MILLIGRAM(S): at 10:18

## 2022-11-29 RX ADMIN — Medication 1 APPLICATION(S): at 10:22

## 2022-11-29 RX ADMIN — FAMOTIDINE 20 MILLIGRAM(S): 10 INJECTION INTRAVENOUS at 10:18

## 2022-11-29 RX ADMIN — LOSARTAN POTASSIUM 100 MILLIGRAM(S): 100 TABLET, FILM COATED ORAL at 10:21

## 2022-11-29 RX ADMIN — HEPARIN SODIUM 5000 UNIT(S): 5000 INJECTION INTRAVENOUS; SUBCUTANEOUS at 10:19

## 2022-11-29 RX ADMIN — Medication 250 MILLIGRAM(S): at 10:20

## 2022-11-29 NOTE — DISCHARGE NOTE PROVIDER - HOSPITAL COURSE
89 y/o woman with extensive chronic medical problems HEFpEF, CKD stage III, pAF (not on AC due to falls), recent hospitalization with (R) hip fracture, adrenal insufficiency, -  admitted in the hospital with progressive worsening chest tightness and shortness of breath /  cough 2/2 likely GNR PNA. Patient was started on IV antibiotic- which she has completed. Plan for dc to rehab facility.     11/29- patient was seen and examined. No acute overnight event, denies CP, SOB.     Vital Signs Last 24 Hrs  T(C): 36.8 (29 Nov 2022 07:54), Max: 37 (28 Nov 2022 15:01)  T(F): 98.2 (29 Nov 2022 07:54), Max: 98.6 (28 Nov 2022 15:01)  HR: 63 (29 Nov 2022 07:54) (62 - 63)  BP: 155/40 (29 Nov 2022 07:54) (155/40 - 165/45)  BP(mean): --  RR: 17 (29 Nov 2022 07:54) (16 - 17)  SpO2: 96% (29 Nov 2022 07:54) (96% - 97%)    Parameters below as of 29 Nov 2022 07:54  Patient On (Oxygen Delivery Method): nasal cannula  O2 Flow (L/min): 2    ROS:   All 10 systems reviewed and found to be negative with the exception of what has been described above.   PE:  Constitutional: NAD, laying in bed- cachectic   HEENT: NC/AT  Back: no tenderness  Respiratory: respirations even and non labored, diminished breath sounds/ on supplemental O2   Cardiovascular: S1S2 regular, no murmurs  Abdomen: soft, not tender, not distended, positive BS  Genitourinary: voiding  Musculoskeletal: no muscle tenderness, no joint swelling or tenderness  Extremities: no pedal edema   Neurological: no focal deficits      #GNR PNA  -11/27- still hypoxic w ambulation, will need home O2- will need dc to rehab - [facility] cannot take her with supplemental O2   -Azithro 500 x3d, Ceftriaxone completed-   -appreciate pulm input  - outpatient f/u     #HFpEF  #11/28- complaining of chest pain- send troponin (negative)- repeat EKG done- no changes- CP resolved   - cardiology notified- d/w Dr Mera - conservative management   - morphine for pain   -appreciate cards input, pt currently euvolemic, resumed home po lasix    #hx adrenal insuff  -home hydrocortisone and florinef    #pAfib  -coreg  -amio stopped by pulm given c/f possibly toxicity  -not on a/c 2/2 falls hx    #RLE swelling  -doppler neg for DVT  -wound care eval appreciated    #transaminitis  -improved    #hypokalemia  -replete    #DVT ppx- SCDs    #DNR/DNI      Case d/w team on IDR.   Plan for dc to rehab  dc time spent 45 minutes

## 2022-11-29 NOTE — DISCHARGE NOTE NURSING/CASE MANAGEMENT/SOCIAL WORK - PATIENT PORTAL LINK FT
You can access the FollowMyHealth Patient Portal offered by [facility] by registering at the following website: [email or web address]. By joining [email or web address]’s FollowMyHealth portal, you will also be able to view your health information using other applications (apps) compatible with our system.

## 2022-11-29 NOTE — DISCHARGE NOTE NURSING/CASE MANAGEMENT/SOCIAL WORK - NSDCPNINST_GEN_ALL_CORE
·Cavilon  (liquid skin barrier) to coccyx - daily.    -Right Lateral Lower Extremity wound and apply iodosorb gel to wound and cover with dry sterile dressing and change every other day.    ·Offload sacral-coccygeal area with positioner pillow, alternate sides Q2H,   ·Incontinence care with repositioning Q2H    -FOR  INCONTINENCE - Cleanse with NS, pat dry, paint with cavilon, apply TRIAD (Zinc-oxide barrier paste) BID and PRN for soiling: It is alright to leave a thin layer of cream on the skin after cleansing as this will not impede wound healing. Use 1 Purple pad under patient accept and wick away moisture.   -Continue turning/positioning patient from side-to-side q2h while in bed, q1h when/if OOB chair, or in accordance w/ pt's plan of care. Utilize pillows and/or z-antonella fluidized positioner to assist w/ turning/positioning. When/if OOB chair, utilize pillows or air waffle chair cushion to offload pressure.

## 2022-11-29 NOTE — DISCHARGE NOTE PROVIDER - CARE PROVIDER_API CALL
Kristie Gong)  Internal Medicine  [address] [place] [address]  Phone: [phone]  Fax: [phone]  Follow Up Time:     Imtiaz Mera (DO)  Cardiology  [address] [place] [address]  Phone: [phone]  Fax: [phone]  Follow Up Time:

## 2022-11-29 NOTE — DISCHARGE NOTE PROVIDER - ATTENDING DISCHARGE PHYSICAL EXAMINATION:
PE:  Constitutional: NAD, laying in bed- cachectic   HEENT: NC/AT  Back: no tenderness  Respiratory: respirations even and non labored, diminished breath sounds/ on supplemental O2   Cardiovascular: S1S2 regular, no murmurs  Abdomen: soft, not tender, not distended, positive BS  Genitourinary: voiding  Musculoskeletal: no muscle tenderness, no joint swelling or tenderness  Extremities: no pedal edema   Neurological: no focal deficits    11/29- patient was seen and examined. No acute overnight event, denies CP, SOB.

## 2022-11-29 NOTE — PROGRESS NOTE ADULT - PROVIDER SPECIALTY LIST ADULT
Hospitalist
Pulmonology
Hospitalist
Hospitalist
Cardiology
Cardiology
Hospitalist
Pulmonology
Pulmonology
Cardiology
Hospitalist
Hospitalist
Pulmonology

## 2022-11-29 NOTE — DISCHARGE NOTE NURSING/CASE MANAGEMENT/SOCIAL WORK - NSDCPEFALRISK_GEN_ALL_CORE
For information on Fall & Injury Prevention, visit: [email or web address] OR  [email or web address] OR  https://www.cdc.gov/steadi/patient.html

## 2022-11-29 NOTE — PROGRESS NOTE ADULT - REASON FOR ADMISSION
Increased cough

## 2022-11-29 NOTE — DISCHARGE NOTE NURSING/CASE MANAGEMENT/SOCIAL WORK - NSDCVIVACCINE_GEN_ALL_CORE_FT
COVID-19, mRNA, LNP-S, PF, 30 mcg/0.3 mL dose, andrew-sucrose (Pfizer); 08-Sep-2022 14:07; Akua Brumfield (RN); Pf[employer] Inc; [id number] (Exp. Date: 31-Oct-2022); IntraMuscular; Deltoid Left.; 0.3 milliLiter(s);   Tdap; 10-Jul-2021 13:25; Starr Zazueta (KENNETH); [employer]; [id number] (Exp. Date: 25-Nov-2022); IntraMuscular; Deltoid Left.; 0.5 milliLiter(s); VIS (VIS Published: 09-May-2013, VIS Presented: 10-Jul-2021);

## 2022-11-29 NOTE — DISCHARGE NOTE PROVIDER - NSDCFUSCHEDAPPT_GEN_ALL_CORE_FT
Kristie Gong  [facility]  [facility] [address]  Scheduled Appointment: 01/04/2023

## 2022-11-29 NOTE — DISCHARGE NOTE PROVIDER - NSDCMRMEDTOKEN_GEN_ALL_CORE_FT
amLODIPine 5 mg oral tablet: 1 tab(s) orally once a day  carvedilol 25 mg oral tablet: 1 tab(s) orally every 12 hours  Claritin 10 mg oral tablet: 1 tab(s) orally once a day, As Needed  Colace 100 mg oral capsule: 2 cap(s) orally once a day (at bedtime)  famotidine 20 mg oral tablet: 1 tab(s) orally once a day  ferrous sulfate 325 mg (65 mg elemental iron) oral tablet: 1 tab(s) orally once a day  fludrocortisone 0.1 mg oral tablet: 0.5 tab(s) orally 2 times a day  furosemide 20 mg oral tablet: 1 tab(s) orally 2 times a day  hydrALAZINE 100 mg oral tablet: 1 tab(s) orally every 8 hours  hydrocortisone 10 mg oral tablet: 1 tab(s) orally once a day (in the morning)  hydrocortisone 5 mg oral tablet: 1 tab(s) orally once a day (in the evening)  Iodosorb 0.9% topical gel: Apply topically to affected area Monday, Wednesday, and Friday  losartan 100 mg oral tablet: 1 tab(s) orally once a day  melatonin 3 mg oral tablet: 1 tab(s) orally once a day (at bedtime), As needed, Insomnia  Multiple Vitamins oral liquid: 5 milliliter(s) orally once a day (at bedtime)  polyethylene glycol 3350 oral powder for reconstitution: 17 gram(s) orally once a day, As Needed  potassium chloride 20 mEq oral powder for reconstitution: 2 packet(s) orally once  Senna 8.6 mg oral tablet: 2 tab(s) orally once a day (at bedtime), As Needed  Tylenol Regular Strength 325 mg oral tablet: 1 tab(s) orally 2 times a day, As Needed  Vitamin D3 50 mcg (2000 intl units) oral tablet: 1 tab(s) orally once a day

## 2022-11-29 NOTE — PROGRESS NOTE ADULT - SUBJECTIVE AND OBJECTIVE BOX
Subjective:    pat better, sitting in bed, no respiratory distress.    Home Medications:  amLODIPine 5 mg oral tablet: 1 tab(s) orally once a day (29 Nov 2022 13:50)  carvedilol 25 mg oral tablet: 1 tab(s) orally every 12 hours (29 Nov 2022 13:50)  Claritin 10 mg oral tablet: 1 tab(s) orally once a day, As Needed (22 Nov 2022 15:35)  Colace 100 mg oral capsule: 2 cap(s) orally once a day (at bedtime) (22 Nov 2022 15:35)  famotidine 20 mg oral tablet: 1 tab(s) orally once a day (29 Nov 2022 13:50)  ferrous sulfate 325 mg (65 mg elemental iron) oral tablet: 1 tab(s) orally once a day (29 Nov 2022 13:50)  fludrocortisone 0.1 mg oral tablet: 0.5 tab(s) orally 2 times a day (22 Nov 2022 15:35)  furosemide 20 mg oral tablet: 1 tab(s) orally 2 times a day (29 Nov 2022 13:50)  hydrALAZINE 100 mg oral tablet: 1 tab(s) orally every 8 hours (29 Nov 2022 13:50)  hydrocortisone 10 mg oral tablet: 1 tab(s) orally once a day (in the morning) (22 Nov 2022 15:35)  hydrocortisone 5 mg oral tablet: 1 tab(s) orally once a day (in the evening) (22 Nov 2022 15:35)  Iodosorb 0.9% topical gel: Apply topically to affected area Monday, Wednesday, and Friday (22 Nov 2022 15:35)  losartan 100 mg oral tablet: 1 tab(s) orally once a day (22 Nov 2022 15:35)  melatonin 3 mg oral tablet: 1 tab(s) orally once a day (at bedtime), As needed, Insomnia (29 Nov 2022 13:50)  Multiple Vitamins oral liquid: 5 milliliter(s) orally once a day (at bedtime) (22 Nov 2022 15:35)  polyethylene glycol 3350 oral powder for reconstitution: 17 gram(s) orally once a day, As Needed (22 Nov 2022 15:35)  potassium chloride 20 mEq oral powder for reconstitution: 2 packet(s) orally once (29 Nov 2022 13:50)  Senna 8.6 mg oral tablet: 2 tab(s) orally once a day (at bedtime), As Needed (22 Nov 2022 15:35)  Tylenol Regular Strength 325 mg oral tablet: 1 tab(s) orally 2 times a day, As Needed (22 Nov 2022 15:35)  Vitamin D3 50 mcg (2000 intl units) oral tablet: 1 tab(s) orally once a day (22 Nov 2022 15:35)    MEDICATIONS  (STANDING):  amLODIPine   Tablet 5 milliGRAM(s) Oral daily  cadexomer iodine 0.9% Gel 1 Application(s) Topical <User Schedule>  carvedilol 25 milliGRAM(s) Oral every 12 hours  cefuroxime   Tablet 250 milliGRAM(s) Oral every 12 hours  famotidine    Tablet 20 milliGRAM(s) Oral daily  ferrous    sulfate 325 milliGRAM(s) Oral daily  fludroCORTISONE 0.05 milliGRAM(s) Oral two times a day  furosemide    Tablet 20 milliGRAM(s) Oral two times a day  heparin   Injectable 5000 Unit(s) SubCutaneous every 12 hours  hydrALAZINE 100 milliGRAM(s) Oral every 8 hours  hydrocortisone 10 milliGRAM(s) Oral daily  hydrocortisone 5 milliGRAM(s) Oral at bedtime  losartan 100 milliGRAM(s) Oral daily    MEDICATIONS  (PRN):  acetaminophen     Tablet .. 650 milliGRAM(s) Oral every 6 hours PRN Temp greater or equal to 38C (100.4F), Mild Pain (1 - 3)  aluminum hydroxide/magnesium hydroxide/simethicone Suspension 30 milliLiter(s) Oral every 4 hours PRN Dyspepsia  melatonin 3 milliGRAM(s) Oral at bedtime PRN Insomnia  ondansetron Injectable 4 milliGRAM(s) IV Push every 8 hours PRN Nausea and/or Vomiting      Allergies    penicillin (Hives)  sulfa drugs (Hives)  tetracycline (Hives)    Intolerances        Vital Signs Last 24 Hrs  T(C): 36.8 (29 Nov 2022 07:54), Max: 37 (28 Nov 2022 15:01)  T(F): 98.2 (29 Nov 2022 07:54), Max: 98.6 (28 Nov 2022 15:01)  HR: 63 (29 Nov 2022 07:54) (62 - 63)  BP: 155/40 (29 Nov 2022 07:54) (155/40 - 165/45)  BP(mean): --  RR: 17 (29 Nov 2022 07:54) (16 - 17)  SpO2: 96% (29 Nov 2022 07:54) (96% - 97%)    Parameters below as of 29 Nov 2022 07:54  Patient On (Oxygen Delivery Method): nasal cannula  O2 Flow (L/min): 2        PHYSICAL EXAMINATION:    NECK:  Supple. No lymphadenopathy. Jugular venous pressure not elevated. Carotids equal.   HEART:   The cardiac impulse has a normal quality. Reg., Nl S1 and S2.  There are no murmurs, rubs or gallops noted  CHEST:  Chest crackles to auscultation. Normal respiratory effort.  ABDOMEN:  Soft and nontender.   EXTREMITIES:  There is no edema.       LABS:                        9.1    4.37  )-----------( 184      ( 29 Nov 2022 07:48 )             29.2     11-29    137  |  99  |  24<H>  ----------------------------<  90  3.4<L>   |  32<H>  |  0.93    Ca    9.4      29 Nov 2022 07:48  Mg     2.0     11-28

## 2022-11-29 NOTE — DISCHARGE NOTE PROVIDER - NSDCCPCAREPLAN_GEN_ALL_CORE_FT
PRINCIPAL DISCHARGE DIAGNOSIS  Diagnosis: Gram-negative pneumonia  Assessment and Plan of Treatment: completed antibiotic treamtent- no further antibiotic needed.   - supplemental O2  *Heart Fialure  - c/w lasix  outaptient f/u with cardiology  *atrial fibrillation   - amiodraone dc;d cue to possible toxicity- patient is rate controlled.   - no anticoagulation secondary to falls        SECONDARY DISCHARGE DIAGNOSES  Diagnosis: Hypokalemia  Assessment and Plan of Treatment:     Diagnosis: Elevated LFTs  Assessment and Plan of Treatment:

## 2022-11-30 ENCOUNTER — NON-APPOINTMENT (OUTPATIENT)
Age: 87
End: 2022-11-30

## 2022-12-01 NOTE — ED ADULT TRIAGE NOTE - ARRIVAL FROM
[Headache] : headache [Negative] : Neurological [FreeTextEntry2] : [facility]

## 2022-12-02 DIAGNOSIS — E87.6 HYPOKALEMIA: ICD-10-CM

## 2022-12-02 DIAGNOSIS — I50.32 CHRONIC DIASTOLIC (CONGESTIVE) HEART FAILURE: ICD-10-CM

## 2022-12-02 DIAGNOSIS — Z20.822 CONTACT WITH AND (SUSPECTED) EXPOSURE TO COVID-19: ICD-10-CM

## 2022-12-02 DIAGNOSIS — L97.919 NON-PRESSURE CHRONIC ULCER OF UNSPECIFIED PART OF RIGHT LOWER LEG WITH UNSPECIFIED SEVERITY: ICD-10-CM
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
## 2022-12-02 DIAGNOSIS — I13.0 HYPERTENSIVE HEART AND CHRONIC KIDNEY DISEASE WITH HEART FAILURE AND STAGE 1 THROUGH STAGE 4 CHRONIC KIDNEY DISEASE, OR UNSPECIFIED CHRONIC KIDNEY DISEASE: ICD-10-CM

## 2022-12-02 DIAGNOSIS — Z90.49 ACQUIRED ABSENCE OF OTHER SPECIFIED PARTS OF DIGESTIVE TRACT: ICD-10-CM

## 2022-12-02 DIAGNOSIS — Z88.2 ALLERGY STATUS TO SULFONAMIDES: ICD-10-CM

## 2022-12-02 DIAGNOSIS — Z66 DO NOT RESUSCITATE: ICD-10-CM

## 2022-12-02 DIAGNOSIS — I48.0 PAROXYSMAL ATRIAL FIBRILLATION: ICD-10-CM

## 2022-12-02 DIAGNOSIS — Z88.1 ALLERGY STATUS TO OTHER ANTIBIOTIC AGENTS STATUS: ICD-10-CM

## 2022-12-02 DIAGNOSIS — Z91.81 HISTORY OF FALLING: ICD-10-CM

## 2022-12-02 DIAGNOSIS — K86.2 CYST OF PANCREAS: ICD-10-CM

## 2022-12-02 DIAGNOSIS — Z79.899 OTHER LONG TERM (CURRENT) DRUG THERAPY: ICD-10-CM

## 2022-12-02 DIAGNOSIS — J96.01 ACUTE RESPIRATORY FAILURE WITH HYPOXIA: ICD-10-CM

## 2022-12-02 DIAGNOSIS — Z90.710 ACQUIRED ABSENCE OF BOTH CERVIX AND UTERUS: ICD-10-CM

## 2022-12-02 DIAGNOSIS — N18.30 CHRONIC KIDNEY DISEASE, STAGE 3 UNSPECIFIED: ICD-10-CM

## 2022-12-02 DIAGNOSIS — I08.3 COMBINED RHEUMATIC DISORDERS OF MITRAL, AORTIC AND TRICUSPID VALVES: ICD-10-CM

## 2022-12-02 DIAGNOSIS — R60.0 LOCALIZED EDEMA: ICD-10-CM

## 2022-12-02 DIAGNOSIS — Z88.0 ALLERGY STATUS TO PENICILLIN: ICD-10-CM

## 2022-12-02 DIAGNOSIS — E27.40 UNSPECIFIED ADRENOCORTICAL INSUFFICIENCY: ICD-10-CM

## 2022-12-02 DIAGNOSIS — Z79.891 LONG TERM (CURRENT) USE OF OPIATE ANALGESIC: ICD-10-CM

## 2022-12-02 DIAGNOSIS — E43 UNSPECIFIED SEVERE PROTEIN-CALORIE MALNUTRITION: ICD-10-CM

## 2022-12-02 DIAGNOSIS — T46.2X5A ADVERSE EFFECT OF OTHER ANTIDYSRHYTHMIC DRUGS, INITIAL ENCOUNTER: ICD-10-CM

## 2022-12-02 DIAGNOSIS — J15.6 PNEUMONIA DUE TO OTHER GRAM-NEGATIVE BACTERIA: ICD-10-CM

## 2022-12-28 ENCOUNTER — FORM ENCOUNTER (OUTPATIENT)
Age: 87
End: 2022-12-28

## 2023-01-04 ENCOUNTER — APPOINTMENT (OUTPATIENT)
Dept: INTERNAL MEDICINE | Facility: CLINIC | Age: 88
End: 2023-01-04

## 2023-01-19 ENCOUNTER — RX RENEWAL (OUTPATIENT)
Age: 88
End: 2023-01-19

## 2023-01-19 RX ORDER — CHLORHEXIDINE GLUCONATE 4 %
325 (65 FE) LIQUID (ML) TOPICAL
Qty: 90 | Refills: 0 | Status: ACTIVE | COMMUNITY
Start: 2023-01-19 | End: 1900-01-01

## 2023-04-11 ENCOUNTER — OFFICE (OUTPATIENT)
Dept: URBAN - METROPOLITAN AREA CLINIC 104 | Facility: CLINIC | Age: 88
Setting detail: OPHTHALMOLOGY
End: 2023-04-11
Payer: MEDICARE

## 2023-04-11 ENCOUNTER — RX ONLY (RX ONLY)
Age: 88
End: 2023-04-11

## 2023-04-11 DIAGNOSIS — H01.004: ICD-10-CM

## 2023-04-11 DIAGNOSIS — H01.005: ICD-10-CM

## 2023-04-11 DIAGNOSIS — H01.002: ICD-10-CM

## 2023-04-11 DIAGNOSIS — H01.001: ICD-10-CM

## 2023-04-11 DIAGNOSIS — H00.14: ICD-10-CM

## 2023-04-11 DIAGNOSIS — H02.015: ICD-10-CM

## 2023-04-11 PROCEDURE — 67820 REVISE EYELASHES: CPT | Performed by: OPTOMETRIST

## 2023-04-11 PROCEDURE — 92014 COMPRE OPH EXAM EST PT 1/>: CPT | Performed by: OPTOMETRIST

## 2023-04-11 ASSESSMENT — VISUAL ACUITY
OS_BCVA: CF 1FT
OD_BCVA: 20/400

## 2023-04-11 ASSESSMENT — LID EXAM ASSESSMENTS
OS_BLEPHARITIS: LLL LUL 4+
OD_BLEPHARITIS: RLL RUL 3+
OS_TRICHIASIS: LLL 1+

## 2023-04-11 ASSESSMENT — SUPERFICIAL PUNCTATE KERATITIS (SPK)
OD_SPK: 1+ 2+
OS_SPK: 1+ 2+

## 2023-04-11 ASSESSMENT — TONOMETRY
OS_IOP_MMHG: 14
OD_IOP_MMHG: 14

## 2023-04-11 ASSESSMENT — KERATOMETRY: METHOD_AUTO_MANUAL: AUTO

## 2023-04-11 ASSESSMENT — CONFRONTATIONAL VISUAL FIELD TEST (CVF)
OS_FINDINGS: FULL
OD_FINDINGS: FULL

## 2023-04-12 ENCOUNTER — APPOINTMENT (OUTPATIENT)
Dept: ORTHOPEDIC SURGERY | Facility: CLINIC | Age: 88
End: 2023-04-12
Payer: MEDICARE

## 2023-04-12 VITALS — WEIGHT: 105 LBS | BODY MASS INDEX: 20.62 KG/M2 | HEIGHT: 60 IN

## 2023-04-12 DIAGNOSIS — Z96.641 PRESENCE OF RIGHT ARTIFICIAL HIP JOINT: ICD-10-CM

## 2023-04-12 PROCEDURE — 73503 X-RAY EXAM HIP UNI 4/> VIEWS: CPT | Mod: RT

## 2023-04-12 PROCEDURE — 99203 OFFICE O/P NEW LOW 30 MIN: CPT | Mod: 25

## 2023-04-12 PROCEDURE — 20610 DRAIN/INJ JOINT/BURSA W/O US: CPT | Mod: RT

## 2023-04-12 PROCEDURE — 99213 OFFICE O/P EST LOW 20 MIN: CPT | Mod: 25

## 2023-04-16 LAB
BASOPHILS # BLD AUTO: 0.01 K/UL
BASOPHILS NFR BLD AUTO: 0.1 %
CRP SERPL-MCNC: 14 MG/L
EOSINOPHIL # BLD AUTO: 0.01 K/UL
EOSINOPHIL NFR BLD AUTO: 0.1 %
ERYTHROCYTE [SEDIMENTATION RATE] IN BLOOD BY WESTERGREN METHOD: 54 MM/HR
HCT VFR BLD CALC: 33.9 %
HGB BLD-MCNC: 10.5 G/DL
IMM GRANULOCYTES NFR BLD AUTO: 0.4 %
LYMPHOCYTES # BLD AUTO: 2.25 K/UL
LYMPHOCYTES NFR BLD AUTO: 30 %
MAN DIFF?: NORMAL
MCHC RBC-ENTMCNC: 29 PG
MCHC RBC-ENTMCNC: 31 GM/DL
MCV RBC AUTO: 93.6 FL
MONOCYTES # BLD AUTO: 0.65 K/UL
MONOCYTES NFR BLD AUTO: 8.7 %
NEUTROPHILS # BLD AUTO: 4.54 K/UL
NEUTROPHILS NFR BLD AUTO: 60.7 %
PLATELET # BLD AUTO: 182 K/UL
RBC # BLD: 3.62 M/UL
RBC # FLD: 15.7 %
WBC # FLD AUTO: 7.49 K/UL

## 2023-04-16 NOTE — ASSESSMENT
[FreeTextEntry1] : The patient is a 89 yo woman presenting 8 months s/p a right hip hemiarthroplasty with Dr. Pollock at . She presents with her son. The patient has developed lateral hip and groin pain that is consistent and moderate in severity. She reports pain with any weightbearing and ambulation. She uses a walker as well. The patient has used oral AIs as needed with intermittent relief. The patient has not had any injections.

## 2023-04-16 NOTE — HISTORY OF PRESENT ILLNESS
[8] : 8 [0] : 0 [Localized] : localized [Sharp] : sharp [Retired] : Work status: retired [] : Post Surgical Visit: no [FreeTextEntry1] : R hip [FreeTextEntry3] : N/A Chronic [FreeTextEntry5] : 89 y/o RHD F eval R hip. pt presents with atraumatic chronic pain due to HX of OA and partial R NEIL approx 8 months ago. Currently in PT  [de-identified] : PT 3x wkly

## 2023-04-16 NOTE — DATA REVIEWED
[FreeTextEntry1] : X-rays done in the office today the AP pelvis 1 view of the right hip 2 views shows a bipolar right hip hemiarthroplasty in good position without signs of loosening or wear.  The left hip is normal.  There were no obvious tumors masses or calcifications seen.

## 2023-04-16 NOTE — IMAGING
[de-identified] : Right hip exam: The patient presents with no apparent distress. Neurovascularly intact. Sensation intact to right lower extremity. Operative incision is clean, dry, and intact. No active drainage or discharge. + straight leg raise. Tender to palpation to lateral hip and anterior thigh. Patient is ambulating with moderate pain leg lengths are equal.\par

## 2023-04-16 NOTE — PROCEDURE
[FreeTextEntry3] : Under sterile conditions the right hip was injected on the greater trochanter with 5 cc of quarter percent plain Marcaine 5 cc of 2% plain lidocaine and 80 mg of Kenalog.  The patient tolerated the procedure well.

## 2023-04-18 ENCOUNTER — RESULT REVIEW (OUTPATIENT)
Age: 88
End: 2023-04-18

## 2023-04-18 ENCOUNTER — OUTPATIENT (OUTPATIENT)
Dept: OUTPATIENT SERVICES | Facility: HOSPITAL | Age: 88
LOS: 1 days | End: 2023-04-18

## 2023-04-18 ENCOUNTER — APPOINTMENT (OUTPATIENT)
Dept: MRI IMAGING | Facility: CLINIC | Age: 88
End: 2023-04-18
Payer: MEDICARE

## 2023-04-18 DIAGNOSIS — Z90.710 ACQUIRED ABSENCE OF BOTH CERVIX AND UTERUS: Chronic | ICD-10-CM

## 2023-04-18 DIAGNOSIS — Z96.641 PRESENCE OF RIGHT ARTIFICIAL HIP JOINT: ICD-10-CM

## 2023-04-18 DIAGNOSIS — Z86.79 PERSONAL HISTORY OF OTHER DISEASES OF THE CIRCULATORY SYSTEM: Chronic | ICD-10-CM

## 2023-04-18 DIAGNOSIS — Z90.49 ACQUIRED ABSENCE OF OTHER SPECIFIED PARTS OF DIGESTIVE TRACT: Chronic | ICD-10-CM

## 2023-04-18 PROCEDURE — 73721 MRI JNT OF LWR EXTRE W/O DYE: CPT | Mod: 26,RT,MH

## 2023-04-25 NOTE — PROGRESS NOTE ADULT - PROVIDER SPECIALTY LIST ADULT
Hospitalist
Hospitalist
Cardiology
Hospitalist
Cardiology
Hospitalist
Cardiology
Cardiology
Hospitalist
Awake/Alert/Cooperative
Hospitalist

## 2023-04-26 ENCOUNTER — RESULT REVIEW (OUTPATIENT)
Age: 88
End: 2023-04-26

## 2023-04-26 ENCOUNTER — APPOINTMENT (OUTPATIENT)
Dept: ULTRASOUND IMAGING | Facility: CLINIC | Age: 88
End: 2023-04-26
Payer: MEDICARE

## 2023-04-26 ENCOUNTER — OUTPATIENT (OUTPATIENT)
Dept: OUTPATIENT SERVICES | Facility: HOSPITAL | Age: 88
LOS: 1 days | End: 2023-04-26
Payer: MEDICARE

## 2023-04-26 DIAGNOSIS — Z90.710 ACQUIRED ABSENCE OF BOTH CERVIX AND UTERUS: Chronic | ICD-10-CM

## 2023-04-26 DIAGNOSIS — Z00.8 ENCOUNTER FOR OTHER GENERAL EXAMINATION: ICD-10-CM

## 2023-04-26 DIAGNOSIS — Z86.79 PERSONAL HISTORY OF OTHER DISEASES OF THE CIRCULATORY SYSTEM: Chronic | ICD-10-CM

## 2023-04-26 DIAGNOSIS — Z90.49 ACQUIRED ABSENCE OF OTHER SPECIFIED PARTS OF DIGESTIVE TRACT: Chronic | ICD-10-CM

## 2023-04-26 PROCEDURE — 20611 DRAIN/INJ JOINT/BURSA W/US: CPT

## 2023-04-26 PROCEDURE — 20611 DRAIN/INJ JOINT/BURSA W/US: CPT | Mod: RT

## 2023-05-08 ENCOUNTER — NON-APPOINTMENT (OUTPATIENT)
Age: 88
End: 2023-05-08

## 2023-05-08 RX ORDER — CEFUROXIME AXETIL 500 MG/1
500 TABLET ORAL
Qty: 60 | Refills: 1 | Status: ACTIVE | COMMUNITY
Start: 2023-05-08 | End: 1900-01-01

## 2023-07-10 ENCOUNTER — INPATIENT (INPATIENT)
Facility: HOSPITAL | Age: 88
LOS: 1 days | Discharge: HOME | DRG: 640 | End: 2023-07-12
Attending: INTERNAL MEDICINE | Admitting: HOSPITALIST
Payer: MEDICARE

## 2023-07-10 VITALS — WEIGHT: 100.09 LBS | HEIGHT: 60 IN

## 2023-07-10 DIAGNOSIS — Z86.79 PERSONAL HISTORY OF OTHER DISEASES OF THE CIRCULATORY SYSTEM: Chronic | ICD-10-CM

## 2023-07-10 DIAGNOSIS — Z90.49 ACQUIRED ABSENCE OF OTHER SPECIFIED PARTS OF DIGESTIVE TRACT: Chronic | ICD-10-CM

## 2023-07-10 DIAGNOSIS — R53.1 WEAKNESS: ICD-10-CM

## 2023-07-10 DIAGNOSIS — Z90.710 ACQUIRED ABSENCE OF BOTH CERVIX AND UTERUS: Chronic | ICD-10-CM

## 2023-07-10 LAB
ALBUMIN SERPL ELPH-MCNC: 2.9 G/DL — LOW (ref 3.3–5)
ALP SERPL-CCNC: 68 U/L — SIGNIFICANT CHANGE UP (ref 40–120)
ALT FLD-CCNC: 21 U/L — SIGNIFICANT CHANGE UP (ref 12–78)
ANION GAP SERPL CALC-SCNC: 4 MMOL/L — LOW (ref 5–17)
APPEARANCE UR: CLEAR — SIGNIFICANT CHANGE UP
APTT BLD: 26.7 SEC — LOW (ref 27.5–35.5)
AST SERPL-CCNC: 19 U/L — SIGNIFICANT CHANGE UP (ref 15–37)
BACTERIA # UR AUTO: ABNORMAL
BASOPHILS # BLD AUTO: 0.01 K/UL — SIGNIFICANT CHANGE UP (ref 0–0.2)
BASOPHILS NFR BLD AUTO: 0.1 % — SIGNIFICANT CHANGE UP (ref 0–2)
BILIRUB SERPL-MCNC: 0.3 MG/DL — SIGNIFICANT CHANGE UP (ref 0.2–1.2)
BILIRUB UR-MCNC: NEGATIVE — SIGNIFICANT CHANGE UP
BUN SERPL-MCNC: 32 MG/DL — HIGH (ref 7–23)
CALCIUM SERPL-MCNC: 9 MG/DL — SIGNIFICANT CHANGE UP (ref 8.5–10.1)
CHLORIDE SERPL-SCNC: 99 MMOL/L — SIGNIFICANT CHANGE UP (ref 96–108)
CO2 SERPL-SCNC: 26 MMOL/L — SIGNIFICANT CHANGE UP (ref 22–31)
COLOR SPEC: YELLOW — SIGNIFICANT CHANGE UP
CREAT SERPL-MCNC: 1.16 MG/DL — SIGNIFICANT CHANGE UP (ref 0.5–1.3)
DIFF PNL FLD: NEGATIVE — SIGNIFICANT CHANGE UP
EGFR: 43 ML/MIN/1.73M2 — LOW
EOSINOPHIL # BLD AUTO: 0.01 K/UL — SIGNIFICANT CHANGE UP (ref 0–0.5)
EOSINOPHIL NFR BLD AUTO: 0.1 % — SIGNIFICANT CHANGE UP (ref 0–6)
EPI CELLS # UR: SIGNIFICANT CHANGE UP
GLUCOSE SERPL-MCNC: 148 MG/DL — HIGH (ref 70–99)
GLUCOSE UR QL: NEGATIVE — SIGNIFICANT CHANGE UP
HCT VFR BLD CALC: 32.2 % — LOW (ref 34.5–45)
HGB BLD-MCNC: 10.7 G/DL — LOW (ref 11.5–15.5)
IMM GRANULOCYTES NFR BLD AUTO: 0.1 % — SIGNIFICANT CHANGE UP (ref 0–0.9)
INR BLD: 0.94 RATIO — SIGNIFICANT CHANGE UP (ref 0.88–1.16)
KETONES UR-MCNC: NEGATIVE — SIGNIFICANT CHANGE UP
LEUKOCYTE ESTERASE UR-ACNC: ABNORMAL
LYMPHOCYTES # BLD AUTO: 1.67 K/UL — SIGNIFICANT CHANGE UP (ref 1–3.3)
LYMPHOCYTES # BLD AUTO: 23.2 % — SIGNIFICANT CHANGE UP (ref 13–44)
MCHC RBC-ENTMCNC: 32.5 PG — SIGNIFICANT CHANGE UP (ref 27–34)
MCHC RBC-ENTMCNC: 33.2 GM/DL — SIGNIFICANT CHANGE UP (ref 32–36)
MCV RBC AUTO: 97.9 FL — SIGNIFICANT CHANGE UP (ref 80–100)
MONOCYTES # BLD AUTO: 0.6 K/UL — SIGNIFICANT CHANGE UP (ref 0–0.9)
MONOCYTES NFR BLD AUTO: 8.3 % — SIGNIFICANT CHANGE UP (ref 2–14)
NEUTROPHILS # BLD AUTO: 4.89 K/UL — SIGNIFICANT CHANGE UP (ref 1.8–7.4)
NEUTROPHILS NFR BLD AUTO: 68.2 % — SIGNIFICANT CHANGE UP (ref 43–77)
NITRITE UR-MCNC: NEGATIVE — SIGNIFICANT CHANGE UP
PH UR: 6 — SIGNIFICANT CHANGE UP (ref 5–8)
PLATELET # BLD AUTO: 148 K/UL — LOW (ref 150–400)
POTASSIUM SERPL-MCNC: 4.8 MMOL/L — SIGNIFICANT CHANGE UP (ref 3.5–5.3)
POTASSIUM SERPL-SCNC: 4.8 MMOL/L — SIGNIFICANT CHANGE UP (ref 3.5–5.3)
PROT SERPL-MCNC: 6.8 GM/DL — SIGNIFICANT CHANGE UP (ref 6–8.3)
PROT UR-MCNC: 30 MG/DL
PROTHROM AB SERPL-ACNC: 10.9 SEC — SIGNIFICANT CHANGE UP (ref 10.5–13.4)
RBC # BLD: 3.29 M/UL — LOW (ref 3.8–5.2)
RBC # FLD: 16.1 % — HIGH (ref 10.3–14.5)
RBC CASTS # UR COMP ASSIST: SIGNIFICANT CHANGE UP /HPF (ref 0–4)
SODIUM SERPL-SCNC: 129 MMOL/L — LOW (ref 135–145)
SP GR SPEC: 1.01 — SIGNIFICANT CHANGE UP (ref 1.01–1.02)
UROBILINOGEN FLD QL: NEGATIVE — SIGNIFICANT CHANGE UP
WBC # BLD: 7.19 K/UL — SIGNIFICANT CHANGE UP (ref 3.8–10.5)
WBC # FLD AUTO: 7.19 K/UL — SIGNIFICANT CHANGE UP (ref 3.8–10.5)
WBC UR QL: SIGNIFICANT CHANGE UP /HPF (ref 0–5)

## 2023-07-10 PROCEDURE — 71045 X-RAY EXAM CHEST 1 VIEW: CPT | Mod: 26

## 2023-07-10 PROCEDURE — 80048 BASIC METABOLIC PNL TOTAL CA: CPT

## 2023-07-10 PROCEDURE — 97116 GAIT TRAINING THERAPY: CPT | Mod: GP

## 2023-07-10 PROCEDURE — 97162 PT EVAL MOD COMPLEX 30 MIN: CPT | Mod: GP

## 2023-07-10 PROCEDURE — 81001 URINALYSIS AUTO W/SCOPE: CPT

## 2023-07-10 PROCEDURE — 70450 CT HEAD/BRAIN W/O DYE: CPT | Mod: 26,MA

## 2023-07-10 PROCEDURE — 36415 COLL VENOUS BLD VENIPUNCTURE: CPT

## 2023-07-10 PROCEDURE — 99222 1ST HOSP IP/OBS MODERATE 55: CPT

## 2023-07-10 PROCEDURE — 99285 EMERGENCY DEPT VISIT HI MDM: CPT

## 2023-07-10 PROCEDURE — 71045 X-RAY EXAM CHEST 1 VIEW: CPT

## 2023-07-10 PROCEDURE — 87086 URINE CULTURE/COLONY COUNT: CPT

## 2023-07-10 RX ORDER — SENNA PLUS 8.6 MG/1
2 TABLET ORAL AT BEDTIME
Refills: 0 | Status: DISCONTINUED | OUTPATIENT
Start: 2023-07-10 | End: 2023-07-12

## 2023-07-10 RX ORDER — ACETAMINOPHEN 500 MG
975 TABLET ORAL ONCE
Refills: 0 | Status: COMPLETED | OUTPATIENT
Start: 2023-07-10 | End: 2023-07-10

## 2023-07-10 RX ORDER — AMIODARONE HYDROCHLORIDE 400 MG/1
200 TABLET ORAL DAILY
Refills: 0 | Status: DISCONTINUED | OUTPATIENT
Start: 2023-07-10 | End: 2023-07-12

## 2023-07-10 RX ORDER — LANOLIN ALCOHOL/MO/W.PET/CERES
3 CREAM (GRAM) TOPICAL AT BEDTIME
Refills: 0 | Status: DISCONTINUED | OUTPATIENT
Start: 2023-07-10 | End: 2023-07-12

## 2023-07-10 RX ORDER — ACETAMINOPHEN 500 MG
650 TABLET ORAL EVERY 6 HOURS
Refills: 0 | Status: DISCONTINUED | OUTPATIENT
Start: 2023-07-10 | End: 2023-07-12

## 2023-07-10 RX ORDER — AMLODIPINE BESYLATE 2.5 MG/1
5 TABLET ORAL DAILY
Refills: 0 | Status: DISCONTINUED | OUTPATIENT
Start: 2023-07-10 | End: 2023-07-12

## 2023-07-10 RX ORDER — FERROUS SULFATE 325(65) MG
325 TABLET ORAL DAILY
Refills: 0 | Status: DISCONTINUED | OUTPATIENT
Start: 2023-07-10 | End: 2023-07-12

## 2023-07-10 RX ORDER — ENOXAPARIN SODIUM 100 MG/ML
30 INJECTION SUBCUTANEOUS EVERY 24 HOURS
Refills: 0 | Status: DISCONTINUED | OUTPATIENT
Start: 2023-07-10 | End: 2023-07-12

## 2023-07-10 RX ORDER — LORATADINE 10 MG/1
1 TABLET ORAL
Qty: 0 | Refills: 0 | DISCHARGE

## 2023-07-10 RX ORDER — HYDROCORTISONE 20 MG
5 TABLET ORAL AT BEDTIME
Refills: 0 | Status: DISCONTINUED | OUTPATIENT
Start: 2023-07-10 | End: 2023-07-12

## 2023-07-10 RX ORDER — HYDROCORTISONE 20 MG
10 TABLET ORAL DAILY
Refills: 0 | Status: DISCONTINUED | OUTPATIENT
Start: 2023-07-10 | End: 2023-07-12

## 2023-07-10 RX ORDER — CHOLECALCIFEROL (VITAMIN D3) 125 MCG
2000 CAPSULE ORAL DAILY
Refills: 0 | Status: DISCONTINUED | OUTPATIENT
Start: 2023-07-10 | End: 2023-07-12

## 2023-07-10 RX ORDER — CADEXOMER IODINE 0.9 %
1 PADS, MEDICATED (EA) TOPICAL
Qty: 0 | Refills: 0 | DISCHARGE

## 2023-07-10 RX ORDER — HYDRALAZINE HCL 50 MG
100 TABLET ORAL EVERY 8 HOURS
Refills: 0 | Status: DISCONTINUED | OUTPATIENT
Start: 2023-07-10 | End: 2023-07-12

## 2023-07-10 RX ORDER — LOSARTAN POTASSIUM 100 MG/1
100 TABLET, FILM COATED ORAL DAILY
Refills: 0 | Status: DISCONTINUED | OUTPATIENT
Start: 2023-07-10 | End: 2023-07-12

## 2023-07-10 RX ORDER — ACETAMINOPHEN 500 MG
1 TABLET ORAL
Qty: 0 | Refills: 0 | DISCHARGE

## 2023-07-10 RX ORDER — FLUDROCORTISONE ACETATE 0.1 MG/1
0.05 TABLET ORAL
Refills: 0 | Status: DISCONTINUED | OUTPATIENT
Start: 2023-07-10 | End: 2023-07-12

## 2023-07-10 RX ORDER — CARVEDILOL PHOSPHATE 80 MG/1
25 CAPSULE, EXTENDED RELEASE ORAL EVERY 12 HOURS
Refills: 0 | Status: DISCONTINUED | OUTPATIENT
Start: 2023-07-10 | End: 2023-07-12

## 2023-07-10 RX ORDER — FAMOTIDINE 10 MG/ML
10 INJECTION INTRAVENOUS
Refills: 0 | Status: DISCONTINUED | OUTPATIENT
Start: 2023-07-10 | End: 2023-07-12

## 2023-07-10 RX ORDER — HYDRALAZINE HCL 50 MG
100 TABLET ORAL ONCE
Refills: 0 | Status: COMPLETED | OUTPATIENT
Start: 2023-07-10 | End: 2023-07-10

## 2023-07-10 RX ORDER — FUROSEMIDE 40 MG
20 TABLET ORAL
Refills: 0 | Status: DISCONTINUED | OUTPATIENT
Start: 2023-07-10 | End: 2023-07-12

## 2023-07-10 RX ORDER — SODIUM CHLORIDE 9 MG/ML
1000 INJECTION INTRAMUSCULAR; INTRAVENOUS; SUBCUTANEOUS
Refills: 0 | Status: DISCONTINUED | OUTPATIENT
Start: 2023-07-10 | End: 2023-07-12

## 2023-07-10 RX ORDER — POTASSIUM CHLORIDE 20 MEQ
40 PACKET (EA) ORAL DAILY
Refills: 0 | Status: DISCONTINUED | OUTPATIENT
Start: 2023-07-10 | End: 2023-07-12

## 2023-07-10 RX ADMIN — Medication 975 MILLIGRAM(S): at 17:43

## 2023-07-10 RX ADMIN — Medication 100 MILLIGRAM(S): at 18:04

## 2023-07-10 RX ADMIN — SODIUM CHLORIDE 125 MILLILITER(S): 9 INJECTION INTRAMUSCULAR; INTRAVENOUS; SUBCUTANEOUS at 17:43

## 2023-07-10 NOTE — H&P ADULT - NSHPPHYSICALEXAM_GEN_ALL_CORE
ICU Vital Signs Last 24 Hrs  T(C): 36.5 (10 Jul 2023 18:56), Max: 36.5 (10 Jul 2023 15:10)  T(F): 97.7 (10 Jul 2023 18:56), Max: 97.7 (10 Jul 2023 15:10)  HR: 62 (10 Jul 2023 18:56) (57 - 62)  BP: 158/41 (10 Jul 2023 18:56) (139/46 - 186/57)  BP(mean): 90 (10 Jul 2023 17:51) (74 - 90)    RR: 16 (10 Jul 2023 18:56) (16 - 19)  SpO2: 97% (10 Jul 2023 18:56) (96% - 100%)    O2 Parameters below as of 10 Jul 2023 18:56  Patient On (Oxygen Delivery Method): room air

## 2023-07-10 NOTE — PATIENT PROFILE ADULT - NSPROGENSOURCEINFO_GEN_A_NUR
S: Astrid Shetty at [id number] for OB  follow up visit.  Patient  Has no complaints of today   Fetal movement is +  Denies any loss of fluid, vaginal bleeding or contractions.    O: VSS  Urine dip  NE   See above values  Cervical exam NE    A: multip [id number]      P:   2nd  Trimester Guidance and comfort measures, diet and exercise review.  Labs:  1 hour, H&H TPA, hemoglobin electrophesis    RTC in 4 weeks      patient

## 2023-07-10 NOTE — PHARMACOTHERAPY INTERVENTION NOTE - COMMENTS
Medication history complete, patient is from [facility], reviewed and confirmed medication with  list provided by facility.

## 2023-07-10 NOTE — H&P ADULT - HISTORY OF PRESENT ILLNESS
Pt is a pleasant  89 y/o female with a PMHx of adrenal insufficiency, CKD, diastolic heart failure, HTN, hypokalemia, hyponatremia, iron deficiency, lymphoma, PAF presents to the ED with daughter c/o headache, nausea, head pressure, weakness x 24 hours. Pt lives in assisted living. Pt's daughter states that pt has had similar sx in the past and was related to her blood pressure. Pt did not take her lunch time meds. Dr. Finley put pt on 2 rounds of abx for an infection. Pt is a pleasant  89 y/o female with a PMHx of adrenal insufficiency, CKD, diastolic heart failure, HTN, hypokalemia, hyponatremia, iron deficiency, lymphoma, PAF presents to the ED with daughter c/o headache, nausea, head pressure, weakness x 24 hours. Pt lives in assisted living. Pt's daughter states that pt has had similar sx in the past and was related to her blood pressure.  Pt did not take her lunch time meds.   She has been unable to ambulate.  No cp/SOB, no fever/chills, no URI or UTI complaints,  no abd pain, no n/v/d or leg edema.     Pt had R hip Repl in April 2023.  Dr. Finley put pt on 2 rounds of antibiotics  for an infection.

## 2023-07-10 NOTE — ED PROVIDER NOTE - OBJECTIVE STATEMENT
89 y/o female with a PMHx of adrenal insufficiency, CKD, diastolic heart failure, HTN, hypokalemia, hyponatremia, iron deficiency, lymphoma, PAF presents to the ED with daughter c/o headache, nausea, head pressure, weakness x 24 hours. Pt lives in assisted living. Pt's daughter states that pt has had similar sx in the past and was related to her blood pressure. Pt did not take her lunch time meds. Dr. Day put pt on 2 rounds of abx for an infection.

## 2023-07-10 NOTE — ED PROVIDER NOTE - CARE PLAN
1 Principal Discharge DX:	Weakness  Secondary Diagnosis:	Hyponatremia  Secondary Diagnosis:	Headache  Secondary Diagnosis:	H/O adrenal insufficiency

## 2023-07-10 NOTE — ED PROVIDER NOTE - PROGRESS NOTE DETAILS
CT neg. Labs with hyponatremia. Likely etiology of weakness. Has hx adrenal insuff. Decr PO intake as well.

## 2023-07-10 NOTE — ED ADULT NURSE NOTE - NSFALLHARMRISKINTERV_ED_ALL_ED

## 2023-07-10 NOTE — PATIENT PROFILE ADULT - FALL HARM RISK - HARM RISK INTERVENTIONS
Assistance with ambulation/Assistance OOB with selected safe patient handling equipment/Communicate Risk of Fall with Harm to all staff/Monitor for mental status changes/Reinforce activity limits and safety measures with patient and family/Tailored Fall Risk Interventions/Use of alarms - bed, chair and/or voice tab/Visual Cue: Yellow wristband and red socks/Bed in lowest position, wheels locked, appropriate side rails in place/Call bell, personal items and telephone in reach/Instruct patient to call for assistance before getting out of bed or chair/Non-slip footwear when patient is out of bed/[place] to call system/Physically safe environment - no spills, clutter or unnecessary equipment/Purposeful Proactive Rounding/Room/bathroom lighting operational, light cord in reach

## 2023-07-10 NOTE — H&P ADULT - ASSESSMENT
Pt is admitted with     Weakness  Hyponatremia  Headache  HYpertensive Urgency  Systolic bp of 186/57 in ED    - s/p NS 500ml in the ED  - s/p Hydralazine 100mg in the ED with improving BP  - cont home meds  - f/u labs in AM  - Physical therapy  - Nutrition consult  - DVT proph: heparin  - Full Code Pt is admitted with     Weakness  Hyponatremia  Headache  HYpertensive Urgency  Systolic bp of 186/57 in ED    - s/p NS 500ml in the ED  - s/p Hydralazine 100mg in the ED with improving BP  - cont home meds  - f/u labs in AM  - Physical therapy  - Nutrition consult  - DVT proph: lovenox  - Full Code

## 2023-07-10 NOTE — H&P ADULT - CONVERSATION DETAILS
Pt is Full Code.  She states her children would help with decision making and that her daughter Kate Ross would be first as HCP.

## 2023-07-10 NOTE — H&P ADULT - NSHPLABSRESULTS_GEN_ALL_CORE
my interpretation    EKG:  Sinus Charli 59 bpm,  qs in III, avF, T wave inv and qs in v1-v3 (similar to 11/22)

## 2023-07-10 NOTE — ED ADULT TRIAGE NOTE - CHIEF COMPLAINT QUOTE
Patient presents to ED with daughter c/o of headache, nausea, head pressure, weakness x24 hours. Pt lives in assisted living. Pt's daughter states that patient has had similar symptoms in past and was related to her blood pressure. denies chest pain, SOB.

## 2023-07-10 NOTE — ED ADULT NURSE NOTE - OBJECTIVE STATEMENT
patient c/o weakness head pressure for a day. Patient has history of BP issues. patient c/o weakness and  head pressure for a day. Patient has history of BP issues. Patient had hip surgery in september and has had an infection and been placed on two courses of oral antibiotics. Denies CP, SOB.

## 2023-07-11 LAB
ANION GAP SERPL CALC-SCNC: 6 MMOL/L — SIGNIFICANT CHANGE UP (ref 5–17)
BUN SERPL-MCNC: 29 MG/DL — HIGH (ref 7–23)
CALCIUM SERPL-MCNC: 9.1 MG/DL — SIGNIFICANT CHANGE UP (ref 8.5–10.1)
CHLORIDE SERPL-SCNC: 100 MMOL/L — SIGNIFICANT CHANGE UP (ref 96–108)
CO2 SERPL-SCNC: 25 MMOL/L — SIGNIFICANT CHANGE UP (ref 22–31)
CREAT SERPL-MCNC: 1 MG/DL — SIGNIFICANT CHANGE UP (ref 0.5–1.3)
EGFR: 51 ML/MIN/1.73M2 — LOW
GLUCOSE SERPL-MCNC: 88 MG/DL — SIGNIFICANT CHANGE UP (ref 70–99)
POTASSIUM SERPL-MCNC: 4.6 MMOL/L — SIGNIFICANT CHANGE UP (ref 3.5–5.3)
POTASSIUM SERPL-SCNC: 4.6 MMOL/L — SIGNIFICANT CHANGE UP (ref 3.5–5.3)
SODIUM SERPL-SCNC: 131 MMOL/L — LOW (ref 135–145)

## 2023-07-11 PROCEDURE — 99232 SBSQ HOSP IP/OBS MODERATE 35: CPT

## 2023-07-11 RX ADMIN — Medication 100 MILLIGRAM(S): at 05:12

## 2023-07-11 RX ADMIN — FLUDROCORTISONE ACETATE 0.05 MILLIGRAM(S): 0.1 TABLET ORAL at 21:40

## 2023-07-11 RX ADMIN — AMIODARONE HYDROCHLORIDE 200 MILLIGRAM(S): 400 TABLET ORAL at 09:28

## 2023-07-11 RX ADMIN — Medication 40 MILLIEQUIVALENT(S): at 02:06

## 2023-07-11 RX ADMIN — LOSARTAN POTASSIUM 100 MILLIGRAM(S): 100 TABLET, FILM COATED ORAL at 09:28

## 2023-07-11 RX ADMIN — Medication 325 MILLIGRAM(S): at 09:28

## 2023-07-11 RX ADMIN — Medication 2000 UNIT(S): at 09:28

## 2023-07-11 RX ADMIN — AMLODIPINE BESYLATE 5 MILLIGRAM(S): 2.5 TABLET ORAL at 09:28

## 2023-07-11 RX ADMIN — CARVEDILOL PHOSPHATE 25 MILLIGRAM(S): 80 CAPSULE, EXTENDED RELEASE ORAL at 02:06

## 2023-07-11 RX ADMIN — FLUDROCORTISONE ACETATE 0.05 MILLIGRAM(S): 0.1 TABLET ORAL at 02:06

## 2023-07-11 RX ADMIN — FLUDROCORTISONE ACETATE 0.05 MILLIGRAM(S): 0.1 TABLET ORAL at 09:29

## 2023-07-11 RX ADMIN — Medication 10 MILLIGRAM(S): at 09:29

## 2023-07-11 RX ADMIN — CARVEDILOL PHOSPHATE 25 MILLIGRAM(S): 80 CAPSULE, EXTENDED RELEASE ORAL at 09:28

## 2023-07-11 RX ADMIN — Medication 20 MILLIGRAM(S): at 05:12

## 2023-07-11 RX ADMIN — CARVEDILOL PHOSPHATE 25 MILLIGRAM(S): 80 CAPSULE, EXTENDED RELEASE ORAL at 21:40

## 2023-07-11 RX ADMIN — ENOXAPARIN SODIUM 30 MILLIGRAM(S): 100 INJECTION SUBCUTANEOUS at 05:12

## 2023-07-11 RX ADMIN — SENNA PLUS 2 TABLET(S): 8.6 TABLET ORAL at 21:40

## 2023-07-11 RX ADMIN — Medication 100 MILLIGRAM(S): at 21:40

## 2023-07-11 RX ADMIN — Medication 5 MILLIGRAM(S): at 02:07

## 2023-07-11 RX ADMIN — Medication 5 MILLIGRAM(S): at 21:41

## 2023-07-11 RX ADMIN — FAMOTIDINE 10 MILLIGRAM(S): 10 INJECTION INTRAVENOUS at 09:27

## 2023-07-11 RX ADMIN — Medication 40 MILLIEQUIVALENT(S): at 09:28

## 2023-07-11 NOTE — DIETITIAN INITIAL EVALUATION ADULT - ORAL INTAKE PTA/DIET HISTORY
Lives at [facility] - on YAMILE diet w/ regular diet texture and liquid consistency. Reports of good appetite PTA, likes to consume a variety of foods however diet recall pt consumes <75% ENN. Also will consume ensure 1-2 times daily. NKFA noted in chart.

## 2023-07-11 NOTE — DIETITIAN INITIAL EVALUATION ADULT - ADD RECOMMEND
1) C/w regular diet and Ensure + HP shake BID to optimize nutritional needs (provides 350 kcal, 20g protein/ shake)   2) Encourage protein-rich foods, maximize food preferences  3) Monitor bowel movements, if no BM for >3 days, consider implementing stronger bowel regimen.   4) MVI w/ minerals daily to ensure 100% RDA met  5) In v/o malnutrition, consider adding 100mg thiamine daily  6) Obtain weekly wt to track/trend changes  7) Pt is full code however please re-confirm goals of care regarding nutrition support. Nutrition support is not recommended due to overall declining medical status which evidenced based studies indicate EN is not effective in prolonging survival and improving quality of life. It can also increase risk of aspiration pneumonia as well as other related issues.   RD will continue to monitor PO intake, labs, hydration, and wt prn.

## 2023-07-11 NOTE — DIETITIAN INITIAL EVALUATION ADULT - WEIGHT (KG)
SPEECH DAILY NOTE - INPATIENT    ASSESSMENT & PLAN   ASSESSMENT  Patient alert in bed, sitting upright and being presented with nectar-thick liquids via tsp per daughter. Several family members present in the room.   Patient remains on Airvo with pursed li precautions    Interdisciplinary Communication: Discussed with RN           GOALS:     Goal #1 The patient will tolerate pureed consistency and NECTAR via tsp  liquids without overt signs or symptoms of aspiration with 90 % accuracy over 2 session(s).      contact Suly Tavares M.S., [address] [facility]

## 2023-07-11 NOTE — DIETITIAN INITIAL EVALUATION ADULT - NSFNSPHYEXAMSKINFT_GEN_A_CORE
Wagner score = 15 ecchymotic/ redness blanchable/non-blanchable skin  Pressure Injury 1: sacrum, Stage I

## 2023-07-11 NOTE — DIETITIAN INITIAL EVALUATION ADULT - OTHER INFO
Pt is a pleasant 89 y/o female with a PMHx of adrenal insufficiency, CKD, diastolic heart failure, HTN, hypokalemia, hyponatremia, iron deficiency, lymphoma, PAF presents to the ED with daughter c/o headache, nausea, head pressure, weakness x 24 hours. Pt lives in assisted living. Pt's daughter states that pt has had similar sx in the past and was related to her blood pressure. She has been unable to ambulate.  FULL CODE.    Pt well-known to RD service, met criteria for PCM on multiple admissions (7/18/22, 6/22/22, 5/31/22, 4/1/22). Endorses continued good appetite since admission. Consumed 75% breakfast tray this morning (cheese omelette, corn muffin, coto, coffee). Wt hx as per EMR: 132# (7/18/22), 124# (6/22/22), 112# (5/31/22), 121# (4/1/22). Unable to obtain bed scale wt 2/2 sitting in chair at time of RD visit. EMR wt doc'd at 109#, appears accurate; 23# wt loss/ 17.42% x 1 year - not clinically significant. Appeared very thin, frail, and ayah w/ NFPE revealing severe muscle/fat wasting. Has ensure present at bed side, will c/w Ensure + HP shake BID to optimize nutritional needs (provides 350 kcal, 20g protein/ shake). See recommendations below.

## 2023-07-11 NOTE — PROGRESS NOTE ADULT - SUBJECTIVE AND OBJECTIVE BOX
Patient is a 89y old  Female who presents with a chief complaint of Weakness  Hyponatremia  Headache  HYpertensive Urgency (10 Jul 2023 22:54)      SUBJECTIVE:   HPI:  Pt is a pleasant  89 y/o female with a PMHx of adrenal insufficiency, CKD, diastolic heart failure, HTN, hypokalemia, hyponatremia, iron deficiency, lymphoma, PAF presents to the ED with daughter c/o headache, nausea, head pressure, weakness x 24 hours. Pt lives in assisted living. Pt's daughter states that pt has had similar sx in the past and was related to her blood pressure.  Pt did not take her lunch time meds.   She has been unable to ambulate.  No cp/SOB, no fever/chills, no URI or UTI complaints,  no abd pain, no n/v/d or leg edema.     Pt had R hip Repl in April 2023.  Dr. Finley put pt on 2 rounds of antibiotics  for an infection. (10 Jul 2023 22:54)      7/11: ambulated from bathroom to bed, no complaints. no dyspnea, chest pain or weakness.  feeling well. daughter at bedside and updated on plan.       REVIEW OF SYSTEMS:    CONSTITUTIONAL: No weakness, fevers or chills  EYES/ENT: No visual changes;  No vertigo or throat pain   NECK: No pain or stiffness  RESPIRATORY: No cough, wheezing, hemoptysis; No shortness of breath  CARDIOVASCULAR: No chest pain or palpitations  GASTROINTESTINAL: No abdominal or epigastric pain. No nausea, vomiting, or hematemesis; No diarrhea or constipation. No melena or hematochezia.  GENITOURINARY: No dysuria, frequency or hematuria  NEUROLOGICAL: No numbness or weakness  SKIN: No itching, burning, rashes, or lesions   All other review of systems is negative unless indicated above        Vital Signs Last 24 Hrs  T(C): 36.8 (11 Jul 2023 07:24), Max: 36.8 (11 Jul 2023 07:24)  T(F): 98.3 (11 Jul 2023 07:24), Max: 98.3 (11 Jul 2023 07:24)  HR: 59 (11 Jul 2023 07:24) (57 - 66)  BP: 155/57 (11 Jul 2023 07:24) (139/46 - 186/57)  BP(mean): 90 (10 Jul 2023 17:51) (74 - 90)  RR: 18 (11 Jul 2023 07:24) (16 - 19)  SpO2: 96% (11 Jul 2023 07:24) (95% - 100%)    Parameters below as of 11 Jul 2023 07:24  Patient On (Oxygen Delivery Method): room air        PHYSICAL EXAM:    Constitutional: NAD, awake and alert, well appearing for stated age,   HEENT: PERR, EOMI, Normal Hearing, MMM  Neck: Soft and supple, No LAD, No JVD  Respiratory: Breath sounds are clear bilaterally, No wheezing, rales or rhonchi  Cardiovascular: S1 and S2, regular rate and rhythm, no Murmurs, gallops or rubs  Gastrointestinal: Bowel Sounds present, soft, nontender, nondistended, no guarding, no rebound  Extremities: No peripheral edema  Vascular: 2+ peripheral pulses  Neurological: A/O x 3, no focal deficits  Musculoskeletal: 5/5 strength b/l upper and lower extremities  Skin: No rashes      MEDICATIONS:  MEDICATIONS  (STANDING):  aMIOdarone    Tablet 200 milliGRAM(s) Oral daily  amLODIPine   Tablet 5 milliGRAM(s) Oral daily  carvedilol 25 milliGRAM(s) Oral every 12 hours  cholecalciferol 2000 Unit(s) Oral daily  enoxaparin Injectable 30 milliGRAM(s) SubCutaneous every 24 hours  famotidine    Tablet 10 milliGRAM(s) Oral <User Schedule>  ferrous    sulfate 325 milliGRAM(s) Oral daily  fludroCORTISONE 0.05 milliGRAM(s) Oral two times a day  furosemide    Tablet 20 milliGRAM(s) Oral two times a day  hydrALAZINE 100 milliGRAM(s) Oral every 8 hours  hydrocortisone 10 milliGRAM(s) Oral daily  hydrocortisone 5 milliGRAM(s) Oral at bedtime  losartan 100 milliGRAM(s) Oral daily  potassium chloride    Tablet ER 40 milliEquivalent(s) Oral daily  senna 2 Tablet(s) Oral at bedtime  sodium chloride 0.9%. 1000 milliLiter(s) (125 mL/Hr) IV Continuous <Continuous>      LABS: All Labs Reviewed:                        10.7   7.19  )-----------( 148      ( 10 Jul 2023 14:16 )             32.2     07-11    131<L>  |  100  |  29<H>  ----------------------------<  88  4.6   |  25  |  1.00    Ca    9.1      11 Jul 2023 08:15    TPro  6.8  /  Alb  2.9<L>  /  TBili  0.3  /  DBili  x   /  AST  19  /  ALT  21  /  AlkPhos  68  07-10    PT/INR - ( 10 Jul 2023 14:16 )   PT: 10.9 sec;   INR: 0.94 ratio  PTT - ( 10 Jul 2023 14:16 )  PTT:26.7 sec

## 2023-07-11 NOTE — PHYSICAL THERAPY INITIAL EVALUATION ADULT - PERTINENT HX OF CURRENT PROBLEM, REHAB EVAL
89 y/o female with a PMHx of adrenal insufficiency, CKD, diastolic heart failure, HTN, hypokalemia, hyponatremia, iron deficiency, lymphoma, PAF presents to the ED with daughter c/o headache, nausea, head pressure, weakness x 24 hours. Pt lives in assisted living. Pt's daughter states that pt has had similar sx in the past and was related to her blood pressure.  Pt did not take her lunch time meds.   She has been unable to ambulate.  No cp/SOB, no fever/chills, no URI or UTI complaints,  no abd pain, no n/v/d or leg edema.

## 2023-07-11 NOTE — DIETITIAN INITIAL EVALUATION ADULT - PERTINENT LABORATORY DATA
07-11    131<L>  |  100  |  29<H>  ----------------------------<  88  4.6   |  25  |  1.00    Ca    9.1      11 Jul 2023 08:15    TPro  6.8  /  Alb  2.9<L>  /  TBili  0.3  /  DBili  x   /  AST  19  /  ALT  21  /  AlkPhos  68  07-10

## 2023-07-11 NOTE — DIETITIAN INITIAL EVALUATION ADULT - PERTINENT MEDS FT
MEDICATIONS  (STANDING):  aMIOdarone    Tablet 200 milliGRAM(s) Oral daily  amLODIPine   Tablet 5 milliGRAM(s) Oral daily  carvedilol 25 milliGRAM(s) Oral every 12 hours  cholecalciferol 2000 Unit(s) Oral daily  enoxaparin Injectable 30 milliGRAM(s) SubCutaneous every 24 hours  famotidine    Tablet 10 milliGRAM(s) Oral <User Schedule>  ferrous    sulfate 325 milliGRAM(s) Oral daily  fludroCORTISONE 0.05 milliGRAM(s) Oral two times a day  furosemide    Tablet 20 milliGRAM(s) Oral two times a day  hydrALAZINE 100 milliGRAM(s) Oral every 8 hours  hydrocortisone 10 milliGRAM(s) Oral daily  hydrocortisone 5 milliGRAM(s) Oral at bedtime  losartan 100 milliGRAM(s) Oral daily  potassium chloride    Tablet ER 40 milliEquivalent(s) Oral daily  senna 2 Tablet(s) Oral at bedtime  sodium chloride 0.9%. 1000 milliLiter(s) (125 mL/Hr) IV Continuous <Continuous>    MEDICATIONS  (PRN):  acetaminophen     Tablet .. 650 milliGRAM(s) Oral every 6 hours PRN Mild Pain (1 - 3)  melatonin 3 milliGRAM(s) Oral at bedtime PRN Insomnia

## 2023-07-11 NOTE — PHYSICAL THERAPY INITIAL EVALUATION ADULT - PATIENT/FAMILY/SIGNIFICANT OTHER GOALS STATEMENT, PT EVAL
"Patient stated she has dizziness, nausea, and her "head feels heavy" . Patient stated she started to have these symptoms after her neurologist gave her injections. Patient was informed to notify her neurologist's office since the symptoms started after the injections and to call back if she needs further assistance.   "
Agreed, recommend contacting her neurologist.  
Pt would like to return to [place].

## 2023-07-11 NOTE — PROGRESS NOTE ADULT - ASSESSMENT
Pt is admitted with     Weakness  Hyponatremia  Headache  HYpertensive Urgency  Systolic bp of 186/57 in ED    - s/p NS 500ml in the ED  - s/p Hydralazine 100mg in the ED with improving BP  - out pt BP med titration with Dr. Mera. will keep on Hydralazine for now in addition to her out pt meds.   - cont home meds  - weakness improving, hyponatremia improving.   - Na 129 --- 131 today   - Physical therapy eval   - Nutrition consult  - DVT proph: lovenox  - Full Code    ---- clinically improving likely dc tomorrow back to [facility].

## 2023-07-12 ENCOUNTER — TRANSCRIPTION ENCOUNTER (OUTPATIENT)
Age: 88
End: 2023-07-12

## 2023-07-12 VITALS
OXYGEN SATURATION: 96 % | TEMPERATURE: 98 F | DIASTOLIC BLOOD PRESSURE: 39 MMHG | HEART RATE: 62 BPM | SYSTOLIC BLOOD PRESSURE: 144 MMHG | RESPIRATION RATE: 18 BRPM

## 2023-07-12 LAB
ANION GAP SERPL CALC-SCNC: 5 MMOL/L — SIGNIFICANT CHANGE UP (ref 5–17)
BUN SERPL-MCNC: 34 MG/DL — HIGH (ref 7–23)
CALCIUM SERPL-MCNC: 9 MG/DL — SIGNIFICANT CHANGE UP (ref 8.5–10.1)
CHLORIDE SERPL-SCNC: 97 MMOL/L — SIGNIFICANT CHANGE UP (ref 96–108)
CO2 SERPL-SCNC: 27 MMOL/L — SIGNIFICANT CHANGE UP (ref 22–31)
CREAT SERPL-MCNC: 1.17 MG/DL — SIGNIFICANT CHANGE UP (ref 0.5–1.3)
CULTURE RESULTS: SIGNIFICANT CHANGE UP
EGFR: 42 ML/MIN/1.73M2 — LOW
GLUCOSE SERPL-MCNC: 111 MG/DL — HIGH (ref 70–99)
POTASSIUM SERPL-MCNC: 3.7 MMOL/L — SIGNIFICANT CHANGE UP (ref 3.5–5.3)
POTASSIUM SERPL-SCNC: 3.7 MMOL/L — SIGNIFICANT CHANGE UP (ref 3.5–5.3)
SODIUM SERPL-SCNC: 129 MMOL/L — LOW (ref 135–145)
SPECIMEN SOURCE: SIGNIFICANT CHANGE UP

## 2023-07-12 PROCEDURE — 99239 HOSP IP/OBS DSCHRG MGMT >30: CPT

## 2023-07-12 RX ORDER — DOCUSATE SODIUM 100 MG
2 CAPSULE ORAL
Qty: 0 | Refills: 0 | DISCHARGE

## 2023-07-12 RX ORDER — SENNA PLUS 8.6 MG/1
2 TABLET ORAL
Qty: 0 | Refills: 0 | DISCHARGE
Start: 2023-07-12

## 2023-07-12 RX ADMIN — Medication 2000 UNIT(S): at 09:00

## 2023-07-12 RX ADMIN — CARVEDILOL PHOSPHATE 25 MILLIGRAM(S): 80 CAPSULE, EXTENDED RELEASE ORAL at 09:01

## 2023-07-12 RX ADMIN — LOSARTAN POTASSIUM 100 MILLIGRAM(S): 100 TABLET, FILM COATED ORAL at 09:00

## 2023-07-12 RX ADMIN — FLUDROCORTISONE ACETATE 0.05 MILLIGRAM(S): 0.1 TABLET ORAL at 09:01

## 2023-07-12 RX ADMIN — Medication 40 MILLIEQUIVALENT(S): at 09:01

## 2023-07-12 RX ADMIN — AMIODARONE HYDROCHLORIDE 200 MILLIGRAM(S): 400 TABLET ORAL at 09:01

## 2023-07-12 RX ADMIN — Medication 10 MILLIGRAM(S): at 09:01

## 2023-07-12 RX ADMIN — AMLODIPINE BESYLATE 5 MILLIGRAM(S): 2.5 TABLET ORAL at 09:01

## 2023-07-12 RX ADMIN — Medication 325 MILLIGRAM(S): at 09:01

## 2023-07-12 RX ADMIN — Medication 20 MILLIGRAM(S): at 05:31

## 2023-07-12 NOTE — DISCHARGE NOTE PROVIDER - NSDCCPCAREPLAN_GEN_ALL_CORE_FT
PRINCIPAL DISCHARGE DIAGNOSIS  Diagnosis: Weakness  Assessment and Plan of Treatment: Continue taking medications as prescribed   Follow up with primary care provider within 1 week   Take all discharge paperwork with you to appointment.   Return to ED if you develop any worsening symptoms, chest pain, shortness of breath, Headache, fever unrelieved with tylenol or ibuprofen, unable to eat or drink, or unable to urinate.      SECONDARY DISCHARGE DIAGNOSES  Diagnosis: Hyponatremia  Assessment and Plan of Treatment: you were admitted to the hospital due to low sodium levels in your blood. Sodium is an electrolyte that helps maintain fluid balance and blood pressure.  low blood sodium can make you weak, dizzy, or give you palpitations.   keep well hydrated and take your medications as discussed. follow up with your primary care provider for further care regarding your sodium levels.    Diagnosis: Headache  Assessment and Plan of Treatment:     Diagnosis: H/O adrenal insufficiency  Assessment and Plan of Treatment:

## 2023-07-12 NOTE — DISCHARGE NOTE NURSING/CASE MANAGEMENT/SOCIAL WORK - NSDCPEFALRISK_GEN_ALL_CORE
For information on Fall & Injury Prevention, visit: [email or web address] OR  [email or web address] OR  https://www.cdc.gov/steadi/patient.html

## 2023-07-12 NOTE — DISCHARGE NOTE PROVIDER - DETAILS OF MALNUTRITION DIAGNOSIS/DIAGNOSES
This patient has been assessed with a concern for Malnutrition and was treated during this hospitalization for the following Nutrition diagnosis/diagnoses:     -  07/11/2023: Severe protein-calorie malnutrition

## 2023-07-12 NOTE — DISCHARGE NOTE PROVIDER - NSDCMRMEDTOKEN_GEN_ALL_CORE_FT
amiodarone 200 mg oral tablet: 1 tab(s) orally once a day  amLODIPine 5 mg oral tablet: 1 tab(s) orally once a day  carvedilol 25 mg oral tablet: 1 tab(s) orally every 12 hours  diclofenac 1% topical gel: Apply topically to affected area of right heel once daily as needed for pain  famotidine 20 mg oral tablet: 1 tab(s) orally once a day  ferrous sulfate 325 mg (65 mg elemental iron) oral tablet: 1 tab(s) orally once a day  fludrocortisone 0.1 mg oral tablet: 0.5 tab(s) orally 2 times a day  furosemide 20 mg oral tablet: 1 tab(s) orally 2 times a day  hydrALAZINE 100 mg oral tablet: 1 tab(s) orally every 8 hours  hydrocortisone 10 mg oral tablet: 1 tab(s) orally once a day (in the morning)  hydrocortisone 5 mg oral tablet: 1 tab(s) orally once a day (in the evening)  losartan 100 mg oral tablet: 1 tab(s) orally once a day  melatonin 3 mg oral tablet: 1 tab(s) orally once a day (at bedtime), As needed, Insomnia  Multiple Vitamins oral liquid: 5 milliliter(s) orally once a day (in the evening)  Potassium Chloride (Eqv-Klor-Con M20) 20 mEq oral tablet, extended release: 2 tab(s) orally once a day (at bedtime)  potassium chloride 20 mEq oral tablet, extended release: 3 tab(s) orally once a day (in the morning)  senna leaf extract oral tablet: 2 tab(s) orally once a day (at bedtime)  Tylenol Extra Strength 500 mg oral tablet: 2 tab(s) orally 3 times a day  Vitamin D3 50 mcg (2000 intl units) oral tablet: 1 tab(s) orally once a day

## 2023-07-12 NOTE — DISCHARGE NOTE PROVIDER - HOSPITAL COURSE
SUBJECTIVE:   HPI:  Pt is a pleasant  89 y/o female with a PMHx of adrenal insufficiency, CKD, diastolic heart failure, HTN, hypokalemia, hyponatremia, iron deficiency, lymphoma, PAF presents to the ED with daughter c/o headache, nausea, head pressure, weakness x 24 hours. Pt lives in assisted living. Pt's daughter states that pt has had similar sx in the past and was related to her blood pressure.  Pt did not take her lunch time meds.   She has been unable to ambulate.  No cp/SOB, no fever/chills, no URI or UTI complaints,  no abd pain, no n/v/d or leg edema.   Pt had R hip Repl in April 2023.  Dr. Finley put pt on 2 rounds of antibiotics  for an infection. (10 Jul 2023 22:54)    7/11: ambulated from bathroom to bed, no complaints. no dyspnea, chest pain or weakness.  feeling well. daughter at bedside and updated on plan.   7/12: no issues overnight or this morning. Bp improving on current reigmens, chronic hyponatermia slightly improved.  pt at baseline. plan for dc back to [facility]     pt admitted for weakness, hyponatremia and hypertensive urgency, pt was given hydralyinze x 1 in Ed , BP improved. pt was maintained on her PO medications. pt sodium was trended , pt has been asymptoamict with her hyponatremia. pt was evaled by PT, and dietary. no other issues during admission.       Weakness  Hyponatremia  Headache  HYpertensive Urgency  Systolic bp of 186/57 in ED    - s/p NS 500ml in the ED  - s/p Hydralazine 100mg in the ED with improving BP  - out pt BP med titration with Dr. Mera. will keep on Hydralazine for now in addition to her out pt meds.   - cont home meds  - weakness improving, hyponatremia improving.   - Na 129 - asymptomatic. due to adrenal insuffiencey.   - Physical therapy eval   - Nutrition consult  - DVT proph: s/p lovenox  - Full Code    ---- clinically improving dc back to [facility] today     above plan discussed with pt, daughter, bedside RN, and MD Yoo        SUBJECTIVE:   HPI:  Pt is a pleasant  89 y/o female with a PMHx of adrenal insufficiency, CKD, diastolic heart failure, HTN, hypokalemia, hyponatremia, iron deficiency, lymphoma, PAF presents to the ED with daughter c/o headache, nausea, head pressure, weakness x 24 hours. Pt lives in assisted living. Pt's daughter states that pt has had similar sx in the past and was related to her blood pressure.  Pt did not take her lunch time meds.   She has been unable to ambulate.  No cp/SOB, no fever/chills, no URI or UTI complaints,  no abd pain, no n/v/d or leg edema.   Pt had R hip Repl in April 2023.  Dr. Finley put pt on 2 rounds of antibiotics  for an infection. (10 Jul 2023 22:54)    7/11: ambulated from bathroom to bed, no complaints. no dyspnea, chest pain or weakness.  feeling well. daughter at bedside and updated on plan.   7/12: no issues overnight or this morning. Bp improving on current reigmens, chronic hyponatermia slightly improved.  pt at baseline. plan for dc back to [facility]     pt admitted for weakness, hyponatremia and hypertensive urgency, pt was given hydralyinze x 1 in Ed , BP improved. pt was maintained on her PO medications. pt sodium was trended , pt has been asymptoamict with her hyponatremia. pt was evaled by PT, and dietary. no other issues during admission.       Weakness  Hyponatremia  Headache  HYpertensive Urgency  Systolic bp of 186/57 in ED    - s/p NS 500ml in the ED  - s/p Hydralazine 100mg in the ED with improving BP  - out pt BP med titration with Dr. Mera. will keep on Hydralazine for now in addition to her out pt meds.   - cont home meds  - weakness improving, hyponatremia improving.   - Na 129 - asymptomatic. due to adrenal insuffiencey.   - Physical therapy eval   - Nutrition consult  - DVT proph: s/p lovenox  - Full Code    ---- clinically improving dc back to [facility] today     above plan discussed with pt, daughter, bedside RN, and MD Yoo   Attending note: Patient seen and examined with DANDRE Barrett  Case reviewed and discussed with her and necessary changes were made  Agree with her assessment and d/c plan.   Spent more than 30 min to prepare the discharge

## 2023-07-12 NOTE — DISCHARGE NOTE NURSING/CASE MANAGEMENT/SOCIAL WORK - NSDCVIVACCINE_GEN_ALL_CORE_FT
COVID-19, mRNA, LNP-S, PF, 30 mcg/0.3 mL dose, andrew-sucrose (Pfizer); 08-Sep-2022 14:07; Akua Brumfield (RN); [employer]; [id number] (Exp. Date: 31-Oct-2022); IntraMuscular; Deltoid Left.; 0.3 milliLiter(s);   Tdap; 10-Jul-2021 13:25; Starr Zazueta (KENNETH); [employer]; [id number] (Exp. Date: 25-Nov-2022); IntraMuscular; Deltoid Left.; 0.5 milliLiter(s); VIS (VIS Published: 09-May-2013, VIS Presented: 10-Jul-2021);

## 2023-07-12 NOTE — DISCHARGE NOTE PROVIDER - NSDCPNSUBOBJ_GEN_ALL_CORE
REVIEW OF SYSTEMS:    CONSTITUTIONAL: No weakness, fevers or chills  EYES/ENT: No visual changes;  No vertigo or throat pain   NECK: No pain or stiffness  RESPIRATORY: No cough, wheezing, hemoptysis; No shortness of breath  CARDIOVASCULAR: No chest pain or palpitations  GASTROINTESTINAL: No abdominal or epigastric pain. No nausea, vomiting, or hematemesis; No diarrhea or constipation. No melena or hematochezia.  GENITOURINARY: No dysuria, frequency or hematuria  NEUROLOGICAL: No numbness or weakness  SKIN: No itching, burning, rashes, or lesions   All other review of systems is negative unless indicated above        Vital Signs Last 24 Hrs  T(C): 36.8 (12 Jul 2023 07:26), Max: 36.8 (11 Jul 2023 21:30)  T(F): 98.2 (12 Jul 2023 07:26), Max: 98.3 (11 Jul 2023 21:30)  HR: 62 (12 Jul 2023 07:26) (62 - 72)  BP: 144/39 (12 Jul 2023 07:26) (137/37 - 186/46)  BP(mean): --  RR: 18 (12 Jul 2023 07:26) (18 - 19)  SpO2: 96% (12 Jul 2023 07:26) (95% - 97%)    Parameters below as of 12 Jul 2023 07:26  Patient On (Oxygen Delivery Method): room air          PHYSICAL EXAM:    Constitutional: NAD, awake and alert, well appearing for stated age,   HEENT: PERR, EOMI, Normal Hearing, MMM  Neck: Soft and supple, No LAD, No JVD  Respiratory: Breath sounds are clear bilaterally, No wheezing, rales or rhonchi  Cardiovascular: S1 and S2, regular rate and rhythm, no Murmurs, gallops or rubs  Gastrointestinal: Bowel Sounds present, soft, nontender, nondistended, no guarding, no rebound  Extremities: No peripheral edema  Vascular: 2+ peripheral pulses  Neurological: A/O x 3, no focal deficits  Musculoskeletal: 5/5 strength b/l upper and lower extremities  Skin: No rashes      MEDICATIONS:  MEDICATIONS  (STANDING):  aMIOdarone    Tablet 200 milliGRAM(s) Oral daily  amLODIPine   Tablet 5 milliGRAM(s) Oral daily  carvedilol 25 milliGRAM(s) Oral every 12 hours  cholecalciferol 2000 Unit(s) Oral daily  enoxaparin Injectable 30 milliGRAM(s) SubCutaneous every 24 hours  famotidine    Tablet 10 milliGRAM(s) Oral <User Schedule>  ferrous    sulfate 325 milliGRAM(s) Oral daily  fludroCORTISONE 0.05 milliGRAM(s) Oral two times a day  furosemide    Tablet 20 milliGRAM(s) Oral two times a day  hydrALAZINE 100 milliGRAM(s) Oral every 8 hours  hydrocortisone 10 milliGRAM(s) Oral daily  hydrocortisone 5 milliGRAM(s) Oral at bedtime  losartan 100 milliGRAM(s) Oral daily  potassium chloride    Tablet ER 40 milliEquivalent(s) Oral daily  senna 2 Tablet(s) Oral at bedtime  sodium chloride 0.9%. 1000 milliLiter(s) (125 mL/Hr) IV Continuous <Continuous>      LABS: All Labs Reviewed:                        10.7   7.19  )-----------( 148      ( 10 Jul 2023 14:16 )             32.2     07-11    131<L>  |  100  |  29<H>  ----------------------------<  88  4.6   |  25  |  1.00    Ca    9.1      11 Jul 2023 08:15    TPro  6.8  /  Alb  2.9<L>  /  TBili  0.3  /  DBili  x   /  AST  19  /  ALT  21  /  AlkPhos  68  07-10    PT/INR - ( 10 Jul 2023 14:16 )   PT: 10.9 sec;   INR: 0.94 ratio  PTT - ( 10 Jul 2023 14:16 )  PTT:26.7 sec                                  10.7   7.19  )-----------( 148      ( 10 Jul 2023 14:16 )             32.2     07-12    129<L>  |  97  |  34<H>  ----------------------------<  111<H>  3.7   |  27  |  1.17    Ca    9.0      12 Jul 2023 08:34    TPro  6.8  /  Alb  2.9<L>  /  TBili  0.3  /  DBili  x   /  AST  19  /  ALT  21  /  AlkPhos  68  07-10        LIVER FUNCTIONS - ( 10 Jul 2023 14:16 )  Alb: 2.9 g/dL / Pro: 6.8 gm/dL / ALK PHOS: 68 U/L / ALT: 21 U/L / AST: 19 U/L / GGT: x           PT/INR - ( 10 Jul 2023 14:16 )   PT: 10.9 sec;   INR: 0.94 ratio         PTT - ( 10 Jul 2023 14:16 )  PTT:26.7 sec  Urinalysis Basic - ( 12 Jul 2023 08:34 )    Color: x / Appearance: x / SG: x / pH: x  Gluc: 111 mg/dL / Ketone: x  / Bili: x / Urobili: x   Blood: x / Protein: x / Nitrite: x   Leuk Esterase: x / RBC: x / WBC x   Sq Epi: x / Non Sq Epi: x / Bacteria: x

## 2023-07-12 NOTE — DISCHARGE NOTE PROVIDER - CARE PROVIDER_API CALL
Kristie Gong  Internal Medicine  [address] [place] [id number]  Phone: [phone]  Fax: [phone]  Follow Up Time:

## 2023-07-12 NOTE — DISCHARGE NOTE NURSING/CASE MANAGEMENT/SOCIAL WORK - PATIENT PORTAL LINK FT
You can access the FollowMyHealth Patient Portal offered by [facility] by registering at the following website: [email or web address]. By joining [facility]’s FollowMyHealth portal, you will also be able to view your health information using other applications (apps) compatible with our system.

## 2023-07-15 ENCOUNTER — EMERGENCY (EMERGENCY)
Facility: HOSPITAL | Age: 88
LOS: 0 days | Discharge: HOME | End: 2023-07-15
Attending: EMERGENCY MEDICINE
Payer: MEDICARE

## 2023-07-15 VITALS
DIASTOLIC BLOOD PRESSURE: 44 MMHG | SYSTOLIC BLOOD PRESSURE: 169 MMHG | HEIGHT: 60 IN | RESPIRATION RATE: 16 BRPM | OXYGEN SATURATION: 98 % | HEART RATE: 59 BPM

## 2023-07-15 VITALS
HEART RATE: 60 BPM | SYSTOLIC BLOOD PRESSURE: 145 MMHG | OXYGEN SATURATION: 99 % | DIASTOLIC BLOOD PRESSURE: 78 MMHG | RESPIRATION RATE: 18 BRPM

## 2023-07-15 DIAGNOSIS — I50.30 UNSPECIFIED DIASTOLIC (CONGESTIVE) HEART FAILURE: ICD-10-CM

## 2023-07-15 DIAGNOSIS — Z86.79 PERSONAL HISTORY OF OTHER DISEASES OF THE CIRCULATORY SYSTEM: Chronic | ICD-10-CM

## 2023-07-15 DIAGNOSIS — E27.40 UNSPECIFIED ADRENOCORTICAL INSUFFICIENCY: ICD-10-CM

## 2023-07-15 DIAGNOSIS — Z88.0 ALLERGY STATUS TO PENICILLIN: ICD-10-CM

## 2023-07-15 DIAGNOSIS — I48.0 PAROXYSMAL ATRIAL FIBRILLATION: ICD-10-CM

## 2023-07-15 DIAGNOSIS — I10 ESSENTIAL (PRIMARY) HYPERTENSION: ICD-10-CM

## 2023-07-15 DIAGNOSIS — Z88.1 ALLERGY STATUS TO OTHER ANTIBIOTIC AGENTS STATUS: ICD-10-CM

## 2023-07-15 DIAGNOSIS — Z85.72 PERSONAL HISTORY OF NON-HODGKIN LYMPHOMAS: ICD-10-CM

## 2023-07-15 DIAGNOSIS — I13.0 HYPERTENSIVE HEART AND CHRONIC KIDNEY DISEASE WITH HEART FAILURE AND STAGE 1 THROUGH STAGE 4 CHRONIC KIDNEY DISEASE, OR UNSPECIFIED CHRONIC KIDNEY DISEASE: ICD-10-CM

## 2023-07-15 DIAGNOSIS — R00.1 BRADYCARDIA, UNSPECIFIED: ICD-10-CM

## 2023-07-15 DIAGNOSIS — Z90.49 ACQUIRED ABSENCE OF OTHER SPECIFIED PARTS OF DIGESTIVE TRACT: Chronic | ICD-10-CM

## 2023-07-15 DIAGNOSIS — Z90.710 ACQUIRED ABSENCE OF BOTH CERVIX AND UTERUS: ICD-10-CM

## 2023-07-15 DIAGNOSIS — Z90.710 ACQUIRED ABSENCE OF BOTH CERVIX AND UTERUS: Chronic | ICD-10-CM

## 2023-07-15 DIAGNOSIS — D50.9 IRON DEFICIENCY ANEMIA, UNSPECIFIED: ICD-10-CM

## 2023-07-15 DIAGNOSIS — Z88.2 ALLERGY STATUS TO SULFONAMIDES: ICD-10-CM

## 2023-07-15 DIAGNOSIS — N18.9 CHRONIC KIDNEY DISEASE, UNSPECIFIED: ICD-10-CM

## 2023-07-15 DIAGNOSIS — Z90.49 ACQUIRED ABSENCE OF OTHER SPECIFIED PARTS OF DIGESTIVE TRACT: ICD-10-CM

## 2023-07-15 LAB
ALBUMIN SERPL ELPH-MCNC: 2.9 G/DL — LOW (ref 3.3–5)
ALP SERPL-CCNC: 68 U/L — SIGNIFICANT CHANGE UP (ref 40–120)
ALT FLD-CCNC: 25 U/L — SIGNIFICANT CHANGE UP (ref 12–78)
ANION GAP SERPL CALC-SCNC: 5 MMOL/L — SIGNIFICANT CHANGE UP (ref 5–17)
APPEARANCE UR: CLEAR — SIGNIFICANT CHANGE UP
AST SERPL-CCNC: 23 U/L — SIGNIFICANT CHANGE UP (ref 15–37)
BACTERIA # UR AUTO: ABNORMAL
BASOPHILS # BLD AUTO: 0.01 K/UL — SIGNIFICANT CHANGE UP (ref 0–0.2)
BASOPHILS NFR BLD AUTO: 0.2 % — SIGNIFICANT CHANGE UP (ref 0–2)
BILIRUB SERPL-MCNC: 0.3 MG/DL — SIGNIFICANT CHANGE UP (ref 0.2–1.2)
BILIRUB UR-MCNC: NEGATIVE — SIGNIFICANT CHANGE UP
BUN SERPL-MCNC: 32 MG/DL — HIGH (ref 7–23)
CALCIUM SERPL-MCNC: 9 MG/DL — SIGNIFICANT CHANGE UP (ref 8.5–10.1)
CHLORIDE SERPL-SCNC: 98 MMOL/L — SIGNIFICANT CHANGE UP (ref 96–108)
CO2 SERPL-SCNC: 27 MMOL/L — SIGNIFICANT CHANGE UP (ref 22–31)
COLOR SPEC: YELLOW — SIGNIFICANT CHANGE UP
COMMENT - URINE: SIGNIFICANT CHANGE UP
CREAT SERPL-MCNC: 1.06 MG/DL — SIGNIFICANT CHANGE UP (ref 0.5–1.3)
CULTURE RESULTS: SIGNIFICANT CHANGE UP
CULTURE RESULTS: SIGNIFICANT CHANGE UP
DIFF PNL FLD: NEGATIVE — SIGNIFICANT CHANGE UP
EGFR: 47 ML/MIN/1.73M2 — LOW
EOSINOPHIL # BLD AUTO: 0.03 K/UL — SIGNIFICANT CHANGE UP (ref 0–0.5)
EOSINOPHIL NFR BLD AUTO: 0.6 % — SIGNIFICANT CHANGE UP (ref 0–6)
EPI CELLS # UR: SIGNIFICANT CHANGE UP
GLUCOSE SERPL-MCNC: 132 MG/DL — HIGH (ref 70–99)
GLUCOSE UR QL: NEGATIVE — SIGNIFICANT CHANGE UP
HCT VFR BLD CALC: 30.7 % — LOW (ref 34.5–45)
HGB BLD-MCNC: 10.3 G/DL — LOW (ref 11.5–15.5)
HYALINE CASTS # UR AUTO: ABNORMAL /LPF
IMM GRANULOCYTES NFR BLD AUTO: 0.4 % — SIGNIFICANT CHANGE UP (ref 0–0.9)
KETONES UR-MCNC: NEGATIVE — SIGNIFICANT CHANGE UP
LEUKOCYTE ESTERASE UR-ACNC: ABNORMAL
LYMPHOCYTES # BLD AUTO: 1.43 K/UL — SIGNIFICANT CHANGE UP (ref 1–3.3)
LYMPHOCYTES # BLD AUTO: 27.7 % — SIGNIFICANT CHANGE UP (ref 13–44)
MAGNESIUM SERPL-MCNC: 1.9 MG/DL — SIGNIFICANT CHANGE UP (ref 1.6–2.6)
MCHC RBC-ENTMCNC: 32.9 PG — SIGNIFICANT CHANGE UP (ref 27–34)
MCHC RBC-ENTMCNC: 33.6 GM/DL — SIGNIFICANT CHANGE UP (ref 32–36)
MCV RBC AUTO: 98.1 FL — SIGNIFICANT CHANGE UP (ref 80–100)
MONOCYTES # BLD AUTO: 0.86 K/UL — SIGNIFICANT CHANGE UP (ref 0–0.9)
MONOCYTES NFR BLD AUTO: 16.6 % — HIGH (ref 2–14)
NEUTROPHILS # BLD AUTO: 2.82 K/UL — SIGNIFICANT CHANGE UP (ref 1.8–7.4)
NEUTROPHILS NFR BLD AUTO: 54.5 % — SIGNIFICANT CHANGE UP (ref 43–77)
NITRITE UR-MCNC: NEGATIVE — SIGNIFICANT CHANGE UP
NT-PROBNP SERPL-SCNC: 1192 PG/ML — HIGH (ref 0–450)
PH UR: 5 — SIGNIFICANT CHANGE UP (ref 5–8)
PLATELET # BLD AUTO: 135 K/UL — LOW (ref 150–400)
POTASSIUM SERPL-MCNC: 4.1 MMOL/L — SIGNIFICANT CHANGE UP (ref 3.5–5.3)
POTASSIUM SERPL-SCNC: 4.1 MMOL/L — SIGNIFICANT CHANGE UP (ref 3.5–5.3)
PROT SERPL-MCNC: 6.7 GM/DL — SIGNIFICANT CHANGE UP (ref 6–8.3)
PROT UR-MCNC: 30 MG/DL
RBC # BLD: 3.13 M/UL — LOW (ref 3.8–5.2)
RBC # FLD: 16 % — HIGH (ref 10.3–14.5)
RBC CASTS # UR COMP ASSIST: NEGATIVE /HPF — SIGNIFICANT CHANGE UP (ref 0–4)
SODIUM SERPL-SCNC: 130 MMOL/L — LOW (ref 135–145)
SP GR SPEC: 1.01 — SIGNIFICANT CHANGE UP (ref 1.01–1.02)
SPECIMEN SOURCE: SIGNIFICANT CHANGE UP
SPECIMEN SOURCE: SIGNIFICANT CHANGE UP
TROPONIN I, HIGH SENSITIVITY RESULT: 9.03 NG/L — SIGNIFICANT CHANGE UP
TROPONIN I, HIGH SENSITIVITY RESULT: 9.09 NG/L — SIGNIFICANT CHANGE UP
UROBILINOGEN FLD QL: NEGATIVE — SIGNIFICANT CHANGE UP
WBC # BLD: 5.17 K/UL — SIGNIFICANT CHANGE UP (ref 3.8–10.5)
WBC # FLD AUTO: 5.17 K/UL — SIGNIFICANT CHANGE UP (ref 3.8–10.5)
WBC UR QL: SIGNIFICANT CHANGE UP /HPF (ref 0–5)

## 2023-07-15 PROCEDURE — 81001 URINALYSIS AUTO W/SCOPE: CPT

## 2023-07-15 PROCEDURE — 85025 COMPLETE CBC W/AUTO DIFF WBC: CPT

## 2023-07-15 PROCEDURE — 99285 EMERGENCY DEPT VISIT HI MDM: CPT | Mod: 25

## 2023-07-15 PROCEDURE — 36415 COLL VENOUS BLD VENIPUNCTURE: CPT

## 2023-07-15 PROCEDURE — 83735 ASSAY OF MAGNESIUM: CPT

## 2023-07-15 PROCEDURE — 71045 X-RAY EXAM CHEST 1 VIEW: CPT

## 2023-07-15 PROCEDURE — 71045 X-RAY EXAM CHEST 1 VIEW: CPT | Mod: 26

## 2023-07-15 PROCEDURE — 80053 COMPREHEN METABOLIC PANEL: CPT

## 2023-07-15 PROCEDURE — 93010 ELECTROCARDIOGRAM REPORT: CPT

## 2023-07-15 PROCEDURE — 99285 EMERGENCY DEPT VISIT HI MDM: CPT

## 2023-07-15 PROCEDURE — 83880 ASSAY OF NATRIURETIC PEPTIDE: CPT

## 2023-07-15 PROCEDURE — 84484 ASSAY OF TROPONIN QUANT: CPT

## 2023-07-15 PROCEDURE — 93005 ELECTROCARDIOGRAM TRACING: CPT

## 2023-07-15 RX ORDER — HYDRALAZINE HCL 50 MG
100 TABLET ORAL ONCE
Refills: 0 | Status: COMPLETED | OUTPATIENT
Start: 2023-07-15 | End: 2023-07-15

## 2023-07-15 RX ORDER — FUROSEMIDE 40 MG
20 TABLET ORAL ONCE
Refills: 0 | Status: COMPLETED | OUTPATIENT
Start: 2023-07-15 | End: 2023-07-15

## 2023-07-15 RX ADMIN — Medication 20 MILLIGRAM(S): at 05:56

## 2023-07-15 RX ADMIN — Medication 100 MILLIGRAM(S): at 05:56

## 2023-07-15 NOTE — ED PROVIDER NOTE - NSFOLLOWUPINSTRUCTIONS_ED_ALL_ED_FT
Return to the Emergency Department for worsening or persistent symptoms, and/or ANY NEW OR CONCERNING SYMPTOMS. If you have issues obtaining follow up, please call: [phone] or [phone]  to obtain a doctor or specialist who takes your insurance in your area.    Hypertension, Adult  High blood pressure (hypertension) is when the force of blood pumping through the arteries is too strong. The arteries are the blood vessels that carry blood from the heart throughout the body. Hypertension forces the heart to work harder to pump blood and may cause arteries to become narrow or stiff. Untreated or uncontrolled hypertension can lead to a heart attack, heart failure, a stroke, kidney disease, and other problems.    A blood pressure reading consists of a higher number over a lower number. Ideally, your blood pressure should be below 120/80. The first ("top") number is called the systolic pressure. It is a measure of the pressure in your arteries as your heart beats. The second ("bottom") number is called the diastolic pressure. It is a measure of the pressure in your arteries as the heart relaxes.    What are the causes?  The exact cause of this condition is not known. There are some conditions that result in high blood pressure.    What increases the risk?  Certain factors may make you more likely to develop high blood pressure. Some of these risk factors are under your control, including:  Smoking.  Not getting enough exercise or physical activity.  Being overweight.  Having too much fat, sugar, calories, or salt (sodium) in your diet.  Drinking too much alcohol.  Other risk factors include:  Having a personal history of heart disease, diabetes, high cholesterol, or kidney disease.  Stress.  Having a family history of high blood pressure and high cholesterol.  Having obstructive sleep apnea.  Age. The risk increases with age.  What are the signs or symptoms?  High blood pressure may not cause symptoms. Very high blood pressure (hypertensive crisis) may cause:  Headache.  Fast or irregular heartbeats (palpitations).  Shortness of breath.  Nosebleed.  Nausea and vomiting.  Vision changes.  Severe chest pain, dizziness, and seizures.  How is this diagnosed?  This condition is diagnosed by measuring your blood pressure while you are seated, with your arm resting on a flat surface, your legs uncrossed, and your feet flat on the floor. The cuff of the blood pressure monitor will be placed directly against the skin of your upper arm at the level of your heart. Blood pressure should be measured at least twice using the same arm. Certain conditions can cause a difference in blood pressure between your right and left arms.    If you have a high blood pressure reading during one visit or you have normal blood pressure with other risk factors, you may be asked to:  Return on a different day to have your blood pressure checked again.  Monitor your blood pressure at home for 1 week or longer.  If you are diagnosed with hypertension, you may have other blood or imaging tests to help your health care provider understand your overall risk for other conditions.    How is this treated?  This condition is treated by making healthy lifestyle changes, such as eating healthy foods, exercising more, and reducing your alcohol intake. You may be referred for counseling on a healthy diet and physical activity.    Your health care provider may prescribe medicine if lifestyle changes are not enough to get your blood pressure under control and if:  Your systolic blood pressure is above 130.  Your diastolic blood pressure is above 80.  Your personal target blood pressure may vary depending on your medical conditions, your age, and other factors.    Follow these instructions at home:  Eating and drinking    A plate with examples of foods in a healthy diet.  Eat a diet that is high in fiber and potassium, and low in sodium, added sugar, and fat. An example of this eating plan is called the DASH diet. DASH stands for Dietary Approaches to Stop Hypertension. To eat this way:  Eat plenty of fresh fruits and vegetables. Try to fill one half of your plate at each meal with fruits and vegetables.  Eat whole grains, such as whole-wheat pasta, brown rice, or whole-grain bread. Fill about one fourth of your plate with whole grains.  Eat or drink low-fat dairy products, such as skim milk or low-fat yogurt.  Avoid fatty cuts of meat, processed or cured meats, and poultry with skin. Fill about one fourth of your plate with lean proteins, such as fish, chicken without skin, beans, eggs, or tofu.  Avoid pre-made and processed foods. These tend to be higher in sodium, added sugar, and fat.  Reduce your daily sodium intake. Many people with hypertension should eat less than 1,500 mg of sodium a day.  Do not drink alcohol if:  Your health care provider tells you not to drink.  You are pregnant, may be pregnant, or are planning to become pregnant.  If you drink alcohol:  Limit how much you have to:  0–1 drink a day for women.  0–2 drinks a day for men.  Know how much alcohol is in your drink. In the [place] one drink equals one 12 oz bottle of beer (355 mL), one 5 oz glass of wine (148 mL), or one 1½ oz glass of hard liquor (44 mL).  Lifestyle    A blood pressure monitor and cuff.   Work with your health care provider to maintain a healthy body weight or to lose weight. Ask what an ideal weight is for you.  Get at least 30 minutes of exercise that causes your heart to beat faster (aerobic exercise) most days of the week. Activities may include walking, swimming, or biking.  Include exercise to strengthen your muscles (resistance exercise), such as Pilates or lifting weights, as part of your weekly exercise routine. Try to do these types of exercises for 30 minutes at least 3 days a week.  Do not use any products that contain nicotine or tobacco. These products include cigarettes, chewing tobacco, and vaping devices, such as e-cigarettes. If you need help quitting, ask your health care provider.  Monitor your blood pressure at home as told by your health care provider.  Keep all follow-up visits. This is important.  Medicines    Take over-the-counter and prescription medicines only as told by your health care provider. Follow directions carefully. Blood pressure medicines must be taken as prescribed.  Do not skip doses of blood pressure medicine. Doing this puts you at risk for problems and can make the medicine less effective.  Ask your health care provider about side effects or reactions to medicines that you should watch for.  Contact a health care provider if you:  Think you are having a reaction to a medicine you are taking.  Have headaches that keep coming back (recurring).  Feel dizzy.  Have swelling in your ankles.  Have trouble with your vision.  Get help right away if you:  Develop a severe headache or confusion.  Have unusual weakness or numbness.  Feel faint.  Have severe pain in your chest or abdomen.  Vomit repeatedly.  Have trouble breathing.  These symptoms may be an emergency. Get help right away. Call 911.  Do not wait to see if the symptoms will go away.  Do not drive yourself to the hospital.  Summary  Hypertension is when the force of blood pumping through your arteries is too strong. If this condition is not controlled, it may put you at risk for serious complications.  Your personal target blood pressure may vary depending on your medical conditions, your age, and other factors. For most people, a normal blood pressure is less than 120/80.  Hypertension is treated with lifestyle changes, medicines, or a combination of both. Lifestyle changes include losing weight, eating a healthy, low-sodium diet, exercising more, and limiting alcohol.  This information is not intended to replace advice given to you by your health care provider. Make sure you discuss any questions you have with your health care provider.

## 2023-07-15 NOTE — ED ADULT TRIAGE NOTE - CHIEF COMPLAINT QUOTE
BIBA from [facility] for HTN. pt BP was taken around 9pm, she has a HX of HTN- was given meds and now reportedly still HTN. pt offers no complaints.

## 2023-07-15 NOTE — ED ADULT NURSE NOTE - OBJECTIVE STATEMENT
Pt to Ed with c/o elevated BP. per daughter BP was elevated in 200s systolic. pt states she was recently D/Cd from hospital following hospitalization for low sodium. Pt states having similar symptoms as when she was hospitalized. Endorses hx HTN and adrenal insufficieny. Pt A&Ox4. No distress noted. resp unlabored.

## 2023-07-15 NOTE — ED ADULT TRIAGE NOTE - ARRIVAL FROM
Home Abdomen soft, non-tender and non-distended, no rebound, no guarding and no masses. no hepatosplenomegaly.

## 2023-07-15 NOTE — ED ADULT NURSE NOTE - NS ED NURSE RECORD ANOTHER VITAL SIGN
Yes

## 2023-07-15 NOTE — ED PROVIDER NOTE - CLINICAL SUMMARY MEDICAL DECISION MAKING FREE TEXT BOX
Pt presenting with elevated BP at facility, initially in 170s then given BP med and rpt was >200. At triage BP improved to 169/44. Pt initially asymptomatic however now with mild dizziness and chest pressure. Will need ewkg, labs, CXR, cardiac monitoring, 2 trops, will reassess Pt presenting with elevated BP at facility, initially in 170s then given BP med and rpt was >200. At triage BP improved to 169/44. Pt initially asymptomatic however now with mild dizziness and chest pressure. Will need ewkg, labs, CXR, cardiac monitoring, 2 trops, will reassess  -On reeval, pt asymptomatic. BP elevated, morning meds ordered. 2 trops negative. No indication for further intervention at this time. Na slightly improved from previous. Stable for DC back to facility

## 2023-07-15 NOTE — ED PROVIDER NOTE - PHYSICAL EXAMINATION
General: Awake and alert, NAD  HEENT: NCAT  Cardiac: Normal rate, no murmurs, normal peripheral perfusion  Respiratory: Normal rate and effort. CTAB  GI: Soft, nondistended, nontender  Neuro: No focal deficits. HECTOR equally x4, sensation to light touch intact throughout  MSK: FROMx4, no focal bony tenderness, no peripheral edema  Skin: No rash

## 2023-07-15 NOTE — ED ADULT NURSE NOTE - CHIEF COMPLAINT QUOTE
BIBA from [facility] for HTN. pt BP was taken around 9pm, she has a HX of HTN- was given meds and now reportedly still HTN. pt offers no complaints.

## 2023-07-15 NOTE — ED PROVIDER NOTE - PATIENT PORTAL LINK FT
You can access the FollowMyHealth Patient Portal offered by [facility] by registering at the following website: [email or web address]. By joining [employer]’s FollowMyHealth portal, you will also be able to view your health information using other applications (apps) compatible with our system.

## 2023-07-15 NOTE — ED PROVIDER NOTE - EKG ADDITIONAL INFORMATION FREE TEXT
EKG interpreted independently by self: Sinus bradycardia at 55 bpm.  Normal axis, normal intervals, no acute ischemic changes seen

## 2023-07-15 NOTE — ED ADULT NURSE NOTE - NSFALLHARMRISKINTERV_ED_ALL_ED

## 2023-07-15 NOTE — ED ADULT NURSE NOTE - ISOLATION TYPE:
the camera           Psychiatric:       [x] Normal Affect [x] No Hallucinations        [] Abnormal-     Other pertinent observable physical exam findings-     ASSESSMENT/PLAN:   Diagnosis Orders   1. Pain in both lower extremities     2. Rash     Advised the patient to be off running for now or jogging do other format of exercise  Discussed skin care monitor closely  If not better needs to be seen physically in the office he agrees on the plan  To use nonsteroidal over-the-counter 400 to 600 mg every 8 hours and full stomach. Reviewed the prior records on epic  [place] Drafts is a 24 y.o. male being evaluated by a Virtual Visit (video visit) encounter to address concerns as mentioned above. A caregiver was present when appropriate. Due to this being a TeleHealth encounter (During UARNN-27 public health emergency), evaluation of the following organ systems was limited: Vitals/Constitutional/EENT/Resp/CV/GI//MS/Neuro/Skin/Heme-Lymph-Imm. Pursuant to the emergency declaration under the [address] [place] [address] and the [employer] and [employer] Act, this Virtual Visit was conducted with patient's (and/or legal guardian's) consent, to reduce the patient's risk of exposure to COVID-19 and provide necessary medical care. The patient (and/or legal guardian) has also been advised to contact this office for worsening conditions or problems, and seek emergency medical treatment and/or call 911 if deemed necessary. Patient identification was verified at the start of the visit: Yes    Total time spent on this encounter: Not billed by time    Services were provided through a video synchronous discussion virtually to substitute for in-person clinic visit. Patient and provider were located at their individual homes. --Yu Su MD on 4/28/2020 at 1:30 PM    An electronic signature was used to authenticate this note.
None

## 2023-07-15 NOTE — ED PROVIDER NOTE - OBJECTIVE STATEMENT
89 y/o female with a PMHx of adrenal insufficiency, CKD, diastolic heart failure, HTN, hypokalemia, hyponatremia, iron deficiency, lymphoma, PAF, sent in from facility for elevated blood pressure. Family at bedside report her pressure was as high as 200-210 systolic tonight, was given BP med and transferred here. Pt initially did not have any symptoms however at time of my eval pt reports some dizziness similar to recent hospitalization where Na was low as well as mild chest pressure that is on/off. No SOB

## 2023-07-17 ENCOUNTER — NON-APPOINTMENT (OUTPATIENT)
Age: 88
End: 2023-07-17

## 2023-07-20 DIAGNOSIS — Z88.2 ALLERGY STATUS TO SULFONAMIDES: ICD-10-CM

## 2023-07-20 DIAGNOSIS — Z88.0 ALLERGY STATUS TO PENICILLIN: ICD-10-CM

## 2023-07-20 DIAGNOSIS — I48.0 PAROXYSMAL ATRIAL FIBRILLATION: ICD-10-CM

## 2023-07-20 DIAGNOSIS — Z88.1 ALLERGY STATUS TO OTHER ANTIBIOTIC AGENTS STATUS: ICD-10-CM

## 2023-07-20 DIAGNOSIS — I16.0 HYPERTENSIVE URGENCY: ICD-10-CM

## 2023-07-20 DIAGNOSIS — C85.90 NON-HODGKIN LYMPHOMA, UNSPECIFIED, UNSPECIFIED SITE: ICD-10-CM

## 2023-07-20 DIAGNOSIS — E43 UNSPECIFIED SEVERE PROTEIN-CALORIE MALNUTRITION: ICD-10-CM

## 2023-07-20 DIAGNOSIS — Z96.641 PRESENCE OF RIGHT ARTIFICIAL HIP JOINT: ICD-10-CM

## 2023-07-20 DIAGNOSIS — E87.1 HYPO-OSMOLALITY AND HYPONATREMIA: ICD-10-CM

## 2023-07-20 DIAGNOSIS — I50.32 CHRONIC DIASTOLIC (CONGESTIVE) HEART FAILURE: ICD-10-CM

## 2023-07-20 DIAGNOSIS — I13.0 HYPERTENSIVE HEART AND CHRONIC KIDNEY DISEASE WITH HEART FAILURE AND STAGE 1 THROUGH STAGE 4 CHRONIC KIDNEY DISEASE, OR UNSPECIFIED CHRONIC KIDNEY DISEASE: ICD-10-CM

## 2023-07-20 DIAGNOSIS — N18.9 CHRONIC KIDNEY DISEASE, UNSPECIFIED: ICD-10-CM

## 2023-10-17 NOTE — PHYSICAL THERAPY INITIAL EVALUATION ADULT - SITTING BALANCE: STATIC
[facility]  Sedation/Analgesia Post Sedation Record    Pt Name: Sherice Olmos  MRN: [id number]  YOB: 1985  Procedure Performed By: Pepe Donnelly DO  Primary Care Physician: Oscar Rodas MD    POST-PROCEDURE    Physicians/Assistants: Pepe Donnelly DO  Procedure Performed: See Procedure Note   Sedation/Anesthesia: Versed and Fentanyl (See procedure note for amount and duration)  Estimated Blood Loss:     0  ml  Specimens Removed: None        Complications: None           Benji Santos DO  Electronically signed 10/17/2023 at 11:58 AM normal balance

## 2024-01-10 ENCOUNTER — EMERGENCY (EMERGENCY)
Facility: HOSPITAL | Age: 89
LOS: 0 days | Discharge: HOME | End: 2024-01-11
Attending: EMERGENCY MEDICINE
Payer: MEDICARE

## 2024-01-10 VITALS
DIASTOLIC BLOOD PRESSURE: 62 MMHG | RESPIRATION RATE: 18 BRPM | TEMPERATURE: 98 F | SYSTOLIC BLOOD PRESSURE: 156 MMHG | HEART RATE: 62 BPM | OXYGEN SATURATION: 96 %

## 2024-01-10 DIAGNOSIS — Z90.710 ACQUIRED ABSENCE OF BOTH CERVIX AND UTERUS: Chronic | ICD-10-CM

## 2024-01-10 DIAGNOSIS — R10.13 EPIGASTRIC PAIN: ICD-10-CM

## 2024-01-10 DIAGNOSIS — Z90.49 ACQUIRED ABSENCE OF OTHER SPECIFIED PARTS OF DIGESTIVE TRACT: Chronic | ICD-10-CM

## 2024-01-10 DIAGNOSIS — R07.9 CHEST PAIN, UNSPECIFIED: ICD-10-CM

## 2024-01-10 DIAGNOSIS — K44.9 DIAPHRAGMATIC HERNIA WITHOUT OBSTRUCTION OR GANGRENE: ICD-10-CM

## 2024-01-10 DIAGNOSIS — I48.0 PAROXYSMAL ATRIAL FIBRILLATION: ICD-10-CM

## 2024-01-10 DIAGNOSIS — I50.30 UNSPECIFIED DIASTOLIC (CONGESTIVE) HEART FAILURE: ICD-10-CM

## 2024-01-10 DIAGNOSIS — Z88.2 ALLERGY STATUS TO SULFONAMIDES: ICD-10-CM

## 2024-01-10 DIAGNOSIS — Z88.1 ALLERGY STATUS TO OTHER ANTIBIOTIC AGENTS STATUS: ICD-10-CM

## 2024-01-10 DIAGNOSIS — Z86.79 PERSONAL HISTORY OF OTHER DISEASES OF THE CIRCULATORY SYSTEM: Chronic | ICD-10-CM

## 2024-01-10 DIAGNOSIS — N18.9 CHRONIC KIDNEY DISEASE, UNSPECIFIED: ICD-10-CM

## 2024-01-10 DIAGNOSIS — R53.1 WEAKNESS: ICD-10-CM

## 2024-01-10 DIAGNOSIS — Z20.822 CONTACT WITH AND (SUSPECTED) EXPOSURE TO COVID-19: ICD-10-CM

## 2024-01-10 DIAGNOSIS — Z88.0 ALLERGY STATUS TO PENICILLIN: ICD-10-CM

## 2024-01-10 DIAGNOSIS — I13.0 HYPERTENSIVE HEART AND CHRONIC KIDNEY DISEASE WITH HEART FAILURE AND STAGE 1 THROUGH STAGE 4 CHRONIC KIDNEY DISEASE, OR UNSPECIFIED CHRONIC KIDNEY DISEASE: ICD-10-CM

## 2024-01-10 DIAGNOSIS — E27.40 UNSPECIFIED ADRENOCORTICAL INSUFFICIENCY: ICD-10-CM

## 2024-01-10 DIAGNOSIS — Z90.49 ACQUIRED ABSENCE OF OTHER SPECIFIED PARTS OF DIGESTIVE TRACT: ICD-10-CM

## 2024-01-10 LAB
ALBUMIN SERPL ELPH-MCNC: 3.1 G/DL — LOW (ref 3.3–5)
ALBUMIN SERPL ELPH-MCNC: 3.1 G/DL — LOW (ref 3.3–5)
ALP SERPL-CCNC: 70 U/L — SIGNIFICANT CHANGE UP (ref 40–120)
ALP SERPL-CCNC: 70 U/L — SIGNIFICANT CHANGE UP (ref 40–120)
ALT FLD-CCNC: 19 U/L — SIGNIFICANT CHANGE UP (ref 12–78)
ALT FLD-CCNC: 19 U/L — SIGNIFICANT CHANGE UP (ref 12–78)
ANION GAP SERPL CALC-SCNC: 7 MMOL/L — SIGNIFICANT CHANGE UP (ref 5–17)
ANION GAP SERPL CALC-SCNC: 7 MMOL/L — SIGNIFICANT CHANGE UP (ref 5–17)
APPEARANCE UR: CLEAR — SIGNIFICANT CHANGE UP
APPEARANCE UR: CLEAR — SIGNIFICANT CHANGE UP
AST SERPL-CCNC: 28 U/L — SIGNIFICANT CHANGE UP (ref 15–37)
AST SERPL-CCNC: 28 U/L — SIGNIFICANT CHANGE UP (ref 15–37)
BASOPHILS # BLD AUTO: 0.01 K/UL — SIGNIFICANT CHANGE UP (ref 0–0.2)
BASOPHILS # BLD AUTO: 0.01 K/UL — SIGNIFICANT CHANGE UP (ref 0–0.2)
BASOPHILS NFR BLD AUTO: 0.2 % — SIGNIFICANT CHANGE UP (ref 0–2)
BASOPHILS NFR BLD AUTO: 0.2 % — SIGNIFICANT CHANGE UP (ref 0–2)
BILIRUB SERPL-MCNC: 0.4 MG/DL — SIGNIFICANT CHANGE UP (ref 0.2–1.2)
BILIRUB SERPL-MCNC: 0.4 MG/DL — SIGNIFICANT CHANGE UP (ref 0.2–1.2)
BILIRUB UR-MCNC: NEGATIVE — SIGNIFICANT CHANGE UP
BILIRUB UR-MCNC: NEGATIVE — SIGNIFICANT CHANGE UP
BUN SERPL-MCNC: 35 MG/DL — HIGH (ref 7–23)
BUN SERPL-MCNC: 35 MG/DL — HIGH (ref 7–23)
CALCIUM SERPL-MCNC: 9.4 MG/DL — SIGNIFICANT CHANGE UP (ref 8.5–10.1)
CALCIUM SERPL-MCNC: 9.4 MG/DL — SIGNIFICANT CHANGE UP (ref 8.5–10.1)
CHLORIDE SERPL-SCNC: 101 MMOL/L — SIGNIFICANT CHANGE UP (ref 96–108)
CHLORIDE SERPL-SCNC: 101 MMOL/L — SIGNIFICANT CHANGE UP (ref 96–108)
CO2 SERPL-SCNC: 27 MMOL/L — SIGNIFICANT CHANGE UP (ref 22–31)
CO2 SERPL-SCNC: 27 MMOL/L — SIGNIFICANT CHANGE UP (ref 22–31)
COLOR SPEC: YELLOW — SIGNIFICANT CHANGE UP
COLOR SPEC: YELLOW — SIGNIFICANT CHANGE UP
CREAT SERPL-MCNC: 1.73 MG/DL — HIGH (ref 0.5–1.3)
CREAT SERPL-MCNC: 1.73 MG/DL — HIGH (ref 0.5–1.3)
DIFF PNL FLD: NEGATIVE — SIGNIFICANT CHANGE UP
DIFF PNL FLD: NEGATIVE — SIGNIFICANT CHANGE UP
EGFR: 26 ML/MIN/1.73M2 — LOW
EGFR: 26 ML/MIN/1.73M2 — LOW
EOSINOPHIL # BLD AUTO: 0.03 K/UL — SIGNIFICANT CHANGE UP (ref 0–0.5)
EOSINOPHIL # BLD AUTO: 0.03 K/UL — SIGNIFICANT CHANGE UP (ref 0–0.5)
EOSINOPHIL NFR BLD AUTO: 0.5 % — SIGNIFICANT CHANGE UP (ref 0–6)
EOSINOPHIL NFR BLD AUTO: 0.5 % — SIGNIFICANT CHANGE UP (ref 0–6)
FLUAV AG NPH QL: SIGNIFICANT CHANGE UP
FLUAV AG NPH QL: SIGNIFICANT CHANGE UP
FLUBV AG NPH QL: SIGNIFICANT CHANGE UP
FLUBV AG NPH QL: SIGNIFICANT CHANGE UP
GLUCOSE SERPL-MCNC: 166 MG/DL — HIGH (ref 70–99)
GLUCOSE SERPL-MCNC: 166 MG/DL — HIGH (ref 70–99)
GLUCOSE UR QL: NEGATIVE MG/DL — SIGNIFICANT CHANGE UP
GLUCOSE UR QL: NEGATIVE MG/DL — SIGNIFICANT CHANGE UP
HCT VFR BLD CALC: 32.8 % — LOW (ref 34.5–45)
HCT VFR BLD CALC: 32.8 % — LOW (ref 34.5–45)
HGB BLD-MCNC: 10.6 G/DL — LOW (ref 11.5–15.5)
HGB BLD-MCNC: 10.6 G/DL — LOW (ref 11.5–15.5)
IMM GRANULOCYTES NFR BLD AUTO: 0.3 % — SIGNIFICANT CHANGE UP (ref 0–0.9)
IMM GRANULOCYTES NFR BLD AUTO: 0.3 % — SIGNIFICANT CHANGE UP (ref 0–0.9)
KETONES UR-MCNC: NEGATIVE MG/DL — SIGNIFICANT CHANGE UP
KETONES UR-MCNC: NEGATIVE MG/DL — SIGNIFICANT CHANGE UP
LEUKOCYTE ESTERASE UR-ACNC: NEGATIVE — SIGNIFICANT CHANGE UP
LEUKOCYTE ESTERASE UR-ACNC: NEGATIVE — SIGNIFICANT CHANGE UP
LIDOCAIN IGE QN: 40 U/L — SIGNIFICANT CHANGE UP (ref 13–75)
LIDOCAIN IGE QN: 40 U/L — SIGNIFICANT CHANGE UP (ref 13–75)
LYMPHOCYTES # BLD AUTO: 1.96 K/UL — SIGNIFICANT CHANGE UP (ref 1–3.3)
LYMPHOCYTES # BLD AUTO: 1.96 K/UL — SIGNIFICANT CHANGE UP (ref 1–3.3)
LYMPHOCYTES # BLD AUTO: 33.6 % — SIGNIFICANT CHANGE UP (ref 13–44)
LYMPHOCYTES # BLD AUTO: 33.6 % — SIGNIFICANT CHANGE UP (ref 13–44)
MCHC RBC-ENTMCNC: 32.3 GM/DL — SIGNIFICANT CHANGE UP (ref 32–36)
MCHC RBC-ENTMCNC: 32.3 GM/DL — SIGNIFICANT CHANGE UP (ref 32–36)
MCHC RBC-ENTMCNC: 32.5 PG — SIGNIFICANT CHANGE UP (ref 27–34)
MCHC RBC-ENTMCNC: 32.5 PG — SIGNIFICANT CHANGE UP (ref 27–34)
MCV RBC AUTO: 100.6 FL — HIGH (ref 80–100)
MCV RBC AUTO: 100.6 FL — HIGH (ref 80–100)
MONOCYTES # BLD AUTO: 0.52 K/UL — SIGNIFICANT CHANGE UP (ref 0–0.9)
MONOCYTES # BLD AUTO: 0.52 K/UL — SIGNIFICANT CHANGE UP (ref 0–0.9)
MONOCYTES NFR BLD AUTO: 8.9 % — SIGNIFICANT CHANGE UP (ref 2–14)
MONOCYTES NFR BLD AUTO: 8.9 % — SIGNIFICANT CHANGE UP (ref 2–14)
NEUTROPHILS # BLD AUTO: 3.29 K/UL — SIGNIFICANT CHANGE UP (ref 1.8–7.4)
NEUTROPHILS # BLD AUTO: 3.29 K/UL — SIGNIFICANT CHANGE UP (ref 1.8–7.4)
NEUTROPHILS NFR BLD AUTO: 56.5 % — SIGNIFICANT CHANGE UP (ref 43–77)
NEUTROPHILS NFR BLD AUTO: 56.5 % — SIGNIFICANT CHANGE UP (ref 43–77)
NITRITE UR-MCNC: NEGATIVE — SIGNIFICANT CHANGE UP
NITRITE UR-MCNC: NEGATIVE — SIGNIFICANT CHANGE UP
NT-PROBNP SERPL-SCNC: 1820 PG/ML — HIGH (ref 0–450)
NT-PROBNP SERPL-SCNC: 1820 PG/ML — HIGH (ref 0–450)
PH UR: 6.5 — SIGNIFICANT CHANGE UP (ref 5–8)
PH UR: 6.5 — SIGNIFICANT CHANGE UP (ref 5–8)
PLATELET # BLD AUTO: 130 K/UL — LOW (ref 150–400)
PLATELET # BLD AUTO: 130 K/UL — LOW (ref 150–400)
POTASSIUM SERPL-MCNC: 4.9 MMOL/L — SIGNIFICANT CHANGE UP (ref 3.5–5.3)
POTASSIUM SERPL-MCNC: 4.9 MMOL/L — SIGNIFICANT CHANGE UP (ref 3.5–5.3)
POTASSIUM SERPL-SCNC: 4.9 MMOL/L — SIGNIFICANT CHANGE UP (ref 3.5–5.3)
POTASSIUM SERPL-SCNC: 4.9 MMOL/L — SIGNIFICANT CHANGE UP (ref 3.5–5.3)
PROT SERPL-MCNC: 6.5 GM/DL — SIGNIFICANT CHANGE UP (ref 6–8.3)
PROT SERPL-MCNC: 6.5 GM/DL — SIGNIFICANT CHANGE UP (ref 6–8.3)
PROT UR-MCNC: NEGATIVE MG/DL — SIGNIFICANT CHANGE UP
PROT UR-MCNC: NEGATIVE MG/DL — SIGNIFICANT CHANGE UP
RBC # BLD: 3.26 M/UL — LOW (ref 3.8–5.2)
RBC # BLD: 3.26 M/UL — LOW (ref 3.8–5.2)
RBC # FLD: 13.8 % — SIGNIFICANT CHANGE UP (ref 10.3–14.5)
RBC # FLD: 13.8 % — SIGNIFICANT CHANGE UP (ref 10.3–14.5)
RSV RNA NPH QL NAA+NON-PROBE: SIGNIFICANT CHANGE UP
RSV RNA NPH QL NAA+NON-PROBE: SIGNIFICANT CHANGE UP
SARS-COV-2 RNA SPEC QL NAA+PROBE: SIGNIFICANT CHANGE UP
SARS-COV-2 RNA SPEC QL NAA+PROBE: SIGNIFICANT CHANGE UP
SODIUM SERPL-SCNC: 135 MMOL/L — SIGNIFICANT CHANGE UP (ref 135–145)
SODIUM SERPL-SCNC: 135 MMOL/L — SIGNIFICANT CHANGE UP (ref 135–145)
SP GR SPEC: 1.01 — SIGNIFICANT CHANGE UP (ref 1–1.03)
SP GR SPEC: 1.01 — SIGNIFICANT CHANGE UP (ref 1–1.03)
TROPONIN I, HIGH SENSITIVITY RESULT: 11.8 NG/L — SIGNIFICANT CHANGE UP
TROPONIN I, HIGH SENSITIVITY RESULT: 11.8 NG/L — SIGNIFICANT CHANGE UP
UROBILINOGEN FLD QL: 1 MG/DL — SIGNIFICANT CHANGE UP (ref 0.2–1)
UROBILINOGEN FLD QL: 1 MG/DL — SIGNIFICANT CHANGE UP (ref 0.2–1)
WBC # BLD: 5.83 K/UL — SIGNIFICANT CHANGE UP (ref 3.8–10.5)
WBC # BLD: 5.83 K/UL — SIGNIFICANT CHANGE UP (ref 3.8–10.5)
WBC # FLD AUTO: 5.83 K/UL — SIGNIFICANT CHANGE UP (ref 3.8–10.5)
WBC # FLD AUTO: 5.83 K/UL — SIGNIFICANT CHANGE UP (ref 3.8–10.5)

## 2024-01-10 PROCEDURE — 99285 EMERGENCY DEPT VISIT HI MDM: CPT

## 2024-01-10 PROCEDURE — 84484 ASSAY OF TROPONIN QUANT: CPT

## 2024-01-10 PROCEDURE — 83690 ASSAY OF LIPASE: CPT

## 2024-01-10 PROCEDURE — 36415 COLL VENOUS BLD VENIPUNCTURE: CPT

## 2024-01-10 PROCEDURE — 87086 URINE CULTURE/COLONY COUNT: CPT

## 2024-01-10 PROCEDURE — 0241U: CPT

## 2024-01-10 PROCEDURE — 93005 ELECTROCARDIOGRAM TRACING: CPT

## 2024-01-10 PROCEDURE — 83880 ASSAY OF NATRIURETIC PEPTIDE: CPT

## 2024-01-10 PROCEDURE — 71045 X-RAY EXAM CHEST 1 VIEW: CPT | Mod: 26

## 2024-01-10 PROCEDURE — 80053 COMPREHEN METABOLIC PANEL: CPT

## 2024-01-10 PROCEDURE — 93010 ELECTROCARDIOGRAM REPORT: CPT

## 2024-01-10 PROCEDURE — 85025 COMPLETE CBC W/AUTO DIFF WBC: CPT

## 2024-01-10 PROCEDURE — 71045 X-RAY EXAM CHEST 1 VIEW: CPT

## 2024-01-10 PROCEDURE — 81003 URINALYSIS AUTO W/O SCOPE: CPT

## 2024-01-10 PROCEDURE — 99285 EMERGENCY DEPT VISIT HI MDM: CPT | Mod: 25

## 2024-01-10 NOTE — ED ADULT TRIAGE NOTE - CHIEF COMPLAINT QUOTE
pt presents to ED from nursing facility for epigastric pain, chest pain. hx hiatal hernia. will obtain ekg.

## 2024-01-10 NOTE — ED PROVIDER NOTE - CLINICAL SUMMARY MEDICAL DECISION MAKING FREE TEXT BOX
ddx: r/o ACS, no abdominal tenderness or guarding to suggest surgical abdomen, CHF exacerbation    CBC, CMP, BNP, trop, CXR, EKG, reassess.

## 2024-01-10 NOTE — ED PROVIDER NOTE - OBJECTIVE STATEMENT
89 y/o F with PMHx of paroxysmal afib, diastolic heart failure, CKD, hypokalemia, hyponatremia, hiatal hernia, adrenal insufficiency, lymphoma in remission, CHF, HTN, iron deficiency and SHx of intracranial hemorrhage, hysterectomy, appendectomy presents to the ED from nursing facility for epigastric pain, chest pain. Pt feels weakness as well. Denies cough, fever. has felt this way in the past. Cp now resolved. No abd pain, no n/v.

## 2024-01-10 NOTE — ED PROVIDER NOTE - PROGRESS NOTE DETAILS
Patient has been chest pain free during ED stay, and pt feels well. Labs wnl, pt ready for d/c. Pt has had this many times in past, and will f/u w her PMD.

## 2024-01-10 NOTE — ED PROVIDER NOTE - PATIENT PORTAL LINK FT
You can access the FollowMyHealth Patient Portal offered by [facility] by registering at the following website: [email or web address]. By joining [employer] [facility]’s FollowMyHealth portal, you will also be able to view your health information using other applications (apps) compatible with our system. You can access the FollowMyHealth Patient Portal offered by [facility] by registering at the following website: [email or web address]. By joining [employer]’s FollowMyHealth portal, you will also be able to view your health information using other applications (apps) compatible with our system.

## 2024-01-10 NOTE — ED PROVIDER NOTE - MUSCULOSKELETAL, MLM
Patient called and would like to know why her pap was not processed. Message forwarded to [facility] to respond to the patient.
Spine appears normal, range of motion is not limited, no muscle or joint tenderness

## 2024-01-10 NOTE — ED ADULT NURSE NOTE - OBJECTIVE STATEMENT
Alert, c/o abd pain, gastric reflux, poor appetite hx hernia  Resides at  [facility]  Offers no further complaints Alert, c/o abd pain, gastric reflux, poor appetite hx hernia  Resides at  [facility]  Offers no further complaints

## 2024-01-10 NOTE — ED PROVIDER NOTE - NSICDXPASTMEDICALHX_GEN_ALL_CORE_FT
PAST MEDICAL HISTORY:  Chronic kidney disease, unspecified CKD stage     Diastolic heart failure     H/O adrenal insufficiency     HTN (hypertension)     Hypokalemia     Hyponatremia     Iron deficiency     Lymphoma     Paroxysmal atrial fibrillation       H/O CHF

## 2024-01-11 VITALS
DIASTOLIC BLOOD PRESSURE: 58 MMHG | HEART RATE: 75 BPM | OXYGEN SATURATION: 96 % | SYSTOLIC BLOOD PRESSURE: 159 MMHG | TEMPERATURE: 98 F | RESPIRATION RATE: 18 BRPM

## 2024-01-11 RX ORDER — HYDRALAZINE HCL 50 MG
100 TABLET ORAL ONCE
Refills: 0 | Status: COMPLETED | OUTPATIENT
Start: 2024-01-11 | End: 2024-01-11

## 2024-01-11 RX ORDER — AMLODIPINE BESYLATE 2.5 MG/1
5 TABLET ORAL ONCE
Refills: 0 | Status: COMPLETED | OUTPATIENT
Start: 2024-01-11 | End: 2024-01-11

## 2024-01-11 RX ADMIN — Medication 100 MILLIGRAM(S): at 04:02

## 2024-01-11 RX ADMIN — AMLODIPINE BESYLATE 5 MILLIGRAM(S): 2.5 TABLET ORAL at 04:02

## 2024-01-11 NOTE — ED ADULT NURSE REASSESSMENT NOTE - NS ED NURSE REASSESS COMMENT FT1
ambulance came to  pt - pt noted to have elevated BP and elevated pain. spoke with Ervin MAC from [facility] who denied pt acceptance. will attempt to dc pt at 0730 as per Kassy speaking with accepting [facility]. pt has no further complaints or discomforts reported at this time. warm blanket provided - MD to be made aware ambulance came to  pt - pt noted to have elevated BP and elevated pain. spoke with Ervin MAC from [facility] who denied pt acceptance. will attempt to dc pt at 0730 as per Kassy speaking with accepting [facility]. pt has no further complaints or discomforts reported at this time. warm blanket provided - MD to be made aware

## 2024-01-24 NOTE — PATIENT PROFILE ADULT - DEAF OR HARD OF HEARING?
Pt in ED d/t constipation and abd pain. Airway intact, alert, resting in chair quiana. Ongoing care.
no

## 2024-02-10 ENCOUNTER — INPATIENT (INPATIENT)
Facility: HOSPITAL | Age: 89
LOS: 3 days | Discharge: HOME | DRG: 391 | End: 2024-02-14
Attending: STUDENT IN AN ORGANIZED HEALTH CARE EDUCATION/TRAINING PROGRAM | Admitting: INTERNAL MEDICINE
Payer: MEDICARE

## 2024-02-10 VITALS
DIASTOLIC BLOOD PRESSURE: 49 MMHG | SYSTOLIC BLOOD PRESSURE: 178 MMHG | HEIGHT: 63 IN | OXYGEN SATURATION: 95 % | TEMPERATURE: 98 F | WEIGHT: 100.09 LBS | RESPIRATION RATE: 18 BRPM | HEART RATE: 74 BPM

## 2024-02-10 DIAGNOSIS — N39.0 URINARY TRACT INFECTION, SITE NOT SPECIFIED: ICD-10-CM

## 2024-02-10 DIAGNOSIS — Z86.79 PERSONAL HISTORY OF OTHER DISEASES OF THE CIRCULATORY SYSTEM: Chronic | ICD-10-CM

## 2024-02-10 DIAGNOSIS — I50.30 UNSPECIFIED DIASTOLIC (CONGESTIVE) HEART FAILURE: ICD-10-CM

## 2024-02-10 DIAGNOSIS — Z90.710 ACQUIRED ABSENCE OF BOTH CERVIX AND UTERUS: Chronic | ICD-10-CM

## 2024-02-10 DIAGNOSIS — I48.0 PAROXYSMAL ATRIAL FIBRILLATION: ICD-10-CM

## 2024-02-10 DIAGNOSIS — Z86.39 PERSONAL HISTORY OF OTHER ENDOCRINE, NUTRITIONAL AND METABOLIC DISEASE: ICD-10-CM

## 2024-02-10 DIAGNOSIS — Z90.49 ACQUIRED ABSENCE OF OTHER SPECIFIED PARTS OF DIGESTIVE TRACT: Chronic | ICD-10-CM

## 2024-02-10 DIAGNOSIS — K52.9 NONINFECTIVE GASTROENTERITIS AND COLITIS, UNSPECIFIED: ICD-10-CM

## 2024-02-10 LAB
ALBUMIN SERPL ELPH-MCNC: 2.8 G/DL — LOW (ref 3.3–5)
ALP SERPL-CCNC: 71 U/L — SIGNIFICANT CHANGE UP (ref 40–120)
ALT FLD-CCNC: 12 U/L — SIGNIFICANT CHANGE UP (ref 12–78)
ANION GAP SERPL CALC-SCNC: 4 MMOL/L — LOW (ref 5–17)
APPEARANCE UR: ABNORMAL
AST SERPL-CCNC: 13 U/L — LOW (ref 15–37)
BACTERIA # UR AUTO: ABNORMAL /HPF
BASOPHILS # BLD AUTO: 0.01 K/UL — SIGNIFICANT CHANGE UP (ref 0–0.2)
BASOPHILS NFR BLD AUTO: 0.1 % — SIGNIFICANT CHANGE UP (ref 0–2)
BILIRUB SERPL-MCNC: 0.3 MG/DL — SIGNIFICANT CHANGE UP (ref 0.2–1.2)
BILIRUB UR-MCNC: NEGATIVE — SIGNIFICANT CHANGE UP
BLD GP AB SCN SERPL QL: SIGNIFICANT CHANGE UP
BUN SERPL-MCNC: 14 MG/DL — SIGNIFICANT CHANGE UP (ref 7–23)
CALCIUM SERPL-MCNC: 9.3 MG/DL — SIGNIFICANT CHANGE UP (ref 8.5–10.1)
CHLORIDE SERPL-SCNC: 104 MMOL/L — SIGNIFICANT CHANGE UP (ref 96–108)
CO2 SERPL-SCNC: 24 MMOL/L — SIGNIFICANT CHANGE UP (ref 22–31)
COLOR SPEC: YELLOW — SIGNIFICANT CHANGE UP
CREAT SERPL-MCNC: 1.19 MG/DL — SIGNIFICANT CHANGE UP (ref 0.5–1.3)
DIFF PNL FLD: ABNORMAL
EGFR: 41 ML/MIN/1.73M2 — LOW
EOSINOPHIL # BLD AUTO: 0.02 K/UL — SIGNIFICANT CHANGE UP (ref 0–0.5)
EOSINOPHIL NFR BLD AUTO: 0.3 % — SIGNIFICANT CHANGE UP (ref 0–6)
FLUAV AG NPH QL: SIGNIFICANT CHANGE UP
FLUBV AG NPH QL: SIGNIFICANT CHANGE UP
GLUCOSE SERPL-MCNC: 115 MG/DL — HIGH (ref 70–99)
GLUCOSE UR QL: NEGATIVE MG/DL — SIGNIFICANT CHANGE UP
HCT VFR BLD CALC: 32 % — LOW (ref 34.5–45)
HGB BLD-MCNC: 10.4 G/DL — LOW (ref 11.5–15.5)
IMM GRANULOCYTES NFR BLD AUTO: 0.3 % — SIGNIFICANT CHANGE UP (ref 0–0.9)
KETONES UR-MCNC: NEGATIVE MG/DL — SIGNIFICANT CHANGE UP
LACTATE SERPL-SCNC: 1.1 MMOL/L — SIGNIFICANT CHANGE UP (ref 0.7–2)
LEUKOCYTE ESTERASE UR-ACNC: ABNORMAL
LIDOCAIN IGE QN: 13 U/L — SIGNIFICANT CHANGE UP (ref 13–75)
LYMPHOCYTES # BLD AUTO: 1.65 K/UL — SIGNIFICANT CHANGE UP (ref 1–3.3)
LYMPHOCYTES # BLD AUTO: 20.7 % — SIGNIFICANT CHANGE UP (ref 13–44)
MCHC RBC-ENTMCNC: 32.1 PG — SIGNIFICANT CHANGE UP (ref 27–34)
MCHC RBC-ENTMCNC: 32.5 GM/DL — SIGNIFICANT CHANGE UP (ref 32–36)
MCV RBC AUTO: 98.8 FL — SIGNIFICANT CHANGE UP (ref 80–100)
MONOCYTES # BLD AUTO: 1.22 K/UL — HIGH (ref 0–0.9)
MONOCYTES NFR BLD AUTO: 15.3 % — HIGH (ref 2–14)
NEUTROPHILS # BLD AUTO: 5.04 K/UL — SIGNIFICANT CHANGE UP (ref 1.8–7.4)
NEUTROPHILS NFR BLD AUTO: 63.3 % — SIGNIFICANT CHANGE UP (ref 43–77)
NITRITE UR-MCNC: NEGATIVE — SIGNIFICANT CHANGE UP
PH UR: 5.5 — SIGNIFICANT CHANGE UP (ref 5–8)
PLATELET # BLD AUTO: 153 K/UL — SIGNIFICANT CHANGE UP (ref 150–400)
POTASSIUM SERPL-MCNC: 4.6 MMOL/L — SIGNIFICANT CHANGE UP (ref 3.5–5.3)
POTASSIUM SERPL-SCNC: 4.6 MMOL/L — SIGNIFICANT CHANGE UP (ref 3.5–5.3)
PROT SERPL-MCNC: 6.2 GM/DL — SIGNIFICANT CHANGE UP (ref 6–8.3)
PROT UR-MCNC: 100 MG/DL
RBC # BLD: 3.24 M/UL — LOW (ref 3.8–5.2)
RBC # FLD: 13.7 % — SIGNIFICANT CHANGE UP (ref 10.3–14.5)
RBC CASTS # UR COMP ASSIST: 1 /HPF — SIGNIFICANT CHANGE UP (ref 0–4)
RSV RNA NPH QL NAA+NON-PROBE: SIGNIFICANT CHANGE UP
SARS-COV-2 RNA SPEC QL NAA+PROBE: SIGNIFICANT CHANGE UP
SODIUM SERPL-SCNC: 132 MMOL/L — LOW (ref 135–145)
SP GR SPEC: 1.01 — SIGNIFICANT CHANGE UP (ref 1–1.03)
SQUAMOUS # UR AUTO: 6 /HPF — HIGH (ref 0–5)
TROPONIN I, HIGH SENSITIVITY RESULT: 9.79 NG/L — SIGNIFICANT CHANGE UP
UROBILINOGEN FLD QL: 0.2 MG/DL — SIGNIFICANT CHANGE UP (ref 0.2–1)
WBC # BLD: 7.96 K/UL — SIGNIFICANT CHANGE UP (ref 3.8–10.5)
WBC # FLD AUTO: 7.96 K/UL — SIGNIFICANT CHANGE UP (ref 3.8–10.5)
WBC UR QL: 222 /HPF — HIGH (ref 0–5)

## 2024-02-10 PROCEDURE — 93005 ELECTROCARDIOGRAM TRACING: CPT

## 2024-02-10 PROCEDURE — 74018 RADEX ABDOMEN 1 VIEW: CPT

## 2024-02-10 PROCEDURE — 80048 BASIC METABOLIC PNL TOTAL CA: CPT

## 2024-02-10 PROCEDURE — 82728 ASSAY OF FERRITIN: CPT

## 2024-02-10 PROCEDURE — 85027 COMPLETE CBC AUTOMATED: CPT

## 2024-02-10 PROCEDURE — 83550 IRON BINDING TEST: CPT

## 2024-02-10 PROCEDURE — 84484 ASSAY OF TROPONIN QUANT: CPT

## 2024-02-10 PROCEDURE — 83690 ASSAY OF LIPASE: CPT

## 2024-02-10 PROCEDURE — 85045 AUTOMATED RETICULOCYTE COUNT: CPT

## 2024-02-10 PROCEDURE — 97116 GAIT TRAINING THERAPY: CPT | Mod: GP

## 2024-02-10 PROCEDURE — 80076 HEPATIC FUNCTION PANEL: CPT

## 2024-02-10 PROCEDURE — 97530 THERAPEUTIC ACTIVITIES: CPT | Mod: GP

## 2024-02-10 PROCEDURE — 82607 VITAMIN B-12: CPT

## 2024-02-10 PROCEDURE — 36415 COLL VENOUS BLD VENIPUNCTURE: CPT

## 2024-02-10 PROCEDURE — 93010 ELECTROCARDIOGRAM REPORT: CPT

## 2024-02-10 PROCEDURE — 74176 CT ABD & PELVIS W/O CONTRAST: CPT | Mod: 26,MA

## 2024-02-10 PROCEDURE — 97162 PT EVAL MOD COMPLEX 30 MIN: CPT | Mod: GP

## 2024-02-10 PROCEDURE — 99223 1ST HOSP IP/OBS HIGH 75: CPT

## 2024-02-10 PROCEDURE — 83615 LACTATE (LD) (LDH) ENZYME: CPT

## 2024-02-10 PROCEDURE — C9113: CPT

## 2024-02-10 PROCEDURE — 83540 ASSAY OF IRON: CPT

## 2024-02-10 PROCEDURE — 76705 ECHO EXAM OF ABDOMEN: CPT

## 2024-02-10 PROCEDURE — 99285 EMERGENCY DEPT VISIT HI MDM: CPT

## 2024-02-10 PROCEDURE — 82746 ASSAY OF FOLIC ACID SERUM: CPT

## 2024-02-10 PROCEDURE — 83010 ASSAY OF HAPTOGLOBIN QUANT: CPT

## 2024-02-10 PROCEDURE — 84100 ASSAY OF PHOSPHORUS: CPT

## 2024-02-10 PROCEDURE — 83735 ASSAY OF MAGNESIUM: CPT

## 2024-02-10 RX ORDER — CARVEDILOL PHOSPHATE 80 MG/1
25 CAPSULE, EXTENDED RELEASE ORAL EVERY 12 HOURS
Refills: 0 | Status: DISCONTINUED | OUTPATIENT
Start: 2024-02-10 | End: 2024-02-14

## 2024-02-10 RX ORDER — HYDROCORTISONE 20 MG
5 TABLET ORAL AT BEDTIME
Refills: 0 | Status: DISCONTINUED | OUTPATIENT
Start: 2024-02-10 | End: 2024-02-14

## 2024-02-10 RX ORDER — HYDRALAZINE HCL 50 MG
100 TABLET ORAL EVERY 8 HOURS
Refills: 0 | Status: DISCONTINUED | OUTPATIENT
Start: 2024-02-10 | End: 2024-02-14

## 2024-02-10 RX ORDER — LACTOBACILLUS ACIDOPHILUS 100MM CELL
1 CAPSULE ORAL
Refills: 0 | DISCHARGE
Start: 2024-02-10 | End: 2024-02-25

## 2024-02-10 RX ORDER — CHOLECALCIFEROL (VITAMIN D3) 125 MCG
2000 CAPSULE ORAL DAILY
Refills: 0 | Status: DISCONTINUED | OUTPATIENT
Start: 2024-02-10 | End: 2024-02-14

## 2024-02-10 RX ORDER — CIPROFLOXACIN LACTATE 400MG/40ML
400 VIAL (ML) INTRAVENOUS EVERY 12 HOURS
Refills: 0 | Status: DISCONTINUED | OUTPATIENT
Start: 2024-02-10 | End: 2024-02-11

## 2024-02-10 RX ORDER — SODIUM CHLORIDE 9 MG/ML
500 INJECTION INTRAMUSCULAR; INTRAVENOUS; SUBCUTANEOUS ONCE
Refills: 0 | Status: COMPLETED | OUTPATIENT
Start: 2024-02-10 | End: 2024-02-10

## 2024-02-10 RX ORDER — ONDANSETRON 8 MG/1
4 TABLET, FILM COATED ORAL EVERY 8 HOURS
Refills: 0 | Status: DISCONTINUED | OUTPATIENT
Start: 2024-02-10 | End: 2024-02-14

## 2024-02-10 RX ORDER — METRONIDAZOLE 500 MG
500 TABLET ORAL EVERY 8 HOURS
Refills: 0 | Status: ACTIVE | OUTPATIENT
Start: 2024-02-10 | End: 2025-01-08

## 2024-02-10 RX ORDER — FAMOTIDINE 10 MG/ML
10 INJECTION INTRAVENOUS
Refills: 0 | Status: DISCONTINUED | OUTPATIENT
Start: 2024-02-10 | End: 2024-02-13

## 2024-02-10 RX ORDER — FERROUS SULFATE 325(65) MG
325 TABLET ORAL DAILY
Refills: 0 | Status: DISCONTINUED | OUTPATIENT
Start: 2024-02-10 | End: 2024-02-14

## 2024-02-10 RX ORDER — CIPROFLOXACIN LACTATE 400MG/40ML
400 VIAL (ML) INTRAVENOUS ONCE
Refills: 0 | Status: COMPLETED | OUTPATIENT
Start: 2024-02-10 | End: 2024-02-10

## 2024-02-10 RX ORDER — LOSARTAN POTASSIUM 100 MG/1
100 TABLET, FILM COATED ORAL DAILY
Refills: 0 | Status: DISCONTINUED | OUTPATIENT
Start: 2024-02-10 | End: 2024-02-14

## 2024-02-10 RX ORDER — FLUDROCORTISONE ACETATE 0.1 MG/1
0.05 TABLET ORAL
Refills: 0 | Status: DISCONTINUED | OUTPATIENT
Start: 2024-02-10 | End: 2024-02-14

## 2024-02-10 RX ORDER — HEPARIN SODIUM 5000 [USP'U]/ML
5000 INJECTION INTRAVENOUS; SUBCUTANEOUS EVERY 12 HOURS
Refills: 0 | Status: DISCONTINUED | OUTPATIENT
Start: 2024-02-10 | End: 2024-02-14

## 2024-02-10 RX ORDER — FAMOTIDINE 10 MG/ML
20 INJECTION INTRAVENOUS DAILY
Refills: 0 | Status: DISCONTINUED | OUTPATIENT
Start: 2024-02-10 | End: 2024-02-10

## 2024-02-10 RX ORDER — AMIODARONE HYDROCHLORIDE 400 MG/1
200 TABLET ORAL DAILY
Refills: 0 | Status: DISCONTINUED | OUTPATIENT
Start: 2024-02-10 | End: 2024-02-14

## 2024-02-10 RX ORDER — LANOLIN ALCOHOL/MO/W.PET/CERES
3 CREAM (GRAM) TOPICAL AT BEDTIME
Refills: 0 | Status: DISCONTINUED | OUTPATIENT
Start: 2024-02-10 | End: 2024-02-14

## 2024-02-10 RX ORDER — ACETAMINOPHEN 500 MG
650 TABLET ORAL EVERY 6 HOURS
Refills: 0 | Status: DISCONTINUED | OUTPATIENT
Start: 2024-02-10 | End: 2024-02-14

## 2024-02-10 RX ORDER — AMLODIPINE BESYLATE 2.5 MG/1
5 TABLET ORAL DAILY
Refills: 0 | Status: DISCONTINUED | OUTPATIENT
Start: 2024-02-10 | End: 2024-02-14

## 2024-02-10 RX ORDER — HYDROCORTISONE 20 MG
10 TABLET ORAL DAILY
Refills: 0 | Status: DISCONTINUED | OUTPATIENT
Start: 2024-02-10 | End: 2024-02-14

## 2024-02-10 RX ORDER — METRONIDAZOLE 500 MG
500 TABLET ORAL ONCE
Refills: 0 | Status: COMPLETED | OUTPATIENT
Start: 2024-02-10 | End: 2024-02-10

## 2024-02-10 RX ADMIN — SODIUM CHLORIDE 500 MILLILITER(S): 9 INJECTION INTRAMUSCULAR; INTRAVENOUS; SUBCUTANEOUS at 13:19

## 2024-02-10 RX ADMIN — Medication 3 MILLIGRAM(S): at 22:43

## 2024-02-10 RX ADMIN — Medication 100 MILLIGRAM(S): at 23:54

## 2024-02-10 RX ADMIN — Medication 400 MILLIGRAM(S): at 16:18

## 2024-02-10 RX ADMIN — FLUDROCORTISONE ACETATE 0.05 MILLIGRAM(S): 0.1 TABLET ORAL at 22:43

## 2024-02-10 RX ADMIN — Medication 100 MILLIGRAM(S): at 22:42

## 2024-02-10 RX ADMIN — CARVEDILOL PHOSPHATE 25 MILLIGRAM(S): 80 CAPSULE, EXTENDED RELEASE ORAL at 22:43

## 2024-02-10 RX ADMIN — Medication 500 MILLIGRAM(S): at 18:33

## 2024-02-10 RX ADMIN — Medication 100 MILLIGRAM(S): at 16:18

## 2024-02-10 RX ADMIN — Medication 200 MILLIGRAM(S): at 15:13

## 2024-02-10 RX ADMIN — HEPARIN SODIUM 5000 UNIT(S): 5000 INJECTION INTRAVENOUS; SUBCUTANEOUS at 22:43

## 2024-02-10 NOTE — ED PROVIDER NOTE - ATTENDING CONTRIBUTION TO CARE
GEN: NAD, A & O x 3  HEAD/EYES: NCAT, PERRL, EOMI, anicteric sclerae, no conjunctival pallor  ENT: mucus membranes moist, oropharynx WNL, trachea midline, no JVD  CHEST: lungs CTA with equal breath sounds bilaterally, chest wall nontender and atraumatic  CV: heart with reg rhythm S1, S2, no murmur; distal pulses intact and symmetric bilaterally  ABDOMEN: normoactive bowel sounds, soft, ND, nontender, no masses  MSK: extremities atraumatic and nontender, no edema. back is without midline tenderness, deformity or .  SKIN: no rash, no bruising, no cyanosis. color appropriate for ethnicity  NEURO:AAO x 3 no facial asymmetry.  sensation, motor, coordination are intact  PSYCH: Affect appropriate  agree with resident evaluation, plan and disposition

## 2024-02-10 NOTE — H&P ADULT - HISTORY OF PRESENT ILLNESS
patient is a 89 y/o/f with extensive past medical history ofparoxysmal afib, diastolic heart failure, CKD, hypokalemia, hyponatremia, hiatal hernia, adrenal insufficiency, lymphoma in remission, CHF, HTN, iron deficiency and SHx of intracranial hemorrhage, hysterectomy, appendectomy presenting to the emergency department with 1 week of watery diarrhea (multiple episodes per day) associated with generalized weakness, abd cramping, and decreased appetite, patient states she was started on antibiotic regimen a few days ago (this being said on the sunruse retirement form flagyl was only started today),    Denies nausea, vomiting. Notes of fullness - upon palpation of the abdomnen.     Family updated. DNR /  DNI

## 2024-02-10 NOTE — PHARMACOTHERAPY INTERVENTION NOTE - COMMENTS
Medication history complete. Medications and allergies reviewed with list provided by Starr and confirmed with .

## 2024-02-10 NOTE — ED ADULT TRIAGE NOTE - CHIEF COMPLAINT QUOTE
patient brought in by EMS from [facility] c/o diarrhea x 1 week.  was given imodium today.  reports abdominal discomfort just prior to episodes of diarrhea.  no recent antibiotic use.

## 2024-02-10 NOTE — H&P ADULT - PROBLEM SELECTOR PLAN 4
- patient not in fluid overloaded state -  rather dehydrated  - gentle hydration  - hold lasix for now

## 2024-02-10 NOTE — ED PROVIDER NOTE - PROGRESS NOTE DETAILS
Starr Ho DO (PGY3): UA positive for UTI. CT showing signs of colitis.  Will give Cipro and Flagyl.   Given generalized weakness, profuse diarrhea, difficulty tolerating p.o. will admit to the hospital for IV antibiotics.  Hospitalist paged, awaiting callback. Starr Ho DO (PGY3): Spoke with hospitalist will admit to their service.

## 2024-02-10 NOTE — ED ADULT NURSE NOTE - OBJECTIVE STATEMENT
pt presenting from [place] for diarrhea for two weeks. Pt was given imodium today and was prescribed ABX today, usually given for diverticulitis, but the family @ bedside does not know what they're for. Pt has not been given fist dose at facility yet. Pt is alert, offers no complaints other than a belly ache.

## 2024-02-10 NOTE — ED ADULT NURSE NOTE - SUICIDE SCREENING QUESTION 2
Can you see if pt can do telehealth appointment to discuss his complaint next week (add to the end of the day) and also to f/u leflunomide.     He needs to get monitoring blood work on leflunomide Patient unable to complete

## 2024-02-10 NOTE — ED ADULT NURSE NOTE - TEMPLATE LIST FOR HEAD TO TOE ASSESSMENT
Venipuncture was obtained from right arm. Patient tolerated the procedure without complications or complaints.   General

## 2024-02-10 NOTE — PATIENT PROFILE ADULT - FUNCTIONAL SCREEN CURRENT LEVEL: SWALLOWING (IF SCORE 2 OR MORE FOR ANY ITEM, CONSULT REHAB SERVICES), MLM)
- Please schedule CBC and type and screen weekly on Mondays for possible transfusions through month of April- Schedule return visit with CBC, type and screen, CMP and appt with Dr. Valdes 4/27- Schedule chemo chair for cycle 12 vidaza 4/27-5/1- cancel bone marrow biopsy
0 = swallows foods/liquids without difficulty

## 2024-02-10 NOTE — PATIENT PROFILE ADULT - FALL HARM RISK - HARM RISK INTERVENTIONS

## 2024-02-10 NOTE — PATIENT PROFILE ADULT - TYPE OF COMMUNICATION USED TO ADDRESS HEALTHCARE
[facility]    Brief Operative Note    Pre-operative diagnosis: Myopathy [G72.9]  Post-operative diagnosis Same as pre-operative diagnosis    Procedure: Procedure(s):  Left trapezius muscle biopsy  Surgeon: Surgeon(s) and Role:     * Ade Villa MD - Primary     * Rachel Chaudhry MD - Resident - Assisting  Anesthesia: Combined MAC with Local   Estimated blood loss: 5 mL  Drains:  None  Specimens:   ID Type Source Tests Collected by Time Destination   1 : Left trapezius muscle biopsy (sent to [facility] for Neuromuscular Collection) Tissue Other OR DOCUMENTATION ONLY Ade Villa MD 7/15/2020  8:36 AM    2 : Left trapezius muscle biopsy (sent to [facility] for Neuromuscular Collection) Tissue Other OR DOCUMENTATION ONLY Ade Villa MD 7/15/2020  8:39 AM    3 : Left trapezius muscle biopsy (sent to [facility] for Neuromuscular Collection) Tissue Other OR DOCUMENTATION ONLY Ade Villa MD 7/15/2020  8:39 AM      Findings: Left trapezius muscle biopsy sent for pathology.  Complications: None.  Implants: * No implants in log *         Other

## 2024-02-10 NOTE — ED PROVIDER NOTE - NSICDXPASTMEDICALHX_GEN_ALL_CORE_FT
PAST MEDICAL HISTORY:  Chronic kidney disease, unspecified CKD stage     Diastolic heart failure     H/O adrenal insufficiency     H/O CHF     HTN (hypertension)     Hypokalemia     Hyponatremia     Iron deficiency     Lymphoma     Paroxysmal atrial fibrillation

## 2024-02-10 NOTE — H&P ADULT - NSHPPHYSICALEXAM_GEN_ALL_CORE
Physical Exam:   GENERAL APPEARANCE:  elderly, frail.  T(C): 36.8 (02-10-24 @ 14:57), Max: 36.8 (02-10-24 @ 12:32)  HR: 72 (02-10-24 @ 14:57) (72 - 74)  BP: 167/53 (02-10-24 @ 14:57) (167/53 - 178/49)  RR: 18 (02-10-24 @ 14:57) (18 - 18)  SpO2: 97% (02-10-24 @ 14:57) (95% - 97%)  HEENT:  Head is normocephalic    Skin:  Warm and dry without any rash   NECK:  Supple without lymphadenopathy.   HEART:  Regular rate and rhythm. normal S1 and S2, No M/R/G  LUNGS:  Good ins/exp effort, no W/R/R/C  ABDOMEN:  Soft, abdomen is mildly distended, no guarding on deep palpation * patient feels almost she is full of urine and is unable to urinate  EXTREMITIES:  Without cyanosis, clubbing or edema.   NEUROLOGICAL:  Gross nonfocal

## 2024-02-10 NOTE — ED ADULT NURSE NOTE - NSFALLHARMRISKINTERV_ED_ALL_ED

## 2024-02-10 NOTE — PATIENT PROFILE ADULT - FALL HARM RISK - ATTEMPT OOB
Department of Anesthesiology  Postprocedure Note    Patient: Rosalie Mora  MRN: [id number]  YOB: 1950  Date of evaluation: 10/18/2022      Procedure Summary     Date: 10/18/22 [place] [facility] [place] [facility]    Anesthesia Start: 4005 Anesthesia Stop: 1507    Aspen Oliva MD Responsible Provider: Quinten Bullard MD    Anesthesia Type: TIVA ASA Status: 2          Anesthesia Type: No value filed.     Edil Phase I: Edil Score: 8    Edil Phase II:        Anesthesia Post Evaluation    Patient location during evaluation: PACU  Patient participation: complete - patient participated  Level of consciousness: awake and alert  Airway patency: patent  Nausea & Vomiting: no nausea and no vomiting  Complications: no  Cardiovascular status: hemodynamically stable  Respiratory status: acceptable, nonlabored ventilation and spontaneous ventilation  Hydration status: euvolemic  Comments: BP (!) 140/74   Pulse 71   Temp 98.6 °F (37 °C) (Temporal) Comment: GI RR  Resp 14   Ht 5' 5\" (1.651 m)   Wt 117 lb (53.1 kg)   SpO2 100%   BMI 19.47 kg/m²     Multimodal analgesia pain management approach
No

## 2024-02-10 NOTE — ED ADULT NURSE REASSESSMENT NOTE - NS ED NURSE REASSESS COMMENT FT1
Received report from KENNETH CARABALLO. Pt A&Ox3, resting comfortably. Pt denies any pain or discomfort. VS as charted, respirations equal and unlabored. Pending bed placement. Pt updated on plan of care, verbalized understanding. Pt currently eating dinner. No acute distress noted or complaints at this time. Safety measures maintained, stretcher locked and in lowest position, both side rails up.

## 2024-02-10 NOTE — ED PROVIDER NOTE - PHYSICAL EXAMINATION
GENERAL: Awake. Alert. NAD. Well nourished.  HEENT: NC/AT, Conjunctiva pink, no scleral icterus. Airway patent.   LUNGS: CTAB. No wheezes or rales noted.  CARDIAC: RRR.  ABDOMEN: No masses noted. Soft, diffusely ttp, ND, no rebound, no guarding.  EXT: No edema, no calf tenderness, distal pulses 2+ bilaterally  NEURO: Moving all extremities. Sensation and strength intact throughout. No focal deficits.   SKIN: Warm and dry.  PSYCH: Normal affect.

## 2024-02-10 NOTE — ED PROVIDER NOTE - CLINICAL SUMMARY MEDICAL DECISION MAKING FREE TEXT BOX
89F PMH paroxysmal afib, diastolic heart failure, CKD, hypokalemia, hyponatremia, hiatal hernia, adrenal insufficiency, lymphoma in remission, CHF, HTN, iron deficiency and SHx of intracranial hemorrhage, hysterectomy, appendectomy presenting to the emergency department with 1 week of watery diarrhea (multiple episodes per day) associated with generalized weakness, abd cramping, and decreased appetite, patient states she was started on antibiotic regimen a few days ago (unsure which medication), patient from a nursing facility. Given hx and physical, ddx includes but is not limited to Colitis, C. difficile, metabolic derangement, viral syndrome, uti. Plan for stool sample, labs, ct, ua, ivf, reassess,

## 2024-02-10 NOTE — H&P ADULT - PROBLEM SELECTOR PLAN 5
- continue with rhythm control approach  - amiodarone 200 mg po qdaily  - coreg 25 mg po qdaily  - follows up with Dr. Mine hand

## 2024-02-10 NOTE — H&P ADULT - PROBLEM SELECTOR PLAN 1
- sepsis not present on admission  - IV flagyl /  IV Cipro  - gentle IV hydration  - isolation -  GI PCR  - ID eval

## 2024-02-10 NOTE — ED PROVIDER NOTE - OBJECTIVE STATEMENT
89F PMH paroxysmal afib, diastolic heart failure, CKD, hypokalemia, hyponatremia, hiatal hernia, adrenal insufficiency, lymphoma in remission, CHF, HTN, iron deficiency and SHx of intracranial hemorrhage, hysterectomy, appendectomy presenting to the emergency department with 1 week of watery diarrhea (multiple episodes per day) associated with generalized weakness, abd cramping, and decreased appetite, patient states she was started on antibiotic regimen a few days ago (unsure which medication), patient from a nursing facility.  Denies nausea, vomiting.  Denies dysuria, hematuria. No new cough or sob.

## 2024-02-10 NOTE — H&P ADULT - NSHPREVIEWOFSYSTEMS_GEN_ALL_CORE
REVIEW OF SYSTEMS:  General: NAD, hemodynamically stable, (-)  fever, (-) chills, (+) weakness, very tired  HEENT:  Eyes:  No visual loss, blurred vision, double vision or yellow sclerae. Ears, Nose, Throat:  No hearing loss, sneezing, congestion, runny nose or sore throat.  SKIN:  thin /  dry /  pale  CARDIOVASCULAR:  No chest pain, chest pressure or chest discomfort. No palpitations or edema.  RESPIRATORY:  No shortness of breath, cough or sputum.  GASTROINTESTINAL: abdominal cramping  NEUROLOGICAL:  No headache, dizziness, syncope, paralysis, ataxia, numbness or tingling in the extremities. No change in bowel or bladder control.  MUSCULOSKELETAL:  No muscle, back pain, joint pain or stiffness.  HEMATOLOGIC:  No anemia, bleeding or bruising.  LYMPHATICS:  No enlarged nodes. No history of splenectomy.  ENDOCRINOLOGIC:  No reports of sweating, cold or heat intolerance. No polyuria or polydipsia.  ALLERGIES:  No history of asthma, hives, eczema or rhinitis.

## 2024-02-10 NOTE — H&P ADULT - NSHPLABSRESULTS_GEN_ALL_CORE
CBC Full  -  ( 10 Feb 2024 13:11 )  WBC Count : 7.96 K/uL  RBC Count : 3.24 M/uL  Hemoglobin : 10.4 g/dL  Hematocrit : 32.0 %  Platelet Count - Automated : 153 K/uL  Mean Cell Volume : 98.8 fl  Mean Cell Hemoglobin : 32.1 pg  Mean Cell Hemoglobin Concentration : 32.5 gm/dL  Auto Neutrophil # : 5.04 K/uL  Auto Lymphocyte # : 1.65 K/uL  Auto Monocyte # : 1.22 K/uL  Auto Eosinophil # : 0.02 K/uL  Auto Basophil # : 0.01 K/uL  Auto Neutrophil % : 63.3 %  Auto Lymphocyte % : 20.7 %  Auto Monocyte % : 15.3 %  Auto Eosinophil % : 0.3 %  Auto Basophil % : 0.1 %      Urinalysis Basic - ( 10 Feb 2024 14:30 )    Color: Yellow / Appearance: Cloudy / S.014 / pH: x  Gluc: x / Ketone: Negative mg/dL  / Bili: Negative / Urobili: 0.2 mg/dL   Blood: x / Protein: 100 mg/dL / Nitrite: Negative   Leuk Esterase: Large / RBC: 1 /HPF /  /HPF   Sq Epi: x / Non Sq Epi: 6 /HPF / Bacteria: Many /HPF      -10    132<L>  |  104  |  14  ----------------------------<  115<H>  4.6   |  24  |  1.19    Ca    9.3      10 Feb 2024 13:11    TPro  6.2  /  Alb  2.8<L>  /  TBili  0.3  /  DBili  x   /  AST  13<L>  /  ALT  12  /  AlkPhos  71  -10

## 2024-02-11 LAB
ANION GAP SERPL CALC-SCNC: 4 MMOL/L — LOW (ref 5–17)
BUN SERPL-MCNC: 13 MG/DL — SIGNIFICANT CHANGE UP (ref 7–23)
CALCIUM SERPL-MCNC: 8.4 MG/DL — LOW (ref 8.5–10.1)
CHLORIDE SERPL-SCNC: 105 MMOL/L — SIGNIFICANT CHANGE UP (ref 96–108)
CO2 SERPL-SCNC: 23 MMOL/L — SIGNIFICANT CHANGE UP (ref 22–31)
CREAT SERPL-MCNC: 1.14 MG/DL — SIGNIFICANT CHANGE UP (ref 0.5–1.3)
EGFR: 43 ML/MIN/1.73M2 — LOW
GLUCOSE SERPL-MCNC: 105 MG/DL — HIGH (ref 70–99)
HCT VFR BLD CALC: 27.8 % — LOW (ref 34.5–45)
HGB BLD-MCNC: 8.8 G/DL — LOW (ref 11.5–15.5)
MCHC RBC-ENTMCNC: 31.7 GM/DL — LOW (ref 32–36)
MCHC RBC-ENTMCNC: 31.7 PG — SIGNIFICANT CHANGE UP (ref 27–34)
MCV RBC AUTO: 100 FL — SIGNIFICANT CHANGE UP (ref 80–100)
PLATELET # BLD AUTO: 120 K/UL — LOW (ref 150–400)
POTASSIUM SERPL-MCNC: 4 MMOL/L — SIGNIFICANT CHANGE UP (ref 3.5–5.3)
POTASSIUM SERPL-SCNC: 4 MMOL/L — SIGNIFICANT CHANGE UP (ref 3.5–5.3)
RBC # BLD: 2.78 M/UL — LOW (ref 3.8–5.2)
RBC # FLD: 13.5 % — SIGNIFICANT CHANGE UP (ref 10.3–14.5)
SODIUM SERPL-SCNC: 132 MMOL/L — LOW (ref 135–145)
WBC # BLD: 5.84 K/UL — SIGNIFICANT CHANGE UP (ref 3.8–10.5)
WBC # FLD AUTO: 5.84 K/UL — SIGNIFICANT CHANGE UP (ref 3.8–10.5)

## 2024-02-11 PROCEDURE — 99233 SBSQ HOSP IP/OBS HIGH 50: CPT

## 2024-02-11 RX ORDER — SODIUM CHLORIDE 9 MG/ML
1000 INJECTION INTRAMUSCULAR; INTRAVENOUS; SUBCUTANEOUS
Refills: 0 | Status: DISCONTINUED | OUTPATIENT
Start: 2024-02-11 | End: 2024-02-14

## 2024-02-11 RX ORDER — CEFTRIAXONE 500 MG/1
1000 INJECTION, POWDER, FOR SOLUTION INTRAMUSCULAR; INTRAVENOUS EVERY 24 HOURS
Refills: 0 | Status: DISCONTINUED | OUTPATIENT
Start: 2024-02-11 | End: 2024-02-11

## 2024-02-11 RX ORDER — CEFTRIAXONE 500 MG/1
1000 INJECTION, POWDER, FOR SOLUTION INTRAMUSCULAR; INTRAVENOUS EVERY 24 HOURS
Refills: 0 | Status: COMPLETED | OUTPATIENT
Start: 2024-02-11 | End: 2024-02-13

## 2024-02-11 RX ADMIN — Medication 1 DROP(S): at 18:34

## 2024-02-11 RX ADMIN — Medication 1 DROP(S): at 20:55

## 2024-02-11 RX ADMIN — FLUDROCORTISONE ACETATE 0.05 MILLIGRAM(S): 0.1 TABLET ORAL at 20:52

## 2024-02-11 RX ADMIN — Medication 10 MILLIGRAM(S): at 09:58

## 2024-02-11 RX ADMIN — AMLODIPINE BESYLATE 5 MILLIGRAM(S): 2.5 TABLET ORAL at 09:57

## 2024-02-11 RX ADMIN — Medication 100 MILLIGRAM(S): at 08:15

## 2024-02-11 RX ADMIN — Medication 100 MILLIGRAM(S): at 15:15

## 2024-02-11 RX ADMIN — SODIUM CHLORIDE 75 MILLILITER(S): 9 INJECTION INTRAMUSCULAR; INTRAVENOUS; SUBCUTANEOUS at 17:56

## 2024-02-11 RX ADMIN — HEPARIN SODIUM 5000 UNIT(S): 5000 INJECTION INTRAVENOUS; SUBCUTANEOUS at 09:59

## 2024-02-11 RX ADMIN — Medication 100 MILLIGRAM(S): at 15:14

## 2024-02-11 RX ADMIN — LOSARTAN POTASSIUM 100 MILLIGRAM(S): 100 TABLET, FILM COATED ORAL at 09:58

## 2024-02-11 RX ADMIN — Medication 3 MILLIGRAM(S): at 22:18

## 2024-02-11 RX ADMIN — Medication 2000 UNIT(S): at 09:56

## 2024-02-11 RX ADMIN — Medication 100 MILLIGRAM(S): at 05:15

## 2024-02-11 RX ADMIN — Medication 100 MILLIGRAM(S): at 20:55

## 2024-02-11 RX ADMIN — CARVEDILOL PHOSPHATE 25 MILLIGRAM(S): 80 CAPSULE, EXTENDED RELEASE ORAL at 09:58

## 2024-02-11 RX ADMIN — HEPARIN SODIUM 5000 UNIT(S): 5000 INJECTION INTRAVENOUS; SUBCUTANEOUS at 20:55

## 2024-02-11 RX ADMIN — Medication 1 TABLET(S): at 09:57

## 2024-02-11 RX ADMIN — FAMOTIDINE 10 MILLIGRAM(S): 10 INJECTION INTRAVENOUS at 10:00

## 2024-02-11 RX ADMIN — Medication 325 MILLIGRAM(S): at 09:57

## 2024-02-11 RX ADMIN — FLUDROCORTISONE ACETATE 0.05 MILLIGRAM(S): 0.1 TABLET ORAL at 09:58

## 2024-02-11 RX ADMIN — CEFTRIAXONE 1000 MILLIGRAM(S): 500 INJECTION, POWDER, FOR SOLUTION INTRAMUSCULAR; INTRAVENOUS at 15:14

## 2024-02-11 RX ADMIN — CARVEDILOL PHOSPHATE 25 MILLIGRAM(S): 80 CAPSULE, EXTENDED RELEASE ORAL at 20:54

## 2024-02-11 RX ADMIN — AMIODARONE HYDROCHLORIDE 200 MILLIGRAM(S): 400 TABLET ORAL at 09:57

## 2024-02-11 RX ADMIN — Medication 100 MILLIGRAM(S): at 20:53

## 2024-02-11 RX ADMIN — Medication 200 MILLIGRAM(S): at 05:15

## 2024-02-11 RX ADMIN — Medication 5 MILLIGRAM(S): at 20:52

## 2024-02-11 NOTE — PROGRESS NOTE ADULT - ASSESSMENT
89 y/o female with  PMH of Paroxysmal AFIB, HFpEF,  CKD, Adrenal insufficiency, Lymphoma in remission,  HTN, Iron deficiency,  ICH admitted for:     1. Diarrhea likely due to Colitis, suspect infectious etiology   sepsis not present on admission  CT abd/pelvis: _ Colitis and distended GB, no stones   LFTs wnl, No RUQ pain on exam   Isolate  GI PCR and CDIFF PCR pending collection   F/u BCX  C/w  IV flagyl /  IV Cipro  Will give gentle IV hydration  Diet as tolerated   Above plan d/w Dr Duke     2.  Abnormal UA, suspect UTI   F/u UCX  C/w  IV Cipro    3. Distended Bladder, suspect urinary retention   Check bladder scan   Straight cath if volume >350ml     4. HI, likely prerenal, but will r/o urinary retention   BUN/CR improved with IVF bolus in ED, unclear of was straight cathed   Monitor UO  IVF  Hold lasix   labs in am     5.  H/O adrenal insufficiency.   BP stable  continue with home regiment of medications: on Florinef and hydrocortisone     6. HPpEF, stable   patient not in fluid overloaded state -  rather dehydrated  gentle hydration  hold lasix for now.    7. Paroxysmal atrial fibrillation.   last ECG reviewed, in SR   C/w Amiodarone 200 mg po qdaily  C/w coreg 25 mg po qdaily    8. HTN   C/w Losartan, Coreg, amlodipine  Monitor BP closely     9. Acute on chronic anemia  pt denies blood in the stool   Trend H/H   Check iron studies, vit B12, folate, retic count, LDH      10. DVT PPX: heparin SQ      11. ACP: DNR/DNI, MOLST in vidhya chart reviewed

## 2024-02-11 NOTE — DIETITIAN INITIAL EVALUATION ADULT - PROBLEM SELECTOR PLAN 4
Progress Note      Patient Name: Malika Jha   Patient ID: [id number]   Sex: Female   YOB: 1939    Primary Care Provider: Noa MUNOZ   Referring Provider: Noa MUNOZ    Visit Date: June 26, 2020    Provider: Raji Prajapati MD   Location: [facility]   Location Address: [address] [place] [address], [address] [place] [address]  [id number]   Location Phone: [phone]          Chief Complaint  · Painful urination      History Of Present Illness  This is a 81 year old /White female , who is here for the evaluation of possible uti.        cmp 5/28/2020.  Patient had urinary tract infection in May.  Dysuria yesterday and about 3 days ago.  Patient has low back pain.  She has no fever or chills.  Patient has history of kidney stones.  She has chronic kidney disease stage III.  She has hesitancy of urination.  Patient was treated with Keflex 500 mg 4 times a day       Review of Systems  · Constitutional  o Denies  o : fever, headache, chills  · Eyes  o Denies  o : eye pain, double vision, blurred vision  · HENT  o Denies  o : sinus problems, sore throat, ear infection  · Cardiovascular  o Denies  o : chest pain, high blood pressure, varicosities  · Respiratory  o Denies  o : shortness of breath, wheezing, frequent cough  · Gastrointestinal  o Denies  o : nausea, vomiting, heartburn, indigestion, abdominal pain  · Genitourinary  o Admits  o : painful urination  o Denies  o : urgency, frequency, urinary retention  · Integument  o Denies  o : rash, itching, boils  · Neurologic  o Denies  o : tingling or numbness, tremors, dizzy spells  · Musculoskeletal  o Denies  o : joint pain, neck pain, back pain  · Endocrine  o Denies  o : cold intolerance, heat intolerance, tired, excessive thirst, sluggish  · Psychiatric  o Admits  o : feels satisfied with life  o Denies  o : severe depression, concerns with hurting themselves  · Heme-Lymph  o Denies  o : swollen glands, blood clotting  "problems  · Allergic-Immunologic  o Denies  o : sinus allergy symptoms, hay fever      Vitals  Date Time BP Position Site L\R Cuff Size HR RR TEMP (F) WT  HT  BMI kg/m2 BSA m2 O2 Sat HC       06/26/2020 01:19 /55 Sitting    56 - R  97.3 143lbs 0oz 5'  4\" 24.55 1.71           Physical Examination  · Constitutional  o Appearance  o : well-nourished, well developed. Walking with the aid of a stick  · Neck  o Thyroid  o : gland size normal, nontender, no nodules or masses present on palpation  · Respiratory  o Respiratory Effort  o : Breathing is unlabored without accessory muscle use  o Inspection of Chest  o : normal appearance, no retractions  o Auscultation of Lungs  o : Normal breath sounds  · Cardiovascular  o Heart  o :   § Auscultation of Heart  § : regular rate and rhythm, no murmurs, gallops or rubs  o Peripheral Vascular System  o : No abnormalities  · Gastrointestinal  o Abdominal Examination  o : Scaphoid abdomen which is non-tender to palpation with normal tone and without rigidity or guarding. Normal bowel sounds. No masses present.  o Liver and spleen  o : No hepatomegaly present. Liver is non-tender to palpation and spleen is not palpable.  o Hernias  o : No abdominal wall hernias are present.  · Lymphatic  o Neck  o : No lymphadenopathy present  o Axilla  o : No lymphadenopathy present  o Groin  o : No lymphadenopathy present  · Skin and Subcutaneous Tissue  o General Inspection  o : No rashes, lesions or areas of discoloration present. Skin turgor is normal.  o General Palpation  o : No abnormalities, masses or tenderness on palpation.  · Neurologic  o Mental Status Examination  o : grossly oriented to person, place and time  o Gait and Station  o : normal gait, able to stand without difficulty  · Psychiatric  o Mood and Affect  o : mood normal, affect appropriate      Figure 1.0: Pain Rating Scale-[place]         Results  · In-Office Procedures  o Lab procedure  § Automated dipstick urinalysis " with microscopy ([id number])   § Color Ur: Yellow   § Clarity Ur: Clear   § Glucose Ur Ql Strip: Negative   § Bilirub Ur Ql Strip: Negative   § Ketones Ur Ql Strip: Negative   § Sp Gr Ur Qn: 1.025   § Hgb Ur Ql Strip: Moderate   § pH Ur-LsCnc: 6.0   § Prot Ur Ql Strip: 100 mg/dL   § Urobilinogen Ur Strip-mCnc: 0.2 E.U./dL   § Nitrite Ur Ql Strip: Negative   § WBC Est Ur Ql Strip: Moderate   § RBC UrnS Qn HPF: 0   § WBC UrnS Qn HPF: 4-5   § Bacteria UrnS Qn HPF: 0   § Crystals UrnS Qn HPF: 0   § Epithelial Cells (non renal): 0 /HPF  § Epithelial Cells (renal): 0       Assessment  · Overactive bladder     596.51/N32.81  · CKD (chronic kidney disease)     585.9/N18.9  · Pyuria     791.9/R82.81    Problems Reconciled  Plan  · Orders  o Urine culture (99116, 72801) - 596.51/N32.81, 585.9/N18.9, 791.9/R82.81 - 06/26/2020  · Medications  o Medications have been Reconciled  o Transition of Care or Provider Policy  · Instructions  o Will do urine C&S. Give her 3 days course of Keflex 250 mg 4 times a day. Start her on Ellura 1 capsule twice a day and recheck her next week            Electronically Signed by: Raji Prajapati MD -Author on June 26, 2020 02:16:36 PM   - patient not in fluid overloaded state -  rather dehydrated  - gentle hydration  - hold lasix for now

## 2024-02-11 NOTE — DIETITIAN INITIAL EVALUATION ADULT - ADD RECOMMEND
Maintain regular diet  MVI w/ minerals daily to ensure 100% RDA met   Ensure plus tidd  Consider adding thiamine 100 mg daily 2/2 poor PO intake/ malnutrition  Suggest add Vit C 500 mg BID, add Zinc Sulfate 220 mg x 10 days to promote wound healing   Record PO intake in EMR after each meal (nursing.)   Confirm Goals of Care regarding nutrition support   Monitor PO intake, tolerance, labs and weight.  Maintain regular diet  MVI w/ minerals daily to ensure 100% RDA met   Ensure plus tid  Consider adding thiamine 100 mg daily 2/2 poor PO intake/ malnutrition  Suggest add Vit C 500 mg BID, add Zinc Sulfate 220 mg x 10 days to promote wound healing   Record PO intake in EMR after each meal (nursing.)   Confirm Goals of Care regarding nutrition support   Monitor PO intake, tolerance, labs and weight.

## 2024-02-11 NOTE — DIETITIAN INITIAL EVALUATION ADULT - PERTINENT LABORATORY DATA
02-10    132<L>  |  104  |  14  ----------------------------<  115<H>  4.6   |  24  |  1.19    Ca    9.3      10 Feb 2024 13:11    TPro  6.2  /  Alb  2.8<L>  /  TBili  0.3  /  DBili  x   /  AST  13<L>  /  ALT  12  /  AlkPhos  71  02-10

## 2024-02-11 NOTE — DIETITIAN INITIAL EVALUATION ADULT - NAME AND PHONE
Khadijah Serrano RDN, CDN, [facility] [place]      [phone]   [email or web address]

## 2024-02-11 NOTE — DIETITIAN INITIAL EVALUATION ADULT - ETIOLOGY
r/t inability to consume sufficient energy/protein 2/2 gastroenteritis, hx of CHF, CKD, lymphoma, progression of aging

## 2024-02-11 NOTE — DIETITIAN INITIAL EVALUATION ADULT - PERTINENT MEDS FT
MEDICATIONS  (STANDING):  aMIOdarone    Tablet 200 milliGRAM(s) Oral daily  amLODIPine   Tablet 5 milliGRAM(s) Oral daily  carvedilol 25 milliGRAM(s) Oral every 12 hours  cholecalciferol 2000 Unit(s) Oral daily  ciprofloxacin   IVPB 400 milliGRAM(s) IV Intermittent every 12 hours  famotidine    Tablet 10 milliGRAM(s) Oral every 48 hours  ferrous    sulfate 325 milliGRAM(s) Oral daily  fludroCORTISONE 0.05 milliGRAM(s) Oral two times a day  heparin   Injectable 5000 Unit(s) SubCutaneous every 12 hours  hydrALAZINE 100 milliGRAM(s) Oral every 8 hours  hydrocortisone 10 milliGRAM(s) Oral daily  hydrocortisone 5 milliGRAM(s) Oral at bedtime  losartan 100 milliGRAM(s) Oral daily  metroNIDAZOLE  IVPB 500 milliGRAM(s) IV Intermittent every 8 hours  multivitamin 1 Tablet(s) Oral daily    MEDICATIONS  (PRN):  acetaminophen     Tablet .. 650 milliGRAM(s) Oral every 6 hours PRN Temp greater or equal to 38C (100.4F), Mild Pain (1 - 3)  aluminum hydroxide/magnesium hydroxide/simethicone Suspension 30 milliLiter(s) Oral every 4 hours PRN Dyspepsia  melatonin 3 milliGRAM(s) Oral at bedtime PRN Insomnia  ondansetron Injectable 4 milliGRAM(s) IV Push every 8 hours PRN Nausea and/or Vomiting

## 2024-02-11 NOTE — DIETITIAN INITIAL EVALUATION ADULT - NSFNSPHYEXAMSKINFT_GEN_A_CORE
Pressure Injury 1: sacrum, Stage I  Pressure Injury 2: heel, Right:, Stage I  Pressure Injury 3: none, none  Pressure Injury 4: none, none  Pressure Injury 5: none, none  Pressure Injury 6: none, none  Pressure Injury 7: none, none  Pressure Injury 8: none, none  Pressure Injury 9: none, none  Pressure Injury 10: none, none  Pressure Injury 11: none, none Pressure Injury 1: sacrum, Stage I  Pressure Injury 2: heel, Right:, Stage I  [place] 17

## 2024-02-11 NOTE — CONSULT NOTE ADULT - ASSESSMENT
89 y/o/f with extensive past medical history of paroxysmal afib, diastolic heart failure, CKD, hypokalemia, hyponatremia, hiatal hernia, adrenal insufficiency, lymphoma in remission, CHF, HTN, iron deficiency and SHx of intracranial hemorrhage, hysterectomy, appendectomy presenting to the emergency department with 1 week of watery diarrhea (multiple episodes per day) associated with generalized weakness, abd cramping, and decreased appetite, patient states she was started on antibiotic regimen a few days ago (this being said on the sunruse KATHY form flagyl was only started today),    Denies nausea, vomiting. Notes of fullness - upon palpation of the abdomen UA positive, imaging shows Acute uncomplicated colitis, most prominent in sigmoid colon. Distended gallbladder without stones. Started on rocephin/flagyl.     1. Acute uncomplicated colitis. Pyuria. UTI. PCN allergy  - change ciprofloxacin to rocephin 1gm daily  - on flagyl [id number]   - continue with abx coverage  - if diarrhea check c diff pcr, gi pcr  - monitor temps  - tolerating abx well so far; no side effects noted  - reason for abx use and side effects reviewed with patient  - supportive care  - fu cbc    Clinical team may change from intravenous to oral antibiotics when the following criteria are met:   1. Patient is clinically improving/stable       a)	Improved signs and symptoms of infection from initial presentation       b)	Afebrile for 24 hours       c)	Leukocytosis trending towards normal range   2. Patient is tolerating oral intake   3. Initial blood cultures are negative and urine cx available    When above criteria met OR on date, may change iv antibiotics to: po ceftin 500mg bid / flagy [id number] x 10 days

## 2024-02-11 NOTE — DIETITIAN INITIAL EVALUATION ADULT - OTHER INFO
patient is a 89 y/o/f with extensive past medical history ofparoxysmal afib, diastolic heart failure, CKD, hypokalemia, hyponatremia, hiatal hernia, adrenal insufficiency, lymphoma in remission, CHF, HTN, iron deficiency and SHx of intracranial hemorrhage, hysterectomy, appendectomy presenting to the emergency department with 1 week of watery diarrhea (multiple episodes per day) associated with generalized weakness, abd cramping, and decreased appetite, patient states she was started on antibiotic regimen a few days ago (this being said on the sunruse intermediate form flagyl was only started today),    Denies nausea, vomiting. Notes of fullness - upon palpation of the abdomnen.     Admit dx:  Noninfectious gastroenteritis  Pt visited at bedside  RD bedscale weight is 45 kg   100#  Pt well-known to RD service, met criteria for PCM on multiple admissions (7/18/22, 6/22/22, 5/31/22, 4/1/22, 7/11/23).  Pt is DNR/DNI, with no current limitations on Feeding Tube,  no other information from [facility] in paper charts  NFPE reveals severe muscle wasting, fat wasting   PO intake estimated < 75% ENN > one month   Will add Ensure plus to l,d  Pt has dx of colitis, UTI and CHF  suggest palliative consult  Recommendations to follow in Plan/Intervention

## 2024-02-11 NOTE — PROGRESS NOTE ADULT - SUBJECTIVE AND OBJECTIVE BOX
CC: UTI /  Diarrhea /  Weakness     HPI: 89 y/o female with  PMH of Paroxysmal AFIB, HFpEF,  CKD, Adrenal insufficiency, Lymphoma in remission,  HTN, Iron deficiency,  ICH,  presented to ED   with 1 week of watery diarrhea (multiple episodes per day) associated with generalized weakness, abd cramping, and decreased appetite, patient states she was started on antibiotic regimen at the facility.  No  nausea, vomiting.     INTERVAL HPI/OVERNIGHT EVENTS:    Vital Signs Last 24 Hrs  T(C): 36.9 (11 Feb 2024 15:15), Max: 36.9 (11 Feb 2024 15:15)  T(F): 98.4 (11 Feb 2024 15:15), Max: 98.4 (11 Feb 2024 15:15)  HR: 66 (11 Feb 2024 15:15) (66 - 78)  BP: 139/52 (11 Feb 2024 15:15) (128/60 - 175/62)  BP(mean): 77 (10 Feb 2024 21:18) (77 - 85)  RR: 18 (11 Feb 2024 15:15) (18 - 18)  SpO2: 95% (11 Feb 2024 15:15) (95% - 100%)    Parameters below as of 11 Feb 2024 15:15  Patient On (Oxygen Delivery Method): room air      I&O's Detail    REVIEW OF SYSTEMS:    CONSTITUTIONAL: No weakness, fevers or chills  EYES/ENT: No visual changes;  No vertigo or throat pain   NECK: No pain or stiffness  RESPIRATORY: No cough, wheezing, hemoptysis; No shortness of breath  CARDIOVASCULAR: No chest pain or palpitations  GASTROINTESTINAL: No abdominal or epigastric pain. No nausea, vomiting, or hematemesis; No diarrhea or constipation. No melena or hematochezia.  GENITOURINARY: No dysuria, frequency or hematuria  NEUROLOGICAL: No numbness or weakness  SKIN: No itching, burning, rashes, or lesions   All other review of systems is negative unless indicated above.  PHYSICAL EXAM:    General: in no acute distress  Eyes: PERRLA, EOMI; conjunctiva and sclera clear  Head: Normocephalic; atraumatic  ENMT: No nasal discharge; airway clear  Neck: Supple; non tender; no masses  Respiratory: No wheezes, rales or rhonchi  Cardiovascular: Regular rate and rhythm. S1 and S2 Normal; No murmurs, gallops or rubs  Gastrointestinal: Soft non-tender non-distended; Normal bowel sounds  Genitourinary: No  suprapubic  tenderness  Extremities: Normal range of motion, No clubbing, cyanosis or edema  Vascular: Peripheral pulses palpable 2+ bilaterally  Neurological: Alert and oriented x4  Skin: Warm and dry. No acute rash  Lymph Nodes: No acute cervical adenopathy  Musculoskeletal: Normal muscle tone, without deformities  Psychiatric: Cooperative and appropriate                            8.8    5.84  )-----------( 120      ( 11 Feb 2024 07:00 )             27.8     11 Feb 2024 07:00    132    |  105    |  13     ----------------------------<  105    4.0     |  23     |  1.14     Ca    8.4        11 Feb 2024 07:00    TPro  6.2    /  Alb  2.8    /  TBili  0.3    /  DBili  x      /  AST  13     /  ALT  12     /  AlkPhos  71     10 Feb 2024 13:11      CAPILLARY BLOOD GLUCOSE        LIVER FUNCTIONS - ( 10 Feb 2024 13:11 )  Alb: 2.8 g/dL / Pro: 6.2 gm/dL / ALK PHOS: 71 U/L / ALT: 12 U/L / AST: 13 U/L / GGT: x           Urinalysis Basic - ( 11 Feb 2024 07:00 )    Color: x / Appearance: x / SG: x / pH: x  Gluc: 105 mg/dL / Ketone: x  / Bili: x / Urobili: x   Blood: x / Protein: x / Nitrite: x   Leuk Esterase: x / RBC: x / WBC x   Sq Epi: x / Non Sq Epi: x / Bacteria: x        MEDICATIONS  (STANDING):  aMIOdarone    Tablet 200 milliGRAM(s) Oral daily  amLODIPine   Tablet 5 milliGRAM(s) Oral daily  carvedilol 25 milliGRAM(s) Oral every 12 hours  cefTRIAXone Injectable. 1000 milliGRAM(s) IV Push every 24 hours  cholecalciferol 2000 Unit(s) Oral daily  famotidine    Tablet 10 milliGRAM(s) Oral every 48 hours  ferrous    sulfate 325 milliGRAM(s) Oral daily  fludroCORTISONE 0.05 milliGRAM(s) Oral two times a day  heparin   Injectable 5000 Unit(s) SubCutaneous every 12 hours  hydrALAZINE 100 milliGRAM(s) Oral every 8 hours  hydrocortisone 10 milliGRAM(s) Oral daily  hydrocortisone 5 milliGRAM(s) Oral at bedtime  losartan 100 milliGRAM(s) Oral daily  metroNIDAZOLE  IVPB 500 milliGRAM(s) IV Intermittent every 8 hours  multivitamin 1 Tablet(s) Oral daily    MEDICATIONS  (PRN):  acetaminophen     Tablet .. 650 milliGRAM(s) Oral every 6 hours PRN Temp greater or equal to 38C (100.4F), Mild Pain (1 - 3)  aluminum hydroxide/magnesium hydroxide/simethicone Suspension 30 milliLiter(s) Oral every 4 hours PRN Dyspepsia  melatonin 3 milliGRAM(s) Oral at bedtime PRN Insomnia  ondansetron Injectable 4 milliGRAM(s) IV Push every 8 hours PRN Nausea and/or Vomiting      RADIOLOGY & ADDITIONAL TESTS: CC: UTI /  Diarrhea /  Weakness     HPI: 89 y/o female with  PMH of Paroxysmal AFIB, HFpEF,  CKD, Adrenal insufficiency, Lymphoma in remission,  HTN, Iron deficiency,  ICH,  presented to ED   with 1 week of watery diarrhea (multiple episodes per day) associated with generalized weakness, abd cramping, and decreased appetite, patient states she was started on antibiotic regimen at the facility.  No  nausea, vomiting.     INTERVAL HPI/ OVERNIGHT EVENTS: chart reviewed, Pt was seen and examined, Pt is awake and alert, reports that in the Hospital 2/2 diarrhea, which is better now, also reports that not able to urinate, last time voided this am. Feels bladder is full. Denies Fevers or chills. Also reports dry eyes and asking for eye drops. Results and POC discussed     Vital Signs Last 24 Hrs  T(C): 36.9 (11 Feb 2024 15:15), Max: 36.9 (11 Feb 2024 15:15)  T(F): 98.4 (11 Feb 2024 15:15), Max: 98.4 (11 Feb 2024 15:15)  HR: 66 (11 Feb 2024 15:15) (66 - 78)  BP: 139/52 (11 Feb 2024 15:15) (128/60 - 175/62)  BP(mean): 77 (10 Feb 2024 21:18) (77 - 85)  RR: 18 (11 Feb 2024 15:15) (18 - 18)  SpO2: 95% (11 Feb 2024 15:15) (95% - 100%)    Parameters below as of 11 Feb 2024 15:15  Patient On (Oxygen Delivery Method): room air        REVIEW OF SYSTEMS:  All other review of systems is negative unless indicated above.      PHYSICAL EXAM:  General: frail elderly female,  in no acute distress  Eyes:  EOMI; conjunctiva and sclera clear, dry crusting on lower lids   Head: Normocephalic; atraumatic  ENMT: No nasal discharge; airway clear  Respiratory: Decreased BS at bases. No wheezes, rales or rhonchi  Cardiovascular: Regular rate and rhythm. S1 and S2 Normal;   Gastrointestinal: Soft non-tender, distended; Normal bowel sounds  Genitourinary: +   suprapubic fulness, discomfort to  palpation      Extremities: No  edema  Neurological: Alert and oriented x2-3, non focal   Musculoskeletal: Normal muscle tone, without deformities  Psychiatric: Cooperative and appropriate    LABS:                         8.8    5.84  )-----------( 120      ( 11 Feb 2024 07:00 )             27.8     11 Feb 2024 07:00    132    |  105    |  13     ----------------------------<  105    4.0     |  23     |  1.14     Ca    8.4        11 Feb 2024 07:00    TPro  6.2    /  Alb  2.8    /  TBili  0.3    /  DBili  x      /  AST  13     /  ALT  12     /  AlkPhos  71     10 Feb 2024 13:11      LIVER FUNCTIONS - ( 10 Feb 2024 13:11 )  Alb: 2.8 g/dL / Pro: 6.2 gm/dL / ALK PHOS: 71 U/L / ALT: 12 U/L / AST: 13 U/L / GGT: x           Urinalysis Basic - ( 11 Feb 2024 07:00 )    Color: x / Appearance: x / SG: x / pH: x  Gluc: 105 mg/dL / Ketone: x  / Bili: x / Urobili: x   Blood: x / Protein: x / Nitrite: x   Leuk Esterase: x / RBC: x / WBC x   Sq Epi: x / Non Sq Epi: x / Bacteria: x        MEDICATIONS  (STANDING):  aMIOdarone    Tablet 200 milliGRAM(s) Oral daily  amLODIPine   Tablet 5 milliGRAM(s) Oral daily  carvedilol 25 milliGRAM(s) Oral every 12 hours  cefTRIAXone Injectable. 1000 milliGRAM(s) IV Push every 24 hours  cholecalciferol 2000 Unit(s) Oral daily  famotidine    Tablet 10 milliGRAM(s) Oral every 48 hours  ferrous    sulfate 325 milliGRAM(s) Oral daily  fludroCORTISONE 0.05 milliGRAM(s) Oral two times a day  heparin   Injectable 5000 Unit(s) SubCutaneous every 12 hours  hydrALAZINE 100 milliGRAM(s) Oral every 8 hours  hydrocortisone 10 milliGRAM(s) Oral daily  hydrocortisone 5 milliGRAM(s) Oral at bedtime  losartan 100 milliGRAM(s) Oral daily  metroNIDAZOLE  IVPB 500 milliGRAM(s) IV Intermittent every 8 hours  multivitamin 1 Tablet(s) Oral daily    MEDICATIONS  (PRN):  acetaminophen     Tablet .. 650 milliGRAM(s) Oral every 6 hours PRN Temp greater or equal to 38C (100.4F), Mild Pain (1 - 3)  aluminum hydroxide/magnesium hydroxide/simethicone Suspension 30 milliLiter(s) Oral every 4 hours PRN Dyspepsia  melatonin 3 milliGRAM(s) Oral at bedtime PRN Insomnia  ondansetron Injectable 4 milliGRAM(s) IV Push every 8 hours PRN Nausea and/or Vomiting      RADIOLOGY & ADDITIONAL TESTS:    ACC: [id number] EXAM:  XR CHEST PORTABLE URGENT 1V   ORDERED BY: ANGELO IVY     PROCEDURE DATE:  01/10/2024          INTERPRETATION:  Portable chest radiograph    CLINICAL INFORMATION: Epigastric pain.    FINDINGS:  The lungs are clear of airspace consolidations or effusions. No   pneumothorax.  The heart and mediastinum size and configuration are within normal limits.  Visualized osseous thorax intact.    IMPRESSION:   No evidence of active chest disease.      ACC: [id number] EXAM:  CT ABDOMEN AND PELVIS   ORDERED BY: MARLENI ROSARIO     PROCEDURE DATE:  02/10/2024          INTERPRETATION:  CLINICAL INFORMATION: Diarrhea  COMPARISON: November 2022    CONTRAST/COMPLICATIONS:  IV Contrast: NONE  Oral Contrast: NONE  Complications: None reported at time of study completion      FINDINGS:  LOWER CHEST: Coronary artery and valvular calcification. Medial RML   /lingula bronchiectasis, peripheral mucous plugging and centimeter sized   calcified RUL granuloma. Bibasilar reticular-nodular changes, of doubtful   significance in this age group. Small/subcentimeter sized (max short   axis) retrocrural lymph nodes. Small pericardial effusion    LIVER: Within normal limits.  BILE DUCTS: Normal caliber.  GALLBLADDER: Distended gallbladder (4.9 cm max trans) with fluid/density   level. No calcified gallstones or pericholecystic edema  SPLEEN: Within normal limits.  PANCREAS: Centimeter sized uncinate process cyst, not significantly   changed and of doubtful significance in this age group  ADRENALS: Within normal limits.  KIDNEYS/URETERS: Renovascular calcification and/or calculi. No   obstructive uropathy.    BLADDER: Distended urinary bladder.  REPRODUCTIVE ORGANS: Hysterectomy. No adnexal mass.    BOWEL: No bowel obstruction. Partially visualized normal appendix. No   appendicitis (coronal [id number]). Diffuse colonic mural thickening and trace   surrounding edema, greatest surrounding the rectosigmoid colon.  PERITONEUM: Trace free pelvic fluid  VESSELS:Described athero  RETROPERITONEUM/LYMPH NODES: No lymphadenopathy.  ABDOMINAL WALL: Within normal limits.  BONES: Diffuse osteopenia . RIGHT hip arthroplasty produces local beam   hardening artifact. T11-L4 compression deformities /kyphoplasty change.   Minimal lumbar degenerative changes    IMPRESSION:  1.  Acute uncomplicated colitis, most prominent in sigmoid colon.  2.  Distended gallbladder without stones.

## 2024-02-11 NOTE — CONSULT NOTE ADULT - SUBJECTIVE AND OBJECTIVE BOX
Patient is a 89y old  Female who presents with a chief complaint of Noninfectious gastroenteritis     (11 Feb 2024 07:18)    HPI:  patient is a 89 y/o/f with extensive past medical history ofparoxysmal afib, diastolic heart failure, CKD, hypokalemia, hyponatremia, hiatal hernia, adrenal insufficiency, lymphoma in remission, CHF, HTN, iron deficiency and SHx of intracranial hemorrhage, hysterectomy, appendectomy presenting to the emergency department with 1 week of watery diarrhea (multiple episodes per day) associated with generalized weakness, abd cramping, and decreased appetite, patient states she was started on antibiotic regimen a few days ago (this being said on the sunruse KATHY form flagyl was only started today),    Denies nausea, vomiting. Notes of fullness - upon palpation of the abdomnen.     Family updated. DNR /  DNI (10 Feb 2024 18:19)      PMH: as above  PSH: as above  Meds: per reconciliation sheet, noted below  MEDICATIONS  (STANDING):  aMIOdarone    Tablet 200 milliGRAM(s) Oral daily  amLODIPine   Tablet 5 milliGRAM(s) Oral daily  carvedilol 25 milliGRAM(s) Oral every 12 hours  cholecalciferol 2000 Unit(s) Oral daily  ciprofloxacin   IVPB 400 milliGRAM(s) IV Intermittent every 12 hours  famotidine    Tablet 10 milliGRAM(s) Oral every 48 hours  ferrous    sulfate 325 milliGRAM(s) Oral daily  fludroCORTISONE 0.05 milliGRAM(s) Oral two times a day  heparin   Injectable 5000 Unit(s) SubCutaneous every 12 hours  hydrALAZINE 100 milliGRAM(s) Oral every 8 hours  hydrocortisone 10 milliGRAM(s) Oral daily  hydrocortisone 5 milliGRAM(s) Oral at bedtime  losartan 100 milliGRAM(s) Oral daily  metroNIDAZOLE  IVPB 500 milliGRAM(s) IV Intermittent every 8 hours  multivitamin 1 Tablet(s) Oral daily    MEDICATIONS  (PRN):  acetaminophen     Tablet .. 650 milliGRAM(s) Oral every 6 hours PRN Temp greater or equal to 38C (100.4F), Mild Pain (1 - 3)  aluminum hydroxide/magnesium hydroxide/simethicone Suspension 30 milliLiter(s) Oral every 4 hours PRN Dyspepsia  melatonin 3 milliGRAM(s) Oral at bedtime PRN Insomnia  ondansetron Injectable 4 milliGRAM(s) IV Push every 8 hours PRN Nausea and/or Vomiting    Allergies    sulfa drugs (Hives)  tetracycline (Hives)  penicillin (Hives)    Intolerances      Social: no smoking, no alcohol, no illegal drugs; no recent travel, no exposure to TB  FAMILY HISTORY:  No known problems (Father, Mother)       no history of premature cardiovascular disease in first degree relatives    ROS: the patient denies fever, no chills, no HA, no dizziness, no sore throat, no blurry vision, no CP, no palpitations, no SOB, no cough, no abdominal pain, no diarrhea, no N/V, no dysuria, no leg pain, no claudication, no rash, no joint aches, no rectal pain or bleeding, no night sweats  All other systems reviewed and are negative    Vital Signs Last 24 Hrs  T(C): 36.7 (11 Feb 2024 09:51), Max: 36.8 (10 Feb 2024 12:32)  T(F): 98.1 (11 Feb 2024 09:51), Max: 98.3 (10 Feb 2024 12:32)  HR: 72 (11 Feb 2024 09:51) (72 - 78)  BP: 152/48 (11 Feb 2024 09:51) (128/60 - 178/49)  BP(mean): 77 (10 Feb 2024 21:18) (77 - 85)  RR: 18 (11 Feb 2024 09:51) (18 - 18)  SpO2: 95% (11 Feb 2024 09:51) (95% - 100%)    Parameters below as of 11 Feb 2024 09:51  Patient On (Oxygen Delivery Method): room air      Daily Height in cm: 160.02 (10 Feb 2024 12:32)    Daily     PE:    Constitutional: frail looking  HEENT: NC/AT, EOMI, PERRLA, conjunctivae clear; ears and nose atraumatic; pharynx benign  Neck: supple; thyroid not palpable  Back: no tenderness  Respiratory: respiratory effort normal; clear to auscultation  Cardiovascular: S1S2 regular, no murmurs  Abdomen: soft, not tender, not distended, positive BS; liver and spleen WNL  Genitourinary: no suprapubic tenderness  Lymphatic: no LN palpable  Musculoskeletal: no muscle tenderness, no joint swelling or tenderness  Extremities: no pedal edema  Neurological/ Psychiatric: AxOx3, Judgement and insight normal;  moving all extremities  Skin: no rashes; no palpable lesions    Labs: all available labs reviewed                        8.8    5.84  )-----------( 120      ( 11 Feb 2024 07:00 )             27.8     02-11    132<L>  |  105  |  13  ----------------------------<  105<H>  4.0   |  23  |  1.14    Ca    8.4<L>      11 Feb 2024 07:00    TPro  6.2  /  Alb  2.8<L>  /  TBili  0.3  /  DBili  x   /  AST  13<L>  /  ALT  12  /  AlkPhos  71  02-10     LIVER FUNCTIONS - ( 10 Feb 2024 13:11 )  Alb: 2.8 g/dL / Pro: 6.2 gm/dL / ALK PHOS: 71 U/L / ALT: 12 U/L / AST: 13 U/L / GGT: x           Urinalysis Basic - ( 11 Feb 2024 07:00 )    Color: x / Appearance: x / SG: x / pH: x  Gluc: 105 mg/dL / Ketone: x  / Bili: x / Urobili: x   Blood: x / Protein: x / Nitrite: x   Leuk Esterase: x / RBC: x / WBC x   Sq Epi: x / Non Sq Epi: x / Bacteria: x          Radiology: all available radiological tests reviewed  < from: CT Abdomen and Pelvis No Cont (02.10.24 @ 13:52) >  ACC: [id number] EXAM:  CT ABDOMEN AND PELVIS   ORDERED BY: MARLENI ROSARIO     PROCEDURE DATE:  02/10/2024          INTERPRETATION:  CLINICAL INFORMATION: Diarrhea    COMPARISON: November 2022    CONTRAST/COMPLICATIONS:  IV Contrast: NONE  Oral Contrast: NONE  Complications: None reported at time of study completion    PROCEDURE:  CT of the Abdomen and Pelvis was performed.  Sagittal and coronal reformats were performed.    LIMITATIONS: Lack of intravenous contrast material limitsdiagnostic   sensitivity in evaluating solid organs /vasculature.    FINDINGS:  LOWER CHEST: Coronary artery and valvular calcification. Medial RML   /lingula bronchiectasis, peripheral mucous plugging and centimeter sized   calcified RUL granuloma. Bibasilar reticular-nodular changes, of doubtful   significance in this age group. Small/subcentimeter sized (max short   axis) retrocrural lymph nodes. Small pericardial effusion    LIVER: Within normal limits.  BILE DUCTS: Normal caliber.  GALLBLADDER: Distended gallbladder (4.9 cm max trans) with fluid/density   level. No calcified gallstones or pericholecystic edema  SPLEEN: Within normal limits.  PANCREAS: Centimeter sized uncinate process cyst, not significantly   changed and of doubtful significance in this age group  ADRENALS: Within normal limits.  KIDNEYS/URETERS: Renovascular calcification and/or calculi. No   obstructive uropathy.    BLADDER: Distended urinary bladder.  REPRODUCTIVE ORGANS: Hysterectomy. No adnexal mass.    BOWEL: No bowel obstruction. Partially visualized normal appendix. No   appendicitis (coronal 602-45). Diffuse colonic mural thickening and trace   surrounding edema, greatest surrounding the rectosigmoid colon.  PERITONEUM: Trace free pelvic fluid  VESSELS:Described athero  RETROPERITONEUM/LYMPH NODES: No lymphadenopathy.  ABDOMINAL WALL: Within normal limits.  BONES: Diffuse osteopenia . RIGHT hip arthroplasty produces local beam   hardening artifact. T11-L4 compression deformities /kyphoplasty change.   Minimal lumbar degenerative changes    IMPRESSION:  1.  Acute uncomplicated colitis, most prominent in sigmoid colon.  2.  Distended gallbladder without stones.    < end of copied text >    Advanced directives addressed: full resuscitation Patient is a 89y old  Female who presents with a chief complaint of Noninfectious gastroenteritis     (11 Feb 2024 07:18)    HPI:  patient is a 89 y/o/f with extensive past medical history of paroxysmal afib, diastolic heart failure, CKD, hypokalemia, hyponatremia, hiatal hernia, adrenal insufficiency, lymphoma in remission, CHF, HTN, iron deficiency and SHx of intracranial hemorrhage, hysterectomy, appendectomy presenting to the emergency department with 1 week of watery diarrhea (multiple episodes per day) associated with generalized weakness, abd cramping, and decreased appetite, patient states she was started on antibiotic regimen a few days ago (this being said on the [facility] form flagyl was only started today),    Denies nausea, vomiting. Notes of fullness - upon palpation of the abdomen UA positive, imaging shows Acute uncomplicated colitis, most prominent in sigmoid colon. Distended gallbladder without stones. Started on rocephin/flagyl.     PMH: as above  PSH: as above    Meds: per reconciliation sheet, noted below  MEDICATIONS  (STANDING):  aMIOdarone    Tablet 200 milliGRAM(s) Oral daily  amLODIPine   Tablet 5 milliGRAM(s) Oral daily  carvedilol 25 milliGRAM(s) Oral every 12 hours  cholecalciferol 2000 Unit(s) Oral daily  ciprofloxacin   IVPB 400 milliGRAM(s) IV Intermittent every 12 hours  famotidine    Tablet 10 milliGRAM(s) Oral every 48 hours  ferrous    sulfate 325 milliGRAM(s) Oral daily  fludroCORTISONE 0.05 milliGRAM(s) Oral two times a day  heparin   Injectable 5000 Unit(s) SubCutaneous every 12 hours  hydrALAZINE 100 milliGRAM(s) Oral every 8 hours  hydrocortisone 10 milliGRAM(s) Oral daily  hydrocortisone 5 milliGRAM(s) Oral at bedtime  losartan 100 milliGRAM(s) Oral daily  metroNIDAZOLE  IVPB 500 milliGRAM(s) IV Intermittent every 8 hours  multivitamin 1 Tablet(s) Oral daily    Allergies    sulfa drugs (Hives)  tetracycline (Hives)  penicillin (Hives)    Intolerances      Social: no smoking, no alcohol, no illegal drugs; no recent travel, no exposure to TB  FAMILY HISTORY:  No known problems (Father, Mother)       no history of premature cardiovascular disease in first degree relatives    ROS: the patient denies fever, no chills, no HA, no dizziness, no sore throat, no blurry vision, no CP, no palpitations, no SOB, no cough, + abdominal pain, + diarrhea, no N/V, dysuria, no leg pain, no claudication, no rash, no joint aches, no rectal pain or bleeding, no night sweats  All other systems reviewed and are negative    Vital Signs Last 24 Hrs  T(C): 36.7 (11 Feb 2024 09:51), Max: 36.8 (10 Feb 2024 12:32)  T(F): 98.1 (11 Feb 2024 09:51), Max: 98.3 (10 Feb 2024 12:32)  HR: 72 (11 Feb 2024 09:51) (72 - 78)  BP: 152/48 (11 Feb 2024 09:51) (128/60 - 178/49)  BP(mean): 77 (10 Feb 2024 21:18) (77 - 85)  RR: 18 (11 Feb 2024 09:51) (18 - 18)  SpO2: 95% (11 Feb 2024 09:51) (95% - 100%)    Parameters below as of 11 Feb 2024 09:51  Patient On (Oxygen Delivery Method): room air      Daily Height in cm: 160.02 (10 Feb 2024 12:32)    Daily     PE:    Constitutional: frail looking  HEENT: NC/AT, EOMI, PERRLA, conjunctivae clear; ears and nose atraumatic; pharynx benign  Neck: supple; thyroid not palpable  Back: no tenderness  Respiratory: respiratory effort normal; clear to auscultation  Cardiovascular: S1S2 regular, no murmurs  Abdomen: soft, not tender, not distended, positive BS; liver and spleen WNL  Genitourinary: no suprapubic tenderness  Lymphatic: no LN palpable  Musculoskeletal: no muscle tenderness, no joint swelling or tenderness  Extremities: no pedal edema  Neurological/ Psychiatric: AxOx3, Judgement and insight normal;  moving all extremities  Skin: no rashes; no palpable lesions    Labs: all available labs reviewed                        8.8    5.84  )-----------( 120      ( 11 Feb 2024 07:00 )             27.8     02-11    132<L>  |  105  |  13  ----------------------------<  105<H>  4.0   |  23  |  1.14    Ca    8.4<L>      11 Feb 2024 07:00    TPro  6.2  /  Alb  2.8<L>  /  TBili  0.3  /  DBili  x   /  AST  13<L>  /  ALT  12  /  AlkPhos  71  02-10     LIVER FUNCTIONS - ( 10 Feb 2024 13:11 )  Alb: 2.8 g/dL / Pro: 6.2 gm/dL / ALK PHOS: 71 U/L / ALT: 12 U/L / AST: 13 U/L / GGT: x           Urinalysis Basic - ( 11 Feb 2024 07:00 )    Color: x / Appearance: x / SG: x / pH: x  Gluc: 105 mg/dL / Ketone: x  / Bili: x / Urobili: x   Blood: x / Protein: x / Nitrite: x   Leuk Esterase: x / RBC: x / WBC x   Sq Epi: x / Non Sq Epi: x / Bacteria: x        Radiology: all available radiological tests reviewed  < from: CT Abdomen and Pelvis No Cont (02.10.24 @ 13:52) >  ACC: [id number] EXAM:  CT ABDOMEN AND PELVIS   ORDERED BY: MARLENI ROSARIO     PROCEDURE DATE:  02/10/2024          INTERPRETATION:  CLINICAL INFORMATION: Diarrhea    COMPARISON: November 2022    CONTRAST/COMPLICATIONS:  IV Contrast: NONE  Oral Contrast: NONE  Complications: None reported at time of study completion    PROCEDURE:  CT of the Abdomen and Pelvis was performed.  Sagittal and coronal reformats were performed.    LIMITATIONS: Lack of intravenous contrast material limitsdiagnostic   sensitivity in evaluating solid organs /vasculature.    FINDINGS:  LOWER CHEST: Coronary artery and valvular calcification. Medial RML   /lingula bronchiectasis, peripheral mucous plugging and centimeter sized   calcified RUL granuloma. Bibasilar reticular-nodular changes, of doubtful   significance in this age group. Small/subcentimeter sized (max short   axis) retrocrural lymph nodes. Small pericardial effusion    LIVER: Within normal limits.  BILE DUCTS: Normal caliber.  GALLBLADDER: Distended gallbladder (4.9 cm max trans) with fluid/density   level. No calcified gallstones or pericholecystic edema  SPLEEN: Within normal limits.  PANCREAS: Centimeter sized uncinate process cyst, not significantly   changed and of doubtful significance in this age group  ADRENALS: Within normal limits.  KIDNEYS/URETERS: Renovascular calcification and/or calculi. No   obstructive uropathy.    BLADDER: Distended urinary bladder.  REPRODUCTIVE ORGANS: Hysterectomy. No adnexal mass.    BOWEL: No bowel obstruction. Partially visualized normal appendix. No   appendicitis (coronal 602-45). Diffuse colonic mural thickening and trace   surrounding edema, greatest surrounding the rectosigmoid colon.  PERITONEUM: Trace free pelvic fluid  VESSELS:Described athero  RETROPERITONEUM/LYMPH NODES: No lymphadenopathy.  ABDOMINAL WALL: Within normal limits.  BONES: Diffuse osteopenia . RIGHT hip arthroplasty produces local beam   hardening artifact. T11-L4 compression deformities /kyphoplasty change.   Minimal lumbar degenerative changes    IMPRESSION:  1.  Acute uncomplicated colitis, most prominent in sigmoid colon.  2.  Distended gallbladder without stones.    < end of copied text >    Advanced directives addressed: full resuscitation

## 2024-02-12 LAB
ANION GAP SERPL CALC-SCNC: 4 MMOL/L — LOW (ref 5–17)
BUN SERPL-MCNC: 15 MG/DL — SIGNIFICANT CHANGE UP (ref 7–23)
CALCIUM SERPL-MCNC: 8.5 MG/DL — SIGNIFICANT CHANGE UP (ref 8.5–10.1)
CHLORIDE SERPL-SCNC: 106 MMOL/L — SIGNIFICANT CHANGE UP (ref 96–108)
CO2 SERPL-SCNC: 24 MMOL/L — SIGNIFICANT CHANGE UP (ref 22–31)
CREAT SERPL-MCNC: 1.06 MG/DL — SIGNIFICANT CHANGE UP (ref 0.5–1.3)
EGFR: 47 ML/MIN/1.73M2 — LOW
FERRITIN SERPL-MCNC: 206 NG/ML — SIGNIFICANT CHANGE UP (ref 13–330)
FOLATE SERPL-MCNC: 8.5 NG/ML — SIGNIFICANT CHANGE UP
GLUCOSE SERPL-MCNC: 109 MG/DL — HIGH (ref 70–99)
HAPTOGLOB SERPL-MCNC: 165 MG/DL — SIGNIFICANT CHANGE UP (ref 34–200)
HCT VFR BLD CALC: 26.5 % — LOW (ref 34.5–45)
HGB BLD-MCNC: 8.5 G/DL — LOW (ref 11.5–15.5)
IRON SATN MFR SERPL: 18 % — SIGNIFICANT CHANGE UP (ref 14–50)
IRON SATN MFR SERPL: 34 UG/DL — SIGNIFICANT CHANGE UP (ref 30–160)
LDH SERPL L TO P-CCNC: 105 U/L — SIGNIFICANT CHANGE UP (ref 84–241)
MAGNESIUM SERPL-MCNC: 1.8 MG/DL — SIGNIFICANT CHANGE UP (ref 1.6–2.6)
MCHC RBC-ENTMCNC: 31.8 PG — SIGNIFICANT CHANGE UP (ref 27–34)
MCHC RBC-ENTMCNC: 32.1 GM/DL — SIGNIFICANT CHANGE UP (ref 32–36)
MCV RBC AUTO: 99.3 FL — SIGNIFICANT CHANGE UP (ref 80–100)
PHOSPHATE SERPL-MCNC: 2.4 MG/DL — LOW (ref 2.5–4.5)
PLATELET # BLD AUTO: 121 K/UL — LOW (ref 150–400)
POTASSIUM SERPL-MCNC: 3.8 MMOL/L — SIGNIFICANT CHANGE UP (ref 3.5–5.3)
POTASSIUM SERPL-SCNC: 3.8 MMOL/L — SIGNIFICANT CHANGE UP (ref 3.5–5.3)
RBC # BLD: 2.67 M/UL — LOW (ref 3.8–5.2)
RBC # BLD: 2.67 M/UL — LOW (ref 3.8–5.2)
RBC # FLD: 13.5 % — SIGNIFICANT CHANGE UP (ref 10.3–14.5)
RETICS #: 57.1 K/UL — SIGNIFICANT CHANGE UP (ref 25–125)
RETICS/RBC NFR: 2.1 % — SIGNIFICANT CHANGE UP (ref 0.5–2.5)
SODIUM SERPL-SCNC: 134 MMOL/L — LOW (ref 135–145)
TIBC SERPL-MCNC: 186 UG/DL — LOW (ref 220–430)
UIBC SERPL-MCNC: 152 UG/DL — SIGNIFICANT CHANGE UP (ref 110–370)
VIT B12 SERPL-MCNC: 858 PG/ML — SIGNIFICANT CHANGE UP (ref 232–1245)
WBC # BLD: 4.34 K/UL — SIGNIFICANT CHANGE UP (ref 3.8–10.5)
WBC # FLD AUTO: 4.34 K/UL — SIGNIFICANT CHANGE UP (ref 3.8–10.5)

## 2024-02-12 PROCEDURE — 99233 SBSQ HOSP IP/OBS HIGH 50: CPT

## 2024-02-12 RX ORDER — BETHANECHOL CHLORIDE 25 MG
25 TABLET ORAL
Refills: 0 | Status: DISCONTINUED | OUTPATIENT
Start: 2024-02-12 | End: 2024-02-14

## 2024-02-12 RX ORDER — POTASSIUM PHOSPHATE, MONOBASIC POTASSIUM PHOSPHATE, DIBASIC 236; 224 MG/ML; MG/ML
15 INJECTION, SOLUTION INTRAVENOUS ONCE
Refills: 0 | Status: COMPLETED | OUTPATIENT
Start: 2024-02-12 | End: 2024-02-12

## 2024-02-12 RX ORDER — MAGNESIUM SULFATE 500 MG/ML
1 VIAL (ML) INJECTION ONCE
Refills: 0 | Status: COMPLETED | OUTPATIENT
Start: 2024-02-12 | End: 2024-02-12

## 2024-02-12 RX ADMIN — Medication 1 DROP(S): at 05:12

## 2024-02-12 RX ADMIN — Medication 100 MILLIGRAM(S): at 05:12

## 2024-02-12 RX ADMIN — Medication 25 MILLIGRAM(S): at 13:54

## 2024-02-12 RX ADMIN — Medication 2000 UNIT(S): at 11:12

## 2024-02-12 RX ADMIN — CEFTRIAXONE 1000 MILLIGRAM(S): 500 INJECTION, POWDER, FOR SOLUTION INTRAMUSCULAR; INTRAVENOUS at 13:20

## 2024-02-12 RX ADMIN — Medication 1 TABLET(S): at 11:12

## 2024-02-12 RX ADMIN — Medication 100 MILLIGRAM(S): at 22:04

## 2024-02-12 RX ADMIN — Medication 3 MILLIGRAM(S): at 22:04

## 2024-02-12 RX ADMIN — Medication 100 MILLIGRAM(S): at 05:14

## 2024-02-12 RX ADMIN — Medication 10 MILLIGRAM(S): at 11:14

## 2024-02-12 RX ADMIN — Medication 25 MILLIGRAM(S): at 21:50

## 2024-02-12 RX ADMIN — AMIODARONE HYDROCHLORIDE 200 MILLIGRAM(S): 400 TABLET ORAL at 11:12

## 2024-02-12 RX ADMIN — CARVEDILOL PHOSPHATE 25 MILLIGRAM(S): 80 CAPSULE, EXTENDED RELEASE ORAL at 11:13

## 2024-02-12 RX ADMIN — Medication 325 MILLIGRAM(S): at 11:12

## 2024-02-12 RX ADMIN — POTASSIUM PHOSPHATE, MONOBASIC POTASSIUM PHOSPHATE, DIBASIC 62.5 MILLIMOLE(S): 236; 224 INJECTION, SOLUTION INTRAVENOUS at 16:10

## 2024-02-12 RX ADMIN — Medication 100 MILLIGRAM(S): at 13:19

## 2024-02-12 RX ADMIN — CARVEDILOL PHOSPHATE 25 MILLIGRAM(S): 80 CAPSULE, EXTENDED RELEASE ORAL at 22:04

## 2024-02-12 RX ADMIN — FLUDROCORTISONE ACETATE 0.05 MILLIGRAM(S): 0.1 TABLET ORAL at 21:49

## 2024-02-12 RX ADMIN — Medication 1 DROP(S): at 21:55

## 2024-02-12 RX ADMIN — Medication 30 MILLILITER(S): at 10:47

## 2024-02-12 RX ADMIN — Medication 100 GRAM(S): at 14:43

## 2024-02-12 RX ADMIN — Medication 100 MILLIGRAM(S): at 21:50

## 2024-02-12 RX ADMIN — HEPARIN SODIUM 5000 UNIT(S): 5000 INJECTION INTRAVENOUS; SUBCUTANEOUS at 21:55

## 2024-02-12 RX ADMIN — Medication 30 MILLILITER(S): at 23:27

## 2024-02-12 RX ADMIN — Medication 100 MILLIGRAM(S): at 13:53

## 2024-02-12 RX ADMIN — LOSARTAN POTASSIUM 100 MILLIGRAM(S): 100 TABLET, FILM COATED ORAL at 11:13

## 2024-02-12 RX ADMIN — HEPARIN SODIUM 5000 UNIT(S): 5000 INJECTION INTRAVENOUS; SUBCUTANEOUS at 10:47

## 2024-02-12 RX ADMIN — Medication 5 MILLIGRAM(S): at 21:48

## 2024-02-12 RX ADMIN — FLUDROCORTISONE ACETATE 0.05 MILLIGRAM(S): 0.1 TABLET ORAL at 11:14

## 2024-02-12 RX ADMIN — AMLODIPINE BESYLATE 5 MILLIGRAM(S): 2.5 TABLET ORAL at 11:13

## 2024-02-12 NOTE — PROGRESS NOTE ADULT - ASSESSMENT
89 y/o female with  PMH of Paroxysmal AFIB, HFpEF,  CKD, Adrenal insufficiency, Lymphoma in remission,  HTN, Iron deficiency,  ICH admitted for:     1. Diarrhea likely due to Colitis, suspect infectious etiology   sepsis not present on admission  CT abd/pelvis: _ Colitis and distended GB, no stones   LFTs wnl, No RUQ pain on exam   Isolate  GI PCR and CDIFF PCR pending collection   F/u BCX  C/w  IV flagyl /  IV Cipro  Will give gentle IV hydration  Diet as tolerated   Above plan d/w Dr Duke     2.  Abnormal UA, suspect UTI   F/u UCX  C/w  IV Cipro    3. Distended Bladder, suspect urinary retention   Check bladder scan   Straight cath if volume >350ml     4. HI, likely prerenal, but will r/o urinary retention   BUN/CR improved with IVF bolus in ED, unclear of was straight cathed   Monitor UO  IVF  Hold lasix   labs in am     5.  H/O adrenal insufficiency.   BP stable  continue with home regiment of medications: on Florinef and hydrocortisone     6. HPpEF, stable   patient not in fluid overloaded state -  rather dehydrated  gentle hydration  hold lasix for now.    7. Paroxysmal atrial fibrillation.   last ECG reviewed, in SR   C/w Amiodarone 200 mg po qdaily  C/w coreg 25 mg po qdaily    8. HTN   C/w Losartan, Coreg, amlodipine  Monitor BP closely     9. Acute on chronic anemia  pt denies blood in the stool   Trend H/H   Check iron studies, vit B12, folate, retic count, LDH      10. DVT PPX: heparin SQ      11. ACP: DNR/DNI, MOLST in vidhya chart reviewed  89 y/o female with  PMH of Paroxysmal AFIB, HFpEF,  CKD, Adrenal insufficiency, Lymphoma in remission,  HTN, Iron deficiency,  ICH admitted for:     1. Diarrhea likely due to Colitis, suspect infectious etiology   sepsis not present on admission  CT abd/pelvis:  Colitis and distended GB, no stones   LFTs wnl, No RUQ pain on exam   Isolate  GI PCR and CDIFF PCR pending collection   F/u BCX: NGTD  C/w  IV flagyl /  IV Cipro  S/o  gentle IV hydration  Diet as tolerated     2.  Abnormal UA, GNR  UTI   F/u UCX: + GNR, f/u final results   C/w  IV Cipro    3. Acute Urinary  retention   as per RN  straight cath _800ml yesterday, was retaining 300ml this am, will  further monitor  Check bladder scan Q 6h, straight cath if retaining >350 ml   start bethanechol BID       4. HI, likely prerenal  BUN/CR improved with IVF bolus in ED  S/p gentle IVF   Monitor UO  bladder scan   Hold lasix   labs in am     5.  H/O adrenal insufficiency.   BP stable  continue with home regiment of medications: on Florinef and hydrocortisone     6. HPpEF, stable   patient not in fluid overloaded state -  rather dehydrated  gentle hydration  hold lasix for now.    7. Paroxysmal atrial fibrillation.   last ECG reviewed, in SR   C/w Amiodarone 200 mg po qdaily  C/w coreg 25 mg po qdaily    8. HTN   C/w Losartan, Coreg, amlodipine  Monitor BP closely     9. Acute on chronic anemia. Mild Thrombocytopenia   pt denies blood in the stool   Trend H/H, stable   Check iron studies, vit B12, folate, LDH, Haptoglobin WNL   retic count not reactive   Monitor labs, will need hematology evaluation outPt     10. Hypophosphatemia/Hypomagnesemia  replace PO/IV  recheck in am     11.  DVT PPX: heparin SQ      12. ACP: DNR/DNI, MOLST in the  chart reviewed       Dispo; Bladder scan, check orthostatics, labs in am. PT

## 2024-02-12 NOTE — PROGRESS NOTE ADULT - SUBJECTIVE AND OBJECTIVE BOX
Date of service: 02-12-24 @ 10:20    pt seen and examined  feels better  no n/v/d  denies abd pain  afebrile      ROS: no fever or chills; denies dizziness, no HA, no SOB or cough, no abdominal pain, no legs pain, no rashes    MEDICATIONS  (STANDING):  aMIOdarone    Tablet 200 milliGRAM(s) Oral daily  amLODIPine   Tablet 5 milliGRAM(s) Oral daily  artificial tears (preservative free) Ophthalmic Solution 1 Drop(s) Both EYES three times a day  carvedilol 25 milliGRAM(s) Oral every 12 hours  cefTRIAXone Injectable. 1000 milliGRAM(s) IV Push every 24 hours  cholecalciferol 2000 Unit(s) Oral daily  famotidine    Tablet 10 milliGRAM(s) Oral every 48 hours  ferrous    sulfate 325 milliGRAM(s) Oral daily  fludroCORTISONE 0.05 milliGRAM(s) Oral two times a day  heparin   Injectable 5000 Unit(s) SubCutaneous every 12 hours  hydrALAZINE 100 milliGRAM(s) Oral every 8 hours  hydrocortisone 10 milliGRAM(s) Oral daily  hydrocortisone 5 milliGRAM(s) Oral at bedtime  losartan 100 milliGRAM(s) Oral daily  metroNIDAZOLE  IVPB 500 milliGRAM(s) IV Intermittent every 8 hours  multivitamin 1 Tablet(s) Oral daily  sodium chloride 0.9%. 1000 milliLiter(s) (75 mL/Hr) IV Continuous <Continuous>    Vital Signs Last 24 Hrs  T(C): 36.5 (12 Feb 2024 08:17), Max: 36.9 (11 Feb 2024 15:15)  T(F): 97.7 (12 Feb 2024 08:17), Max: 98.4 (11 Feb 2024 15:15)  HR: 67 (12 Feb 2024 08:17) (66 - 70)  BP: 154/51 (12 Feb 2024 08:17) (139/52 - 156/53)  BP(mean): --  RR: 18 (12 Feb 2024 08:17) (18 - 18)  SpO2: 95% (12 Feb 2024 08:17) (95% - 96%)    Parameters below as of 12 Feb 2024 08:17  Patient On (Oxygen Delivery Method): room air              PE:    Constitutional: frail looking  HEENT: NC/AT, EOMI, PERRLA, conjunctivae clear; ears and nose atraumatic; pharynx benign  Neck: supple; thyroid not palpable  Back: no tenderness  Respiratory: respiratory effort normal; clear to auscultation  Cardiovascular: S1S2 regular, no murmurs  Abdomen: soft, not tender, not distended, positive BS; liver and spleen WNL  Genitourinary: no suprapubic tenderness  Lymphatic: no LN palpable  Musculoskeletal: no muscle tenderness, no joint swelling or tenderness  Extremities: no pedal edema  Neurological/ Psychiatric: AxOx3, Judgement and insight normal;  moving all extremities  Skin: no rashes; no palpable lesions    Labs: all available labs reviewed                                   8.5    4.34  )-----------( 121      ( 12 Feb 2024 06:01 )             26.5     02-12    134<L>  |  106  |  15  ----------------------------<  109<H>  3.8   |  24  |  1.06    Ca    8.5      12 Feb 2024 06:01  Phos  2.4     02-12  Mg     1.8     02-12    TPro  6.2  /  Alb  2.8<L>  /  TBili  0.3  /  DBili  x   /  AST  13<L>  /  ALT  12  /  AlkPhos  71  02-10           LIVER FUNCTIONS - ( 10 Feb 2024 13:11 )  Alb: 2.8 g/dL / Pro: 6.2 gm/dL / ALK PHOS: 71 U/L / ALT: 12 U/L / AST: 13 U/L / GGT: x           Urinalysis Basic - ( 11 Feb 2024 07:00 )    Color: x / Appearance: x / SG: x / pH: x  Gluc: 105 mg/dL / Ketone: x  / Bili: x / Urobili: x   Blood: x / Protein: x / Nitrite: x   Leuk Esterase: x / RBC: x / WBC x   Sq Epi: x / Non Sq Epi: x / Bacteria: x    Culture - Urine (02.10.24 @ 14:30)   Specimen Source: Clean Catch Clean Catch (Midstream)  Culture Results:   >100,000 CFU/ml Gram Negative Rods   >100,000 CFU/ml Gram Negative Rods #2  Culture - Blood (02.10.24 @ 13:11)   Specimen Source: .Blood None  Culture Results:   No growth at 24 hours  Culture - Blood (02.10.24 @ 13:11)   Specimen Source: .Blood Blood-Peripheral  Culture Results:   No growth at 24 hours    Radiology: all available radiological tests reviewed    ACC: [id number] EXAM:  CT ABDOMEN AND PELVIS   ORDERED BY: MARLENI ROSARIO     PROCEDURE DATE:  02/10/2024          INTERPRETATION:  CLINICAL INFORMATION: Diarrhea    COMPARISON: November 2022    CONTRAST/COMPLICATIONS:  IV Contrast: NONE  Oral Contrast: NONE  Complications: None reported at time of study completion    PROCEDURE:  CT of the Abdomen and Pelvis was performed.  Sagittal and coronal reformats were performed.    LIMITATIONS: Lack of intravenous contrast material limitsdiagnostic   sensitivity in evaluating solid organs /vasculature.    FINDINGS:  LOWER CHEST: Coronary artery and valvular calcification. Medial RML   /lingula bronchiectasis, peripheral mucous plugging and centimeter sized   calcified RUL granuloma. Bibasilar reticular-nodular changes, of doubtful   significance in this age group. Small/subcentimeter sized (max short   axis) retrocrural lymph nodes. Small pericardial effusion    LIVER: Within normal limits.  BILE DUCTS: Normal caliber.  GALLBLADDER: Distended gallbladder (4.9 cm max trans) with fluid/density   level. No calcified gallstones or pericholecystic edema  SPLEEN: Within normal limits.  PANCREAS: Centimeter sized uncinate process cyst, not significantly   changed and of doubtful significance in this age group  ADRENALS: Within normal limits.  KIDNEYS/URETERS: Renovascular calcification and/or calculi. No   obstructive uropathy.    BLADDER: Distended urinary bladder.  REPRODUCTIVE ORGANS: Hysterectomy. No adnexal mass.    BOWEL: No bowel obstruction. Partially visualized normal appendix. No   appendicitis (coronal 602-45). Diffuse colonic mural thickening and trace   surrounding edema, greatest surrounding the rectosigmoid colon.  PERITONEUM: Trace free pelvic fluid  VESSELS:Described athero  RETROPERITONEUM/LYMPH NODES: No lymphadenopathy.  ABDOMINAL WALL: Within normal limits.  BONES: Diffuse osteopenia . RIGHT hip arthroplasty produces local beam   hardening artifact. T11-L4 compression deformities /kyphoplasty change.   Minimal lumbar degenerative changes    IMPRESSION:  1.  Acute uncomplicated colitis, most prominent in sigmoid colon.  2.  Distended gallbladder without stones.    < end of copied text >    Advanced directives addressed: full resuscitation

## 2024-02-12 NOTE — PHYSICAL THERAPY INITIAL EVALUATION ADULT - ADDITIONAL COMMENTS
Pt from [facility] in [place]. Pt  utilizes RW and Wheelchair at baseline and receives Home PT 3x weekly.

## 2024-02-12 NOTE — PROGRESS NOTE ADULT - SUBJECTIVE AND OBJECTIVE BOX
CC: UTI /  Diarrhea /  Weakness     HPI: 89 y/o female with  PMH of Paroxysmal AFIB, HFpEF,  CKD, Adrenal insufficiency, Lymphoma in remission,  HTN, Iron deficiency,  ICH,  presented to ED   with 1 week of watery diarrhea (multiple episodes per day) associated with generalized weakness, abd cramping, and decreased appetite, patient states she was started on antibiotic regimen at the facility.  No  nausea, vomiting.     INTERVAL HPI/ OVERNIGHT EVENTS: Pt was seen and examined,    Vital Signs Last 24 Hrs  T(C): 36.5 (12 Feb 2024 08:17), Max: 36.9 (11 Feb 2024 15:15)  T(F): 97.7 (12 Feb 2024 08:17), Max: 98.4 (11 Feb 2024 15:15)  HR: 69 (12 Feb 2024 10:40) (66 - 70)  BP: 150/49 (12 Feb 2024 10:40) (139/52 - 156/53)  BP(mean): --  RR: 18 (12 Feb 2024 08:17) (18 - 18)  SpO2: 95% (12 Feb 2024 08:17) (95% - 96%)    Parameters below as of 12 Feb 2024 08:17  Patient On (Oxygen Delivery Method): room air          REVIEW OF SYSTEMS:  All other review of systems is negative unless indicated above.      PHYSICAL EXAM:  General: frail elderly female,  in no acute distress  Eyes:  EOMI; conjunctiva and sclera clear, dry crusting on lower lids   Head: Normocephalic; atraumatic  ENMT: No nasal discharge; airway clear  Respiratory: Decreased BS at bases. No wheezes, rales or rhonchi  Cardiovascular: Regular rate and rhythm. S1 and S2 Normal;   Gastrointestinal: Soft non-tender, distended; Normal bowel sounds  Genitourinary: +   suprapubic fulness, discomfort to  palpation      Extremities: No  edema  Neurological: Alert and oriented x2-3, non focal   Musculoskeletal: Normal muscle tone, without deformities  Psychiatric: Cooperative and appropriate    LABS:                         8.5    4.34  )-----------( 121      ( 12 Feb 2024 06:01 )             26.5     02-12    134<L>  |  106  |  15  ----------------------------<  109<H>  3.8   |  24  |  1.06    Ca    8.5      12 Feb 2024 06:01  Phos  2.4     02-12  Mg     1.8     02-12    TPro  6.2  /  Alb  2.8<L>  /  TBili  0.3  /  DBili  x   /  AST  13<L>  /  ALT  12  /  AlkPhos  71  02-10        LIVER FUNCTIONS - ( 10 Feb 2024 13:11 )  Alb: 2.8 g/dL / Pro: 6.2 gm/dL / ALK PHOS: 71 U/L / ALT: 12 U/L / AST: 13 U/L / GGT: x             Urinalysis Basic - ( 12 Feb 2024 06:01 )    Color: x / Appearance: x / SG: x / pH: x  Gluc: 109 mg/dL / Ketone: x  / Bili: x / Urobili: x   Blood: x / Protein: x / Nitrite: x   Leuk Esterase: x / RBC: x / WBC x   Sq Epi: x / Non Sq Epi: x / Bacteria: x                          8.8    5.84  )-----------( 120      ( 11 Feb 2024 07:00 )             27.8     11 Feb 2024 07:00    132    |  105    |  13     ----------------------------<  105    4.0     |  23     |  1.14     Ca    8.4        11 Feb 2024 07:00    TPro  6.2    /  Alb  2.8    /  TBili  0.3    /  DBili  x      /  AST  13     /  ALT  12     /  AlkPhos  71     10 Feb 2024 13:11      LIVER FUNCTIONS - ( 10 Feb 2024 13:11 )  Alb: 2.8 g/dL / Pro: 6.2 gm/dL / ALK PHOS: 71 U/L / ALT: 12 U/L / AST: 13 U/L / GGT: x           Urinalysis Basic - ( 11 Feb 2024 07:00 )    Color: x / Appearance: x / SG: x / pH: x  Gluc: 105 mg/dL / Ketone: x  / Bili: x / Urobili: x   Blood: x / Protein: x / Nitrite: x   Leuk Esterase: x / RBC: x / WBC x   Sq Epi: x / Non Sq Epi: x / Bacteria: x        MEDICATIONS  (STANDING):  aMIOdarone    Tablet 200 milliGRAM(s) Oral daily  amLODIPine   Tablet 5 milliGRAM(s) Oral daily  artificial tears (preservative free) Ophthalmic Solution 1 Drop(s) Both EYES three times a day  carvedilol 25 milliGRAM(s) Oral every 12 hours  cefTRIAXone Injectable. 1000 milliGRAM(s) IV Push every 24 hours  cholecalciferol 2000 Unit(s) Oral daily  famotidine    Tablet 10 milliGRAM(s) Oral every 48 hours  ferrous    sulfate 325 milliGRAM(s) Oral daily  fludroCORTISONE 0.05 milliGRAM(s) Oral two times a day  heparin   Injectable 5000 Unit(s) SubCutaneous every 12 hours  hydrALAZINE 100 milliGRAM(s) Oral every 8 hours  hydrocortisone 10 milliGRAM(s) Oral daily  hydrocortisone 5 milliGRAM(s) Oral at bedtime  losartan 100 milliGRAM(s) Oral daily  magnesium sulfate  IVPB 1 Gram(s) IV Intermittent once  metroNIDAZOLE  IVPB 500 milliGRAM(s) IV Intermittent every 8 hours  multivitamin 1 Tablet(s) Oral daily  potassium phosphate IVPB 15 milliMole(s) IV Intermittent once  sodium chloride 0.9%. 1000 milliLiter(s) (75 mL/Hr) IV Continuous <Continuous>    MEDICATIONS  (PRN):  acetaminophen     Tablet .. 650 milliGRAM(s) Oral every 6 hours PRN Temp greater or equal to 38C (100.4F), Mild Pain (1 - 3)  aluminum hydroxide/magnesium hydroxide/simethicone Suspension 30 milliLiter(s) Oral every 4 hours PRN Dyspepsia  melatonin 3 milliGRAM(s) Oral at bedtime PRN Insomnia  ondansetron Injectable 4 milliGRAM(s) IV Push every 8 hours PRN Nausea and/or Vomiting    RADIOLOGY & ADDITIONAL TESTS:    ACC: [id number] EXAM:  XR CHEST PORTABLE URGENT 1V   ORDERED BY: ANGELO IVY     PROCEDURE DATE:  01/10/2024          INTERPRETATION:  Portable chest radiograph    CLINICAL INFORMATION: Epigastric pain.    FINDINGS:  The lungs are clear of airspace consolidations or effusions. No   pneumothorax.  The heart and mediastinum size and configuration are within normal limits.  Visualized osseous thorax intact.    IMPRESSION:   No evidence of active chest disease.      ACC: [id number] EXAM:  CT ABDOMEN AND PELVIS   ORDERED BY: MARLENI ROSARIO     PROCEDURE DATE:  02/10/2024          INTERPRETATION:  CLINICAL INFORMATION: Diarrhea  COMPARISON: November 2022    CONTRAST/COMPLICATIONS:  IV Contrast: NONE  Oral Contrast: NONE  Complications: None reported at time of study completion      FINDINGS:  LOWER CHEST: Coronary artery and valvular calcification. Medial RML   /lingula bronchiectasis, peripheral mucous plugging and centimeter sized   calcified RUL granuloma. Bibasilar reticular-nodular changes, of doubtful   significance in this age group. Small/subcentimeter sized (max short   axis) retrocrural lymph nodes. Small pericardial effusion    LIVER: Within normal limits.  BILE DUCTS: Normal caliber.  GALLBLADDER: Distended gallbladder (4.9 cm max trans) with fluid/density   level. No calcified gallstones or pericholecystic edema  SPLEEN: Within normal limits.  PANCREAS: Centimeter sized uncinate process cyst, not significantly   changed and of doubtful significance in this age group  ADRENALS: Within normal limits.  KIDNEYS/URETERS: Renovascular calcification and/or calculi. No   obstructive uropathy.    BLADDER: Distended urinary bladder.  REPRODUCTIVE ORGANS: Hysterectomy. No adnexal mass.    BOWEL: No bowel obstruction. Partially visualized normal appendix. No   appendicitis (coronal [id number]). Diffuse colonic mural thickening and trace   surrounding edema, greatest surrounding the rectosigmoid colon.  PERITONEUM: Trace free pelvic fluid  VESSELS:Described athero  RETROPERITONEUM/LYMPH NODES: No lymphadenopathy.  ABDOMINAL WALL: Within normal limits.  BONES: Diffuse osteopenia . RIGHT hip arthroplasty produces local beam   hardening artifact. T11-L4 compression deformities /kyphoplasty change.   Minimal lumbar degenerative changes    IMPRESSION:  1.  Acute uncomplicated colitis, most prominent in sigmoid colon.  2.  Distended gallbladder without stones.   CC: UTI /  Diarrhea /  Weakness     HPI: 89 y/o female with  PMH of Paroxysmal AFIB, HFpEF,  CKD, Adrenal insufficiency, Lymphoma in remission,  HTN, Iron deficiency,  ICH,  presented to ED   with 1 week of watery diarrhea (multiple episodes per day) associated with generalized weakness, abd cramping, and decreased appetite, patient states she was started on antibiotic regimen at the facility.  No  nausea, vomiting.     INTERVAL HPI/ OVERNIGHT EVENTS: Pt was seen and examined, report no diarrhea, has difficulty urinating. Also felt lightheaded when walked to the bathroom. Pts daughter at bedside,  results and POC discussed     Vital Signs Last 24 Hrs  T(C): 36.5 (12 Feb 2024 08:17), Max: 36.9 (11 Feb 2024 15:15)  T(F): 97.7 (12 Feb 2024 08:17), Max: 98.4 (11 Feb 2024 15:15)  HR: 69 (12 Feb 2024 10:40) (66 - 70)  BP: 150/49 (12 Feb 2024 10:40) (139/52 - 156/53)  RR: 18 (12 Feb 2024 08:17) (18 - 18)  SpO2: 95% (12 Feb 2024 08:17) (95% - 96%)    Parameters below as of 12 Feb 2024 08:17  Patient On (Oxygen Delivery Method): room air          REVIEW OF SYSTEMS:  All other review of systems is negative unless indicated above.      PHYSICAL EXAM:  General: frail elderly female,  in no acute distress  Eyes:  EOMI; conjunctiva and sclera clear, dry crusting on lower lids   Head: Normocephalic; atraumatic  ENMT: No nasal discharge; airway clear  Respiratory: Decreased BS at bases. No wheezes, rales or rhonchi  Cardiovascular: Regular rate and rhythm. S1 and S2 Normal;   Gastrointestinal: Soft non-tender, distended; Normal bowel sounds  Genitourinary: +   suprapubic fulness, non tender   Extremities: No  edema  Neurological: Alert and oriented x2-3, [place], non focal   Musculoskeletal: Normal muscle tone, without deformities  Psychiatric: Cooperative and appropriate    LABS:                         8.5    4.34  )-----------( 121      ( 12 Feb 2024 06:01 )             26.5     02-12    134<L>  |  106  |  15  ----------------------------<  109<H>  3.8   |  24  |  1.06    Ca    8.5      12 Feb 2024 06:01  Phos  2.4     02-12  Mg     1.8     02-12    TPro  6.2  /  Alb  2.8<L>  /  TBili  0.3  /  DBili  x   /  AST  13<L>  /  ALT  12  /  AlkPhos  71  02-10        LIVER FUNCTIONS - ( 10 Feb 2024 13:11 )  Alb: 2.8 g/dL / Pro: 6.2 gm/dL / ALK PHOS: 71 U/L / ALT: 12 U/L / AST: 13 U/L / GGT: x             Urinalysis Basic - ( 12 Feb 2024 06:01 )    Color: x / Appearance: x / SG: x / pH: x  Gluc: 109 mg/dL / Ketone: x  / Bili: x / Urobili: x   Blood: x / Protein: x / Nitrite: x   Leuk Esterase: x / RBC: x / WBC x   Sq Epi: x / Non Sq Epi: x / Bacteria: x                          8.8    5.84  )-----------( 120      ( 11 Feb 2024 07:00 )             27.8     11 Feb 2024 07:00    132    |  105    |  13     ----------------------------<  105    4.0     |  23     |  1.14     Ca    8.4        11 Feb 2024 07:00    TPro  6.2    /  Alb  2.8    /  TBili  0.3    /  DBili  x      /  AST  13     /  ALT  12     /  AlkPhos  71     10 Feb 2024 13:11      LIVER FUNCTIONS - ( 10 Feb 2024 13:11 )  Alb: 2.8 g/dL / Pro: 6.2 gm/dL / ALK PHOS: 71 U/L / ALT: 12 U/L / AST: 13 U/L / GGT: x           Urinalysis Basic - ( 11 Feb 2024 07:00 )    Color: x / Appearance: x / SG: x / pH: x  Gluc: 105 mg/dL / Ketone: x  / Bili: x / Urobili: x   Blood: x / Protein: x / Nitrite: x   Leuk Esterase: x / RBC: x / WBC x   Sq Epi: x / Non Sq Epi: x / Bacteria: x    Culture - Urine (02.10.24 @ 14:30)   Specimen Source: Clean Catch Clean Catch (Midstream)  Culture Results:   >100,000 CFU/ml Gram Negative Rods   >100,000 CFU/ml Gram Negative Rods #2      MEDICATIONS  (STANDING):  aMIOdarone    Tablet 200 milliGRAM(s) Oral daily  amLODIPine   Tablet 5 milliGRAM(s) Oral daily  artificial tears (preservative free) Ophthalmic Solution 1 Drop(s) Both EYES three times a day  carvedilol 25 milliGRAM(s) Oral every 12 hours  cefTRIAXone Injectable. 1000 milliGRAM(s) IV Push every 24 hours  cholecalciferol 2000 Unit(s) Oral daily  famotidine    Tablet 10 milliGRAM(s) Oral every 48 hours  ferrous    sulfate 325 milliGRAM(s) Oral daily  fludroCORTISONE 0.05 milliGRAM(s) Oral two times a day  heparin   Injectable 5000 Unit(s) SubCutaneous every 12 hours  hydrALAZINE 100 milliGRAM(s) Oral every 8 hours  hydrocortisone 10 milliGRAM(s) Oral daily  hydrocortisone 5 milliGRAM(s) Oral at bedtime  losartan 100 milliGRAM(s) Oral daily  magnesium sulfate  IVPB 1 Gram(s) IV Intermittent once  metroNIDAZOLE  IVPB 500 milliGRAM(s) IV Intermittent every 8 hours  multivitamin 1 Tablet(s) Oral daily  potassium phosphate IVPB 15 milliMole(s) IV Intermittent once  sodium chloride 0.9%. 1000 milliLiter(s) (75 mL/Hr) IV Continuous <Continuous>    MEDICATIONS  (PRN):  acetaminophen     Tablet .. 650 milliGRAM(s) Oral every 6 hours PRN Temp greater or equal to 38C (100.4F), Mild Pain (1 - 3)  aluminum hydroxide/magnesium hydroxide/simethicone Suspension 30 milliLiter(s) Oral every 4 hours PRN Dyspepsia  melatonin 3 milliGRAM(s) Oral at bedtime PRN Insomnia  ondansetron Injectable 4 milliGRAM(s) IV Push every 8 hours PRN Nausea and/or Vomiting    RADIOLOGY & ADDITIONAL TESTS:    ACC: [id number] EXAM:  XR CHEST PORTABLE URGENT 1V   ORDERED BY: ANGELO IVY     PROCEDURE DATE:  01/10/2024          INTERPRETATION:  Portable chest radiograph    CLINICAL INFORMATION: Epigastric pain.    FINDINGS:  The lungs are clear of airspace consolidations or effusions. No   pneumothorax.  The heart and mediastinum size and configuration are within normal limits.  Visualized osseous thorax intact.    IMPRESSION:   No evidence of active chest disease.      ACC: [id number] EXAM:  CT ABDOMEN AND PELVIS   ORDERED BY: MARLENI ROSARIO     PROCEDURE DATE:  02/10/2024          INTERPRETATION:  CLINICAL INFORMATION: Diarrhea  COMPARISON: November 2022    CONTRAST/COMPLICATIONS:  IV Contrast: NONE  Oral Contrast: NONE  Complications: None reported at time of study completion      FINDINGS:  LOWER CHEST: Coronary artery and valvular calcification. Medial RML   /lingula bronchiectasis, peripheral mucous plugging and centimeter sized   calcified RUL granuloma. Bibasilar reticular-nodular changes, of doubtful   significance in this age group. Small/subcentimeter sized (max short   axis) retrocrural lymph nodes. Small pericardial effusion    LIVER: Within normal limits.  BILE DUCTS: Normal caliber.  GALLBLADDER: Distended gallbladder (4.9 cm max trans) with fluid/density   level. No calcified gallstones or pericholecystic edema  SPLEEN: Within normal limits.  PANCREAS: Centimeter sized uncinate process cyst, not significantly   changed and of doubtful significance in this age group  ADRENALS: Within normal limits.  KIDNEYS/URETERS: Renovascular calcification and/or calculi. No   obstructive uropathy.    BLADDER: Distended urinary bladder.  REPRODUCTIVE ORGANS: Hysterectomy. No adnexal mass.    BOWEL: No bowel obstruction. Partially visualized normal appendix. No   appendicitis (coronal 602-45). Diffuse colonic mural thickening and trace   surrounding edema, greatest surrounding the rectosigmoid colon.  PERITONEUM: Trace free pelvic fluid  VESSELS:Described athero  RETROPERITONEUM/LYMPH NODES: No lymphadenopathy.  ABDOMINAL WALL: Within normal limits.  BONES: Diffuse osteopenia . RIGHT hip arthroplasty produces local beam   hardening artifact. T11-L4 compression deformities /kyphoplasty change.   Minimal lumbar degenerative changes    IMPRESSION:  1.  Acute uncomplicated colitis, most prominent in sigmoid colon.  2.  Distended gallbladder without stones.

## 2024-02-12 NOTE — PHYSICAL THERAPY INITIAL EVALUATION ADULT - PERTINENT HX OF CURRENT PROBLEM, REHAB EVAL
89 y/o female with  PMH of Paroxysmal AFIB, HFpEF,  CKD, Adrenal insufficiency, Lymphoma in remission,  HTN, Iron deficiency,  ICH,  presented to ED   with 1 week of watery diarrhea (multiple episodes per day) associated with generalized weakness, abd cramping, and decreased appetite, patient states she was started on antibiotic regimen at the facility.

## 2024-02-12 NOTE — PROGRESS NOTE ADULT - ASSESSMENT
89 y/o/f with extensive past medical history of paroxysmal afib, diastolic heart failure, CKD, hypokalemia, hyponatremia, hiatal hernia, adrenal insufficiency, lymphoma in remission, CHF, HTN, iron deficiency and SHx of intracranial hemorrhage, hysterectomy, appendectomy presenting to the emergency department with 1 week of watery diarrhea (multiple episodes per day) associated with generalized weakness, abd cramping, and decreased appetite, patient states she was started on antibiotic regimen a few days ago (this being said on the sunruse KATHY form flagyl was only started today),    Denies nausea, vomiting. Notes of fullness - upon palpation of the abdomen UA positive, imaging shows Acute uncomplicated colitis, most prominent in sigmoid colon. Distended gallbladder without stones. Started on rocephin/flagyl.     1. Acute uncomplicated colitis. Pyuria. UTI. PCN allergy  - s/p cipro, on rocephin 1gm daily #2  - on flagyl [id number] #2-3  - continue with abx coverage  - if diarrhea check c diff pcr, gi pcr=  - urine cx growing 2 gnrs f/u final cx/sensitivities   - monitor temps  - tolerating abx well so far; no side effects noted  - reason for abx use and side effects reviewed with patient  - supportive care  - fu cbc    Clinical team may change from intravenous to oral antibiotics when the following criteria are met:   1. Patient is clinically improving/stable       a)	Improved signs and symptoms of infection from initial presentation       b)	Afebrile for 24 hours       c)	Leukocytosis trending towards normal range   2. Patient is tolerating oral intake   3. Initial blood cultures are negative and urine cx available    When above criteria met OR on date, may change iv antibiotics to: po ceftin 500mg bid / flagy [id number] x 10 days if urine cx susceptible

## 2024-02-13 LAB
-  AMIKACIN: SIGNIFICANT CHANGE UP
-  AMOXICILLIN/CLAVULANIC ACID: SIGNIFICANT CHANGE UP
-  AMPICILLIN/SULBACTAM: SIGNIFICANT CHANGE UP
-  AMPICILLIN: SIGNIFICANT CHANGE UP
-  AZTREONAM: SIGNIFICANT CHANGE UP
-  AZTREONAM: SIGNIFICANT CHANGE UP
-  CEFAZOLIN: SIGNIFICANT CHANGE UP
-  CEFEPIME: SIGNIFICANT CHANGE UP
-  CEFEPIME: SIGNIFICANT CHANGE UP
-  CEFOXITIN: SIGNIFICANT CHANGE UP
-  CEFTAZIDIME: SIGNIFICANT CHANGE UP
-  CEFTRIAXONE: SIGNIFICANT CHANGE UP
-  CEFUROXIME: SIGNIFICANT CHANGE UP
-  CIPROFLOXACIN: SIGNIFICANT CHANGE UP
-  CIPROFLOXACIN: SIGNIFICANT CHANGE UP
-  ERTAPENEM: SIGNIFICANT CHANGE UP
-  GENTAMICIN: SIGNIFICANT CHANGE UP
-  IMIPENEM: SIGNIFICANT CHANGE UP
-  IMIPENEM: SIGNIFICANT CHANGE UP
-  LEVOFLOXACIN: SIGNIFICANT CHANGE UP
-  LEVOFLOXACIN: SIGNIFICANT CHANGE UP
-  MEROPENEM: SIGNIFICANT CHANGE UP
-  MEROPENEM: SIGNIFICANT CHANGE UP
-  NITROFURANTOIN: SIGNIFICANT CHANGE UP
-  PIPERACILLIN/TAZOBACTAM: SIGNIFICANT CHANGE UP
-  PIPERACILLIN/TAZOBACTAM: SIGNIFICANT CHANGE UP
-  TOBRAMYCIN: SIGNIFICANT CHANGE UP
-  TRIMETHOPRIM/SULFAMETHOXAZOLE: SIGNIFICANT CHANGE UP
ALBUMIN SERPL ELPH-MCNC: 2.4 G/DL — LOW (ref 3.3–5)
ALP SERPL-CCNC: 55 U/L — SIGNIFICANT CHANGE UP (ref 40–120)
ALT FLD-CCNC: 10 U/L — LOW (ref 12–78)
ANION GAP SERPL CALC-SCNC: 4 MMOL/L — LOW (ref 5–17)
AST SERPL-CCNC: 8 U/L — LOW (ref 15–37)
BILIRUB DIRECT SERPL-MCNC: <0.1 MG/DL — SIGNIFICANT CHANGE UP (ref 0–0.3)
BILIRUB INDIRECT FLD-MCNC: >0.1 MG/DL — LOW (ref 0.2–1)
BILIRUB SERPL-MCNC: 0.2 MG/DL — SIGNIFICANT CHANGE UP (ref 0.2–1.2)
BUN SERPL-MCNC: 17 MG/DL — SIGNIFICANT CHANGE UP (ref 7–23)
CALCIUM SERPL-MCNC: 8.4 MG/DL — LOW (ref 8.5–10.1)
CHLORIDE SERPL-SCNC: 105 MMOL/L — SIGNIFICANT CHANGE UP (ref 96–108)
CO2 SERPL-SCNC: 25 MMOL/L — SIGNIFICANT CHANGE UP (ref 22–31)
CREAT SERPL-MCNC: 1.02 MG/DL — SIGNIFICANT CHANGE UP (ref 0.5–1.3)
CULTURE RESULTS: ABNORMAL
EGFR: 49 ML/MIN/1.73M2 — LOW
GLUCOSE SERPL-MCNC: 104 MG/DL — HIGH (ref 70–99)
HCT VFR BLD CALC: 27.1 % — LOW (ref 34.5–45)
HGB BLD-MCNC: 8.9 G/DL — LOW (ref 11.5–15.5)
LIDOCAIN IGE QN: 24 U/L — SIGNIFICANT CHANGE UP (ref 13–75)
MAGNESIUM SERPL-MCNC: 2 MG/DL — SIGNIFICANT CHANGE UP (ref 1.6–2.6)
MCHC RBC-ENTMCNC: 32.1 PG — SIGNIFICANT CHANGE UP (ref 27–34)
MCHC RBC-ENTMCNC: 32.8 GM/DL — SIGNIFICANT CHANGE UP (ref 32–36)
MCV RBC AUTO: 97.8 FL — SIGNIFICANT CHANGE UP (ref 80–100)
METHOD TYPE: SIGNIFICANT CHANGE UP
METHOD TYPE: SIGNIFICANT CHANGE UP
ORGANISM # SPEC MICROSCOPIC CNT: ABNORMAL
ORGANISM # SPEC MICROSCOPIC CNT: ABNORMAL
ORGANISM # SPEC MICROSCOPIC CNT: SIGNIFICANT CHANGE UP
PHOSPHATE SERPL-MCNC: 2.5 MG/DL — SIGNIFICANT CHANGE UP (ref 2.5–4.5)
PLATELET # BLD AUTO: 131 K/UL — LOW (ref 150–400)
POTASSIUM SERPL-MCNC: 3.8 MMOL/L — SIGNIFICANT CHANGE UP (ref 3.5–5.3)
POTASSIUM SERPL-SCNC: 3.8 MMOL/L — SIGNIFICANT CHANGE UP (ref 3.5–5.3)
PROT SERPL-MCNC: 5.3 GM/DL — LOW (ref 6–8.3)
RBC # BLD: 2.77 M/UL — LOW (ref 3.8–5.2)
RBC # FLD: 13.3 % — SIGNIFICANT CHANGE UP (ref 10.3–14.5)
SODIUM SERPL-SCNC: 134 MMOL/L — LOW (ref 135–145)
SPECIMEN SOURCE: SIGNIFICANT CHANGE UP
TROPONIN I, HIGH SENSITIVITY RESULT: 10.53 NG/L — SIGNIFICANT CHANGE UP
TROPONIN I, HIGH SENSITIVITY RESULT: 8.64 NG/L — SIGNIFICANT CHANGE UP
WBC # BLD: 4.56 K/UL — SIGNIFICANT CHANGE UP (ref 3.8–10.5)
WBC # FLD AUTO: 4.56 K/UL — SIGNIFICANT CHANGE UP (ref 3.8–10.5)

## 2024-02-13 PROCEDURE — 74018 RADEX ABDOMEN 1 VIEW: CPT | Mod: 26

## 2024-02-13 PROCEDURE — 76705 ECHO EXAM OF ABDOMEN: CPT | Mod: 26

## 2024-02-13 PROCEDURE — 99233 SBSQ HOSP IP/OBS HIGH 50: CPT

## 2024-02-13 PROCEDURE — 93010 ELECTROCARDIOGRAM REPORT: CPT

## 2024-02-13 RX ORDER — PANTOPRAZOLE SODIUM 20 MG/1
40 TABLET, DELAYED RELEASE ORAL
Refills: 0 | Status: DISCONTINUED | OUTPATIENT
Start: 2024-02-13 | End: 2024-02-14

## 2024-02-13 RX ORDER — PANTOPRAZOLE SODIUM 20 MG/1
40 TABLET, DELAYED RELEASE ORAL ONCE
Refills: 0 | Status: COMPLETED | OUTPATIENT
Start: 2024-02-13 | End: 2024-02-13

## 2024-02-13 RX ADMIN — Medication 100 MILLIGRAM(S): at 05:20

## 2024-02-13 RX ADMIN — FLUDROCORTISONE ACETATE 0.05 MILLIGRAM(S): 0.1 TABLET ORAL at 09:47

## 2024-02-13 RX ADMIN — Medication 5 MILLIGRAM(S): at 21:39

## 2024-02-13 RX ADMIN — Medication 100 MILLIGRAM(S): at 21:39

## 2024-02-13 RX ADMIN — Medication 100 MILLIGRAM(S): at 06:15

## 2024-02-13 RX ADMIN — Medication 2000 UNIT(S): at 09:45

## 2024-02-13 RX ADMIN — ONDANSETRON 4 MILLIGRAM(S): 8 TABLET, FILM COATED ORAL at 05:20

## 2024-02-13 RX ADMIN — Medication 25 MILLIGRAM(S): at 09:48

## 2024-02-13 RX ADMIN — Medication 10 MILLIGRAM(S): at 09:48

## 2024-02-13 RX ADMIN — Medication 25 MILLIGRAM(S): at 21:39

## 2024-02-13 RX ADMIN — Medication 1 DROP(S): at 21:38

## 2024-02-13 RX ADMIN — FLUDROCORTISONE ACETATE 0.05 MILLIGRAM(S): 0.1 TABLET ORAL at 21:40

## 2024-02-13 RX ADMIN — Medication 1 TABLET(S): at 09:46

## 2024-02-13 RX ADMIN — CARVEDILOL PHOSPHATE 25 MILLIGRAM(S): 80 CAPSULE, EXTENDED RELEASE ORAL at 21:39

## 2024-02-13 RX ADMIN — AMLODIPINE BESYLATE 5 MILLIGRAM(S): 2.5 TABLET ORAL at 09:46

## 2024-02-13 RX ADMIN — PANTOPRAZOLE SODIUM 40 MILLIGRAM(S): 20 TABLET, DELAYED RELEASE ORAL at 05:20

## 2024-02-13 RX ADMIN — AMIODARONE HYDROCHLORIDE 200 MILLIGRAM(S): 400 TABLET ORAL at 09:46

## 2024-02-13 RX ADMIN — Medication 1 DROP(S): at 17:00

## 2024-02-13 RX ADMIN — Medication 1 DROP(S): at 06:15

## 2024-02-13 RX ADMIN — Medication 100 MILLIGRAM(S): at 16:29

## 2024-02-13 RX ADMIN — Medication 100 MILLIGRAM(S): at 21:38

## 2024-02-13 RX ADMIN — LOSARTAN POTASSIUM 100 MILLIGRAM(S): 100 TABLET, FILM COATED ORAL at 09:48

## 2024-02-13 RX ADMIN — Medication 100 MILLIGRAM(S): at 16:25

## 2024-02-13 RX ADMIN — HEPARIN SODIUM 5000 UNIT(S): 5000 INJECTION INTRAVENOUS; SUBCUTANEOUS at 21:40

## 2024-02-13 RX ADMIN — FAMOTIDINE 10 MILLIGRAM(S): 10 INJECTION INTRAVENOUS at 09:46

## 2024-02-13 RX ADMIN — Medication 30 MILLILITER(S): at 05:35

## 2024-02-13 RX ADMIN — HEPARIN SODIUM 5000 UNIT(S): 5000 INJECTION INTRAVENOUS; SUBCUTANEOUS at 09:46

## 2024-02-13 RX ADMIN — CEFTRIAXONE 1000 MILLIGRAM(S): 500 INJECTION, POWDER, FOR SOLUTION INTRAMUSCULAR; INTRAVENOUS at 16:26

## 2024-02-13 RX ADMIN — Medication 325 MILLIGRAM(S): at 09:45

## 2024-02-13 RX ADMIN — CARVEDILOL PHOSPHATE 25 MILLIGRAM(S): 80 CAPSULE, EXTENDED RELEASE ORAL at 09:47

## 2024-02-13 NOTE — PATIENT PROFILE ADULT - NSPROMEDSBROUGHTTOHOSP_GEN_A_NUR
Condition is stable  Reports school is going pretty well  Mom reports improved with mood   Will continue current meds   no

## 2024-02-13 NOTE — PROGRESS NOTE ADULT - SUBJECTIVE AND OBJECTIVE BOX
CC: UTI /  Diarrhea /  Weakness     HPI: 89 y/o female with  PMH of Paroxysmal AFIB, HFpEF,  CKD, Adrenal insufficiency, Lymphoma in remission,  HTN, Iron deficiency,  ICH,  presented to ED   with 1 week of watery diarrhea (multiple episodes per day) associated with generalized weakness, abd cramping, and decreased appetite, patient states she was started on antibiotic regimen at the facility.  No  nausea, vomiting.     INTERVAL HPI/ OVERNIGHT EVENTS: Pt was seen and examined, report was having episode of indigestion and heaviness in the stomach. also states that it gets worse with meals and has some nausea. + Flatus, no BMs since admission. No SOB     Vital Signs Last 24 Hrs  T(C): 36.5 (12 Feb 2024 08:17), Max: 36.9 (11 Feb 2024 15:15)  T(F): 97.7 (12 Feb 2024 08:17), Max: 98.4 (11 Feb 2024 15:15)  HR: 69 (12 Feb 2024 10:40) (66 - 70)  BP: 150/49 (12 Feb 2024 10:40) (139/52 - 156/53)  RR: 18 (12 Feb 2024 08:17) (18 - 18)  SpO2: 95% (12 Feb 2024 08:17) (95% - 96%)    Parameters below as of 12 Feb 2024 08:17  Patient On (Oxygen Delivery Method): room air          REVIEW OF SYSTEMS:  All other review of systems is negative unless indicated above.      PHYSICAL EXAM:  General: frail elderly female,  in no acute distress  Eyes:  EOMI; conjunctiva and sclera clear, dry crusting on lower lids   Head: Normocephalic; atraumatic  ENMT: No nasal discharge; airway clear  Respiratory: Decreased BS at bases. No wheezes, rales or rhonchi  Cardiovascular: Regular rate and rhythm. S1 and S2 Normal;   Gastrointestinal: Soft non-tender, mildly  distended; Normal bowel sounds  Genitourinary: +   suprapubic fulness, non tender   Extremities: No  edema  Neurological: Alert and oriented x2-3, [place], non focal   Musculoskeletal: Normal muscle tone, without deformities  Psychiatric: Cooperative and appropriate    LABS:                         8.9    4.56  )-----------( 131      ( 13 Feb 2024 05:48 )             27.1     02-13    134<L>  |  105  |  17  ----------------------------<  104<H>  3.8   |  25  |  1.02    Ca    8.4<L>      13 Feb 2024 05:48  Phos  2.5     02-13  Mg     2.0     02-13      Urinalysis Basic - ( 13 Feb 2024 05:48 )  Color: x / Appearance: x / SG: x / pH: x  Gluc: 104 mg/dL / Ketone: x  / Bili: x / Urobili: x   Blood: x / Protein: x / Nitrite: x   Leuk Esterase: x / RBC: x / WBC x   Sq Epi: x / Non Sq Epi: x / Bacteria: x                            8.5    4.34  )-----------( 121      ( 12 Feb 2024 06:01 )             26.5     02-12    134<L>  |  106  |  15  ----------------------------<  109<H>  3.8   |  24  |  1.06    Ca    8.5      12 Feb 2024 06:01  Phos  2.4     02-12  Mg     1.8     02-12    TPro  6.2  /  Alb  2.8<L>  /  TBili  0.3  /  DBili  x   /  AST  13<L>  /  ALT  12  /  AlkPhos  71  02-10        LIVER FUNCTIONS - ( 10 Feb 2024 13:11 )  Alb: 2.8 g/dL / Pro: 6.2 gm/dL / ALK PHOS: 71 U/L / ALT: 12 U/L / AST: 13 U/L / GGT: x             Urinalysis Basic - ( 12 Feb 2024 06:01 )    Color: x / Appearance: x / SG: x / pH: x  Gluc: 109 mg/dL / Ketone: x  / Bili: x / Urobili: x   Blood: x / Protein: x / Nitrite: x   Leuk Esterase: x / RBC: x / WBC x   Sq Epi: x / Non Sq Epi: x / Bacteria: x                          8.8    5.84  )-----------( 120      ( 11 Feb 2024 07:00 )             27.8     11 Feb 2024 07:00    132    |  105    |  13     ----------------------------<  105    4.0     |  23     |  1.14     Ca    8.4        11 Feb 2024 07:00    TPro  6.2    /  Alb  2.8    /  TBili  0.3    /  DBili  x      /  AST  13     /  ALT  12     /  AlkPhos  71     10 Feb 2024 13:11      LIVER FUNCTIONS - ( 10 Feb 2024 13:11 )  Alb: 2.8 g/dL / Pro: 6.2 gm/dL / ALK PHOS: 71 U/L / ALT: 12 U/L / AST: 13 U/L / GGT: x           Urinalysis Basic - ( 11 Feb 2024 07:00 )    Color: x / Appearance: x / SG: x / pH: x  Gluc: 105 mg/dL / Ketone: x  / Bili: x / Urobili: x   Blood: x / Protein: x / Nitrite: x   Leuk Esterase: x / RBC: x / WBC x   Sq Epi: x / Non Sq Epi: x / Bacteria: x    Culture - Urine (02.10.24 @ 14:30)   - Amikacin: S <=16  - Amoxicillin/Clavulanic Acid: S <=8/4  - Ampicillin: R 16 These ampicillin results predict results for amoxicillin  - Ampicillin/Sulbactam: S <=4/2  - Aztreonam: S <=4  - Aztreonam: S <=4  - Cefazolin: S <=2 For uncomplicated UTI with K. pneumoniae, E. coli, or P. mirablis: JEAN <=16 is sensitive and JEAN >=32 is resistant. This also predicts results for oral agents cefaclor, cefdinir, cefpodoxime, cefprozil, cefuroxime axetil, cephalexin and locarbef for uncomplicated UTI. Note that some isolates may be susceptible to these agents while testing resistant to cefazolin.  - Cefepime: S <=2  - Cefepime: S <=2  - Cefoxitin: S <=8  - Ceftazidime: S <=1  - Ceftriaxone: S <=1  - Cefuroxime: S <=4  - Ciprofloxacin: S <=0.25  - Ciprofloxacin: S <=0.25  - Ertapenem: S <=0.5  - Gentamicin: S <=2  - Imipenem: S <=1  - Imipenem: S <=1  - Levofloxacin: S <=0.5  - Levofloxacin: S <=0.5  - Meropenem: S <=1  - Meropenem: S <=1  - Nitrofurantoin: S <=32 Should not be used to treat pyelonephritis  - Piperacillin/Tazobactam: S <=8  - Piperacillin/Tazobactam: S <=8  - Tobramycin: S <=2  - Trimethoprim/Sulfamethoxazole: S <=0.5/9.5  Specimen Source: Clean Catch Clean Catch (Midstream)  Culture Results:   >100,000 CFU/ml Pseudomonas aeruginosa   >100,000 CFU/ml Klebsiella pneumoniae  Organism Identification: Pseudomonas aeruginosa   Klebsiella pneumoniae  Organism: Pseudomonas aeruginosa  Organism: Klebsiella pneumoniae    MEDICATIONS  (STANDING):  aMIOdarone    Tablet 200 milliGRAM(s) Oral daily  amLODIPine   Tablet 5 milliGRAM(s) Oral daily  artificial tears (preservative free) Ophthalmic Solution 1 Drop(s) Both EYES three times a day  carvedilol 25 milliGRAM(s) Oral every 12 hours  cefTRIAXone Injectable. 1000 milliGRAM(s) IV Push every 24 hours  cholecalciferol 2000 Unit(s) Oral daily  famotidine    Tablet 10 milliGRAM(s) Oral every 48 hours  ferrous    sulfate 325 milliGRAM(s) Oral daily  fludroCORTISONE 0.05 milliGRAM(s) Oral two times a day  heparin   Injectable 5000 Unit(s) SubCutaneous every 12 hours  hydrALAZINE 100 milliGRAM(s) Oral every 8 hours  hydrocortisone 10 milliGRAM(s) Oral daily  hydrocortisone 5 milliGRAM(s) Oral at bedtime  losartan 100 milliGRAM(s) Oral daily  magnesium sulfate  IVPB 1 Gram(s) IV Intermittent once  metroNIDAZOLE  IVPB 500 milliGRAM(s) IV Intermittent every 8 hours  multivitamin 1 Tablet(s) Oral daily  potassium phosphate IVPB 15 milliMole(s) IV Intermittent once  sodium chloride 0.9%. 1000 milliLiter(s) (75 mL/Hr) IV Continuous <Continuous>    MEDICATIONS  (PRN):  acetaminophen     Tablet .. 650 milliGRAM(s) Oral every 6 hours PRN Temp greater or equal to 38C (100.4F), Mild Pain (1 - 3)  aluminum hydroxide/magnesium hydroxide/simethicone Suspension 30 milliLiter(s) Oral every 4 hours PRN Dyspepsia  melatonin 3 milliGRAM(s) Oral at bedtime PRN Insomnia  ondansetron Injectable 4 milliGRAM(s) IV Push every 8 hours PRN Nausea and/or Vomiting    RADIOLOGY & ADDITIONAL TESTS:    ACC: [id number] EXAM:  XR CHEST PORTABLE URGENT 1V   ORDERED BY: ANGELO IVY     PROCEDURE DATE:  01/10/2024          INTERPRETATION:  Portable chest radiograph    CLINICAL INFORMATION: Epigastric pain.    FINDINGS:  The lungs are clear of airspace consolidations or effusions. No   pneumothorax.  The heart and mediastinum size and configuration are within normal limits.  Visualized osseous thorax intact.    IMPRESSION:   No evidence of active chest disease.      ACC: [id number] EXAM:  CT ABDOMEN AND PELVIS   ORDERED BY: MARLENI ROSARIO     PROCEDURE DATE:  02/10/2024          INTERPRETATION:  CLINICAL INFORMATION: Diarrhea  COMPARISON: November 2022    CONTRAST/COMPLICATIONS:  IV Contrast: NONE  Oral Contrast: NONE  Complications: None reported at time of study completion      FINDINGS:  LOWER CHEST: Coronary artery and valvular calcification. Medial RML   /lingula bronchiectasis, peripheral mucous plugging and centimeter sized   calcified RUL granuloma. Bibasilar reticular-nodular changes, of doubtful   significance in this age group. Small/subcentimeter sized (max short   axis) retrocrural lymph nodes. Small pericardial effusion    LIVER: Within normal limits.  BILE DUCTS: Normal caliber.  GALLBLADDER: Distended gallbladder (4.9 cm max trans) with fluid/density   level. No calcified gallstones or pericholecystic edema  SPLEEN: Within normal limits.  PANCREAS: Centimeter sized uncinate process cyst, not significantly   changed and of doubtful significance in this age group  ADRENALS: Within normal limits.  KIDNEYS/URETERS: Renovascular calcification and/or calculi. No   obstructive uropathy.    BLADDER: Distended urinary bladder.  REPRODUCTIVE ORGANS: Hysterectomy. No adnexal mass.    BOWEL: No bowel obstruction. Partially visualized normal appendix. No   appendicitis (coronal [id number]). Diffuse colonic mural thickening and trace   surrounding edema, greatest surrounding the rectosigmoid colon.  PERITONEUM: Trace free pelvic fluid  VESSELS:Described athero  RETROPERITONEUM/LYMPH NODES: No lymphadenopathy.  ABDOMINAL WALL: Within normal limits.  BONES: Diffuse osteopenia . RIGHT hip arthroplasty produces local beam   hardening artifact. T11-L4 compression deformities /kyphoplasty change.   Minimal lumbar degenerative changes    IMPRESSION:  1.  Acute uncomplicated colitis, most prominent in sigmoid colon.  2.  Distended gallbladder without stones.

## 2024-02-13 NOTE — PROGRESS NOTE ADULT - ASSESSMENT
89 y/o female with  PMH of Paroxysmal AFIB, HFpEF,  CKD, Adrenal insufficiency, Lymphoma in remission,  HTN, Iron deficiency,  ICH admitted for:     1. Diarrhea likely due to Colitis, suspect infectious etiology   sepsis not present on admission  CT abd/pelvis:  Colitis and distended GB, no stones   LFTs wnl, No RUQ pain on exam   Isolate  GI PCR and CDIFF PCR not sent as no BMs  F/u BCX: NGTD  C/w  IV flagyl /  IV Cipro  S/p  gentle IV hydration  Diet as tolerated     2.  Abnormal UA, ECOLI  and KLPN  UTI   F/u UCX: + GNR, f/u final results   C/w  IV Cipro      3. Acute Urinary  retention, improved   Able to void now, monitor for residuals   C/w  bethanechol BID       4. HI, likely prerenal  BUN/CR improved with IVF bolus in ED  S/p gentle IVF   Monitor UO  bladder scan   Hold lasix   labs in am     5.  H/O adrenal insufficiency.   BP stable  continue with home regiment of medications: on Florinef and hydrocortisone     6. HPpEF, stable   patient not in fluid overloaded state -  rather dehydrated  gentle hydration  hold lasix for now.    7. Paroxysmal atrial fibrillation.   last ECG reviewed, in SR   C/w Amiodarone 200 mg po qdaily  C/w coreg 25 mg po qdaily    8. HTN   C/w Losartan, Coreg, amlodipine  Monitor BP closely     9. Acute on chronic anemia. Mild Thrombocytopenia   pt denies blood in the stool   Trend H/H, stable   Check iron studies, vit B12, folate, LDH, Haptoglobin WNL   retic count not reactive   Monitor labs, will need hematology evaluation outPt     10. Hypophosphatemia/Hypomagnesemia  replaced  recheck in am     11.  Dyspepsia, abd pain   GERD? has distended GB on CT   Will ask for RUQ sono  Abd XR   Dc pepcid, Start PPIs   Maalox, Zofran PRN   Det as tolerated   ECG overnight with no acute ischemic changes, Trop neg x 1       12.  DVT PPX: heparin SQ      13. ACP: DNR/DNI, MOLST in the  chart reviewed       Dispo; XR, RUQ sono, labs

## 2024-02-13 NOTE — PROGRESS NOTE ADULT - NUTRITIONAL ASSESSMENT
This patient has been assessed with a concern for Malnutrition and has been determined to have a diagnosis/diagnoses of Severe protein-calorie malnutrition and Underweight (BMI < 19).    This patient is being managed with:   Diet Regular-  Entered: Feb 10 2024  5:51PM  

## 2024-02-14 ENCOUNTER — TRANSCRIPTION ENCOUNTER (OUTPATIENT)
Age: 89
End: 2024-02-14

## 2024-02-14 VITALS
SYSTOLIC BLOOD PRESSURE: 122 MMHG | OXYGEN SATURATION: 92 % | RESPIRATION RATE: 17 BRPM | TEMPERATURE: 98 F | HEART RATE: 69 BPM | DIASTOLIC BLOOD PRESSURE: 68 MMHG

## 2024-02-14 LAB
ANION GAP SERPL CALC-SCNC: 4 MMOL/L — LOW (ref 5–17)
BUN SERPL-MCNC: 21 MG/DL — SIGNIFICANT CHANGE UP (ref 7–23)
CALCIUM SERPL-MCNC: 8.7 MG/DL — SIGNIFICANT CHANGE UP (ref 8.5–10.1)
CHLORIDE SERPL-SCNC: 106 MMOL/L — SIGNIFICANT CHANGE UP (ref 96–108)
CO2 SERPL-SCNC: 25 MMOL/L — SIGNIFICANT CHANGE UP (ref 22–31)
CREAT SERPL-MCNC: 0.97 MG/DL — SIGNIFICANT CHANGE UP (ref 0.5–1.3)
EGFR: 52 ML/MIN/1.73M2 — LOW
GLUCOSE SERPL-MCNC: 91 MG/DL — SIGNIFICANT CHANGE UP (ref 70–99)
HCT VFR BLD CALC: 24.3 % — LOW (ref 34.5–45)
HGB BLD-MCNC: 7.9 G/DL — LOW (ref 11.5–15.5)
MCHC RBC-ENTMCNC: 32.1 PG — SIGNIFICANT CHANGE UP (ref 27–34)
MCHC RBC-ENTMCNC: 32.5 GM/DL — SIGNIFICANT CHANGE UP (ref 32–36)
MCV RBC AUTO: 98.8 FL — SIGNIFICANT CHANGE UP (ref 80–100)
PLATELET # BLD AUTO: 116 K/UL — LOW (ref 150–400)
POTASSIUM SERPL-MCNC: 3.8 MMOL/L — SIGNIFICANT CHANGE UP (ref 3.5–5.3)
POTASSIUM SERPL-SCNC: 3.8 MMOL/L — SIGNIFICANT CHANGE UP (ref 3.5–5.3)
RBC # BLD: 2.46 M/UL — LOW (ref 3.8–5.2)
RBC # FLD: 13.4 % — SIGNIFICANT CHANGE UP (ref 10.3–14.5)
SODIUM SERPL-SCNC: 135 MMOL/L — SIGNIFICANT CHANGE UP (ref 135–145)
WBC # BLD: 3.94 K/UL — SIGNIFICANT CHANGE UP (ref 3.8–10.5)
WBC # FLD AUTO: 3.94 K/UL — SIGNIFICANT CHANGE UP (ref 3.8–10.5)

## 2024-02-14 PROCEDURE — 99239 HOSP IP/OBS DSCHRG MGMT >30: CPT

## 2024-02-14 RX ORDER — METRONIDAZOLE 500 MG
1 TABLET ORAL
Refills: 0 | DISCHARGE

## 2024-02-14 RX ORDER — POTASSIUM CHLORIDE 20 MEQ
2 PACKET (EA) ORAL
Refills: 0 | DISCHARGE

## 2024-02-14 RX ORDER — LOPERAMIDE HCL 2 MG
2 TABLET ORAL
Refills: 0 | DISCHARGE

## 2024-02-14 RX ORDER — CIPROFLOXACIN LACTATE 400MG/40ML
250 VIAL (ML) INTRAVENOUS EVERY 12 HOURS
Refills: 0 | Status: DISCONTINUED | OUTPATIENT
Start: 2024-02-14 | End: 2024-02-14

## 2024-02-14 RX ORDER — DICLOFENAC SODIUM 30 MG/G
2 GEL TOPICAL
Refills: 0 | DISCHARGE

## 2024-02-14 RX ORDER — POTASSIUM CHLORIDE 20 MEQ
3 PACKET (EA) ORAL
Refills: 0 | DISCHARGE

## 2024-02-14 RX ADMIN — Medication 100 MILLIGRAM(S): at 06:05

## 2024-02-14 RX ADMIN — Medication 250 MILLIGRAM(S): at 11:26

## 2024-02-14 RX ADMIN — Medication 25 MILLIGRAM(S): at 09:09

## 2024-02-14 RX ADMIN — AMLODIPINE BESYLATE 5 MILLIGRAM(S): 2.5 TABLET ORAL at 09:10

## 2024-02-14 RX ADMIN — Medication 325 MILLIGRAM(S): at 09:10

## 2024-02-14 RX ADMIN — FLUDROCORTISONE ACETATE 0.05 MILLIGRAM(S): 0.1 TABLET ORAL at 09:07

## 2024-02-14 RX ADMIN — PANTOPRAZOLE SODIUM 40 MILLIGRAM(S): 20 TABLET, DELAYED RELEASE ORAL at 06:05

## 2024-02-14 RX ADMIN — AMIODARONE HYDROCHLORIDE 200 MILLIGRAM(S): 400 TABLET ORAL at 09:10

## 2024-02-14 RX ADMIN — Medication 10 MILLIGRAM(S): at 09:09

## 2024-02-14 RX ADMIN — LOSARTAN POTASSIUM 100 MILLIGRAM(S): 100 TABLET, FILM COATED ORAL at 09:11

## 2024-02-14 RX ADMIN — Medication 1 DROP(S): at 06:05

## 2024-02-14 RX ADMIN — CARVEDILOL PHOSPHATE 25 MILLIGRAM(S): 80 CAPSULE, EXTENDED RELEASE ORAL at 09:10

## 2024-02-14 RX ADMIN — Medication 1 TABLET(S): at 09:08

## 2024-02-14 RX ADMIN — HEPARIN SODIUM 5000 UNIT(S): 5000 INJECTION INTRAVENOUS; SUBCUTANEOUS at 09:09

## 2024-02-14 RX ADMIN — Medication 2000 UNIT(S): at 09:09

## 2024-02-14 NOTE — DISCHARGE NOTE PROVIDER - HOSPITAL COURSE
89 y/o female with PMH of Paroxysmal AFIB, HFpEF, CKD, Adrenal insufficiency, Lymphoma in remission,  HTN, Iron deficiency,  ICH,  presented to ED  with 1 week of watery diarrhea (multiple episodes per day) associated with generalized weakness, abd cramping, and decreased appetite, patient states she was started on antibiotic regimen at the facility.  No  nausea, vomiting.  Patient was admitted and found to have a positive UTI.  In addition patient was found to have cystic pancreatic lesion which will need further follow up ouptatient

## 2024-02-14 NOTE — DISCHARGE NOTE PROVIDER - NSDCMRMEDTOKEN_GEN_ALL_CORE_FT
Acidophilus oral tablet, chewable: 1 tab(s) chewed 2 times a day for 15 days ***course not complete***  amiodarone 200 mg oral tablet: 1 tab(s) orally once a day  amLODIPine 5 mg oral tablet: 1 tab(s) orally once a day  carvedilol 25 mg oral tablet: 1 tab(s) orally every 12 hours  Cipro 250 mg oral tablet: 1 tab(s) orally 2 times a day Please take medications for 3 days total.  Stop 2/17  Colace 100 mg oral capsule: 2 cap(s) orally once a day (at bedtime) as needed for  constipation  famotidine 20 mg oral tablet: 1 tab(s) orally once a day  ferrous sulfate 325 mg (65 mg elemental iron) oral tablet: 1 tab(s) orally once a day  fludrocortisone 0.1 mg oral tablet: 0.5 tab(s) orally 2 times a day  furosemide 20 mg oral tablet: 1 tab(s) orally 2 times a day  hydrALAZINE 100 mg oral tablet: 1 tab(s) orally every 8 hours  hydrocortisone 10 mg oral tablet: 1 tab(s) orally once a day (in the morning)  hydrocortisone 5 mg oral tablet: 1 tab(s) orally once a day (in the evening)  losartan 100 mg oral tablet: 1 tab(s) orally once a day  melatonin 3 mg oral tablet: 1 tab(s) orally once a day (at bedtime)  Multiple Vitamins oral liquid: 5 milliliter(s) orally once a day (in the evening)  Tylenol Extra Strength 500 mg oral tablet: 2 tab(s) orally 3 times a day  Vitamin D3 50 mcg (2000 intl units) oral tablet: 1 tab(s) orally once a day

## 2024-02-14 NOTE — DISCHARGE NOTE NURSING/CASE MANAGEMENT/SOCIAL WORK - NSDCVIVACCINE_GEN_ALL_CORE_FT
COVID-19, mRNA, LNP-S, PF, 30 mcg/0.3 mL dose, andrew-sucrose (Pf[employer]); 08-Sep-2022 14:07; Akua Brumfield (RN); [employer]; [id number] (Exp. Date: 31-Oct-2022); IntraMuscular; Deltoid Left.; 0.3 milliLiter(s);   Tdap; 10-Jul-2021 13:25; Starr Zazueta (KENNETH); [employer]; [id number] (Exp. Date: 25-Nov-2022); IntraMuscular; Deltoid Left.; 0.5 milliLiter(s); VIS (VIS Published: 09-May-2013, VIS Presented: 10-Jul-2021);

## 2024-02-14 NOTE — DISCHARGE NOTE NURSING/CASE MANAGEMENT/SOCIAL WORK - PATIENT PORTAL LINK FT
You can access the FollowMyHealth Patient Portal offered by [facility] by registering at the following website: [email or web address]. By joining [facility]’s FollowMyHealth portal, you will also be able to view your health information using other applications (apps) compatible with our system.

## 2024-02-14 NOTE — DISCHARGE NOTE PROVIDER - CARE PROVIDER_API CALL
Apollo Nix  Internal Medicine  [address] [facility], [facility] [address]  [place] [address]  Phone: [phone]  Fax: [phone]  Follow Up Time:

## 2024-02-14 NOTE — PROGRESS NOTE ADULT - REASON FOR ADMISSION
UTI /  Diarrhea /  Weakness

## 2024-02-14 NOTE — DISCHARGE NOTE PROVIDER - NSDCCPCAREPLAN_GEN_ALL_CORE_FT
PRINCIPAL DISCHARGE DIAGNOSIS  Diagnosis: Colitis  Assessment and Plan of Treatment: Your symptoms improved.  You no longer have any diarrhea.  You were treated with antibiotics      SECONDARY DISCHARGE DIAGNOSES  Diagnosis: Acute UTI  Assessment and Plan of Treatment: Please continue Cipro 250 BID for 3 more days until 2/17    Diagnosis: Diastolic heart failure  Assessment and Plan of Treatment: Please continue home medications.  Including Lasix, losartan, norvasc and beta blocker    Diagnosis: Paroxysmal atrial fibrillation  Assessment and Plan of Treatment: Continue Amidoarone    Diagnosis: H/O adrenal insufficiency  Assessment and Plan of Treatment: Continue steroids    Diagnosis: Pancreatic lesion  Assessment and Plan of Treatment: You were found to have a 1.5 cystic pancreatic lesion.  Please follow up with a gastroenterologist for further workup if you chose to pursue.

## 2024-02-14 NOTE — DISCHARGE NOTE PROVIDER - ATTENDING DISCHARGE PHYSICAL EXAMINATION:
General: Elderly frail   HEENT MMM  Cardio s1/s2 irregular rhythm  Lung CTAB  Gi soft +bs nontender

## 2024-02-14 NOTE — PROGRESS NOTE ADULT - ASSESSMENT
89 y/o/f with extensive past medical history of paroxysmal afib, diastolic heart failure, CKD, hypokalemia, hyponatremia, hiatal hernia, adrenal insufficiency, lymphoma in remission, CHF, HTN, iron deficiency and SHx of intracranial hemorrhage, hysterectomy, appendectomy presenting to the emergency department with 1 week of watery diarrhea (multiple episodes per day) associated with generalized weakness, abd cramping, and decreased appetite, patient states she was started on antibiotic regimen a few days ago (this being said on the sunruse [facility] form flagyl was only started today),    Denies nausea, vomiting. Notes of fullness - upon palpation of the abdomen UA positive, imaging shows Acute uncomplicated colitis, most prominent in sigmoid colon. Distended gallbladder without stones. Started on rocephin/flagyl.     1. Acute uncomplicated colitis. Pyuria. UTI. PCN allergy  - s/p cipro #1, s/p rocephin 1gm daily #3  - on flagyl [id number] #4-5  - will dc  - continue with abx coverage  - urine cx growing PSAE/KLPN sensitivities reviewed   - dc with po cipro 250mg bid x 3 days  - on amiodarone, ekg qtc wnl and will be on short course of abx  - monitor temps  - tolerating abx well so far; no side effects noted  - reason for abx use and side effects reviewed with patient  - supportive care  - fu cbc    Clinical team may change from intravenous to oral antibiotics when the following criteria are met:   1. Patient is clinically improving/stable       a)	Improved signs and symptoms of infection from initial presentation       b)	Afebrile for 24 hours       c)	Leukocytosis trending towards normal range   2. Patient is tolerating oral intake   3. Initial blood cultures are negative and urine cx available    When above criteria met OR on date, may change iv antibiotics to: po cipro 250mg BID x 3 days, no further flagyl on dc

## 2024-02-14 NOTE — PROGRESS NOTE ADULT - SUBJECTIVE AND OBJECTIVE BOX
Date of service: 02-14-24 @ 10:00    pt seen and examined  feels better  thinks she cant urinate but has been without difficulty per nursing  no abd pain, no n/v/d    ROS: no fever or chills; denies dizziness, no HA, no SOB or cough, no abdominal pain, no legs pain, no rashes    MEDICATIONS  (STANDING):  aMIOdarone    Tablet 200 milliGRAM(s) Oral daily  amLODIPine   Tablet 5 milliGRAM(s) Oral daily  artificial tears (preservative free) Ophthalmic Solution 1 Drop(s) Both EYES three times a day  bethanechol 25 milliGRAM(s) Oral two times a day  carvedilol 25 milliGRAM(s) Oral every 12 hours  cholecalciferol 2000 Unit(s) Oral daily  ciprofloxacin     Tablet 250 milliGRAM(s) Oral every 12 hours  ferrous    sulfate 325 milliGRAM(s) Oral daily  fludroCORTISONE 0.05 milliGRAM(s) Oral two times a day  heparin   Injectable 5000 Unit(s) SubCutaneous every 12 hours  hydrALAZINE 100 milliGRAM(s) Oral every 8 hours  hydrocortisone 10 milliGRAM(s) Oral daily  hydrocortisone 5 milliGRAM(s) Oral at bedtime  losartan 100 milliGRAM(s) Oral daily  metroNIDAZOLE  IVPB 500 milliGRAM(s) IV Intermittent every 8 hours  multivitamin 1 Tablet(s) Oral daily  pantoprazole    Tablet 40 milliGRAM(s) Oral before breakfast  sodium chloride 0.9%. 1000 milliLiter(s) (75 mL/Hr) IV Continuous <Continuous>    Vital Signs Last 24 Hrs  T(C): 36.7 (14 Feb 2024 08:07), Max: 36.7 (14 Feb 2024 08:07)  T(F): 98.1 (14 Feb 2024 08:07), Max: 98.1 (14 Feb 2024 08:07)  HR: 69 (14 Feb 2024 08:07) (66 - 69)  BP: 122/68 (14 Feb 2024 08:07) (122/68 - 173/55)  BP(mean): 77 (14 Feb 2024 06:00) (77 - 89)  RR: 17 (14 Feb 2024 08:07) (16 - 18)  SpO2: 92% (14 Feb 2024 08:07) (92% - 97%)    Parameters below as of 14 Feb 2024 08:07  Patient On (Oxygen Delivery Method): room air        PE:  Constitutional: frail looking  HEENT: NC/AT, EOMI, PERRLA, conjunctivae clear; ears and nose atraumatic; pharynx benign  Neck: supple; thyroid not palpable  Back: no tenderness  Respiratory: respiratory effort normal; clear to auscultation  Cardiovascular: S1S2 regular, no murmurs  Abdomen: soft, not tender, not distended, positive BS; liver and spleen WNL  Genitourinary: no suprapubic tenderness  Lymphatic: no LN palpable  Musculoskeletal: no muscle tenderness, no joint swelling or tenderness  Extremities: no pedal edema  Neurological/ Psychiatric: AxOx3, Judgement and insight normal;  moving all extremities  Skin: no rashes; no palpable lesions    Labs: all available labs reviewed                        7.9    3.94  )-----------( 116      ( 14 Feb 2024 06:33 )             24.3     02-14    135  |  106  |  21  ----------------------------<  91  3.8   |  25  |  0.97    Ca    8.7      14 Feb 2024 06:33  Phos  2.5     02-13  Mg     2.0     02-13    TPro  5.3<L>  /  Alb  2.4<L>  /  TBili  0.2  /  DBili  <0.1  /  AST  8<L>  /  ALT  10<L>  /  AlkPhos  55  02-13        LIVER FUNCTIONS - ( 10 Feb 2024 13:11 )  Alb: 2.8 g/dL / Pro: 6.2 gm/dL / ALK PHOS: 71 U/L / ALT: 12 U/L / AST: 13 U/L / GGT: x           Urinalysis Basic - ( 11 Feb 2024 07:00 )    Color: x / Appearance: x / SG: x / pH: x  Gluc: 105 mg/dL / Ketone: x  / Bili: x / Urobili: x   Blood: x / Protein: x / Nitrite: x   Leuk Esterase: x / RBC: x / WBC x   Sq Epi: x / Non Sq Epi: x / Bacteria: x    Culture - Urine (02.10.24 @ 14:30)   - Amikacin: S <=16  - Amoxicillin/Clavulanic Acid: S <=8/4  - Ampicillin: R 16 These ampicillin results predict results for amoxicillin  - Ampicillin/Sulbactam: S <=4/2  - Aztreonam: S <=4  - Aztreonam: S <=4  - Cefazolin: S <=2 For uncomplicated UTI with K. pneumoniae, E. coli, or P. mirablis: JEAN <=16 is sensitive and JEAN >=32 is resistant. This also predicts results for oral agents cefaclor, cefdinir, cefpodoxime, cefprozil, cefuroxime axetil, cephalexin and locarbef for uncomplicated UTI. Note that some isolates may be susceptible to these agents while testing resistant to cefazolin.  - Cefepime: S <=2  - Cefepime: S <=2  - Cefoxitin: S <=8  - Ceftazidime: S <=1  - Ceftriaxone: S <=1  - Cefuroxime: S <=4  - Ciprofloxacin: S <=0.25  - Ciprofloxacin: S <=0.25  - Ertapenem: S <=0.5  - Gentamicin: S <=2  - Imipenem: S <=1  - Imipenem: S <=1  - Levofloxacin: S <=0.5  - Levofloxacin: S <=0.5  - Meropenem: S <=1  - Meropenem: S <=1  - Nitrofurantoin: S <=32 Should not be used to treat pyelonephritis  - Piperacillin/Tazobactam: S <=8  - Piperacillin/Tazobactam: S <=8  - Tobramycin: S <=2  - Trimethoprim/Sulfamethoxazole: S <=0.5/9.5  Specimen Source: Clean Catch Clean Catch (Midstream)  Culture Results:   >100,000 CFU/ml Pseudomonas aeruginosa   >100,000 CFU/ml Klebsiella pneumoniae  Organism Identification: Pseudomonas aeruginosa   Culture - Blood (02.10.24 @ 13:11)   Specimen Source: .Blood None  Culture Results:   No growth at 24 hours  Culture - Blood (02.10.24 @ 13:11)   Specimen Source: .Blood Blood-Peripheral  Culture Results:   No growth at 24 hours    Radiology: all available radiological tests reviewed    ACC: [id number] EXAM:  CT ABDOMEN AND PELVIS   ORDERED BY: MARLENI ROSARIO     PROCEDURE DATE:  02/10/2024          INTERPRETATION:  CLINICAL INFORMATION: Diarrhea    COMPARISON: November 2022    CONTRAST/COMPLICATIONS:  IV Contrast: NONE  Oral Contrast: NONE  Complications: None reported at time of study completion    PROCEDURE:  CT of the Abdomen and Pelvis was performed.  Sagittal and coronal reformats were performed.    LIMITATIONS: Lack of intravenous contrast material limitsdiagnostic   sensitivity in evaluating solid organs /vasculature.    FINDINGS:  LOWER CHEST: Coronary artery and valvular calcification. Medial RML   /lingula bronchiectasis, peripheral mucous plugging and centimeter sized   calcified RUL granuloma. Bibasilar reticular-nodular changes, of doubtful   significance in this age group. Small/subcentimeter sized (max short   axis) retrocrural lymph nodes. Small pericardial effusion    LIVER: Within normal limits.  BILE DUCTS: Normal caliber.  GALLBLADDER: Distended gallbladder (4.9 cm max trans) with fluid/density   level. No calcified gallstones or pericholecystic edema  SPLEEN: Within normal limits.  PANCREAS: Centimeter sized uncinate process cyst, not significantly   changed and of doubtful significance in this age group  ADRENALS: Within normal limits.  KIDNEYS/URETERS: Renovascular calcification and/or calculi. No   obstructive uropathy.    BLADDER: Distended urinary bladder.  REPRODUCTIVE ORGANS: Hysterectomy. No adnexal mass.    BOWEL: No bowel obstruction. Partially visualized normal appendix. No   appendicitis (coronal [id number]). Diffuse colonic mural thickening and trace   surrounding edema, greatest surrounding the rectosigmoid colon.  PERITONEUM: Trace free pelvic fluid  VESSELS:Described athero  RETROPERITONEUM/LYMPH NODES: No lymphadenopathy.  ABDOMINAL WALL: Within normal limits.  BONES: Diffuse osteopenia . RIGHT hip arthroplasty produces local beam   hardening artifact. T11-L4 compression deformities /kyphoplasty change.   Minimal lumbar degenerative changes    IMPRESSION:  1.  Acute uncomplicated colitis, most prominent in sigmoid colon.  2.  Distended gallbladder without stones.    < end of copied text >    Advanced directives addressed: full resuscitation

## 2024-02-27 ENCOUNTER — INPATIENT (INPATIENT)
Facility: HOSPITAL | Age: 89
LOS: 2 days | Discharge: HOSPICE | DRG: 291 | End: 2024-03-01
Attending: STUDENT IN AN ORGANIZED HEALTH CARE EDUCATION/TRAINING PROGRAM | Admitting: FAMILY MEDICINE
Payer: MEDICARE

## 2024-02-27 VITALS
HEART RATE: 81 BPM | HEIGHT: 63 IN | RESPIRATION RATE: 18 BRPM | DIASTOLIC BLOOD PRESSURE: 63 MMHG | TEMPERATURE: 99 F | OXYGEN SATURATION: 96 % | SYSTOLIC BLOOD PRESSURE: 182 MMHG

## 2024-02-27 DIAGNOSIS — Z90.710 ACQUIRED ABSENCE OF BOTH CERVIX AND UTERUS: Chronic | ICD-10-CM

## 2024-02-27 DIAGNOSIS — Z86.79 PERSONAL HISTORY OF OTHER DISEASES OF THE CIRCULATORY SYSTEM: Chronic | ICD-10-CM

## 2024-02-27 DIAGNOSIS — Z90.49 ACQUIRED ABSENCE OF OTHER SPECIFIED PARTS OF DIGESTIVE TRACT: Chronic | ICD-10-CM

## 2024-02-27 DIAGNOSIS — R07.9 CHEST PAIN, UNSPECIFIED: ICD-10-CM

## 2024-02-27 LAB
ALBUMIN SERPL ELPH-MCNC: 3 G/DL — LOW (ref 3.3–5)
ALP SERPL-CCNC: 71 U/L — SIGNIFICANT CHANGE UP (ref 40–120)
ALT FLD-CCNC: 11 U/L — LOW (ref 12–78)
ANION GAP SERPL CALC-SCNC: 3 MMOL/L — LOW (ref 5–17)
ANION GAP SERPL CALC-SCNC: 3 MMOL/L — LOW (ref 5–17)
ANION GAP SERPL CALC-SCNC: 5 MMOL/L — SIGNIFICANT CHANGE UP (ref 5–17)
AST SERPL-CCNC: 20 U/L — SIGNIFICANT CHANGE UP (ref 15–37)
BASOPHILS # BLD AUTO: 0.01 K/UL — SIGNIFICANT CHANGE UP (ref 0–0.2)
BASOPHILS NFR BLD AUTO: 0.1 % — SIGNIFICANT CHANGE UP (ref 0–2)
BILIRUB SERPL-MCNC: 0.4 MG/DL — SIGNIFICANT CHANGE UP (ref 0.2–1.2)
BUN SERPL-MCNC: 15 MG/DL — SIGNIFICANT CHANGE UP (ref 7–23)
BUN SERPL-MCNC: 16 MG/DL — SIGNIFICANT CHANGE UP (ref 7–23)
BUN SERPL-MCNC: 16 MG/DL — SIGNIFICANT CHANGE UP (ref 7–23)
CALCIUM SERPL-MCNC: 8.5 MG/DL — SIGNIFICANT CHANGE UP (ref 8.5–10.1)
CALCIUM SERPL-MCNC: 8.7 MG/DL — SIGNIFICANT CHANGE UP (ref 8.5–10.1)
CALCIUM SERPL-MCNC: 9 MG/DL — SIGNIFICANT CHANGE UP (ref 8.5–10.1)
CHLORIDE SERPL-SCNC: 98 MMOL/L — SIGNIFICANT CHANGE UP (ref 96–108)
CHLORIDE SERPL-SCNC: 98 MMOL/L — SIGNIFICANT CHANGE UP (ref 96–108)
CHLORIDE SERPL-SCNC: 99 MMOL/L — SIGNIFICANT CHANGE UP (ref 96–108)
CO2 SERPL-SCNC: 34 MMOL/L — HIGH (ref 22–31)
CO2 SERPL-SCNC: 34 MMOL/L — HIGH (ref 22–31)
CO2 SERPL-SCNC: 35 MMOL/L — HIGH (ref 22–31)
CREAT SERPL-MCNC: 1.03 MG/DL — SIGNIFICANT CHANGE UP (ref 0.5–1.3)
CREAT SERPL-MCNC: 1.08 MG/DL — SIGNIFICANT CHANGE UP (ref 0.5–1.3)
CREAT SERPL-MCNC: 1.1 MG/DL — SIGNIFICANT CHANGE UP (ref 0.5–1.3)
EGFR: 45 ML/MIN/1.73M2 — LOW
EGFR: 46 ML/MIN/1.73M2 — LOW
EGFR: 49 ML/MIN/1.73M2 — LOW
EOSINOPHIL # BLD AUTO: 0.01 K/UL — SIGNIFICANT CHANGE UP (ref 0–0.5)
EOSINOPHIL NFR BLD AUTO: 0.1 % — SIGNIFICANT CHANGE UP (ref 0–6)
GLUCOSE SERPL-MCNC: 112 MG/DL — HIGH (ref 70–99)
GLUCOSE SERPL-MCNC: 115 MG/DL — HIGH (ref 70–99)
GLUCOSE SERPL-MCNC: 95 MG/DL — SIGNIFICANT CHANGE UP (ref 70–99)
HCT VFR BLD CALC: 30.6 % — LOW (ref 34.5–45)
HGB BLD-MCNC: 9.8 G/DL — LOW (ref 11.5–15.5)
IMM GRANULOCYTES NFR BLD AUTO: 0.4 % — SIGNIFICANT CHANGE UP (ref 0–0.9)
LIDOCAIN IGE QN: 34 U/L — SIGNIFICANT CHANGE UP (ref 13–75)
LYMPHOCYTES # BLD AUTO: 1.69 K/UL — SIGNIFICANT CHANGE UP (ref 1–3.3)
LYMPHOCYTES # BLD AUTO: 22.3 % — SIGNIFICANT CHANGE UP (ref 13–44)
MAGNESIUM SERPL-MCNC: 2.2 MG/DL — SIGNIFICANT CHANGE UP (ref 1.6–2.6)
MAGNESIUM SERPL-MCNC: 2.3 MG/DL — SIGNIFICANT CHANGE UP (ref 1.6–2.6)
MCHC RBC-ENTMCNC: 32 GM/DL — SIGNIFICANT CHANGE UP (ref 32–36)
MCHC RBC-ENTMCNC: 32.6 PG — SIGNIFICANT CHANGE UP (ref 27–34)
MCV RBC AUTO: 101.7 FL — HIGH (ref 80–100)
MONOCYTES # BLD AUTO: 0.91 K/UL — HIGH (ref 0–0.9)
MONOCYTES NFR BLD AUTO: 12 % — SIGNIFICANT CHANGE UP (ref 2–14)
NEUTROPHILS # BLD AUTO: 4.93 K/UL — SIGNIFICANT CHANGE UP (ref 1.8–7.4)
NEUTROPHILS NFR BLD AUTO: 65.1 % — SIGNIFICANT CHANGE UP (ref 43–77)
NT-PROBNP SERPL-SCNC: 4199 PG/ML — HIGH (ref 0–450)
PLATELET # BLD AUTO: 173 K/UL — SIGNIFICANT CHANGE UP (ref 150–400)
POTASSIUM SERPL-MCNC: 2.7 MMOL/L — CRITICAL LOW (ref 3.5–5.3)
POTASSIUM SERPL-MCNC: 2.9 MMOL/L — CRITICAL LOW (ref 3.5–5.3)
POTASSIUM SERPL-MCNC: 2.9 MMOL/L — CRITICAL LOW (ref 3.5–5.3)
POTASSIUM SERPL-SCNC: 2.7 MMOL/L — CRITICAL LOW (ref 3.5–5.3)
POTASSIUM SERPL-SCNC: 2.9 MMOL/L — CRITICAL LOW (ref 3.5–5.3)
POTASSIUM SERPL-SCNC: 2.9 MMOL/L — CRITICAL LOW (ref 3.5–5.3)
PROT SERPL-MCNC: 6.3 GM/DL — SIGNIFICANT CHANGE UP (ref 6–8.3)
RBC # BLD: 3.01 M/UL — LOW (ref 3.8–5.2)
RBC # FLD: 15.8 % — HIGH (ref 10.3–14.5)
SODIUM SERPL-SCNC: 135 MMOL/L — SIGNIFICANT CHANGE UP (ref 135–145)
SODIUM SERPL-SCNC: 136 MMOL/L — SIGNIFICANT CHANGE UP (ref 135–145)
SODIUM SERPL-SCNC: 138 MMOL/L — SIGNIFICANT CHANGE UP (ref 135–145)
TROPONIN I, HIGH SENSITIVITY RESULT: 23.36 NG/L — SIGNIFICANT CHANGE UP
TROPONIN I, HIGH SENSITIVITY RESULT: 32.38 NG/L — SIGNIFICANT CHANGE UP
WBC # BLD: 7.58 K/UL — SIGNIFICANT CHANGE UP (ref 3.8–10.5)
WBC # FLD AUTO: 7.58 K/UL — SIGNIFICANT CHANGE UP (ref 3.8–10.5)

## 2024-02-27 PROCEDURE — 99285 EMERGENCY DEPT VISIT HI MDM: CPT

## 2024-02-27 PROCEDURE — 84100 ASSAY OF PHOSPHORUS: CPT

## 2024-02-27 PROCEDURE — 83521 IG LIGHT CHAINS FREE EACH: CPT

## 2024-02-27 PROCEDURE — 97116 GAIT TRAINING THERAPY: CPT | Mod: GP

## 2024-02-27 PROCEDURE — 84165 PROTEIN E-PHORESIS SERUM: CPT

## 2024-02-27 PROCEDURE — 93010 ELECTROCARDIOGRAM REPORT: CPT

## 2024-02-27 PROCEDURE — 93005 ELECTROCARDIOGRAM TRACING: CPT

## 2024-02-27 PROCEDURE — 97163 PT EVAL HIGH COMPLEX 45 MIN: CPT | Mod: GP

## 2024-02-27 PROCEDURE — 99223 1ST HOSP IP/OBS HIGH 75: CPT | Mod: GC

## 2024-02-27 PROCEDURE — 84155 ASSAY OF PROTEIN SERUM: CPT

## 2024-02-27 PROCEDURE — 71275 CT ANGIOGRAPHY CHEST: CPT | Mod: 26,MC

## 2024-02-27 PROCEDURE — 94761 N-INVAS EAR/PLS OXIMETRY MLT: CPT

## 2024-02-27 PROCEDURE — 85025 COMPLETE CBC W/AUTO DIFF WBC: CPT

## 2024-02-27 PROCEDURE — 74174 CTA ABD&PLVS W/CONTRAST: CPT | Mod: 26,MC

## 2024-02-27 PROCEDURE — 85027 COMPLETE CBC AUTOMATED: CPT

## 2024-02-27 PROCEDURE — 36415 COLL VENOUS BLD VENIPUNCTURE: CPT

## 2024-02-27 PROCEDURE — 93306 TTE W/DOPPLER COMPLETE: CPT

## 2024-02-27 PROCEDURE — 80048 BASIC METABOLIC PNL TOTAL CA: CPT

## 2024-02-27 PROCEDURE — 84145 PROCALCITONIN (PCT): CPT

## 2024-02-27 PROCEDURE — 83735 ASSAY OF MAGNESIUM: CPT

## 2024-02-27 PROCEDURE — 93306 TTE W/DOPPLER COMPLETE: CPT | Mod: 26

## 2024-02-27 RX ORDER — AZITHROMYCIN 500 MG/1
500 TABLET, FILM COATED ORAL DAILY
Refills: 0 | Status: DISCONTINUED | OUTPATIENT
Start: 2024-02-27 | End: 2024-03-01

## 2024-02-27 RX ORDER — HYDRALAZINE HCL 50 MG
100 TABLET ORAL EVERY 8 HOURS
Refills: 0 | Status: DISCONTINUED | OUTPATIENT
Start: 2024-02-27 | End: 2024-02-27

## 2024-02-27 RX ORDER — SODIUM,POTASSIUM PHOSPHATES 278-250MG
1 POWDER IN PACKET (EA) ORAL ONCE
Refills: 0 | Status: COMPLETED | OUTPATIENT
Start: 2024-02-27 | End: 2024-02-27

## 2024-02-27 RX ORDER — HYDRALAZINE HCL 50 MG
100 TABLET ORAL THREE TIMES A DAY
Refills: 0 | Status: DISCONTINUED | OUTPATIENT
Start: 2024-02-27 | End: 2024-03-01

## 2024-02-27 RX ORDER — CARVEDILOL PHOSPHATE 80 MG/1
25 CAPSULE, EXTENDED RELEASE ORAL EVERY 12 HOURS
Refills: 0 | Status: DISCONTINUED | OUTPATIENT
Start: 2024-02-27 | End: 2024-03-01

## 2024-02-27 RX ORDER — CEFTRIAXONE 500 MG/1
1000 INJECTION, POWDER, FOR SOLUTION INTRAMUSCULAR; INTRAVENOUS EVERY 24 HOURS
Refills: 0 | Status: DISCONTINUED | OUTPATIENT
Start: 2024-02-27 | End: 2024-02-27

## 2024-02-27 RX ORDER — ACETAMINOPHEN 500 MG
650 TABLET ORAL EVERY 6 HOURS
Refills: 0 | Status: DISCONTINUED | OUTPATIENT
Start: 2024-02-27 | End: 2024-03-01

## 2024-02-27 RX ORDER — SODIUM,POTASSIUM PHOSPHATES 278-250MG
1 POWDER IN PACKET (EA) ORAL ONCE
Refills: 0 | Status: DISCONTINUED | OUTPATIENT
Start: 2024-02-27 | End: 2024-02-27

## 2024-02-27 RX ORDER — CARVEDILOL PHOSPHATE 80 MG/1
25 CAPSULE, EXTENDED RELEASE ORAL ONCE
Refills: 0 | Status: COMPLETED | OUTPATIENT
Start: 2024-02-27 | End: 2024-02-27

## 2024-02-27 RX ORDER — AMLODIPINE BESYLATE 2.5 MG/1
5 TABLET ORAL DAILY
Refills: 0 | Status: DISCONTINUED | OUTPATIENT
Start: 2024-02-27 | End: 2024-02-28

## 2024-02-27 RX ORDER — HYDROCORTISONE 20 MG
5 TABLET ORAL AT BEDTIME
Refills: 0 | Status: DISCONTINUED | OUTPATIENT
Start: 2024-02-27 | End: 2024-03-01

## 2024-02-27 RX ORDER — MAGNESIUM HYDROXIDE 400 MG/1
30 TABLET, CHEWABLE ORAL EVERY 24 HOURS
Refills: 0 | Status: DISCONTINUED | OUTPATIENT
Start: 2024-02-27 | End: 2024-03-01

## 2024-02-27 RX ORDER — FERROUS SULFATE 325(65) MG
325 TABLET ORAL DAILY
Refills: 0 | Status: DISCONTINUED | OUTPATIENT
Start: 2024-02-27 | End: 2024-03-01

## 2024-02-27 RX ORDER — LANOLIN ALCOHOL/MO/W.PET/CERES
3 CREAM (GRAM) TOPICAL AT BEDTIME
Refills: 0 | Status: DISCONTINUED | OUTPATIENT
Start: 2024-02-27 | End: 2024-03-01

## 2024-02-27 RX ORDER — CEFTRIAXONE 500 MG/1
1000 INJECTION, POWDER, FOR SOLUTION INTRAMUSCULAR; INTRAVENOUS EVERY 24 HOURS
Refills: 0 | Status: DISCONTINUED | OUTPATIENT
Start: 2024-02-27 | End: 2024-03-01

## 2024-02-27 RX ORDER — HEPARIN SODIUM 5000 [USP'U]/ML
5000 INJECTION INTRAVENOUS; SUBCUTANEOUS EVERY 12 HOURS
Refills: 0 | Status: DISCONTINUED | OUTPATIENT
Start: 2024-02-27 | End: 2024-02-29

## 2024-02-27 RX ORDER — POTASSIUM CHLORIDE 20 MEQ
40 PACKET (EA) ORAL ONCE
Refills: 0 | Status: COMPLETED | OUTPATIENT
Start: 2024-02-27 | End: 2024-02-27

## 2024-02-27 RX ORDER — CIPROFLOXACIN LACTATE 400MG/40ML
1 VIAL (ML) INTRAVENOUS
Qty: 0 | Refills: 0 | DISCHARGE

## 2024-02-27 RX ORDER — POTASSIUM CHLORIDE 20 MEQ
10 PACKET (EA) ORAL
Refills: 0 | Status: DISCONTINUED | OUTPATIENT
Start: 2024-02-27 | End: 2024-02-27

## 2024-02-27 RX ORDER — LOSARTAN POTASSIUM 100 MG/1
100 TABLET, FILM COATED ORAL DAILY
Refills: 0 | Status: DISCONTINUED | OUTPATIENT
Start: 2024-02-27 | End: 2024-03-01

## 2024-02-27 RX ORDER — POTASSIUM CHLORIDE 20 MEQ
10 PACKET (EA) ORAL
Refills: 0 | Status: COMPLETED | OUTPATIENT
Start: 2024-02-27 | End: 2024-02-27

## 2024-02-27 RX ORDER — FUROSEMIDE 40 MG
20 TABLET ORAL
Refills: 0 | Status: DISCONTINUED | OUTPATIENT
Start: 2024-02-28 | End: 2024-02-28

## 2024-02-27 RX ORDER — HYDRALAZINE HCL 50 MG
100 TABLET ORAL THREE TIMES A DAY
Refills: 0 | Status: DISCONTINUED | OUTPATIENT
Start: 2024-02-27 | End: 2024-02-27

## 2024-02-27 RX ORDER — FUROSEMIDE 40 MG
40 TABLET ORAL ONCE
Refills: 0 | Status: COMPLETED | OUTPATIENT
Start: 2024-02-27 | End: 2024-02-27

## 2024-02-27 RX ORDER — FLUDROCORTISONE ACETATE 0.1 MG/1
0.1 TABLET ORAL
Refills: 0 | Status: DISCONTINUED | OUTPATIENT
Start: 2024-02-27 | End: 2024-03-01

## 2024-02-27 RX ORDER — HYDROCORTISONE 20 MG
10 TABLET ORAL DAILY
Refills: 0 | Status: DISCONTINUED | OUTPATIENT
Start: 2024-02-27 | End: 2024-03-01

## 2024-02-27 RX ORDER — AMIODARONE HYDROCHLORIDE 400 MG/1
200 TABLET ORAL DAILY
Refills: 0 | Status: DISCONTINUED | OUTPATIENT
Start: 2024-02-27 | End: 2024-03-01

## 2024-02-27 RX ADMIN — HEPARIN SODIUM 5000 UNIT(S): 5000 INJECTION INTRAVENOUS; SUBCUTANEOUS at 21:02

## 2024-02-27 RX ADMIN — Medication 3 MILLIGRAM(S): at 21:03

## 2024-02-27 RX ADMIN — AMIODARONE HYDROCHLORIDE 200 MILLIGRAM(S): 400 TABLET ORAL at 14:53

## 2024-02-27 RX ADMIN — FLUDROCORTISONE ACETATE 0.1 MILLIGRAM(S): 0.1 TABLET ORAL at 21:02

## 2024-02-27 RX ADMIN — Medication 1 PACKET(S): at 15:30

## 2024-02-27 RX ADMIN — Medication 100 MILLIGRAM(S): at 21:02

## 2024-02-27 RX ADMIN — CEFTRIAXONE 1000 MILLIGRAM(S): 500 INJECTION, POWDER, FOR SOLUTION INTRAMUSCULAR; INTRAVENOUS at 17:58

## 2024-02-27 RX ADMIN — Medication 40 MILLIGRAM(S): at 17:57

## 2024-02-27 RX ADMIN — Medication 325 MILLIGRAM(S): at 14:53

## 2024-02-27 RX ADMIN — Medication 5 MILLIGRAM(S): at 21:57

## 2024-02-27 RX ADMIN — AMLODIPINE BESYLATE 5 MILLIGRAM(S): 2.5 TABLET ORAL at 14:53

## 2024-02-27 RX ADMIN — CARVEDILOL PHOSPHATE 25 MILLIGRAM(S): 80 CAPSULE, EXTENDED RELEASE ORAL at 13:18

## 2024-02-27 RX ADMIN — Medication 100 MILLIEQUIVALENT(S): at 19:02

## 2024-02-27 RX ADMIN — AZITHROMYCIN 500 MILLIGRAM(S): 500 TABLET, FILM COATED ORAL at 18:55

## 2024-02-27 RX ADMIN — Medication 100 MILLIEQUIVALENT(S): at 21:55

## 2024-02-27 RX ADMIN — CARVEDILOL PHOSPHATE 25 MILLIGRAM(S): 80 CAPSULE, EXTENDED RELEASE ORAL at 21:02

## 2024-02-27 NOTE — ED PROVIDER NOTE - CLINICAL SUMMARY MEDICAL DECISION MAKING FREE TEXT BOX
89 y/o female here with intermittent chest tightness x1 week and SOB. Vitals normal other than hypertension. Pt not hypoxic. SpO2 96% on RA. EKG NSR. TWI AVL and V6 which are contiguous leads. Will compare to old. Will r/o ACS with troponin, CT r/o aortic dissection, reassess.

## 2024-02-27 NOTE — H&P ADULT - ATTENDING COMMENTS
this pleasant 89 yo woman with h/o HFpEF, mod AS, recent GE p/w subacute SOB and LE edema, chest tightness, worsening x 2 weeks. 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 progression of valvular heart disease (or in combination with it) vs. poorly controlled HTN.  in addition I suspect she has either myeloma (macrocytic anemia with low anion gap) or MDS.      plan- abx's (PENFAST score of 1 thus ok to use a cephalosporin- d/w pt and daughter risks:benefits), diurese, BP control, correct hypokalemia, c/w adrenal replacement therapy, echo, SPEP/IPEP.  advanced directives reviewed- DNR/DNI. this pleasant 89 yo woman with h/o HFpEF, mod AS, recent GE p/w subacute SOB and LE edema, chest tightness, worsening x 2 weeks. 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 progression of valvular heart disease (or in combination with it) vs. poorly controlled HTN.  in addition I suspect she has either myeloma (macrocytic anemia with low anion gap) or MDS.      plan- abx's (PENFAST score of 1 thus ok to use a cephalosporin- d/w pt and daughter risks:benefits), diurese, BP control, correct hypokalemia, c/w adrenal replacement therapy, echo, SPEP/IPEP.  advanced directives reviewed- DNR/DNI.      addendum- keep amiodarone pulm toxicity in mind if pulm inflitrates worsen oin abx's. she has a h/o PAF but not on anticoag- will confer with daughter re why.

## 2024-02-27 NOTE — H&P ADULT - ASSESSMENT
Pt is a 89yo F with PMHx of Paroxysmal AFIB, HFpEF,  CKD, Adrenal insufficiency, HFpEF, Lymphoma in remission,  HTN, Iron deficiency,  ICH who presents to ED from [facility] for sob and chest pressure which is worse in the mornings for the last 2 weeks. Says legs feels heavy and swollen at night when lying in bed. Pt uses a wheelchair. Daughter Kate is at bedside. Denies bleeding or syncope.       #Decompensated CHF   -exacerbated by recent illness or possible worsening of valvular pathology   -last echo in 2022 demonstrated mild AS with moderate AR, will repeat  -BNP 4199  -Lasix IV x 1, continue home regimen tomorrow (20 mg PO bid)   -daily weights, strict I's and O's, DASH diet   -monitor on telemetry   -CTA ordered by ED to r/o PE pending   -cardiology consult    #Abnormal EKG   -TWI in AVL and V6 new from prior   -trop not elevated, second ordered  -repeat EKG after K supp, monitor on telemetry   -fu cardiology consultation     #Moderate Hypokalemia  -repleted PO in ED   -fu bmp later today   -adjust diurestic dose pending results   -monitor, pt on florinef   -check Mg     #CKD3  -monitor renal function, received IV contrast   -avoid nephrotoxic medications    #Hx of Adrenal Insufficiency   -continue home regimen with Florinef and Hydrocortisone  -monitor BP and K levels closely     #Paroxysmal Atrial Fibrillation   -EKG on arrival NSR   -rate controlled  -continue Amiodarone 200 mg daily, Coreg 25 mg  q12     #HTN  -C/w Losartan, Coreg, amlodipine   -monitor BP     #Chronic Anemia  -Hgb at baseline   -Iron studies from prior admission reviewed, iron borderline low   -ferrous sulfate supp     #DVT PPx   -heparin subQ             Pt is a 89yo F with PMHx of Paroxysmal AFIB, HFpEF,  CKD, Adrenal insufficiency, HFpEF, Lymphoma in remission,  HTN, Iron deficiency,  ICH who presents to ED from [facility] for sob and chest pressure which is worse in the mornings for the last 2 weeks. Says legs feels heavy and swollen at night when lying in bed. Pt uses a wheelchair. Daughter Kate is at bedside. Denies bleeding or syncope.       #SOB 2/2 Decompensated CHF or PNA   -exacerbated by recent illness or possible worsening of valvular pathology   -last echo in 2022 demonstrated mild AS with moderate AR, will repeat  -BNP 4199  - CT Chest showing Mild bronchiectasis with areas of distal   airway impaction, tree-in-budnodules, groundglass opacities and   calcifications, also b/L pleural effusions that have increased in size  -Lasix IV x 1, continue home regimen tomorrow (20 mg PO bid)   - start Rocephin and Azithromycin   -daily weights, strict I's and O's, DASH diet   -monitor on telemetry   -cardiology consult        #Abnormal EKG   -TWI in AVL and V6 new from prior   -trop not elevated, second ordered  -repeat EKG after K supp, monitor on telemetry   -fu cardiology consultation     #Moderate Hypokalemia  -repleted PO   -serial bmp's   -monitor, pt on florinef   -Mg wnl     #CKD3  -monitor renal function, received IV contrast   -avoid nephrotoxic medications    #Hx of Adrenal Insufficiency   -continue home regimen with Florinef and Hydrocortisone  -monitor BP and K levels closely     #Paroxysmal Atrial Fibrillation   -EKG on arrival NSR   -rate controlled  -continue Amiodarone 200 mg daily, Coreg 25 mg  q12     #HTN  -C/w Losartan, Coreg, amlodipine   -monitor BP     #Chronic Anemia  -Hgb at baseline   -Iron studies from prior admission reviewed, iron borderline low   -ferrous sulfate supp     #DVT PPx   -heparin subQ             Pt is a 89yo F with PMHx of Paroxysmal AFIB, HFpEF,  CKD, Adrenal insufficiency, HFpEF, Lymphoma in remission,  HTN, Iron deficiency,  ICH who presents to ED from [facility] for sob and chest pressure which is worse in the mornings for the last 2 weeks. Says legs feels heavy and swollen at night when lying in bed. Pt uses a wheelchair. Daughter Kate is at bedside. Denies bleeding or syncope.       #SOB 2/2 Decompensated CHF or PNA   -exacerbated by recent illness or possible worsening of valvular pathology   -last echo in 2022 demonstrated mild AS with moderate AR, will repeat  -BNP 4199  - CT Chest showing Mild bronchiectasis with areas of distal   airway impaction, tree-in-budnodules, groundglass opacities and   calcifications, also b/L pleural effusions that have increased in size  -Lasix IV x 1, continue home regimen tomorrow (20 mg PO bid)   - start Rocephin and Azithromycin   -daily weights, strict I's and O's, DASH diet   -monitor on telemetry   -cardiology consult        #Abnormal EKG   -TWI in AVL and V6 new from prior   -trop not elevated, second ordered  -repeat EKG after K supp, monitor on telemetry   -fu cardiology consultation     #Moderate Hypokalemia  -repleted PO   -serial bmp's   -monitor, pt on florinef   -Mg wnl     #CKD3  -monitor renal function, received IV contrast   -avoid nephrotoxic medications    #Hx of Adrenal Insufficiency   -continue home regimen with Florinef and Hydrocortisone  -monitor BP and K levels closely     #Paroxysmal Atrial Fibrillation   -EKG on arrival NSR   -rate controlled  -continue Amiodarone 200 mg daily, Coreg 25 mg  q12     #HTN  -C/w Losartan, Coreg, amlodipine   -monitor BP     #Chronic Anemia  -Hgb at baseline   -Iron studies from prior admission reviewed, iron borderline low   -ferrous sulfate supp   -check spep     #DVT PPx   -heparin subQ

## 2024-02-27 NOTE — H&P ADULT - HISTORY OF PRESENT ILLNESS
Pt is a 89yo F with PMHx of Paroxysmal AFIB, HFpEF,  CKD, Adrenal insufficiency, HFpEF, Lymphoma in remission,  HTN, Iron deficiency,  ICH who presents to ED from [facility] for sob and chest pressure which is worse in the mornings for the last 2 weeks. Says legs feels heavy and swollen at night when lying in bed. Pt uses a wheelchair. Daughter Kate is at bedside. Denies bleeding or syncope.     Pt was recent admitted from 2/10-2/14 for UTI and gatroenteritis. Denies ny further diarrhea or urinary symptoms. In ED    In ED, BP was elevated at 182/63, all other VS stable. EKG: NSR, TWI at AVL and V6.

## 2024-02-27 NOTE — PHARMACOTHERAPY INTERVENTION NOTE - COMMENTS
Medication history complete, patient is from [facility], reviewed and confirmed medication with list provided by facility.

## 2024-02-27 NOTE — H&P ADULT - NSHPLABSRESULTS_GEN_ALL_CORE
< from: CT Angio Chest Aorta w/wo IV Cont (02.27.24 @ 11:16) >    FINDINGS:  CTA: Multifocal areas of embolized methyl methacrylate are again noted   within several segmental and subsegmental branches, for example in the   left upper lobe (4:152, 121), right upper and right middle lobes. No   interval acute pulmonary embolism.    There is a common origin of the innominate artery and left common carotid   artery, normal anatomic variant. Unchanged focal, short segment   dissection of the infrarenal aorta. No thoracic aortic dissection,   aneurysm or intramural hematoma. Multifocal calcified and noncalcified   aortic plaque.    Heart: The heart is enlarged. Coronary artery calcification. No   pericardial effusion..    Lungs/Airways/Pleura: Small pleural effusions have increased in size,   previously trace on the 2/10/2024 abdomen CT, with new partial lower lobe   atelectasis. No pneumothorax.  Mild bronchiectasis with areas of distal   airway impaction, tree-in-budnodules, groundglass opacities and   calcifications.    Mediastinum, Lymph nodes: No thoracic lymphadenopathy.    Hepatobiliary: Unremarkable.    Pancreas: Several stable pancreatic cysts measuring up to 1.6 cm.    Spleen: Numerous small hypodensities throughout the spleen are unchanged.    Adrenal glands: Unremarkable.    Kidneys: Bilateral non obstructing nephrolithiasis.    Bowel: No bowel obstruction. Overall improvement in previously seen   diffuse colonic wall thickening compared to 2/10/2024 abdomen CT, with   residual areas of colonic wall thickening (i.e. right lower abdomen   3:147).    Abdominal lymph nodes: No lymphadenopathy.      < end of copied text >

## 2024-02-27 NOTE — PATIENT PROFILE ADULT - FALL HARM RISK - HARM RISK INTERVENTIONS
Assistance with ambulation/Assistance OOB with selected safe patient handling equipment/Communicate Risk of Fall with Harm to all staff/Discuss with provider need for PT consult/Monitor gait and stability/Reinforce activity limits and safety measures with patient and family/Review medications for side effects contributing to fall risk/Tailored Fall Risk Interventions/Use of alarms - bed, chair and/or voice tab/Visual Cue: Yellow wristband and red socks/Bed in lowest position, wheels locked, appropriate side rails in place/Call bell, personal items and telephone in reach/Instruct patient to call for assistance before getting out of bed or chair/Non-slip footwear when patient is out of bed/[place] to call system/Physically safe environment - no spills, clutter or unnecessary equipment/Purposeful Proactive Rounding/Room/bathroom lighting operational, light cord in reach

## 2024-02-27 NOTE — ED ADULT TRIAGE NOTE - AS PAIN REST
----- Message from Huseyin Menon DO sent at 7/3/2017  1:31 PM CDT -----  Notify her lipids were normal-cont simvastatin, low fat/chol diet, exercise   2 (mild pain)

## 2024-02-27 NOTE — PROVIDER CONTACT NOTE (OTHER) - ACTION/TREATMENT ORDERED:
Md made aware of what pt received and when today at , MD verbalized to RN, if SBP is greater than 180, to give 2200 medications early. Telemetry monitoring continued.

## 2024-02-27 NOTE — H&P ADULT - NSHPPHYSICALEXAM_GEN_ALL_CORE
Vital Signs Last 24 Hrs  T(C): 37.1 (27 Feb 2024 10:24), Max: 37.1 (27 Feb 2024 10:24)  T(F): 98.7 (27 Feb 2024 10:24), Max: 98.7 (27 Feb 2024 10:24)  HR: 81 (27 Feb 2024 10:24) (81 - 81)  BP: 182/63 (27 Feb 2024 10:24) (182/63 - 182/63)  BP(mean): --  RR: 18 (27 Feb 2024 10:24) (18 - 18)  SpO2: 96% (27 Feb 2024 10:24) (96% - 96%)    Parameters below as of 27 Feb 2024 10:24  Patient On (Oxygen Delivery Method): room air        General: WN/WD NAD  Head- Atraumatic, normocephalic   Neurology: A&Ox3, nonfocal  HEENT- PERRLA, moist muscous membrane  Neck-supple, no JVD  Respiratory: rales R base, decreased BS RUL   CVS:   systolic ejection murmur LLSB   Genitourinary- voiding, non palpable bladder  Extremities: No edema, + peripheral pulses  Skin-no rashes  Psychiatric- mood stable

## 2024-02-27 NOTE — ED ADULT TRIAGE NOTE - CHIEF COMPLAINT QUOTE
pt presents to ED for chest pressure, SOB this morning. also noting increased bilateral leg swelling last night. hx CHF. does not wear home O2. will obtain ekg.

## 2024-02-27 NOTE — H&P ADULT - NSHPREVIEWOFSYSTEMS_GEN_ALL_CORE
REVIEW OF SYSTEMS:  CONSTITUTIONAL: No weakness, fevers or chills  EYES/ENT: No visual changes;  No vertigo or throat pain   NECK: No pain or stiffness  RESPIRATORY: No cough, wheezing, hemoptysis; + shortness of breath  CARDIOVASCULAR: + chest pain , no palpitations  GASTROINTESTINAL: No abdominal or epigastric pain. No nausea, vomiting, or hematemesis; No diarrhea or constipation. No melena or hematochezia.  GENITOURINARY: No dysuria, frequency or hematuria  NEUROLOGICAL: No numbness or weakness  SKIN: rash in legs

## 2024-02-27 NOTE — ED PROVIDER NOTE - OBJECTIVE STATEMENT
89 y/o female with a PMHx of adrenal insufficiency, CHF, CKD, diastolic heart failure, hypokalemia, hyponatremia, intracranial hemorrhage, iron deficiency anemia presents to the ED c/o intermittent chest tightness and SOB x1 week. Denies n/v, cough. Pt recently admitted for UTI. No known sick contacts. No other complaints at this time.

## 2024-02-28 DIAGNOSIS — K86.2 CYST OF PANCREAS: ICD-10-CM

## 2024-02-28 DIAGNOSIS — E83.42 HYPOMAGNESEMIA: ICD-10-CM

## 2024-02-28 DIAGNOSIS — I50.32 CHRONIC DIASTOLIC (CONGESTIVE) HEART FAILURE: ICD-10-CM

## 2024-02-28 DIAGNOSIS — E43 UNSPECIFIED SEVERE PROTEIN-CALORIE MALNUTRITION: ICD-10-CM

## 2024-02-28 DIAGNOSIS — I48.0 PAROXYSMAL ATRIAL FIBRILLATION: ICD-10-CM

## 2024-02-28 DIAGNOSIS — E83.39 OTHER DISORDERS OF PHOSPHORUS METABOLISM: ICD-10-CM

## 2024-02-28 DIAGNOSIS — D69.6 THROMBOCYTOPENIA, UNSPECIFIED: ICD-10-CM

## 2024-02-28 DIAGNOSIS — N39.0 URINARY TRACT INFECTION, SITE NOT SPECIFIED: ICD-10-CM

## 2024-02-28 DIAGNOSIS — E27.40 UNSPECIFIED ADRENOCORTICAL INSUFFICIENCY: ICD-10-CM

## 2024-02-28 DIAGNOSIS — I34.0 NONRHEUMATIC MITRAL (VALVE) INSUFFICIENCY: ICD-10-CM

## 2024-02-28 DIAGNOSIS — C85.90 NON-HODGKIN LYMPHOMA, UNSPECIFIED, UNSPECIFIED SITE: ICD-10-CM

## 2024-02-28 DIAGNOSIS — I10 ESSENTIAL (PRIMARY) HYPERTENSION: ICD-10-CM

## 2024-02-28 DIAGNOSIS — I50.30 UNSPECIFIED DIASTOLIC (CONGESTIVE) HEART FAILURE: ICD-10-CM

## 2024-02-28 DIAGNOSIS — Z88.0 ALLERGY STATUS TO PENICILLIN: ICD-10-CM

## 2024-02-28 DIAGNOSIS — E61.1 IRON DEFICIENCY: ICD-10-CM

## 2024-02-28 DIAGNOSIS — K52.9 NONINFECTIVE GASTROENTERITIS AND COLITIS, UNSPECIFIED: ICD-10-CM

## 2024-02-28 DIAGNOSIS — N18.9 CHRONIC KIDNEY DISEASE, UNSPECIFIED: ICD-10-CM

## 2024-02-28 DIAGNOSIS — Z66 DO NOT RESUSCITATE: ICD-10-CM

## 2024-02-28 DIAGNOSIS — I13.0 HYPERTENSIVE HEART AND CHRONIC KIDNEY DISEASE WITH HEART FAILURE AND STAGE 1 THROUGH STAGE 4 CHRONIC KIDNEY DISEASE, OR UNSPECIFIED CHRONIC KIDNEY DISEASE: ICD-10-CM

## 2024-02-28 DIAGNOSIS — N17.9 ACUTE KIDNEY FAILURE, UNSPECIFIED: ICD-10-CM

## 2024-02-28 DIAGNOSIS — Z88.1 ALLERGY STATUS TO OTHER ANTIBIOTIC AGENTS STATUS: ICD-10-CM

## 2024-02-28 DIAGNOSIS — B96.20 UNSPECIFIED ESCHERICHIA COLI [E. COLI] AS THE CAUSE OF DISEASES CLASSIFIED ELSEWHERE: ICD-10-CM

## 2024-02-28 LAB
% ALBUMIN: 55.3 % — SIGNIFICANT CHANGE UP
% ALPHA 1: 8.5 % — SIGNIFICANT CHANGE UP
% ALPHA 2: 17.6 % — SIGNIFICANT CHANGE UP
% BETA: 10.8 % — SIGNIFICANT CHANGE UP
% GAMMA: 7.8 % — SIGNIFICANT CHANGE UP
% M SPIKE: 2.2 % — SIGNIFICANT CHANGE UP
ALBUMIN SERPL ELPH-MCNC: 3 G/DL — LOW (ref 3.6–5.5)
ALBUMIN/GLOB SERPL ELPH: 1.2 RATIO — SIGNIFICANT CHANGE UP
ALPHA1 GLOB SERPL ELPH-MCNC: 0.5 G/DL — HIGH (ref 0.1–0.4)
ALPHA2 GLOB SERPL ELPH-MCNC: 1 G/DL — SIGNIFICANT CHANGE UP (ref 0.5–1)
ANION GAP SERPL CALC-SCNC: 5 MMOL/L — SIGNIFICANT CHANGE UP (ref 5–17)
ANION GAP SERPL CALC-SCNC: 5 MMOL/L — SIGNIFICANT CHANGE UP (ref 5–17)
B-GLOBULIN SERPL ELPH-MCNC: 0.6 G/DL — SIGNIFICANT CHANGE UP (ref 0.5–1)
BASOPHILS # BLD AUTO: 0.01 K/UL — SIGNIFICANT CHANGE UP (ref 0–0.2)
BASOPHILS NFR BLD AUTO: 0.2 % — SIGNIFICANT CHANGE UP (ref 0–2)
BUN SERPL-MCNC: 15 MG/DL — SIGNIFICANT CHANGE UP (ref 7–23)
BUN SERPL-MCNC: 16 MG/DL — SIGNIFICANT CHANGE UP (ref 7–23)
CALCIUM SERPL-MCNC: 8.6 MG/DL — SIGNIFICANT CHANGE UP (ref 8.5–10.1)
CALCIUM SERPL-MCNC: 8.7 MG/DL — SIGNIFICANT CHANGE UP (ref 8.5–10.1)
CHLORIDE SERPL-SCNC: 100 MMOL/L — SIGNIFICANT CHANGE UP (ref 96–108)
CHLORIDE SERPL-SCNC: 99 MMOL/L — SIGNIFICANT CHANGE UP (ref 96–108)
CO2 SERPL-SCNC: 28 MMOL/L — SIGNIFICANT CHANGE UP (ref 22–31)
CO2 SERPL-SCNC: 34 MMOL/L — HIGH (ref 22–31)
CREAT SERPL-MCNC: 1.06 MG/DL — SIGNIFICANT CHANGE UP (ref 0.5–1.3)
CREAT SERPL-MCNC: 1.1 MG/DL — SIGNIFICANT CHANGE UP (ref 0.5–1.3)
EGFR: 45 ML/MIN/1.73M2 — LOW
EGFR: 47 ML/MIN/1.73M2 — LOW
EOSINOPHIL # BLD AUTO: 0.03 K/UL — SIGNIFICANT CHANGE UP (ref 0–0.5)
EOSINOPHIL NFR BLD AUTO: 0.5 % — SIGNIFICANT CHANGE UP (ref 0–6)
GAMMA GLOBULIN: 0.4 G/DL — LOW (ref 0.6–1.6)
GLUCOSE SERPL-MCNC: 100 MG/DL — HIGH (ref 70–99)
GLUCOSE SERPL-MCNC: 157 MG/DL — HIGH (ref 70–99)
HCT VFR BLD CALC: 28.1 % — LOW (ref 34.5–45)
HGB BLD-MCNC: 8.9 G/DL — LOW (ref 11.5–15.5)
IMM GRANULOCYTES NFR BLD AUTO: 0.2 % — SIGNIFICANT CHANGE UP (ref 0–0.9)
LYMPHOCYTES # BLD AUTO: 1.49 K/UL — SIGNIFICANT CHANGE UP (ref 1–3.3)
LYMPHOCYTES # BLD AUTO: 27.1 % — SIGNIFICANT CHANGE UP (ref 13–44)
M-SPIKE: 0.1 G/DL — HIGH (ref 0–0)
MAGNESIUM SERPL-MCNC: 2.1 MG/DL — SIGNIFICANT CHANGE UP (ref 1.6–2.6)
MCHC RBC-ENTMCNC: 31.7 GM/DL — LOW (ref 32–36)
MCHC RBC-ENTMCNC: 32.4 PG — SIGNIFICANT CHANGE UP (ref 27–34)
MCV RBC AUTO: 102.2 FL — HIGH (ref 80–100)
MONOCYTES # BLD AUTO: 0.74 K/UL — SIGNIFICANT CHANGE UP (ref 0–0.9)
MONOCYTES NFR BLD AUTO: 13.5 % — SIGNIFICANT CHANGE UP (ref 2–14)
NEUTROPHILS # BLD AUTO: 3.22 K/UL — SIGNIFICANT CHANGE UP (ref 1.8–7.4)
NEUTROPHILS NFR BLD AUTO: 58.5 % — SIGNIFICANT CHANGE UP (ref 43–77)
PLATELET # BLD AUTO: 144 K/UL — LOW (ref 150–400)
POTASSIUM SERPL-MCNC: 2.8 MMOL/L — CRITICAL LOW (ref 3.5–5.3)
POTASSIUM SERPL-MCNC: 4.2 MMOL/L — SIGNIFICANT CHANGE UP (ref 3.5–5.3)
POTASSIUM SERPL-SCNC: 2.8 MMOL/L — CRITICAL LOW (ref 3.5–5.3)
POTASSIUM SERPL-SCNC: 4.2 MMOL/L — SIGNIFICANT CHANGE UP (ref 3.5–5.3)
PROCALCITONIN SERPL-MCNC: 0.16 NG/ML — HIGH (ref 0.02–0.1)
PROT PATTERN SERPL ELPH-IMP: SIGNIFICANT CHANGE UP
PROT SERPL-MCNC: 5.5 G/DL — LOW (ref 6–8.3)
PROT SERPL-MCNC: 5.5 G/DL — LOW (ref 6–8.3)
RBC # BLD: 2.75 M/UL — LOW (ref 3.8–5.2)
RBC # FLD: 15.9 % — HIGH (ref 10.3–14.5)
SODIUM SERPL-SCNC: 133 MMOL/L — LOW (ref 135–145)
SODIUM SERPL-SCNC: 138 MMOL/L — SIGNIFICANT CHANGE UP (ref 135–145)
WBC # BLD: 5.5 K/UL — SIGNIFICANT CHANGE UP (ref 3.8–10.5)
WBC # FLD AUTO: 5.5 K/UL — SIGNIFICANT CHANGE UP (ref 3.8–10.5)

## 2024-02-28 PROCEDURE — 93010 ELECTROCARDIOGRAM REPORT: CPT

## 2024-02-28 PROCEDURE — 99233 SBSQ HOSP IP/OBS HIGH 50: CPT | Mod: GC

## 2024-02-28 RX ORDER — FUROSEMIDE 40 MG
40 TABLET ORAL DAILY
Refills: 0 | Status: DISCONTINUED | OUTPATIENT
Start: 2024-02-28 | End: 2024-02-29

## 2024-02-28 RX ORDER — POTASSIUM CHLORIDE 20 MEQ
10 PACKET (EA) ORAL
Refills: 0 | Status: DISCONTINUED | OUTPATIENT
Start: 2024-02-28 | End: 2024-02-28

## 2024-02-28 RX ORDER — POTASSIUM CHLORIDE 20 MEQ
40 PACKET (EA) ORAL ONCE
Refills: 0 | Status: COMPLETED | OUTPATIENT
Start: 2024-02-28 | End: 2024-02-28

## 2024-02-28 RX ORDER — AMLODIPINE BESYLATE 2.5 MG/1
10 TABLET ORAL DAILY
Refills: 0 | Status: DISCONTINUED | OUTPATIENT
Start: 2024-02-28 | End: 2024-02-28

## 2024-02-28 RX ORDER — POTASSIUM CHLORIDE 20 MEQ
20 PACKET (EA) ORAL
Refills: 0 | Status: DISCONTINUED | OUTPATIENT
Start: 2024-02-28 | End: 2024-02-28

## 2024-02-28 RX ORDER — AMLODIPINE BESYLATE 2.5 MG/1
5 TABLET ORAL DAILY
Refills: 0 | Status: DISCONTINUED | OUTPATIENT
Start: 2024-02-28 | End: 2024-02-28

## 2024-02-28 RX ORDER — SPIRONOLACTONE 25 MG/1
25 TABLET, FILM COATED ORAL DAILY
Refills: 0 | Status: DISCONTINUED | OUTPATIENT
Start: 2024-02-28 | End: 2024-03-01

## 2024-02-28 RX ADMIN — AZITHROMYCIN 500 MILLIGRAM(S): 500 TABLET, FILM COATED ORAL at 09:44

## 2024-02-28 RX ADMIN — AMLODIPINE BESYLATE 5 MILLIGRAM(S): 2.5 TABLET ORAL at 11:47

## 2024-02-28 RX ADMIN — Medication 100 MILLIGRAM(S): at 22:42

## 2024-02-28 RX ADMIN — Medication 100 MILLIEQUIVALENT(S): at 10:25

## 2024-02-28 RX ADMIN — Medication 40 MILLIGRAM(S): at 18:32

## 2024-02-28 RX ADMIN — LOSARTAN POTASSIUM 100 MILLIGRAM(S): 100 TABLET, FILM COATED ORAL at 11:47

## 2024-02-28 RX ADMIN — FLUDROCORTISONE ACETATE 0.1 MILLIGRAM(S): 0.1 TABLET ORAL at 10:26

## 2024-02-28 RX ADMIN — SPIRONOLACTONE 25 MILLIGRAM(S): 25 TABLET, FILM COATED ORAL at 18:28

## 2024-02-28 RX ADMIN — Medication 10 MILLIGRAM(S): at 09:45

## 2024-02-28 RX ADMIN — CARVEDILOL PHOSPHATE 25 MILLIGRAM(S): 80 CAPSULE, EXTENDED RELEASE ORAL at 22:40

## 2024-02-28 RX ADMIN — Medication 20 MILLIGRAM(S): at 06:31

## 2024-02-28 RX ADMIN — HEPARIN SODIUM 5000 UNIT(S): 5000 INJECTION INTRAVENOUS; SUBCUTANEOUS at 22:40

## 2024-02-28 RX ADMIN — CARVEDILOL PHOSPHATE 25 MILLIGRAM(S): 80 CAPSULE, EXTENDED RELEASE ORAL at 11:48

## 2024-02-28 RX ADMIN — Medication 100 MILLIEQUIVALENT(S): at 11:49

## 2024-02-28 RX ADMIN — Medication 20 MILLIGRAM(S): at 13:43

## 2024-02-28 RX ADMIN — Medication 5 MILLIGRAM(S): at 22:41

## 2024-02-28 RX ADMIN — Medication 3 MILLIGRAM(S): at 22:42

## 2024-02-28 RX ADMIN — HEPARIN SODIUM 5000 UNIT(S): 5000 INJECTION INTRAVENOUS; SUBCUTANEOUS at 09:44

## 2024-02-28 RX ADMIN — Medication 325 MILLIGRAM(S): at 09:44

## 2024-02-28 RX ADMIN — Medication 100 MILLIEQUIVALENT(S): at 13:42

## 2024-02-28 RX ADMIN — Medication 100 MILLIGRAM(S): at 06:31

## 2024-02-28 RX ADMIN — FLUDROCORTISONE ACETATE 0.1 MILLIGRAM(S): 0.1 TABLET ORAL at 22:41

## 2024-02-28 RX ADMIN — Medication 100 MILLIEQUIVALENT(S): at 00:50

## 2024-02-28 RX ADMIN — Medication 40 MILLIEQUIVALENT(S): at 11:49

## 2024-02-28 RX ADMIN — CEFTRIAXONE 1000 MILLIGRAM(S): 500 INJECTION, POWDER, FOR SOLUTION INTRAMUSCULAR; INTRAVENOUS at 15:42

## 2024-02-28 RX ADMIN — AMIODARONE HYDROCHLORIDE 200 MILLIGRAM(S): 400 TABLET ORAL at 09:44

## 2024-02-28 NOTE — CONSULT NOTE ADULT - PROBLEM SELECTOR RECOMMENDATION 2
Volume overloaded on exam  - continue lasix 40 mg IV daily  - continue losartan  - can add spironolactone 25 mg po daily to aid in hypokalemia  - Strict I&Os  - Daily Standing weights

## 2024-02-28 NOTE — DIETITIAN INITIAL EVALUATION ADULT - NS FNS DIET ORDER
Diet, DASH/TLC:   Sodium & Cholesterol Restricted  1500mL Fluid Restriction ([id number])     Special Instructions for Nursin milliLiter(s) to 2000 milliLiter(s) fluid restriction (24 @ 13:55)

## 2024-02-28 NOTE — CONSULT NOTE ADULT - SUBJECTIVE AND OBJECTIVE BOX
89 yo F HFpEF (EF 55-60% LVEDd 3.8) with PMH PAF, CKD, Adrenal insufficiency, Lymphoma in remission,  HTN, Iron deficiency, ICH, and mitral regurgitation  who presented to  ED from [facility] for worsening dyspnea, edema, and fatigue with repeat echo showing severe MR.  Structural team now consulted for treatment options.      The patient reports worsening fatigue for the past several months, with dyspnea with minimal exertion.  She has reported increase dyspnea when laying in bed, as she had been unable to lay flat for sometime due ot her dyspnea.  She denies syncope, lightheadedness, dizziness, palpitations, PND, nausea, vomiting.    She was given one dose of IV lasix 40 in ED yesterday with some improvement  Echocardiogram showed severe MR.  She continues to report shortness of breath      PAST MEDICAL & SURGICAL HISTORY:  Iron deficiency  HTN (hypertension)  Lymphoma  H/O adrenal insufficiency  Hyponatremia  Hypokalemia  Chronic kidney disease, unspecified CKD stage  Diastolic heart failure  Paroxysmal atrial fibrillation  H/O CHF    S/P appendectomy  S/P hysterectomy  H/O intracranial hemorrhage    REVIEW OF SYSTEMS:  14 point ROS negative in detail apart from as documented in HPI.    MEDICATIONS  (STANDING):  aMIOdarone    Tablet 200 milliGRAM(s) Oral daily  amLODIPine   Tablet 5 milliGRAM(s) Oral daily  azithromycin   Tablet 500 milliGRAM(s) Oral daily  carvedilol 25 milliGRAM(s) Oral every 12 hours  cefTRIAXone Injectable. 1000 milliGRAM(s) IV Push every 24 hours  ferrous    sulfate 325 milliGRAM(s) Oral daily  fludroCORTISONE 0.1 milliGRAM(s) Oral two times a day  furosemide    Tablet 20 milliGRAM(s) Oral two times a day  heparin   Injectable 5000 Unit(s) SubCutaneous every 12 hours  hydrALAZINE 100 milliGRAM(s) Oral three times a day  hydrocortisone 5 milliGRAM(s) Oral at bedtime  hydrocortisone 10 milliGRAM(s) Oral daily  losartan 100 milliGRAM(s) Oral daily    MEDICATIONS  (PRN):  acetaminophen     Tablet .. 650 milliGRAM(s) Oral every 6 hours PRN Mild Pain (1 - 3)  magnesium hydroxide Suspension 30 milliLiter(s) Oral every 24 hours PRN for constipation  melatonin 3 milliGRAM(s) Oral at bedtime PRN Insomnia    Home Medications:  amiodarone 200 mg oral tablet: 1 tab(s) orally once a day (2024 11:19)  amLODIPine 5 mg oral tablet: 1 tab(s) orally once a day (2024 11:19)  azithromycin 500 mg oral tablet: 1 tab(s) orally as needed prior to dental procedures (2024 15:25)  carvedilol 25 mg oral tablet: 1 tab(s) orally every 12 hours (2024 11:19)  Colace 100 mg oral capsule: 2 cap(s) orally once a day (at bedtime) as needed for  constipation (2024 11:19)  famotidine 20 mg oral tablet: 1 tab(s) orally once a day (2024 11:19)  ferrous sulfate 325 mg (65 mg elemental iron) oral tablet: 1 tab(s) orally once a day (:)  fludrocortisone 0.1 mg oral tablet: 0.5 tab(s) orally 2 times a day (2024 11:)  furosemide 20 mg oral tablet: 1 tab(s) orally 2 times a day (:)  hydrALAZINE 100 mg oral tablet: 1 tab(s) orally every 8 hours (:)  hydrocortisone 10 mg oral tablet: 1 tab(s) orally once a day (in the morning) (:19)  hydrocortisone 5 mg oral tablet: 1 tab(s) orally once a day (in the evening) (2024 11:)  losartan 100 mg oral tablet: 1 tab(s) orally once a day (2024 11:19)  melatonin 3 mg oral tablet: 1 tab(s) orally once a day (at bedtime) (2024 11:19)  Milk of Magnesia 8% oral suspension: 30 milliliter(s) orally every 24 hours as needed for  constipation (2024 15:25)  Multiple Vitamins oral liquid: 5 milliliter(s) orally once a day (in the evening) (:)  Tylenol Extra Strength 500 mg oral tablet: 2 tab(s) orally 3 times a day (2024 11:19)  Vitamin D3 50 mcg (2000 intl units) oral tablet: 1 tab(s) orally once a day (2024 11:19)    Allergies    sulfa drugs (Hives)  penicillin (Hives)  tetracycline (Hives)    Intolerances      Social History:  Assisted living facility   uses wheelchair (2024 14:26)    FAMILY HISTORY:  No known problems (Father, Mother)        ICU Vital Signs Last 24 Hrs  T(C): 36.6, Max: 37.2 ( @ 20:49)  HR: 66 (63 - 80)  BP: 136/38 (136/38 - 189/60)  BP(mean): 96 (91 - 96)  ABP: --  ABP(mean): --  RR: 18 (18 - 22)  SpO2: 98% (96% - 100%)    Weight in k.8 (24)  Weight in k.9 (24)      I&O's Summary Last 24 Hrs    Tele: Sinus 60s-70s    Physical Exam:    General: No distress. Comfortable.  HEENT: EOM intact.  Neck: Neck supple. JVP significantly elevated. No masses  Chest: Clear to auscultation bilaterally  CV: Normal S1 and S2. Systolic Murmur noted, No rub, or gallops. Radial pulses normal.  Abdomen: Soft, non-distended, non-tender  Skin: No rashes or skin breakdown  Extremities  Warm, trace LE Edema   Neurology: Alert and oriented times three. Sensation intact  Psych: Affect normal    Labs:    ( 24 @ 06:55 )               8.9    5.50  )--------( 144                  28.1     ( 24 @ 06:55 )     138  |  99  |  15  ---------------------<  100  2.8  |  34  |  1.06    Ca 8.6  Phos x   Mg 2.1    ( 24 @ 20:28 )  TPro  5.5  /  Alb  x   /  TBili  x   /  DBili  x   /  AST  x   /  ALT  x   /  AlkPhos  x   ( 24 @ 10:52 )  TPro  6.3  /  Alb  3.0  /  TBili  0.4  /  DBili  x   /  AST  20  /  ALT  11  /  AlkPhos  71                      RADIOLOGY & ADDITIONAL STUDIES:

## 2024-02-28 NOTE — PHYSICAL THERAPY INITIAL EVALUATION ADULT - GENERAL OBSERVATIONS, REHAB EVAL
Pt found supine in bed with tele monitor and bed alarm activated. Pt with increase thoracic kyphosis and pt with no c/o pain.

## 2024-02-28 NOTE — DIETITIAN INITIAL EVALUATION ADULT - ORAL NUTRITION SUPPLEMENTS
[facility] BID to optimize nutritional needs (provides 325 kcal, 16 g protein/ shake)

## 2024-02-28 NOTE — DIETITIAN INITIAL EVALUATION ADULT - ADD RECOMMEND
1. Recommend liberalize diet to regular to maximize caloric and nutrient intake. FR as per MD  2. [facility] BID to optimize nutritional needs (provides 325 kcal, 16 g protein/ shake)   3. Consider adding appetite stimulant such as Remeron or Marinol 2/2 chronically poor appetite/ PO intake   4. Consider adding thiamine 100 mg daily 2/2 poor PO intake/ malnutrition   5. Recommend MVI w/ minerals daily to ensure 100% RDA met   6. Obtain vitamin D 25OH level to assess nutriture and check serum zinc level for suspected deficiency. Consider adding 220 mg of zinc sulfate daily 2/2 persistent diarrhea 2/10-2/14  7. Please obtain daily weights 2/2 CHF  8. Confirm goals of care regarding nutrition support   RD will continue to monitor PO intake, labs, hydration, and wt prn.

## 2024-02-28 NOTE — PROVIDER CONTACT NOTE (CRITICAL VALUE NOTIFICATION) - ACTION/TREATMENT ORDERED:
MD will order K steve
complete iv supplement and let PA know when completed. Primary RN Lindy made aware

## 2024-02-28 NOTE — PHYSICAL THERAPY INITIAL EVALUATION ADULT - PERTINENT HX OF CURRENT PROBLEM, REHAB EVAL
Pt admitted to  secondary to SOB, chest pressure, bilateral leg feeling heavy, and bilateral LE edema. Pt with recent admission from 2/10-2/14 for UTI and gastroenteritis. H/H- 8.9/28.1.

## 2024-02-28 NOTE — PHYSICAL THERAPY INITIAL EVALUATION ADULT - PLANNED THERAPY INTERVENTIONS, PT EVAL
Eval, amb, transfers, bed mobility x 15'. Pt left OOB in chair with all observation section equipment/material intact and pad alarm intact/activated. Pt with no c/o pain. Will cont to follow./bed mobility training/gait training/ROM/strengthening/transfer training

## 2024-02-28 NOTE — DIETITIAN NUTRITION RISK NOTIFICATION - TREATMENT: THE FOLLOWING DIET HAS BEEN RECOMMENDED
Diet, DASH/TLC:   Sodium & Cholesterol Restricted  1500mL Fluid Restriction ([id number])     Special Instructions for Nursin milliLiter(s) to 2000 milliLiter(s) fluid restriction (24 @ 13:55) [Active]

## 2024-02-28 NOTE — PHYSICAL THERAPY INITIAL EVALUATION ADULT - ACTIVE RANGE OF MOTION EXAMINATION, REHAB EVAL
Bilateral shoulder flex ~ 120 degrees, bilateral hip flex ~ 90 degrees, and bilateral DF neutral./Left UE Active ROM was WFL (within functional limits)/Right UE Active ROM was WFL (within functional limits)/Left LE Active ROM was WFL (within functional limits)/Right LE Active ROM was WFL (within functional limits)

## 2024-02-28 NOTE — PROGRESS NOTE ADULT - ATTENDING COMMENTS
above noted and apprecaited.  spoke with her primary cardiologist.    she feels better today.  BP improved,  no rales.    imp- ADHF, severe MR.  per d/w her cardiologistjose meeting with family and patient they agreed not to pursue further investigations or invasive treatments.  we will pivot to palliative vcare and treat medically only.  need to decrease afterload and diureses.

## 2024-02-28 NOTE — CONSULT NOTE ADULT - ASSESSMENT
89 yo F HFpEF (EF 55-60% LVEDd 3.8) with PMH PAF, CKD, Adrenal insufficiency, Lymphoma in remission,  HTN, Iron deficiency, ICH, and mitral regurgitation  who [facility] for [facility] dyspnea, edema, and fatigue with repeat echo showing severe MR. Remains volume overloaded on exam     TTE 2/27/24:  EF 55-60%, LVEDd 3.81 LA mildly dilated, RA Normal, RV normal Structure an functionSevere MR, Mod AS, Mod AI, Mod TR RVSP 43

## 2024-02-28 NOTE — DIETITIAN INITIAL EVALUATION ADULT - PERTINENT LABORATORY DATA
02-28    138  |  99  |  15  ----------------------------<  100<H>  2.8<LL>   |  34<H>  |  1.06    Ca    8.6      28 Feb 2024 06:55  Mg     2.1     02-28    TPro  5.5<L>  /  Alb  x   /  TBili  x   /  DBili  x   /  AST  x   /  ALT  x   /  AlkPhos  x   02-27

## 2024-02-28 NOTE — DIETITIAN INITIAL EVALUATION ADULT - NSFNSGIASSESSMENTFT_GEN_A_CORE
[facility]
Last BM 2/26 as doc'd in nrsing flowsheets; bowel regimen in place (Magnesium hydroxide PRN)

## 2024-02-28 NOTE — PHYSICAL THERAPY INITIAL EVALUATION ADULT - GAIT PATTERN USED, PT EVAL
Pt required verbal cues to increase posture, to increase step length bilaterally, and to keep appropriate navigation of RW

## 2024-02-28 NOTE — CONSULT NOTE ADULT - PROBLEM SELECTOR RECOMMENDATION 9
Severe MR on TTE  - will need JUAN to further assess severity of MR,  - given JUAN, can consider RAZIA  - Will need [facility]/C

## 2024-02-28 NOTE — CONSULT NOTE ADULT - ASSESSMENT
89 y/o F with PMHx of Paroxysmal AFIB, HFpEF,  CKD, Adrenal insufficiency, HFpEF, Lymphoma in remission,  HTN, Iron deficiency,  ICH who presents to ED from [facility] for sob and chest pressure which is worse in the mornings for the last 2 weeks. Says legs feels heavy and swollen at night when lying in bed. Pt uses a wheelchair. Daughter Kate is at bedside. Denies bleeding or syncope.   - Pt was recent admitted from 2/10-2/14 for UTI and gatroenteritis. Denies ny further diarrhea or urinary symptoms. In ED   89 y/o F with PMHx of Paroxysmal AFIB, HFpEF,  CKD, Adrenal insufficiency, HFpEF, Lymphoma in remission,  HTN, Iron deficiency,  ICH who presents to ED from [facility] for sob and chest pressure which is worse in the mornings for the last 2 weeks. Says legs feels heavy and swollen at night when lying in bed. Pt uses a wheelchair. Daughter Kate is at bedside. Denies bleeding or syncope.   - Pt was recent admitted from 2/10-2/14 for UTI and gastroenteritis Denies ny further diarrhea or urinary symptoms.    #Acute hypoxic respiratory failure. In the setting of HFpEF, Severe MR  C/w IV Lasix, 40mg daily, spirolactone added, trend labs  D/w family, conservative management for Severe MR.    #PAF. Currently SR. On Amiodarone, given age etc will continue with amiodarone.     #Adrenal Insufficiency. On florinef, ?reduce dose if able given HF. and  on Hydrocortisone.    #HTN. BP stable. Continue with current medications. Will DC Norvasc. Continue coreg, hydralazine, losartan.    #Hypokalemia. Repleted. Monitor.      Will follow  Case d/w Dr. Mera     89 y/o F with PMHx of Paroxysmal AFIB, HFpEF,  CKD, Adrenal insufficiency, HFpEF, Lymphoma in remission,  HTN, Iron deficiency,  ICH who presents to ED from [facility] for sob and chest pressure which is worse in the mornings for the last 2 weeks. Says legs feels heavy and swollen at night when lying in bed. Pt uses a wheelchair. Daughter Kate is at bedside. Denies bleeding or syncope.   - Pt was recent admitted from 2/10-2/14 for UTI and gastroenteritis Denies ny further diarrhea or urinary symptoms.    #Acute hypoxic respiratory failure. In the setting of HFpEF, Severe MR  C/w IV Lasix, 40mg daily, spirolactone added, trend labs  D/w family, conservative management for Severe MR.    #PAF. Currently SR. On Amiodarone. Will continue with amiodarone. Not on AC.    #Adrenal Insufficiency. On florinef, ?reduce dose if able given HF. and  on Hydrocortisone.    #HTN. BP stable. Continue with current medications. Will DC Norvasc. Continue coreg, hydralazine, losartan.    #Hypokalemia. Repleted. Monitor.      Will follow  Case d/w Dr. Mera

## 2024-02-28 NOTE — DIETITIAN INITIAL EVALUATION ADULT - PERTINENT MEDS FT
MEDICATIONS  (STANDING):  aMIOdarone    Tablet 200 milliGRAM(s) Oral daily  amLODIPine   Tablet 5 milliGRAM(s) Oral daily  azithromycin   Tablet 500 milliGRAM(s) Oral daily  carvedilol 25 milliGRAM(s) Oral every 12 hours  cefTRIAXone Injectable. 1000 milliGRAM(s) IV Push every 24 hours  ferrous    sulfate 325 milliGRAM(s) Oral daily  fludroCORTISONE 0.1 milliGRAM(s) Oral two times a day  furosemide    Tablet 20 milliGRAM(s) Oral two times a day  heparin   Injectable 5000 Unit(s) SubCutaneous every 12 hours  hydrALAZINE 100 milliGRAM(s) Oral three times a day  hydrocortisone 10 milliGRAM(s) Oral daily  hydrocortisone 5 milliGRAM(s) Oral at bedtime  losartan 100 milliGRAM(s) Oral daily  potassium chloride  10 mEq/100 mL IVPB 10 milliEquivalent(s) IV Intermittent every 2 hours    MEDICATIONS  (PRN):  acetaminophen     Tablet .. 650 milliGRAM(s) Oral every 6 hours PRN Mild Pain (1 - 3)  magnesium hydroxide Suspension 30 milliLiter(s) Oral every 24 hours PRN for constipation  melatonin 3 milliGRAM(s) Oral at bedtime PRN Insomnia

## 2024-02-28 NOTE — DIETITIAN INITIAL EVALUATION ADULT - ORAL INTAKE PTA/DIET HISTORY
[facility] from [facility]. Reports she likes "some" of the food there, endorses fair appetite, usually consumes 2 meals/day - likely meeting <75% ENN chronically. [facility] Transfer papers reveal pt is on YAMILE diet, is on bowel regimen (Docusate Na, Milk of Magnesia), and takes MVI w/ minerals and Vit D3 2000 IU daily. Recent adm 2/10-2/14 for gastroenteritis w/ diarrhea x 1 week.

## 2024-02-28 NOTE — DIETITIAN INITIAL EVALUATION ADULT - LAB (SPECIFY)
Obtain vitamin D 25OH level to assess nutriture   Check serum zinc level for suspected deficiency. Consider adding 220 mg of zinc sulfate daily 2/2 persistent diarrhea 2/10-2/14

## 2024-02-28 NOTE — CONSULT NOTE ADULT - SUBJECTIVE AND OBJECTIVE BOX
CHIEF COMPLAINT: Patient is a 89y old  Female who presents with a chief complaint of Chest pain  (28 Feb 2024 11:50)    FROM H&P: This is 89 y/o F with PMHx of Paroxysmal AFIB, HFpEF,  CKD, Adrenal insufficiency, HFpEF, Lymphoma in remission,  HTN, Iron deficiency,  ICH who presents to ED from [facility] for sob and chest pressure which is worse in the mornings for the last 2 weeks. Says legs feels heavy and swollen at night when lying in bed. Pt uses a wheelchair. Daughter Kate is at bedside. Denies bleeding or syncope.   - Pt was recent admitted from 2/10-2/14 for UTI and gatroenteritis. Denies ny further diarrhea or urinary symptoms. In ED  - In ED, BP was elevated at 182/63, all other VS stable. EKG: NSR, TWI at AVL and V6.   (27 Feb 2024 14:26)    Cardiology consulted for HF        MEDICATIONS  (STANDING):  aMIOdarone    Tablet 200 milliGRAM(s) Oral daily  amLODIPine   Tablet 5 milliGRAM(s) Oral daily  azithromycin   Tablet 500 milliGRAM(s) Oral daily  carvedilol 25 milliGRAM(s) Oral every 12 hours  cefTRIAXone Injectable. 1000 milliGRAM(s) IV Push every 24 hours  ferrous    sulfate 325 milliGRAM(s) Oral daily  fludroCORTISONE 0.1 milliGRAM(s) Oral two times a day  furosemide    Tablet 20 milliGRAM(s) Oral two times a day  heparin   Injectable 5000 Unit(s) SubCutaneous every 12 hours  hydrALAZINE 100 milliGRAM(s) Oral three times a day  hydrocortisone 5 milliGRAM(s) Oral at bedtime  hydrocortisone 10 milliGRAM(s) Oral daily  losartan 100 milliGRAM(s) Oral daily  potassium chloride  10 mEq/100 mL IVPB 10 milliEquivalent(s) IV Intermittent every 2 hours    MEDICATIONS  (PRN):  acetaminophen     Tablet .. 650 milliGRAM(s) Oral every 6 hours PRN Mild Pain (1 - 3)  magnesium hydroxide Suspension 30 milliLiter(s) Oral every 24 hours PRN for constipation  melatonin 3 milliGRAM(s) Oral at bedtime PRN Insomnia    HOME MEDICATIONS:  amiodarone 200 mg oral tablet: 1 tab(s) orally once a day (27 Feb 2024 11:19)  amLODIPine 5 mg oral tablet: 1 tab(s) orally once a day (27 Feb 2024 11:19)  azithromycin 500 mg oral tablet: 1 tab(s) orally as needed prior to dental procedures (27 Feb 2024 15:25)  carvedilol 25 mg oral tablet: 1 tab(s) orally every 12 hours (27 Feb 2024 11:19)  Colace 100 mg oral capsule: 2 cap(s) orally once a day (at bedtime) as needed for  constipation (27 Feb 2024 11:19)  famotidine 20 mg oral tablet: 1 tab(s) orally once a day (27 Feb 2024 11:19)  ferrous sulfate 325 mg (65 mg elemental iron) oral tablet: 1 tab(s) orally once a day (27 Feb 2024 11:19)  fludrocortisone 0.1 mg oral tablet: 0.5 tab(s) orally 2 times a day (27 Feb 2024 11:19)  furosemide 20 mg oral tablet: 1 tab(s) orally 2 times a day (27 Feb 2024 11:19)  hydrALAZINE 100 mg oral tablet: 1 tab(s) orally every 8 hours (27 Feb 2024 11:19)  hydrocortisone 10 mg oral tablet: 1 tab(s) orally once a day (in the morning) (27 Feb 2024 11:19)  hydrocortisone 5 mg oral tablet: 1 tab(s) orally once a day (in the evening) (27 Feb 2024 11:19)  losartan 100 mg oral tablet: 1 tab(s) orally once a day (27 Feb 2024 11:19)  melatonin 3 mg oral tablet: 1 tab(s) orally once a day (at bedtime) (27 Feb 2024 11:19)  Milk of Magnesia 8% oral suspension: 30 milliliter(s) orally every 24 hours as needed for  constipation (27 Feb 2024 15:25)  Multiple Vitamins oral liquid: 5 milliliter(s) orally once a day (in the evening) (27 Feb 2024 11:19)  Tylenol Extra Strength 500 mg oral tablet: 2 tab(s) orally 3 times a day (27 Feb 2024 11:19)  Vitamin D3 50 mcg (2000 intl units) oral tablet: 1 tab(s) orally once a day (27 Feb 2024 11:19)    PHYSICAL EXAM:  T(C): 36.6 (28 Feb 2024 08:38), Max: 37.2 (27 Feb 2024 20:49)  T(F): 97.8 (28 Feb 2024 08:38), Max: 99 (27 Feb 2024 20:49)  HR: 66 (28 Feb 2024 08:38) (63 - 80)  BP: 136/38 (28 Feb 2024 10:00) (136/38 - 189/60)  BP(mean): 96 (27 Feb 2024 18:25) (91 - 96)  RR: 18 (28 Feb 2024 08:38) (18 - 22)  SpO2: 98% (28 Feb 2024 08:38) (96% - 100%)    Parameters below as of 28 Feb 2024 08:38  Patient On (Oxygen Delivery Method): nasal cannula    Constitutional: NAD, awake and alert  HEENT: PERR, EOMI, Normal Hearing, MMM  Neck: Soft and supple, No LAD, No JVD  Respiratory: Breath sounds are clear bilaterally, No wheezing, rales or rhonchi  Cardiovascular: S1 and S2, regular rate and rhythm, no Murmurs, gallops or rubs  Gastrointestinal: Bowel Sounds present, soft, nontender, nondistended, no guarding, no rebound  Extremities: No peripheral edema  Vascular: 2+ peripheral pulses  Neurological: A/O x 3, no focal deficits  Musculoskeletal: 5/5 strength b/l upper and lower extremities  Skin: No rashes    =======================================    INTERPRETATION OF TELEMETRY: SR 65    ECG:    ========================================    LABS:                        8.9    5.50  )-----------( 144      ( 28 Feb 2024 06:55 )             28.1     02-28    138  |  99  |  15  ----------------------------<  100<H>  2.8<LL>   |  34<H>  |  1.06    Ca    8.6      28 Feb 2024 06:55  Mg     2.1     02-28    TPro  5.5<L>  /  Alb  x   /  TBili  x   /  DBili  x   /  AST  x   /  ALT  x   /  AlkPhos  x   02-27    TroponinI hsT: <-32.38, <-23.36    BNP 4199    CARDIAC TESTING:    < from: TTE Echo Complete w/o Contrast w/ Doppler (02.27.24 @ 15:23) >  The left ventricle is normal in size, wall thickness, wall motion and   contractility as seen in limited views.   Mild asymmetrical septal left ventricular hypertrophy is present.   Mild diastolic dysfunction (stage I).   Estimated left ventricular ejection fraction is 55-60 %.   The left atrium is mildly dilated.   Moderate (2+) eccentric aortic regurgitation.   Severe (4+) mitral regurgitation.   Mild aortic stenosis.   Moderate (2+) tricuspid valve regurgitation is present.   Mild pulmonaryhypertension.   Pleural effusion is present.   Mild aortic stenosis is consistent with previous echo on 9/2022.    RADIOLOGY & ADDITIONAL STUDIES:    < from: CT Angio Chest Aorta w/wo IV Cont (02.27.24 @ 11:16) >  No acute aortic process. 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    No acute pulmonary embolism. 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    Small pleural effusions, increased since 2/10/2024 abdomen CT, with   partial lower lobe atelectasis. Chronic large and small airways disease.    Stable pancreatic cysts measuring up to 1.6 cm.   CHIEF COMPLAINT: Patient is a 89y old  Female who presents with a chief complaint of Chest pain  (28 Feb 2024 11:50)    FROM H&P: This is 89 y/o F with PMHx of Paroxysmal AFIB, HFpEF,  CKD, Adrenal insufficiency, HFpEF, Lymphoma in remission,  HTN, Iron deficiency,  ICH who presents to ED from [facility] for sob and chest pressure which is worse in the mornings for the last 2 weeks. Says legs feels heavy and swollen at night when lying in bed. Pt uses a wheelchair. Daughter Kate is at bedside. Denies bleeding or syncope.   - Pt was recent admitted from 2/10-2/14 for UTI and gatroenteritis. Denies ny further diarrhea or urinary symptoms. In ED  - In ED, BP was elevated at 182/63, all other VS stable. EKG: NSR, TWI at AVL and V6.   (27 Feb 2024 14:26)    Cardiology consulted for HF  Breathing improved. No CP or palp.    MEDICATIONS  (STANDING):  aMIOdarone    Tablet 200 milliGRAM(s) Oral daily  amLODIPine   Tablet 5 milliGRAM(s) Oral daily  azithromycin   Tablet 500 milliGRAM(s) Oral daily  carvedilol 25 milliGRAM(s) Oral every 12 hours  cefTRIAXone Injectable. 1000 milliGRAM(s) IV Push every 24 hours  ferrous    sulfate 325 milliGRAM(s) Oral daily  fludroCORTISONE 0.1 milliGRAM(s) Oral two times a day  furosemide    Tablet 20 milliGRAM(s) Oral two times a day  heparin   Injectable 5000 Unit(s) SubCutaneous every 12 hours  hydrALAZINE 100 milliGRAM(s) Oral three times a day  hydrocortisone 5 milliGRAM(s) Oral at bedtime  hydrocortisone 10 milliGRAM(s) Oral daily  losartan 100 milliGRAM(s) Oral daily  potassium chloride  10 mEq/100 mL IVPB 10 milliEquivalent(s) IV Intermittent every 2 hours    MEDICATIONS  (PRN):  acetaminophen     Tablet .. 650 milliGRAM(s) Oral every 6 hours PRN Mild Pain (1 - 3)  magnesium hydroxide Suspension 30 milliLiter(s) Oral every 24 hours PRN for constipation  melatonin 3 milliGRAM(s) Oral at bedtime PRN Insomnia    HOME MEDICATIONS:  amiodarone 200 mg oral tablet: 1 tab(s) orally once a day (27 Feb 2024 11:19)  amLODIPine 5 mg oral tablet: 1 tab(s) orally once a day (27 Feb 2024 11:19)  azithromycin 500 mg oral tablet: 1 tab(s) orally as needed prior to dental procedures (27 Feb 2024 15:25)  carvedilol 25 mg oral tablet: 1 tab(s) orally every 12 hours (27 Feb 2024 11:19)  Colace 100 mg oral capsule: 2 cap(s) orally once a day (at bedtime) as needed for  constipation (27 Feb 2024 11:19)  famotidine 20 mg oral tablet: 1 tab(s) orally once a day (27 Feb 2024 11:19)  ferrous sulfate 325 mg (65 mg elemental iron) oral tablet: 1 tab(s) orally once a day (27 Feb 2024 11:19)  fludrocortisone 0.1 mg oral tablet: 0.5 tab(s) orally 2 times a day (27 Feb 2024 11:19)  furosemide 20 mg oral tablet: 1 tab(s) orally 2 times a day (27 Feb 2024 11:19)  hydrALAZINE 100 mg oral tablet: 1 tab(s) orally every 8 hours (27 Feb 2024 11:19)  hydrocortisone 10 mg oral tablet: 1 tab(s) orally once a day (in the morning) (27 Feb 2024 11:19)  hydrocortisone 5 mg oral tablet: 1 tab(s) orally once a day (in the evening) (27 Feb 2024 11:19)  losartan 100 mg oral tablet: 1 tab(s) orally once a day (27 Feb 2024 11:19)  melatonin 3 mg oral tablet: 1 tab(s) orally once a day (at bedtime) (27 Feb 2024 11:19)  Milk of Magnesia 8% oral suspension: 30 milliliter(s) orally every 24 hours as needed for  constipation (27 Feb 2024 15:25)  Multiple Vitamins oral liquid: 5 milliliter(s) orally once a day (in the evening) (27 Feb 2024 11:19)  Tylenol Extra Strength 500 mg oral tablet: 2 tab(s) orally 3 times a day (27 Feb 2024 11:19)  Vitamin D3 50 mcg (2000 intl units) oral tablet: 1 tab(s) orally once a day (27 Feb 2024 11:19)    PHYSICAL EXAM:  T(C): 36.6 (28 Feb 2024 08:38), Max: 37.2 (27 Feb 2024 20:49)  T(F): 97.8 (28 Feb 2024 08:38), Max: 99 (27 Feb 2024 20:49)  HR: 66 (28 Feb 2024 08:38) (63 - 80)  BP: 136/38 (28 Feb 2024 10:00) (136/38 - 189/60)  BP(mean): 96 (27 Feb 2024 18:25) (91 - 96)  RR: 18 (28 Feb 2024 08:38) (18 - 22)  SpO2: 98% (28 Feb 2024 08:38) (96% - 100%)    Parameters below as of 28 Feb 2024 08:38  Patient On (Oxygen Delivery Method): nasal cannula    Constitutional: NAD, awake and alert  HEENT: PERR, EOMI, Normal Hearing, MMM  Neck: Soft and supple, No LAD, No JVD  Respiratory: Breath sounds are clear bilaterally, No wheezing, rales or rhonchi  Cardiovascular: S1 and S2, regular rate and rhythm, no Murmurs, gallops or rubs  Gastrointestinal: Bowel Sounds present, soft, nontender, nondistended, no guarding, no rebound  Extremities: No peripheral edema  Vascular: 2+ peripheral pulses  Neurological: A/O x 3, no focal deficits  Musculoskeletal: 5/5 strength b/l upper and lower extremities  Skin: No rashes    =======================================    INTERPRETATION OF TELEMETRY: SR 65    ECG:    ========================================    LABS:                        8.9    5.50  )-----------( 144      ( 28 Feb 2024 06:55 )             28.1     02-28    138  |  99  |  15  ----------------------------<  100<H>  2.8<LL>   |  34<H>  |  1.06    Ca    8.6      28 Feb 2024 06:55  Mg     2.1     02-28    TPro  5.5<L>  /  Alb  x   /  TBili  x   /  DBili  x   /  AST  x   /  ALT  x   /  AlkPhos  x   02-27    TroponinI hsT: <-32.38, <-23.36    BNP 4199    CARDIAC TESTING:    < from: TTE Echo Complete w/o Contrast w/ Doppler (02.27.24 @ 15:23) >  The left ventricle is normal in size, wall thickness, wall motion and   contractility as seen in limited views.   Mild asymmetrical septal left ventricular hypertrophy is present.   Mild diastolic dysfunction (stage I).   Estimated left ventricular ejection fraction is 55-60 %.   The left atrium is mildly dilated.   Moderate (2+) eccentric aortic regurgitation.   Severe (4+) mitral regurgitation.   Mild aortic stenosis.   Moderate (2+) tricuspid valve regurgitation is present.   Mild pulmonary hypertension.   Pleural effusion is present.   Mild aortic stenosis is consistent with previous echo on 9/2022.    RADIOLOGY & ADDITIONAL STUDIES:    < from: CT Angio Chest Aorta w/wo IV Cont (02.27.24 @ 11:16) >  No acute aortic process. 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    No acute pulmonary embolism. 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    Small pleural effusions, increased since 2/10/2024 abdomen CT, with   partial lower lobe atelectasis. Chronic large and small airways disease.    Stable pancreatic cysts measuring up to 1.6 cm.

## 2024-02-28 NOTE — DIETITIAN INITIAL EVALUATION ADULT - OTHER INFO
Pt is a 89yo F with PMHx of Paroxysmal AFIB, HFpEF,  CKD, Adrenal insufficiency, HFpEF, Lymphoma in remission, HTN, Iron deficiency,  ICH who [place] ED from [facility] for sob and chest pressure which is worse in the mornings for the last 2 wks. Says legs feels heavy and swollen at night when lying in bed. Pt uses a wheelchair. Adm w/ SOB 2/2 decompensated CHF or PNA. Recent adm 2/10 - 2/14 for gastroenteritis and UTI.     Pt known to nutr services, dx'd w/ sev PCM on multiple past admissions - continues to meet criteria. Reports fair-poor appetite at present w/ some nausea, reports only having some oatmeal for bkfst today. Endorses UBW of 120# from over 1 year ago; RD obtained bed scale wt of 100# on 2/11/23; RN obtained bed scale wt of 98.7# on 2/28; RD unable to obtain bed scale wt 2/2 pt's bed not in optimal position. NFPE reveals moderate-severe muscle/fat wasting - appears thin, bony, frail. Pt currently on DASH-TLC diet w/ 2747-7134 mL FR, Recommend liberalize diet to regular to maximize caloric and nutrient intake 2/2 poor appetite and advanced age. Recommend [employer] BID to optimize nutritional needs (provides 325 kcal, 16 g protein/ shake). Denies diarrhea since last admission and has had 2 BMs since d/c. Pt is DNR/DNI w/ no TF restrictions at this time. Diet education not appropriate at this time 2/2 pt w/ poor appetite and advanced age. See below for further recommendations.

## 2024-02-28 NOTE — DIETITIAN NUTRITION RISK NOTIFICATION - ADDITIONAL COMMENTS/DIETITIAN RECOMMENDATIONS
1. Recommend liberalize diet to regular to maximize caloric and nutrient intake. FR as per MD  2. [employer] BID to optimize nutritional needs (provides 325 kcal, 16 g protein/ shake)   3. Consider adding appetite stimulant such as Remeron or Marinol 2/2 chronically poor appetite/ PO intake   4. Consider adding thiamine 100 mg daily 2/2 poor PO intake/ malnutrition   5. Recommend MVI w/ minerals daily to ensure 100% RDA met   6. Obtain vitamin D 25OH level to assess nutriture and check serum zinc level for suspected deficiency. Consider adding 220 mg of zinc sulfate daily 2/2 persistent diarrhea 2/10-2/14  7. Please obtain daily weights 2/2 CHF  8. Encourage protein-rich foods, maximize food preferences   9. Monitor bowel movements, if no BM for >3 days, consider implementing bowel regimen.   10. Confirm goals of care regarding nutrition support   RD will continue to monitor PO intake, labs, hydration, and wt prn.

## 2024-02-29 ENCOUNTER — TRANSCRIPTION ENCOUNTER (OUTPATIENT)
Age: 89
End: 2024-02-29

## 2024-02-29 DIAGNOSIS — E43 UNSPECIFIED SEVERE PROTEIN-CALORIE MALNUTRITION: ICD-10-CM

## 2024-02-29 LAB
ANION GAP SERPL CALC-SCNC: 2 MMOL/L — LOW (ref 5–17)
BUN SERPL-MCNC: 15 MG/DL — SIGNIFICANT CHANGE UP (ref 7–23)
CALCIUM SERPL-MCNC: 8.9 MG/DL — SIGNIFICANT CHANGE UP (ref 8.5–10.1)
CHLORIDE SERPL-SCNC: 103 MMOL/L — SIGNIFICANT CHANGE UP (ref 96–108)
CO2 SERPL-SCNC: 35 MMOL/L — HIGH (ref 22–31)
CREAT SERPL-MCNC: 1.09 MG/DL — SIGNIFICANT CHANGE UP (ref 0.5–1.3)
EGFR: 46 ML/MIN/1.73M2 — LOW
GLUCOSE SERPL-MCNC: 106 MG/DL — HIGH (ref 70–99)
HCT VFR BLD CALC: 26.4 % — LOW (ref 34.5–45)
HGB BLD-MCNC: 8.3 G/DL — LOW (ref 11.5–15.5)
MAGNESIUM SERPL-MCNC: 2.1 MG/DL — SIGNIFICANT CHANGE UP (ref 1.6–2.6)
MCHC RBC-ENTMCNC: 31.4 GM/DL — LOW (ref 32–36)
MCHC RBC-ENTMCNC: 32.4 PG — SIGNIFICANT CHANGE UP (ref 27–34)
MCV RBC AUTO: 103.1 FL — HIGH (ref 80–100)
PHOSPHATE SERPL-MCNC: 3.2 MG/DL — SIGNIFICANT CHANGE UP (ref 2.5–4.5)
PLATELET # BLD AUTO: 141 K/UL — LOW (ref 150–400)
POTASSIUM SERPL-MCNC: 3.6 MMOL/L — SIGNIFICANT CHANGE UP (ref 3.5–5.3)
POTASSIUM SERPL-SCNC: 3.6 MMOL/L — SIGNIFICANT CHANGE UP (ref 3.5–5.3)
RBC # BLD: 2.56 M/UL — LOW (ref 3.8–5.2)
RBC # FLD: 15.8 % — HIGH (ref 10.3–14.5)
SODIUM SERPL-SCNC: 140 MMOL/L — SIGNIFICANT CHANGE UP (ref 135–145)
WBC # BLD: 5.06 K/UL — SIGNIFICANT CHANGE UP (ref 3.8–10.5)
WBC # FLD AUTO: 5.06 K/UL — SIGNIFICANT CHANGE UP (ref 3.8–10.5)

## 2024-02-29 PROCEDURE — 99223 1ST HOSP IP/OBS HIGH 75: CPT

## 2024-02-29 PROCEDURE — 99233 SBSQ HOSP IP/OBS HIGH 50: CPT | Mod: GC

## 2024-02-29 PROCEDURE — 99497 ADVNCD CARE PLAN 30 MIN: CPT | Mod: 25

## 2024-02-29 RX ORDER — THIAMINE MONONITRATE (VIT B1) 100 MG
1 TABLET ORAL
Qty: 0 | Refills: 0 | DISCHARGE
Start: 2024-02-29

## 2024-02-29 RX ORDER — MULTIVIT-MIN/FERROUS GLUCONATE 9 MG/15 ML
1 LIQUID (ML) ORAL
Qty: 0 | Refills: 0 | DISCHARGE
Start: 2024-02-29

## 2024-02-29 RX ORDER — FLUDROCORTISONE ACETATE 0.1 MG/1
0.5 TABLET ORAL
Qty: 1 | Refills: 0 | DISCHARGE
Start: 2024-02-29

## 2024-02-29 RX ORDER — AZITHROMYCIN 500 MG/1
1 TABLET, FILM COATED ORAL
Refills: 0 | DISCHARGE

## 2024-02-29 RX ORDER — AZITHROMYCIN 500 MG/1
1 TABLET, FILM COATED ORAL
Refills: 0 | DISCHARGE
Start: 2024-02-29 | End: 2024-02-03

## 2024-02-29 RX ORDER — SPIRONOLACTONE 25 MG/1
1 TABLET, FILM COATED ORAL
Qty: 30 | Refills: 0
Start: 2024-02-29 | End: 2024-03-29

## 2024-02-29 RX ORDER — FLUDROCORTISONE ACETATE 0.1 MG/1
1 TABLET ORAL
Qty: 1 | Refills: 0
Start: 2024-02-29

## 2024-02-29 RX ORDER — FUROSEMIDE 40 MG
20 TABLET ORAL
Refills: 0 | Status: DISCONTINUED | OUTPATIENT
Start: 2024-02-29 | End: 2024-03-01

## 2024-02-29 RX ORDER — FLUDROCORTISONE ACETATE 0.1 MG/1
0.5 TABLET ORAL
Qty: 0 | Refills: 0 | DISCHARGE

## 2024-02-29 RX ADMIN — Medication 5 MILLIGRAM(S): at 22:21

## 2024-02-29 RX ADMIN — CARVEDILOL PHOSPHATE 25 MILLIGRAM(S): 80 CAPSULE, EXTENDED RELEASE ORAL at 22:21

## 2024-02-29 RX ADMIN — Medication 325 MILLIGRAM(S): at 10:27

## 2024-02-29 RX ADMIN — CARVEDILOL PHOSPHATE 25 MILLIGRAM(S): 80 CAPSULE, EXTENDED RELEASE ORAL at 10:26

## 2024-02-29 RX ADMIN — SPIRONOLACTONE 25 MILLIGRAM(S): 25 TABLET, FILM COATED ORAL at 10:28

## 2024-02-29 RX ADMIN — AZITHROMYCIN 500 MILLIGRAM(S): 500 TABLET, FILM COATED ORAL at 10:26

## 2024-02-29 RX ADMIN — Medication 40 MILLIGRAM(S): at 10:28

## 2024-02-29 RX ADMIN — AMIODARONE HYDROCHLORIDE 200 MILLIGRAM(S): 400 TABLET ORAL at 10:27

## 2024-02-29 RX ADMIN — Medication 3 MILLIGRAM(S): at 22:22

## 2024-02-29 RX ADMIN — FLUDROCORTISONE ACETATE 0.1 MILLIGRAM(S): 0.1 TABLET ORAL at 22:22

## 2024-02-29 RX ADMIN — FLUDROCORTISONE ACETATE 0.1 MILLIGRAM(S): 0.1 TABLET ORAL at 10:27

## 2024-02-29 RX ADMIN — Medication 100 MILLIGRAM(S): at 05:49

## 2024-02-29 RX ADMIN — LOSARTAN POTASSIUM 100 MILLIGRAM(S): 100 TABLET, FILM COATED ORAL at 10:27

## 2024-02-29 RX ADMIN — CEFTRIAXONE 1000 MILLIGRAM(S): 500 INJECTION, POWDER, FOR SOLUTION INTRAMUSCULAR; INTRAVENOUS at 17:34

## 2024-02-29 RX ADMIN — Medication 10 MILLIGRAM(S): at 10:27

## 2024-02-29 NOTE — DISCHARGE NOTE PROVIDER - NSDCMRMEDTOKEN_GEN_ALL_CORE_FT
amiodarone 200 mg oral tablet: 1 tab(s) orally once a day  amLODIPine 5 mg oral tablet: 1 tab(s) orally once a day  azithromycin 500 mg oral tablet: 1 tab(s) orally once a day  carvedilol 25 mg oral tablet: 1 tab(s) orally every 12 hours  Colace 100 mg oral capsule: 2 cap(s) orally once a day (at bedtime) as needed for  constipation  famotidine 20 mg oral tablet: 1 tab(s) orally once a day  ferrous sulfate 325 mg (65 mg elemental iron) oral tablet: 1 tab(s) orally once a day  fludrocortisone 0.1 mg oral tablet: 1 tab(s) orally once a day  furosemide 20 mg oral tablet: 1 tab(s) orally 2 times a day  hydrALAZINE 100 mg oral tablet: 1 tab(s) orally every 8 hours  hydrocortisone 10 mg oral tablet: 1 tab(s) orally once a day (in the morning)  hydrocortisone 5 mg oral tablet: 1 tab(s) orally once a day (in the evening)  losartan 100 mg oral tablet: 1 tab(s) orally once a day  melatonin 3 mg oral tablet: 1 tab(s) orally once a day (at bedtime)  Milk of Magnesia 8% oral suspension: 30 milliliter(s) orally every 24 hours as needed for  constipation  Multiple Vitamins oral liquid: 5 milliliter(s) orally once a day (in the evening)  Multiple Vitamins with Minerals oral tablet: 1 tab(s) orally once a day  spironolactone 25 mg oral tablet: 1 tab(s) orally once a day  thiamine 100 mg oral tablet: 1 tab(s) orally once a day  Tylenol Extra Strength 500 mg oral tablet: 2 tab(s) orally 3 times a day  Vitamin D3 50 mcg (2000 intl units) oral tablet: 1 tab(s) orally once a day   amiodarone 200 mg oral tablet: 1 tab(s) orally once a day  amLODIPine 5 mg oral tablet: 1 tab(s) orally once a day  azithromycin 500 mg oral tablet: 1 tab(s) orally once a day  carvedilol 25 mg oral tablet: 1 tab(s) orally every 12 hours  Colace 100 mg oral capsule: 2 cap(s) orally once a day (at bedtime) as needed for  constipation  famotidine 20 mg oral tablet: 1 tab(s) orally once a day  ferrous sulfate 325 mg (65 mg elemental iron) oral tablet: 1 tab(s) orally once a day  fludrocortisone 0.1 mg oral tablet: 1 tab(s) orally once a day  furosemide 40 mg oral tablet: 1 tab(s) orally once a day  hydrALAZINE 100 mg oral tablet: 1 tab(s) orally every 8 hours  hydrocortisone 10 mg oral tablet: 1 tab(s) orally once a day (in the morning)  hydrocortisone 5 mg oral tablet: 1 tab(s) orally once a day (in the evening)  losartan 50 mg oral tablet: 1 tab(s) orally 2 times a day  melatonin 3 mg oral tablet: 1 tab(s) orally once a day (at bedtime)  Milk of Magnesia 8% oral suspension: 30 milliliter(s) orally every 24 hours as needed for  constipation  Multiple Vitamins oral liquid: 5 milliliter(s) orally once a day (in the evening)  Multiple Vitamins with Minerals oral tablet: 1 tab(s) orally once a day  spironolactone 25 mg oral tablet: 1 tab(s) orally once a day  thiamine 100 mg oral tablet: 1 tab(s) orally once a day  Tylenol Extra Strength 500 mg oral tablet: 2 tab(s) orally 3 times a day  Vitamin D3 50 mcg (2000 intl units) oral tablet: 1 tab(s) orally once a day

## 2024-02-29 NOTE — DISCHARGE NOTE PROVIDER - NSDCCPCAREPLAN_GEN_ALL_CORE_FT
PRINCIPAL DISCHARGE DIAGNOSIS  Diagnosis: Acute on chronic diastolic congestive heart failure  Assessment and Plan of Treatment: You have a history of heart failure. This means that your heart is not pumping blood to the rest of the body as well as it should. This can lead fluid to build up in the lungs and in the legs, leading to difficulty breathing and swelling. You must continue to take your heart and blood pressure medications as prescribed. You may also need to take a water pill to help you remove the excess fluid in the body when you urinate. You must follow up with a cardiologist and your primary care doctor to keep your heart as strong as possible.      SECONDARY DISCHARGE DIAGNOSES  Diagnosis: Severe mitral regurgitation  Assessment and Plan of Treatment: MR may be due to a primary abnormality of one or more components of the valve apparatus (leaflets, chordae tendineae, papillary muscles, and/or annulus) or may be secondary to another cardiac disease, rheumatic heart disease, endocarditis, CAD (regional wall motion abnormality distorting the mitral valve apparatus), cardiomyopathy, right ventricular pacing   A watchful waiting approach should probably be preferred in case of high surgical risk (elderly patients with relevant comorbidities) and/or low probability of durable repair (complex valve lesions/insufficient surgical expertise).       Diagnosis: Hypokalemia  Assessment and Plan of Treatment: Hypokalemia  Hypokalemia means that the amount of potassium in the blood is lower than normal. Potassium is a chemical that helps regulate the amount of fluid in the body (electrolyte). It also stimulates muscle tightening (contraction) and helps nerves work properly. Normally, most of the body’s potassium is inside of cells, and only a very small amount is in the blood. Because the amount in the blood is so small, minor changes to potassium levels in the blood can be life-threatening.      Diagnosis: Atypical pneumonia  Assessment and Plan of Treatment: You have pneumonia, which is an infection in your lungs. In the hospital, your health care providers helped you breathe better. They also gave you medicine to help your body get rid of the germs that cause pneumonia. They also made sure you got enough liquids and nutrients.

## 2024-02-29 NOTE — CONSULT NOTE ADULT - SUBJECTIVE AND OBJECTIVE BOX
HPI: Pt is a 89y old Female with hx of Paroxysmal AFIB, HFpEF,  CKD, Adrenal insufficiency, HFpEF, Lymphoma in remission,  HTN, Iron deficiency,  ICH who presents to ED from [facility] for sob and chest pressure which is worse in the mornings for the last 2 weeks. Says legs feels heavy and swollen at night when lying in bed. Pt uses a wheelchair. Daughter Kate is at bedside. Denies bleeding or syncope.     Pt was recent admitted from 2/10- for UTI and gatroenteritis. Denies ny further diarrhea or urinary symptoms. In ED    In ED, BP was elevated at 182/63, all other VS stable. EKG: NSR, TWI at AVL and V6.     Palliative consulted for GOC.    : Seen and examined at bedside. Up in chair, A&Ox3. Denies CP, SOB at rest.  Endorses dyspnea with any amount of exertion. Ate breakfast this AM.      PAIN: ( )Yes   (X)No  denies    DYSPNEA: (X) Yes  ( ) No  +GREENBERG  on 3LNC  no dyspnea at rest      PAST MEDICAL & SURGICAL HISTORY:  Iron deficiency  HTN (hypertension)  Lymphoma  H/O adrenal insufficiency  Hyponatremia  Hypokalemia  Chronic kidney disease, unspecified CKD stage  Diastolic heart failure  Paroxysmal atrial fibrillation  H/O CHF  S/P appendectomy  S/P hysterectomy  H/O intracranial hemorrhage      SOCIAL HX:    Hx opiate tolerance ( )YES  ( )NO    Baseline ADLs  (Prior to Admission)  ( ) Independent   ( )Dependent    FAMILY HISTORY:  No known problems (Father, Mother)        Review of Systems:    Anxiety-  Depression-  Physical Discomfort-  Dyspnea-  Constipation-  Diarrhea-  Nausea-  Vomiting-  Anorexia-  Weight Loss-   Cough-  Secretions-  Fatigue-  Weakness-  Delirium-    All other systems reviewed and negative  Unable to obtain/Limited due to:      PHYSICAL EXAM:    Vital Signs Last 24 Hrs  T(C): 36.9 (2024 08:25), Max: 36.9 (2024 08:25)  T(F): 98.4 (2024 08:25), Max: 98.4 (2024 08:25)  HR: 64 (2024 08:25) (63 - 68)  BP: 157/49 (2024 08:25) (154/59 - 174/59)  BP(mean): --  RR: 18 (2024 08:25) (18 - 18)  SpO2: 95% (2024 08:25) (95% - 97%)    Parameters below as of 2024 08:25  Patient On (Oxygen Delivery Method): nasal cannula      Daily     Daily Weight in k (2024 06:29)    PPSV2:   %  FAST:    General:  Mental Status:  HEENT:  Lungs:  Cardiac:  GI:  :  Ext:  Neuro:      LABS:                        8.3    5.06  )-----------( 141      ( 2024 07:10 )             26.4         140  |  103  |  15  ----------------------------<  106<H>  3.6   |  35<H>  |  1.09    Ca    8.9      2024 07:10  Phos  3.2       Mg     2.1         TPro  5.5<L>  /  Alb  3.0<L>  /  TBili  x   /  DBili  x   /  AST  x   /  ALT  x   /  AlkPhos  x         Albumin: Albumin: 3.0 g/dL ( @ 20:28)      Allergies    sulfa drugs (Hives)  penicillin (Hives)  tetracycline (Hives)    Intolerances      MEDICATIONS  (STANDING):  aMIOdarone    Tablet 200 milliGRAM(s) Oral daily  azithromycin   Tablet 500 milliGRAM(s) Oral daily  carvedilol 25 milliGRAM(s) Oral every 12 hours  cefTRIAXone Injectable. 1000 milliGRAM(s) IV Push every 24 hours  ferrous    sulfate 325 milliGRAM(s) Oral daily  fludroCORTISONE 0.1 milliGRAM(s) Oral two times a day  furosemide   Injectable 40 milliGRAM(s) IV Push daily  hydrALAZINE 100 milliGRAM(s) Oral three times a day  hydrocortisone 5 milliGRAM(s) Oral at bedtime  hydrocortisone 10 milliGRAM(s) Oral daily  losartan 100 milliGRAM(s) Oral daily  spironolactone 25 milliGRAM(s) Oral daily    MEDICATIONS  (PRN):  acetaminophen     Tablet .. 650 milliGRAM(s) Oral every 6 hours PRN Mild Pain (1 - 3)  magnesium hydroxide Suspension 30 milliLiter(s) Oral every 24 hours PRN for constipation  melatonin 3 milliGRAM(s) Oral at bedtime PRN Insomnia      RADIOLOGY/ADDITIONAL STUDIES: HPI: Pt is a 89y old Female with hx of Paroxysmal AFIB, HFpEF,  CKD, Adrenal insufficiency, HFpEF, Lymphoma in remission,  HTN, Iron deficiency,  ICH who presents to ED from [facility] for sob and chest pressure which is worse in the mornings for the last 2 weeks. Says legs feels heavy and swollen at night when lying in bed. Pt uses a wheelchair. Daughter Kate is at bedside. Denies bleeding or syncope.     Pt was recent admitted from 2/10- for UTI and gatroenteritis. Denies ny further diarrhea or urinary symptoms. In ED    In ED, BP was elevated at 182/63, all other VS stable. EKG: NSR, TWI at AVL and V6.     Palliative consulted for GOC.    : Seen and examined at bedside. Up in chair, A&Ox3. Denies CP, SOB at rest.  Endorses dyspnea with any amount of exertion. Ate breakfast this AM. Patient defers all medical decision making to her children -call placed to daughter, Kate, awaiting call back.      PAIN: ( )Yes   (X)No  denies    DYSPNEA: (X) Yes  ( ) No  +GREENBERG  on 3LNC  no dyspnea at rest      PAST MEDICAL & SURGICAL HISTORY:  Iron deficiency  HTN (hypertension)  Lymphoma  H/O adrenal insufficiency  Hyponatremia  Hypokalemia  Chronic kidney disease, unspecified CKD stage  Diastolic heart failure  Paroxysmal atrial fibrillation  H/O CHF  S/P appendectomy  S/P hysterectomy  H/O intracranial hemorrhage      SOCIAL HX:    Hx opiate tolerance ( )YES  ( )NO    Baseline ADLs  (Prior to Admission)  ( ) Independent   (X)Dependent      FAMILY HISTORY:  No known problems (Father, Mother)      Review of Systems:    Anxiety- denies  Depression- denies  Physical Discomfort- denies  Dyspnea- ++ with activity  Constipation- denies  Diarrhea- denies  Nausea- denies  Vomiting- denies  Anorexia- denies  Weight Loss-   Cough- denies  Secretions- denies  Fatigue- ++  Weakness- ++  Delirium- denies    All other systems reviewed and negative  Unable to obtain/Limited due to:      PHYSICAL EXAM:    Vital Signs Last 24 Hrs  T(C): 36.9 (2024 08:25), Max: 36.9 (2024 08:25)  T(F): 98.4 (2024 08:25), Max: 98.4 (2024 08:25)  HR: 64 (2024 08:25) (63 - 68)  BP: 157/49 (2024 08:25) (154/59 - 174/59)  BP(mean): --  RR: 18 (2024 08:25) (18 - 18)  SpO2: 95% (2024 08:25) (95% - 97%)    Parameters below as of 2024 08:25  Patient On (Oxygen Delivery Method): nasal cannula      Daily     Daily Weight in k (2024 06:29)    PPSV2:  40%  FAST:    General: awake, alert, pleasant, up in chair in NAD  Mental Status: A&Ox3  HEENT: on 3LNC  Lungs: Diminished throughout all lung fields  Cardiac: Regular rate and rhythm. S1S2  GI: Abdomen soft, nontender, nondistended. +bsx4  : no suprapubic tenderness  Ext: b/l LE erythematous   Neuro: A&Ox3. Speech intact. No focal neuro deficits.      LABS:                        8.3    5.06  )-----------( 141      ( 2024 07:10 )             26.4         140  |  103  |  15  ----------------------------<  106<H>  3.6   |  35<H>  |  1.09    Ca    8.9      2024 07:10  Phos  3.2       Mg     2.1         TPro  5.5<L>  /  Alb  3.0<L>  /  TBili  x   /  DBili  x   /  AST  x   /  ALT  x   /  AlkPhos  x         Albumin: Albumin: 3.0 g/dL ( @ 20:28)      Allergies    sulfa drugs (Hives)  penicillin (Hives)  tetracycline (Hives)    Intolerances      MEDICATIONS  (STANDING):  aMIOdarone    Tablet 200 milliGRAM(s) Oral daily  azithromycin   Tablet 500 milliGRAM(s) Oral daily  carvedilol 25 milliGRAM(s) Oral every 12 hours  cefTRIAXone Injectable. 1000 milliGRAM(s) IV Push every 24 hours  ferrous    sulfate 325 milliGRAM(s) Oral daily  fludroCORTISONE 0.1 milliGRAM(s) Oral two times a day  furosemide   Injectable 40 milliGRAM(s) IV Push daily  hydrALAZINE 100 milliGRAM(s) Oral three times a day  hydrocortisone 5 milliGRAM(s) Oral at bedtime  hydrocortisone 10 milliGRAM(s) Oral daily  losartan 100 milliGRAM(s) Oral daily  spironolactone 25 milliGRAM(s) Oral daily    MEDICATIONS  (PRN):  acetaminophen     Tablet .. 650 milliGRAM(s) Oral every 6 hours PRN Mild Pain (1 - 3)  magnesium hydroxide Suspension 30 milliLiter(s) Oral every 24 hours PRN for constipation  melatonin 3 milliGRAM(s) Oral at bedtime PRN Insomnia      RADIOLOGY/ADDITIONAL STUDIES:  < from: CT Angio Abdomen and Pelvis w/ IV Cont (24 @ 11:16) >    ACC: [id number] EXAM:  CT ANGIO ABD PELV (W)AW IC   ORDERED BY: KYA ARMAS     ACC: [id number] EXAM:  CT ANGIO CHEST AORTA WAWIC   ORDERED BY: KYA ARMAS     PROCEDURE DATE:  2024          INTERPRETATION:  INDICATION: Evaluate for pulmonary embolism. Evaluate   aorta.    TECHNIQUE: Helical CTA images of the chest, abdomen and pelvis were   performed before and after the administration of 90cc Omnipaque 350   intravenous contrast from the thoracic inlet to the pelvic brim. Coronal   MIP reformats are provided.    COMPARISON: Abdomen CT 2/10/2024. Chest CTA 2022    FINDINGS:  CTA: Multifocal areas of embolized methyl methacrylate are again noted   within several segmental and subsegmental branches, for example in the   left upper lobe (4:152, 121), right upper and right middle lobes. No   interval acute pulmonary embolism.    There is a common origin of the innominate artery and left common carotid   artery, normal anatomic variant. Unchanged focal, short segment   dissection of the infrarenal aorta. No thoracic aortic dissection,   aneurysm or intramural hematoma. Multifocal calcified and noncalcified   aortic plaque.    Heart: The heart is enlarged. Coronary artery calcification. No   pericardial effusion..    Lungs/Airways/Pleura: Small pleural effusions have increased in size,   previously trace on the 2/10/2024 abdomen CT, with new partial lower lobe   atelectasis. No pneumothorax.  Mild bronchiectasis with areas of distal   airway impaction, tree-in-budnodules, groundglass opacities and   calcifications.    Mediastinum, Lymph nodes: No thoracic lymphadenopathy.    Hepatobiliary: Unremarkable.    Pancreas: Several stable pancreatic cysts measuring up to 1.6 cm.    Spleen: Numerous small hypodensities throughout the spleen are unchanged.    Adrenal glands: Unremarkable.    Kidneys: Bilateral nonobstructing nephrolithiasis.    Bowel: No bowel obstruction. Overall improvement in previously seen   diffuse colonic wall thickening compared to 2/10/2024 abdomen CT, with   residual areas of colonic wall thickening (i.e. right lower abdomen   3:147).    Abdominal lymph nodes: No lymphadenopathy.    Peritoneum: No ascites.    Pelvis: Limited evaluation due to metallic streak artifact from right hip   arthroplasty. Physiologically distended urinary bladder. Hysterectomy.    Musculoskeletal system and soft tissue: Right hip arthroplasty. The bones   are diffusely demineralized. Status post T9-L4 vertebroplasties.    Findings are confirmed on the [facility].    IMPRESSION:  No acute aortic process. 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    No acute pulmonary embolism. 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    Small pleural effusions, increased since 2/10/2024 abdomen CT, with   partial lower lobe atelectasis. Chronic large and small airways disease.    Stable pancreatic cysts measuring up to 1.6 cm.    --- End of Report ---            SYLVIA PRINGLE M.D., Attending Radiologist  This document has been electronically signed. 2024  2:35PM    < end of copied text >

## 2024-02-29 NOTE — CONSULT NOTE ADULT - ASSESSMENT
Pt is a 89y old Female with hx of Paroxysmal AFIB, HFpEF,  CKD, Adrenal insufficiency, HFpEF, Lymphoma in remission,  HTN, Iron deficiency,  ICH who presents to ED from [facility] for sob and chest pressure which is worse in the mornings for the last 2 weeks. Says legs feels heavy and swollen at night when lying in bed. Pt uses a wheelchair. Daughter Kate is at bedside. Denies bleeding or syncope.     1. Acute decompensated HFpEF exacerbation 2/2 severe MR  -recent echo EF 55-60%, severe MR  -appreciate structural heart team consult  -family refusing further workup and treatment for valve disease, requesting medical management  -IV Lasix for optimization  -Spirolactone added by cardiology   -daily weight, strict I&O  -cardio following  -call placed to daughter, Kate, to discuss recommendations of hospice at [place]    2. Severe protein calorie malnutrition  -albumin 3.0  -appreciate dietary recommendations  -DASH diet, encourage PO intake      Process of Care   --Reviewed dx/treatment problems and alignment with Goals of Care     Physical Aspects of Care   --Pain   patient denies at this time   c/w current management     --Bowel Regimen   denies constipation   risk for constipation d/t immobility   daily dulcolax as needed     --Dyspnea   No SOB at rest  ++ dyspnea with any exertion  on 3LNC  comfortable and in NAD     --Nausea Vomiting   denies     --Weakness   PT as tolerated    encourage OOB to chair       Psychological and Psychiatric Aspects of Care:    --Grief/ Bereavement: emotional support provided   --Hx of psychiatric dx: none   --Pastoral Care Available PRN        Social Aspects of Care   --SW involved          Cultural Aspects   --Primary Language: English           Goals of Care:            We discussed Palliative Care team being a supportive team when a patient has ongoing illnesses.  We also discussed that it is not an end of life care service, but can help navigate symptoms and emotional support throughout their hospital stay here.           Ethical and Legal Aspects: NA           Capacity- A&Ox3, has medical decision making capacity but defers to her children         HCP/Surrogate: Kate Partida, daughter [phone]          Code Status: DNR/DNI    MOLST: MOLST with limitations of DNR/DNI    Dispo Plan: hospice vs. home care back to [facility]          Discussed With: Dr. Gardner  & Dr. Sagastume          Case coordinated with attending and SW and RN            Time Spent: 75 minutes including the care, coordination and counseling of this patient, 50% of which was spent coordinating and counseling.         Pt is a 89y old Female with hx of Paroxysmal AFIB, HFpEF,  CKD, Adrenal insufficiency, HFpEF, Lymphoma in remission,  HTN, Iron deficiency,  ICH who presents to ED from [facility] for sob and chest pressure which is worse in the mornings for the last 2 weeks. Says legs feels heavy and swollen at night when lying in bed. Pt uses a wheelchair. Daughter Kate is at bedside. Denies bleeding or syncope.     1. Acute decompensated HFpEF exacerbation 2/2 severe MR  -recent echo EF 55-60%, severe MR  -appreciate structural heart team consult  -family refusing further workup and treatment for valve disease, requesting medical management  -IV Lasix for optimization  -Spirolactone added by cardiology   -daily weight, strict I&O  -cardio following  -call placed to daughter, Kate, to discuss recommendations of hospice at [place]    2. Severe protein calorie malnutrition  -albumin 3.0  -appreciate dietary recommendations  -DASH diet, encourage PO intake      Process of Care   --Reviewed dx/treatment problems and alignment with Goals of Care     Physical Aspects of Care   --Pain   patient denies at this time   c/w current management     --Bowel Regimen   denies constipation   risk for constipation d/t immobility   daily dulcolax as needed     --Dyspnea   No SOB at rest  ++ dyspnea with any exertion  on 3LNC  comfortable and in NAD     --Nausea Vomiting   denies     --Weakness   PT as tolerated    encourage OOB to chair       Psychological and Psychiatric Aspects of Care:    --Grief/ Bereavement: emotional support provided   --Hx of psychiatric dx: none   --Pastoral Care Available PRN        Social Aspects of Care   --SW involved          Cultural Aspects   --Primary Language: English           Goals of Care:            We discussed Palliative Care team being a supportive team when a patient has ongoing illnesses.  We also discussed that it is not an end of life care service, but can help navigate symptoms and emotional support throughout their hospital stay here.           Ethical and Legal Aspects: NA           Capacity- A&Ox3, has medical decision making capacity but defers to her children         HCP/Surrogate: Kate Partida, daughter [phone]          Code Status: DNR/DNI    MOLST: MOLST with limitations of DNR/DNI    Dispo Plan: [facility]          Discussed With: Dr. Gardner  & Dr. Sagastume          Case coordinated with attending and SW and RN            Time Spent: 75 minutes including the care, coordination and counseling of this patient, 50% of which was spent coordinating and counseling.

## 2024-02-29 NOTE — GOALS OF CARE CONVERSATION - ADVANCED CARE PLANNING - CONVERSATION DETAILS
HPI:  Pt is a 89yo F with PMHx of Paroxysmal AFIB, HFpEF,  CKD, Adrenal insufficiency, HFpEF, Lymphoma in remission,  HTN, Iron deficiency,  ICH who presents to ED from [facility] for sob and chest pressure which is worse in the mornings for the last 2 weeks. Says legs feels heavy and swollen at night when lying in bed. Pt uses a wheelchair. (27 Feb 2024 14:26)      PERTINENT PMH REVIEWED:  [ x ] YES [ ] NO           Primary Contact:          Kate daughter, phone # [phone]    HCP [ x ] Surrogate [   ] Guardian [   ]    Mental Status: [x ] Alert  [  x] Oriented [  ] Confused [  ] Lethargic  Concerns of Depression [  ]-none reported  Anxiety [   ] -none reported  Baseline ADLs (prior to admission):  Independent [ ] moderately [ ] fully   Dependent   [ x ] moderately [ ]fully    Family Meeting attendees: GOC discussed with Pt and family    Anticipated Grief: Patient[ x ] Family [ x ]    Caregiver [place] Assessed: Yes [ x ] No [  ]    Voodoo: Episcopal.    Spiritual Concerns: Not identified,  available for support as needed.    Goals of Care: current medical management     Previous Services: [facility]     ADVANCE DIRECTIVES:    -Pt has capacity  -HCP [facility] [employer] names Kate as primary agent  -MOLST DNR DNI      Anticipated D/C Plan: return to [facility]                     Summary:  Palliative team met with Pt, daughter and son at the bedside to discuss GOC, assist with planning and provide supportive counseling.  Palliative role explained.  Emotional support provided.  Pt alert and oriented, able to make needs known.  Pt appears in good spirits, pleasant with our team.  Prior to hospitalization, Pt. resides at [facility].  Pt has assistance at [facility].  Daughter shared aide to start upon discharge from hospital to provide 24/7 aide assistance at [facility].  Pt and families feelings explored.  Support provided.    We discussed the option to return to [facility] with the support of home hospice to focus on Pts quality of life and symptom management.  We noted that hospice would prevent future hospitalization.  The philosophy of hospice and the services provided at home discussed with Pt and family in details.  Pt and family to discuss hospice but shared at this time they are not ready for a referral for home hospice.  Pt and family aware they can make a referral from [facility] to hospice, educated of referral process.  Plan to return to [facility] with 24/7 aide services.  Pt hopeful for discharge this date.    Advance directives reviewed.  HCP [facility] [employer] names Kate as primary agent.  DNR/DNI in place.      Plan to return to [facility].  DNR/DNI in place.  Family aware of palliative team availability.  Emotional support provided.  Our team to continue to follow.

## 2024-02-29 NOTE — DISCHARGE NOTE PROVIDER - CARE PROVIDER_API CALL
Apollo Nix  Internal Medicine  [address] [facility] [facility] [address]  [place] [address]  Phone: [phone]  Fax: [phone]  Follow Up Time:

## 2024-02-29 NOTE — GOALS OF CARE CONVERSATION - ADVANCED CARE PLANNING - NSPROPTRIGHTCAREGIVER_GEN_A_NUR
GENERAL SURGERY PROGRESS NOTE    CC/HPI:           Patient feels better from his surgery. Vitals:    04/08/20 0342 04/08/20 1615 04/09/20 0625 04/09/20 0814   BP: (!) 141/67 (!) 123/58 138/77 107/67   Pulse: 66 73 78 71   Resp: 16 16 16 16   Temp: 98.2 °F (36.8 °C) 98.6 °F (37 °C) 97.7 °F (36.5 °C) 98 °F (36.7 °C)   TempSrc: Oral   Oral   SpO2: 98% 97% 97% 99%   Weight:       Height:         I/O last 3 completed shifts: In: 7507 [P.O.:1160; I.V.:10]  Out: 500 [Urine:500]  No intake/output data recorded. DIET CARB CONTROL;   Dietary Nutrition Supplements: Wound Healing Oral Supplement    Recent Results (from the past 48 hour(s))   POCT Glucose    Collection Time: 04/07/20  4:47 PM   Result Value Ref Range    POC Glucose 326 (H) 70 - 99 MG/DL   POCT Glucose    Collection Time: 04/07/20  8:27 PM   Result Value Ref Range    POC Glucose 230 (H) 70 - 99 MG/DL   POCT Glucose    Collection Time: 04/08/20  3:11 AM   Result Value Ref Range    POC Glucose 219 (H) 70 - 99 MG/DL   Creatinine, Serum    Collection Time: 04/08/20  6:30 AM   Result Value Ref Range    CREATININE 0.7 (L) 0.9 - 1.3 MG/DL    GFR Non-African American >60 >60 mL/min/1.73m2    GFR African American >60 >60 mL/min/1.73m2   POCT Glucose    Collection Time: 04/08/20  7:47 AM   Result Value Ref Range    POC Glucose 251 (H) 70 - 99 MG/DL   POCT Glucose    Collection Time: 04/08/20 12:20 PM   Result Value Ref Range    POC Glucose 110 (H) 70 - 99 MG/DL   POCT Glucose    Collection Time: 04/08/20  4:38 PM   Result Value Ref Range    POC Glucose 244 (H) 70 - 99 MG/DL   POCT Glucose    Collection Time: 04/08/20  9:23 PM   Result Value Ref Range    POC Glucose 196 (H) 70 - 99 MG/DL   POCT Glucose    Collection Time: 04/09/20  3:56 AM   Result Value Ref Range    POC Glucose 200 (H) 70 - 99 MG/DL   POCT Glucose    Collection Time: 04/09/20  7:47 AM   Result Value Ref Range    POC Glucose 159 (H) 70 - 99 MG/DL   POCT Glucose    Collection Time: 04/09/20 11:39 AM   Result Value Ref Range    POC Glucose 81 70 - 99 MG/DL       Scheduled Meds:   insulin lispro  10 Units Subcutaneous TID WC    collagenase   Topical Daily    insulin lispro  0-12 Units Subcutaneous 2 times per day    insulin glargine  15 Units Subcutaneous Nightly    insulin lispro  0-12 Units Subcutaneous TID WC    sodium chloride flush  10 mL Intravenous 2 times per day    atorvastatin  40 mg Oral Nightly    clopidogrel  75 mg Oral Daily    enoxaparin  40 mg Subcutaneous Daily    gabapentin  600 mg Oral TID    levothyroxine  100 mcg Oral Daily    nicotine  1 patch Transdermal Daily    vancomycin  1,000 mg Intravenous Q12H       Continuous Infusions:   dextrose         Physical Exam:  HEENT: Anicteric sclerae, Oropharyngeal mucosae moist, pink and intact. Heart:  Normal S1 and S2, RRR  Lungs: Clear to auscultation bilaterally, No audible Wheezes or Rales. Extremities: No edema. Right foot wound nicely healing needs the Santyl. Neuro: Alert and Oriented x 3, Non focal.  Abdomen: Soft, Benign, Non tender, Non distended, Positive bowel sounds. Principal Problem:    Acute hematogenous osteomyelitis of right foot (HCC)  Active Problems:    Uncontrolled type II diabetes with peripheral autonomic neuropathy (HCC)    Amputation of little toe (HCC)    PAD (peripheral artery disease) (HCC)    Uncontrolled pain    Generalized weakness    Simple chronic bronchitis (HCC)  Resolved Problems:    * No resolved hospital problems. *      Assessment and Plan:  Ariel Perdomo is a 64 y.o. male who is POD # 9 status post:  Right 5th toe amputation, debridement of major tissue necrosis and purulent necrotic material, and application of the wound vac, and excision of a lateral heel callus. Doing well will follow the wound - continue santyl for few more days and then would benefit from the wound vac, will follow.     ___________________________________________    Mark Silver MD, FACS, no

## 2024-02-29 NOTE — CONSULT NOTE ADULT - CONVERSATION DETAILS
Met with patient at bedside to introduce team and discuss GOC.    Patient is alert, oriented x3. Has a simple understanding of her current medical condition. Endorsing shortness of breath with any amount of activity. Understands that valve is causing her symptoms of shortness of breath. I explained to her that without surgical intervention, she will continue to have symptoms of shortness of breath with activity. She understands this, and understands that the doctors are trying their best to medically manage her so that her symptoms are manageable. She understands that any future hospitalizations related to her valve disease will be medical and palliative management of her symptoms, not curative. She stated "I am 89 years old, what do you expect?".     She confirmed that her daughter, Kate, is her HCP. She defers all medical decision making to her children, explaining that she has a son who is a physician who is flying in from [place] this morning. She allowed me to reach out to her daughter to discuss her care further. A call was placed to her daughter to discuss recommendations of hospice at [place]. Awaiting call back from daughter. Met with patient at bedside to introduce team and discuss GOC.    Patient is alert, oriented x3. Has a simple understanding of her current medical condition. Endorsing shortness of breath with any amount of activity. Understands that valve is causing her symptoms of shortness of breath. I explained to her that without surgical intervention, she will continue to have symptoms of shortness of breath with activity. She understands this, and understands that the doctors are trying their best to medically manage her so that her symptoms are manageable. She understands that any future hospitalizations related to her valve disease will be medical and palliative management of her symptoms, not curative. She stated "I am 89 years old, what do you expect?".     She confirmed that her daughter, Kate, is her HCP. She defers all medical decision making to her children, explaining that she has a son who is a physician who is flying in from [place] this morning. She allowed me to reach out to her daughter to discuss her care further. A call was placed to her daughter to discuss recommendations of hospice at [place]. Awaiting call back from daughter.      addendum:  Palliative team met with Pt, daughter and son at the bedside to discuss GOC, assist with planning and provide supportive counseling.  Palliative role explained.  Emotional support provided.  Pt alert and oriented, able to make needs known.  Pt appears in good spirits, pleasant with our team.  Prior to hospitalization, Pt. resides at [facility].  Pt has assistance at [facility].  Daughter shared aide to start upon discharge from hospital to provide 24/7 aide assistance at [facility].  Pt and families feelings explored.  Support provided.    We discussed the option to return to [facility] with the support of home hospice to focus on Pts quality of life and symptom management.  We noted that hospice would prevent future hospitalization.  The philosophy of hospice and the services provided at home discussed with Pt and family in details.  Pt and family to discuss hospice but shared at this time they are not ready for a referral for home hospice.  Pt and family aware they can make a referral from [facility] to hospice, educated of referral process.  Plan to return to [facility] with 24/7 aide services.  Pt hopeful for discharge this date.    Advance directives reviewed.  HCP [facility] names Kate as primary agent.  DNR/DNI in place.      Plan to return to [facility].  DNR/DNI in place.  Family aware of palliative team availability.  Emotional support provided.  Our team to continue to follow.

## 2024-03-01 ENCOUNTER — TRANSCRIPTION ENCOUNTER (OUTPATIENT)
Age: 89
End: 2024-03-01

## 2024-03-01 VITALS
HEART RATE: 63 BPM | RESPIRATION RATE: 20 BRPM | OXYGEN SATURATION: 99 % | DIASTOLIC BLOOD PRESSURE: 53 MMHG | TEMPERATURE: 98 F | SYSTOLIC BLOOD PRESSURE: 158 MMHG

## 2024-03-01 LAB
ANION GAP SERPL CALC-SCNC: 4 MMOL/L — LOW (ref 5–17)
BUN SERPL-MCNC: 16 MG/DL — SIGNIFICANT CHANGE UP (ref 7–23)
CALCIUM SERPL-MCNC: 9.3 MG/DL — SIGNIFICANT CHANGE UP (ref 8.5–10.1)
CHLORIDE SERPL-SCNC: 99 MMOL/L — SIGNIFICANT CHANGE UP (ref 96–108)
CO2 SERPL-SCNC: 35 MMOL/L — HIGH (ref 22–31)
CREAT SERPL-MCNC: 1.05 MG/DL — SIGNIFICANT CHANGE UP (ref 0.5–1.3)
EGFR: 48 ML/MIN/1.73M2 — LOW
GLUCOSE SERPL-MCNC: 95 MG/DL — SIGNIFICANT CHANGE UP (ref 70–99)
MAGNESIUM SERPL-MCNC: 2.3 MG/DL — SIGNIFICANT CHANGE UP (ref 1.6–2.6)
POTASSIUM SERPL-MCNC: 3.1 MMOL/L — LOW (ref 3.5–5.3)
POTASSIUM SERPL-SCNC: 3.1 MMOL/L — LOW (ref 3.5–5.3)
SODIUM SERPL-SCNC: 138 MMOL/L — SIGNIFICANT CHANGE UP (ref 135–145)

## 2024-03-01 PROCEDURE — 99239 HOSP IP/OBS DSCHRG MGMT >30: CPT

## 2024-03-01 RX ORDER — POTASSIUM CHLORIDE 20 MEQ
40 PACKET (EA) ORAL EVERY 4 HOURS
Refills: 0 | Status: COMPLETED | OUTPATIENT
Start: 2024-03-01 | End: 2024-03-01

## 2024-03-01 RX ORDER — FUROSEMIDE 40 MG
1 TABLET ORAL
Qty: 0 | Refills: 0 | DISCHARGE
Start: 2024-03-01

## 2024-03-01 RX ORDER — LOSARTAN POTASSIUM 100 MG/1
50 TABLET, FILM COATED ORAL
Refills: 0 | Status: DISCONTINUED | OUTPATIENT
Start: 2024-03-01 | End: 2024-03-01

## 2024-03-01 RX ORDER — FUROSEMIDE 40 MG
40 TABLET ORAL DAILY
Refills: 0 | Status: DISCONTINUED | OUTPATIENT
Start: 2024-03-01 | End: 2024-03-01

## 2024-03-01 RX ORDER — LOSARTAN POTASSIUM 100 MG/1
1 TABLET, FILM COATED ORAL
Qty: 0 | Refills: 0 | DISCHARGE
Start: 2024-03-01

## 2024-03-01 RX ORDER — LOSARTAN POTASSIUM 100 MG/1
1 TABLET, FILM COATED ORAL
Qty: 0 | Refills: 0 | DISCHARGE

## 2024-03-01 RX ADMIN — Medication 40 MILLIEQUIVALENT(S): at 10:22

## 2024-03-01 RX ADMIN — AMIODARONE HYDROCHLORIDE 200 MILLIGRAM(S): 400 TABLET ORAL at 10:21

## 2024-03-01 RX ADMIN — Medication 325 MILLIGRAM(S): at 10:21

## 2024-03-01 RX ADMIN — Medication 10 MILLIGRAM(S): at 10:22

## 2024-03-01 RX ADMIN — FLUDROCORTISONE ACETATE 0.1 MILLIGRAM(S): 0.1 TABLET ORAL at 10:21

## 2024-03-01 RX ADMIN — Medication 100 MILLIGRAM(S): at 20:09

## 2024-03-01 RX ADMIN — Medication 40 MILLIEQUIVALENT(S): at 16:36

## 2024-03-01 RX ADMIN — SPIRONOLACTONE 25 MILLIGRAM(S): 25 TABLET, FILM COATED ORAL at 10:21

## 2024-03-01 RX ADMIN — CARVEDILOL PHOSPHATE 25 MILLIGRAM(S): 80 CAPSULE, EXTENDED RELEASE ORAL at 10:21

## 2024-03-01 RX ADMIN — AZITHROMYCIN 500 MILLIGRAM(S): 500 TABLET, FILM COATED ORAL at 10:21

## 2024-03-01 RX ADMIN — Medication 20 MILLIGRAM(S): at 06:27

## 2024-03-01 RX ADMIN — LOSARTAN POTASSIUM 50 MILLIGRAM(S): 100 TABLET, FILM COATED ORAL at 10:22

## 2024-03-01 RX ADMIN — Medication 40 MILLIGRAM(S): at 10:21

## 2024-03-01 RX ADMIN — CEFTRIAXONE 1000 MILLIGRAM(S): 500 INJECTION, POWDER, FOR SOLUTION INTRAMUSCULAR; INTRAVENOUS at 16:36

## 2024-03-01 NOTE — DISCHARGE NOTE NURSING/CASE MANAGEMENT/SOCIAL WORK - NSDCPEFALRISK_GEN_ALL_CORE
For information on Fall & Injury Prevention, visit: [email or web address] OR  [email or web address] OR  https://www.cdc.gov/steadi/patient.html

## 2024-03-01 NOTE — PROGRESS NOTE ADULT - ASSESSMENT
Pt is a 89yo F with PMHx of Paroxysmal AFIB, HFpEF,  CKD, Adrenal insufficiency, HFpEF, Lymphoma in remission,  HTN, Iron deficiency,  ICH admitted for decompensated HF, suspected PNA.       #SOB 2/2 Decompensated CHF or PNA   -exacerbated by recent illness or possible worsening of valvular pathology   -echo showing severe MR, unchanged mild AS   - CT Chest showing Mild bronchiectasis with areas of distal   airway impaction, tree-in-budnodules, groundglass opacities and   calcifications, also b/L pleural effusions that have increased in size  -Lasix 20 mg PO q12  -continue antibiotics   -daily weights, strict I's and O's, DASH diet   -cardiology, HF team consults         #Moderate Hypokalemia  -continued K supplementation   -monitor closely, will cut back on Lasix if needed, continue for now  -pt used to be on K supp which was recently discontinued 2/2 hyperkalemia   -urine lytes   -serial bmp's       #CKD3  -renal fxn stable, continue to monitor  -avoid nephrotoxic medications    #Hx of Adrenal Insufficiency   -continue home regimen with Florinef and Hydrocortisone  -monitor BP and K levels closely     #Paroxysmal Atrial Fibrillation   -EKG on arrival NSR   -rate controlled  -continue Amiodarone 200 mg daily, Coreg 25 mg  q12   -fu cardiology consultation    #HTN  -C/w Losartan, Coreg, amlodipine   -monitor BP     #Chronic Anemia  -Hgb at baseline   -Iron studies from prior admission reviewed, iron borderline low   -ferrous sulfate supp   -check spep     #DVT PPx   -heparin subQ            
89 y/o F with PMHx of Paroxysmal AFIB, HFpEF,  CKD, Adrenal insufficiency, HFpEF, Lymphoma in remission,  HTN, Iron deficiency,  ICH who presents to ED from [facility] for sob and chest pressure which is worse in the mornings for the last 2 weeks. Says legs feels heavy and swollen at night when lying in bed. Pt uses a wheelchair. Daughter Kate is at bedside. Denies bleeding or syncope.   - Pt was recent admitted from 2/10-2/14 for UTI and gastroenteritis Denies ny further diarrhea or urinary symptoms.    #Acute hypoxic respiratory failure. In the setting of HFpEF, Severe MR  C/w diuresis for today - IV Lasix 40mg daily, spirolactone added, trend labs  D/w family, conservative management for Severe MR.    #PAF. Currently SR. On Amiodarone. Will continue with amiodarone. Not on AC.    #Adrenal Insufficiency. On florinef, ?reduce dose if able given HF or switch to midodrine.     #HTN. BP stable. Continue with current medications. Will DC Norvasc. Continue coreg, hydralazine, losartan.    #Hypokalemia. Repleted. Monitor.      Will follow  Case d/w Dr. Mccracken    
89 y/o F with PMHx of Paroxysmal AFIB, HFpEF,  CKD, Adrenal insufficiency, HFpEF, Lymphoma in remission,  HTN, Iron deficiency,  ICH who presents to ED from [facility] for sob and chest pressure which is worse in the mornings for the last 2 weeks. Says legs feels heavy and swollen at night when lying in bed. Pt uses a wheelchair. Daughter Kate is at bedside. Denies bleeding or syncope.   - Pt was recent admitted from 2/10-2/14 for UTI and gastroenteritis Denies ny further diarrhea or urinary symptoms.    #Acute hypoxic respiratory failure. In the setting of HFpEF, Severe MR  Continue Lasix 40mg daily, spirolactone 25mg daily, awaiting labs  D/w family, conservative management for Severe MR.    #PAF. Currently SR. On Amiodarone. Will continue with amiodarone. Not on AC.    #Adrenal Insufficiency. On florinef, recommend to reduce dose given HF or switch to midodrine.     #HTN. BP stable. Continue with current medications. Continue coreg, hydralazine, losartan. Recommend reducing florinef.    #Hypokalemia. Repleted. Monitor.      Will sign off, case d/w Dr. Mera  Outpatient follow up

## 2024-03-01 NOTE — PROGRESS NOTE ADULT - SUBJECTIVE AND OBJECTIVE BOX
Interval HPI:  Pt is a 89yo F with PMHx of Paroxysmal AFIB, HFpEF,  CKD, Adrenal insufficiency, HFpEF, Lymphoma in remission,  HTN, Iron deficiency,  ICH admitted for decompensated HF, suspected PNA.     Pt was evaluated today at bedside, reports improvement in her condition, NC in place, decreased to 1L. No acute complaints. Spoke to daughter Kate, unsure why pt not on AC, mentioned used to be on K supplements which were discontinued on prior admission 2/2 hyperkalemia.     ROS: negative except HPI.     MEDICATIONS  (STANDING):  aMIOdarone    Tablet 200 milliGRAM(s) Oral daily  amLODIPine   Tablet 5 milliGRAM(s) Oral daily  azithromycin   Tablet 500 milliGRAM(s) Oral daily  carvedilol 25 milliGRAM(s) Oral every 12 hours  cefTRIAXone Injectable. 1000 milliGRAM(s) IV Push every 24 hours  ferrous    sulfate 325 milliGRAM(s) Oral daily  fludroCORTISONE 0.1 milliGRAM(s) Oral two times a day  furosemide    Tablet 20 milliGRAM(s) Oral two times a day  heparin   Injectable 5000 Unit(s) SubCutaneous every 12 hours  hydrALAZINE 100 milliGRAM(s) Oral three times a day  hydrocortisone 10 milliGRAM(s) Oral daily  hydrocortisone 5 milliGRAM(s) Oral at bedtime  losartan 100 milliGRAM(s) Oral daily  potassium chloride    Tablet ER 40 milliEquivalent(s) Oral once  potassium chloride  10 mEq/100 mL IVPB 10 milliEquivalent(s) IV Intermittent every 2 hours    MEDICATIONS  (PRN):  acetaminophen     Tablet .. 650 milliGRAM(s) Oral every 6 hours PRN Mild Pain (1 - 3)  magnesium hydroxide Suspension 30 milliLiter(s) Oral every 24 hours PRN for constipation  melatonin 3 milliGRAM(s) Oral at bedtime PRN Insomnia      Vital Signs Last 24 Hrs  T(C): 36.6 (28 Feb 2024 08:38), Max: 37.2 (27 Feb 2024 20:49)  T(F): 97.8 (28 Feb 2024 08:38), Max: 99 (27 Feb 2024 20:49)  HR: 66 (28 Feb 2024 08:38) (63 - 80)  BP: 136/38 (28 Feb 2024 10:00) (136/38 - 189/60)  BP(mean): 96 (27 Feb 2024 18:25) (91 - 96)  RR: 18 (28 Feb 2024 08:38) (18 - 22)  SpO2: 98% (28 Feb 2024 08:38) (96% - 100%)    Parameters below as of 28 Feb 2024 08:38  Patient On (Oxygen Delivery Method): nasal cannula          General: WN/WD NAD  Head- Atraumatic, normocephalic   Neurology: A&Ox3, nonfocal  HEENT- PERRLA, moist muscous membrane  Neck-supple, no JVD  Respiratory: rales R base, decreased BS RUL   CVS:   systolic ejection murmur LLSB   Genitourinary- voiding, non palpable bladder  Extremities: No edema, + peripheral pulses  Skin-no rashes  Psychiatric- mood stable        LABS:                          8.9    5.50  )-----------( 144      ( 28 Feb 2024 06:55 )             28.1     28 Feb 2024 06:55    138    |  99     |  15     ----------------------------<  100    2.8     |  34     |  1.06     Ca    8.6        28 Feb 2024 06:55  Mg     2.1       28 Feb 2024 06:55    TPro  5.5    /  Alb  x      /  TBili  x      /  DBili  x      /  AST  x      /  ALT  x      /  AlkPhos  x      27 Feb 2024 20:28    LIVER FUNCTIONS - ( 27 Feb 2024 20:28 )  Alb: x     / Pro: 5.5 g/dL / ALK PHOS: x     / ALT: x     / AST: x     / GGT: x             CAPILLARY BLOOD GLUCOSE            Urinalysis Basic - ( 28 Feb 2024 06:55 )    Color: x / Appearance: x / SG: x / pH: x  Gluc: 100 mg/dL / Ketone: x  / Bili: x / Urobili: x   Blood: x / Protein: x / Nitrite: x   Leuk Esterase: x / RBC: x / WBC x   Sq Epi: x / Non Sq Epi: x / Bacteria: x        RADIOLOGY:      < from: TTE Echo Complete w/o Contrast w/ Doppler (02.27.24 @ 15:23) >   Summary     The left ventricle is normal in size, wall thickness, wall motion and   contractility as seen in limited views.   Mild asymmetrical septal left ventricular hypertrophy is present.   Mild diastolic dysfunction (stage I).   Estimated left ventricular ejection fraction is 55-60 %.   The left atrium is mildly dilated.   Moderate (2+) eccentric aortic regurgitation.   Severe (4+) mitral regurgitation.   Mild aortic stenosis.   Moderate (2+) tricuspid valve regurgitation is present.   Mild pulmonaryhypertension.   Pleural effusion is present.   Mild aortic stenosis is consistent with previous echo on 9/2022.      < end of copied text >        
CHIEF COMPLAINT: Patient is a 89y old  Female who presents with a chief complaint of Chest pain  (28 Feb 2024 11:50)    FROM H&P: This is 89 y/o F with PMHx of Paroxysmal AFIB, HFpEF,  CKD, Adrenal insufficiency, HFpEF, Lymphoma in remission,  HTN, Iron deficiency,  ICH who presents to ED from [facility] for sob and chest pressure which is worse in the mornings for the last 2 weeks. Says legs feels heavy and swollen at night when lying in bed. Pt uses a wheelchair. Daughter Kate is at bedside. Denies bleeding or syncope.   - Pt was recent admitted from 2/10-2/14 for UTI and gatroenteritis. Denies ny further diarrhea or urinary symptoms. In ED  - In ED, BP was elevated at 182/63, all other VS stable. EKG: NSR, TWI at AVL and V6.   (27 Feb 2024 14:26)    Cardiology consulted for HF  Breathing improved. No CP or palp.    2/29. Breathing improved. C/w IV diuresis.  3/1. Breathing stable. Adjust meds    MEDICATIONS  (STANDING):  aMIOdarone    Tablet 200 milliGRAM(s) Oral daily  azithromycin   Tablet 500 milliGRAM(s) Oral daily  carvedilol 25 milliGRAM(s) Oral every 12 hours  cefTRIAXone Injectable. 1000 milliGRAM(s) IV Push every 24 hours  ferrous    sulfate 325 milliGRAM(s) Oral daily  fludroCORTISONE 0.1 milliGRAM(s) Oral two times a day  furosemide    Tablet 40 milliGRAM(s) Oral daily  hydrALAZINE 100 milliGRAM(s) Oral three times a day  hydrocortisone 5 milliGRAM(s) Oral at bedtime  hydrocortisone 10 milliGRAM(s) Oral daily  losartan 50 milliGRAM(s) Oral two times a day  spironolactone 25 milliGRAM(s) Oral daily    MEDICATIONS  (PRN):  acetaminophen     Tablet .. 650 milliGRAM(s) Oral every 6 hours PRN Mild Pain (1 - 3)  magnesium hydroxide Suspension 30 milliLiter(s) Oral every 24 hours PRN for constipation  melatonin 3 milliGRAM(s) Oral at bedtime PRN Insomnia    HOME MEDICATIONS:  amiodarone 200 mg oral tablet: 1 tab(s) orally once a day (27 Feb 2024 11:19)  amLODIPine 5 mg oral tablet: 1 tab(s) orally once a day (27 Feb 2024 11:19)  azithromycin 500 mg oral tablet: 1 tab(s) orally as needed prior to dental procedures (27 Feb 2024 15:25)  carvedilol 25 mg oral tablet: 1 tab(s) orally every 12 hours (27 Feb 2024 11:19)  Colace 100 mg oral capsule: 2 cap(s) orally once a day (at bedtime) as needed for  constipation (27 Feb 2024 11:19)  famotidine 20 mg oral tablet: 1 tab(s) orally once a day (27 Feb 2024 11:19)  ferrous sulfate 325 mg (65 mg elemental iron) oral tablet: 1 tab(s) orally once a day (27 Feb 2024 11:19)  fludrocortisone 0.1 mg oral tablet: 0.5 tab(s) orally 2 times a day (27 Feb 2024 11:19)  furosemide 20 mg oral tablet: 1 tab(s) orally 2 times a day (27 Feb 2024 11:19)  hydrALAZINE 100 mg oral tablet: 1 tab(s) orally every 8 hours (27 Feb 2024 11:19)  hydrocortisone 10 mg oral tablet: 1 tab(s) orally once a day (in the morning) (27 Feb 2024 11:19)  hydrocortisone 5 mg oral tablet: 1 tab(s) orally once a day (in the evening) (27 Feb 2024 11:19)  losartan 100 mg oral tablet: 1 tab(s) orally once a day (27 Feb 2024 11:19)  melatonin 3 mg oral tablet: 1 tab(s) orally once a day (at bedtime) (27 Feb 2024 11:19)  Milk of Magnesia 8% oral suspension: 30 milliliter(s) orally every 24 hours as needed for  constipation (27 Feb 2024 15:25)  Multiple Vitamins oral liquid: 5 milliliter(s) orally once a day (in the evening) (27 Feb 2024 11:19)  Tylenol Extra Strength 500 mg oral tablet: 2 tab(s) orally 3 times a day (27 Feb 2024 11:19)  Vitamin D3 50 mcg (2000 intl units) oral tablet: 1 tab(s) orally once a day (27 Feb 2024 11:19)    PHYSICAL EXAM:  Vital Signs Last 24 Hrs  T(C): 36.8 (01 Mar 2024 05:43), Max: 36.8 (29 Feb 2024 20:39)  T(F): 98.3 (01 Mar 2024 05:43), Max: 98.3 (29 Feb 2024 20:39)  HR: 63 (01 Mar 2024 05:43) (63 - 67)  BP: 155/54 (01 Mar 2024 05:43) (155/54 - 161/47)  BP(mean): --  RR: 18 (01 Mar 2024 05:43) (18 - 18)  SpO2: 99% (01 Mar 2024 05:43) (93% - 99%)    Parameters below as of 01 Mar 2024 05:43  Patient On (Oxygen Delivery Method): nasal cannula  O2 Flow (L/min): 2    Constitutional: NAD, awake and alert  HEENT: PERR, EOMI, Normal Hearing, MMM  Neck: Soft and supple, No LAD, No JVD  Respiratory: Breath sounds are clear bilaterally, No wheezing, rales or rhonchi  Cardiovascular: S1 and S2, regular rate and rhythm, no Murmurs, gallops or rubs  Gastrointestinal: Bowel Sounds present, soft, nontender, nondistended, no guarding, no rebound  Extremities: No peripheral edema  Vascular: 2+ peripheral pulses  Neurological: A/O x 3, no focal deficits  Musculoskeletal: 5/5 strength b/l upper and lower extremities  Skin: No rashes    =======================================    INTERPRETATION OF TELEMETRY: SR 65    ECG:   < from: 12 Lead ECG (02.28.24 @ 07:54) >  Diagnosis Line Normal sinus rhythm  Minimal voltage criteria for LVH, may be normal variant ( [place] product )  Nonspecific T wave abnormality  Prolonged QT  Abnormal ECG    ========================================    LABS: All Labs Reviewed:                        8.3    5.06  )-----------( 141      ( 29 Feb 2024 07:10 )             26.4     02-29    140  |  103  |  15  ----------------------------<  106<H>  3.6   |  35<H>  |  1.09    Ca    8.9      29 Feb 2024 07:10  Phos  3.2     02-29  Mg     2.1     02-29    Troponin: 23.36 ng/L, Troponin: 32.38 ng/L    BNP 4199    CARDIAC TESTING:    < from: TTE Echo Complete w/o Contrast w/ Doppler (02.27.24 @ 15:23) >  The left ventricle is normal in size, wall thickness, wall motion and   contractility as seen in limited views.   Mild asymmetrical septal left ventricular hypertrophy is present.   Mild diastolic dysfunction (stage I).   Estimated left ventricular ejection fraction is 55-60 %.   The left atrium is mildly dilated.   Moderate (2+) eccentric aortic regurgitation.   Severe (4+) mitral regurgitation.   Mild aortic stenosis.   Moderate (2+) tricuspid valve regurgitation is present.   Mild pulmonary hypertension.   Pleural effusion is present.   Mild aortic stenosis is consistent with previous echo on 9/2022.    RADIOLOGY & ADDITIONAL STUDIES:    < from: CT Angio Chest Aorta w/wo IV Cont (02.27.24 @ 11:16) >  No acute aortic process. 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    No acute pulmonary embolism. 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    Small pleural effusions, increased since 2/10/2024 abdomen CT, with   partial lower lobe atelectasis. Chronic large and small airways disease.    Stable pancreatic cysts measuring up to 1.6 cm.  
CHIEF COMPLAINT: Patient is a 89y old  Female who presents with a chief complaint of Chest pain  (28 Feb 2024 11:50)    FROM H&P: This is 89 y/o F with PMHx of Paroxysmal AFIB, HFpEF,  CKD, Adrenal insufficiency, HFpEF, Lymphoma in remission,  HTN, Iron deficiency,  ICH who presents to ED from [facility] for sob and chest pressure which is worse in the mornings for the last 2 weeks. Says legs feels heavy and swollen at night when lying in bed. Pt uses a wheelchair. Daughter Kate is at bedside. Denies bleeding or syncope.   - Pt was recent admitted from 2/10-2/14 for UTI and gatroenteritis. Denies ny further diarrhea or urinary symptoms. In ED  - In ED, BP was elevated at 182/63, all other VS stable. EKG: NSR, TWI at AVL and V6.   (27 Feb 2024 14:26)    Cardiology consulted for HF  Breathing improved. No CP or palp.    2/29. Breathing improved. C/w IV diuresis.    MEDICATIONS  (STANDING):  aMIOdarone    Tablet 200 milliGRAM(s) Oral daily  amLODIPine   Tablet 5 milliGRAM(s) Oral daily  azithromycin   Tablet 500 milliGRAM(s) Oral daily  carvedilol 25 milliGRAM(s) Oral every 12 hours  cefTRIAXone Injectable. 1000 milliGRAM(s) IV Push every 24 hours  ferrous    sulfate 325 milliGRAM(s) Oral daily  fludroCORTISONE 0.1 milliGRAM(s) Oral two times a day  furosemide    Tablet 20 milliGRAM(s) Oral two times a day  heparin   Injectable 5000 Unit(s) SubCutaneous every 12 hours  hydrALAZINE 100 milliGRAM(s) Oral three times a day  hydrocortisone 5 milliGRAM(s) Oral at bedtime  hydrocortisone 10 milliGRAM(s) Oral daily  losartan 100 milliGRAM(s) Oral daily  potassium chloride  10 mEq/100 mL IVPB 10 milliEquivalent(s) IV Intermittent every 2 hours    MEDICATIONS  (PRN):  acetaminophen     Tablet .. 650 milliGRAM(s) Oral every 6 hours PRN Mild Pain (1 - 3)  magnesium hydroxide Suspension 30 milliLiter(s) Oral every 24 hours PRN for constipation  melatonin 3 milliGRAM(s) Oral at bedtime PRN Insomnia    HOME MEDICATIONS:  amiodarone 200 mg oral tablet: 1 tab(s) orally once a day (27 Feb 2024 11:19)  amLODIPine 5 mg oral tablet: 1 tab(s) orally once a day (27 Feb 2024 11:19)  azithromycin 500 mg oral tablet: 1 tab(s) orally as needed prior to dental procedures (27 Feb 2024 15:25)  carvedilol 25 mg oral tablet: 1 tab(s) orally every 12 hours (27 Feb 2024 11:19)  Colace 100 mg oral capsule: 2 cap(s) orally once a day (at bedtime) as needed for  constipation (27 Feb 2024 11:19)  famotidine 20 mg oral tablet: 1 tab(s) orally once a day (27 Feb 2024 11:19)  ferrous sulfate 325 mg (65 mg elemental iron) oral tablet: 1 tab(s) orally once a day (27 Feb 2024 11:19)  fludrocortisone 0.1 mg oral tablet: 0.5 tab(s) orally 2 times a day (27 Feb 2024 11:19)  furosemide 20 mg oral tablet: 1 tab(s) orally 2 times a day (27 Feb 2024 11:19)  hydrALAZINE 100 mg oral tablet: 1 tab(s) orally every 8 hours (27 Feb 2024 11:19)  hydrocortisone 10 mg oral tablet: 1 tab(s) orally once a day (in the morning) (27 Feb 2024 11:19)  hydrocortisone 5 mg oral tablet: 1 tab(s) orally once a day (in the evening) (27 Feb 2024 11:19)  losartan 100 mg oral tablet: 1 tab(s) orally once a day (27 Feb 2024 11:19)  melatonin 3 mg oral tablet: 1 tab(s) orally once a day (at bedtime) (27 Feb 2024 11:19)  Milk of Magnesia 8% oral suspension: 30 milliliter(s) orally every 24 hours as needed for  constipation (27 Feb 2024 15:25)  Multiple Vitamins oral liquid: 5 milliliter(s) orally once a day (in the evening) (27 Feb 2024 11:19)  Tylenol Extra Strength 500 mg oral tablet: 2 tab(s) orally 3 times a day (27 Feb 2024 11:19)  Vitamin D3 50 mcg (2000 intl units) oral tablet: 1 tab(s) orally once a day (27 Feb 2024 11:19)    PHYSICAL EXAM:  T(C): 36.9 (29 Feb 2024 08:25), Max: 36.9 (29 Feb 2024 08:25)  T(F): 98.4 (29 Feb 2024 08:25), Max: 98.4 (29 Feb 2024 08:25)  HR: 64 (29 Feb 2024 08:25) (63 - 68)  BP: 157/49 (29 Feb 2024 08:25) (136/38 - 174/59)  RR: 18 (29 Feb 2024 08:25) (18 - 18)  SpO2: 95% (29 Feb 2024 08:25) (95% - 97%)    Parameters below as of 29 Feb 2024 08:25  Patient On (Oxygen Delivery Method): nasal cannula    Constitutional: NAD, awake and alert  HEENT: PERR, EOMI, Normal Hearing, MMM  Neck: Soft and supple, No LAD, No JVD  Respiratory: Breath sounds are clear bilaterally, No wheezing, rales or rhonchi  Cardiovascular: S1 and S2, regular rate and rhythm, no Murmurs, gallops or rubs  Gastrointestinal: Bowel Sounds present, soft, nontender, nondistended, no guarding, no rebound  Extremities: No peripheral edema  Vascular: 2+ peripheral pulses  Neurological: A/O x 3, no focal deficits  Musculoskeletal: 5/5 strength b/l upper and lower extremities  Skin: No rashes    =======================================    INTERPRETATION OF TELEMETRY: SR 65    ECG:   < from: 12 Lead ECG (02.28.24 @ 07:54) >  Diagnosis Line Normal sinus rhythm  Minimal voltage criteria for LVH, may be normal variant ( [place] product )  Nonspecific T wave abnormality  Prolonged QT  Abnormal ECG    ========================================    LABS: All Labs Reviewed:                        8.3    5.06  )-----------( 141      ( 29 Feb 2024 07:10 )             26.4     02-29    140  |  103  |  15  ----------------------------<  106<H>  3.6   |  35<H>  |  1.09    Ca    8.9      29 Feb 2024 07:10  Phos  3.2     02-29  Mg     2.1     02-29    TPro  5.5<L>  /  Alb  3.0<L>  /  TBili  x   /  DBili  x   /  AST  x   /  ALT  x   /  AlkPhos  x   02-27    Troponin: 23.36 ng/L, Troponin: 32.38 ng/L    BNP 4199    CARDIAC TESTING:    < from: TTE Echo Complete w/o Contrast w/ Doppler (02.27.24 @ 15:23) >  The left ventricle is normal in size, wall thickness, wall motion and   contractility as seen in limited views.   Mild asymmetrical septal left ventricular hypertrophy is present.   Mild diastolic dysfunction (stage I).   Estimated left ventricular ejection fraction is 55-60 %.   The left atrium is mildly dilated.   Moderate (2+) eccentric aortic regurgitation.   Severe (4+) mitral regurgitation.   Mild aortic stenosis.   Moderate (2+) tricuspid valve regurgitation is present.   Mild pulmonary hypertension.   Pleural effusion is present.   Mild aortic stenosis is consistent with previous echo on 9/2022.    RADIOLOGY & ADDITIONAL STUDIES:    < from: CT Angio Chest Aorta w/wo IV Cont (02.27.24 @ 11:16) >  No acute aortic process. 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    No acute pulmonary embolism. 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    Small pleural effusions, increased since 2/10/2024 abdomen CT, with   partial lower lobe atelectasis. Chronic large and small airways disease.    Stable pancreatic cysts measuring up to 1.6 cm.

## 2024-03-01 NOTE — DISCHARGE NOTE NURSING/CASE MANAGEMENT/SOCIAL WORK - NSDCVIVACCINE_GEN_ALL_CORE_FT
COVID-19, mRNA, LNP-S, PF, 30 mcg/0.3 mL dose, andrew-sucrose (Pfizer); 08-Sep-2022 14:07; Akua Brumfield (RN); [employer]; [id number] (Exp. Date: 31-Oct-2022); IntraMuscular; Deltoid Left.; 0.3 milliLiter(s);   Tdap; 10-Jul-2021 13:25; Starr Zazueta (KENNETH); [employer]; [id number] (Exp. Date: 25-Nov-2022); IntraMuscular; Deltoid Left.; 0.5 milliLiter(s); VIS (VIS Published: 09-May-2013, VIS Presented: 10-Jul-2021);

## 2024-03-01 NOTE — DISCHARGE NOTE NURSING/CASE MANAGEMENT/SOCIAL WORK - PATIENT PORTAL LINK FT
You can access the FollowMyHealth Patient Portal offered by [facility] by registering at the following website: [email or web address]. By joining [employer]’s FollowMyHealth portal, you will also be able to view your health information using other applications (apps) compatible with our system.

## 2024-03-02 RX ORDER — THIAMINE MONONITRATE (VIT B1) 100 MG
100 TABLET ORAL DAILY
Refills: 0 | Status: DISCONTINUED | OUTPATIENT
Start: 2024-02-28 | End: 2024-03-01

## 2024-03-02 RX ORDER — MULTIVIT-MIN/FERROUS GLUCONATE 9 MG/15 ML
1 LIQUID (ML) ORAL DAILY
Refills: 0 | Status: DISCONTINUED | OUTPATIENT
Start: 2024-02-28 | End: 2024-03-01

## 2024-03-07 NOTE — ED ADULT NURSE NOTE - PAIN: PRESENCE, MLM
Rx Refill Note  Requested Prescriptions     Pending Prescriptions Disp Refills    Ozempic, 1 MG/DOSE, 4 MG/3ML solution pen-injector [Pharmacy Med Name: OZEMPIC 1 MG/DOSE (4 MG/3 ML)] 9 mL 1     Sig: DIAL AND INJECT UNDER THE SKIN 1 MG WEEKLY      Last office visit with prescribing clinician: 7/19/2023   Last telemedicine visit with prescribing clinician: Visit date not found   Next office visit with prescribing clinician: Visit date not found                         Would you like a call back once the refill request has been completed: [] Yes [] No    If the office needs to give you a call back, can they leave a voicemail: [] Yes [] No    Mimi Delacruz MA  03/07/24, 11:36 EST    denies pain/discomfort

## 2024-03-08 DIAGNOSIS — E87.1 HYPO-OSMOLALITY AND HYPONATREMIA: ICD-10-CM

## 2024-03-08 DIAGNOSIS — D64.9 ANEMIA, UNSPECIFIED: ICD-10-CM

## 2024-03-08 DIAGNOSIS — Z91.81 HISTORY OF FALLING: ICD-10-CM

## 2024-03-08 DIAGNOSIS — I35.0 NONRHEUMATIC AORTIC (VALVE) STENOSIS: ICD-10-CM

## 2024-03-08 DIAGNOSIS — Z88.0 ALLERGY STATUS TO PENICILLIN: ICD-10-CM

## 2024-03-08 DIAGNOSIS — C85.90 NON-HODGKIN LYMPHOMA, UNSPECIFIED, UNSPECIFIED SITE: ICD-10-CM

## 2024-03-08 DIAGNOSIS — N18.30 CHRONIC KIDNEY DISEASE, STAGE 3 UNSPECIFIED: ICD-10-CM

## 2024-03-08 DIAGNOSIS — E27.40 UNSPECIFIED ADRENOCORTICAL INSUFFICIENCY: ICD-10-CM

## 2024-03-08 DIAGNOSIS — Z88.2 ALLERGY STATUS TO SULFONAMIDES: ICD-10-CM

## 2024-03-08 DIAGNOSIS — Z88.1 ALLERGY STATUS TO OTHER ANTIBIOTIC AGENTS STATUS: ICD-10-CM

## 2024-03-08 DIAGNOSIS — I48.0 PAROXYSMAL ATRIAL FIBRILLATION: ICD-10-CM

## 2024-03-08 DIAGNOSIS — I13.0 HYPERTENSIVE HEART AND CHRONIC KIDNEY DISEASE WITH HEART FAILURE AND STAGE 1 THROUGH STAGE 4 CHRONIC KIDNEY DISEASE, OR UNSPECIFIED CHRONIC KIDNEY DISEASE: ICD-10-CM

## 2024-03-08 DIAGNOSIS — J96.01 ACUTE RESPIRATORY FAILURE WITH HYPOXIA: ICD-10-CM

## 2024-03-08 DIAGNOSIS — D46.9 MYELODYSPLASTIC SYNDROME, UNSPECIFIED: ICD-10-CM

## 2024-03-08 DIAGNOSIS — I34.0 NONRHEUMATIC MITRAL (VALVE) INSUFFICIENCY: ICD-10-CM

## 2024-03-08 DIAGNOSIS — J18.9 PNEUMONIA, UNSPECIFIED ORGANISM: ICD-10-CM

## 2024-03-08 DIAGNOSIS — Z66 DO NOT RESUSCITATE: ICD-10-CM

## 2024-03-08 DIAGNOSIS — I50.33 ACUTE ON CHRONIC DIASTOLIC (CONGESTIVE) HEART FAILURE: ICD-10-CM

## 2024-03-08 DIAGNOSIS — E87.6 HYPOKALEMIA: ICD-10-CM

## 2024-03-26 ENCOUNTER — RX RENEWAL (OUTPATIENT)
Age: 89
End: 2024-03-26

## 2024-03-26 RX ORDER — SPIRONOLACTONE 25 MG/1
25 TABLET ORAL
Qty: 30 | Refills: 4 | Status: ACTIVE | COMMUNITY
Start: 2024-03-26 | End: 1900-01-01

## 2024-04-10 ENCOUNTER — INPATIENT (INPATIENT)
Facility: HOSPITAL | Age: 89
LOS: 6 days | Discharge: NURSING, REHAB OR LONG-TERM CARE | DRG: 551 | End: 2024-04-17
Attending: HOSPITALIST | Admitting: SURGERY
Payer: MEDICARE

## 2024-04-10 VITALS
DIASTOLIC BLOOD PRESSURE: 49 MMHG | OXYGEN SATURATION: 90 % | HEART RATE: 81 BPM | RESPIRATION RATE: 20 BRPM | HEIGHT: 63 IN | SYSTOLIC BLOOD PRESSURE: 199 MMHG

## 2024-04-10 DIAGNOSIS — J96.01 ACUTE RESPIRATORY FAILURE WITH HYPOXIA: ICD-10-CM

## 2024-04-10 DIAGNOSIS — Z90.49 ACQUIRED ABSENCE OF OTHER SPECIFIED PARTS OF DIGESTIVE TRACT: Chronic | ICD-10-CM

## 2024-04-10 DIAGNOSIS — S32.9XXA FRACTURE OF UNSPECIFIED PARTS OF LUMBOSACRAL SPINE AND PELVIS, INITIAL ENCOUNTER FOR CLOSED FRACTURE: ICD-10-CM

## 2024-04-10 DIAGNOSIS — Z90.710 ACQUIRED ABSENCE OF BOTH CERVIX AND UTERUS: Chronic | ICD-10-CM

## 2024-04-10 DIAGNOSIS — Z86.79 PERSONAL HISTORY OF OTHER DISEASES OF THE CIRCULATORY SYSTEM: Chronic | ICD-10-CM

## 2024-04-10 LAB
ALBUMIN SERPL ELPH-MCNC: 3.2 G/DL — LOW (ref 3.3–5)
ALP SERPL-CCNC: 71 U/L — SIGNIFICANT CHANGE UP (ref 40–120)
ALT FLD-CCNC: 15 U/L — SIGNIFICANT CHANGE UP (ref 12–78)
ANION GAP SERPL CALC-SCNC: 8 MMOL/L — SIGNIFICANT CHANGE UP (ref 5–17)
APPEARANCE UR: ABNORMAL
APTT BLD: 27.5 SEC — SIGNIFICANT CHANGE UP (ref 24.5–35.6)
AST SERPL-CCNC: 21 U/L — SIGNIFICANT CHANGE UP (ref 15–37)
BASOPHILS # BLD AUTO: 0 K/UL — SIGNIFICANT CHANGE UP (ref 0–0.2)
BASOPHILS NFR BLD AUTO: 0 % — SIGNIFICANT CHANGE UP (ref 0–2)
BILIRUB SERPL-MCNC: 0.4 MG/DL — SIGNIFICANT CHANGE UP (ref 0.2–1.2)
BILIRUB UR-MCNC: NEGATIVE — SIGNIFICANT CHANGE UP
BUN SERPL-MCNC: 22 MG/DL — SIGNIFICANT CHANGE UP (ref 7–23)
CALCIUM SERPL-MCNC: 9.4 MG/DL — SIGNIFICANT CHANGE UP (ref 8.5–10.1)
CHLORIDE SERPL-SCNC: 99 MMOL/L — SIGNIFICANT CHANGE UP (ref 96–108)
CO2 SERPL-SCNC: 25 MMOL/L — SIGNIFICANT CHANGE UP (ref 22–31)
COLOR SPEC: YELLOW — SIGNIFICANT CHANGE UP
CREAT SERPL-MCNC: 1.25 MG/DL — SIGNIFICANT CHANGE UP (ref 0.5–1.3)
DIFF PNL FLD: NEGATIVE — SIGNIFICANT CHANGE UP
EGFR: 39 ML/MIN/1.73M2 — LOW
EOSINOPHIL # BLD AUTO: 0 K/UL — SIGNIFICANT CHANGE UP (ref 0–0.5)
EOSINOPHIL NFR BLD AUTO: 0 % — SIGNIFICANT CHANGE UP (ref 0–6)
FLUAV AG NPH QL: SIGNIFICANT CHANGE UP
FLUBV AG NPH QL: SIGNIFICANT CHANGE UP
GLUCOSE SERPL-MCNC: 97 MG/DL — SIGNIFICANT CHANGE UP (ref 70–99)
GLUCOSE UR QL: NEGATIVE MG/DL — SIGNIFICANT CHANGE UP
HCT VFR BLD CALC: 30 % — LOW (ref 34.5–45)
HCT VFR BLD CALC: 32.1 % — LOW (ref 34.5–45)
HGB BLD-MCNC: 10.5 G/DL — LOW (ref 11.5–15.5)
HGB BLD-MCNC: 9.8 G/DL — LOW (ref 11.5–15.5)
INR BLD: 0.89 RATIO — SIGNIFICANT CHANGE UP (ref 0.85–1.18)
KETONES UR-MCNC: NEGATIVE MG/DL — SIGNIFICANT CHANGE UP
LACTATE SERPL-SCNC: 0.8 MMOL/L — SIGNIFICANT CHANGE UP (ref 0.7–2)
LEUKOCYTE ESTERASE UR-ACNC: NEGATIVE — SIGNIFICANT CHANGE UP
LYMPHOCYTES # BLD AUTO: 0.89 K/UL — LOW (ref 1–3.3)
LYMPHOCYTES # BLD AUTO: 7 % — LOW (ref 13–44)
MCHC RBC-ENTMCNC: 31.7 PG — SIGNIFICANT CHANGE UP (ref 27–34)
MCHC RBC-ENTMCNC: 31.8 PG — SIGNIFICANT CHANGE UP (ref 27–34)
MCHC RBC-ENTMCNC: 32.7 GM/DL — SIGNIFICANT CHANGE UP (ref 32–36)
MCHC RBC-ENTMCNC: 32.7 GM/DL — SIGNIFICANT CHANGE UP (ref 32–36)
MCV RBC AUTO: 97.1 FL — SIGNIFICANT CHANGE UP (ref 80–100)
MCV RBC AUTO: 97.3 FL — SIGNIFICANT CHANGE UP (ref 80–100)
MONOCYTES # BLD AUTO: 1.27 K/UL — HIGH (ref 0–0.9)
MONOCYTES NFR BLD AUTO: 10 % — SIGNIFICANT CHANGE UP (ref 2–14)
NEUTROPHILS # BLD AUTO: 10.02 K/UL — HIGH (ref 1.8–7.4)
NEUTROPHILS NFR BLD AUTO: 78 % — HIGH (ref 43–77)
NITRITE UR-MCNC: NEGATIVE — SIGNIFICANT CHANGE UP
NRBC # BLD: SIGNIFICANT CHANGE UP /100 WBCS (ref 0–0)
NT-PROBNP SERPL-SCNC: 4806 PG/ML — HIGH (ref 0–450)
PH UR: 7 — SIGNIFICANT CHANGE UP (ref 5–8)
PLATELET # BLD AUTO: 121 K/UL — LOW (ref 150–400)
PLATELET # BLD AUTO: 137 K/UL — LOW (ref 150–400)
POTASSIUM SERPL-MCNC: 3.8 MMOL/L — SIGNIFICANT CHANGE UP (ref 3.5–5.3)
POTASSIUM SERPL-SCNC: 3.8 MMOL/L — SIGNIFICANT CHANGE UP (ref 3.5–5.3)
PROT SERPL-MCNC: 6.6 GM/DL — SIGNIFICANT CHANGE UP (ref 6–8.3)
PROT UR-MCNC: 300 MG/DL
PROTHROM AB SERPL-ACNC: 10.1 SEC — SIGNIFICANT CHANGE UP (ref 9.5–13)
RBC # BLD: 3.09 M/UL — LOW (ref 3.8–5.2)
RBC # BLD: 3.3 M/UL — LOW (ref 3.8–5.2)
RBC # FLD: 14.6 % — HIGH (ref 10.3–14.5)
RBC # FLD: 14.6 % — HIGH (ref 10.3–14.5)
RSV RNA NPH QL NAA+NON-PROBE: SIGNIFICANT CHANGE UP
SARS-COV-2 RNA SPEC QL NAA+PROBE: SIGNIFICANT CHANGE UP
SODIUM SERPL-SCNC: 132 MMOL/L — LOW (ref 135–145)
SP GR SPEC: 1.01 — SIGNIFICANT CHANGE UP (ref 1–1.03)
TROPONIN I, HIGH SENSITIVITY RESULT: 27.34 NG/L — SIGNIFICANT CHANGE UP
UROBILINOGEN FLD QL: 1 MG/DL — SIGNIFICANT CHANGE UP (ref 0.2–1)
WBC # BLD: 12.12 K/UL — HIGH (ref 3.8–10.5)
WBC # BLD: 12.68 K/UL — HIGH (ref 3.8–10.5)
WBC # FLD AUTO: 12.12 K/UL — HIGH (ref 3.8–10.5)
WBC # FLD AUTO: 12.68 K/UL — HIGH (ref 3.8–10.5)

## 2024-04-10 PROCEDURE — 74174 CTA ABD&PLVS W/CONTRAST: CPT | Mod: MC

## 2024-04-10 PROCEDURE — 80069 RENAL FUNCTION PANEL: CPT

## 2024-04-10 PROCEDURE — 80048 BASIC METABOLIC PNL TOTAL CA: CPT

## 2024-04-10 PROCEDURE — 82570 ASSAY OF URINE CREATININE: CPT

## 2024-04-10 PROCEDURE — 85025 COMPLETE CBC W/AUTO DIFF WBC: CPT

## 2024-04-10 PROCEDURE — 94010 BREATHING CAPACITY TEST: CPT

## 2024-04-10 PROCEDURE — 71045 X-RAY EXAM CHEST 1 VIEW: CPT | Mod: 26

## 2024-04-10 PROCEDURE — 83880 ASSAY OF NATRIURETIC PEPTIDE: CPT

## 2024-04-10 PROCEDURE — 71045 X-RAY EXAM CHEST 1 VIEW: CPT

## 2024-04-10 PROCEDURE — 97116 GAIT TRAINING THERAPY: CPT | Mod: GP

## 2024-04-10 PROCEDURE — 83935 ASSAY OF URINE OSMOLALITY: CPT

## 2024-04-10 PROCEDURE — 93005 ELECTROCARDIOGRAM TRACING: CPT

## 2024-04-10 PROCEDURE — 97162 PT EVAL MOD COMPLEX 30 MIN: CPT | Mod: GP

## 2024-04-10 PROCEDURE — 83735 ASSAY OF MAGNESIUM: CPT

## 2024-04-10 PROCEDURE — 85027 COMPLETE CBC AUTOMATED: CPT

## 2024-04-10 PROCEDURE — 74176 CT ABD & PELVIS W/O CONTRAST: CPT | Mod: 26,MC,59

## 2024-04-10 PROCEDURE — 70450 CT HEAD/BRAIN W/O DYE: CPT | Mod: 26,MC

## 2024-04-10 PROCEDURE — 71250 CT THORAX DX C-: CPT | Mod: MC

## 2024-04-10 PROCEDURE — 72125 CT NECK SPINE W/O DYE: CPT | Mod: 26,MC

## 2024-04-10 PROCEDURE — 36415 COLL VENOUS BLD VENIPUNCTURE: CPT

## 2024-04-10 PROCEDURE — 84100 ASSAY OF PHOSPHORUS: CPT

## 2024-04-10 PROCEDURE — 84300 ASSAY OF URINE SODIUM: CPT

## 2024-04-10 PROCEDURE — 93010 ELECTROCARDIOGRAM REPORT: CPT

## 2024-04-10 PROCEDURE — 99223 1ST HOSP IP/OBS HIGH 75: CPT

## 2024-04-10 PROCEDURE — 97530 THERAPEUTIC ACTIVITIES: CPT | Mod: GP

## 2024-04-10 PROCEDURE — 99285 EMERGENCY DEPT VISIT HI MDM: CPT

## 2024-04-10 PROCEDURE — 73100 X-RAY EXAM OF WRIST: CPT | Mod: RT

## 2024-04-10 PROCEDURE — 80053 COMPREHEN METABOLIC PANEL: CPT

## 2024-04-10 PROCEDURE — 94640 AIRWAY INHALATION TREATMENT: CPT

## 2024-04-10 PROCEDURE — 74174 CTA ABD&PLVS W/CONTRAST: CPT | Mod: 26

## 2024-04-10 PROCEDURE — 71250 CT THORAX DX C-: CPT | Mod: 26,MC

## 2024-04-10 PROCEDURE — 72131 CT LUMBAR SPINE W/O DYE: CPT | Mod: 26,MC

## 2024-04-10 PROCEDURE — C9113: CPT

## 2024-04-10 PROCEDURE — 84484 ASSAY OF TROPONIN QUANT: CPT

## 2024-04-10 PROCEDURE — 99221 1ST HOSP IP/OBS SF/LOW 40: CPT

## 2024-04-10 RX ORDER — FERROUS SULFATE 325(65) MG
325 TABLET ORAL DAILY
Refills: 0 | Status: DISCONTINUED | OUTPATIENT
Start: 2024-04-10 | End: 2024-04-17

## 2024-04-10 RX ORDER — ACETAMINOPHEN 500 MG
2 TABLET ORAL
Refills: 0 | DISCHARGE

## 2024-04-10 RX ORDER — ACETAMINOPHEN 500 MG
650 TABLET ORAL EVERY 6 HOURS
Refills: 0 | Status: DISCONTINUED | OUTPATIENT
Start: 2024-04-10 | End: 2024-04-17

## 2024-04-10 RX ORDER — HYDROCORTISONE 20 MG
5 TABLET ORAL AT BEDTIME
Refills: 0 | Status: DISCONTINUED | OUTPATIENT
Start: 2024-04-10 | End: 2024-04-17

## 2024-04-10 RX ORDER — MAGNESIUM HYDROXIDE 400 MG/1
30 TABLET, CHEWABLE ORAL
Refills: 0 | DISCHARGE

## 2024-04-10 RX ORDER — AZITHROMYCIN 500 MG/1
500 TABLET, FILM COATED ORAL ONCE
Refills: 0 | Status: COMPLETED | OUTPATIENT
Start: 2024-04-10 | End: 2024-04-10

## 2024-04-10 RX ORDER — LEVOFLOXACIN 5 MG/ML
1 INJECTION, SOLUTION INTRAVENOUS
Refills: 0 | DISCHARGE
Start: 2024-04-10 | End: 2024-04-14

## 2024-04-10 RX ORDER — AMLODIPINE BESYLATE 2.5 MG/1
5 TABLET ORAL DAILY
Refills: 0 | Status: DISCONTINUED | OUTPATIENT
Start: 2024-04-10 | End: 2024-04-17

## 2024-04-10 RX ORDER — LOSARTAN POTASSIUM 100 MG/1
50 TABLET, FILM COATED ORAL
Refills: 0 | Status: DISCONTINUED | OUTPATIENT
Start: 2024-04-10 | End: 2024-04-17

## 2024-04-10 RX ORDER — HYDRALAZINE HCL 50 MG
100 TABLET ORAL EVERY 8 HOURS
Refills: 0 | Status: DISCONTINUED | OUTPATIENT
Start: 2024-04-10 | End: 2024-04-17

## 2024-04-10 RX ORDER — GUAIFENESIN/DEXTROMETHORPHAN 600MG-30MG
10 TABLET, EXTENDED RELEASE 12 HR ORAL
Refills: 0 | DISCHARGE

## 2024-04-10 RX ORDER — GUAIFENESIN/DEXTROMETHORPHAN 600MG-30MG
10 TABLET, EXTENDED RELEASE 12 HR ORAL THREE TIMES A DAY
Refills: 0 | Status: DISCONTINUED | OUTPATIENT
Start: 2024-04-10 | End: 2024-04-17

## 2024-04-10 RX ORDER — FUROSEMIDE 40 MG
40 TABLET ORAL DAILY
Refills: 0 | Status: DISCONTINUED | OUTPATIENT
Start: 2024-04-10 | End: 2024-04-13

## 2024-04-10 RX ORDER — CEFTRIAXONE 500 MG/1
1000 INJECTION, POWDER, FOR SOLUTION INTRAMUSCULAR; INTRAVENOUS ONCE
Refills: 0 | Status: COMPLETED | OUTPATIENT
Start: 2024-04-10 | End: 2024-04-10

## 2024-04-10 RX ORDER — DOCUSATE SODIUM 100 MG
2 CAPSULE ORAL
Refills: 0 | DISCHARGE

## 2024-04-10 RX ORDER — HYDROCORTISONE 20 MG
10 TABLET ORAL DAILY
Refills: 0 | Status: DISCONTINUED | OUTPATIENT
Start: 2024-04-10 | End: 2024-04-17

## 2024-04-10 RX ORDER — MAGNESIUM HYDROXIDE 400 MG/1
30 TABLET, CHEWABLE ORAL EVERY 24 HOURS
Refills: 0 | Status: DISCONTINUED | OUTPATIENT
Start: 2024-04-10 | End: 2024-04-17

## 2024-04-10 RX ORDER — HYDROCORTISONE 20 MG
1 TABLET ORAL
Qty: 0 | Refills: 0 | DISCHARGE

## 2024-04-10 RX ORDER — CARVEDILOL PHOSPHATE 80 MG/1
25 CAPSULE, EXTENDED RELEASE ORAL EVERY 12 HOURS
Refills: 0 | Status: DISCONTINUED | OUTPATIENT
Start: 2024-04-10 | End: 2024-04-17

## 2024-04-10 RX ORDER — CHOLECALCIFEROL (VITAMIN D3) 125 MCG
1 CAPSULE ORAL
Qty: 0 | Refills: 0 | DISCHARGE

## 2024-04-10 RX ORDER — ATORVASTATIN CALCIUM 80 MG/1
10 TABLET, FILM COATED ORAL AT BEDTIME
Refills: 0 | Status: DISCONTINUED | OUTPATIENT
Start: 2024-04-10 | End: 2024-04-17

## 2024-04-10 RX ORDER — AMIODARONE HYDROCHLORIDE 400 MG/1
1 TABLET ORAL
Refills: 0 | DISCHARGE

## 2024-04-10 RX ORDER — SODIUM CHLORIDE 9 MG/ML
1000 INJECTION INTRAMUSCULAR; INTRAVENOUS; SUBCUTANEOUS
Refills: 0 | Status: DISCONTINUED | OUTPATIENT
Start: 2024-04-10 | End: 2024-04-13

## 2024-04-10 RX ORDER — CEFTRIAXONE 500 MG/1
1000 INJECTION, POWDER, FOR SOLUTION INTRAMUSCULAR; INTRAVENOUS ONCE
Refills: 0 | Status: DISCONTINUED | OUTPATIENT
Start: 2024-04-10 | End: 2024-04-10

## 2024-04-10 RX ORDER — SPIRONOLACTONE 25 MG/1
25 TABLET, FILM COATED ORAL DAILY
Refills: 0 | Status: DISCONTINUED | OUTPATIENT
Start: 2024-04-10 | End: 2024-04-17

## 2024-04-10 RX ORDER — FUROSEMIDE 40 MG
1 TABLET ORAL
Refills: 0 | DISCHARGE

## 2024-04-10 RX ORDER — FAMOTIDINE 10 MG/ML
20 INJECTION INTRAVENOUS DAILY
Refills: 0 | Status: DISCONTINUED | OUTPATIENT
Start: 2024-04-10 | End: 2024-04-17

## 2024-04-10 RX ORDER — AMIODARONE HYDROCHLORIDE 400 MG/1
200 TABLET ORAL DAILY
Refills: 0 | Status: DISCONTINUED | OUTPATIENT
Start: 2024-04-10 | End: 2024-04-17

## 2024-04-10 RX ORDER — FLUDROCORTISONE ACETATE 0.1 MG/1
0.05 TABLET ORAL DAILY
Refills: 0 | Status: DISCONTINUED | OUTPATIENT
Start: 2024-04-10 | End: 2024-04-17

## 2024-04-10 RX ORDER — ONDANSETRON 8 MG/1
4 TABLET, FILM COATED ORAL EVERY 6 HOURS
Refills: 0 | Status: DISCONTINUED | OUTPATIENT
Start: 2024-04-10 | End: 2024-04-17

## 2024-04-10 RX ORDER — PITAVASTATIN CALCIUM 1.04 MG/1
1 TABLET, FILM COATED ORAL
Refills: 0 | DISCHARGE

## 2024-04-10 RX ADMIN — Medication 10 MILLILITER(S): at 21:56

## 2024-04-10 RX ADMIN — CEFTRIAXONE 1000 MILLIGRAM(S): 500 INJECTION, POWDER, FOR SOLUTION INTRAMUSCULAR; INTRAVENOUS at 10:13

## 2024-04-10 RX ADMIN — CARVEDILOL PHOSPHATE 25 MILLIGRAM(S): 80 CAPSULE, EXTENDED RELEASE ORAL at 21:53

## 2024-04-10 RX ADMIN — Medication 5 MILLIGRAM(S): at 22:47

## 2024-04-10 RX ADMIN — AZITHROMYCIN 255 MILLIGRAM(S): 500 TABLET, FILM COATED ORAL at 12:37

## 2024-04-10 RX ADMIN — ATORVASTATIN CALCIUM 10 MILLIGRAM(S): 80 TABLET, FILM COATED ORAL at 21:53

## 2024-04-10 RX ADMIN — Medication 10 MILLILITER(S): at 15:59

## 2024-04-10 RX ADMIN — Medication 100 MILLIGRAM(S): at 21:52

## 2024-04-10 RX ADMIN — Medication 100 MILLIGRAM(S): at 15:59

## 2024-04-10 RX ADMIN — SODIUM CHLORIDE 40 MILLILITER(S): 9 INJECTION INTRAMUSCULAR; INTRAVENOUS; SUBCUTANEOUS at 16:04

## 2024-04-10 RX ADMIN — LOSARTAN POTASSIUM 50 MILLIGRAM(S): 100 TABLET, FILM COATED ORAL at 21:53
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
## 2024-04-10 NOTE — H&P ADULT - NSHPLABSRESULTS_GEN_ALL_CORE
LABS:                        10.5   12.68 )-----------( 137      ( 10 Apr 2024 09:13 )             32.1     10 Apr 2024 09:13    132    |  99     |  22     ----------------------------<  97     3.8     |  25     |  1.25     Ca    9.4        10 Apr 2024 09:13    TPro  6.6    /  Alb  3.2    /  TBili  0.4    /  DBili  x      /  AST  21     /  ALT  15     /  AlkPhos  71     10 Apr 2024 09:13    PT/INR - ( 10 Apr 2024 09:13 )   PT: 10.1 sec;   INR: 0.89 ratio         PTT - ( 10 Apr 2024 09:13 )  PTT:27.5 sec    < from: CT Abdomen and Pelvis No Cont (04.10.24 @ 09:03) >    IMPRESSION:  1.  Interval LLL bronchopneumonia.  2.  Equivocal/minimal anterior lower sacral/S5 cortical contour   deformity/breech (602-59) suggests nondisplaced lower sacral fracture.   Confirm with directed physical exam.  3.  Trace dependent pelvic/presacral hemorrhage (2/118), probably related   to adjacent nondisplaced lower sacral fracture.  4.  Distended urinary bladder (approx 800 cc)    < end of copied text >    < from: CT Lumbar Spine Reform No Cont (04.10.24 @ 09:03) >    IMPRESSION: Multilevel compression fractures throughout the lower   thoracic and lumbar vertebral bodies with bone cement in the T9-L4   levels, similar in appearance to 2/27/2024, suggesting chronic fractures.   If there is continued clinical suspicion for acute fracture, consider MRI.    < end of copied text >    < from: CT Head No Cont (04.10.24 @ 09:00) >    BRAIN  IMPRESSION:    1)  chronic ischemic changes in both hemispheres with volume loss. No   acute abnormality suggested.  2)  no intracerebral hemorrhage, contusion, or extracerebral hemorrhagic   collections identified.    CERVICAL IMPRESSION:    Multilevel degenerative changes with underlying kyphoscoliosis. No acute   fracture identified.    < end of copied text >

## 2024-04-10 NOTE — PHYSICAL THERAPY INITIAL EVALUATION ADULT - NSACTIVITYREC_GEN_A_PT
out of bed to chair as tolerated with 1PA/RW WBAT RLE and waffle cushion to seat to offload sacrococcygeal spine

## 2024-04-10 NOTE — PHYSICAL THERAPY INITIAL EVALUATION ADULT - SKIN INTEGRITY
the R heel with thin/dry susceptible skin over a very prominent posterior calcaneal prominence; well healed surgical scar R hip laterally s/p HEMIARTHROPLASTY/scar

## 2024-04-10 NOTE — H&P ADULT - ATTENDING COMMENTS
A/P:  Sacral fracture  Fall risk precautions  Kidd for urinary retention  ICU for SICU mgmt  GI/DVT prophylaxis  Pain control  F/U labs  Pt will be monitored for signs of evolution/resolution of pathology and surgical intervention as required and warranted  Pt aware of and agrees with all of the above    75 minuted of time spent on pt examination, review of relevant labs and radiologic studies, assured stabilization of pt, discussion with relevant services/providers for coordination of pt care and services

## 2024-04-10 NOTE — PHYSICAL THERAPY INITIAL EVALUATION ADULT - PERTINENT HX OF CURRENT PROBLEM, REHAB EVAL
89yo F w/ paroxysmal Afib, HFpEF, CKD?, adrenal insufficiency, lymphoma (remission), HTN, iron def, ICH presents s/p fall this morning. Patient is AOx3. Patient states that she was on a toilet and slipped from the toilet and fell onto her buttock. Denies head trauma, LOC, not on blood thinners. States that her body is generally achy, but today her tailbone is especially painful. Patient was admitted 2 months ago for heart failure exacerbation, was discharged on oxygen. Per son, oxygen was weaned off few weeks ago, but this morning her saturation is around 88% on room air, currently on NC and saturating above 95%. Patient was also told she has upper resp infection, was on Levaquin. Denies fever/chills shortness of breath, chest pain, abd pain, n/v, numbness/tingling. VS reviewed. Patient is DNR/DNI 89yo F w/ paroxysmal Afib, HFpEF, CKD?, adrenal insufficiency, lymphoma (remission), HTN, iron def, ICH presents s/p fall this morning. Patient is AOx3. Patient states that she was on a toilet and slipped from the toilet and fell onto her buttock. Denies head trauma, LOC, not on blood thinners. States that her body is generally achy, but today her tailbone is especially painful. Patient was admitted 2 months ago for heart failure exacerbation, was discharged on oxygen. Per son, oxygen was weaned off few weeks ago, but this morning her saturation is around 88% on room air, currently on NC and saturating above 95%. Patient was also told she has upper respiratory  infection, was on Levaquin. Denies fever/chills shortness of breath, chest pain, abd pain, n/v, numbness/tingling. VS reviewed. Patient is DNR/DNI Risks/benefits discussed with patient/surrogate

## 2024-04-10 NOTE — PHYSICAL THERAPY INITIAL EVALUATION ADULT - AMBULATION SKILLS, REHAB EVAL
pt is at a bed to WC activity level at the [facility], primarily WC mobile/unable to perform pt is at a bed to WC activity level, primarily WC mobile/unable to perform

## 2024-04-10 NOTE — H&P ADULT - HISTORY OF PRESENT ILLNESS
89yo F w/ paroxysmal Afib, HFpEF, CKD?, adrenal insufficiency, lymphoma (remission), HTN, iron def, ICH presents s/p fall this morning. Patient is AOx3. Patient states that she was on a toilet and slipped from the toilet and fell onto her buttock. Denies head trauma, LOC, not on blood thinners. States that her body is generally achy, but today her tailbone is especially painful. Patient was admitted 2 months ago for heart failure exacerbation, was discharged on oxygen. Per son, oxygen was weaned off few weeks ago, but this morning her saturation is around 88% on room air, currently on NC and saturating above 95%. Patient was also told she has upper resp infection, was on levaquin. Denies fever/chills shortness of breath, chest pain, abd pain, n/v, numbness/tingling. VS reviewed. Patient is DNR/DNI

## 2024-04-10 NOTE — ED PROVIDER NOTE - PHYSICAL EXAMINATION
Constitutional: well appearing, NAD AAOx1  Eyes: EOMI, PERRL  Head: Normocephalic atraumatic  Mouth: no airway obstruction, posterior oropharynx clear without erythema or exudate  Neck: supple  Cardiac: regular rate and rhythm, no MRG  Resp: Lungs CTAB  GI: Abd s/nt/nd  Neuro: CN2-12 intact, strength 5/5x4, sensation grossly intact  Skin: No rashes  SPINE: no midline tenderness of CTL spine Constitutional: well appearing, NAD AAOx1  Eyes: EOMI, PERRL  Head: Normocephalic atraumatic  Mouth: no airway obstruction, posterior oropharynx clear without erythema or exudate  Neck: supple  Cardiac: regular rate and rhythm, no MRG  Resp: Lungs CTAB  GI: Abd s/nt/nd  Neuro: CN2-12 intact, strength 5/5x4, sensation grossly intact  Skin: No rashes  SPINE: no midline tenderness of CTL spine, mild tenderness over coccyx

## 2024-04-10 NOTE — CONSULT NOTE ADULT - ASSESSMENT
A/P  S5 fx, acute, closed.  -WBAT  -Analgesia  -Will order Donut Pillow  -DVT PE ppx  -No orthopedic intervention  -Spoke w Dr Morales at 1235h

## 2024-04-10 NOTE — PHYSICAL THERAPY INITIAL EVALUATION ADULT - GENERAL OBSERVATIONS, REHAB EVAL
Resting supine with cervical "U"shaped pillow to neck ,somnolent but arousable , B heel protectors, resting position RLE with mod-sever in-toeing; denies significant pain at rest , does not want to get out of bed at this time, feels too tired and wants to get some rest Resting supine with cervical "U"shaped pillow to neck ,somnolent but arousable , intermittent congested cough ,+B heel protectors, resting position RLE with mod-sever in-toeing; denies significant pain at rest , does not want to get out of bed at this time, feels too tired and wants to get some rest

## 2024-04-10 NOTE — ED PROVIDER NOTE - SECONDARY DIAGNOSIS.
How Severe Is Your Skin Lesion?: mild Has Your Skin Lesion Been Treated?: not been treated Is This A New Presentation, Or A Follow-Up?: Skin Lesions Hospital chart Left lower lobe pneumonia

## 2024-04-10 NOTE — ED ADULT NURSE NOTE - OBJECTIVE STATEMENT
Pt BIBA from [facility] s/p fall. Pt reports she slid down in the chair at night and had no strength to get up to bed, spent approx. 3h on the floor. Denies LOC, c/o pain in the sacrum area, no bruising noted, c collar in place. 90% on RA placed on 2L NC, Sat 96%.

## 2024-04-10 NOTE — ED PROVIDER NOTE - OBJECTIVE STATEMENT
89yoF PMH Paroxysmal AFIB, HFpEF,  CKD, Adrenal insufficiency, HFpEF, Lymphoma in remission,  HTN, Iron deficiency,  ICH presents for evaluation from [facility] s/p fall. [facility] reports pt slid out of bed and did not hit head pt not on AC, pt is AAOX1 and states she fell while in the bathroom. Pt reports her lower back was hurting her but no longer is, has no other complaints. Son Wolf at bedside reports pt was started on Levaquin yesterday for "respiratory infection but not pneumonia". Pt additionally found to be febrile and hypoxic to 88% on RA while in room. HPI limited 2/2 medical condition

## 2024-04-10 NOTE — PHYSICAL THERAPY INITIAL EVALUATION ADULT - ADDITIONAL COMMENTS
pt at bed to WC activity level prior to admission via pivot transfers , she has been mostly WC mobile since her hip surgery in September 2022 ( R hip hemiarthroplasty) pt at bed to WC activity level prior to admission via pivot transfers , she has been mostly WC mobile since her hip surgery in September 2022 ( R hip hemiarthroplasty)Pt states she is too old now to have it revised

## 2024-04-10 NOTE — PHYSICAL THERAPY INITIAL EVALUATION ADULT - PRECAUTIONS/LIMITATIONS, REHAB EVAL
pt currently requiring supplemental O2 (O2 SAT was 88% on RA in ED); h/o R hip HEMIARTHROPLASTY few years ago/fall precautions/right hip precautions/oxygen therapy device and L/min

## 2024-04-10 NOTE — PATIENT PROFILE ADULT - FUNCTIONAL ASSESSMENT - BASIC MOBILITY 6.
2-calculated by average/Not able to assess (calculate score using [facility] averaging method)

## 2024-04-10 NOTE — PHYSICAL THERAPY INITIAL EVALUATION ADULT - LIVES WITH, PROFILE
pt is resident of [facility] in [place] resides in [facility] [address]

## 2024-04-10 NOTE — ED ADULT NURSE NOTE - NSFALLHARMRISKINTERV_ED_ALL_ED
Assistance OOB with selected safe patient handling equipment if applicable/Assistance with ambulation/Communicate risk of Fall with Harm to all staff, patient, and family/Monitor gait and stability/Provide visual cue: red socks, yellow wristband, yellow gown, etc/Reinforce activity limits and safety measures with patient and family/Bed in lowest position, wheels locked, appropriate side rails in place/Call bell, personal items and telephone in reach/Instruct patient to call for assistance before getting out of bed/chair/stretcher/Non-slip footwear applied when patient is off stretcher/[place] to call system/Physically safe environment - no spills, clutter or unnecessary equipment/Purposeful Proactive Rounding/Room/bathroom lighting operational, light cord in reach

## 2024-04-10 NOTE — ED ADULT TRIAGE NOTE - CHIEF COMPLAINT QUOTE
Patient presents to the ER with complaint of sliding out of chair onto floor. Patient complained of back pain. Denies LOC, blood thinner use. Patient in c-collar upon arrival.

## 2024-04-10 NOTE — PATIENT PROFILE ADULT - FALL HARM RISK - HARM RISK INTERVENTIONS
Assistance with ambulation/Assistance OOB with selected safe patient handling equipment/Communicate Risk of Fall with Harm to all staff/Discuss with provider need for PT consult/Monitor gait and stability/Provide patient with walking aids - walker, cane, crutches/Reinforce activity limits and safety measures with patient and family/Tailored Fall Risk Interventions/Use of alarms - bed, chair and/or voice tab/Visual Cue: Yellow wristband and red socks/Bed in lowest position, wheels locked, appropriate side rails in place/Call bell, personal items and telephone in reach/Instruct patient to call for assistance before getting out of bed or chair/Non-slip footwear when patient is out of bed/[place] to call system/Physically safe environment - no spills, clutter or unnecessary equipment/Purposeful Proactive Rounding/Room/bathroom lighting operational, light cord in reach

## 2024-04-10 NOTE — PHYSICAL THERAPY INITIAL EVALUATION ADULT - ACTIVE RANGE OF MOTION EXAMINATION, REHAB EVAL
AAROM RLE to tolerance,winces with ABD >20 degrees, ER to >10 degrees/sita. upper extremity Active ROM was WNL (within normal limits)/LLE Active ROM was WNL (within normal limits)/Right LE Active ROM was WFL (within functional limits)

## 2024-04-10 NOTE — H&P ADULT - NSHPPHYSICALEXAM_GEN_ALL_CORE
PHYSICAL EXAM:  GENERAL: NAD, AOx3, well developed  HEAD:  Atraumatic, Normocephalic  EYES: EOMI, conjunctiva and sclera clear  NECK: Supple, No JVD  CHEST/LUNG: Unlabored respirations b/l  HEART: S1 and S2 normal  ABDOMEN: soft, nondistended, nontender  EXTREMITIES:  2+ Peripheral Pulses, brisk capillary refill. No clubbing, cyanosis, or edema  NERVOUS SYSTEM:  AO X3, speech clear. No deficits   MSK: full ROM, no deformities. tender to palpation at gluteal cleft, no ecchymosis/erythema  SKIN: warm to touch. No rashes or lesions

## 2024-04-10 NOTE — CONSULT NOTE ADULT - TIME BILLING
40 Minutes Consult time which included reviewing the history, patient records, speaking with pt at bedside, discussing the etiology/nature of the symptoms injury or illness, reviewing imaging, reviewing medications, coordinating plan w Attending and relaying plan to ED and Medical Team

## 2024-04-10 NOTE — PHYSICAL THERAPY INITIAL EVALUATION ADULT - PATIENT PROFILE REVIEW, REHAB EVAL
yes pt last hospitalized with diastolic HF /chest pain/pressure; she was also hospitalized 2/10-2/14/24 with UTI/gastroenteritis ; pt with a h/o mechanical fall with R femoral neck fx 9/5/22 s/p R hip SHELLI 9/7/22 (Dr Pollock)/yes

## 2024-04-10 NOTE — ED PROVIDER NOTE - CLINICAL SUMMARY MEDICAL DECISION MAKING FREE TEXT BOX
EKG, CXR, labs, UA, CT, ABX, monitor and likely admission EKG, CXR, labs, UA, CT, ABX, monitor and likely admission. CT imaging concerning LLL PNA- treated with ceftriaxone and azithromycin and for sacral versus pelvic fracture.  Consult placed to Dr. Morales spine surgeon, awaiting recommends. I Dr. Keane ED attending spoke with surgical resident working with Dr. Isidro trauma attending who recommends CTA abd and pelvis and admission to SICU

## 2024-04-10 NOTE — PHARMACOTHERAPY INTERVENTION NOTE - COMMENTS
Medication history complete, patient is from [place] [facility] [place], reviewed and confirmed medication with list provided by facility.

## 2024-04-10 NOTE — PHYSICAL THERAPY INITIAL EVALUATION ADULT - DIAGNOSIS, PT EVAL
slip/fall from toilet, +sacral fx (S5 vertebra) upper respiratory infection with hypoxia , HFrEF slip/fall from toilet, +sacral fx (S5 vertebra) upper respiratory infection with hypoxia , HFpEF, severe mitral regurgitation, mild AS slip/fall from toilet, +sacral fx (S5 vertebra) upper respiratory infection with hypoxia ?PNA,, HFpEF, severe mitral regurgitation, mild AS

## 2024-04-10 NOTE — PHYSICAL THERAPY INITIAL EVALUATION ADULT - LEVEL OF INDEPENDENCE: SIT/STAND, REHAB EVAL
TBA, pt refused out of bed at this time; will revisit next Rx session or later today if schedule permits

## 2024-04-10 NOTE — ED PROVIDER NOTE - PROGRESS NOTE DETAILS
CT imaging concerning for sacral fracture vs pelvis fracture- case discussed with surgery resident and page placed for spine consult

## 2024-04-10 NOTE — H&P ADULT - ASSESSMENT
89yo F presents s/p fall onto her buttock, no head trauma, no LOC, not on blood thinners  CT demonstrated chronic thoracolumbar fx, but only tender at coccyx area, likely chronic compression fx, acute sacral S5 fx  Trace fluid at the pelvis, bleeding vs. CHF?    Plan:  - admit to SICU  - pain control prn  - monitor VS  - wean off oxygen  - cleared for c-collar  - diet: NPO, reg once no intervention  - antiemetic control prn  - continue IVF  - levaquin for pneumonia  - SCD only  - spine surgery consult  - q4hr CBC  - encourage IS/OOB  - DNR/DNI    Plan discussed with surgery team and attending, Dr. Isidro 89yo F presents s/p fall onto her buttock, no head trauma, no LOC, not on blood thinners  CT demonstrated chronic thoracolumbar fx, but only tender at coccyx area, likely chronic compression fx, acute sacral S5 fx  Trace fluid at the pelvis, bleeding vs. CHF?    Plan:  - admit to SICU  - pain control prn  - monitor VS  - wean off oxygen  - cleared for c-collar  - diet: NPO, reg once no intervention  - antiemetic control prn  - light hydration  - levaquin for pneumonia  - SCD only  - spine surgery consult  - q4hr CBC  - CTangio abd/pelvis to r/o acute bleeding  - encourage IS/OOB/PT  - DNR/DNI    Plan discussed with surgery team and attending, Dr. Isidro

## 2024-04-11 ENCOUNTER — TRANSCRIPTION ENCOUNTER (OUTPATIENT)
Age: 89
End: 2024-04-11

## 2024-04-11 LAB
ALBUMIN SERPL ELPH-MCNC: 2.7 G/DL — LOW (ref 3.3–5)
ALP SERPL-CCNC: 57 U/L — SIGNIFICANT CHANGE UP (ref 40–120)
ALT FLD-CCNC: 20 U/L — SIGNIFICANT CHANGE UP (ref 12–78)
ANION GAP SERPL CALC-SCNC: 7 MMOL/L — SIGNIFICANT CHANGE UP (ref 5–17)
AST SERPL-CCNC: 26 U/L — SIGNIFICANT CHANGE UP (ref 15–37)
BILIRUB SERPL-MCNC: 0.3 MG/DL — SIGNIFICANT CHANGE UP (ref 0.2–1.2)
BUN SERPL-MCNC: 22 MG/DL — SIGNIFICANT CHANGE UP (ref 7–23)
CALCIUM SERPL-MCNC: 8.9 MG/DL — SIGNIFICANT CHANGE UP (ref 8.5–10.1)
CHLORIDE SERPL-SCNC: 101 MMOL/L — SIGNIFICANT CHANGE UP (ref 96–108)
CO2 SERPL-SCNC: 25 MMOL/L — SIGNIFICANT CHANGE UP (ref 22–31)
CREAT SERPL-MCNC: 1.26 MG/DL — SIGNIFICANT CHANGE UP (ref 0.5–1.3)
EGFR: 38 ML/MIN/1.73M2 — LOW
GLUCOSE SERPL-MCNC: 120 MG/DL — HIGH (ref 70–99)
HCT VFR BLD CALC: 29.6 % — LOW (ref 34.5–45)
HGB BLD-MCNC: 9.6 G/DL — LOW (ref 11.5–15.5)
MAGNESIUM SERPL-MCNC: 2 MG/DL — SIGNIFICANT CHANGE UP (ref 1.6–2.6)
MCHC RBC-ENTMCNC: 32 PG — SIGNIFICANT CHANGE UP (ref 27–34)
MCHC RBC-ENTMCNC: 32.4 GM/DL — SIGNIFICANT CHANGE UP (ref 32–36)
MCV RBC AUTO: 98.7 FL — SIGNIFICANT CHANGE UP (ref 80–100)
PHOSPHATE SERPL-MCNC: 3 MG/DL — SIGNIFICANT CHANGE UP (ref 2.5–4.5)
PLATELET # BLD AUTO: 116 K/UL — LOW (ref 150–400)
POTASSIUM SERPL-MCNC: 3.6 MMOL/L — SIGNIFICANT CHANGE UP (ref 3.5–5.3)
POTASSIUM SERPL-SCNC: 3.6 MMOL/L — SIGNIFICANT CHANGE UP (ref 3.5–5.3)
PROT SERPL-MCNC: 5.8 GM/DL — LOW (ref 6–8.3)
RBC # BLD: 3 M/UL — LOW (ref 3.8–5.2)
RBC # FLD: 14.6 % — HIGH (ref 10.3–14.5)
SODIUM SERPL-SCNC: 133 MMOL/L — LOW (ref 135–145)
WBC # BLD: 9.69 K/UL — SIGNIFICANT CHANGE UP (ref 3.8–10.5)
WBC # FLD AUTO: 9.69 K/UL — SIGNIFICANT CHANGE UP (ref 3.8–10.5)

## 2024-04-11 PROCEDURE — 99221 1ST HOSP IP/OBS SF/LOW 40: CPT

## 2024-04-11 PROCEDURE — 99232 SBSQ HOSP IP/OBS MODERATE 35: CPT

## 2024-04-11 RX ORDER — LANOLIN ALCOHOL/MO/W.PET/CERES
3 CREAM (GRAM) TOPICAL ONCE
Refills: 0 | Status: COMPLETED | OUTPATIENT
Start: 2024-04-11 | End: 2024-04-11

## 2024-04-11 RX ADMIN — Medication 40 MILLIGRAM(S): at 10:37

## 2024-04-11 RX ADMIN — Medication 325 MILLIGRAM(S): at 10:37

## 2024-04-11 RX ADMIN — Medication 5 MILLIGRAM(S): at 21:51

## 2024-04-11 RX ADMIN — CARVEDILOL PHOSPHATE 25 MILLIGRAM(S): 80 CAPSULE, EXTENDED RELEASE ORAL at 21:51

## 2024-04-11 RX ADMIN — ATORVASTATIN CALCIUM 10 MILLIGRAM(S): 80 TABLET, FILM COATED ORAL at 21:51

## 2024-04-11 RX ADMIN — CARVEDILOL PHOSPHATE 25 MILLIGRAM(S): 80 CAPSULE, EXTENDED RELEASE ORAL at 10:37

## 2024-04-11 RX ADMIN — AMLODIPINE BESYLATE 5 MILLIGRAM(S): 2.5 TABLET ORAL at 10:37

## 2024-04-11 RX ADMIN — Medication 10 MILLILITER(S): at 15:24

## 2024-04-11 RX ADMIN — LOSARTAN POTASSIUM 50 MILLIGRAM(S): 100 TABLET, FILM COATED ORAL at 21:51

## 2024-04-11 RX ADMIN — SODIUM CHLORIDE 40 MILLILITER(S): 9 INJECTION INTRAMUSCULAR; INTRAVENOUS; SUBCUTANEOUS at 17:14

## 2024-04-11 RX ADMIN — Medication 100 MILLIGRAM(S): at 21:51

## 2024-04-11 RX ADMIN — AMIODARONE HYDROCHLORIDE 200 MILLIGRAM(S): 400 TABLET ORAL at 10:37

## 2024-04-11 RX ADMIN — Medication 10 MILLILITER(S): at 22:00

## 2024-04-11 RX ADMIN — FAMOTIDINE 20 MILLIGRAM(S): 10 INJECTION INTRAVENOUS at 10:37

## 2024-04-11 RX ADMIN — Medication 10 MILLILITER(S): at 06:03

## 2024-04-11 RX ADMIN — LOSARTAN POTASSIUM 50 MILLIGRAM(S): 100 TABLET, FILM COATED ORAL at 10:37

## 2024-04-11 RX ADMIN — FLUDROCORTISONE ACETATE 0.05 MILLIGRAM(S): 0.1 TABLET ORAL at 10:37

## 2024-04-11 RX ADMIN — Medication 3 MILLIGRAM(S): at 23:12

## 2024-04-11 RX ADMIN — SPIRONOLACTONE 25 MILLIGRAM(S): 25 TABLET, FILM COATED ORAL at 10:37

## 2024-04-11 RX ADMIN — Medication 100 MILLIGRAM(S): at 06:03

## 2024-04-11 RX ADMIN — Medication 10 MILLIGRAM(S): at 10:37

## 2024-04-11 NOTE — PROGRESS NOTE ADULT - ASSESSMENT
89yo F presents s/p fall onto her buttock, no head trauma, no LOC, not on blood thinners  CT demonstrated chronic thoracolumbar fx, but only tender at coccyx area, likely chronic compression fx, acute sacral S5 fx      Plan:  - pain control prn  - monitor VS  - wean off oxygen  - diet  - light hydration  - levaquin for pneumonia  - SCD only  - follow up spine/ortho recs   - essential home meds   - encourage IS/OOB/PT  - DNR/DNI

## 2024-04-11 NOTE — CONSULT NOTE ADULT - ASSESSMENT
89 yo female with PMH as above admitted s/p fall with sacral fx. Urology consulted for pt with urinary retention requiring nova placement.  Recommend  - Keep indwelling nova in place  - D/c with nova to leg bag for outpatient nova management  - F/U urine c/s and treat accordingly    Case discussed with Dr. Cui

## 2024-04-11 NOTE — PROGRESS NOTE ADULT - ATTENDING COMMENTS
A/P:  Sacral fracture  Fall risk precautions  Kidd for urinary retention   consult for urinary retention mgmt  GI/DVT prophylaxis  Pain control  F/U labs  Pt will be monitored for signs of evolution/resolution of pathology and surgical intervention as required and warranted  Pt aware of and agrees with all of the above    75 minuted of time spent on pt examination, review of relevant labs and radiologic studies, assured stabilization of pt, discussion with relevant services/providers for coordination of pt care and services .

## 2024-04-11 NOTE — CONSULT NOTE ADULT - SUBJECTIVE AND OBJECTIVE BOX
HPI:  89yo F w/ paroxysmal Afib, HFpEF, CKD?, adrenal insufficiency, lymphoma (remission), HTN, iron def, ICH presents s/p fall this morning. Patient is AOx3. Patient states that she was on a toilet and slipped from the toilet and fell onto her buttock. Denies head trauma, LOC, not on blood thinners. States that her body is generally achy, but today her tailbone is especially painful. Patient was admitted 2 months ago for heart failure exacerbation, was discharged on oxygen. Per son, oxygen was weaned off few weeks ago, but this morning her saturation is around 88% on room air, currently on NC and saturating above 95%. Patient was also told she has upper resp infection, was on levaquin. Denies fever/chills shortness of breath, chest pain, abd pain, n/v, numbness/tingling. VS reviewed. Patient is DNR/DNI (10 Apr 2024 11:58)    89 yo female with PMH as above admitted s/p fall with sacral fx. Urology consulted for pt with urinary retention requiring nova placement. Patient seen at bedside, reports she was having trouble voiding for a long time where she feels like she voids but has incomplete bladder emptying. She states she has to double void. Denies any abd/flank pain, urologic hx, dysuria, hematuria or retention in the past.   PAST MEDICAL & SURGICAL HISTORY:  Iron deficiency      HTN (hypertension)      Lymphoma      H/O adrenal insufficiency      Hyponatremia      Hypokalemia      Chronic kidney disease, unspecified CKD stage      Diastolic heart failure      Paroxysmal atrial fibrillation      H/O CHF      S/P appendectomy      S/P hysterectomy      H/O intracranial hemorrhage          REVIEW OF SYSTEMS    All other review of systems neg, except as noted in HPI       MEDICATIONS  (STANDING):  aMIOdarone    Tablet 200 milliGRAM(s) Oral daily  amLODIPine   Tablet 5 milliGRAM(s) Oral daily  atorvastatin 10 milliGRAM(s) Oral at bedtime  carvedilol 25 milliGRAM(s) Oral every 12 hours  famotidine    Tablet 20 milliGRAM(s) Oral daily  ferrous    sulfate 325 milliGRAM(s) Oral daily  fludroCORTISONE 0.05 milliGRAM(s) Oral daily  furosemide    Tablet 40 milliGRAM(s) Oral daily  guaifenesin/dextromethorphan Oral Liquid 10 milliLiter(s) Oral three times a day  hydrALAZINE 100 milliGRAM(s) Oral every 8 hours  hydrocortisone 5 milliGRAM(s) Oral at bedtime  hydrocortisone 10 milliGRAM(s) Oral daily  levoFLOXacin  Tablet 250 milliGRAM(s) Oral every 24 hours  losartan 50 milliGRAM(s) Oral two times a day  sodium chloride 0.9%. 1000 milliLiter(s) (40 mL/Hr) IV Continuous <Continuous>  spironolactone 25 milliGRAM(s) Oral daily    MEDICATIONS  (PRN):  acetaminophen     Tablet .. 650 milliGRAM(s) Oral every 6 hours PRN Temp greater or equal to 38C (100.4F), Mild Pain (1 - 3)  magnesium hydroxide Suspension 30 milliLiter(s) Oral every 24 hours PRN for constipation  ondansetron Injectable 4 milliGRAM(s) IV Push every 6 hours PRN Nausea      Allergies    tetracycline (Hives)  sulfa drugs (Hives)  penicillin (Hives)    Intolerances        SOCIAL HISTORY:    FAMILY HISTORY:  No known problems (Father, Mother)        Vital Signs Last 24 Hrs  T(C): 37.3 (11 Apr 2024 08:25), Max: 38 (10 Apr 2024 14:40)  T(F): 99.1 (11 Apr 2024 08:25), Max: 100.4 (10 Apr 2024 14:40)  HR: 74 (11 Apr 2024 08:25) (63 - 74)  BP: 154/51 (11 Apr 2024 08:25) (150/46 - 167/51)  BP(mean): 79 (11 Apr 2024 08:25) (79 - 79)  RR: 18 (11 Apr 2024 08:25) (17 - 18)  SpO2: 96% (11 Apr 2024 08:25) (96% - 98%)    Parameters below as of 11 Apr 2024 08:25  Patient On (Oxygen Delivery Method): nasal cannula  O2 Flow (L/min): 2      PHYSICAL EXAM:       General: No distress, No anxiety  VITALS  T(C): 37.3 (04-11-24 @ 08:25), Max: 38 (04-10-24 @ 14:40)  HR: 74 (04-11-24 @ 08:25) (63 - 74)  BP: 154/51 (04-11-24 @ 08:25) (150/46 - 167/51)  RR: 18 (04-11-24 @ 08:25) (17 - 18)  SpO2: 96% (04-11-24 @ 08:25) (96% - 98%)            Skin     : No jaundice, No lesions, No swelling  HEENT: Normocephalic,  No epistaxis  Lung    : No resp distress  Abdo:   : Soft, Non tender, No guarding, No distension   Back    : No CVAT b/l  Genitalia Female: Nova with yellow urine  Neuro   : A&Ox3      LABS:                        9.6    9.69  )-----------( 116      ( 11 Apr 2024 06:31 )             29.6     04-11    133<L>  |  101  |  22  ----------------------------<  120<H>  3.6   |  25  |  1.26    Ca    8.9      11 Apr 2024 06:31  Phos  3.0     04-11  Mg     2.0     04-11    TPro  5.8<L>  /  Alb  2.7<L>  /  TBili  0.3  /  DBili  x   /  AST  26  /  ALT  20  /  AlkPhos  57  04-11    PT/INR - ( 10 Apr 2024 09:13 )   PT: 10.1 sec;   INR: 0.89 ratio         PTT - ( 10 Apr 2024 09:13 )  PTT:27.5 sec  Urinalysis Basic - ( 11 Apr 2024 06:31 )    Color: x / Appearance: x / SG: x / pH: x  Gluc: 120 mg/dL / Ketone: x  / Bili: x / Urobili: x   Blood: x / Protein: x / Nitrite: x   Leuk Esterase: x / RBC: x / WBC x   Sq Epi: x / Non Sq Epi: x / Bacteria: x        RADIOLOGY & ADDITIONAL STUDIES:< from: CT Angio Abdomen and Pelvis w/ IV Cont (04.10.24 @ 12:32) >    ACC: [id number] EXAM:  CT ANGIO ABD PELV (W)AW IC   ORDERED BY: MEAGHAN TELLEZ     PROCEDURE DATE:  04/10/2024          INTERPRETATION:  CLINICAL INFORMATION: Concern for pelvic fracture with   bleed.    COMPARISON: CT obtained earlier today    CONTRAST/COMPLICATIONS:  IV Contrast: Omnipaque 350  90 cc administered   0 cc discarded  Oral Contrast: NONE  Complications: None reported at time of study completion    PROCEDURE:  CT of the Abdomen and Pelvis was performed.  Precontrast, Arterial andDelayed phases were performed.  Sagittal and coronal reformats were performed.    FINDINGS:  LOWER CHEST: Bibasilar dependent lung atelectasis, left more than right   and a small bilateral pleural effusions. Multifocal nodular opacities   bilaterallythat may represent lung nodules or airspace disease.   Calcified granulomata right middle lobe. Partially delineated embolized   methyl methacrylate in the right middle lobe pulmonary artery branch.   Bronchiectasis lingular segment. Cardiomegaly. Atherosclerotic   calcification including the coronary arteries.    LIVER: Within normal limits.  BILE DUCTS: Normal caliber.  GALLBLADDER: Within normal limits.  SPLEEN: Multiple small hypodense foci, stable in comparison to prior CT   dated 11/22/2022.  PANCREAS: Stable cystic foci head and tail of the pancreas measuring 1.9   cm, 1.5 cm and 1 cm.  ADRENALS: Within normal limits.  KIDNEYS/URETERS: No hydronephrosis. Renal vascular calcification. Small   indeterminate hypodense foci bilateral renalcortex.    BLADDER: Distended.  REPRODUCTIVE ORGANS: Hysterectomy.    BOWEL: No bowel obstruction. Appendix is normal.  PERITONEUM: No ascites. Reticular perirectal and hyperdense presacral   stranding. No contrast extravasation to suggest active bleed.  VESSELS: Atherosclerotic changes.  RETROPERITONEUM/LYMPH NODES: No lymphadenopathy.  ABDOMINAL WALL: Within normal limits.  BONES: Degenerative changes. Multilevel thoracolumbar vertebroplasty.   Right hip prosthesis.    IMPRESSION:  No evidence of active pelvic bleed.    Additional findings as above.        --- End of Report ---            SLOANE FIGUEROA MD; Attending Radiologist  This document has been electronically signed. Apr 10 2024  1:15PM    < end of copied text >

## 2024-04-11 NOTE — PROGRESS NOTE ADULT - SUBJECTIVE AND OBJECTIVE BOX
Pt seen and examined this am, No sacral Pain, some ttp sacrum, nova in place (home nova), WBAT and Donut Pillow per ortho recs, Denies fever/chills shortness of breath, chest pain, abd pain, n/v, numbness/tingling. VS reviewed. Patient is DNR/DNI        P/E  GENERAL: NAD, AOx3, well developed  HEAD:  Atraumatic, Normocephalic  EYES: EOMI, conjunctiva and sclera clear  NECK: Supple, No JVD  CHEST/LUNG: Unlabored respirations b/l  HEART: S1 and S2 normal  ABDOMEN: soft, nondistended, nontender  some ttp sacrum,  nova in place   EXTREMITIES:  2+ Peripheral Pulses, brisk capillary refill. No clubbing, cyanosis, or edema  NERVOUS SYSTEM:  AO X3, speech clear. No deficits   MSK: full ROM, no deformities. tender to palpation at gluteal cleft, no ecchymosis/erythema  SKIN: warm to touch. No rashes or lesions      Vitals:  T(C): 37.1 ( @ 06:10), Max: 38.2 (04-10 @ 08:31)  HR: 71 ( @ 06:10) (63 - 81)  BP: 163/59 ( @ 06:10) (150/46 - 199/49)  RR: 18 ( @ 06:10) (17 - 20)  SpO2: 98% ( @ 06:10) (90% - 98%)      04-10 @ 07:01  -   @ 07:00  --------------------------------------------------------  IN:  Total IN: 0 mL    OUT:    Indwelling Catheter - Urethral (mL): 1175 mL  Total OUT: 1175 mL    Total NET: -1175 mL           @ 06:31                    9.6  CBC: 9.69>)-------(<116                     29.6                 133 | 101 | 22    CMP:  ----------------------< 120               3.6 | 25 | 1.26                      Ca:8.9  Phos:3.0  M.0               0.3|      |26        LFTs:  ------|57|-----             -|      |-  04-10 @ 13:06                    9.8  CBC: 12.12>)-------(<121                     30.0                 - | - | -    CMP:  ----------------------< -               - | - | -                      Ca:-  Phos:-  Mg:-               -|      |-        LFTs:  ------|-|-----             -|      |-  10 @ 09:13                    10.5  CBC: 12.68>)-------(<137                     32.1                 132 | 99 | 22    CMP:  ----------------------< 97               3.8 | 25 | 1.25                      Ca:9.4  Phos:-  Mg:-               0.4|      |21        LFTs:  ------|71|-----             -|      |-            Current Inpatient Medications:  acetaminophen     Tablet .. 650 milliGRAM(s) Oral every 6 hours PRN  aMIOdarone    Tablet 200 milliGRAM(s) Oral daily  amLODIPine   Tablet 5 milliGRAM(s) Oral daily  atorvastatin 10 milliGRAM(s) Oral at bedtime  carvedilol 25 milliGRAM(s) Oral every 12 hours  famotidine    Tablet 20 milliGRAM(s) Oral daily  ferrous    sulfate 325 milliGRAM(s) Oral daily  fludroCORTISONE 0.05 milliGRAM(s) Oral daily  furosemide    Tablet 40 milliGRAM(s) Oral daily  guaifenesin/dextromethorphan Oral Liquid 10 milliLiter(s) Oral three times a day  hydrALAZINE 100 milliGRAM(s) Oral every 8 hours  hydrocortisone 10 milliGRAM(s) Oral daily  hydrocortisone 5 milliGRAM(s) Oral at bedtime  levoFLOXacin  Tablet 250 milliGRAM(s) Oral every 24 hours  losartan 50 milliGRAM(s) Oral two times a day  magnesium hydroxide Suspension 30 milliLiter(s) Oral every 24 hours PRN  ondansetron Injectable 4 milliGRAM(s) IV Push every 6 hours PRN  sodium chloride 0.9%. 1000 milliLiter(s) (40 mL/Hr) IV Continuous <Continuous>  spironolactone 25 milliGRAM(s) Oral daily    CTA:    LIVER: Within normal limits.  BILE DUCTS: Normal caliber.  GALLBLADDER: Within normal limits.  SPLEEN: Multiple small hypodense foci, stable in comparison to prior CT dated 2022.  PANCREAS: Stable cystic foci head and tail of the pancreas measuring 1.9 cm, 1.5 cm and 1 cm.  ADRENALS: Within normal limits.  KIDNEYS/URETERS: No hydronephrosis. Renal vascular calcification. Small indeterminate hypodense foci bilateral renal cortex.    BLADDER: Distended.  REPRODUCTIVE ORGANS: Hysterectomy.    BOWEL: No bowel obstruction. Appendix is normal.  PERITONEUM: No ascites. Reticular perirectal and hyperdense presacral stranding. No contrast extravasation to suggest active bleed.  VESSELS: Atherosclerotic changes.  RETROPERITONEUM/LYMPH NODES: No lymphadenopathy.  ABDOMINAL WALL: Within normal limits.  BONES: Degenerative changes. Multilevel thoracolumbar vertebroplasty. Right hip prosthesis.    IMPRESSION:  No evidence of active pelvic bleed.   CC: Patient is a 89y old  Female who presents with a chief complaint of s/p fall sacral fx (2024 12:09)    Pt seen and examined this am, No sacral Pain, some ttp sacrum, nova in place (home nova), WBAT and Donut Pillow per ortho recs, Denies fever/chills shortness of breath, chest pain, abd pain, n/v, numbness/tingling. VS reviewed. Patient is DNR/DNI    ROS: as above otherwise negative    P/E  GENERAL: NAD, AOx3, well developed  HEAD:  Atraumatic, Normocephalic  EYES: EOMI, conjunctiva and sclera clear  NECK: Supple, No JVD  CHEST/LUNG: Unlabored respirations b/l  HEART: S1 and S2 normal  ABDOMEN: soft, nondistended, nontender  some ttp sacrum,  nova in place   EXTREMITIES:  2+ Peripheral Pulses, brisk capillary refill. No clubbing, cyanosis, or edema  NERVOUS SYSTEM:  AO X3, speech clear. No deficits   MSK: full ROM, no deformities. tender to palpation at gluteal cleft, no ecchymosis/erythema  SKIN: warm to touch. No rashes or lesions      Vitals:  T(C): 37.1 ( @ 06:10), Max: 38.2 (04-10 @ 08:31)  HR: 71 ( @ 06:10) (63 - 81)  BP: 163/59 ( @ 06:10) (150/46 - 199/49)  RR: 18 ( @ 06:10) (17 - 20)  SpO2: 98% ( @ 06:10) (90% - 98%)      04-10 @ 07:01  -   @ 07:00  --------------------------------------------------------  IN:  Total IN: 0 mL    OUT:    Indwelling Catheter - Urethral (mL): 1175 mL  Total OUT: 1175 mL    Total NET: -1175 mL           @ 06:31                    9.6  CBC: 9.69>)-------(<116                     29.6                 133 | 101 | 22    CMP:  ----------------------< 120               3.6 | 25 | 1.26                      Ca:8.9  Phos:3.0  M.0               0.3|      |26        LFTs:  ------|57|-----             -|      |-  04-10 @ 13:06                    9.8  CBC: 12.12>)-------(<121                     30.0                 - | - | -    CMP:  ----------------------< -               - | - | -                      Ca:-  Phos:-  Mg:-               -|      |-        LFTs:  ------|-|-----             -|      |-  04-10 @ 09:13                    10.5  CBC: 12.68>)-------(<137                     32.1                 132 | 99 | 22    CMP:  ----------------------< 97               3.8 | 25 | 1.25                      Ca:9.4  Phos:-  Mg:-               0.4|      |21        LFTs:  ------|71|-----             -|      |-            Current Inpatient Medications:  acetaminophen     Tablet .. 650 milliGRAM(s) Oral every 6 hours PRN  aMIOdarone    Tablet 200 milliGRAM(s) Oral daily  amLODIPine   Tablet 5 milliGRAM(s) Oral daily  atorvastatin 10 milliGRAM(s) Oral at bedtime  carvedilol 25 milliGRAM(s) Oral every 12 hours  famotidine    Tablet 20 milliGRAM(s) Oral daily  ferrous    sulfate 325 milliGRAM(s) Oral daily  fludroCORTISONE 0.05 milliGRAM(s) Oral daily  furosemide    Tablet 40 milliGRAM(s) Oral daily  guaifenesin/dextromethorphan Oral Liquid 10 milliLiter(s) Oral three times a day  hydrALAZINE 100 milliGRAM(s) Oral every 8 hours  hydrocortisone 10 milliGRAM(s) Oral daily  hydrocortisone 5 milliGRAM(s) Oral at bedtime  levoFLOXacin  Tablet 250 milliGRAM(s) Oral every 24 hours  losartan 50 milliGRAM(s) Oral two times a day  magnesium hydroxide Suspension 30 milliLiter(s) Oral every 24 hours PRN  ondansetron Injectable 4 milliGRAM(s) IV Push every 6 hours PRN  sodium chloride 0.9%. 1000 milliLiter(s) (40 mL/Hr) IV Continuous <Continuous>  spironolactone 25 milliGRAM(s) Oral daily    CTA:    LIVER: Within normal limits.  BILE DUCTS: Normal caliber.  GALLBLADDER: Within normal limits.  SPLEEN: Multiple small hypodense foci, stable in comparison to prior CT dated 2022.  PANCREAS: Stable cystic foci head and tail of the pancreas measuring 1.9 cm, 1.5 cm and 1 cm.  ADRENALS: Within normal limits.  KIDNEYS/URETERS: No hydronephrosis. Renal vascular calcification. Small indeterminate hypodense foci bilateral renal cortex.    BLADDER: Distended.  REPRODUCTIVE ORGANS: Hysterectomy.    BOWEL: No bowel obstruction. Appendix is normal.  PERITONEUM: No ascites. Reticular perirectal and hyperdense presacral stranding. No contrast extravasation to suggest active bleed.  VESSELS: Atherosclerotic changes.  RETROPERITONEUM/LYMPH NODES: No lymphadenopathy.  ABDOMINAL WALL: Within normal limits.  BONES: Degenerative changes. Multilevel thoracolumbar vertebroplasty. Right hip prosthesis.    IMPRESSION:  No evidence of active pelvic bleed.

## 2024-04-12 ENCOUNTER — TRANSCRIPTION ENCOUNTER (OUTPATIENT)
Age: 89
End: 2024-04-12

## 2024-04-12 LAB
ANION GAP SERPL CALC-SCNC: 6 MMOL/L — SIGNIFICANT CHANGE UP (ref 5–17)
BASOPHILS # BLD AUTO: 0.01 K/UL — SIGNIFICANT CHANGE UP (ref 0–0.2)
BASOPHILS NFR BLD AUTO: 0.2 % — SIGNIFICANT CHANGE UP (ref 0–2)
BUN SERPL-MCNC: 26 MG/DL — HIGH (ref 7–23)
CALCIUM SERPL-MCNC: 8.1 MG/DL — LOW (ref 8.5–10.1)
CHLORIDE SERPL-SCNC: 101 MMOL/L — SIGNIFICANT CHANGE UP (ref 96–108)
CO2 SERPL-SCNC: 23 MMOL/L — SIGNIFICANT CHANGE UP (ref 22–31)
CREAT SERPL-MCNC: 1.51 MG/DL — HIGH (ref 0.5–1.3)
CULTURE RESULTS: SIGNIFICANT CHANGE UP
EGFR: 31 ML/MIN/1.73M2 — LOW
EOSINOPHIL # BLD AUTO: 0.01 K/UL — SIGNIFICANT CHANGE UP (ref 0–0.5)
EOSINOPHIL NFR BLD AUTO: 0.2 % — SIGNIFICANT CHANGE UP (ref 0–6)
GLUCOSE SERPL-MCNC: 121 MG/DL — HIGH (ref 70–99)
HCT VFR BLD CALC: 25.6 % — LOW (ref 34.5–45)
HGB BLD-MCNC: 8.1 G/DL — LOW (ref 11.5–15.5)
IMM GRANULOCYTES NFR BLD AUTO: 0.7 % — SIGNIFICANT CHANGE UP (ref 0–0.9)
LYMPHOCYTES # BLD AUTO: 1.17 K/UL — SIGNIFICANT CHANGE UP (ref 1–3.3)
LYMPHOCYTES # BLD AUTO: 20.4 % — SIGNIFICANT CHANGE UP (ref 13–44)
MAGNESIUM SERPL-MCNC: 1.9 MG/DL — SIGNIFICANT CHANGE UP (ref 1.6–2.6)
MCHC RBC-ENTMCNC: 31.3 PG — SIGNIFICANT CHANGE UP (ref 27–34)
MCHC RBC-ENTMCNC: 31.6 GM/DL — LOW (ref 32–36)
MCV RBC AUTO: 98.8 FL — SIGNIFICANT CHANGE UP (ref 80–100)
MONOCYTES # BLD AUTO: 0.69 K/UL — SIGNIFICANT CHANGE UP (ref 0–0.9)
MONOCYTES NFR BLD AUTO: 12 % — SIGNIFICANT CHANGE UP (ref 2–14)
NEUTROPHILS # BLD AUTO: 3.82 K/UL — SIGNIFICANT CHANGE UP (ref 1.8–7.4)
NEUTROPHILS NFR BLD AUTO: 66.5 % — SIGNIFICANT CHANGE UP (ref 43–77)
PHOSPHATE SERPL-MCNC: 2.9 MG/DL — SIGNIFICANT CHANGE UP (ref 2.5–4.5)
PLATELET # BLD AUTO: 107 K/UL — LOW (ref 150–400)
POTASSIUM SERPL-MCNC: 3.6 MMOL/L — SIGNIFICANT CHANGE UP (ref 3.5–5.3)
POTASSIUM SERPL-SCNC: 3.6 MMOL/L — SIGNIFICANT CHANGE UP (ref 3.5–5.3)
RBC # BLD: 2.59 M/UL — LOW (ref 3.8–5.2)
RBC # FLD: 14.6 % — HIGH (ref 10.3–14.5)
SODIUM SERPL-SCNC: 130 MMOL/L — LOW (ref 135–145)
SPECIMEN SOURCE: SIGNIFICANT CHANGE UP
WBC # BLD: 5.74 K/UL — SIGNIFICANT CHANGE UP (ref 3.8–10.5)
WBC # FLD AUTO: 5.74 K/UL — SIGNIFICANT CHANGE UP (ref 3.8–10.5)

## 2024-04-12 PROCEDURE — 99232 SBSQ HOSP IP/OBS MODERATE 35: CPT

## 2024-04-12 RX ORDER — LEVOFLOXACIN 5 MG/ML
1 INJECTION, SOLUTION INTRAVENOUS
Qty: 0 | Refills: 0 | DISCHARGE
Start: 2024-04-12

## 2024-04-12 RX ADMIN — AMLODIPINE BESYLATE 5 MILLIGRAM(S): 2.5 TABLET ORAL at 13:24

## 2024-04-12 RX ADMIN — Medication 10 MILLIGRAM(S): at 10:44

## 2024-04-12 RX ADMIN — FAMOTIDINE 20 MILLIGRAM(S): 10 INJECTION INTRAVENOUS at 10:44

## 2024-04-12 RX ADMIN — Medication 30 MILLILITER(S): at 22:16

## 2024-04-12 RX ADMIN — AMIODARONE HYDROCHLORIDE 200 MILLIGRAM(S): 400 TABLET ORAL at 10:45

## 2024-04-12 RX ADMIN — ATORVASTATIN CALCIUM 10 MILLIGRAM(S): 80 TABLET, FILM COATED ORAL at 22:16

## 2024-04-12 RX ADMIN — Medication 30 MILLILITER(S): at 15:55

## 2024-04-12 RX ADMIN — Medication 325 MILLIGRAM(S): at 10:44

## 2024-04-12 RX ADMIN — CARVEDILOL PHOSPHATE 25 MILLIGRAM(S): 80 CAPSULE, EXTENDED RELEASE ORAL at 22:17

## 2024-04-12 RX ADMIN — Medication 10 MILLILITER(S): at 16:33

## 2024-04-12 RX ADMIN — LOSARTAN POTASSIUM 50 MILLIGRAM(S): 100 TABLET, FILM COATED ORAL at 13:25

## 2024-04-12 RX ADMIN — FLUDROCORTISONE ACETATE 0.05 MILLIGRAM(S): 0.1 TABLET ORAL at 10:44

## 2024-04-12 RX ADMIN — Medication 40 MILLIGRAM(S): at 13:23

## 2024-04-12 RX ADMIN — CARVEDILOL PHOSPHATE 25 MILLIGRAM(S): 80 CAPSULE, EXTENDED RELEASE ORAL at 13:23

## 2024-04-12 RX ADMIN — Medication 100 MILLIGRAM(S): at 22:18

## 2024-04-12 RX ADMIN — LOSARTAN POTASSIUM 50 MILLIGRAM(S): 100 TABLET, FILM COATED ORAL at 22:20

## 2024-04-12 RX ADMIN — Medication 650 MILLIGRAM(S): at 22:54

## 2024-04-12 RX ADMIN — Medication 100 MILLIGRAM(S): at 06:43

## 2024-04-12 RX ADMIN — Medication 650 MILLIGRAM(S): at 22:25

## 2024-04-12 RX ADMIN — Medication 10 MILLILITER(S): at 22:17

## 2024-04-12 RX ADMIN — SPIRONOLACTONE 25 MILLIGRAM(S): 25 TABLET, FILM COATED ORAL at 13:40

## 2024-04-12 RX ADMIN — Medication 5 MILLIGRAM(S): at 22:19

## 2024-04-12 RX ADMIN — Medication 10 MILLILITER(S): at 06:46

## 2024-04-12 RX ADMIN — SODIUM CHLORIDE 40 MILLILITER(S): 9 INJECTION INTRAMUSCULAR; INTRAVENOUS; SUBCUTANEOUS at 18:03

## 2024-04-12 NOTE — DISCHARGE NOTE PROVIDER - NSDCFUADDAPPT_GEN_ALL_CORE_FT
Please take all appropriate fall risk precautions  Please follow up as indicated  Please seek immediate medical attention for chest pain, shortness of breath, fever>100.4, inability to tolerate diet or pass bowels/flatus, any adverse changes to health

## 2024-04-12 NOTE — DIETITIAN INITIAL EVALUATION ADULT - OTHER INFO
89yo F w/ paroxysmal Afib, HFpEF, CKD?, adrenal insufficiency, lymphoma (remission), HTN, iron def, ICH presents s/p fall this morning. Patient is AOx3. Patient states that she was on a toilet and slipped from the toilet and fell onto her buttock. Denies head trauma, LOC, not on blood thinners. States that her body is generally achy, but today her tailbone is especially painful. Patient was admitted 2 months ago for heart failure exacerbation, was discharged on oxygen. Per son, oxygen was weaned off few weeks ago, but this morning her saturation is around 88% on room air, currently on NC and saturating above 95%. Patient was also told she has upper resp infection, was on levaquin. Denies fever/chills shortness of breath, chest pain, abd pain, n/v, numbness/tingling. VS reviewed. Patient is DNR/DNI 89yo F w/ paroxysmal Afib, HFpEF, CKD?, adrenal insufficiency, lymphoma (remission), HTN, iron def, ICH presents s/p fall this morning. Patient is AOx3. Patient states that she was on a toilet and slipped from the toilet and fell onto her buttock. Denies head trauma, LOC, not on blood thinners. States that her body is generally achy, but today her tailbone is especially painful. Patient was admitted 2 months ago for heart failure exacerbation, was discharged on oxygen. Per son, oxygen was weaned off few weeks ago, but this morning her saturation is around 88% on room air, currently on NC and saturating above 95%. Patient was also told she has upper resp infection, was on levaquin. Denies fever/chills shortness of breath, chest pain, abd pain, n/v, numbness/tingling. VS reviewed. Patient is DNR/DNI    Admit dx  fall on sacrum  RD bedscale wt is 45 kg   98#  NFPE reveals muscle wasting, fat wasting   PO intake estimated < 75% ENN > one month   Pt visited while she was napping in chair.  [facility] is not fond of the food  She is currently NPO for procedure.  Supplement Ensure Max building up on table, she does not like sweet food  Suggest Confirm Goals of Care regarding nutrition support   Consider adding appetite stimulant such as Remeron or Marinol 2/2 chronically poor appetite/ PO intake   Monitor PO intake, tolerance, labs and weight.

## 2024-04-12 NOTE — DISCHARGE NOTE PROVIDER - NSDCCPCAREPLAN_GEN_ALL_CORE_FT
PRINCIPAL DISCHARGE DIAGNOSIS  Diagnosis: Pelvic fracture  Assessment and Plan of Treatment:       SECONDARY DISCHARGE DIAGNOSES  Diagnosis: Acute hypoxic respiratory failure  Assessment and Plan of Treatment:     Diagnosis: Left lower lobe pneumonia  Assessment and Plan of Treatment:      PRINCIPAL DISCHARGE DIAGNOSIS  Diagnosis: Pelvic fracture  Assessment and Plan of Treatment:       SECONDARY DISCHARGE DIAGNOSES  Diagnosis: Left lower lobe pneumonia  Assessment and Plan of Treatment:     Diagnosis: Acute hypoxic respiratory failure  Assessment and Plan of Treatment:

## 2024-04-12 NOTE — DISCHARGE NOTE PROVIDER - NSDCACTIVITY_GEN_ALL_CORE
Bathing allowed/Do not drive or operate machinery/Showering allowed/Do not make important decisions/Walking - Indoors allowed/No heavy lifting/straining/Walking - Outdoors allowed/Follow Instructions Provided by your Surgical Team

## 2024-04-12 NOTE — DIETITIAN INITIAL EVALUATION ADULT - NAME AND PHONE
Khadijah Serrano RDN, CDN, [facility]      [phone]   [email or web address]

## 2024-04-12 NOTE — DISCHARGE NOTE PROVIDER - HOSPITAL COURSE
89yo F w/ paroxysmal Afib, HFpEF, CKD?, adrenal insufficiency, lymphoma (remission), HTN, iron def, ICH presents s/p fall this morning. Patient is AOx3. Patient states that she was on a toilet and slipped from the toilet and fell onto her buttock. Denies head trauma, LOC, not on blood thinners. States that her body is generally achy, but today her tailbone is especially painful. Patient was admitted 2 months ago for heart failure exacerbation, was discharged on oxygen. Per son, oxygen was weaned off few weeks ago, but this morning her saturation is around 88% on room air, currently on NC and saturating above 95%. Patient was also told she has upper resp infection, was on levaquin. Denies fever/chills shortness of breath, chest pain, abd pain, n/v, numbness/tingling. VS reviewed. Patient is DNR/DNI    PAST MEDICAL HISTORY: Chronic kidney disease, unspecified CKD stage , Diastolic heart failure , H/O adrenal insufficiency , H/O CHF , HTN (hypertension) , Hypokalemia , Hyponatremia , Iron deficiency , Lymphoma , Paroxysmal atrial fibrillation.     PAST SURGICAL HISTORY: H/O intracranial hemorrhage , S/P appendectomy , S/P hysterectomy.     89yo F presents s/p fall onto her buttock, no head trauma, no LOC, not on blood thinners  CT demonstrated chronic thoracolumbar fx, but only tender at coccyx area, likely chronic compression fx, acute sacral S5 fx  Management: - pain control prn,  monitor VS,  wean off oxygen,  diet,  levaquin for pneumonia,  SCD ,  follow up spine/ortho recs ,  essential home meds , - encourage IS/OOB/PT  - DNR/DNI  Urology consult called for pt with urinary retention requiring nova placement.  Recommend- Keep indwelling nova in place,  D/c with nova to leg bag for outpatient nova management, - F/U urine c/s and treat accordingly  Pt was        89yo F w/ paroxysmal Afib, HFpEF, CKD?, adrenal insufficiency, lymphoma (remission), HTN, iron def, ICH presents s/p fall, found to have sacral fracture  and later on possible pneumonia /bronchiectesis. She was being managed here smptomatically. Considering her overall clinical picture and all comorbidities, quality of life, pt and pt's family decided to pursue hospice care. At this time pt is stable and is being discharged       Vital Signs Last 24 Hrs  T(C): 36.7 (17 Apr 2024 09:10), Max: 36.8 (16 Apr 2024 16:30)  T(F): 98.1 (17 Apr 2024 09:10), Max: 98.2 (16 Apr 2024 16:30)  HR: 59 (17 Apr 2024 09:10) (54 - 88)  BP: 142/46 (17 Apr 2024 09:10) (142/46 - 167/47)  BP(mean): --  RR: 18 (17 Apr 2024 09:10) (18 - 18)  SpO2: 96% (17 Apr 2024 09:10) (94% - 100%)    Parameters below as of 17 Apr 2024 09:10  Patient On (Oxygen Delivery Method): nasal cannula  O2 Flow (L/min): 2      General: comfortable   Head- Atraumatic, normocephalic   Neurology: Alert, follows command   HEENT- PERRLA, moist mucous membrane  Neck-supple, no JVD  Respiratory: Air entry equal b/l, CTA   CVS:  S1S2, no murmurs, rubs or gallops  Abdominal: Soft, NT, ND +BS,   Genitourinary- non palpable bladder  Extremities: No edema, + peripheral pulses  Skin- no rash, no ulcer  Psychiatric- mood stable   LN- no lymphadenopathy         04-17-24 @ 10:52          Pt has been managed here symptomatically, currently hemodynamically stable and is being discharged with further management as an outpt, time spent 45 minutes

## 2024-04-12 NOTE — PROGRESS NOTE ADULT - SUBJECTIVE AND OBJECTIVE BOX
TRANSFER NOTE     89yo F w/ paroxysmal Afib, HFpEF, CKD?, adrenal insufficiency, lymphoma (remission), HTN, iron def, ICH presents s/p fall this morning. Patient is AOx3. Patient states that she was on a toilet and slipped from the toilet and fell onto her buttock. Denies head trauma, LOC, not on blood thinners. States that her body is generally achy, but today her tailbone is especially painful. Patient was admitted 2 months ago for heart failure exacerbation, was discharged on oxygen. Per son, oxygen was weaned off few weeks ago, but this morning her saturation is around 88% on room air, currently on NC and saturating above 95%. Patient was also told she has upper resp infection, was on levaquin. Denies fever/chills shortness of breath, chest pain, abd pain, n/v, numbness/tingling. VS reviewed. Patient is DNR/DNI  PAST MEDICAL HISTORY: Chronic kidney disease, unspecified CKD stage , Diastolic heart failure , H/O adrenal insufficiency , H/O CHF , HTN (hypertension) , Hypokalemia , Hyponatremia , Iron deficiency , Lymphoma , Paroxysmal atrial fibrillation.     PAST SURGICAL HISTORY: H/O intracranial hemorrhage , S/P appendectomy , S/P hysterectomy.     MANAGEMENT - 89yo F presents s/p fall onto her buttock, no head trauma, no LOC, not on blood thinners  CT demonstrated chronic thoracolumbar fx, but only tender at coccyx area, likely chronic compression fx, acute sacral S5 fx  Management: - pain control prn,  monitor VS,  wean off oxygen,  diet,  levaquin for pneumonia,  SCD ,  follow up spine/ortho recs ,  essential home meds , - encourage IS/OOB/PT  - DNR/DNI  Urology consult called for pt with urinary retention requiring nova placement.  Recommend- Keep indwelling nova in place,  D/c with nova to leg bag for outpatient nova management, - F/U urine c/s and treat accordingly    MEDICATIONS  (STANDING):  aMIOdarone    Tablet 200 milliGRAM(s) Oral daily  amLODIPine   Tablet 5 milliGRAM(s) Oral daily  atorvastatin 10 milliGRAM(s) Oral at bedtime  carvedilol 25 milliGRAM(s) Oral every 12 hours  famotidine    Tablet 20 milliGRAM(s) Oral daily  ferrous    sulfate 325 milliGRAM(s) Oral daily  fludroCORTISONE 0.05 milliGRAM(s) Oral daily  furosemide    Tablet 40 milliGRAM(s) Oral daily  guaifenesin/dextromethorphan Oral Liquid 10 milliLiter(s) Oral three times a day  hydrALAZINE 100 milliGRAM(s) Oral every 8 hours  hydrocortisone 10 milliGRAM(s) Oral daily  hydrocortisone 5 milliGRAM(s) Oral at bedtime  levoFLOXacin  Tablet 250 milliGRAM(s) Oral every 24 hours  losartan 50 milliGRAM(s) Oral two times a day  sodium chloride 0.9%. 1000 milliLiter(s) (40 mL/Hr) IV Continuous <Continuous>  spironolactone 25 milliGRAM(s) Oral daily    MEDICATIONS  (PRN):  acetaminophen     Tablet .. 650 milliGRAM(s) Oral every 6 hours PRN Temp greater or equal to 38C (100.4F), Mild Pain (1 - 3)  magnesium hydroxide Suspension 30 milliLiter(s) Oral every 24 hours PRN for constipation  ondansetron Injectable 4 milliGRAM(s) IV Push every 6 hours PRN Nausea

## 2024-04-12 NOTE — DISCHARGE NOTE PROVIDER - PROVIDER TOKENS
PROVIDER:[TOKEN:[[id number]],FOLLOWUP:[2 weeks]] PROVIDER:[TOKEN:[[id number]],FOLLOWUP:[2 weeks]],PROVIDER:[TOKEN:[[id number]],FOLLOWUP:[1 week]]

## 2024-04-12 NOTE — DIETITIAN INITIAL EVALUATION ADULT - ORAL INTAKE PTA/DIET HISTORY
Pt from [facility].  She reports she eats in the dining room, but not much.  She is not fond of the food.  PO intake estimated < 75% ENN > one month

## 2024-04-12 NOTE — DIETITIAN INITIAL EVALUATION ADULT - ADD RECOMMEND
Liberalize diet to Regular to optimize po/nutrient intake.   MVI w/ minerals daily to ensure 100% RDA met   Record PO intake in EMR after each meal (nursing.)   Pt declines supplement  Consider adding thiamine 100 mg daily 2/2 poor PO intake/ malnutrition  Monitor bowel movements, if no BM for >3 days, consider implementing bowel regimen.  suggest Confirm Goals of Care regarding nutrition support   Consider adding appetite stimulant such as Remeron or Marinol 2/2 chronically poor appetite/ PO intake   Monitor PO intake, tolerance, labs and weight.

## 2024-04-12 NOTE — DISCHARGE NOTE PROVIDER - DETAILS OF MALNUTRITION DIAGNOSIS/DIAGNOSES
This patient has been assessed with a concern for Malnutrition and was treated during this hospitalization for the following Nutrition diagnosis/diagnoses:     -  04/12/2024: Severe protein-calorie malnutrition   -  04/12/2024: Underweight (BMI < 19)

## 2024-04-12 NOTE — DIETITIAN INITIAL EVALUATION ADULT - NSFNSPHYEXAMSKINFT_GEN_A_CORE
Pressure Injury 1: thoracic spine, blanchable redness  Pressure Injury 2: coccyx, blanchable redness  Pressure Injury 3: none, none  Pressure Injury 4: none, none  Pressure Injury 5: none, none  Pressure Injury 6: none, none  Pressure Injury 7: none, none  Pressure Injury 8: none, none  Pressure Injury 9: none, none  Pressure Injury 10: none, none  Pressure Injury 11: none, none, Pressure Injury 1: thoracic spine, blanchable redness   Pressure Injury 2: coccyx, blanchable redness  Pressure Injury 3: none, none  Pressure Injury 4: none, none  Pressure Injury 5: none, none  Pressure Injury 6: none, none  Pressure Injury 7: none, none  Pressure Injury 8: none, none  Pressure Injury 9: none, none  Pressure Injury 10: none, none  Pressure Injury 11: none, none Pressure Injury 1: thoracic spine, blanchable redness  Pressure Injury 2: coccyx, blanchable redness    Wagner 14

## 2024-04-12 NOTE — DIETITIAN INITIAL EVALUATION ADULT - PERTINENT LABORATORY DATA
04-12    130<L>  |  101  |  26<H>  ----------------------------<  121<H>  3.6   |  23  |  1.51<H>    Ca    8.1<L>      12 Apr 2024 07:28  Phos  2.9     04-12  Mg     1.9     04-12    TPro  5.8<L>  /  Alb  2.7<L>  /  TBili  0.3  /  DBili  x   /  AST  26  /  ALT  20  /  AlkPhos  57  04-11

## 2024-04-12 NOTE — DISCHARGE NOTE PROVIDER - NSDCMRMEDTOKEN_GEN_ALL_CORE_FT
amiodarone 200 mg oral tablet: 1 tab(s) orally once a day  amLODIPine 5 mg oral tablet: 1 tab(s) orally once a day  carvedilol 25 mg oral tablet: 1 tab(s) orally every 12 hours  Colace 100 mg oral capsule: 2 cap(s) orally once a day (at bedtime) as needed for  constipation  famotidine 20 mg oral tablet: 1 tab(s) orally once a day  ferrous sulfate 325 mg (65 mg elemental iron) oral tablet: 1 tab(s) orally once a day  fludrocortisone 0.1 mg oral tablet: 0.5 tab(s) orally once a day  guaiFENesin-dextromethorphan 100 mg-10 mg/5 mL oral liquid: 10 milliliter(s) orally 3 times a day  hydrALAZINE 100 mg oral tablet: 1 tab(s) orally every 8 hours  hydrocortisone 10 mg oral tablet: 1 tab(s) orally once a day (in the morning)  hydrocortisone 5 mg oral tablet: 1 tab(s) orally once a day (in the evening)  Lasix 40 mg oral tablet: 1 tab(s) orally once a day  levoFLOXacin 250 mg oral tablet: 1 tab(s) orally every 24 hours  Livalo 2 mg oral tablet: 1 tab(s) orally once a day  losartan 50 mg oral tablet: 1 tab(s) orally 2 times a day  melatonin 3 mg oral tablet: 1 tab(s) orally once a day (at bedtime)  Milk of Magnesia 8% oral suspension: 30 milliliter(s) orally every 24 hours as needed for  constipation  Multiple Vitamins oral liquid: 5 milliliter(s) orally once a day (in the evening)  spironolactone 25 mg oral tablet: 1 tab(s) orally once a day  Tylenol Extra Strength 500 mg oral tablet: 2 tab(s) orally 3 times a day  Vitamin D3 50 mcg (2000 intl units) oral tablet: 1 tab(s) orally once a day   albuterol 90 mcg/inh inhalation aerosol: 2 puff(s) inhaled every 6 hours as needed for  shortness of breath and/or wheezing  amiodarone 200 mg oral tablet: 1 tab(s) orally once a day  amLODIPine 5 mg oral tablet: 1 tab(s) orally once a day  budesonide-formoterol 160 mcg-4.5 mcg/inh inhalation aerosol: 2 puff(s) inhaled 2 times a day  carvedilol 25 mg oral tablet: 1 tab(s) orally every 12 hours  Colace 100 mg oral capsule: 2 cap(s) orally once a day (at bedtime) as needed for  constipation  famotidine 20 mg oral tablet: 1 tab(s) orally once a day  ferrous sulfate 325 mg (65 mg elemental iron) oral tablet: 1 tab(s) orally once a day  fludrocortisone 0.1 mg oral tablet: 0.5 tab(s) orally once a day  guaiFENesin-dextromethorphan 100 mg-10 mg/5 mL oral liquid: 10 milliliter(s) orally 3 times a day  hydrALAZINE 100 mg oral tablet: 1 tab(s) orally every 8 hours  hydrocortisone 10 mg oral tablet: 1 tab(s) orally once a day (in the morning)  hydrocortisone 5 mg oral tablet: 1 tab(s) orally once a day (in the evening)  Lasix 40 mg oral tablet: 1 tab(s) orally once a day  levoFLOXacin 250 mg oral tablet: 1 tab(s) orally every 24 hours  Livalo 2 mg oral tablet: 1 tab(s) orally once a day  losartan 50 mg oral tablet: 1 tab(s) orally 2 times a day  melatonin 3 mg oral tablet: 1 tab(s) orally once a day (at bedtime)  Milk of Magnesia 8% oral suspension: 30 milliliter(s) orally every 24 hours as needed for  constipation  Multiple Vitamins oral liquid: 5 milliliter(s) orally once a day (in the evening)  Protonix 40 mg oral delayed release tablet: 1 tab(s) orally 2 times a day  spironolactone 25 mg oral tablet: 1 tab(s) orally once a day  sucralfate 1 g oral tablet: 1 tab(s) orally 4 times a day  Tylenol Extra Strength 500 mg oral tablet: 2 tab(s) orally 3 times a day  Vitamin D3 50 mcg (2000 intl units) oral tablet: 1 tab(s) orally once a day

## 2024-04-12 NOTE — PROGRESS NOTE ADULT - ASSESSMENT
89yo F presents s/p fall onto her buttock, no head trauma, no LOC, not on blood thinners  CT demonstrated chronic thoracolumbar fx, but only tender at coccyx area, likely chronic compression fx, acute sacral S5 fx      Plan:  - pain control prn  - monitor VS  - wean off oxygen  - diet  - levaquin for pneumonia  - SCD   - follow up spine/ortho recs   - essential home meds   - encourage IS/OOB/PT  - DNR/DNI 89yo F presents s/p fall onto her buttock, no head trauma, no LOC, not on blood thinners  CT demonstrated chronic thoracolumbar fx, but only tender at coccyx area, likely chronic compression fx, acute sacral S5 fx      Plan:  - pain control prn  - monitor VS  - wean off oxygen  - diet  - levaquin for pneumonia  - SCD   - follow up spine/ortho recs   - essential home meds   - encourage IS/OOB/PT  - DNR/DNI    Attending A/P:    Chart reviewed, patient examined, agree with above resident evaluation in addition to the following    L bronchopneumonia, S5 fracture with ?trace hemorrhage, CTA negative, still on o2, pain well controlled  no new injuries on tertiary    PLAN:  consult hospitalist, antibiotics for pneumonia  ortho recs, urology recs appreciate for retention  GI/DVT prophylaxis  home meds as appropriate  Pain control  PT, Spirometer      Pt will be monitored for signs of evolution/resolution of pathology   Pt aware of and agrees with all of the above    35 minuted of time spent on pt examination, review of relevant labs and radiologic studies, assured stabilization of pt, discussion with relevant services/providers for coordination of pt care and services

## 2024-04-12 NOTE — DISCHARGE NOTE PROVIDER - CARE PROVIDERS DIRECT ADDRESSES
[email or web address]

## 2024-04-12 NOTE — PROGRESS NOTE ADULT - SUBJECTIVE AND OBJECTIVE BOX
Pt seen and examined this am, No sacral Pain, some ttp sacrum, nova in place (home nova), WBAT and Donut Pillow per ortho recs, Denies fever/chills shortness of breath, chest pain, abd pain, n/v, numbness/tingling. VS reviewed. Patient is DNR/DNI  Urology saw the pt and recommended to Keep indwelling nova in place      Tertiary survey:    Physical Exam:  GENERAL: NAD, AOx3, well developed  HEAD:  Atraumatic, Normocephalic  EYES: EOMI, conjunctiva and sclera clear  NECK: Supple, No JVD  CHEST/LUNG: Unlabored respirations b/l  HEART: S1 and S2 normal  ABDOMEN: soft, nondistended, nontender  some ttp sacrum,  nova in place   EXTREMITIES:  2+ Peripheral Pulses, brisk capillary refill. No clubbing, cyanosis, or edema  NERVOUS SYSTEM:  AO X3, speech clear. No deficits   MSK: full ROM, no deformities. tender to palpation at gluteal cleft, no ecchymosis/erythema  SKIN: warm to touch. No rashes or lesions        Vitals:  T(C): 37 ( @ 08:17), Max: 37 ( @ 08:17)  HR: 65 ( @ 08:17) (65 - 66)  BP: 129/39 ( @ 08:17) (116/40 - 161/46)  RR: 18 ( @ 08:17) (18 - 18)  SpO2: 97% ( 08:17) (97% - 97%)       @ 07:01  -   @ 07:00  --------------------------------------------------------  IN:  Total IN: 0 mL    OUT:    Indwelling Catheter - Urethral (mL): 850 mL  Total OUT: 850 mL    Total NET: -850 mL           @ 07:28                    8.1  CBC: 5.74>)-------(<107                     25.6                 130 | 101 | 26    CMP:  ----------------------< 121               3.6 | 23 | 1.51                      Ca:8.1  Phos:2.9  M.9               -|      |-        LFTs:  ------|-|-----             -|      |-  04-11 @ 06:31                    9.6  CBC: 9.69>)-------(<116                     29.6                 133 | 101 | 22    CMP:  ----------------------< 120               3.6 | 25 | 1.26                      Ca:8.9  Phos:3.0  M.0               0.3|      |26        LFTs:  ------|57|-----             -|      |-        Culture - Urine (collected 04-10-24 @ 09:13)  Source: Clean Catch None  Final Report (24 @ 01:01):    <10,000 CFU/mL Normal Urogenital Britta    Culture - Blood (collected 04-10-24 @ 09:13)  Source: .Blood None  Preliminary Report (24 @ 18:02):    No growth at 24 hours    Culture - Blood (collected 04-10-24 @ 09:13)  Source: .Blood None  Preliminary Report (24 @ 18:02):    No growth at 24 hours          Current Inpatient Medications:  acetaminophen     Tablet .. 650 milliGRAM(s) Oral every 6 hours PRN  aMIOdarone    Tablet 200 milliGRAM(s) Oral daily  amLODIPine   Tablet 5 milliGRAM(s) Oral daily  atorvastatin 10 milliGRAM(s) Oral at bedtime  carvedilol 25 milliGRAM(s) Oral every 12 hours  famotidine    Tablet 20 milliGRAM(s) Oral daily  ferrous    sulfate 325 milliGRAM(s) Oral daily  fludroCORTISONE 0.05 milliGRAM(s) Oral daily  furosemide    Tablet 40 milliGRAM(s) Oral daily  guaifenesin/dextromethorphan Oral Liquid 10 milliLiter(s) Oral three times a day  hydrALAZINE 100 milliGRAM(s) Oral every 8 hours  hydrocortisone 10 milliGRAM(s) Oral daily  hydrocortisone 5 milliGRAM(s) Oral at bedtime  levoFLOXacin  Tablet 250 milliGRAM(s) Oral every 24 hours  losartan 50 milliGRAM(s) Oral two times a day  magnesium hydroxide Suspension 30 milliLiter(s) Oral every 24 hours PRN  ondansetron Injectable 4 milliGRAM(s) IV Push every 6 hours PRN  sodium chloride 0.9%. 1000 milliLiter(s) (40 mL/Hr) IV Continuous <Continuous>  spironolactone 25 milliGRAM(s) Oral daily       Patient is a 89y old  Female who presents with a chief complaint of s/p fall sacral fx (2024 08:32)    Pt seen and examined this am, No sacral Pain, some ttp sacrum, nova in place (home nova), WBAT and Donut Pillow per ortho recs, Denies fever/chills shortness of breath, chest pain, abd pain, n/v, numbness/tingling. VS reviewed. Patient is DNR/DNI  Urology saw the pt and recommended to Keep indwelling nova in place  ROS negative except as above    Tertiary survey:    Physical Exam:  GENERAL: NAD, AOx3, well developed  HEAD:  Atraumatic, Normocephalic  EYES: EOMI, conjunctiva and sclera clear  NECK: Supple, No JVD  CHEST/LUNG: Unlabored respirations b/l  HEART: S1 and S2 normal  ABDOMEN: soft, nondistended, nontender  some ttp sacrum,  nova in place   EXTREMITIES:  2+ Peripheral Pulses, brisk capillary refill. No clubbing, cyanosis, or edema  NERVOUS SYSTEM:  AO X3, speech clear. No deficits   MSK: full ROM, no deformities. tender to palpation at gluteal cleft, no ecchymosis/erythema  SKIN: warm to touch. No rashes or lesions        Vitals:  T(C): 37 ( @ 08:17), Max: 37 ( @ 08:17)  HR: 65 ( @ 08:17) (65 - 66)  BP: 129/39 ( @ 08:17) (116/40 - 161/46)  RR: 18 ( 08:17) (18 - 18)  SpO2: 97% ( @ 08:17) (97% - 97%)       @ 07:01  -   @ 07:00  --------------------------------------------------------  IN:  Total IN: 0 mL    OUT:    Indwelling Catheter - Urethral (mL): 850 mL  Total OUT: 850 mL    Total NET: -850 mL           @ 07:28                    8.1  CBC: 5.74>)-------(<107                     25.6                 130 | 101 | 26    CMP:  ----------------------< 121               3.6 | 23 | 1.51                      Ca:8.1  Phos:2.9  M.9               -|      |-        LFTs:  ------|-|-----             -|      |-  0411 @ 06:31                    9.6  CBC: 9.69>)-------(<116                     29.6                 133 | 101 | 22    CMP:  ----------------------< 120               3.6 | 25 | 1.26                      Ca:8.9  Phos:3.0  M.0               0.3|      |26        LFTs:  ------|57|-----             -|      |-        Culture - Urine (collected 04-10-24 @ 09:13)  Source: Clean Catch None  Final Report (24 @ 01:01):    <10,000 CFU/mL Normal Urogenital Britta    Culture - Blood (collected 04-10-24 @ 09:13)  Source: .Blood None  Preliminary Report (24 @ 18:02):    No growth at 24 hours    Culture - Blood (collected 04-10-24 @ 09:13)  Source: .Blood None  Preliminary Report (24 @ 18:02):    No growth at 24 hours          Current Inpatient Medications:  acetaminophen     Tablet .. 650 milliGRAM(s) Oral every 6 hours PRN  aMIOdarone    Tablet 200 milliGRAM(s) Oral daily  amLODIPine   Tablet 5 milliGRAM(s) Oral daily  atorvastatin 10 milliGRAM(s) Oral at bedtime  carvedilol 25 milliGRAM(s) Oral every 12 hours  famotidine    Tablet 20 milliGRAM(s) Oral daily  ferrous    sulfate 325 milliGRAM(s) Oral daily  fludroCORTISONE 0.05 milliGRAM(s) Oral daily  furosemide    Tablet 40 milliGRAM(s) Oral daily  guaifenesin/dextromethorphan Oral Liquid 10 milliLiter(s) Oral three times a day  hydrALAZINE 100 milliGRAM(s) Oral every 8 hours  hydrocortisone 10 milliGRAM(s) Oral daily  hydrocortisone 5 milliGRAM(s) Oral at bedtime  levoFLOXacin  Tablet 250 milliGRAM(s) Oral every 24 hours  losartan 50 milliGRAM(s) Oral two times a day  magnesium hydroxide Suspension 30 milliLiter(s) Oral every 24 hours PRN  ondansetron Injectable 4 milliGRAM(s) IV Push every 6 hours PRN  sodium chloride 0.9%. 1000 milliLiter(s) (40 mL/Hr) IV Continuous <Continuous>  spironolactone 25 milliGRAM(s) Oral daily    < from: CT Angio Abdomen and Pelvis w/ IV Cont (04.10.24 @ 12:32) >  IMPRESSION:  No evidence of active pelvic bleed.    Additional findings as above.        --- End of Report ---            SLOANE FIGUEROA MD; Attending Radiologist  This document has been electronically signed. Apr 10 2024  1:15PM    < end of copied text >  < from: CT Lumbar Spine Reform No Cont (04.10.24 @ 09:03) >  IMPRESSION: Multilevel compression fractures throughout the lower   thoracic and lumbar vertebral bodies with bone cement in the T9-L4   levels, similar in appearance to 2024, suggesting chronic fractures.   If there is continued clinical suspicion for acute fracture, consider MRI.    --- End of Report ---            OFELIA ALVES MD; Attending Radiologist  This document has been electronically signed. Apr 10 2024  9:44AM    < end of copied text >  < from: Xray Chest 1 View-PORTABLE IMMEDIATE (04.10.24 @ 09:09) >  IMPRESSION:  1. No acute abnormalities are seen and there is no significant change   from the prior study, with details as noted above.    --- End of Report ---            JAIRO EDOUARD MD; Attending Radiologist  This document has been electronically signed. Apr 10 2024  9:19AM    < end of copied text >  < from: CT Abdomen and Pelvis No Cont (04.10.24 @ 09:03) >  IMPRESSION:  1.  Interval LLL bronchopneumonia.  2.  Equivocal/minimal anterior lower sacral/S5 cortical contour   deformity/breech (602-59) suggests nondisplaced lower sacral fracture.   Confirm with directed physical exam.  3.  Trace dependent pelvic/presacral hemorrhage (2/118), probably related   to adjacent nondisplaced lower sacral fracture.  4.  Distended urinary bladder (approx 800 cc)      --- End of Report ---            MAMI PALMER MD; Attending Radiologist  This document has been electronically signed. Apr 10 2024  9:41AM    < end of copied text >  < from: CT Head No Cont (04.10.24 @ 09:00) >  IMPRESSION:    1)  chronic ischemic changes in both hemispheres with volume loss. No   acute abnormality suggested.  2)  no intracerebral hemorrhage, contusion, or extracerebral hemorrhagic   collections identified.    CERVICAL IMPRESSION:    Multilevel degenerative changes with underlying kyphoscoliosis. No acute   fracture identified.    --- End of Report ---            JOSHUA CROSS MD; Attending Radiologist  This document has been electronically signed. Apr 10 2024 10:10AM    < end of copied text >

## 2024-04-12 NOTE — DIETITIAN INITIAL EVALUATION ADULT - NS FNS DIET ORDER
Diet, DASH/TLC:   Sodium & Cholesterol Restricted  Supplement Feeding Modality:  Oral  Ensure Max Cans or Servings Per Day:  1       Frequency:  Three Times a day (04-10-24 @ 13:23) [Active]

## 2024-04-12 NOTE — DISCHARGE NOTE PROVIDER - CARE PROVIDER_API CALL
Dwaine [facility]  Surgery  [address] [place] [address]  Phone: [phone]  Fax: [phone]  Follow Up Time: 2 weeks   Dontae Isidro  Surgery  [address] [place] [address]  Phone: [phone]  Fax: [phone]  Follow Up Time: 2 weeks    Apollo Nix  Internal Medicine  [address] [place] [address], [address]  [place] [address]  Phone: [phone]  Fax: [phone]  Follow Up Time: 1 week

## 2024-04-12 NOTE — DIETITIAN INITIAL EVALUATION ADULT - PERTINENT MEDS FT
MEDICATIONS  (STANDING):  aMIOdarone    Tablet 200 milliGRAM(s) Oral daily  amLODIPine   Tablet 5 milliGRAM(s) Oral daily  atorvastatin 10 milliGRAM(s) Oral at bedtime  carvedilol 25 milliGRAM(s) Oral every 12 hours  famotidine    Tablet 20 milliGRAM(s) Oral daily  ferrous    sulfate 325 milliGRAM(s) Oral daily  fludroCORTISONE 0.05 milliGRAM(s) Oral daily  furosemide    Tablet 40 milliGRAM(s) Oral daily  guaifenesin/dextromethorphan Oral Liquid 10 milliLiter(s) Oral three times a day  hydrALAZINE 100 milliGRAM(s) Oral every 8 hours  hydrocortisone 10 milliGRAM(s) Oral daily  hydrocortisone 5 milliGRAM(s) Oral at bedtime  levoFLOXacin  Tablet 250 milliGRAM(s) Oral every 24 hours  losartan 50 milliGRAM(s) Oral two times a day  sodium chloride 0.9%. 1000 milliLiter(s) (40 mL/Hr) IV Continuous <Continuous>  spironolactone 25 milliGRAM(s) Oral daily    MEDICATIONS  (PRN):  acetaminophen     Tablet .. 650 milliGRAM(s) Oral every 6 hours PRN Temp greater or equal to 38C (100.4F), Mild Pain (1 - 3)  magnesium hydroxide Suspension 30 milliLiter(s) Oral every 24 hours PRN for constipation  ondansetron Injectable 4 milliGRAM(s) IV Push every 6 hours PRN Nausea

## 2024-04-13 LAB
ADD ON TEST-SPECIMEN IN LAB: SIGNIFICANT CHANGE UP
ANION GAP SERPL CALC-SCNC: 8 MMOL/L — SIGNIFICANT CHANGE UP (ref 5–17)
BUN SERPL-MCNC: 22 MG/DL — SIGNIFICANT CHANGE UP (ref 7–23)
CALCIUM SERPL-MCNC: 8.8 MG/DL — SIGNIFICANT CHANGE UP (ref 8.5–10.1)
CHLORIDE SERPL-SCNC: 98 MMOL/L — SIGNIFICANT CHANGE UP (ref 96–108)
CO2 SERPL-SCNC: 24 MMOL/L — SIGNIFICANT CHANGE UP (ref 22–31)
CREAT SERPL-MCNC: 1.17 MG/DL — SIGNIFICANT CHANGE UP (ref 0.5–1.3)
EGFR: 42 ML/MIN/1.73M2 — LOW
GLUCOSE SERPL-MCNC: 113 MG/DL — HIGH (ref 70–99)
HCT VFR BLD CALC: 27.6 % — LOW (ref 34.5–45)
HGB BLD-MCNC: 8.9 G/DL — LOW (ref 11.5–15.5)
MAGNESIUM SERPL-MCNC: 2.1 MG/DL — SIGNIFICANT CHANGE UP (ref 1.6–2.6)
MCHC RBC-ENTMCNC: 31.3 PG — SIGNIFICANT CHANGE UP (ref 27–34)
MCHC RBC-ENTMCNC: 32.2 GM/DL — SIGNIFICANT CHANGE UP (ref 32–36)
MCV RBC AUTO: 97.2 FL — SIGNIFICANT CHANGE UP (ref 80–100)
NT-PROBNP SERPL-SCNC: 4775 PG/ML — HIGH (ref 0–450)
PHOSPHATE SERPL-MCNC: 2.8 MG/DL — SIGNIFICANT CHANGE UP (ref 2.5–4.5)
PLATELET # BLD AUTO: 108 K/UL — LOW (ref 150–400)
POTASSIUM SERPL-MCNC: 3.6 MMOL/L — SIGNIFICANT CHANGE UP (ref 3.5–5.3)
POTASSIUM SERPL-SCNC: 3.6 MMOL/L — SIGNIFICANT CHANGE UP (ref 3.5–5.3)
RBC # BLD: 2.84 M/UL — LOW (ref 3.8–5.2)
RBC # FLD: 14.3 % — SIGNIFICANT CHANGE UP (ref 10.3–14.5)
SODIUM SERPL-SCNC: 130 MMOL/L — LOW (ref 135–145)
TROPONIN I, HIGH SENSITIVITY RESULT: 14.52 NG/L — SIGNIFICANT CHANGE UP
WBC # BLD: 5.41 K/UL — SIGNIFICANT CHANGE UP (ref 3.8–10.5)
WBC # FLD AUTO: 5.41 K/UL — SIGNIFICANT CHANGE UP (ref 3.8–10.5)

## 2024-04-13 PROCEDURE — 73100 X-RAY EXAM OF WRIST: CPT | Mod: 26,RT

## 2024-04-13 PROCEDURE — 71045 X-RAY EXAM CHEST 1 VIEW: CPT | Mod: 26

## 2024-04-13 PROCEDURE — 99497 ADVNCD CARE PLAN 30 MIN: CPT | Mod: 25

## 2024-04-13 PROCEDURE — 99222 1ST HOSP IP/OBS MODERATE 55: CPT

## 2024-04-13 PROCEDURE — 93010 ELECTROCARDIOGRAM REPORT: CPT

## 2024-04-13 RX ORDER — CEFEPIME 1 G/1
INJECTION, POWDER, FOR SOLUTION INTRAMUSCULAR; INTRAVENOUS
Refills: 0 | Status: DISCONTINUED | OUTPATIENT
Start: 2024-04-13 | End: 2024-04-17

## 2024-04-13 RX ORDER — AZITHROMYCIN 500 MG/1
500 TABLET, FILM COATED ORAL ONCE
Refills: 0 | Status: COMPLETED | OUTPATIENT
Start: 2024-04-13 | End: 2024-04-13

## 2024-04-13 RX ORDER — CEFEPIME 1 G/1
INJECTION, POWDER, FOR SOLUTION INTRAMUSCULAR; INTRAVENOUS
Refills: 0 | Status: DISCONTINUED | OUTPATIENT
Start: 2024-04-13 | End: 2024-04-13

## 2024-04-13 RX ORDER — FUROSEMIDE 40 MG
40 TABLET ORAL DAILY
Refills: 0 | Status: DISCONTINUED | OUTPATIENT
Start: 2024-04-13 | End: 2024-04-15

## 2024-04-13 RX ORDER — PANTOPRAZOLE SODIUM 20 MG/1
40 TABLET, DELAYED RELEASE ORAL DAILY
Refills: 0 | Status: DISCONTINUED | OUTPATIENT
Start: 2024-04-13 | End: 2024-04-17

## 2024-04-13 RX ORDER — AZITHROMYCIN 500 MG/1
TABLET, FILM COATED ORAL
Refills: 0 | Status: DISCONTINUED | OUTPATIENT
Start: 2024-04-13 | End: 2024-04-17

## 2024-04-13 RX ORDER — IPRATROPIUM/ALBUTEROL SULFATE 18-103MCG
3 AEROSOL WITH ADAPTER (GRAM) INHALATION EVERY 6 HOURS
Refills: 0 | Status: DISCONTINUED | OUTPATIENT
Start: 2024-04-13 | End: 2024-04-17

## 2024-04-13 RX ORDER — CEFEPIME 1 G/1
1000 INJECTION, POWDER, FOR SOLUTION INTRAMUSCULAR; INTRAVENOUS EVERY 12 HOURS
Refills: 0 | Status: DISCONTINUED | OUTPATIENT
Start: 2024-04-14 | End: 2024-04-17

## 2024-04-13 RX ORDER — CEFEPIME 1 G/1
1000 INJECTION, POWDER, FOR SOLUTION INTRAMUSCULAR; INTRAVENOUS ONCE
Refills: 0 | Status: COMPLETED | OUTPATIENT
Start: 2024-04-13 | End: 2024-04-13

## 2024-04-13 RX ORDER — AZITHROMYCIN 500 MG/1
500 TABLET, FILM COATED ORAL EVERY 24 HOURS
Refills: 0 | Status: DISCONTINUED | OUTPATIENT
Start: 2024-04-14 | End: 2024-04-17

## 2024-04-13 RX ADMIN — Medication 1200 MILLIGRAM(S): at 21:18

## 2024-04-13 RX ADMIN — AZITHROMYCIN 255 MILLIGRAM(S): 500 TABLET, FILM COATED ORAL at 15:32

## 2024-04-13 RX ADMIN — CEFEPIME 1000 MILLIGRAM(S): 1 INJECTION, POWDER, FOR SOLUTION INTRAMUSCULAR; INTRAVENOUS at 14:58

## 2024-04-13 RX ADMIN — FLUDROCORTISONE ACETATE 0.05 MILLIGRAM(S): 0.1 TABLET ORAL at 09:54

## 2024-04-13 RX ADMIN — Medication 10 MILLIGRAM(S): at 09:53

## 2024-04-13 RX ADMIN — Medication 650 MILLIGRAM(S): at 13:06

## 2024-04-13 RX ADMIN — Medication 100 MILLIGRAM(S): at 05:53

## 2024-04-13 RX ADMIN — Medication 40 MILLIGRAM(S): at 09:53

## 2024-04-13 RX ADMIN — Medication 30 MILLILITER(S): at 19:03

## 2024-04-13 RX ADMIN — SPIRONOLACTONE 25 MILLIGRAM(S): 25 TABLET, FILM COATED ORAL at 09:53

## 2024-04-13 RX ADMIN — Medication 40 MILLIGRAM(S): at 14:58

## 2024-04-13 RX ADMIN — Medication 100 MILLIGRAM(S): at 14:24

## 2024-04-13 RX ADMIN — AMLODIPINE BESYLATE 5 MILLIGRAM(S): 2.5 TABLET ORAL at 09:53

## 2024-04-13 RX ADMIN — Medication 40 MILLIGRAM(S): at 23:38

## 2024-04-13 RX ADMIN — Medication 650 MILLIGRAM(S): at 05:54

## 2024-04-13 RX ADMIN — Medication 325 MILLIGRAM(S): at 09:53

## 2024-04-13 RX ADMIN — PANTOPRAZOLE SODIUM 40 MILLIGRAM(S): 20 TABLET, DELAYED RELEASE ORAL at 14:58

## 2024-04-13 RX ADMIN — FAMOTIDINE 20 MILLIGRAM(S): 10 INJECTION INTRAVENOUS at 09:53

## 2024-04-13 RX ADMIN — Medication 3 MILLILITER(S): at 20:58

## 2024-04-13 RX ADMIN — Medication 30 MILLILITER(S): at 12:29

## 2024-04-13 RX ADMIN — ATORVASTATIN CALCIUM 10 MILLIGRAM(S): 80 TABLET, FILM COATED ORAL at 21:21

## 2024-04-13 RX ADMIN — Medication 5 MILLIGRAM(S): at 21:19

## 2024-04-13 RX ADMIN — CARVEDILOL PHOSPHATE 25 MILLIGRAM(S): 80 CAPSULE, EXTENDED RELEASE ORAL at 09:53

## 2024-04-13 RX ADMIN — Medication 650 MILLIGRAM(S): at 06:31

## 2024-04-13 RX ADMIN — Medication 10 MILLILITER(S): at 21:18

## 2024-04-13 RX ADMIN — AMIODARONE HYDROCHLORIDE 200 MILLIGRAM(S): 400 TABLET ORAL at 09:53

## 2024-04-13 RX ADMIN — LOSARTAN POTASSIUM 50 MILLIGRAM(S): 100 TABLET, FILM COATED ORAL at 09:53

## 2024-04-13 RX ADMIN — Medication 10 MILLILITER(S): at 14:23

## 2024-04-13 RX ADMIN — Medication 100 MILLIGRAM(S): at 21:18

## 2024-04-13 RX ADMIN — LOSARTAN POTASSIUM 50 MILLIGRAM(S): 100 TABLET, FILM COATED ORAL at 21:18

## 2024-04-13 RX ADMIN — CARVEDILOL PHOSPHATE 25 MILLIGRAM(S): 80 CAPSULE, EXTENDED RELEASE ORAL at 21:19

## 2024-04-13 NOTE — PROGRESS NOTE ADULT - ASSESSMENT
89yo F presents s/p fall onto her buttock, no head trauma, no LOC, not on blood thinners  CT demonstrated chronic thoracolumbar fx, but only tender at coccyx area, likely chronic compression fx, acute sacral S5 fx    Pt accepted to medicine team since no further traumatic workup needed, and treatment of bronchopneumonia      Plan:  - management of L bronchopneumonia per medicine   - pain control prn  - monitor VS  - diet  - encourage IS/OOB/PT  - DNR/DNI  - ortho recs, urology recs appreciate for retention

## 2024-04-13 NOTE — PROGRESS NOTE ADULT - SUBJECTIVE AND OBJECTIVE BOX
Pt seen and examined this am, No sacral Pain, some ttp sacrum, nova in place (home nova),  Pt accepted to medicine team yesterday.         Physical Exam:  GENERAL: NAD, AOx3, well developed  HEAD:  Atraumatic, Normocephalic  EYES: EOMI, conjunctiva and sclera clear  NECK: Supple, No JVD  CHEST/LUNG: Unlabored respirations b/l  HEART: S1 and S2 normal  ABDOMEN: soft, nondistended, nontender  some ttp sacrum,  nova in place   EXTREMITIES:  2+ Peripheral Pulses, brisk capillary refill. No clubbing, cyanosis, or edema  NERVOUS SYSTEM:  AO X3, speech clear. No deficits   MSK: full ROM, no deformities. tender to palpation at gluteal cleft, no ecchymosis/erythema  SKIN: warm to touch. No rashes or lesions      Vital Signs Last 24 Hrs  T(C): 36.6 (13 Apr 2024 06:04), Max: 37 (12 Apr 2024 08:17)  T(F): 97.9 (13 Apr 2024 06:04), Max: 98.6 (12 Apr 2024 08:17)  HR: 68 (13 Apr 2024 06:04) (59 - 71)  BP: 170/52 (13 Apr 2024 06:04) (129/39 - 171/52)  BP(mean): --  RR: 18 (13 Apr 2024 06:04) (18 - 19)  SpO2: 96% (13 Apr 2024 06:04) (94% - 97%)    Parameters below as of 13 Apr 2024 06:04  Patient On (Oxygen Delivery Method): nasal cannula  O2 Flow (L/min): 2                          8.1    5.74  )-----------( 107      ( 12 Apr 2024 07:28 )             25.6     04-12    130<L>  |  101  |  26<H>  ----------------------------<  121<H>  3.6   |  23  |  1.51<H>    Ca    8.1<L>      12 Apr 2024 07:28  Phos  2.9     04-12  Mg     1.9     04-12    TPro  5.8<L>  /  Alb  2.7<L>  /  TBili  0.3  /  DBili  x   /  AST  26  /  ALT  20  /  AlkPhos  57  04-11    I&O's Summary    11 Apr 2024 07:01  -  12 Apr 2024 07:00  --------------------------------------------------------  IN: 0 mL / OUT: 850 mL / NET: -850 mL    12 Apr 2024 07:01  -  13 Apr 2024 06:21  --------------------------------------------------------  IN: 0 mL / OUT: 900 mL / NET: -900 mL        Culture - Blood (collected 10 Apr 2024 09:13)  Source: .Blood None  Preliminary Report (12 Apr 2024 18:02):    No growth at 48 Hours    Culture - Urine (collected 10 Apr 2024 09:13)  Source: Clean Catch None  Final Report (12 Apr 2024 01:01):    <10,000 CFU/mL Normal Urogenital Britta    Culture - Blood (collected 10 Apr 2024 09:13)  Source: .Blood None  Preliminary Report (12 Apr 2024 18:01):    No growth at 48 Hours

## 2024-04-13 NOTE — CONSULT NOTE ADULT - ASSESSMENT
89 y/o female w/ paroxysmal Afib-- not on AC, HFpEF, adrenal insufficiency, lymphoma (remission), HTN, iron def, ICH presents s/p fall this morning.  Found to have sacral fracture and admitted by trauma surgery.  Alos noted to have urinary retention and nova placed.  Noted to have LLL PNA and started on PO levaquin.  Medicine consulted.      #Acute hypoxic respiratory failure secondary to HCAP and suspected acute on chronic diastolic CHF exacerbation:    D/c PO levaquin.    Start IV ABX-- cefepime and azithromycin.    Treat gram negative rods/ HCAP.    Mucinex BID.    Duonebs.    Cont NC O2-- 2 L.  Titrate.    Follow labs/ temps.    IV diuresis-- lasix 40 IV daily.    D/c IVF which she has been on for 4 days.    Repeat chest imaging with b/l PNA and suspect CHF component/ effusions.      #Chest pain/ epigastric pain:    EKG/ trop negative this am.    Start protonix.    Follow.      #Sacral Fracture:    Further care as per trauma/ ortho.      #Hyponatremia:    Suspect related to CHF.    Check urine lytes.    Stop IVF.    Diuresis.    Repeat labs in am.      #Urinary Retention:    Cont nova.  Appreciate urology eval.    Recommend d/c with nova.    No UTI>      #Adrenal Insufficiency:    Cont home meds.  Florinef/ hydrocortisone.      #PAF:    No on AC.  Cont amio.      #HTN:    Cont home meds.  Losartan/ Norvasc/ Hydralazine/ Coreg.      #Anemia:    Secondary to bleed.  Follow.      #Fall:    Pt eval.  REHAB.      #DVT proph:  contraindicated with sacral hematoma.     89 y/o female w/ paroxysmal Afib-- not on AC, HFpEF, adrenal insufficiency, lymphoma (remission), HTN, iron def, ICH presents s/p fall this morning.  Found to have sacral fracture and admitted by trauma surgery.  Alos noted to have urinary retention and nova placed.  Noted to have LLL PNA and started on PO levaquin.  Medicine consulted.      #Acute hypoxic respiratory failure secondary to HCAP and suspected acute on chronic diastolic CHF exacerbation:    D/c PO levaquin.    Start IV ABX-- cefepime and azithromycin.    Treat gram negative rods/ HCAP.    Mucinex BID.    Duonebs.    Cont NC O2-- 2 L.  Titrate.    Follow labs/ temps.    IV diuresis-- lasix 40 IV daily.    D/c IVF which she has been on for 4 days.    Repeat chest imaging with b/l PNA and suspect CHF component/ effusions.      #Chest pain/ epigastric pain:    EKG/ trop negative this am.    Start protonix.    Follow.      #Sacral Fracture:    Further care as per trauma/ ortho.      #Hyponatremia:    Suspect related to CHF.    Check urine lytes.    Stop IVF.    Diuresis.    Repeat labs in am.      #Urinary Retention:    Cont nova.  Appreciate urology eval.    Recommend d/c with nova.    No UTI>      #Adrenal Insufficiency:    Cont home meds.  Florinef/ hydrocortisone.      #PAF:    No on AC.  Cont amio.      #HTN:    Cont home meds.  Losartan/ Norvasc/ Hydralazine/ Coreg.      #Anemia:    Secondary to bleed.  Follow.      #Fall:    Pt eval.  REHAB.      #DVT proph:  contraindicated with sacral hematoma.      DNR/ DNI.  D/w son Wolf and TOSHA and Grandson in detail at bedside and all questions answered.

## 2024-04-13 NOTE — CONSULT NOTE ADULT - SUBJECTIVE AND OBJECTIVE BOX
PCP:  Apollo Nix    CHIEF COMPLAINT: fall    HISTORY OF THE PRESENT ILLNESS:  89 y/o female w/ paroxysmal Afib-- not on AC, HFpEF, adrenal insufficiency, lymphoma (remission), HTN, iron def, ICH presents s/p fall this morning. Patient is AOx3. Patient states that she was on a toilet and slipped from the toilet and fell onto her buttock. Denies head trauma, LOC, not on blood thinners.  States that her body is generally achy, but today her tailbone is especially painful.  Patient was admitted 2 months ago for heart failure exacerbation, was discharged on oxygen.  Per son, oxygen was weaned off few weeks ago, but this morning her saturation is around 88% on room air, currently on NC and saturating above 95%.  Patient was also told she has upper resp infection, was on levaquin.      Patient admitted by trauma surgery,  Patient was given PO levaquin for PNA.  Hospitalist consulted for medical management and transfer of service.  Patient seen.  C/o not feeling well for the last 2-3 days.  She notes indigestion/ epigastric pain.  Has hx of hiatal hernia.  Also notes SOB and coughing.  Was told she has PNA.  Isn't improving.  Also has been on oxygen since admission.      Patient is DNR/DNI-- has MOLST.      PAST MEDICAL HISTORY:  as above    PAST SURGICAL HISTORY:   as above    FAMILY HISTORY:   non-contributory to the patient's current presentation    SOCIAL HISTORY:  no smoking, no alcohol, no drugs    REVIEW OF SYSTEMS:   All 10 systems reviewed in detailed and found to be negative with the exception of what has already been described above    MEDICATIONS  (STANDING):  aMIOdarone    Tablet 200 milliGRAM(s) Oral daily  amLODIPine   Tablet 5 milliGRAM(s) Oral daily  atorvastatin 10 milliGRAM(s) Oral at bedtime  carvedilol 25 milliGRAM(s) Oral every 12 hours  famotidine    Tablet 20 milliGRAM(s) Oral daily  ferrous    sulfate 325 milliGRAM(s) Oral daily  fludroCORTISONE 0.05 milliGRAM(s) Oral daily  furosemide    Tablet 40 milliGRAM(s) Oral daily  guaifenesin/dextromethorphan Oral Liquid 10 milliLiter(s) Oral three times a day  hydrALAZINE 100 milliGRAM(s) Oral every 8 hours  hydrocortisone 5 milliGRAM(s) Oral at bedtime  hydrocortisone 10 milliGRAM(s) Oral daily  losartan 50 milliGRAM(s) Oral two times a day  sodium chloride 0.9%. 1000 milliLiter(s) (40 mL/Hr) IV Continuous <Continuous>  spironolactone 25 milliGRAM(s) Oral daily    MEDICATIONS  (PRN):  acetaminophen     Tablet .. 650 milliGRAM(s) Oral every 6 hours PRN Temp greater or equal to 38C (100.4F), Mild Pain (1 - 3)  aluminum hydroxide/magnesium hydroxide/simethicone Suspension 30 milliLiter(s) Oral every 4 hours PRN Dyspepsia  magnesium hydroxide Suspension 30 milliLiter(s) Oral every 24 hours PRN for constipation  ondansetron Injectable 4 milliGRAM(s) IV Push every 6 hours PRN Nausea    Vital Signs Last 24 Hrs  T(C): 36.4 (13 Apr 2024 14:17), Max: 36.8 (12 Apr 2024 15:48)  T(F): 97.5 (13 Apr 2024 14:17), Max: 98.2 (12 Apr 2024 15:48)  HR: 61 (13 Apr 2024 14:17) (59 - 71)  BP: 164/43 (13 Apr 2024 14:17) (145/49 - 171/52)  BP(mean): --  RR: 18 (13 Apr 2024 14:17) (18 - 19)  SpO2: 98% (13 Apr 2024 14:17) (94% - 98%)    Parameters below as of 13 Apr 2024 14:17  Patient On (Oxygen Delivery Method): nasal cannula  O2 Flow (L/min): 2    HEENT:  pupils equal and reactive, EOMI, no oropharyngeal lesions, erythema, exudates, oral thrush  NECK:   supple, no carotid bruits  CV:  +S1, +S2, regular, no murmurs  RESP:   lungs clear to auscultation bilaterally, no wheezing, rales, rhonchi, good air entry bilaterally  GI:  abdomen soft, non-tender, non-distended, normal BS  EXT:  no clubbing, no cyanosis, no edema, no calf pain, swelling or erythema  NEURO:  AAOX3, no focal neurological deficits, follows all commands, able to move extremities spontaneously  SKIN:  no ulcers, lesions or rashes    LABS:                          8.9    5.41  )-----------( 108      ( 13 Apr 2024 06:26 )             27.6     04-13    130<L>  |  98  |  22  ----------------------------<  113<H>  3.6   |  24  |  1.17    Ca    8.8      13 Apr 2024 06:26  Phos  2.8     04-13  Mg     2.1     04-13      Urinalysis Basic - ( 13 Apr 2024 06:26   Color: x / Appearance: x / SG: x / pH: x  Gluc: 113 mg/dL / Ketone: x  / Bili: x / Urobili: x   Blood: x / Protein: x / Nitrite: x   Leuk Esterase: x / RBC: x / WBC x   Sq Epi: x / Non Sq Epi: x / Bacteria: x        < from: CT Chest No Cont (04.10.24 @ 09:03) >  IMPRESSION:  1.  Interval LLL bronchopneumonia.  2.  Equivocal/minimal anterior lower sacral/S5 cortical contour   deformity/breech (602-59) suggests nondisplaced lower sacral fracture.   Confirm with directed physical exam.  3.  Trace dependent pelvic/presacral hemorrhage (2/118), probably related   to adjacent nondisplaced lower sacral fracture.  4.  Distended urinary bladder (approx 800 cc)  < end of copied text >   PCP:  Apollo Nix    CHIEF COMPLAINT: fall    HISTORY OF THE PRESENT ILLNESS:  89 y/o female w/ paroxysmal Afib-- not on AC, HFpEF, adrenal insufficiency, lymphoma (remission), HTN, iron def, ICH presents s/p fall this morning. Patient is AOx3. Patient states that she was on a toilet and slipped from the toilet and fell onto her buttock. Denies head trauma, LOC, not on blood thinners.  States that her body is generally achy, but today her tailbone is especially painful.  Patient was admitted 2 months ago for heart failure exacerbation, was discharged on oxygen.  Per son, oxygen was weaned off few weeks ago, but this morning her saturation is around 88% on room air, currently on NC and saturating above 95%.  Patient was also told she has upper resp infection, was on levaquin.      Patient admitted by trauma surgery,  Found to have sacral fracture.  Patient was given PO levaquin for PNA.  Hospitalist consulted for medical management and transfer of service.  Patient seen.  C/o not feeling well for the last 2-3 days.  She notes indigestion/ epigastric pain.  Has hx of hiatal hernia.  Also notes SOB and coughing.  Was told she has PNA.  Isn't improving.  Also has been on oxygen since admission.      Patient is DNR/DNI-- has MOLST.      PAST MEDICAL HISTORY:  as above    PAST SURGICAL HISTORY:   as above    FAMILY HISTORY:   non-contributory to the patient's current presentation    SOCIAL HISTORY:  no smoking, no alcohol, no drugs    REVIEW OF SYSTEMS:   All 10 systems reviewed in detailed and found to be negative with the exception of what has already been described above    MEDICATIONS  (STANDING):  aMIOdarone    Tablet 200 milliGRAM(s) Oral daily  amLODIPine   Tablet 5 milliGRAM(s) Oral daily  atorvastatin 10 milliGRAM(s) Oral at bedtime  carvedilol 25 milliGRAM(s) Oral every 12 hours  famotidine    Tablet 20 milliGRAM(s) Oral daily  ferrous    sulfate 325 milliGRAM(s) Oral daily  fludroCORTISONE 0.05 milliGRAM(s) Oral daily  furosemide    Tablet 40 milliGRAM(s) Oral daily  guaifenesin/dextromethorphan Oral Liquid 10 milliLiter(s) Oral three times a day  hydrALAZINE 100 milliGRAM(s) Oral every 8 hours  hydrocortisone 5 milliGRAM(s) Oral at bedtime  hydrocortisone 10 milliGRAM(s) Oral daily  losartan 50 milliGRAM(s) Oral two times a day  sodium chloride 0.9%. 1000 milliLiter(s) (40 mL/Hr) IV Continuous <Continuous>  spironolactone 25 milliGRAM(s) Oral daily    MEDICATIONS  (PRN):  acetaminophen     Tablet .. 650 milliGRAM(s) Oral every 6 hours PRN Temp greater or equal to 38C (100.4F), Mild Pain (1 - 3)  aluminum hydroxide/magnesium hydroxide/simethicone Suspension 30 milliLiter(s) Oral every 4 hours PRN Dyspepsia  magnesium hydroxide Suspension 30 milliLiter(s) Oral every 24 hours PRN for constipation  ondansetron Injectable 4 milliGRAM(s) IV Push every 6 hours PRN Nausea    Vital Signs Last 24 Hrs  T(C): 36.4 (13 Apr 2024 14:17), Max: 36.8 (12 Apr 2024 15:48)  T(F): 97.5 (13 Apr 2024 14:17), Max: 98.2 (12 Apr 2024 15:48)  HR: 61 (13 Apr 2024 14:17) (59 - 71)  BP: 164/43 (13 Apr 2024 14:17) (145/49 - 171/52)  BP(mean): --  RR: 18 (13 Apr 2024 14:17) (18 - 19)  SpO2: 98% (13 Apr 2024 14:17) (94% - 98%)    Parameters below as of 13 Apr 2024 14:17  Patient On (Oxygen Delivery Method): nasal cannula  O2 Flow (L/min): 2    HEENT:  pupils equal and reactive, EOMI, no oropharyngeal lesions, erythema, exudates, oral thrush  NECK:   supple, no carotid bruits  CV:  +S1, +S2, regular, no murmurs  RESP:   lungs clear to auscultation bilaterally, no wheezing, rales, rhonchi, good air entry bilaterally  GI:  abdomen soft, non-tender, non-distended, normal BS  EXT:  no clubbing, no cyanosis, no edema, no calf pain, swelling or erythema  NEURO:  AAOX3, no focal neurological deficits, follows all commands, able to move extremities spontaneously  SKIN:  no ulcers, lesions or rashes    LABS:                          8.9    5.41  )-----------( 108      ( 13 Apr 2024 06:26 )             27.6     04-13    130<L>  |  98  |  22  ----------------------------<  113<H>  3.6   |  24  |  1.17    Ca    8.8      13 Apr 2024 06:26  Phos  2.8     04-13  Mg     2.1     04-13      Urinalysis Basic - ( 13 Apr 2024 06:26   Color: x / Appearance: x / SG: x / pH: x  Gluc: 113 mg/dL / Ketone: x  / Bili: x / Urobili: x   Blood: x / Protein: x / Nitrite: x   Leuk Esterase: x / RBC: x / WBC x   Sq Epi: x / Non Sq Epi: x / Bacteria: x        < from: CT Chest No Cont (04.10.24 @ 09:03) >  IMPRESSION:  1.  Interval LLL bronchopneumonia.  2.  Equivocal/minimal anterior lower sacral/S5 cortical contour   deformity/breech (602-59) suggests nondisplaced lower sacral fracture.   Confirm with directed physical exam.  3.  Trace dependent pelvic/presacral hemorrhage (2/118), probably related   to adjacent nondisplaced lower sacral fracture.  4.  Distended urinary bladder (approx 800 cc)  < end of copied text >      MEDICATIONS  (STANDING):  albuterol/ipratropium for Nebulization 3 milliLiter(s) Nebulizer every 6 hours  aMIOdarone    Tablet 200 milliGRAM(s) Oral daily  amLODIPine   Tablet 5 milliGRAM(s) Oral daily  atorvastatin 10 milliGRAM(s) Oral at bedtime  azithromycin  IVPB      carvedilol 25 milliGRAM(s) Oral every 12 hours  cefepime   IVPB      famotidine    Tablet 20 milliGRAM(s) Oral daily  ferrous    sulfate 325 milliGRAM(s) Oral daily  fludroCORTISONE 0.05 milliGRAM(s) Oral daily  furosemide   Injectable 40 milliGRAM(s) IV Push daily  guaiFENesin ER 1200 milliGRAM(s) Oral every 12 hours  guaifenesin/dextromethorphan Oral Liquid 10 milliLiter(s) Oral three times a day  hydrALAZINE 100 milliGRAM(s) Oral every 8 hours  hydrocortisone 5 milliGRAM(s) Oral at bedtime  hydrocortisone 10 milliGRAM(s) Oral daily  losartan 50 milliGRAM(s) Oral two times a day  pantoprazole  Injectable 40 milliGRAM(s) IV Push daily  spironolactone 25 milliGRAM(s) Oral daily    MEDICATIONS  (PRN):  acetaminophen     Tablet .. 650 milliGRAM(s) Oral every 6 hours PRN Temp greater or equal to 38C (100.4F), Mild Pain (1 - 3)  aluminum hydroxide/magnesium hydroxide/simethicone Suspension 30 milliLiter(s) Oral every 4 hours PRN Dyspepsia  magnesium hydroxide Suspension 30 milliLiter(s) Oral every 24 hours PRN for constipation  ondansetron Injectable 4 milliGRAM(s) IV Push every 6 hours PRN Nausea

## 2024-04-14 LAB
ADD ON TEST-SPECIMEN IN LAB: SIGNIFICANT CHANGE UP
ALBUMIN SERPL ELPH-MCNC: 2.2 G/DL — LOW (ref 3.3–5)
ANION GAP SERPL CALC-SCNC: 7 MMOL/L — SIGNIFICANT CHANGE UP (ref 5–17)
ANION GAP SERPL CALC-SCNC: 7 MMOL/L — SIGNIFICANT CHANGE UP (ref 5–17)
BUN SERPL-MCNC: 21 MG/DL — SIGNIFICANT CHANGE UP (ref 7–23)
BUN SERPL-MCNC: 25 MG/DL — HIGH (ref 7–23)
CALCIUM SERPL-MCNC: 8.4 MG/DL — LOW (ref 8.5–10.1)
CALCIUM SERPL-MCNC: 8.7 MG/DL — SIGNIFICANT CHANGE UP (ref 8.5–10.1)
CHLORIDE SERPL-SCNC: 93 MMOL/L — LOW (ref 96–108)
CHLORIDE SERPL-SCNC: 93 MMOL/L — LOW (ref 96–108)
CO2 SERPL-SCNC: 24 MMOL/L — SIGNIFICANT CHANGE UP (ref 22–31)
CO2 SERPL-SCNC: 27 MMOL/L — SIGNIFICANT CHANGE UP (ref 22–31)
CREAT ?TM UR-MCNC: 17 MG/DL — SIGNIFICANT CHANGE UP
CREAT SERPL-MCNC: 1.16 MG/DL — SIGNIFICANT CHANGE UP (ref 0.5–1.3)
CREAT SERPL-MCNC: 1.19 MG/DL — SIGNIFICANT CHANGE UP (ref 0.5–1.3)
EGFR: 41 ML/MIN/1.73M2 — LOW
EGFR: 42 ML/MIN/1.73M2 — LOW
GLUCOSE SERPL-MCNC: 114 MG/DL — HIGH (ref 70–99)
GLUCOSE SERPL-MCNC: 192 MG/DL — HIGH (ref 70–99)
HCT VFR BLD CALC: 26.1 % — LOW (ref 34.5–45)
HGB BLD-MCNC: 8.6 G/DL — LOW (ref 11.5–15.5)
MAGNESIUM SERPL-MCNC: 2 MG/DL — SIGNIFICANT CHANGE UP (ref 1.6–2.6)
MCHC RBC-ENTMCNC: 31.5 PG — SIGNIFICANT CHANGE UP (ref 27–34)
MCHC RBC-ENTMCNC: 33 GM/DL — SIGNIFICANT CHANGE UP (ref 32–36)
MCV RBC AUTO: 95.6 FL — SIGNIFICANT CHANGE UP (ref 80–100)
OSMOLALITY UR: 313 MOSM/KG — SIGNIFICANT CHANGE UP (ref 50–1200)
PHOSPHATE SERPL-MCNC: 2.2 MG/DL — LOW (ref 2.5–4.5)
PHOSPHATE SERPL-MCNC: 2.4 MG/DL — LOW (ref 2.5–4.5)
PLATELET # BLD AUTO: 105 K/UL — LOW (ref 150–400)
POTASSIUM SERPL-MCNC: 3.1 MMOL/L — LOW (ref 3.5–5.3)
POTASSIUM SERPL-MCNC: 3.8 MMOL/L — SIGNIFICANT CHANGE UP (ref 3.5–5.3)
POTASSIUM SERPL-SCNC: 3.1 MMOL/L — LOW (ref 3.5–5.3)
POTASSIUM SERPL-SCNC: 3.8 MMOL/L — SIGNIFICANT CHANGE UP (ref 3.5–5.3)
RBC # BLD: 2.73 M/UL — LOW (ref 3.8–5.2)
RBC # FLD: 14.1 % — SIGNIFICANT CHANGE UP (ref 10.3–14.5)
SODIUM SERPL-SCNC: 124 MMOL/L — LOW (ref 135–145)
SODIUM SERPL-SCNC: 127 MMOL/L — LOW (ref 135–145)
SODIUM UR-SCNC: 83 MMOL/L — SIGNIFICANT CHANGE UP
TROPONIN I, HIGH SENSITIVITY RESULT: 14.49 NG/L — SIGNIFICANT CHANGE UP
WBC # BLD: 6.43 K/UL — SIGNIFICANT CHANGE UP (ref 3.8–10.5)
WBC # FLD AUTO: 6.43 K/UL — SIGNIFICANT CHANGE UP (ref 3.8–10.5)

## 2024-04-14 PROCEDURE — 99233 SBSQ HOSP IP/OBS HIGH 50: CPT

## 2024-04-14 PROCEDURE — 71250 CT THORAX DX C-: CPT | Mod: 26

## 2024-04-14 RX ORDER — POTASSIUM CHLORIDE 20 MEQ
40 PACKET (EA) ORAL EVERY 4 HOURS
Refills: 0 | Status: COMPLETED | OUTPATIENT
Start: 2024-04-14 | End: 2024-04-14

## 2024-04-14 RX ORDER — BUDESONIDE AND FORMOTEROL FUMARATE DIHYDRATE 160; 4.5 UG/1; UG/1
2 AEROSOL RESPIRATORY (INHALATION)
Refills: 0 | Status: DISCONTINUED | OUTPATIENT
Start: 2024-04-14 | End: 2024-04-17

## 2024-04-14 RX ORDER — ALBUTEROL 90 UG/1
2 AEROSOL, METERED ORAL EVERY 6 HOURS
Refills: 0 | Status: DISCONTINUED | OUTPATIENT
Start: 2024-04-14 | End: 2024-04-17

## 2024-04-14 RX ADMIN — Medication 1200 MILLIGRAM(S): at 21:32

## 2024-04-14 RX ADMIN — Medication 3 MILLILITER(S): at 08:44

## 2024-04-14 RX ADMIN — Medication 10 MILLILITER(S): at 06:44

## 2024-04-14 RX ADMIN — CEFEPIME 1000 MILLIGRAM(S): 1 INJECTION, POWDER, FOR SOLUTION INTRAMUSCULAR; INTRAVENOUS at 17:43

## 2024-04-14 RX ADMIN — CEFEPIME 1000 MILLIGRAM(S): 1 INJECTION, POWDER, FOR SOLUTION INTRAMUSCULAR; INTRAVENOUS at 06:52

## 2024-04-14 RX ADMIN — CARVEDILOL PHOSPHATE 25 MILLIGRAM(S): 80 CAPSULE, EXTENDED RELEASE ORAL at 10:55

## 2024-04-14 RX ADMIN — SPIRONOLACTONE 25 MILLIGRAM(S): 25 TABLET, FILM COATED ORAL at 10:55

## 2024-04-14 RX ADMIN — Medication 100 MILLIGRAM(S): at 14:35

## 2024-04-14 RX ADMIN — Medication 40 MILLIEQUIVALENT(S): at 14:36

## 2024-04-14 RX ADMIN — Medication 10 MILLILITER(S): at 14:35

## 2024-04-14 RX ADMIN — Medication 10 MILLIGRAM(S): at 10:55

## 2024-04-14 RX ADMIN — LOSARTAN POTASSIUM 50 MILLIGRAM(S): 100 TABLET, FILM COATED ORAL at 10:55

## 2024-04-14 RX ADMIN — ATORVASTATIN CALCIUM 10 MILLIGRAM(S): 80 TABLET, FILM COATED ORAL at 21:32

## 2024-04-14 RX ADMIN — CARVEDILOL PHOSPHATE 25 MILLIGRAM(S): 80 CAPSULE, EXTENDED RELEASE ORAL at 21:33

## 2024-04-14 RX ADMIN — Medication 40 MILLIGRAM(S): at 23:54

## 2024-04-14 RX ADMIN — PANTOPRAZOLE SODIUM 40 MILLIGRAM(S): 20 TABLET, DELAYED RELEASE ORAL at 10:54

## 2024-04-14 RX ADMIN — Medication 10 MILLILITER(S): at 21:34

## 2024-04-14 RX ADMIN — Medication 325 MILLIGRAM(S): at 10:55

## 2024-04-14 RX ADMIN — FLUDROCORTISONE ACETATE 0.05 MILLIGRAM(S): 0.1 TABLET ORAL at 10:54

## 2024-04-14 RX ADMIN — Medication 3 MILLILITER(S): at 14:08

## 2024-04-14 RX ADMIN — Medication 100 MILLIGRAM(S): at 21:32

## 2024-04-14 RX ADMIN — LOSARTAN POTASSIUM 50 MILLIGRAM(S): 100 TABLET, FILM COATED ORAL at 21:32

## 2024-04-14 RX ADMIN — Medication 40 MILLIEQUIVALENT(S): at 17:43

## 2024-04-14 RX ADMIN — Medication 100 MILLIGRAM(S): at 06:44

## 2024-04-14 RX ADMIN — Medication 1200 MILLIGRAM(S): at 10:54

## 2024-04-14 RX ADMIN — AMIODARONE HYDROCHLORIDE 200 MILLIGRAM(S): 400 TABLET ORAL at 10:55

## 2024-04-14 RX ADMIN — FAMOTIDINE 20 MILLIGRAM(S): 10 INJECTION INTRAVENOUS at 10:55

## 2024-04-14 RX ADMIN — AMLODIPINE BESYLATE 5 MILLIGRAM(S): 2.5 TABLET ORAL at 10:55

## 2024-04-14 RX ADMIN — AZITHROMYCIN 255 MILLIGRAM(S): 500 TABLET, FILM COATED ORAL at 14:36

## 2024-04-14 RX ADMIN — Medication 3 MILLILITER(S): at 20:59

## 2024-04-14 RX ADMIN — Medication 5 MILLIGRAM(S): at 21:33

## 2024-04-14 NOTE — PROGRESS NOTE ADULT - ASSESSMENT
#Acute hypoxic respiratory failure   -probably atelectasis and COPD exacerbation   -CT chest to follow   -for now ct abx for possible gram negative pneumonia   -spirometry   -inhaled steroids   -ct iv lasix for possible acute decompensated diastolic heart failure     #Hyponatremia -monitor it   -water restriction     #Chest pain/ epigastric pain:    resolved     #Sacral Fracture:    Further care as per trauma/ ortho.      #Urinary Retention:    Cont nova.  Appreciate urology eval.    Recommend d/c with nova.    No UTI>      #Adrenal Insufficiency:    Cont home meds.  Florinef/ hydrocortisone.      #PAF:    No on AC.  Cont amio.      #HTN:    Cont home meds.  Losartan/ Norvasc/ Hydralazine/ Coreg.      #Anemia:    Secondary to bleed.  Follow.      #discussed with trauma team

## 2024-04-14 NOTE — PROGRESS NOTE ADULT - ASSESSMENT
89yo F presents s/p fall onto her buttock, no head trauma, no LOC, not on blood thinners  CT demonstrated chronic thoracolumbar fx, but only tender at coccyx area, likely chronic compression fx, acute sacral S5 fx  bronchopneumonia      Plan:  - management of bronchopneumonia per medicine   - pain control prn  - monitor VS  - diet  - encourage IS/OOB/PT  - DNR/DNI  - ortho recs, urology recs appreciate for retention  -dispo planning

## 2024-04-14 NOTE — PROGRESS NOTE ADULT - SUBJECTIVE AND OBJECTIVE BOX
HPI:  89yo F w/ paroxysmal Afib, HFpEF, CKD?, adrenal insufficiency, lymphoma (remission), HTN, iron def, ICH presents s/p fall  Patient states that she was on a toilet and slipped from the toilet and fell onto her buttock. Denies head trauma, LOC, not on blood thinners.   was admitted under surgery team and found to have hypoxic respiratory failure     Review of Systems:  CONSTITUTIONAL: weakness,+  EYES/ENT: No visual changes;  No vertigo or throat pain   NECK: No pain or stiffness  RESPIRATORY:  shortness of breath+  CARDIOVASCULAR: No chest pain or palpitations  GASTROINTESTINAL: No abdominal or epigastric pain. No nausea, vomiting, or hematemesis; No diarrhea or constipation.   GENITOURINARY: No dysuria, frequency or hematuria  NEUROLOGICAL: No numbness or weakness  SKIN: No itching, burning, rashes, or lesions   All other review of systems is negative unless indicated above    PHYSICAL EXAM:    Vital Signs Last 24 Hrs  T(C): 37.1 (14 Apr 2024 08:32), Max: 37.1 (14 Apr 2024 08:32)  T(F): 98.8 (14 Apr 2024 08:32), Max: 98.8 (14 Apr 2024 08:32)  HR: 62 (14 Apr 2024 08:45) (61 - 66)  BP: 130/40 (14 Apr 2024 08:32) (130/40 - 174/54)  BP(mean): --  RR: 18 (14 Apr 2024 08:32) (18 - 18)  SpO2: 97% (14 Apr 2024 08:45) (96% - 98%)    Parameters below as of 14 Apr 2024 08:45  Patient On (Oxygen Delivery Method): nasal cannula, 2L        GENERAL: comfortable   HEAD:  Atraumatic, Normocephalic  EYES: EOMI, PERRLA, conjunctiva and sclera clear  HEENT: Moist mucous membranes  NECK: Supple, No JVD  NERVOUS SYSTEM:  Alert  CHEST/LUNG: AEEB, mild wheeze noted   HEART:S1S2 normal, no murmer  ABDOMEN: Soft, Nontender, Nondistended; Bowel sounds present  GENITOURINARY- Voiding, no palpable bladder  EXTREMITIES:  2+ Peripheral Pulses, No clubbing, cyanosis, or edema  MUSCULOSKELETAL No muscle tenderness, Muscle tone normal,   SKIN-no rash, no lesion  PSYCH- Mood stable  LYMPH Node- No palpable lymph node    LABS:                        8.6    6.43  )-----------( 105      ( 14 Apr 2024 07:54 )             26.1     04-14    127<L>  |  93<L>  |  21  ----------------------------<  114<H>  3.1<L>   |  27  |  1.16    Ca    8.7      14 Apr 2024 07:54  Phos  2.4     04-14  Mg     2.0     04-14        Urinalysis Basic - ( 14 Apr 2024 07:54 )    Color: x / Appearance: x / SG: x / pH: x  Gluc: 114 mg/dL / Ketone: x  / Bili: x / Urobili: x   Blood: x / Protein: x / Nitrite: x   Leuk Esterase: x / RBC: x / WBC x   Sq Epi: x / Non Sq Epi: x / Bacteria: x        CAPILLARY BLOOD GLUCOSE                Standing medicine  acetaminophen     Tablet .. 650 milliGRAM(s) Oral every 6 hours PRN  albuterol    90 MICROgram(s) HFA Inhaler 2 Puff(s) Inhalation every 6 hours  albuterol/ipratropium for Nebulization 3 milliLiter(s) Nebulizer every 6 hours  aluminum hydroxide/magnesium hydroxide/simethicone Suspension 30 milliLiter(s) Oral every 4 hours PRN  aMIOdarone    Tablet 200 milliGRAM(s) Oral daily  amLODIPine   Tablet 5 milliGRAM(s) Oral daily  atorvastatin 10 milliGRAM(s) Oral at bedtime  azithromycin  IVPB      azithromycin  IVPB 500 milliGRAM(s) IV Intermittent every 24 hours  budesonide 160 MICROgram(s)/formoterol 4.5 MICROgram(s) Inhaler 2 Puff(s) Inhalation two times a day  carvedilol 25 milliGRAM(s) Oral every 12 hours  cefepime  Injectable.      cefepime  Injectable. 1000 milliGRAM(s) IV Push every 12 hours  famotidine    Tablet 20 milliGRAM(s) Oral daily  ferrous    sulfate 325 milliGRAM(s) Oral daily  fludroCORTISONE 0.05 milliGRAM(s) Oral daily  furosemide   Injectable 40 milliGRAM(s) IV Push daily  guaiFENesin ER 1200 milliGRAM(s) Oral every 12 hours  guaifenesin/dextromethorphan Oral Liquid 10 milliLiter(s) Oral three times a day  hydrALAZINE 100 milliGRAM(s) Oral every 8 hours  hydrocortisone 10 milliGRAM(s) Oral daily  hydrocortisone 5 milliGRAM(s) Oral at bedtime  losartan 50 milliGRAM(s) Oral two times a day  magnesium hydroxide Suspension 30 milliLiter(s) Oral every 24 hours PRN  ondansetron Injectable 4 milliGRAM(s) IV Push every 6 hours PRN  pantoprazole  Injectable 40 milliGRAM(s) IV Push daily  potassium chloride    Tablet ER 40 milliEquivalent(s) Oral every 4 hours  spironolactone 25 milliGRAM(s) Oral daily

## 2024-04-14 NOTE — PROGRESS NOTE ADULT - SUBJECTIVE AND OBJECTIVE BOX
Pt seen and examined this am, some ttp sacrum, nova in place (home nova). denies n/v/f/c/cp/sob      Physical Exam:  GENERAL: NAD, AOx3, well developed  HEAD:  Atraumatic, Normocephalic  EYES: EOMI, conjunctiva and sclera clear  NECK: Supple, No JVD  CHEST/LUNG: Unlabored respirations b/l  HEART: S1 and S2 normal  ABDOMEN: soft, nondistended, nontender  some ttp sacrum  nova in place   EXTREMITIES:  2+ Peripheral Pulses, brisk capillary refill. No clubbing, cyanosis, or edema  NERVOUS SYSTEM:  AO X3, speech clear. No deficits   MSK: full ROM, no deformities. tender to palpation at gluteal cleft, no ecchymosis/erythema  SKIN: warm to touch. No rashes or lesions    Vital Signs Last 24 Hrs  T(C): 36.6 (13 Apr 2024 23:13), Max: 36.7 (13 Apr 2024 08:31)  T(F): 97.9 (13 Apr 2024 23:13), Max: 98.1 (13 Apr 2024 08:31)  HR: 61 (13 Apr 2024 23:13) (61 - 66)  BP: 147/51 (13 Apr 2024 23:13) (145/49 - 174/54)  BP(mean): --  RR: 18 (13 Apr 2024 23:13) (18 - 18)  SpO2: 96% (13 Apr 2024 23:13) (96% - 98%)    Parameters below as of 13 Apr 2024 23:13  Patient On (Oxygen Delivery Method): nasal cannula  O2 Flow (L/min): 2    MEDICATIONS  (STANDING):  albuterol/ipratropium for Nebulization 3 milliLiter(s) Nebulizer every 6 hours  aMIOdarone    Tablet 200 milliGRAM(s) Oral daily  amLODIPine   Tablet 5 milliGRAM(s) Oral daily  atorvastatin 10 milliGRAM(s) Oral at bedtime  azithromycin  IVPB      azithromycin  IVPB 500 milliGRAM(s) IV Intermittent every 24 hours  carvedilol 25 milliGRAM(s) Oral every 12 hours  cefepime  Injectable. 1000 milliGRAM(s) IV Push every 12 hours  cefepime  Injectable.      famotidine    Tablet 20 milliGRAM(s) Oral daily  ferrous    sulfate 325 milliGRAM(s) Oral daily  fludroCORTISONE 0.05 milliGRAM(s) Oral daily  furosemide   Injectable 40 milliGRAM(s) IV Push daily  guaiFENesin ER 1200 milliGRAM(s) Oral every 12 hours  guaifenesin/dextromethorphan Oral Liquid 10 milliLiter(s) Oral three times a day  hydrALAZINE 100 milliGRAM(s) Oral every 8 hours  hydrocortisone 5 milliGRAM(s) Oral at bedtime  hydrocortisone 10 milliGRAM(s) Oral daily  losartan 50 milliGRAM(s) Oral two times a day  pantoprazole  Injectable 40 milliGRAM(s) IV Push daily  spironolactone 25 milliGRAM(s) Oral daily    MEDICATIONS  (PRN):  acetaminophen     Tablet .. 650 milliGRAM(s) Oral every 6 hours PRN Temp greater or equal to 38C (100.4F), Mild Pain (1 - 3)  aluminum hydroxide/magnesium hydroxide/simethicone Suspension 30 milliLiter(s) Oral every 4 hours PRN Dyspepsia  magnesium hydroxide Suspension 30 milliLiter(s) Oral every 24 hours PRN for constipation  ondansetron Injectable 4 milliGRAM(s) IV Push every 6 hours PRN Nausea                          8.9    5.41  )-----------( 108      ( 13 Apr 2024 06:26 )             27.6     04-13    130<L>  |  98  |  22  ----------------------------<  113<H>  3.6   |  24  |  1.17    Ca    8.8      13 Apr 2024 06:26  Phos  2.8     04-13  Mg     2.1     04-13      I&O's Summary    12 Apr 2024 07:01  -  13 Apr 2024 07:00  --------------------------------------------------------  IN: 0 mL / OUT: 900 mL / NET: -900 mL    13 Apr 2024 07:01  -  14 Apr 2024 06:20  --------------------------------------------------------  IN: 0 mL / OUT: 1000 mL / NET: -1000 mL

## 2024-04-15 LAB
ALBUMIN SERPL ELPH-MCNC: 2.4 G/DL — LOW (ref 3.3–5)
ANION GAP SERPL CALC-SCNC: 4 MMOL/L — LOW (ref 5–17)
ANION GAP SERPL CALC-SCNC: 5 MMOL/L — SIGNIFICANT CHANGE UP (ref 5–17)
BASOPHILS # BLD AUTO: 0.01 K/UL — SIGNIFICANT CHANGE UP (ref 0–0.2)
BASOPHILS NFR BLD AUTO: 0.1 % — SIGNIFICANT CHANGE UP (ref 0–2)
BUN SERPL-MCNC: 26 MG/DL — HIGH (ref 7–23)
BUN SERPL-MCNC: 28 MG/DL — HIGH (ref 7–23)
CALCIUM SERPL-MCNC: 8.7 MG/DL — SIGNIFICANT CHANGE UP (ref 8.5–10.1)
CALCIUM SERPL-MCNC: 9.1 MG/DL — SIGNIFICANT CHANGE UP (ref 8.5–10.1)
CHLORIDE SERPL-SCNC: 91 MMOL/L — LOW (ref 96–108)
CHLORIDE SERPL-SCNC: 93 MMOL/L — LOW (ref 96–108)
CO2 SERPL-SCNC: 29 MMOL/L — SIGNIFICANT CHANGE UP (ref 22–31)
CO2 SERPL-SCNC: 29 MMOL/L — SIGNIFICANT CHANGE UP (ref 22–31)
CREAT SERPL-MCNC: 1.05 MG/DL — SIGNIFICANT CHANGE UP (ref 0.5–1.3)
CREAT SERPL-MCNC: 1.27 MG/DL — SIGNIFICANT CHANGE UP (ref 0.5–1.3)
CULTURE RESULTS: SIGNIFICANT CHANGE UP
CULTURE RESULTS: SIGNIFICANT CHANGE UP
EGFR: 38 ML/MIN/1.73M2 — LOW
EGFR: 48 ML/MIN/1.73M2 — LOW
EOSINOPHIL # BLD AUTO: 0.01 K/UL — SIGNIFICANT CHANGE UP (ref 0–0.5)
EOSINOPHIL NFR BLD AUTO: 0.1 % — SIGNIFICANT CHANGE UP (ref 0–6)
GLUCOSE SERPL-MCNC: 117 MG/DL — HIGH (ref 70–99)
GLUCOSE SERPL-MCNC: 98 MG/DL — SIGNIFICANT CHANGE UP (ref 70–99)
HCT VFR BLD CALC: 27.1 % — LOW (ref 34.5–45)
HGB BLD-MCNC: 8.9 G/DL — LOW (ref 11.5–15.5)
IMM GRANULOCYTES NFR BLD AUTO: 0.7 % — SIGNIFICANT CHANGE UP (ref 0–0.9)
LYMPHOCYTES # BLD AUTO: 1.53 K/UL — SIGNIFICANT CHANGE UP (ref 1–3.3)
LYMPHOCYTES # BLD AUTO: 20.7 % — SIGNIFICANT CHANGE UP (ref 13–44)
MAGNESIUM SERPL-MCNC: 2.2 MG/DL — SIGNIFICANT CHANGE UP (ref 1.6–2.6)
MCHC RBC-ENTMCNC: 31.2 PG — SIGNIFICANT CHANGE UP (ref 27–34)
MCHC RBC-ENTMCNC: 32.8 GM/DL — SIGNIFICANT CHANGE UP (ref 32–36)
MCV RBC AUTO: 95.1 FL — SIGNIFICANT CHANGE UP (ref 80–100)
MONOCYTES # BLD AUTO: 1.04 K/UL — HIGH (ref 0–0.9)
MONOCYTES NFR BLD AUTO: 14.1 % — HIGH (ref 2–14)
NEUTROPHILS # BLD AUTO: 4.75 K/UL — SIGNIFICANT CHANGE UP (ref 1.8–7.4)
NEUTROPHILS NFR BLD AUTO: 64.3 % — SIGNIFICANT CHANGE UP (ref 43–77)
PHOSPHATE SERPL-MCNC: 1.6 MG/DL — LOW (ref 2.5–4.5)
PLATELET # BLD AUTO: 123 K/UL — LOW (ref 150–400)
POTASSIUM SERPL-MCNC: 4.3 MMOL/L — SIGNIFICANT CHANGE UP (ref 3.5–5.3)
POTASSIUM SERPL-MCNC: 4.3 MMOL/L — SIGNIFICANT CHANGE UP (ref 3.5–5.3)
POTASSIUM SERPL-SCNC: 4.3 MMOL/L — SIGNIFICANT CHANGE UP (ref 3.5–5.3)
POTASSIUM SERPL-SCNC: 4.3 MMOL/L — SIGNIFICANT CHANGE UP (ref 3.5–5.3)
RBC # BLD: 2.85 M/UL — LOW (ref 3.8–5.2)
RBC # FLD: 14 % — SIGNIFICANT CHANGE UP (ref 10.3–14.5)
SODIUM SERPL-SCNC: 124 MMOL/L — LOW (ref 135–145)
SODIUM SERPL-SCNC: 127 MMOL/L — LOW (ref 135–145)
SPECIMEN SOURCE: SIGNIFICANT CHANGE UP
SPECIMEN SOURCE: SIGNIFICANT CHANGE UP
WBC # BLD: 7.39 K/UL — SIGNIFICANT CHANGE UP (ref 3.8–10.5)
WBC # FLD AUTO: 7.39 K/UL — SIGNIFICANT CHANGE UP (ref 3.8–10.5)

## 2024-04-15 PROCEDURE — 99233 SBSQ HOSP IP/OBS HIGH 50: CPT

## 2024-04-15 PROCEDURE — 99223 1ST HOSP IP/OBS HIGH 75: CPT

## 2024-04-15 RX ORDER — HYDRALAZINE HCL 50 MG
10 TABLET ORAL ONCE
Refills: 0 | Status: COMPLETED | OUTPATIENT
Start: 2024-04-15 | End: 2024-04-15

## 2024-04-15 RX ORDER — SODIUM CHLORIDE 9 MG/ML
500 INJECTION INTRAMUSCULAR; INTRAVENOUS; SUBCUTANEOUS
Refills: 0 | Status: DISCONTINUED | OUTPATIENT
Start: 2024-04-15 | End: 2024-04-17

## 2024-04-15 RX ORDER — SUCRALFATE 1 G
1 TABLET ORAL
Refills: 0 | Status: DISCONTINUED | OUTPATIENT
Start: 2024-04-15 | End: 2024-04-17

## 2024-04-15 RX ORDER — LANOLIN ALCOHOL/MO/W.PET/CERES
3 CREAM (GRAM) TOPICAL AT BEDTIME
Refills: 0 | Status: DISCONTINUED | OUTPATIENT
Start: 2024-04-15 | End: 2024-04-17

## 2024-04-15 RX ADMIN — FAMOTIDINE 20 MILLIGRAM(S): 10 INJECTION INTRAVENOUS at 09:29

## 2024-04-15 RX ADMIN — Medication 3 MILLILITER(S): at 21:30

## 2024-04-15 RX ADMIN — Medication 3 MILLIGRAM(S): at 21:16

## 2024-04-15 RX ADMIN — LOSARTAN POTASSIUM 50 MILLIGRAM(S): 100 TABLET, FILM COATED ORAL at 21:16

## 2024-04-15 RX ADMIN — Medication 100 MILLIGRAM(S): at 06:52

## 2024-04-15 RX ADMIN — Medication 3 MILLILITER(S): at 10:03

## 2024-04-15 RX ADMIN — FLUDROCORTISONE ACETATE 0.05 MILLIGRAM(S): 0.1 TABLET ORAL at 09:30

## 2024-04-15 RX ADMIN — Medication 325 MILLIGRAM(S): at 09:29

## 2024-04-15 RX ADMIN — Medication 30 MILLILITER(S): at 00:00

## 2024-04-15 RX ADMIN — LOSARTAN POTASSIUM 50 MILLIGRAM(S): 100 TABLET, FILM COATED ORAL at 09:29

## 2024-04-15 RX ADMIN — CARVEDILOL PHOSPHATE 25 MILLIGRAM(S): 80 CAPSULE, EXTENDED RELEASE ORAL at 21:19

## 2024-04-15 RX ADMIN — SODIUM CHLORIDE 75 MILLILITER(S): 9 INJECTION INTRAMUSCULAR; INTRAVENOUS; SUBCUTANEOUS at 09:55

## 2024-04-15 RX ADMIN — CEFEPIME 1000 MILLIGRAM(S): 1 INJECTION, POWDER, FOR SOLUTION INTRAMUSCULAR; INTRAVENOUS at 17:37

## 2024-04-15 RX ADMIN — Medication 10 MILLILITER(S): at 06:53

## 2024-04-15 RX ADMIN — Medication 10 MILLIGRAM(S): at 09:30

## 2024-04-15 RX ADMIN — Medication 30 MILLILITER(S): at 10:49

## 2024-04-15 RX ADMIN — ATORVASTATIN CALCIUM 10 MILLIGRAM(S): 80 TABLET, FILM COATED ORAL at 21:18

## 2024-04-15 RX ADMIN — Medication 10 MILLILITER(S): at 21:22

## 2024-04-15 RX ADMIN — CARVEDILOL PHOSPHATE 25 MILLIGRAM(S): 80 CAPSULE, EXTENDED RELEASE ORAL at 09:28

## 2024-04-15 RX ADMIN — Medication 10 MILLIGRAM(S): at 14:32

## 2024-04-15 RX ADMIN — Medication 1 GRAM(S): at 21:28

## 2024-04-15 RX ADMIN — BUDESONIDE AND FORMOTEROL FUMARATE DIHYDRATE 2 PUFF(S): 160; 4.5 AEROSOL RESPIRATORY (INHALATION) at 21:30

## 2024-04-15 RX ADMIN — Medication 100 MILLIGRAM(S): at 21:15

## 2024-04-15 RX ADMIN — CEFEPIME 1000 MILLIGRAM(S): 1 INJECTION, POWDER, FOR SOLUTION INTRAMUSCULAR; INTRAVENOUS at 07:00

## 2024-04-15 RX ADMIN — AZITHROMYCIN 255 MILLIGRAM(S): 500 TABLET, FILM COATED ORAL at 14:13

## 2024-04-15 RX ADMIN — Medication 5 MILLIGRAM(S): at 21:14

## 2024-04-15 RX ADMIN — AMLODIPINE BESYLATE 5 MILLIGRAM(S): 2.5 TABLET ORAL at 09:28

## 2024-04-15 RX ADMIN — PANTOPRAZOLE SODIUM 40 MILLIGRAM(S): 20 TABLET, DELAYED RELEASE ORAL at 09:33

## 2024-04-15 RX ADMIN — AMIODARONE HYDROCHLORIDE 200 MILLIGRAM(S): 400 TABLET ORAL at 09:29

## 2024-04-15 RX ADMIN — BUDESONIDE AND FORMOTEROL FUMARATE DIHYDRATE 2 PUFF(S): 160; 4.5 AEROSOL RESPIRATORY (INHALATION) at 10:21

## 2024-04-15 RX ADMIN — SPIRONOLACTONE 25 MILLIGRAM(S): 25 TABLET, FILM COATED ORAL at 09:30

## 2024-04-15 NOTE — CHART NOTE - NSCHARTNOTEFT_GEN_A_CORE
89yo F presents s/p fall onto her buttock, no head trauma, no LOC, not on blood thinners  CT demonstrated chronic thoracolumbar fx, but only tender at coccyx area, likely chronic compression fx, acute sacral S5 fx  bronchopneumonia    Plan:  - pt Transferred to Dr. Cruz service    - pain control prn  - monitor VS  - diet as tolerated  - encourage IS/OOB/PT  - DNR/DNI  - Restart DVT ppx  - ortho recs, urology recs appreciate for retention  - dispo planning: [facility]ANG  - trauma surgery will sign off    Plan discussed with Dr. Ames.

## 2024-04-15 NOTE — PROGRESS NOTE ADULT - ASSESSMENT
89yo F presents s/p fall onto her buttock, no head trauma, no LOC, not on blood thinners  CT demonstrated chronic thoracolumbar fx, but only tender at coccyx area, likely chronic compression fx, acute sacral S5 fx  bronchopneumonia    Plan:  - management of bronchopneumonia per medicine   - pain control prn  - monitor VS  - diet as tolerated  - encourage IS/OOB/PT  - DNR/DNI  - Restart DVT ppx  - ortho recs, urology recs appreciate for retention  - dispo planning: TAG[facility]    Plan discussed with Dr. Ames.

## 2024-04-15 NOTE — PROGRESS NOTE ADULT - SUBJECTIVE AND OBJECTIVE BOX
Pt. seen and examined at bedside this morning. Patient tolerating diet, nova in place. She denies fever, chest pain, SOB, nausea, vomiting.    ROS negative except as above.    Physical Exam:  General: AOx3, Well developed, NAD  HEENT: NC/AT, normal pinnae and tragi  Chest: Normal respiratory effort, equal chest rise  Heart: RRR  Abdomen: Soft, NTND, Nova in place  Neuro/Psych: No localized deficits. Normal speech, normal tone, normal affect  Skin: Normal, warm, no rashes, no lesions noted  Extremities: Warm, well perfused, no edema. Tenderness at sacrum    Vitals:  T(C): 36.4 ( @ 23:15), Max: 37.1 ( @ 08:32)  HR: 61 ( @ 23:15) (59 - 62)  BP: 157/47 ( @ 23:15) (115/49 - 157/47)  RR: 18 ( @ 23:15) (18 - 18)  SpO2: 96% ( @ 23:15) (96% - 98%)     @ 07:01  -   @ 07:00  --------------------------------------------------------  IN:  Total IN: 0 mL    OUT:    Indwelling Catheter - Urethral (mL): 1900 mL  Total OUT: 1900 mL    Total NET: -1900 mL           @ 15:44                    -  CBC: ->)-------(<-                     -                 124 | 93 | 25    CMP:  ----------------------< 192               3.8 | 24 | 1.19                      Ca:8.4  Phos:2.2  Mg:-               -|      |-        LFTs:  ------|-|-----             -|      |-   @ 07:54                    8.6  CBC: 6.43>)-------(<105                     26.1                 127 | 93 | 21    CMP:  ----------------------< 114               3.1 | 27 | 1.16                      Ca:8.7  Phos:2.4  M.0               -|      |-        LFTs:  ------|-|-----             -|      |-      Culture - Urine (collected 04-10-24 @ 09:13)  Source: Clean Catch None  Final Report (24 @ 01:01):    <10,000 CFU/mL Normal Urogenital Britta    Culture - Blood (collected 04-10-24 @ 09:13)  Source: .Blood None  Preliminary Report (24 @ 18:01):    No growth at 4 days    Culture - Blood (collected 04-10-24 @ 09:13)  Source: .Blood None  Preliminary Report (24 @ 18:01):    No growth at 4 days

## 2024-04-15 NOTE — CONSULT NOTE ADULT - SUBJECTIVE AND OBJECTIVE BOX
HPI: Pt is a 89y old Female with hx of Afib, HFpEF, CKD?, adrenal insufficiency, lymphoma (remission), HTN, iron def, ICH presents s/p fall  Patient states that she was on a toilet and slipped from the toilet and fell onto her buttock. Denies head trauma, LOC, not on blood thinners. was admitted under surgery team and found to have hypoxic respiratory failure. Palliative medicine consulted to help establish GOC and advance care planning     4/15/2024       PAIN: ( )Yes   ( )No  Level:  Location:  Intensity:    /10  Quality:  Aggravating Factors:  Alleviating Factors:  Radiation:  Duration/Timing:  Impact on ADLs:    DYSPNEA: ( ) Yes  ( ) No  Level:    PAST MEDICAL & SURGICAL HISTORY:  Iron deficiency  HTN (hypertension)  Lymphoma  H/O adrenal insufficiency  Hyponatremia  Hypokalemia  Chronic kidney disease, unspecified CKD stage  Diastolic heart failure  Paroxysmal atrial fibrillation  H/O CHF  S/P appendectomy  S/P hysterectomy  H/O intracranial hemorrhage    SOCIAL HX:    Hx opiate tolerance ( )YES  (x )NO    Baseline ADLs  (Prior to Admission)  ( ) Independent   ( )Dependent    FAMILY HISTORY:  No known problems (Father, Mother)      Review of Systems:    Anxiety- denies  Depression- denies  Physical Discomfort- denies  Dyspnea- ++ with activity  Constipation- denies  Diarrhea- denies  Nausea- denies  Vomiting- denies  Anorexia- denies  Weight Loss-   Cough- denies  Secretions- denies  Fatigue- ++  Weakness- ++  Delirium- denies    All other systems reviewed and negative      PHYSICAL EXAM:    Vital Signs Last 24 Hrs  T(C): 36.6 (15 Apr 2024 15:03), Max: 36.6 (15 Apr 2024 07:50)  T(F): 97.9 (15 Apr 2024 15:03), Max: 97.9 (15 Apr 2024 07:50)  HR: 60 (15 Apr 2024 15:03) (60 - 61)  BP: 135/41 (15 Apr 2024 15:03) (135/41 - 163/44)  BP(mean): 69 (15 Apr 2024 15:03) (69 - 75)  RR: 16 (15 Apr 2024 15:03) (16 - 18)  SpO2: 97% (15 Apr 2024 15:03) (96% - 97%)    Parameters below as of 15 Apr 2024 15:03  Patient On (Oxygen Delivery Method): nasal cannula    PPSV2:  40%    General: awake, alert, pleasant, up in chair in NAD  Mental Status: A&Ox3  HEENT: on 3LNC  Lungs: Diminished throughout all lung fields  Cardiac: Regular rate and rhythm. S1S2  GI: Abdomen soft, nontender, nondistended. +bsx4  : no suprapubic tenderness  Ext: b/l LE erythematous   Neuro: A&Ox3. Speech intact. No focal neuro deficits.      LABS:                        8.9    7.39  )-----------( 123      ( 15 Apr 2024 06:26 )             27.1     04-15    127<L>  |  93<L>  |  28<H>  ----------------------------<  98  4.3   |  29  |  1.27    Ca    9.1      15 Apr 2024 06:26  Phos  1.6     04-15  Mg     2.2     04-15    TPro  x   /  Alb  2.4<L>  /  TBili  x   /  DBili  x   /  AST  x   /  ALT  x   /  AlkPhos  x   04-15    Albumin: Albumin: 2.4 g/dL (04-15 @ 06:26)    Allergies    tetracycline (Hives)  sulfa drugs (Hives)  penicillin (Hives)    Intolerances    MEDICATIONS  (STANDING):  albuterol    90 MICROgram(s) HFA Inhaler 2 Puff(s) Inhalation every 6 hours  albuterol/ipratropium for Nebulization 3 milliLiter(s) Nebulizer every 6 hours  aMIOdarone    Tablet 200 milliGRAM(s) Oral daily  amLODIPine   Tablet 5 milliGRAM(s) Oral daily  atorvastatin 10 milliGRAM(s) Oral at bedtime  azithromycin  IVPB      azithromycin  IVPB 500 milliGRAM(s) IV Intermittent every 24 hours  budesonide 160 MICROgram(s)/formoterol 4.5 MICROgram(s) Inhaler 2 Puff(s) Inhalation two times a day  carvedilol 25 milliGRAM(s) Oral every 12 hours  cefepime  Injectable. 1000 milliGRAM(s) IV Push every 12 hours  cefepime  Injectable.      famotidine    Tablet 20 milliGRAM(s) Oral daily  ferrous    sulfate 325 milliGRAM(s) Oral daily  fludroCORTISONE 0.05 milliGRAM(s) Oral daily  guaiFENesin ER 1200 milliGRAM(s) Oral every 12 hours  guaifenesin/dextromethorphan Oral Liquid 10 milliLiter(s) Oral three times a day  hydrALAZINE 100 milliGRAM(s) Oral every 8 hours  hydrocortisone 5 milliGRAM(s) Oral at bedtime  hydrocortisone 10 milliGRAM(s) Oral daily  losartan 50 milliGRAM(s) Oral two times a day  pantoprazole  Injectable 40 milliGRAM(s) IV Push daily  sodium chloride 0.9%. 500 milliLiter(s) (75 mL/Hr) IV Continuous <Continuous>  spironolactone 25 milliGRAM(s) Oral daily  sucralfate 1 Gram(s) Oral four times a day    MEDICATIONS  (PRN):  acetaminophen     Tablet .. 650 milliGRAM(s) Oral every 6 hours PRN Temp greater or equal to 38C (100.4F), Mild Pain (1 - 3)  aluminum hydroxide/magnesium hydroxide/simethicone Suspension 30 milliLiter(s) Oral every 4 hours PRN Dyspepsia  magnesium hydroxide Suspension 30 milliLiter(s) Oral every 24 hours PRN for constipation  ondansetron Injectable 4 milliGRAM(s) IV Push every 6 hours PRN Nausea      RADIOLOGY/ADDITIONAL STUDIES:    < from: CT Chest No Cont (04.14.24 @ 13:44) >  ACC: [id number] EXAM:  CT CHEST   ORDERED BY: DASHA WASHINGTON     PROCEDURE DATE:  04/14/2024          INTERPRETATION:  EXAMINATION: CT CHEST    CLINICAL INDICATION: Dyspnea.    TECHNIQUE: Noncontrast CT of the chest was obtained.    COMPARISON: Multiple CT, most recent 4/10/2024.    FINDINGS:    AIRWAYS AND LUNGS: Tracheobronchial secretions.  Bronchiectasis is noted.   Scattered clustered opacities. Lingula 1.1 cm partially cavitary nodule   versus focal bronchiectasis is unchanged in size withnew cavitation   compared to prior studies. Small bilateral pleural effusions with partial   atelectasis of both lower lobes.    MEDIASTINUM AND PLEURA: There are no enlarged mediastinal, hilar or   axillary lymph nodes. The visualized portion of thethyroid gland is   unremarkable. There is no pneumothorax.    HEART AND VESSELS: There is cardiomegaly.  There are atherosclerotic   calcifications of the aorta and coronary arteries.  There is trace   pericardial fluid.    UPPER ABDOMEN: Images of the upper abdomen demonstrate hyperdense liver.    BONES AND SOFT TISSUES: Multilevel vertebroplasty  The soft tissues are   unremarkable.    IMPRESSION:  Tracheobronchial secretions.  Bronchiectasis is noted. Scattered   clustered opacities. Lingula 1.1 cm partially cavitary nodule versus   focal bronchiectasis is unchanged in size with new cavitation compared to   prior studies. Small bilateral pleural effusions with partial atelectasis   of both lower lobes.    --- End of Report ---      < end of copied text >   HPI: Pt is a 89y old Female with hx of Afib, HFpEF, CKD?, adrenal insufficiency, lymphoma (remission), HTN, iron def, ICH presents s/p fall  Patient states that she was on a toilet and slipped from the toilet and fell onto her buttock. Denies head trauma, LOC, not on blood thinners. was admitted under surgery team and found to have hypoxic respiratory failure. Palliative medicine consulted to help establish GOC and advance care planning     4/15/2024 pt seen and examined with no family at bedside, patients states that she is tired       PAIN: ( )Yes   ( )No  Level:  Location:  Intensity:    /10  Quality:  Aggravating Factors:  Alleviating Factors:  Radiation:  Duration/Timing:  Impact on ADLs:    DYSPNEA: ( ) Yes  ( ) No  Level:    PAST MEDICAL & SURGICAL HISTORY:  Iron deficiency  HTN (hypertension)  Lymphoma  H/O adrenal insufficiency  Hyponatremia  Hypokalemia  Chronic kidney disease, unspecified CKD stage  Diastolic heart failure  Paroxysmal atrial fibrillation  H/O CHF  S/P appendectomy  S/P hysterectomy  H/O intracranial hemorrhage    SOCIAL HX:    Hx opiate tolerance ( )YES  (x )NO    Baseline ADLs  (Prior to Admission)  ( ) Independent   ( )Dependent    FAMILY HISTORY:  No known problems (Father, Mother)      Review of Systems:    Anxiety- denies  Depression- denies  Physical Discomfort- denies  Dyspnea- ++ with activity  Constipation- denies  Diarrhea- denies  Nausea- denies  Vomiting- denies  Anorexia- denies  Weight Loss-   Cough- denies  Secretions- denies  Fatigue- ++  Weakness- ++  Delirium- denies    All other systems reviewed and negative      PHYSICAL EXAM:    Vital Signs Last 24 Hrs  T(C): 36.6 (15 Apr 2024 15:03), Max: 36.6 (15 Apr 2024 07:50)  T(F): 97.9 (15 Apr 2024 15:03), Max: 97.9 (15 Apr 2024 07:50)  HR: 60 (15 Apr 2024 15:03) (60 - 61)  BP: 135/41 (15 Apr 2024 15:03) (135/41 - 163/44)  BP(mean): 69 (15 Apr 2024 15:03) (69 - 75)  RR: 16 (15 Apr 2024 15:03) (16 - 18)  SpO2: 97% (15 Apr 2024 15:03) (96% - 97%)    Parameters below as of 15 Apr 2024 15:03  Patient On (Oxygen Delivery Method): nasal cannula    PPSV2:  40%    General: awake, alert, pleasant, up in chair in NAD  Mental Status: A&Ox3  HEENT: on 3LNC  Lungs: Diminished throughout all lung fields  Cardiac: Regular rate and rhythm. S1S2  GI: Abdomen soft, nontender, nondistended. +bsx4  : no suprapubic tenderness  Ext: b/l LE erythematous   Neuro: A&Ox3. Speech intact. No focal neuro deficits.      LABS:                        8.9    7.39  )-----------( 123      ( 15 Apr 2024 06:26 )             27.1     04-15    127<L>  |  93<L>  |  28<H>  ----------------------------<  98  4.3   |  29  |  1.27    Ca    9.1      15 Apr 2024 06:26  Phos  1.6     04-15  Mg     2.2     04-15    TPro  x   /  Alb  2.4<L>  /  TBili  x   /  DBili  x   /  AST  x   /  ALT  x   /  AlkPhos  x   04-15    Albumin: Albumin: 2.4 g/dL (04-15 @ 06:26)    Allergies    tetracycline (Hives)  sulfa drugs (Hives)  penicillin (Hives)    Intolerances    MEDICATIONS  (STANDING):  albuterol    90 MICROgram(s) HFA Inhaler 2 Puff(s) Inhalation every 6 hours  albuterol/ipratropium for Nebulization 3 milliLiter(s) Nebulizer every 6 hours  aMIOdarone    Tablet 200 milliGRAM(s) Oral daily  amLODIPine   Tablet 5 milliGRAM(s) Oral daily  atorvastatin 10 milliGRAM(s) Oral at bedtime  azithromycin  IVPB      azithromycin  IVPB 500 milliGRAM(s) IV Intermittent every 24 hours  budesonide 160 MICROgram(s)/formoterol 4.5 MICROgram(s) Inhaler 2 Puff(s) Inhalation two times a day  carvedilol 25 milliGRAM(s) Oral every 12 hours  cefepime  Injectable. 1000 milliGRAM(s) IV Push every 12 hours  cefepime  Injectable.      famotidine    Tablet 20 milliGRAM(s) Oral daily  ferrous    sulfate 325 milliGRAM(s) Oral daily  fludroCORTISONE 0.05 milliGRAM(s) Oral daily  guaiFENesin ER 1200 milliGRAM(s) Oral every 12 hours  guaifenesin/dextromethorphan Oral Liquid 10 milliLiter(s) Oral three times a day  hydrALAZINE 100 milliGRAM(s) Oral every 8 hours  hydrocortisone 5 milliGRAM(s) Oral at bedtime  hydrocortisone 10 milliGRAM(s) Oral daily  losartan 50 milliGRAM(s) Oral two times a day  pantoprazole  Injectable 40 milliGRAM(s) IV Push daily  sodium chloride 0.9%. 500 milliLiter(s) (75 mL/Hr) IV Continuous <Continuous>  spironolactone 25 milliGRAM(s) Oral daily  sucralfate 1 Gram(s) Oral four times a day    MEDICATIONS  (PRN):  acetaminophen     Tablet .. 650 milliGRAM(s) Oral every 6 hours PRN Temp greater or equal to 38C (100.4F), Mild Pain (1 - 3)  aluminum hydroxide/magnesium hydroxide/simethicone Suspension 30 milliLiter(s) Oral every 4 hours PRN Dyspepsia  magnesium hydroxide Suspension 30 milliLiter(s) Oral every 24 hours PRN for constipation  ondansetron Injectable 4 milliGRAM(s) IV Push every 6 hours PRN Nausea      RADIOLOGY/ADDITIONAL STUDIES:    < from: CT Chest No Cont (04.14.24 @ 13:44) >  ACC: [id number] EXAM:  CT CHEST   ORDERED BY: DASHA WASHINGTON     PROCEDURE DATE:  04/14/2024          INTERPRETATION:  EXAMINATION: CT CHEST    CLINICAL INDICATION: Dyspnea.    TECHNIQUE: Noncontrast CT of the chest was obtained.    COMPARISON: Multiple CT, most recent 4/10/2024.    FINDINGS:    AIRWAYS AND LUNGS: Tracheobronchial secretions.  Bronchiectasis is noted.   Scattered clustered opacities. Lingula 1.1 cm partially cavitary nodule   versus focal bronchiectasis is unchanged in size withnew cavitation   compared to prior studies. Small bilateral pleural effusions with partial   atelectasis of both lower lobes.    MEDIASTINUM AND PLEURA: There are no enlarged mediastinal, hilar or   axillary lymph nodes. The visualized portion of thethyroid gland is   unremarkable. There is no pneumothorax.    HEART AND VESSELS: There is cardiomegaly.  There are atherosclerotic   calcifications of the aorta and coronary arteries.  There is trace   pericardial fluid.    UPPER ABDOMEN: Images of the upper abdomen demonstrate hyperdense liver.    BONES AND SOFT TISSUES: Multilevel vertebroplasty  The soft tissues are   unremarkable.    IMPRESSION:  Tracheobronchial secretions.  Bronchiectasis is noted. Scattered   clustered opacities. Lingula 1.1 cm partially cavitary nodule versus   focal bronchiectasis is unchanged in size with new cavitation compared to   prior studies. Small bilateral pleural effusions with partial atelectasis   of both lower lobes.    --- End of Report ---      < end of copied text >   HPI: Pt is a 89y old Female with hx of Afib, HFpEF, CKD?, adrenal insufficiency, lymphoma (remission), HTN, iron def, ICH presents s/p fall  Patient states that she was on a toilet and slipped from the toilet and fell onto her buttock. Denies head trauma, LOC, not on blood thinners. was admitted under surgery team and found to have hypoxic respiratory failure. Palliative medicine consulted to help establish GOC and advance care planning     4/15/2024 pt seen and examined with no family at bedside, patients states that she is tired stating to speak with her Daughter. GOC meeting will be scheduled.     PAIN: ( )Yes   (x )No  DYSPNEA: ( ) Yes  (x ) No    PAST MEDICAL & SURGICAL HISTORY:  Iron deficiency  HTN (hypertension)  Lymphoma  H/O adrenal insufficiency  Hyponatremia  Hypokalemia  Chronic kidney disease, unspecified CKD stage  Diastolic heart failure  Paroxysmal atrial fibrillation  H/O CHF  S/P appendectomy  S/P hysterectomy  H/O intracranial hemorrhage    SOCIAL HX: lives in assisted living     Hx opiate tolerance ( )YES  (x )NO    Baseline ADLs  (Prior to Admission)  ( ) Independent   ( )Dependent    FAMILY HISTORY:  No known problems (Father, Mother)      Review of Systems:    Anxiety- denies  Depression- denies  Physical Discomfort- denies  Dyspnea- ++ with activity  Constipation- denies  Diarrhea- denies  Nausea- denies  Vomiting- denies  Anorexia- denies  Weight Loss-   Cough- denies  Secretions- denies  Fatigue- ++  Weakness- ++  Delirium- denies    All other systems reviewed and negative      PHYSICAL EXAM:    Vital Signs Last 24 Hrs  T(C): 36.6 (15 Apr 2024 15:03), Max: 36.6 (15 Apr 2024 07:50)  T(F): 97.9 (15 Apr 2024 15:03), Max: 97.9 (15 Apr 2024 07:50)  HR: 60 (15 Apr 2024 15:03) (60 - 61)  BP: 135/41 (15 Apr 2024 15:03) (135/41 - 163/44)  BP(mean): 69 (15 Apr 2024 15:03) (69 - 75)  RR: 16 (15 Apr 2024 15:03) (16 - 18)  SpO2: 97% (15 Apr 2024 15:03) (96% - 97%)    Parameters below as of 15 Apr 2024 15:03  Patient On (Oxygen Delivery Method): nasal cannula    PPSV2:  30%    General: awake, alert, pleasant, in bed   Mental Status: A&Ox3  HEENT: on 3LNC  Lungs: Diminished throughout all lung fields  Cardiac: Regular rate and rhythm. S1S2  GI: Abdomen soft, nontender, nondistended. +bs  : no suprapubic tenderness  Ext: b/l LE erythematous   Neuro: weakness No focal neuro deficits.      LABS:                        8.9    7.39  )-----------( 123      ( 15 Apr 2024 06:26 )             27.1     04-15    127<L>  |  93<L>  |  28<H>  ----------------------------<  98  4.3   |  29  |  1.27    Ca    9.1      15 Apr 2024 06:26  Phos  1.6     04-15  Mg     2.2     04-15    TPro  x   /  Alb  2.4<L>  /  TBili  x   /  DBili  x   /  AST  x   /  ALT  x   /  AlkPhos  x   04-15    Albumin: Albumin: 2.4 g/dL (04-15 @ 06:26)    Allergies    tetracycline (Hives)  sulfa drugs (Hives)  penicillin (Hives)    Intolerances    MEDICATIONS  (STANDING):  albuterol    90 MICROgram(s) HFA Inhaler 2 Puff(s) Inhalation every 6 hours  albuterol/ipratropium for Nebulization 3 milliLiter(s) Nebulizer every 6 hours  aMIOdarone    Tablet 200 milliGRAM(s) Oral daily  amLODIPine   Tablet 5 milliGRAM(s) Oral daily  atorvastatin 10 milliGRAM(s) Oral at bedtime  azithromycin  IVPB      azithromycin  IVPB 500 milliGRAM(s) IV Intermittent every 24 hours  budesonide 160 MICROgram(s)/formoterol 4.5 MICROgram(s) Inhaler 2 Puff(s) Inhalation two times a day  carvedilol 25 milliGRAM(s) Oral every 12 hours  cefepime  Injectable. 1000 milliGRAM(s) IV Push every 12 hours  cefepime  Injectable.      famotidine    Tablet 20 milliGRAM(s) Oral daily  ferrous    sulfate 325 milliGRAM(s) Oral daily  fludroCORTISONE 0.05 milliGRAM(s) Oral daily  guaiFENesin ER 1200 milliGRAM(s) Oral every 12 hours  guaifenesin/dextromethorphan Oral Liquid 10 milliLiter(s) Oral three times a day  hydrALAZINE 100 milliGRAM(s) Oral every 8 hours  hydrocortisone 5 milliGRAM(s) Oral at bedtime  hydrocortisone 10 milliGRAM(s) Oral daily  losartan 50 milliGRAM(s) Oral two times a day  pantoprazole  Injectable 40 milliGRAM(s) IV Push daily  sodium chloride 0.9%. 500 milliLiter(s) (75 mL/Hr) IV Continuous <Continuous>  spironolactone 25 milliGRAM(s) Oral daily  sucralfate 1 Gram(s) Oral four times a day    MEDICATIONS  (PRN):  acetaminophen     Tablet .. 650 milliGRAM(s) Oral every 6 hours PRN Temp greater or equal to 38C (100.4F), Mild Pain (1 - 3)  aluminum hydroxide/magnesium hydroxide/simethicone Suspension 30 milliLiter(s) Oral every 4 hours PRN Dyspepsia  magnesium hydroxide Suspension 30 milliLiter(s) Oral every 24 hours PRN for constipation  ondansetron Injectable 4 milliGRAM(s) IV Push every 6 hours PRN Nausea      RADIOLOGY/ADDITIONAL STUDIES:    < from: CT Chest No Cont (04.14.24 @ 13:44) >  ACC: [id number] EXAM:  CT CHEST   ORDERED BY: DASHA WASHINGTON     PROCEDURE DATE:  04/14/2024          INTERPRETATION:  EXAMINATION: CT CHEST    CLINICAL INDICATION: Dyspnea.    TECHNIQUE: Noncontrast CT of the chest was obtained.    COMPARISON: Multiple CT, most recent 4/10/2024.    FINDINGS:    AIRWAYS AND LUNGS: Tracheobronchial secretions.  Bronchiectasis is noted.   Scattered clustered opacities. Lingula 1.1 cm partially cavitary nodule   versus focal bronchiectasis is unchanged in size withnew cavitation   compared to prior studies. Small bilateral pleural effusions with partial   atelectasis of both lower lobes.    MEDIASTINUM AND PLEURA: There are no enlarged mediastinal, hilar or   axillary lymph nodes. The visualized portion of thethyroid gland is   unremarkable. There is no pneumothorax.    HEART AND VESSELS: There is cardiomegaly.  There are atherosclerotic   calcifications of the aorta and coronary arteries.  There is trace   pericardial fluid.    UPPER ABDOMEN: Images of the upper abdomen demonstrate hyperdense liver.    BONES AND SOFT TISSUES: Multilevel vertebroplasty  The soft tissues are   unremarkable.    IMPRESSION:  Tracheobronchial secretions.  Bronchiectasis is noted. Scattered   clustered opacities. Lingula 1.1 cm partially cavitary nodule versus   focal bronchiectasis is unchanged in size with new cavitation compared to   prior studies. Small bilateral pleural effusions with partial atelectasis   of both lower lobes.    --- End of Report ---      < end of copied text >

## 2024-04-15 NOTE — CONSULT NOTE ADULT - ASSESSMENT
ANP Visit Note    Patient Name: Chavo Dorsey   YOB: 1946   Medical Record Number: [id number]   CSN: [id number]   Date of visit: 8/4/2021   Jenny Lara MD   No primary care provider on file.      Chief Complaint/Reason for Visit:  Patient pr - started gemcitabine with cisplatin 7/15/20 (as back then was unclear whether had pancreatic primary)                    - MSI stable and no  mutation seen through [facility]:  Somatic - Potentially Actionable Variant [place]le Fraction Irinotecan single agent. She does have some abdominal discomfort that comes and goes and she can not correlate to anything.      Problem List:  Patient Active Problem List:     Pure hypercholesterolemia     Lymphedema of both lower extremities     [place] Allergies:    Penicillins             RASH  Flonase [Fluticason*    RASH    Comment:Red face, looked like a burn    Family History:  Family History   Problem Relation Age of Onset   • Other (leukemia) Father          age [de-identified]   • Breast Cancer Cous Exercise per Week:       Minutes of Exercise per Session:   Stress:       Feeling of Stress :   Social Connections:       Frequency of Communication with Friends and Family:       Frequency of Social Gatherings with Friends and Family:       Attends Religi distress. Vital Signs: Oncology Vitals 8/4/2021   Height 5' 0.394\"   Height 153 cm   Weight 161 lb   Weight 73. 029 kg   BSA (m2) 1.71 m2   /76   Pulse 72   Resp 16   Temp 98   SpO2 97   Pain Score      HEENT: EOMs intact. PERRL.  Oropharynx is jennifer x10(3) uL    Lymphocyte Absolute 1.38 1.00 - 4.00 x10(3) uL    Monocyte Absolute 1.04 (H) 0.10 - 1.00 x10(3) uL    Eosinophil Absolute 0.35 0.00 - 0.70 x10(3) uL    Basophil Absolute 0.09 0.00 - 0.20 x10(3) uL    Immature Granulocyte Absolute 0.01 0.00 - 1 Pt is a 89y old Female with hx of Afib, HFpEF, CKD?, adrenal insufficiency, lymphoma (remission), HTN, iron def, ICH presents s/p fall  Patient states that she was on a toilet and slipped from the toilet and fell onto her buttock. Denies head trauma, LOC, not on blood thinners. I was admitted under surgery team and found to have hypoxic respiratory failure. Palliative medicine consulted to help establish GOC and advance care planning     1) Acute hypoxic respiratory failure   - c/w supplemental O2  - c/w antibiotics  - incentive spirometer     2) severe protein calori malnutrition  - registered dietician consult appreciated    3) s/p fall   - CT demonstrated chronic thoracolumbar fx, but only tender at coccyx area, likely chronic compression fx, acute sacral S5   - PT as tolerated     Process of Care   --Reviewed dx/treatment problems and alignment with Goals of Care     Physical Aspects of Care   --Pain   patient denies at this time   c/w current management     --Bowel Regimen   denies constipation   risk for constipation d/t immobility   daily dulcolax as needed     --Dyspnea   No SOB at rest  ++ dyspnea with any exertion  on 3LNC  comfortable and in NAD     --Nausea Vomiting   denies     --Weakness   PT as tolerated    encourage OOB to chair       Psychological and Psychiatric Aspects of Care:    --Grief/ Bereavement: emotional support provided   --Hx of psychiatric dx: none   --Pastoral Care Available PRN        Social Aspects of Care   --SW involved          Cultural Aspects   --Primary Language: English     Goals of Care:      We discussed Palliative Care team being a supportive team when a patient has ongoing illnesses.  We also discussed that it is not an end of life care service, but can help navigate symptoms and emotional support throughout their hospital stay here.     Ethical and Legal Aspects: NA   Capacity- A&Ox3, has medical decision making capacity but defers to her children  HCP/Surrogate: Kate Partida, daughter [phone]  Code Status: DNR/DNI  MOLST: MOLST with limitations of DNR/DNI  Dispo Plan: [facility]  Discussed With: Dr. Mancia coordinated with attending and SW and RN            Time Spent: 75 minutes including the care, coordination and counseling of this patient, 50% of which was spent coordinating and counseling.

## 2024-04-15 NOTE — PROGRESS NOTE ADULT - ASSESSMENT
#Acute hypoxic respiratory failure   -probably atelectasis and COPD exacerbation   -for now ct abx for possible gram negative pneumonia   -spirometry   -inhaled steroids     #Hyponatremia   -monitor it   -etiology-? free water intake or SIADH  -limit water intake   -give trial of NS   -monitor it closely     #Nauseous today   -etiology?   -possible gastritis   -iv ppi, sucralfate and monitoring   -GI evaluation       #Sacral Fracture:    -pain control     #Urinary Retention:    Cont nova.  Appreciate urology eval.    Recommend d/c with nova.    No UTI>      #Adrenal Insufficiency:    Cont home meds.  Florinef/ hydrocortisone.      #PAF:    No on AC.  Cont amio.      #HTN:    Cont home meds.  Losartan/ Norvasc/ Hydralazine/ Coreg.      #Anemia:    Secondary to bleed.  Follow.      #discussed with trauma team

## 2024-04-15 NOTE — PROGRESS NOTE ADULT - SUBJECTIVE AND OBJECTIVE BOX
HPI:  89yo F w/ paroxysmal Afib, HFpEF, CKD?, adrenal insufficiency, lymphoma (remission), HTN, iron def, ICH presents s/p fall  Patient states that she was on a toilet and slipped from the toilet and fell onto her buttock. Denies head trauma, LOC, not on blood thinners.   was admitted under surgery team and found to have hypoxic respiratory failure     Review of Systems:  CONSTITUTIONAL: weakness,+  EYES/ENT: No visual changes;  No vertigo or throat pain   NECK: No pain or stiffness  RESPIRATORY:  shortness of breath+  CARDIOVASCULAR: No chest pain or palpitations  GASTROINTESTINAL: No abdominal or epigastric pain. No nausea, vomiting, or hematemesis; No diarrhea or constipation.   GENITOURINARY: No dysuria, frequency or hematuria  NEUROLOGICAL: No numbness or weakness  SKIN: No itching, burning, rashes, or lesions   All other review of systems is negative unless indicated above    PHYSICAL EXAM:  Vital Signs Last 24 Hrs  T(C): 36.4 (15 Apr 2024 14:05), Max: 37 (14 Apr 2024 15:50)  T(F): 97.6 (15 Apr 2024 14:05), Max: 98.6 (14 Apr 2024 15:50)  HR: 61 (15 Apr 2024 14:05) (60 - 61)  BP: 163/44 (15 Apr 2024 14:05) (115/49 - 163/44)  BP(mean): 75 (14 Apr 2024 21:31) (68 - 75)  RR: 16 (15 Apr 2024 14:05) (16 - 18)  SpO2: 97% (15 Apr 2024 14:05) (96% - 97%)    Parameters below as of 15 Apr 2024 14:05  Patient On (Oxygen Delivery Method): nasal cannula            GENERAL: comfortable, naussous  today   HEAD:  Atraumatic, Normocephalic  EYES: EOMI, PERRLA, conjunctiva and sclera   HEENT: Moist mucous membranes  NECK: Supple, No JVD  NERVOUS SYSTEM:  Alert  CHEST/LUNG: AEEB, mild wheeze noted   HEART:S1S2 normal, no murmer  ABDOMEN: Soft, Nontender, Nondistended; Bowel sounds present  GENITOURINARY- Voiding, no palpable bladder  EXTREMITIES:  2+ Peripheral Pulses, No clubbing, cyanosis, or edema  MUSCULOSKELETAL No muscle tenderness, Muscle tone normal,   SKIN-no rash, no lesion  PSYCH- Mood stable  LYMPH Node- No palpable lymph node                          8.9    7.39  )-----------( 123      ( 15 Apr 2024 06:26 )             27.1     04-15    127<L>  |  93<L>  |  28<H>  ----------------------------<  98  4.3   |  29  |  1.27    Ca    9.1      15 Apr 2024 06:26  Phos  1.6     04-15  Mg     2.2     04-15    TPro  x   /  Alb  2.4<L>  /  TBili  x   /  DBili  x   /  AST  x   /  ALT  x   /  AlkPhos  x   04-15    LIVER FUNCTIONS - ( 15 Apr 2024 06:26 )  Alb: 2.4 g/dL / Pro: x     / ALK PHOS: x     / ALT: x     / AST: x     / GGT: x               Urinalysis Basic - ( 15 Apr 2024 06:26 )    Color: x / Appearance: x / SG: x / pH: x  Gluc: 98 mg/dL / Ketone: x  / Bili: x / Urobili: x   Blood: x / Protein: x / Nitrite: x   Leuk Esterase: x / RBC: x / WBC x   Sq Epi: x / Non Sq Epi: x / Bacteria: x          CAPILLARY BLOOD GLUCOSE          MEDICATIONS  (STANDING):  albuterol    90 MICROgram(s) HFA Inhaler 2 Puff(s) Inhalation every 6 hours  albuterol/ipratropium for Nebulization 3 milliLiter(s) Nebulizer every 6 hours  aMIOdarone    Tablet 200 milliGRAM(s) Oral daily  amLODIPine   Tablet 5 milliGRAM(s) Oral daily  atorvastatin 10 milliGRAM(s) Oral at bedtime  azithromycin  IVPB      azithromycin  IVPB 500 milliGRAM(s) IV Intermittent every 24 hours  budesonide 160 MICROgram(s)/formoterol 4.5 MICROgram(s) Inhaler 2 Puff(s) Inhalation two times a day  carvedilol 25 milliGRAM(s) Oral every 12 hours  cefepime  Injectable.      cefepime  Injectable. 1000 milliGRAM(s) IV Push every 12 hours  famotidine    Tablet 20 milliGRAM(s) Oral daily  ferrous    sulfate 325 milliGRAM(s) Oral daily  fludroCORTISONE 0.05 milliGRAM(s) Oral daily  guaiFENesin ER 1200 milliGRAM(s) Oral every 12 hours  guaifenesin/dextromethorphan Oral Liquid 10 milliLiter(s) Oral three times a day  hydrALAZINE 100 milliGRAM(s) Oral every 8 hours  hydrocortisone 10 milliGRAM(s) Oral daily  hydrocortisone 5 milliGRAM(s) Oral at bedtime  losartan 50 milliGRAM(s) Oral two times a day  pantoprazole  Injectable 40 milliGRAM(s) IV Push daily  sodium chloride 0.9%. 500 milliLiter(s) (75 mL/Hr) IV Continuous <Continuous>  spironolactone 25 milliGRAM(s) Oral daily  sucralfate 1 Gram(s) Oral four times a day    MEDICATIONS  (PRN):  acetaminophen     Tablet .. 650 milliGRAM(s) Oral every 6 hours PRN Temp greater or equal to 38C (100.4F), Mild Pain (1 - 3)  aluminum hydroxide/magnesium hydroxide/simethicone Suspension 30 milliLiter(s) Oral every 4 hours PRN Dyspepsia  magnesium hydroxide Suspension 30 milliLiter(s) Oral every 24 hours PRN for constipation  ondansetron Injectable 4 milliGRAM(s) IV Push every 6 hours PRN Nausea

## 2024-04-16 LAB
ALBUMIN SERPL ELPH-MCNC: 2.2 G/DL — LOW (ref 3.3–5)
ANION GAP SERPL CALC-SCNC: 5 MMOL/L — SIGNIFICANT CHANGE UP (ref 5–17)
BUN SERPL-MCNC: 27 MG/DL — HIGH (ref 7–23)
CALCIUM SERPL-MCNC: 9 MG/DL — SIGNIFICANT CHANGE UP (ref 8.5–10.1)
CHLORIDE SERPL-SCNC: 94 MMOL/L — LOW (ref 96–108)
CO2 SERPL-SCNC: 29 MMOL/L — SIGNIFICANT CHANGE UP (ref 22–31)
CREAT SERPL-MCNC: 1.04 MG/DL — SIGNIFICANT CHANGE UP (ref 0.5–1.3)
EGFR: 48 ML/MIN/1.73M2 — LOW
GLUCOSE SERPL-MCNC: 100 MG/DL — HIGH (ref 70–99)
PHOSPHATE SERPL-MCNC: 2.2 MG/DL — LOW (ref 2.5–4.5)
POTASSIUM SERPL-MCNC: 4.4 MMOL/L — SIGNIFICANT CHANGE UP (ref 3.5–5.3)
POTASSIUM SERPL-SCNC: 4.4 MMOL/L — SIGNIFICANT CHANGE UP (ref 3.5–5.3)
SODIUM SERPL-SCNC: 128 MMOL/L — LOW (ref 135–145)

## 2024-04-16 PROCEDURE — 99497 ADVNCD CARE PLAN 30 MIN: CPT

## 2024-04-16 PROCEDURE — 99233 SBSQ HOSP IP/OBS HIGH 50: CPT

## 2024-04-16 PROCEDURE — 99232 SBSQ HOSP IP/OBS MODERATE 35: CPT

## 2024-04-16 RX ORDER — PANTOPRAZOLE SODIUM 20 MG/1
1 TABLET, DELAYED RELEASE ORAL
Qty: 60 | Refills: 0
Start: 2024-04-16 | End: 2024-05-15

## 2024-04-16 RX ORDER — ALBUTEROL 90 UG/1
2 AEROSOL, METERED ORAL
Qty: 1 | Refills: 0
Start: 2024-04-16 | End: 2024-05-15

## 2024-04-16 RX ORDER — SUCRALFATE 1 G
1 TABLET ORAL
Qty: 120 | Refills: 0
Start: 2024-04-16 | End: 2024-05-15

## 2024-04-16 RX ORDER — BUDESONIDE AND FORMOTEROL FUMARATE DIHYDRATE 160; 4.5 UG/1; UG/1
2 AEROSOL RESPIRATORY (INHALATION)
Qty: 1 | Refills: 0
Start: 2024-04-16 | End: 2024-04-30

## 2024-04-16 RX ADMIN — CEFEPIME 1000 MILLIGRAM(S): 1 INJECTION, POWDER, FOR SOLUTION INTRAMUSCULAR; INTRAVENOUS at 05:38

## 2024-04-16 RX ADMIN — LOSARTAN POTASSIUM 50 MILLIGRAM(S): 100 TABLET, FILM COATED ORAL at 10:48

## 2024-04-16 RX ADMIN — ATORVASTATIN CALCIUM 10 MILLIGRAM(S): 80 TABLET, FILM COATED ORAL at 21:17

## 2024-04-16 RX ADMIN — PANTOPRAZOLE SODIUM 40 MILLIGRAM(S): 20 TABLET, DELAYED RELEASE ORAL at 10:48

## 2024-04-16 RX ADMIN — LOSARTAN POTASSIUM 50 MILLIGRAM(S): 100 TABLET, FILM COATED ORAL at 21:19

## 2024-04-16 RX ADMIN — Medication 5 MILLIGRAM(S): at 21:18

## 2024-04-16 RX ADMIN — FLUDROCORTISONE ACETATE 0.05 MILLIGRAM(S): 0.1 TABLET ORAL at 10:49

## 2024-04-16 RX ADMIN — Medication 10 MILLILITER(S): at 21:20

## 2024-04-16 RX ADMIN — Medication 3 MILLILITER(S): at 16:10

## 2024-04-16 RX ADMIN — Medication 1 GRAM(S): at 05:38

## 2024-04-16 RX ADMIN — Medication 325 MILLIGRAM(S): at 10:48

## 2024-04-16 RX ADMIN — Medication 100 MILLIGRAM(S): at 05:38

## 2024-04-16 RX ADMIN — Medication 1 GRAM(S): at 21:40

## 2024-04-16 RX ADMIN — Medication 3 MILLIGRAM(S): at 21:19

## 2024-04-16 RX ADMIN — AMIODARONE HYDROCHLORIDE 200 MILLIGRAM(S): 400 TABLET ORAL at 10:48

## 2024-04-16 RX ADMIN — Medication 3 MILLILITER(S): at 08:05

## 2024-04-16 RX ADMIN — BUDESONIDE AND FORMOTEROL FUMARATE DIHYDRATE 2 PUFF(S): 160; 4.5 AEROSOL RESPIRATORY (INHALATION) at 21:57

## 2024-04-16 RX ADMIN — Medication 3 MILLILITER(S): at 21:57

## 2024-04-16 RX ADMIN — SPIRONOLACTONE 25 MILLIGRAM(S): 25 TABLET, FILM COATED ORAL at 10:49

## 2024-04-16 RX ADMIN — Medication 1200 MILLIGRAM(S): at 10:48

## 2024-04-16 RX ADMIN — Medication 10 MILLIGRAM(S): at 11:51

## 2024-04-16 RX ADMIN — AZITHROMYCIN 255 MILLIGRAM(S): 500 TABLET, FILM COATED ORAL at 14:47

## 2024-04-16 RX ADMIN — CARVEDILOL PHOSPHATE 25 MILLIGRAM(S): 80 CAPSULE, EXTENDED RELEASE ORAL at 21:17

## 2024-04-16 RX ADMIN — CARVEDILOL PHOSPHATE 25 MILLIGRAM(S): 80 CAPSULE, EXTENDED RELEASE ORAL at 10:49

## 2024-04-16 RX ADMIN — Medication 100 MILLIGRAM(S): at 21:17

## 2024-04-16 RX ADMIN — FAMOTIDINE 20 MILLIGRAM(S): 10 INJECTION INTRAVENOUS at 10:49

## 2024-04-16 RX ADMIN — AMLODIPINE BESYLATE 5 MILLIGRAM(S): 2.5 TABLET ORAL at 10:48

## 2024-04-16 RX ADMIN — CEFEPIME 1000 MILLIGRAM(S): 1 INJECTION, POWDER, FOR SOLUTION INTRAMUSCULAR; INTRAVENOUS at 18:55

## 2024-04-16 NOTE — PROGRESS NOTE ADULT - SUBJECTIVE AND OBJECTIVE BOX
HPI:  89yo F w/ paroxysmal Afib, HFpEF, CKD?, adrenal insufficiency, lymphoma (remission), HTN, iron def, ICH presents s/p fall  Patient states that she was on a toilet and slipped from the toilet and fell onto her buttock. Denies head trauma, LOC, not on blood thinners.   was admitted under surgery team and found to have hypoxic respiratory failure     Review of Systems:  CONSTITUTIONAL: weakness,+  EYES/ENT: No visual changes;  No vertigo or throat pain   NECK: No pain or stiffness  RESPIRATORY:  shortness of breath+  CARDIOVASCULAR: No chest pain or palpitations  GASTROINTESTINAL: No abdominal or epigastric pain. No nausea, vomiting, or hematemesis; No diarrhea or constipation.   GENITOURINARY: No dysuria, frequency or hematuria  NEUROLOGICAL: No numbness or weakness  SKIN: No itching, burning, rashes, or lesions   All other review of systems is negative unless indicated above    PHYSICAL EXAM:  Vital Signs Last 24 Hrs  T(C): 36.6 (16 Apr 2024 07:27), Max: 36.6 (15 Apr 2024 21:29)  T(F): 97.9 (16 Apr 2024 07:27), Max: 97.9 (15 Apr 2024 21:29)  HR: 66 (16 Apr 2024 10:41) (60 - 88)  BP: 156/45 (16 Apr 2024 10:41) (139/43 - 156/45)  BP(mean): --  RR: 18 (16 Apr 2024 10:41) (18 - 18)  SpO2: 98% (16 Apr 2024 10:41) (97% - 99%)    Parameters below as of 16 Apr 2024 10:41  Patient On (Oxygen Delivery Method): nasal cannula              GENERAL: comfortable, naussous  today   HEAD:  Atraumatic, Normocephalic  EYES: EOMI, PERRLA, conjunctiva and sclera   HEENT: Moist mucous membranes  NECK: Supple, No JVD  NERVOUS SYSTEM:  Alert  CHEST/LUNG: AEEB, mild wheeze noted   HEART:S1S2 normal, no murmer  ABDOMEN: Soft, Nontender, Nondistended; Bowel sounds present  GENITOURINARY- Voiding, no palpable bladder  EXTREMITIES:  2+ Peripheral Pulses, No clubbing, cyanosis, or edema  MUSCULOSKELETAL No muscle tenderness, Muscle tone normal,   SKIN-no rash, no lesion  PSYCH- Mood stable  LYMPH Node- No palpable lymph node                           8.9    7.39  )-----------( 123      ( 15 Apr 2024 06:26 )             27.1     04-16    128<L>  |  94<L>  |  27<H>  ----------------------------<  100<H>  4.4   |  29  |  1.04    Ca    9.0      16 Apr 2024 06:32  Phos  2.2     04-16  Mg     2.2     04-15    TPro  x   /  Alb  2.2<L>  /  TBili  x   /  DBili  x   /  AST  x   /  ALT  x   /  AlkPhos  x   04-16    LIVER FUNCTIONS - ( 16 Apr 2024 06:32 )  Alb: 2.2 g/dL / Pro: x     / ALK PHOS: x     / ALT: x     / AST: x     / GGT: x               Urinalysis Basic - ( 16 Apr 2024 06:32 )    Color: x / Appearance: x / SG: x / pH: x  Gluc: 100 mg/dL / Ketone: x  / Bili: x / Urobili: x   Blood: x / Protein: x / Nitrite: x   Leuk Esterase: x / RBC: x / WBC x   Sq Epi: x / Non Sq Epi: x / Bacteria: x          CAPILLARY BLOOD GLUCOSE          MEDICATIONS  (STANDING):  albuterol    90 MICROgram(s) HFA Inhaler 2 Puff(s) Inhalation every 6 hours  albuterol/ipratropium for Nebulization 3 milliLiter(s) Nebulizer every 6 hours  aMIOdarone    Tablet 200 milliGRAM(s) Oral daily  amLODIPine   Tablet 5 milliGRAM(s) Oral daily  atorvastatin 10 milliGRAM(s) Oral at bedtime  azithromycin  IVPB      azithromycin  IVPB 500 milliGRAM(s) IV Intermittent every 24 hours  budesonide 160 MICROgram(s)/formoterol 4.5 MICROgram(s) Inhaler 2 Puff(s) Inhalation two times a day  carvedilol 25 milliGRAM(s) Oral every 12 hours  cefepime  Injectable. 1000 milliGRAM(s) IV Push every 12 hours  cefepime  Injectable.      famotidine    Tablet 20 milliGRAM(s) Oral daily  ferrous    sulfate 325 milliGRAM(s) Oral daily  fludroCORTISONE 0.05 milliGRAM(s) Oral daily  guaiFENesin ER 1200 milliGRAM(s) Oral every 12 hours  guaifenesin/dextromethorphan Oral Liquid 10 milliLiter(s) Oral three times a day  hydrALAZINE 100 milliGRAM(s) Oral every 8 hours  hydrocortisone 5 milliGRAM(s) Oral at bedtime  hydrocortisone 10 milliGRAM(s) Oral daily  losartan 50 milliGRAM(s) Oral two times a day  melatonin 3 milliGRAM(s) Oral at bedtime  pantoprazole  Injectable 40 milliGRAM(s) IV Push daily  sodium chloride 0.9%. 500 milliLiter(s) (75 mL/Hr) IV Continuous <Continuous>  spironolactone 25 milliGRAM(s) Oral daily  sucralfate 1 Gram(s) Oral four times a day    MEDICATIONS  (PRN):  acetaminophen     Tablet .. 650 milliGRAM(s) Oral every 6 hours PRN Temp greater or equal to 38C (100.4F), Mild Pain (1 - 3)  aluminum hydroxide/magnesium hydroxide/simethicone Suspension 30 milliLiter(s) Oral every 4 hours PRN Dyspepsia  magnesium hydroxide Suspension 30 milliLiter(s) Oral every 24 hours PRN for constipation  ondansetron Injectable 4 milliGRAM(s) IV Push every 6 hours PRN Nausea

## 2024-04-16 NOTE — PROGRESS NOTE ADULT - SUBJECTIVE AND OBJECTIVE BOX
HPI: pt seen and examined with no family at bedside this AM, patient states that she has no pain at this time is hopeful to be getting back home. [facility] meeting held with patients son and daughter whom she defers to and does not want to be apart of conversations     PAIN: ( )Yes   (x )No  DYSPNEA: ( ) Yes  (x ) No  Level:    Review of Systems:    Anxiety- denies  Depression- denies  Physical Discomfort- denies  Dyspnea- ++ with activity  Constipation- denies  Diarrhea- denies  Nausea- denies  Vomiting- denies  Anorexia- denies  Weight Loss-   Cough- denies  Secretions- denies  Fatigue- ++  Weakness- ++  Delirium- denies    All other systems reviewed and negative    PHYSICAL EXAM:    Vital Signs Last 24 Hrs  T(C): 36.6 (16 Apr 2024 07:27), Max: 36.6 (15 Apr 2024 21:29)  T(F): 97.9 (16 Apr 2024 07:27), Max: 97.9 (15 Apr 2024 21:29)  HR: 66 (16 Apr 2024 10:41) (60 - 88)  BP: 156/45 (16 Apr 2024 10:41) (139/43 - 156/45)  RR: 18 (16 Apr 2024 10:41) (18 - 18)  SpO2: 98% (16 Apr 2024 10:41) (97% - 99%)    Parameters below as of 16 Apr 2024 10:41  Patient On (Oxygen Delivery Method): nasal cannula    PPSV2:  30%    General: awake, alert, pleasant, in bed   Mental Status: A&Ox3  HEENT: on 3LNC  Lungs: Diminished throughout all lung fields  Cardiac: Regular rate and rhythm. S1S2  GI: Abdomen soft, nontender, nondistended. +bs  : no suprapubic tenderness  Ext: b/l LE erythematous   Neuro: weakness No focal neuro deficits.    LABS:                        8.9    7.39  )-----------( 123      ( 15 Apr 2024 06:26 )             27.1     04-16    128<L>  |  94<L>  |  27<H>  ----------------------------<  100<H>  4.4   |  29  |  1.04    Ca    9.0      16 Apr 2024 06:32  Phos  2.2     04-16  Mg     2.2     04-15    TPro  x   /  Alb  2.2<L>  /  TBili  x   /  DBili  x   /  AST  x   /  ALT  x   /  AlkPhos  x   04-16      Albumin: Albumin: 2.2 g/dL (04-16 @ 06:32)      Allergies    tetracycline (Hives)  sulfa drugs (Hives)  penicillin (Hives)    Intolerances      MEDICATIONS  (STANDING):  albuterol    90 MICROgram(s) HFA Inhaler 2 Puff(s) Inhalation every 6 hours  albuterol/ipratropium for Nebulization 3 milliLiter(s) Nebulizer every 6 hours  aMIOdarone    Tablet 200 milliGRAM(s) Oral daily  amLODIPine   Tablet 5 milliGRAM(s) Oral daily  atorvastatin 10 milliGRAM(s) Oral at bedtime  azithromycin  IVPB      azithromycin  IVPB 500 milliGRAM(s) IV Intermittent every 24 hours  budesonide 160 MICROgram(s)/formoterol 4.5 MICROgram(s) Inhaler 2 Puff(s) Inhalation two times a day  carvedilol 25 milliGRAM(s) Oral every 12 hours  cefepime  Injectable.      cefepime  Injectable. 1000 milliGRAM(s) IV Push every 12 hours  famotidine    Tablet 20 milliGRAM(s) Oral daily  ferrous    sulfate 325 milliGRAM(s) Oral daily  fludroCORTISONE 0.05 milliGRAM(s) Oral daily  guaiFENesin ER 1200 milliGRAM(s) Oral every 12 hours  guaifenesin/dextromethorphan Oral Liquid 10 milliLiter(s) Oral three times a day  hydrALAZINE 100 milliGRAM(s) Oral every 8 hours  hydrocortisone 5 milliGRAM(s) Oral at bedtime  hydrocortisone 10 milliGRAM(s) Oral daily  losartan 50 milliGRAM(s) Oral two times a day  melatonin 3 milliGRAM(s) Oral at bedtime  pantoprazole  Injectable 40 milliGRAM(s) IV Push daily  sodium chloride 0.9%. 500 milliLiter(s) (75 mL/Hr) IV Continuous <Continuous>  spironolactone 25 milliGRAM(s) Oral daily  sucralfate 1 Gram(s) Oral four times a day    MEDICATIONS  (PRN):  acetaminophen     Tablet .. 650 milliGRAM(s) Oral every 6 hours PRN Temp greater or equal to 38C (100.4F), Mild Pain (1 - 3)  aluminum hydroxide/magnesium hydroxide/simethicone Suspension 30 milliLiter(s) Oral every 4 hours PRN Dyspepsia  magnesium hydroxide Suspension 30 milliLiter(s) Oral every 24 hours PRN for constipation  ondansetron Injectable 4 milliGRAM(s) IV Push every 6 hours PRN Nausea      RADIOLOGY:

## 2024-04-16 NOTE — PROGRESS NOTE ADULT - NUTRITIONAL ASSESSMENT
This patient has been assessed with a concern for Malnutrition and has been determined to have a diagnosis/diagnoses of Severe protein-calorie malnutrition and Underweight (BMI < 19).    This patient is being managed with:   Diet DASH/TLC-  Sodium & Cholesterol Restricted  1200mL Fluid Restriction ([id number])  Supplement Feeding Modality:  Oral  Ensure Max Cans or Servings Per Day:  1       Frequency:  Three Times a day  Entered: Apr 14 2024 12:54PM  
This patient has been assessed with a concern for Malnutrition and has been determined to have a diagnosis/diagnoses of Severe protein-calorie malnutrition and Underweight (BMI < 19).    This patient is being managed with:   Diet DASH/TLC-  Sodium & Cholesterol Restricted  1200mL Fluid Restriction ([id number])  Supplement Feeding Modality:  Oral  Ensure Max Cans or Servings Per Day:  1       Frequency:  Three Times a day  Entered: Apr 14 2024 12:54PM  
This patient has been assessed with a concern for Malnutrition and has been determined to have a diagnosis/diagnoses of Severe protein-calorie malnutrition and Underweight (BMI < 19).    This patient is being managed with:   Diet DASH/TLC-  Sodium & Cholesterol Restricted  1200mL Fluid Restriction ([id number])  Supplement Feeding Modality:  Oral  Ensure Max Cans or Servings Per Day:  1       Frequency:  Three Times a day  Entered: Apr 14 2024 12:54PM

## 2024-04-16 NOTE — PROGRESS NOTE ADULT - PROVIDER SPECIALTY LIST ADULT
Trauma Surgery
Hospitalist
Palliative Care
Trauma Surgery
Hospitalist
Trauma Surgery
Hospitalist

## 2024-04-16 NOTE — PROGRESS NOTE ADULT - CONVERSATION DETAILS
met with the patient at bedside and stated that she just wants to go home and die in her home she doesn't want to come back to the hospital anymore, but she also wanted me to speak with her children about the specifics   We did update MOLST to reflect a focus on patients' comfort including DNR/I, no feeding tube, sending to the hospital only if symptoms can not be managed, no IV fluids, and determining the use of antibiotics. [facility] hospice referral was placed and the patient's family has set up 24-hour aides  Emotional Support provided will continue to follow

## 2024-04-16 NOTE — PROGRESS NOTE ADULT - ASSESSMENT
Pt is a 89y old Female with hx of Afib, HFpEF, CKD?, adrenal insufficiency, lymphoma (remission), HTN, iron def, ICH presents s/p fall  Patient states that she was on a toilet and slipped from the toilet and fell onto her buttock. Denies head trauma, LOC, not on blood thinners. I was admitted under surgery team and found to have hypoxic respiratory failure. Palliative medicine consulted to help establish GOC and advance care planning     1) Acute hypoxic respiratory failure   - c/w supplemental O2  - c/w antibiotics  - incentive spirometer     2) severe protein calori malnutrition  - registered dietician consult appreciated    3) s/p fall   - CT demonstrated chronic thoracolumbar fx, but only tender at coccyx area, likely chronic compression fx, acute sacral S5   - PT as tolerated     Process of Care   --Reviewed dx/treatment problems and alignment with Goals of Care     Physical Aspects of Care   --Pain   patient denies at this time   c/w current management     --Bowel Regimen   denies constipation   risk for constipation d/t immobility   daily dulcolax as needed     --Dyspnea   No SOB at rest  ++ dyspnea with any exertion  on 3LNC  comfortable and in NAD     --Nausea Vomiting   denies     --Weakness   PT as tolerated    encourage OOB to chair       Psychological and Psychiatric Aspects of Care:    --Grief/ Bereavement: emotional support provided   --Hx of psychiatric dx: none   --Pastoral Care Available PRN        Social Aspects of Care   --SW involved          Cultural Aspects   --Primary Language: English     Goals of Care:      We discussed Palliative Care team being a supportive team when a patient has ongoing illnesses.  We also discussed that it is not an end of life care service, but can help navigate symptoms and emotional support throughout their hospital stay here.     Ethical and Legal Aspects: NA   Capacity- A&Ox3, has medical decision making capacity but defers to her children  HCP/Surrogate: Ktae Partida, daughter [phone]  Code Status: DNR/DNI  MOLST: MOLST with limitations of DNR/DNI  Dispo Plan: [facility]  Discussed With: Dr. Mancia coordinated with attending and SW and RN            Time Spent: 75 minutes including the care, coordination and counseling of this patient, 50% of which was spent coordinating and counseling.

## 2024-04-16 NOTE — PROGRESS NOTE ADULT - REASON FOR ADMISSION
s/p fall sacral fx

## 2024-04-16 NOTE — PROGRESS NOTE ADULT - ASSESSMENT
#Considering overall clinical situation, her age. pt and pt's family decided to pursue hospice. discharge plan for tomorrow to assisted living facility with hospice care     #Acute hypoxic respiratory failure   -probably atelectasis and COPD exacerbation   -for now ct abx for possible gram negative pneumonia   -spirometry   -ct inhaled steroids   -stable   -  #Hyponatremia   -stable   -as per family chronic problem   -water restriction   -due to  lasix and free water intake     #Nauseous today   -due to gastritis   -stable on PPI/sucralfate       #Sacral Fracture:    -pain control     #Urinary Retention:    Cont nova.  Appreciate urology eval.    Recommend d/c with nova.    No UTI>      #Adrenal Insufficiency:    Cont home meds.  Florinef/ hydrocortisone.      #PAF:    No on AC.  Cont amio.      #HTN:    Cont home meds.  Losartan/ Norvasc/ Hydralazine/ Coreg.      #Anemia:    Secondary to bleed

## 2024-04-17 ENCOUNTER — TRANSCRIPTION ENCOUNTER (OUTPATIENT)
Age: 89
End: 2024-04-17

## 2024-04-17 VITALS — SYSTOLIC BLOOD PRESSURE: 149 MMHG | HEART RATE: 56 BPM | DIASTOLIC BLOOD PRESSURE: 54 MMHG

## 2024-04-17 LAB
ALBUMIN SERPL ELPH-MCNC: 2.1 G/DL — LOW (ref 3.3–5)
ANION GAP SERPL CALC-SCNC: 5 MMOL/L — SIGNIFICANT CHANGE UP (ref 5–17)
BUN SERPL-MCNC: 26 MG/DL — HIGH (ref 7–23)
CALCIUM SERPL-MCNC: 8.9 MG/DL — SIGNIFICANT CHANGE UP (ref 8.5–10.1)
CHLORIDE SERPL-SCNC: 93 MMOL/L — LOW (ref 96–108)
CO2 SERPL-SCNC: 29 MMOL/L — SIGNIFICANT CHANGE UP (ref 22–31)
CREAT SERPL-MCNC: 0.96 MG/DL — SIGNIFICANT CHANGE UP (ref 0.5–1.3)
EGFR: 53 ML/MIN/1.73M2 — LOW
GLUCOSE SERPL-MCNC: 122 MG/DL — HIGH (ref 70–99)
PHOSPHATE SERPL-MCNC: 1.9 MG/DL — LOW (ref 2.5–4.5)
POTASSIUM SERPL-MCNC: 4.2 MMOL/L — SIGNIFICANT CHANGE UP (ref 3.5–5.3)
POTASSIUM SERPL-SCNC: 4.2 MMOL/L — SIGNIFICANT CHANGE UP (ref 3.5–5.3)
SODIUM SERPL-SCNC: 127 MMOL/L — LOW (ref 135–145)

## 2024-04-17 PROCEDURE — 99239 HOSP IP/OBS DSCHRG MGMT >30: CPT

## 2024-04-17 RX ADMIN — LOSARTAN POTASSIUM 50 MILLIGRAM(S): 100 TABLET, FILM COATED ORAL at 12:13

## 2024-04-17 RX ADMIN — AMLODIPINE BESYLATE 5 MILLIGRAM(S): 2.5 TABLET ORAL at 12:13

## 2024-04-17 RX ADMIN — BUDESONIDE AND FORMOTEROL FUMARATE DIHYDRATE 2 PUFF(S): 160; 4.5 AEROSOL RESPIRATORY (INHALATION) at 08:12

## 2024-04-17 RX ADMIN — SPIRONOLACTONE 25 MILLIGRAM(S): 25 TABLET, FILM COATED ORAL at 12:14

## 2024-04-17 RX ADMIN — Medication 1 GRAM(S): at 05:45

## 2024-04-17 RX ADMIN — Medication 10 MILLIGRAM(S): at 12:14

## 2024-04-17 RX ADMIN — Medication 10 MILLILITER(S): at 05:46

## 2024-04-17 RX ADMIN — Medication 3 MILLILITER(S): at 08:11

## 2024-04-17 RX ADMIN — Medication 100 MILLIGRAM(S): at 05:44

## 2024-04-17 RX ADMIN — CARVEDILOL PHOSPHATE 25 MILLIGRAM(S): 80 CAPSULE, EXTENDED RELEASE ORAL at 12:12

## 2024-04-17 RX ADMIN — CEFEPIME 1000 MILLIGRAM(S): 1 INJECTION, POWDER, FOR SOLUTION INTRAMUSCULAR; INTRAVENOUS at 05:46

## 2024-04-17 RX ADMIN — FLUDROCORTISONE ACETATE 0.05 MILLIGRAM(S): 0.1 TABLET ORAL at 12:14

## 2024-04-17 NOTE — DISCHARGE NOTE NURSING/CASE MANAGEMENT/SOCIAL WORK - NSDCVIVACCINE_GEN_ALL_CORE_FT
COVID-19, mRNA, LNP-S, PF, 30 mcg/0.3 mL dose, andrew-sucrose (Pf[employer]); 08-Sep-2022 14:07; Akua Brumfield (RN); [employer]; [id number] (Exp. Date: 31-Oct-2022); IntraMuscular; Deltoid Left.; 0.3 milliLiter(s);   Tdap; 10-Jul-2021 13:25; Starr Zazueta (KENNETH); [employer]; [id number] (Exp. Date: 25-Nov-2022); IntraMuscular; Deltoid Left.; 0.5 milliLiter(s); VIS (VIS Published: 09-May-2013, VIS Presented: 10-Jul-2021);

## 2024-04-17 NOTE — DISCHARGE NOTE NURSING/CASE MANAGEMENT/SOCIAL WORK - PATIENT PORTAL LINK FT
You can access the FollowMyHealth Patient Portal offered by [facility] by registering at the following website: [email or web address]. By joining [facility]’s FollowMyHealth portal, you will also be able to view your health information using other applications (apps) compatible with our system.

## 2024-04-17 NOTE — DISCHARGE NOTE NURSING/CASE MANAGEMENT/SOCIAL WORK - NSDCPEFALRISK_GEN_ALL_CORE
For information on Fall & Injury Prevention, visit: [email or web address] OR  [email or web address] OR  https://www.cdc.gov/steadi/patient.html

## 2024-04-23 DIAGNOSIS — I48.0 PAROXYSMAL ATRIAL FIBRILLATION: ICD-10-CM

## 2024-04-23 DIAGNOSIS — S32.10XA UNSPECIFIED FRACTURE OF SACRUM, INITIAL ENCOUNTER FOR CLOSED FRACTURE: ICD-10-CM

## 2024-04-23 DIAGNOSIS — E27.40 UNSPECIFIED ADRENOCORTICAL INSUFFICIENCY: ICD-10-CM

## 2024-04-23 DIAGNOSIS — Z88.0 ALLERGY STATUS TO PENICILLIN: ICD-10-CM

## 2024-04-23 DIAGNOSIS — N18.9 CHRONIC KIDNEY DISEASE, UNSPECIFIED: ICD-10-CM

## 2024-04-23 DIAGNOSIS — Z66 DO NOT RESUSCITATE: ICD-10-CM

## 2024-04-23 DIAGNOSIS — J96.01 ACUTE RESPIRATORY FAILURE WITH HYPOXIA: ICD-10-CM
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
## 2024-04-23 DIAGNOSIS — R33.9 RETENTION OF URINE, UNSPECIFIED: ICD-10-CM
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
## 2024-04-23 DIAGNOSIS — E43 UNSPECIFIED SEVERE PROTEIN-CALORIE MALNUTRITION: ICD-10-CM

## 2024-04-23 DIAGNOSIS — E87.1 HYPO-OSMOLALITY AND HYPONATREMIA: ICD-10-CM

## 2024-04-23 DIAGNOSIS — Z88.2 ALLERGY STATUS TO SULFONAMIDES: ICD-10-CM

## 2024-04-23 DIAGNOSIS — I13.0 HYPERTENSIVE HEART AND CHRONIC KIDNEY DISEASE WITH HEART FAILURE AND STAGE 1 THROUGH STAGE 4 CHRONIC KIDNEY DISEASE, OR UNSPECIFIED CHRONIC KIDNEY DISEASE: ICD-10-CM

## 2024-04-23 DIAGNOSIS — J18.9 PNEUMONIA, UNSPECIFIED ORGANISM: ICD-10-CM

## 2024-04-23 DIAGNOSIS — Z85.72 PERSONAL HISTORY OF NON-HODGKIN LYMPHOMAS: ICD-10-CM

## 2024-04-23 DIAGNOSIS — Z88.1 ALLERGY STATUS TO OTHER ANTIBIOTIC AGENTS STATUS: ICD-10-CM

## 2024-04-23 DIAGNOSIS — D50.0 IRON DEFICIENCY ANEMIA SECONDARY TO BLOOD LOSS (CHRONIC): ICD-10-CM

## 2024-04-23 DIAGNOSIS — I50.32 CHRONIC DIASTOLIC (CONGESTIVE) HEART FAILURE: ICD-10-CM

## 2024-05-03 NOTE — ED ADULT NURSE NOTE - NS ED NOTE ABUSE SUSPICION NEGLECT YN
In an effort to ensure that our patients LiveWell, a Team Member has reviewed your chart and identified an opportunity to provide the best care possible. An attempt was made to discuss or schedule overdue Preventive or Disease Management screening.     The Outcome was Contact was made, appointment scheduled. Care Gaps include Breast Cancer Screening and Diabetes.     No Albendazole Counseling:  I discussed with the patient the risks of albendazole including but not limited to cytopenia, kidney damage, nausea/vomiting and severe allergy.  The patient understands that this medication is being used in an off-label manner.

## 2024-05-29 NOTE — DIETITIAN INITIAL EVALUATION ADULT - NUTRITION CONSULT
Quality 130: Documentation Of Current Medications In The Medical Record: Current Medications Documented
Detail Level: Detailed
yes

## 2024-09-09 NOTE — PHYSICAL THERAPY INITIAL EVALUATION ADULT - SHORT TERM MEMORY, REHAB EVAL
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 Her pathology results showed Left breast biopsy - Atypia. All of the pt's questions were answered.  STAGE 0 R BREAST CA PLAN -pt to meet med onc for hormonal therapy  RAD ONC CONSULTATION -bilateral mammo in 6 months  -follow up after that kelsey CONTRERAS 
[FreeTextEntry1] : Stefani Aparicio is a 61 y/o F with a history of right breast DCIS, ER/CT (+) and LEFT breast BIRADS3 abnormality and a PSHx of right lumpectomy. Her pathology results showed Left breast biopsy - Atypia. All of the pt's questions were answered.  STAGE 0 R BREAST CA PLAN -pt to meet med onc for hormonal therapy  RAD ONC CONSULTATION -bilateral mammo in 6 months  -follow up after that kelsey CONTRERAS 
intact

## 2024-09-10 NOTE — ED ADULT NURSE NOTE - IN THE PAST 12 MONTHS HAVE YOU USED DRUGS OTHER THAN THOSE REQUIRED FOR MEDICAL REASON?
MEDICAL ICU PROGRESS NOTE  09/10/2024    Date of Service (when I saw the patient): 09/10/2024    ASSESSMENT: Massiel Flaherty is a 53 year old female with PMH Anxiety/depression, fibromyalgia, c/f nonepileptic seizures not on AEDs, hypothyroidism, asthma, HLD, MURIEL on CPAP, expressive aphasia, hypertension  who was admitted on 8/3/2024 for fatigue, fever and dyspnea and intubated 8/6/24 at OSH for ARDS, proned and paralyzed without improvement. Transferred to [facility] 8/8/24, hypoxic with high plateaus/peaks and placed on VV ECMO and CRRT. Decannulated from VV ECMO 8/18 and transferred to MICU 8/26/24 for ongoing management. Extubated 8/27 then reintubated 8/29 iso worsening AHRF and pseudomonas pneumonia.     CHANGES and MAJOR THINGS TODAY:  - Improved ventilation and oxygenation overnight, hypertensive rather than hypotensive off pressers  - Will give paralytic holiday  - PACs/SVTs this morning, could be related to high doses of ketamine so if continues will discontinue ketamine drip and restart propofol infusion  - Decrease epoprostenol to 10, if stable FiO2 today will discontinue tonight    Neuro:  # Pain and sedation  # Acute pain  # Sedation and analgesia for vent compliance   # Concern for ICU delirium   # Hx Fibromyalgia   # Hx myalgic encephalomyelitis   # Hx anxiety/depression  - Monitor neurological status. Delirium preventions and precautions.   - Pain: Scheduled: tylenol, oxycodone 10mg q 4hr, Ativan 4 mg every 6 hours for further sedation   PRN: tylenol, oxycodone  - Sedation plan: dilaudid (rotated from fentanyl 9/5), midazolam, ketamine (9/9-)  - Paralysis: Cys holiday 9/10   - discontinued propofol due to elevated triglycerides , Tgs back below 400 so ok to restart if need sedation  - RASS goal -4 to -5  - Gabapentin 300mg TID held while sedated  - Seroquel 25mg BID PRN held while sedated  - PTA Venlafaxine and Amitriptyline discontinued while sedated    # Hx spells with staring and BUE posturing  previously described as nonepileptic seizures   # Hx of GTC seizures and myoclonic epilepsy, weaned off AEDs   # Diffuse cerebral edema - improved  - Sodium goals liberalized to 140-145  - 8/21: MRI Brain: no acute intracranial pathology. No findings to suggest anoxic brain injury.     Pulmonary:  # Acute hypoxic respiratory failure c/b ARDS  # Pseudomonas pneumonia  # Mechanical ventilation  # s/p VV ECMO 8/8 - 8/18   # Hx CAP  # Asthma  # MURIEL on home CPAP  PFT dated 9/8/2020 demonstrated normal spirometry with mild diffusion impairment and mild restriction (FEV1/FVC 80%, FEV1 2.59, DLCO 40%). CT abdomen chest 3/9/2020 demonstrated scarring and fibrosis bilaterally which correlated with the decreased DLCO. The patient also has a history of MURIEL on CPAP therapy as currently managed by her provider in [place]. Unclear what triggered ARDS or why she has had such severe hospital course, further investigated ILD but results all unremarkable. Extubated 8/27, reintubated 8/29 for worsening O2 demands and diffuse opacities iso new/recurrent psuedomonas pneumonia. Bronch with BAL 8/30, pseudomonas growing as below.   - ILD w/u aldolase, EVELIO, RF, CCP, ANCA, MPO, SSA Ro and La, Scleroderma antibody, Radha 1,  hypersensity pneumonitis, IGG subclasses,  aspergillus, blastomyces, histoplasma neg  - SpO2 goals >92%, PaO2 goals >60   - PTA singular at bedtime if able  - Scheduled pulmicort, and duonebs   - Lung protective ventilation strategies given ARDS  - Methylpred 125mg q6H x 24h 8/30 --> transition to 20 mg dex x 5 days, followed by 10mg x 5 days  - Epoprostenol restarted 9/8 at 20 --> 10 9/10, consider discontinuing later tonight 9/10  - Wean vent as able  FiO2 (%): 40 %, Resp: 28, Vent Mode: CMV/AC, Resp Rate (Set): 28 breaths/min, Tidal Volume (Set, mL): 400 mL, PEEP (cm H2O): 10 cmH2O, Resp Rate (Set): 28 breaths/min, Tidal Volume (Set, mL): 400 mL, PEEP (cm H2O): 10 cmH2O    Cardiovascular:  # Hyperlipidemia  #  Hypotension  # Hx HTN  - MAP goal >65, SBP goals >110  - NE, vaso for MAP goal  - PTA atorvastatin  - PTA clonidine held while sedated  - Lower extremity ultrasound without vascular pathology, no hematoma or clots  - Monitor discoloration of extremities for necrosis  - Monitoring Hypertension  - PRN hydralazine for SBP > 200     Sinus Tachycardia  Likely 2/2 fluid removal, sepsis, pain and sedation as above    GI/Nutrition:  # Moderate protein calorie malnutrition  # Non-infectious Diarrhea  # GERD   - Protonix (home medication)   - Nutrition consulted, appreciate recs  - Diet: TF via NJ, transitioned to trickle feeds 9/5 given pressor requirements  - Hold bowel regimen d/t loose stools  - Change suspension meds to other form as able  - Continue scheduled multivitamin, banatrol, prosource packet  - loperamide PRN  - Rectal tube, continues with high output    Renal/Fluids/Electrolytes:  # Acute kidney injury  # Renal failure  Baseline Cr approx 0.6. Pt was anuric here but now making some urine, likely prerenal due to shock.  - Continue CRRT; fluid goal net negative 1L for the day  - Heparinized CRRT circuit, low intensity protocol. Last clotted 8/24 PM  - Strict I&Os  - Bladder scan q shift  - Avoid nephrotoxins as able    Endocrine:  # Steroid and stress induced hyperglycemia  # Hypothyroidism   - Goal to keep BG < 180 for optimal wound healing.   - with rising blood glucose due to steroids started insulin drip 8/30, discontinued 9/7  - sliding scale insulin   - Continue PTA synthroid    ID:  # Leukocytosis  # Psuedomonas pneumonia  # Hx CAP  Respiratory cx 8/29 pseudomonas pneumonia. Intermediate susceptability to meropenem, more significant sensitivities to ciprofloxacin  - Continue to monitor fever curve and inflammatory markers as appropriate  - ID consulted, appreciate recs.   - Due to rigors seen on exam 8/29, low CRRT set temp masking any possible fevers, worsening O2 requirements, took blood cultures and  started antibiotics    Abx  - Meropenem 8/29 - 9/1  - Vancomycin 8/29- 8/30  - Tobramycin x 1 8/29  - Vancomycin 9/5-9/8    - Tobramycin nebs BID 9/1-9/11  - Ciprofloxacin infusion 9/1- 9/11  - Metronidazole 9/6 - 9/11    PJP Ppx  Given Dex-ARDS Massiel ferraro remain on steriods for > 3 weeks so will initiate PJP ppx. 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   - Ganciclovir 9/6 - 9/8, discontinued given ID recs  - trend CMV 9/9    # HSV-1  Mouth sores present, which could have viral etiology. 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 Monitor and trend.   - Heparinize CRRT circuit    - CTAP 8/30 due to acute hemoglobin drop requiring transfusion of 2 x 1 unit of blood 12 hours apart. Negative for acute bleed    Musculoskeletal/Rheum:  # Alopecia  - Hold PTA hydroxychloroquine     # Weakness and deconditioning of critical illness  # Left ankle injury pre-hospitalization    - Physical and occupational therapy when able.     Skin:  # BLE scattered ecchymosis  # Left toe duskiness  - Bilateral lower leg ecchymosis  - WOC consult  - Ecchymosis to left foot, most noticeable to 3rd and 4th toes    General Cares/Prophylaxis:  DVT Prophylaxis: Heparin through CRRT circuit  GI Prophylaxis: PPI  Restraints: no  Code Status: DNR/OK to intubate    Lines/tubes/drains:  - ETT (8/29)  - NJ (8/9)  - LIJ CVC (8/8)  - Radial a-line (8/29)  - PIV x3  - Rectal tube (8/17)  - Tunneled HD line (8/23)    Disposition:  - Medical ICU     Patient seen and findings/plan discussed with medical ICU staff, Dr. Dubon.    I spent a total of 45 minutes (excluding procedure time) personally providing and directing  critical care services at the bedside and on the critical care unit for Massiel Flaherty     Gaudencio De Souza MD    Clinically Significant Risk Factors              # Hypoalbuminemia: Lowest albumin = 2.2 g/dL at 8/10/2024  9:47 AM, will monitor as appropriate                # Severe Malnutrition: based on nutrition assessment      # Financial/Environmental Concerns: none        Code Status: No CPR- Pre-arrest intubation OK       ====================================  INTERVAL HISTORY:   Improved ventilation and oxygenation overnight, hypertensive rather than hypotensive off pressers. Some PACs vs Svts this AM but self resolved.     OBJECTIVE:   1. VITAL SIGNS:   Temp:  [97  F (36.1  C)-99  F (37.2  C)] 97.2  F (36.2  C)  Pulse:  [] 105  Resp:  [13-28] 28  MAP:  [65 mmHg-110 mmHg] 99 mmHg  Arterial Line BP: ()/(48-79) 154/72  FiO2 (%):  [40 %-55 %] 40 %  SpO2:  [95 %-100 %] 100 %  FiO2 (%): 40 %, Resp: 28, Vent Mode: CMV/AC, Resp Rate (Set): 28 breaths/min, Tidal Volume (Set, mL): 400 mL, PEEP (cm H2O): 10 cmH2O, Resp Rate (Set): 28 breaths/min, Tidal Volume (Set, mL): 400 mL, PEEP (cm H2O): 10 cmH2O  2. INTAKE/ OUTPUT:   I/O last 3 completed shifts:  In: 3657.2 [I.V.:2112.2; NG/GT:510]  Out: 3981 [Urine:13; Other:3143; Stool:825]    3. PHYSICAL EXAMINATION:  General: Intubated, sedated  HEENT: Normocephalic, atraumatic, eyes with ointment dressing  Neuro: RASS -4-5, arefelexic  Pulm/Resp: Bilateral breath sounds audible with diffuse coarse breath sounds  CV: Sinus tachycardia, s1/2 normal, no murmur  Abdomen: Soft, non-distended, limited 2/2 to sedation  : deferred  Incisions/Skin: lower leg 1+ edema with ecchymosis present and scattered. Some bluish discoloration of the finger tips, capillary refill normal    4. LABS:   Arterial Blood Gases   Recent Labs   Lab 09/10/24  0314 09/09/24  1542 09/09/24  1049 09/09/24  0343   PH 7.45 7.37 7.31* 7.35   PCO2 36 43 49* 44   PO2 103 81 70* 64*   HCO3 25 25 25  24     Complete Blood Count   Recent Labs   Lab 09/10/24  0314 09/09/24  0343 09/08/24  0329 09/07/24  0332   WBC 16.5* 15.0* 15.0* 11.8*   HGB 7.5* 7.2* 7.5* 7.6*    153 185 171     Basic Metabolic Panel  Recent Labs   Lab 09/10/24  0436 09/10/24  0314 09/10/24  0041 09/09/24 2055 09/09/24 2002 09/09/24 2000 09/09/24  1540 09/09/24  1147 09/09/24  0802 09/09/24  0343   NA  --  136  --   --   --  139  --  138  --  135   POTASSIUM  --  3.6  --   --   --  3.8  --  3.5  --  3.5   CHLORIDE  --  102  --   --   --  105  --  105  --  104   CO2  --  22  --   --   --  22  --  21*  --  22   BUN  --  21.7*  --   --   --  21.5*  --  22.6*  --  21.7*   CR  --  0.45*  --   --   --  0.48*  --  0.46*  --  0.48*   * 158* 135* 137*   < > 151*   < > 219*   < > 233*    < > = values in this interval not displayed.     Liver Function Tests  Recent Labs   Lab 09/10/24  0314 09/09/24  2000 09/09/24  1147 09/09/24  0343 09/08/24  1155 09/08/24 0329 09/07/24  1210 09/07/24  0332   AST 19  --   --  42  --  28  --  29   ALT 22  --   --  26  --  28  --  29   ALKPHOS 100  --   --  93  --  85  --  86   BILITOTAL 0.2  --   --  0.2  --  0.2  --  0.2   ALBUMIN 3.0* 3.1* 3.1* 2.9*   < > 3.1*   < > 3.1*    < > = values in this interval not displayed.     Coagulation Profile  No lab results found in last 7 days.    5. RADIOLOGY:   No results found for this or any previous visit (from the past 24 hour(s)).       No

## 2024-10-12 ENCOUNTER — EMERGENCY (EMERGENCY)
Facility: HOSPITAL | Age: 89
LOS: 0 days | Discharge: HOME | End: 2024-10-12
Attending: EMERGENCY MEDICINE
Payer: MEDICARE

## 2024-10-12 VITALS
TEMPERATURE: 98 F | OXYGEN SATURATION: 95 % | WEIGHT: 104.94 LBS | DIASTOLIC BLOOD PRESSURE: 58 MMHG | SYSTOLIC BLOOD PRESSURE: 176 MMHG | RESPIRATION RATE: 18 BRPM | HEART RATE: 83 BPM

## 2024-10-12 VITALS
SYSTOLIC BLOOD PRESSURE: 145 MMHG | DIASTOLIC BLOOD PRESSURE: 53 MMHG | TEMPERATURE: 98 F | RESPIRATION RATE: 18 BRPM | OXYGEN SATURATION: 99 % | HEART RATE: 67 BPM

## 2024-10-12 DIAGNOSIS — R10.2 PELVIC AND PERINEAL PAIN: ICD-10-CM

## 2024-10-12 DIAGNOSIS — Z71.1 PERSON WITH FEARED HEALTH COMPLAINT IN WHOM NO DIAGNOSIS IS MADE: ICD-10-CM

## 2024-10-12 DIAGNOSIS — Z96.0 PRESENCE OF UROGENITAL IMPLANTS: ICD-10-CM

## 2024-10-12 DIAGNOSIS — Z88.2 ALLERGY STATUS TO SULFONAMIDES: ICD-10-CM

## 2024-10-12 DIAGNOSIS — Z90.710 ACQUIRED ABSENCE OF BOTH CERVIX AND UTERUS: Chronic | ICD-10-CM

## 2024-10-12 DIAGNOSIS — E87.1 HYPO-OSMOLALITY AND HYPONATREMIA: ICD-10-CM

## 2024-10-12 DIAGNOSIS — Z88.1 ALLERGY STATUS TO OTHER ANTIBIOTIC AGENTS: ICD-10-CM

## 2024-10-12 DIAGNOSIS — Z88.0 ALLERGY STATUS TO PENICILLIN: ICD-10-CM

## 2024-10-12 DIAGNOSIS — Z90.49 ACQUIRED ABSENCE OF OTHER SPECIFIED PARTS OF DIGESTIVE TRACT: Chronic | ICD-10-CM

## 2024-10-12 DIAGNOSIS — I11.0 HYPERTENSIVE HEART DISEASE WITH HEART FAILURE: ICD-10-CM

## 2024-10-12 DIAGNOSIS — Z86.79 PERSONAL HISTORY OF OTHER DISEASES OF THE CIRCULATORY SYSTEM: Chronic | ICD-10-CM

## 2024-10-12 DIAGNOSIS — I50.9 HEART FAILURE, UNSPECIFIED: ICD-10-CM

## 2024-10-12 PROCEDURE — 99283 EMERGENCY DEPT VISIT LOW MDM: CPT

## 2024-10-12 PROCEDURE — 99284 EMERGENCY DEPT VISIT MOD MDM: CPT

## 2024-11-13 NOTE — INPATIENT CERTIFICATION FOR MEDICARE PATIENTS - IN ORDER TO MEET MEDICARE REQUIREMENTS.
I'm eight weeks pregnant and I woke up to go to the bathroom and I'm spotting - patient
In order to meet Medicare requirements, the clinical documentation must support the information cited in the admission order.  Please be sure to provide detailed and clear documentation about the following in the admitting note/history and physical:

## 2024-11-21 NOTE — DISCHARGE NOTE PROVIDER - NSDCMRMEDTOKEN_GEN_ALL_CORE_FT
Chart(s) acetaminophen 500 mg oral tablet: 1 tab(s) orally every 8 hours, As Needed  amiodarone 200 mg oral tablet: 1 tab(s) orally once a day  amLODIPine 10 mg oral tablet: 1 tab(s) orally once a day  carvedilol 25 mg oral tablet: 1 tab(s) orally every 12 hours  Claritin 10 mg oral tablet: 1 tab(s) orally once a day, As Needed  Cranberry oral tablet: 1 tab(s) 500mg orally once a day as needed  cyanocobalamin 1000 mcg oral tablet: 1 tab(s) orally once a day  Cystex Urinary Pain Relief 162 mg-162.5 mg oral tablet: 1 tab(s) orally once a day, As Needed  famotidine 20 mg oral tablet: 1 tab(s) orally 2 times a day  ferrous sulfate 325 mg (65 mg elemental iron) oral tablet: 1 tab(s) orally once a day  fludrocortisone 0.1 mg oral tablet: 0.5 tab(s) orally once a day  furosemide 40 mg oral tablet: 1 tab(s) orally 2 times a day  hydrALAZINE 100 mg oral tablet: 1 tab(s) orally 3 times a day   hydrocortisone 10 mg oral tablet: 1 tab(s) orally once a day (in the morning)  hydrocortisone 5 mg oral tablet: 1 tab(s) orally once a day (in the evening)  Icy Hot Advanced Pain Relief 5% topical pad: Apply topically to affected area 3 times a day, As Needed  Livalo 2 mg oral tablet: 1 tab(s) orally once a day  losartan 50 mg oral tablet: 2 tab(s) orally once a day  Melatonin 3 mg oral tablet: 1 tab(s) orally once a day (at bedtime), As Needed  MiraLax oral powder for reconstitution: 17 gram(s) orally once a day, As Needed  Pepto-Bismol 262 mg/15 mL oral suspension: 15 milliliter(s) orally once a day, As Needed  potassium chloride 10 mEq oral capsule, extended release: 2 cap(s) orally once a day  Senna 8.6 mg oral tablet: 2 tab(s) orally once a day  traMADol 50 mg oral tablet: 1 tab(s) orally every 8 hours, As needed, Severe Pain (7 - 10) MDD:150mg  Vitamin D3 50 mcg (2000 intl units) oral tablet: 1 tab(s) orally once a day

## 2024-12-06 NOTE — ED ADULT TRIAGE NOTE - BSA (M2)
Outpatient Physical Therapy  [place]           [x] Phone: [phone]   Fax: [phone]  [place]           [] Phone: [phone]   Fax: [phone]        Physical Therapy Daily Treatment Note  Date:  2024    Patient Name:  Jojo Stone    :  1959  MRN: [id number]  Restrictions/Precautions: n/a  Diagnosis: Tear of medial meniscus left knee  Date of Injury/Surgery: 24  Treatment Diagnosis:  R knee pain, RLE weakness, impaired gait  Insurance/Certification information: Medicare - BOMN  Referring Physician:   Arthur Vásquez MD   Next Doctor Visit:    Plan of care signed (Y/N):  Y  Outcome Measure: KOOS:   Visit# / total visits:     Pain level:    9/10       Goals:     Patient goals: reduce pain: Progressing   Short term goals  Time Frame for Short term goals: Refer to [facility]  Long Term Goals  Time Frame for Long Term Goals: 10 visits  Pt will improve RLE gross strength to 3+/5 to aide in ADLs: Progressing   Pt will improve R knee AROM ext to 0 deg to aide in TKE during gait and reduced falls: Progressing   Pt will improve R knee AROM flex to 100 deg to aide in curb navigation: Progressing   Pt will improve KOOS to 18 or less to show subjective report in condition: Not MET   Pt will improve 5x STS to 50 seconds or less to show improved transfer independence: Progressing          Summary of Evaluation:  Assessment: Pt is a 65-year-old female who presents to therapy s/p R knee arthroscopy w/ partial lateral meniscectomy with chondroplasty performed on 24. Upon assessment, pt does present with impaired RLE strength, impaired gait, impaired balance, impaired R knee ROM, R knee pain and overall reduced independence with ADLs and IADLs after surgery. Pt would benefit from skilled therapy interventions to address listed impairments, progress toward goal completion, and improve ADL/IADL status. PT also warranted to reduce risk for further injury or  1.7

## 2024-12-11 NOTE — ED PROVIDER NOTE - PRO INTERPRETER NEED 2
English
Partially impaired: cannot see medication labels or newsprint, but can see obstacles in path, and the surrounding layout; can count fingers at arm's length

## 2024-12-19 NOTE — ED PROVIDER NOTE - AGGRAVATING FACTORS
12/20/24                            Corry Moreno  [address]  [place] [address]    To Whom It May Concern:    This is to certify Corry Moreno was evaluated with Sugey Roach PA-C and can return on 12/23/2024.                Electronically signed by:  Sugey Roach PA-C  [facility]--[address]  [address]ST FLO[address]  [facility] [address]  Dept Phone: [phone]        none

## 2025-01-11 NOTE — PATIENT PROFILE ADULT - LIVING ENVIRONMENT
Lab Results   Component Value Date    CHOLESTEROL 116 01/07/2025    CHOLESTEROL 120 08/22/2020    TRIG 74 01/07/2025    TRIG 92 08/22/2020    HDL 47 01/07/2025    HDL 54 08/22/2020    LDLCALC 54 01/07/2025    LDLCALC 48 08/22/2020   Continue to monitor   no

## 2025-01-18 NOTE — PATIENT PROFILE ADULT - NSTRANSFERHEARINGAID_GEN_A_NUR
bilateral Patient received supine in bed; No apparent distress. (+) 4L of oxygen via NC unable to assess 2*impaired cognition

## 2025-05-02 NOTE — ED PROVIDER NOTE - CROS ED CARDIOVAS ALL NEG
-- DO NOT REPLY / DO NOT REPLY ALL --  -- This inbox is not monitored. If this was sent to the wrong provider or department, reroute message to P ECO Reroute pool. --  -- Message is from Engagement Center Operations (ECO) --    General Patient Message: patient is calling back to get a status update on the medication  sent over to the pharmacy please advise and call back as soon as possible   Caller Information       Contact Date/Time Type Contact Phone/Fax    05/01/2025 02:47 PM CDT Phone (Incoming) elliot [phone]    05/02/2025 12:35 PM CDT Phone (Incoming) elliot (Self) [phone]            Alternative phone number:     Can a detailed message be left? Yes - [employer] Message   Patient has been advised the message will be addressed within 2-3 business days.                 - - -

## 2025-05-14 NOTE — ED ADULT TRIAGE NOTE - CADM TRG TX PRIOR TO ARRIVAL
Faxed pw again to [facility]   It was originally sent in March   But we refaxed it      medications

## 2025-07-02 NOTE — PATIENT PROFILE ADULT - INSURANCE HELP
Detail Level: Generalized Patient Specific Counseling (Will Not Stick From Patient To Patient): Cerave moisturizer or Lipikar AP Balm Detail Level: Detailed High Dose Vitamin A Pregnancy And Lactation Text: High dose vitamin A therapy is contraindicated during pregnancy and breast feeding. Topical Clindamycin Pregnancy And Lactation Text: This medication is Pregnancy Category B and is considered safe during pregnancy. It is unknown if it is excreted in breast milk. Doxycycline Counseling:  Patient counseled regarding possible photosensitivity and increased risk for sunburn.  Patient instructed to avoid sunlight, if possible.  When exposed to sunlight, patients should wear protective clothing, sunglasses, and sunscreen.  The patient was instructed to call the office immediately if the following severe adverse effects occur:  hearing changes, easy bruising/bleeding, severe headache, or vision changes.  The patient verbalized understanding of the proper use and possible adverse effects of doxycycline.  All of the patient's questions and concerns were addressed. Topical Sulfur Applications Pregnancy And Lactation Text: This medication is Pregnancy Category C and has an unknown safety profile during pregnancy. It is unknown if this topical medication is excreted in breast milk. Detail Level: Zone Dapsone Counseling: I discussed with the patient the risks of dapsone including but not limited to hemolytic anemia, agranulocytosis, rashes, methemoglobinemia, kidney failure, peripheral neuropathy, headaches, GI upset, and liver toxicity.  Patients who start dapsone require monitoring including baseline LFTs and weekly CBCs for the first month, then every month thereafter.  The patient verbalized understanding of the proper use and possible adverse effects of dapsone.  All of the patient's questions and concerns were addressed. Aklief counseling:  Patient advised to apply a pea-sized amount only at bedtime and wait 30 minutes after washing their face before applying.  If too drying, patient may add a non-comedogenic moisturizer.  The most commonly reported side effects including irritation, redness, scaling, dryness, stinging, burning, itching, and increased risk of sunburn.  The patient verbalized understanding of the proper use and possible adverse effects of retinoids.  All of the patient's questions and concerns were addressed. Tetracycline Counseling: Patient counseled regarding possible photosensitivity and increased risk for sunburn.  Patient instructed to avoid sunlight, if possible.  When exposed to sunlight, patients should wear protective clothing, sunglasses, and sunscreen.  The patient was instructed to call the office immediately if the following severe adverse effects occur:  hearing changes, easy bruising/bleeding, severe headache, or vision changes.  The patient verbalized understanding of the proper use and possible adverse effects of tetracycline.  All of the patient's questions and concerns were addressed. Patient understands to avoid pregnancy while on therapy due to potential birth defects. Isotretinoin Pregnancy And Lactation Text: This medication is Pregnancy Category X and is considered extremely dangerous during pregnancy. It is unknown if it is excreted in breast milk. Tazorac Pregnancy And Lactation Text: This medication is not safe during pregnancy. It is unknown if this medication is excreted in breast milk. Spironolactone Counseling: Patient advised regarding risks of diarrhea, abdominal pain, hyperkalemia, birth defects (for female patients), liver toxicity and renal toxicity. The patient may need blood work to monitor liver and kidney function and potassium levels while on therapy. The patient verbalized understanding of the proper use and possible adverse effects of spironolactone.  All of the patient's questions and concerns were addressed. Topical Retinoid Pregnancy And Lactation Text: This medication is Pregnancy Category C. It is unknown if this medication is excreted in breast milk. Birth Control Pills Counseling: Birth Control Pill Counseling: I discussed with the patient the potential side effects of OCPs including but not limited to increased risk of stroke, heart attack, thrombophlebitis, deep venous thrombosis, hepatic adenomas, breast changes, GI upset, headaches, and depression.  The patient verbalized understanding of the proper use and possible adverse effects of OCPs. All of the patient's questions and concerns were addressed. Benzoyl Peroxide Pregnancy And Lactation Text: This medication is Pregnancy Category C. It is unknown if benzoyl peroxide is excreted in breast milk. Bactrim Counseling:  I discussed with the patient the risks of sulfa antibiotics including but not limited to GI upset, allergic reaction, drug rash, diarrhea, dizziness, photosensitivity, and yeast infections.  Rarely, more serious reactions can occur including but not limited to aplastic anemia, agranulocytosis, methemoglobinemia, blood dyscrasias, liver or kidney failure, lung infiltrates or desquamative/blistering drug rashes. Winlevi Counseling:  I discussed with the patient the risks of topical clascoterone including but not limited to erythema, scaling, itching, and stinging. Patient voiced their understanding. Sarecycline Counseling: Patient advised regarding possible photosensitivity and discoloration of the teeth, skin, lips, tongue and gums.  Patient instructed to avoid sunlight, if possible.  When exposed to sunlight, patients should wear protective clothing, sunglasses, and sunscreen.  The patient was instructed to call the office immediately if the following severe adverse effects occur:  hearing changes, easy bruising/bleeding, severe headache, or vision changes.  The patient verbalized understanding of the proper use and possible adverse effects of sarecycline.  All of the patient's questions and concerns were addressed. Erythromycin Pregnancy And Lactation Text: This medication is Pregnancy Category B and is considered safe during pregnancy. It is also excreted in breast milk. Doxycycline Pregnancy And Lactation Text: This medication is Pregnancy Category D and not consider safe during pregnancy. It is also excreted in breast milk but is considered safe for shorter treatment courses. Minocycline Counseling: Patient advised regarding possible photosensitivity and discoloration of the teeth, skin, lips, tongue and gums.  Patient instructed to avoid sunlight, if possible.  When exposed to sunlight, patients should wear protective clothing, sunglasses, and sunscreen.  The patient was instructed to call the office immediately if the following severe adverse effects occur:  hearing changes, easy bruising/bleeding, severe headache, or vision changes.  The patient verbalized understanding of the proper use and possible adverse effects of minocycline.  All of the patient's questions and concerns were addressed. Bpo Recommendations: Adan Topical Clindamycin Counseling: Patient counseled that this medication may cause skin irritation or allergic reactions.  In the event of skin irritation, the patient was advised to reduce the amount of the drug applied or use it less frequently.   The patient verbalized understanding of the proper use and possible adverse effects of clindamycin.  All of the patient's questions and concerns were addressed. Azithromycin Counseling:  I discussed with the patient the risks of azithromycin including but not limited to GI upset, allergic reaction, drug rash, diarrhea, and yeast infections. Azelaic Acid Pregnancy And Lactation Text: This medication is considered safe during pregnancy and breast feeding. Sunscreen Recommendations: cerave Dapsone Pregnancy And Lactation Text: This medication is Pregnancy Category C and is not considered safe during pregnancy or breast feeding. Topical Sulfur Applications Counseling: Topical Sulfur Counseling: Patient counseled that this medication may cause skin irritation or allergic reactions.  In the event of skin irritation, the patient was advised to reduce the amount of the drug applied or use it less frequently.   The patient verbalized understanding of the proper use and possible adverse effects of topical sulfur application.  All of the patient's questions and concerns were addressed. Aklief Pregnancy And Lactation Text: It is unknown if this medication is safe to use during pregnancy.  It is unknown if this medication is excreted in breast milk.  Breastfeeding women should use the topical cream on the smallest area of the skin for the shortest time needed while breastfeeding.  Do not apply to nipple and areola. Birth Control Pills Pregnancy And Lactation Text: This medication should be avoided if pregnant and for the first 30 days post-partum. Topical Retinoids Recommendations: Patient only uses Bactrim as needed for flares. Tetracycline Pregnancy And Lactation Text: This medication is Pregnancy Category D and not consider safe during pregnancy. It is also excreted in breast milk. High Dose Vitamin A Counseling: Side effects reviewed, pt to contact office should one occur. Isotretinoin Counseling: Patient should get monthly blood tests, not donate blood, not drive at night if vision affected, not share medication, and not undergo elective surgery for 6 months after tx completed. Side effects reviewed, pt to contact office should one occur. Winlevi Pregnancy And Lactation Text: This medication is considered safe during pregnancy and breastfeeding. Tazorac Counseling:  Patient advised that medication is irritating and drying.  Patient may need to apply sparingly and wash off after an hour before eventually leaving it on overnight.  The patient verbalized understanding of the proper use and possible adverse effects of tazorac.  All of the patient's questions and concerns were addressed. Bactrim Pregnancy And Lactation Text: This medication is Pregnancy Category D and is known to cause fetal risk.  It is also excreted in breast milk. Topical Retinoid counseling:  Patient advised to apply a pea-sized amount only at bedtime and wait 30 minutes after washing their face before applying.  If too drying, patient may add a non-comedogenic moisturizer. The patient verbalized understanding of the proper use and possible adverse effects of retinoids.  All of the patient's questions and concerns were addressed. Cleanser Recommendations: cerave or cetaphil Spironolactone Pregnancy And Lactation Text: This medication can cause feminization of the male fetus and should be avoided during pregnancy. The active metabolite is also found in breast milk. Include Pregnancy/Lactation Warning?: Add Automatically Based on Childbearing Potential and Patient Age Benzoyl Peroxide Counseling: Patient counseled that medicine may cause skin irritation and bleach clothing.  In the event of skin irritation, the patient was advised to reduce the amount of the drug applied or use it less frequently.   The patient verbalized understanding of the proper use and possible adverse effects of benzoyl peroxide.  All of the patient's questions and concerns were addressed. Azithromycin Pregnancy And Lactation Text: This medication is considered safe during pregnancy and is also secreted in breast milk. Erythromycin Counseling:  I discussed with the patient the risks of erythromycin including but not limited to GI upset, allergic reaction, drug rash, diarrhea, increase in liver enzymes, and yeast infections. Azelaic Acid Counseling: Patient counseled that medicine may cause skin irritation and to avoid applying near the eyes.  In the event of skin irritation, the patient was advised to reduce the amount of the drug applied or use it less frequently.   The patient verbalized understanding of the proper use and possible adverse effects of azelaic acid.  All of the patient's questions and concerns were addressed. Use Enhanced Medication Counseling?: No no

## 2025-07-30 NOTE — ED ADULT NURSE NOTE - NS ED NURSE LEVEL OF CONSCIOUSNESS AFFECT
Pt states that she has had nausea, vomiting and fatigue that began approx 1 week ago.  Hx of gastric bypass   Calm